# Patient Record
Sex: FEMALE | Race: WHITE | Employment: UNEMPLOYED | URBAN - METROPOLITAN AREA
[De-identification: names, ages, dates, MRNs, and addresses within clinical notes are randomized per-mention and may not be internally consistent; named-entity substitution may affect disease eponyms.]

---

## 2015-01-30 LAB — HPV ABSTRACT: NORMAL

## 2017-05-10 ENCOUNTER — TELEPHONE (OUTPATIENT)
Dept: NURSING | Facility: CLINIC | Age: 42
End: 2017-05-10

## 2017-08-11 ENCOUNTER — APPOINTMENT (OUTPATIENT)
Dept: CT IMAGING | Facility: CLINIC | Age: 42
End: 2017-08-11
Attending: EMERGENCY MEDICINE
Payer: COMMERCIAL

## 2017-08-11 ENCOUNTER — APPOINTMENT (OUTPATIENT)
Dept: ULTRASOUND IMAGING | Facility: CLINIC | Age: 42
End: 2017-08-11
Attending: EMERGENCY MEDICINE
Payer: COMMERCIAL

## 2017-08-11 ENCOUNTER — HOSPITAL ENCOUNTER (EMERGENCY)
Facility: CLINIC | Age: 42
Discharge: HOME | End: 2017-08-11
Attending: EMERGENCY MEDICINE | Admitting: EMERGENCY MEDICINE
Payer: COMMERCIAL

## 2017-08-11 VITALS
SYSTOLIC BLOOD PRESSURE: 122 MMHG | RESPIRATION RATE: 16 BRPM | TEMPERATURE: 98.1 F | OXYGEN SATURATION: 97 % | DIASTOLIC BLOOD PRESSURE: 84 MMHG | BODY MASS INDEX: 43.4 KG/M2 | WEIGHT: 293 LBS | HEIGHT: 69 IN

## 2017-08-11 DIAGNOSIS — R17 TOTAL BILIRUBIN, ELEVATED: ICD-10-CM

## 2017-08-11 DIAGNOSIS — R11.2 NON-INTRACTABLE VOMITING WITH NAUSEA, UNSPECIFIED VOMITING TYPE: ICD-10-CM

## 2017-08-11 DIAGNOSIS — R74.8 ELEVATED LIPASE: ICD-10-CM

## 2017-08-11 LAB
ALBUMIN SERPL-MCNC: 3.4 G/DL (ref 3.4–5)
ALP SERPL-CCNC: 65 U/L (ref 40–150)
ALT SERPL W P-5'-P-CCNC: 49 U/L (ref 0–50)
ANION GAP SERPL CALCULATED.3IONS-SCNC: 13 MMOL/L (ref 3–14)
AST SERPL W P-5'-P-CCNC: 86 U/L (ref 0–45)
BASOPHILS # BLD AUTO: 0 10E9/L (ref 0–0.2)
BASOPHILS NFR BLD AUTO: 0.2 %
BILIRUB SERPL-MCNC: 1.6 MG/DL (ref 0.2–1.3)
BUN SERPL-MCNC: 7 MG/DL (ref 7–30)
CALCIUM SERPL-MCNC: 9 MG/DL (ref 8.5–10.1)
CHLORIDE SERPL-SCNC: 100 MMOL/L (ref 94–109)
CO2 SERPL-SCNC: 25 MMOL/L (ref 20–32)
CREAT SERPL-MCNC: 0.62 MG/DL (ref 0.52–1.04)
DIFFERENTIAL METHOD BLD: ABNORMAL
EOSINOPHIL # BLD AUTO: 0 10E9/L (ref 0–0.7)
EOSINOPHIL NFR BLD AUTO: 0.2 %
ERYTHROCYTE [DISTWIDTH] IN BLOOD BY AUTOMATED COUNT: 16.5 % (ref 10–15)
GFR SERPL CREATININE-BSD FRML MDRD: ABNORMAL ML/MIN/1.7M2
GLUCOSE SERPL-MCNC: 120 MG/DL (ref 70–99)
HCG SERPL QL: NEGATIVE
HCT VFR BLD AUTO: 43.8 % (ref 35–47)
HGB BLD-MCNC: 14.6 G/DL (ref 11.7–15.7)
IMM GRANULOCYTES # BLD: 0 10E9/L (ref 0–0.4)
IMM GRANULOCYTES NFR BLD: 0.3 %
INTERPRETATION ECG - MUSE: NORMAL
LIPASE SERPL-CCNC: 738 U/L (ref 73–393)
LYMPHOCYTES # BLD AUTO: 1.2 10E9/L (ref 0.8–5.3)
LYMPHOCYTES NFR BLD AUTO: 18.7 %
MCH RBC QN AUTO: 29.8 PG (ref 26.5–33)
MCHC RBC AUTO-ENTMCNC: 33.3 G/DL (ref 31.5–36.5)
MCV RBC AUTO: 89 FL (ref 78–100)
MONOCYTES # BLD AUTO: 0.5 10E9/L (ref 0–1.3)
MONOCYTES NFR BLD AUTO: 7.7 %
NEUTROPHILS # BLD AUTO: 4.8 10E9/L (ref 1.6–8.3)
NEUTROPHILS NFR BLD AUTO: 72.9 %
NRBC # BLD AUTO: 0 10*3/UL
NRBC BLD AUTO-RTO: 0 /100
PLATELET # BLD AUTO: 173 10E9/L (ref 150–450)
POTASSIUM SERPL-SCNC: 3.5 MMOL/L (ref 3.4–5.3)
PROT SERPL-MCNC: 7.7 G/DL (ref 6.8–8.8)
RBC # BLD AUTO: 4.9 10E12/L (ref 3.8–5.2)
SODIUM SERPL-SCNC: 138 MMOL/L (ref 133–144)
WBC # BLD AUTO: 6.6 10E9/L (ref 4–11)

## 2017-08-11 PROCEDURE — 96361 HYDRATE IV INFUSION ADD-ON: CPT

## 2017-08-11 PROCEDURE — 25000125 ZZHC RX 250: Performed by: EMERGENCY MEDICINE

## 2017-08-11 PROCEDURE — 99285 EMERGENCY DEPT VISIT HI MDM: CPT | Mod: 25

## 2017-08-11 PROCEDURE — 93005 ELECTROCARDIOGRAM TRACING: CPT

## 2017-08-11 PROCEDURE — 76705 ECHO EXAM OF ABDOMEN: CPT

## 2017-08-11 PROCEDURE — 25000128 H RX IP 250 OP 636: Performed by: EMERGENCY MEDICINE

## 2017-08-11 PROCEDURE — 80053 COMPREHEN METABOLIC PANEL: CPT | Performed by: EMERGENCY MEDICINE

## 2017-08-11 PROCEDURE — 96374 THER/PROPH/DIAG INJ IV PUSH: CPT | Mod: 59

## 2017-08-11 PROCEDURE — 96376 TX/PRO/DX INJ SAME DRUG ADON: CPT

## 2017-08-11 PROCEDURE — 83690 ASSAY OF LIPASE: CPT | Performed by: EMERGENCY MEDICINE

## 2017-08-11 PROCEDURE — 74177 CT ABD & PELVIS W/CONTRAST: CPT

## 2017-08-11 PROCEDURE — 85025 COMPLETE CBC W/AUTO DIFF WBC: CPT | Performed by: EMERGENCY MEDICINE

## 2017-08-11 PROCEDURE — 96375 TX/PRO/DX INJ NEW DRUG ADDON: CPT

## 2017-08-11 PROCEDURE — 84703 CHORIONIC GONADOTROPIN ASSAY: CPT | Performed by: EMERGENCY MEDICINE

## 2017-08-11 RX ORDER — ONDANSETRON 2 MG/ML
4 INJECTION INTRAMUSCULAR; INTRAVENOUS
Status: COMPLETED | OUTPATIENT
Start: 2017-08-11 | End: 2017-08-11

## 2017-08-11 RX ORDER — IOPAMIDOL 755 MG/ML
135 INJECTION, SOLUTION INTRAVASCULAR ONCE
Status: COMPLETED | OUTPATIENT
Start: 2017-08-11 | End: 2017-08-11

## 2017-08-11 RX ORDER — ONDANSETRON 4 MG/1
4 TABLET, ORALLY DISINTEGRATING ORAL EVERY 8 HOURS PRN
Qty: 10 TABLET | Refills: 0 | Status: SHIPPED | OUTPATIENT
Start: 2017-08-11 | End: 2017-08-14

## 2017-08-11 RX ORDER — ONDANSETRON 2 MG/ML
4 INJECTION INTRAMUSCULAR; INTRAVENOUS ONCE
Status: COMPLETED | OUTPATIENT
Start: 2017-08-11 | End: 2017-08-11

## 2017-08-11 RX ORDER — METOCLOPRAMIDE HYDROCHLORIDE 5 MG/ML
10 INJECTION INTRAMUSCULAR; INTRAVENOUS ONCE
Status: COMPLETED | OUTPATIENT
Start: 2017-08-11 | End: 2017-08-11

## 2017-08-11 RX ORDER — ONDANSETRON 4 MG/1
4 TABLET, ORALLY DISINTEGRATING ORAL ONCE
Status: DISCONTINUED | OUTPATIENT
Start: 2017-08-11 | End: 2017-08-11 | Stop reason: HOSPADM

## 2017-08-11 RX ORDER — MORPHINE SULFATE 4 MG/ML
4 INJECTION, SOLUTION INTRAMUSCULAR; INTRAVENOUS ONCE
Status: COMPLETED | OUTPATIENT
Start: 2017-08-11 | End: 2017-08-11

## 2017-08-11 RX ORDER — MORPHINE SULFATE 4 MG/ML
4 INJECTION, SOLUTION INTRAMUSCULAR; INTRAVENOUS
Status: COMPLETED | OUTPATIENT
Start: 2017-08-11 | End: 2017-08-11

## 2017-08-11 RX ADMIN — MORPHINE SULFATE 4 MG: 4 INJECTION, SOLUTION INTRAMUSCULAR; INTRAVENOUS at 13:23

## 2017-08-11 RX ADMIN — SODIUM CHLORIDE 80 ML: 9 INJECTION, SOLUTION INTRAVENOUS at 14:38

## 2017-08-11 RX ADMIN — MORPHINE SULFATE 4 MG: 4 INJECTION, SOLUTION INTRAMUSCULAR; INTRAVENOUS at 16:14

## 2017-08-11 RX ADMIN — IOPAMIDOL 135 ML: 755 INJECTION, SOLUTION INTRAVENOUS at 14:38

## 2017-08-11 RX ADMIN — SODIUM CHLORIDE 1000 ML: 9 INJECTION, SOLUTION INTRAVENOUS at 13:21

## 2017-08-11 RX ADMIN — ONDANSETRON 4 MG: 2 SOLUTION INTRAMUSCULAR; INTRAVENOUS at 13:22

## 2017-08-11 RX ADMIN — METOCLOPRAMIDE 10 MG: 5 INJECTION, SOLUTION INTRAMUSCULAR; INTRAVENOUS at 17:41

## 2017-08-11 RX ADMIN — ONDANSETRON 4 MG: 2 SOLUTION INTRAMUSCULAR; INTRAVENOUS at 16:14

## 2017-08-11 ASSESSMENT — ENCOUNTER SYMPTOMS
ABDOMINAL PAIN: 1
NAUSEA: 1
VOMITING: 1

## 2017-08-11 NOTE — DISCHARGE INSTRUCTIONS
Follow up with primary care provider for repeat of labs including liver tests and lipase  Could consider starting an antacid medication regularly  Use zofran for nausea  Return to ED if severe pain, uncontrolled vomiting, fever  Discharge Instructions  Vomiting    You have been seen today for vomiting (throwing up). This is usually caused by a virus, but some bacteria, parasites, medicines or other medical conditions can cause similar symptoms. At this time your provider does not find that your vomiting is a sign of anything dangerous or life-threatening. However, sometimes the signs of serious illness do not show up right away. If you have new or worse symptoms, you may need to be seen again in the Emergency Department or by your primary provider. Remember that serious problems like appendicitis can start as vomiting.    Generally, every Emergency Department visit should have a follow-up clinic visit with either a primary or a specialty clinic/provider. Please follow-up as instructed by your emergency provider today.    Return to the Emergency Department if:    You keep vomiting and you are not able to keep liquids down.     You feel you are getting dehydrated, such as being very thirsty, not urinating (peeing) at least every 8-12 hours, or feeling faint or lightheaded.     You develop a new fever, or your fever continues for more than 2 days.     You have abdominal (belly pain) that seems worse than cramps, is in one spot, or is getting worse over time. Appendicitis usually causes pain in the right lower abdomen (to the right and below your belly button) so watch for pain in this location.    You have blood in your vomit or stools.     You feel very weak.    You are not starting to improve within 24 hours of your visit here.     What can I do to help myself?    The most important thing to do is to drink clear liquids. If you have been vomiting a lot, it is best to have only small, frequent sips of liquids.  Drinking too much at once may cause more vomiting. If you are vomiting often, you must replace minerals, sodium and potassium lost with your illness. Pedialyte  is the best available rehydration liquid but some find that it doesn t taste good so sports drinks are an alterative. You can also drink clear liquids such as water, weak tea, apple juice, and 7-Up . Avoid acid liquids (orange), caffeine (coffee) or alcohol. Do not drink milk until you no longer have diarrhea (loose stools).     After liquids are staying down, you may start eating mild foods. Soda crackers, toast, plain noodles, gelatin, applesauce and bananas are good first choices. Avoid foods that have acid, are spicy, fatty or have a lot of fiber (such as meats, coarse grains, vegetables). You may start eating these foods again in about 3 days when you are better.     Sometimes treatment includes prescription medicine to prevent nausea (sick to your stomach) and vomiting. If your provider prescribes these for you, take them as directed.     Do not take ibuprofen, naproxen, or other nonsteroidal anti-inflammatory (NSAID) medicines without checking with your healthcare provider.     If you were given a prescription for medicine here today, be sure to read all of the information (including the package insert) that comes with your prescription.  This will include important information about the medicine, its side effects, and any warnings that you need to know about.  The pharmacist who fills the prescription can provide more information and answer questions you may have about the medicine.  If you have questions or concerns that the pharmacist cannot address, please call or return to the Emergency Department.     Remember that you can always come back to the Emergency Department if you are not able to see your regular provider in the amount of time listed above, if you get any new symptoms, or if there is anything that worries you.

## 2017-08-11 NOTE — ED AVS SNAPSHOT
Emergency Department    [address] [facility] [address]    Phone:  [phone]    Fax:  [phone]                                       Neema Bailey   MRN: [id number]    Department:   Emergency Department   Date of Visit:  8/11/2017           After Visit Summary Signature Page     I have received my discharge instructions, and my questions have been answered. I have discussed any challenges I see with this plan with the nurse or doctor.    ..........................................................................................................................................  Patient/Patient Representative Signature      ..........................................................................................................................................  Patient Representative Print Name and Relationship to Patient    ..................................................               ................................................  Date                                            Time    ..........................................................................................................................................  Reviewed by Signature/Title    ...................................................              ..............................................  Date                                                            Time

## 2017-08-11 NOTE — ED AVS SNAPSHOT
Emergency Department    [address] [facility] [address]    Phone:  [phone]    Fax:  [phone]                                       Neema Bailey   MRN: [id number]    Department:   Emergency Department   Date of Visit:  8/11/2017           Patient Information     Date Of Birth          1975        Your diagnoses for this visit were:     Non-intractable vomiting with nausea, unspecified vomiting type     Elevated lipase     Total bilirubin, elevated        You were seen by Mile Low MD.      Follow-up Information     Schedule an appointment as soon as possible for a visit with Suresh Shabazz MD.    Contact information:    [facility]  [address]  [facility] [address] [phone]          Follow up with  Emergency Department.    Specialty:  EMERGENCY MEDICINE    Why:  If symptoms worsen    Contact information:    [address] [facility] [id number] [phone]        Discharge Instructions       Follow up with primary care provider for repeat of labs including liver tests and lipase  Could consider starting an antacid medication regularly  Use zofran for nausea  Return to ED if severe pain, uncontrolled vomiting, fever  Discharge Instructions  Vomiting    You have been seen today for vomiting (throwing up). This is usually caused by a virus, but some bacteria, parasites, medicines or other medical conditions can cause similar symptoms. At this time your provider does not find that your vomiting is a sign of anything dangerous or life-threatening. However, sometimes the signs of serious illness do not show up right away. If you have new or worse symptoms, you may need to be seen again in the Emergency Department or by your primary provider. Remember that serious problems like appendicitis can start as vomiting.    Generally, every Emergency Department visit should have a follow-up clinic visit with either a primary or a  specialty clinic/provider. Please follow-up as instructed by your emergency provider today.    Return to the Emergency Department if:    You keep vomiting and you are not able to keep liquids down.     You feel you are getting dehydrated, such as being very thirsty, not urinating (peeing) at least every 8-12 hours, or feeling faint or lightheaded.     You develop a new fever, or your fever continues for more than 2 days.     You have abdominal (belly pain) that seems worse than cramps, is in one spot, or is getting worse over time. Appendicitis usually causes pain in the right lower abdomen (to the right and below your belly button) so watch for pain in this location.    You have blood in your vomit or stools.     You feel very weak.    You are not starting to improve within 24 hours of your visit here.     What can I do to help myself?    The most important thing to do is to drink clear liquids. If you have been vomiting a lot, it is best to have only small, frequent sips of liquids. Drinking too much at once may cause more vomiting. If you are vomiting often, you must replace minerals, sodium and potassium lost with your illness. Pedialyte  is the best available rehydration liquid but some find that it doesn t taste good so sports drinks are an alterative. You can also drink clear liquids such as water, weak tea, apple juice, and 7-Up . Avoid acid liquids (orange), caffeine (coffee) or alcohol. Do not drink milk until you no longer have diarrhea (loose stools).     After liquids are staying down, you may start eating mild foods. Soda crackers, toast, plain noodles, gelatin, applesauce and bananas are good first choices. Avoid foods that have acid, are spicy, fatty or have a lot of fiber (such as meats, coarse grains, vegetables). You may start eating these foods again in about 3 days when you are better.     Sometimes treatment includes prescription medicine to prevent nausea (sick to your stomach) and vomiting.  If your provider prescribes these for you, take them as directed.     Do not take ibuprofen, naproxen, or other nonsteroidal anti-inflammatory (NSAID) medicines without checking with your healthcare provider.     If you were given a prescription for medicine here today, be sure to read all of the information (including the package insert) that comes with your prescription.  This will include important information about the medicine, its side effects, and any warnings that you need to know about.  The pharmacist who fills the prescription can provide more information and answer questions you may have about the medicine.  If you have questions or concerns that the pharmacist cannot address, please call or return to the Emergency Department.     Remember that you can always come back to the Emergency Department if you are not able to see your regular provider in the amount of time listed above, if you get any new symptoms, or if there is anything that worries you.      24 Hour Appointment Hotline       To make an appointment at any [facility], call [phone] ([phone]). If you don't have a family doctor or clinic, we will help you find one. [facility] are conveniently located to serve the needs of you and your family.             Review of your medicines      START taking        Dose / Directions Last dose taken    ondansetron 4 MG ODT tab   Commonly known as:  ZOFRAN ODT   Dose:  4 mg   Quantity:  10 tablet        Take 1 tablet (4 mg) by mouth every 8 hours as needed   Refills:  0          Our records show that you are taking the medicines listed below. If these are incorrect, please call your family doctor or clinic.        Dose / Directions Last dose taken    amitriptyline 10 MG tablet   Commonly known as:  ELAVIL   Dose:  10 mg        10 mg At Bedtime   Refills:  2        chlorhexidine 0.12 % solution   Commonly known as:  PERIDEX        daily as needed   Refills:  0        fluocinolone 0.01 %  solution   Commonly known as:  SYNALAR        Apply topically daily as needed   Refills:  3        GABAPENTIN PO   Dose:  900 mg        Take 900 mg by mouth daily   Refills:  0        IMITREX PO        Take by mouth as needed for migraine (uNKNOWN DOSE)   Refills:  0        IRON SUPPLEMENT PO   Dose:  325 mg        Take 325 mg by mouth daily   Refills:  0        morphine 15 MG IR tablet   Commonly known as:  MSIR   Dose:  15 mg        Take 15 mg by mouth 2 times daily as needed   Refills:  0        Multi-vitamin Tabs tablet   Dose:  1 tablet        Take 1 tablet by mouth daily   Refills:  0        sucralfate 1 GM/10ML suspension   Commonly known as:  CARAFATE   Dose:  1 g   Quantity:  420 mL        Take 10 mLs (1 g) by mouth 4 times daily   Refills:  1        tolterodine 4 MG 24 hr capsule   Commonly known as:  DETROL LA   Dose:  4 mg   Quantity:  30 capsule        Take 1 capsule (4 mg) by mouth daily   Refills:  1        vitamin D 66545 UNIT capsule   Commonly known as:  ERGOCALCIFEROL        once a week   Refills:  0        vitamin E 400 UNIT capsule        daily   Refills:  6                Prescriptions were sent or printed at these locations (1 Prescription)                   Other Prescriptions                Printed at Department/Unit printer (1 of 1)         ondansetron (ZOFRAN ODT) 4 MG ODT tab                Procedures and tests performed during your visit     Abdomen US, limited (RUQ only)    CBC with platelets + differential    CT Abdomen Pelvis w Contrast    Comprehensive metabolic panel    EKG 12 lead    HCG qualitative    Lipase      Orders Needing Specimen Collection     None      Pending Results     No orders found from 8/9/2017 to 8/12/2017.            Pending Culture Results     No orders found from 8/9/2017 to 8/12/2017.            Pending Results Instructions     If you had any lab results that were not finalized at the time of your Discharge, you can call the ED Lab Result RN at [phone].  You will be contacted by this team for any positive Lab results or changes in treatment. The nurses are available 7 days a week from 10A to 6:30P.  You can leave a message 24 hours per day and they will return your call.        Test Results From Your Hospital Stay        8/11/2017  1:28 PM      Component Results     Component Value Ref Range & Units Status    WBC 6.6 4.0 - 11.0 10e9/L Final    RBC Count 4.90 3.8 - 5.2 10e12/L Final    Hemoglobin 14.6 11.7 - 15.7 g/dL Final    Hematocrit 43.8 35.0 - 47.0 % Final    MCV 89 78 - 100 fl Final    MCH 29.8 26.5 - 33.0 pg Final    MCHC 33.3 31.5 - 36.5 g/dL Final    RDW 16.5 (H) 10.0 - 15.0 % Final    Platelet Count 173 150 - 450 10e9/L Final    Diff Method Automated Method  Final    % Neutrophils 72.9 % Final    % Lymphocytes 18.7 % Final    % Monocytes 7.7 % Final    % Eosinophils 0.2 % Final    % Basophils 0.2 % Final    % Immature Granulocytes 0.3 % Final    Nucleated RBCs 0 0 /100 Final    Absolute Neutrophil 4.8 1.6 - 8.3 10e9/L Final    Absolute Lymphocytes 1.2 0.8 - 5.3 10e9/L Final    Absolute Monocytes 0.5 0.0 - 1.3 10e9/L Final    Absolute Eosinophils 0.0 0.0 - 0.7 10e9/L Final    Absolute Basophils 0.0 0.0 - 0.2 10e9/L Final    Abs Immature Granulocytes 0.0 0 - 0.4 10e9/L Final    Absolute Nucleated RBC 0.0  Final         8/11/2017  1:47 PM      Component Results     Component Value Ref Range & Units Status    Sodium 138 133 - 144 mmol/L Final    Potassium 3.5 3.4 - 5.3 mmol/L Final    Chloride 100 94 - 109 mmol/L Final    Carbon Dioxide 25 20 - 32 mmol/L Final    Anion Gap 13 3 - 14 mmol/L Final    Glucose 120 (H) 70 - 99 mg/dL Final    Urea Nitrogen 7 7 - 30 mg/dL Final    Creatinine 0.62 0.52 - 1.04 mg/dL Final    GFR Estimate >90  Non  GFR Calc   >60 mL/min/1.7m2 Final    GFR Estimate If Black >90   GFR Calc   >60 mL/min/1.7m2 Final    Calcium 9.0 8.5 - 10.1 mg/dL Final    Bilirubin Total 1.6 (H) 0.2 - 1.3 mg/dL Final     Albumin 3.4 3.4 - 5.0 g/dL Final    Protein Total 7.7 6.8 - 8.8 g/dL Final    Alkaline Phosphatase 65 40 - 150 U/L Final    ALT 49 0 - 50 U/L Final    AST 86 (H) 0 - 45 U/L Final         8/11/2017  1:45 PM      Component Results     Component Value Ref Range & Units Status    Lipase 738 (H) 73 - 393 U/L Final         8/11/2017  3:33 PM      Narrative     CT ABDOMEN AND PELVIS WITH CONTRAST August 11, 2017 2:44 PM     HISTORY: Abdominal pain, vomiting.    COMPARISON: 6/17/2016.    TECHNIQUE: Volumetric helical acquisition of CT images from the lung  bases through the symphysis pubis after the administration of 135mL  Isovue-370 intravenous contrast. Radiation dose for this scan was  reduced using automated exposure control, adjustment of the mA and/or  kV according to patient size, or iterative reconstruction technique.    FINDINGS: Severe hepatic steatosis and hepatic enlargement, consider  steatohepatitis. Gallbladder unremarkable. No biliary dilatation.  Adrenal glands, kidneys, spleen, and pancreas demonstrate no worrisome  focal lesion. No pancreatic inflammation demonstrated. Spleen is  minimally generous at 13.5 cm. Nonaneurysmal aorta. No free fluid. No  free air. There are no lymph nodes that are abnormal by size criteria.   There are no dilated loops of small intestine or large bowel to  suggest ileus or obstruction. Gastric surgical changes noted. Survey  of the visualized bony structures demonstrates no destructive bony  lesions. The visualized lung bases are clear of infiltrate. Stable  benign granuloma at the left base.        Impression     IMPRESSION: Severe hepatic steatosis and hepatic enlargement, consider  steatohepatitis. Otherwise no acute process demonstrated in the  abdomen and pelvis.    ANETA GRADY MD         8/11/2017  2:01 PM      Component Results     Component Value Ref Range & Units Status    HCG Qualitative Serum Negative NEG Final         8/11/2017  4:08 PM      Narrative      ULTRASOUND ABDOMEN LIMITED   8/11/2017 3:57 PM     HISTORY: Epigastric pain, elevated lipase.    COMPARISON: CT 8/11/2017.    FINDINGS: Visualized portions of the pancreas are unremarkable. There  is diffuse increased echogenicity of the enlarged liver. The liver  measures 18.6 cm in length. No focal liver lesion or biliary  dilatation demonstrated. Common bile duct measures 3 mm. The  gallbladder appears normal. No gallstones demonstrated. The right  kidney measures 11.2 cm and appears normal.        Impression     IMPRESSION: Fatty infiltration of the liver. No gallstones.    EVANGELINA PERALTA MD                Clinical Quality Measure: Blood Pressure Screening     Your blood pressure was checked while you were in the emergency department today. The last reading we obtained was  BP: 122/84 . Please read the guidelines below about what these numbers mean and what you should do about them.  If your systolic blood pressure (the top number) is less than 120 and your diastolic blood pressure (the bottom number) is less than 80, then your blood pressure is normal. There is nothing more that you need to do about it.  If your systolic blood pressure (the top number) is 120-139 or your diastolic blood pressure (the bottom number) is 80-89, your blood pressure may be higher than it should be. You should have your blood pressure rechecked within a year by a primary care provider.  If your systolic blood pressure (the top number) is 140 or greater or your diastolic blood pressure (the bottom number) is 90 or greater, you may have high blood pressure. High blood pressure is treatable, but if left untreated over time it can put you at risk for heart attack, stroke, or kidney failure. You should have your blood pressure rechecked by a primary care provider within the next 4 weeks.  If your provider in the emergency department today gave you specific instructions to follow-up with your doctor or provider even sooner than that, you  "should follow that instruction and not wait for up to 4 weeks for your follow-up visit.        Thank you for choosing [facility]       Thank you for choosing [facility] for your care. Our goal is always to provide you with excellent care. Hearing back from our patients is one way we can continue to improve our services. Please take a few minutes to complete the written survey that you may receive in the mail after you visit with us. Thank you!        [employer] Information     [employer] lets you send messages to your doctor, view your test results, renew your prescriptions, schedule appointments and more. To sign up, go to [email or web address] . Click on \"Log in\" on the left side of the screen, which will take you to the Welcome page. Then click on \"Sign up Now\" on the right side of the page.     You will be asked to enter the access code listed below, as well as some personal information. Please follow the directions to create your username and password.     Your access code is: [email or web address]  Expires: 2017  6:20 PM     Your access code will  in 90 days. If you need help or a new code, please call your [facility] clinic or [phone].        Care EveryWhere ID     This is your Care EveryWhere ID. This could be used by other organizations to access your [facility] medical records  [id number]        Equal Access to Services     LISA LAWTON : Hadii rosales Rasmussen, waaxda luqadaha, qaybta kaalmada scott, daja hollins . So [facility] [phone].    ATENCIÓN: Si habla español, tiene a boykin disposición servicios gratuitos de asistencia lingüística. Llame al [phone].    We comply with applicable federal civil rights laws and [place] laws. We do not discriminate on the basis of race, color, national origin, age, disability sex, sexual orientation or gender identity.            After Visit Summary       This is your record. Keep this with you and show to your community " pharmacist(s) and doctor(s) at your next visit.

## 2017-08-11 NOTE — ED PROVIDER NOTES
History     Chief Complaint:  Abdominal Pain     HPI   Neema Bailey is a 42 year old female s/p gastric bypass (2002) who presents with abdominal pain, nausea, and vomiting. The patient reports 2 days ago she began feeling unwell with nausea and vomiting. The patient continued to have nausea/vomiting and developed stabbing epigastric abdominal pain yesterday which has been constant since though the pain is very minimal at this time. The patient was unable to tolerate her symptoms which prompts her visit. She has had little intake since onset of her nausea but has continued to have some urine output. The patient has been unable to keep her medications down since onset of symptoms. Normal BMs. No chest pain. No urinary symptoms.     Allergies:  The patient has no known allergies to medications.      Medications:    Sucralfate (carafate) 1 gm/10ml suspension  Vitamin e 400 unit capsule  Morphine (msir) 15 mg tablet  Fluocinolone (synalar) 0.01 % external solution  Vitamin d (ergocalciferol) 76193 unit capsule  Chlorhexidine (peridex) 0.12 % solution  Amitriptyline (elavil) 10 mg tablet  Gabapentin po  Multivitamin, therapeutic with minerals (multi-vitamin) tabs  Ferrous sulfate (iron supplement po)  Sumatriptan succinate (imitrex po)  Tolterodine (detrol la) 4 mg 24 hr capsule    Past Medical History:    Anemia   Numbness and tingling   Other chronic pain   Seborrheic dermatitis   Vitamin D deficiency       Past Surgical History:    Gastric bypass  Appendectomy  Back surgery  Colporrhaphy anterior  Tonsillectomy    Family History:    The patient has no pertinent family history.     Social History:  Single.  The patient denies smoking.  The patient denies alcohol consumption.      Review of Systems   Gastrointestinal: Positive for abdominal pain, nausea and vomiting.   All other systems reviewed and are negative.    Physical Exam   First Vitals:  BP: (!) 146/102  Heart Rate: 99  Temp: 98.1  F (36.7  " C)  Resp: 20  Height: 175.3 cm (5' 9\")  Weight: (!) 145.2 kg (320 lb)  SpO2: 100 %    Physical Exam  General: Resting comfortably on the gurney  Eyes:  The pupils are equal and round  ENT:    Moist mucous membranes  Neck:  Normal range of motion  CV:  Regular rate and rhythm    Skin warm and well perfused   Resp:  Lungs are clear    Non-labored    No rales    No wheezing   GI:  Abdomen is soft, there is no rigidity    No distension    No rebound tenderness     Minimal epigastric and mid abdominal pain on palpation  MS:  Normal muscular tone  Skin:  No rash or acute skin lesions noted  Neuro:   Awake, alert.      Speech is normal and fluent.    Face is symmetric.     Moves all extremities  Psych:  Normal affect.  Appropriate interactions.    Emergency Department Course   ECG:  Completed at 1734.  Read at 1738.   Rate 90 bpm. RI interval 150. QRS duration 76. QT/QTc 378/462. P-R-T axes 42 -33 18. normal sinus rhythm, left axis deviation, low voltage QRS, cannot rule out anterior infarct age undetermined, abnormal ECG     Imaging:  Radiographic findings were communicated with the patient who voiced understanding of the findings.    US Abdomen Limited RUQ:  Fatty infiltration of the liver. No gallstones.  Results per radiology.     CT Abdomen Pelvis w contrast:   Severe hepatic steatosis and hepatic enlargement, consider  steatohepatitis. Otherwise no acute process demonstrated in the  abdomen and pelvis  Results per radiology.     Laboratory:  CBC: WNL (WBC 6.6, HGB 14.6, )  CMP:  (H), Bili tot 1.6 (H), AST 86 (H), ow wnl (creat 0.62)  Lipase: 738     Interventions:  Zofran 8 mg IV  Morphine 8 mg IV  0.9% sodium chloride infusion 1000 mL    Emergency Department Course:  Nursing notes and vitals reviewed.  I performed an exam of the patient as documented above.     The work up above was undertaken.     The patient received the intervention(s) above.     Findings and plan explained to the Patient. Patient " discharged home with instructions regarding supportive care, medications, and reasons to return. The importance of close follow-up was reviewed.     Impression & Plan    Medical Decision Making:  Neema Bailey is a 42 year old female who presented to the emergency department with vomiting. She does have some mild epigastric tenderness though really unable to illicit much tenderness on exam and she reports pain is minimal at this time and mostly concerned about her nausea and vomiting. Vital signs normal except for mild hypertension. Laboratory values obtained and lipase is slightly elevated, total bilirubin is slightly elevated, and AST is slightly elevated. Has had elevated AST in past. CT abdomen was performed and shows severe hepatic steatosis. She is not a drinker but is overweight which is likely cause of this and she has been told this in the past. No gallstones on ultrasound and her common bile duct is normal. Patient was able to tolerate oral intake here in the emergency department w/o vomiting and feeling better though still mildly nauseous intermittently. Could be a small gall stone that they do not visualize or has already passed which caused the lab abnormalities. Lipase is slightly elevated which could be causing her nausea/vomiting though lipase minimal elevated. Potentially could be gastritis as well. I do not suspect ectopic pregnancy, ovarian torsion, ACS, PE, or mesenteric ischemia. No evidence of cholangitis, cholecystitis. Given that she is able to tolerate oral intake, well appearing, minimal pain, nl WBC, will discharge her home to follow up with PCP for recheck of the labs. Reasons to return to the emergency department were discussed with the patient.     Diagnosis:    ICD-10-CM    1. Non-intractable vomiting with nausea, unspecified vomiting type R11.2    2. Elevated lipase R74.8    3. Total bilirubin, elevated R17        Disposition:  Discharged.    Discharge Medications:  New  Prescriptions    ONDANSETRON (ZOFRAN ODT) 4 MG ODT TAB    Take 1 tablet (4 mg) by mouth every 8 hours as needed       IMelecio, am serving as a scribe at 4:57 PM on 8/11/2017 to document services personally performed by Dr. Low, based on my observations and the provider's statements to me.     EMERGENCY DEPARTMENT       Mile Low MD  08/11/17 5808

## 2018-01-01 ENCOUNTER — HOSPITAL ENCOUNTER (EMERGENCY)
Facility: CLINIC | Age: 43
Discharge: HOME | End: 2018-12-15
Attending: EMERGENCY MEDICINE | Admitting: EMERGENCY MEDICINE
Payer: COMMERCIAL

## 2018-01-01 ENCOUNTER — TELEPHONE (OUTPATIENT)
Dept: FAMILY MEDICINE | Facility: CLINIC | Age: 43
End: 2018-01-01

## 2018-01-01 ENCOUNTER — OFFICE VISIT (OUTPATIENT)
Dept: FAMILY MEDICINE | Facility: CLINIC | Age: 43
End: 2018-01-01
Payer: COMMERCIAL

## 2018-01-01 ENCOUNTER — ANESTHESIA (OUTPATIENT)
Dept: SURGERY | Facility: CLINIC | Age: 43
End: 2018-01-01
Payer: COMMERCIAL

## 2018-01-01 ENCOUNTER — APPOINTMENT (OUTPATIENT)
Dept: GENERAL RADIOLOGY | Facility: CLINIC | Age: 43
End: 2018-01-01
Attending: EMERGENCY MEDICINE
Payer: COMMERCIAL

## 2018-01-01 ENCOUNTER — RADIANT APPOINTMENT (OUTPATIENT)
Dept: GENERAL RADIOLOGY | Facility: CLINIC | Age: 43
End: 2018-01-01
Attending: PHYSICIAN ASSISTANT
Payer: COMMERCIAL

## 2018-01-01 ENCOUNTER — APPOINTMENT (OUTPATIENT)
Dept: CT IMAGING | Facility: CLINIC | Age: 43
End: 2018-01-01
Attending: EMERGENCY MEDICINE
Payer: COMMERCIAL

## 2018-01-01 ENCOUNTER — TELEPHONE (OUTPATIENT)
Dept: OBGYN | Facility: CLINIC | Age: 43
End: 2018-01-01

## 2018-01-01 ENCOUNTER — HOSPITAL ENCOUNTER (EMERGENCY)
Facility: CLINIC | Age: 43
Discharge: HOME | End: 2018-12-20
Attending: EMERGENCY MEDICINE | Admitting: EMERGENCY MEDICINE
Payer: COMMERCIAL

## 2018-01-01 ENCOUNTER — ANESTHESIA EVENT (OUTPATIENT)
Dept: SURGERY | Facility: CLINIC | Age: 43
End: 2018-01-01
Payer: COMMERCIAL

## 2018-01-01 ENCOUNTER — HOSPITAL ENCOUNTER (EMERGENCY)
Facility: CLINIC | Age: 43
Discharge: HOME | End: 2018-10-22
Attending: EMERGENCY MEDICINE | Admitting: EMERGENCY MEDICINE
Payer: COMMERCIAL

## 2018-01-01 ENCOUNTER — OFFICE VISIT (OUTPATIENT)
Dept: OBGYN | Facility: CLINIC | Age: 43
End: 2018-01-01
Payer: COMMERCIAL

## 2018-01-01 ENCOUNTER — NURSE TRIAGE (OUTPATIENT)
Dept: NURSING | Facility: CLINIC | Age: 43
End: 2018-01-01

## 2018-01-01 ENCOUNTER — HOSPITAL ENCOUNTER (OUTPATIENT)
Facility: CLINIC | Age: 43
Discharge: HOME | End: 2018-11-28
Attending: OBSTETRICS & GYNECOLOGY | Admitting: OBSTETRICS & GYNECOLOGY
Payer: COMMERCIAL

## 2018-01-01 ENCOUNTER — SURGERY (OUTPATIENT)
Age: 43
End: 2018-01-01
Payer: COMMERCIAL

## 2018-01-01 VITALS
SYSTOLIC BLOOD PRESSURE: 110 MMHG | HEIGHT: 69 IN | WEIGHT: 270 LBS | DIASTOLIC BLOOD PRESSURE: 85 MMHG | TEMPERATURE: 98.1 F | RESPIRATION RATE: 18 BRPM | OXYGEN SATURATION: 98 % | BODY MASS INDEX: 39.99 KG/M2

## 2018-01-01 VITALS
DIASTOLIC BLOOD PRESSURE: 60 MMHG | OXYGEN SATURATION: 93 % | HEART RATE: 67 BPM | HEIGHT: 68 IN | WEIGHT: 266.9 LBS | SYSTOLIC BLOOD PRESSURE: 110 MMHG | BODY MASS INDEX: 40.45 KG/M2 | TEMPERATURE: 98.6 F | RESPIRATION RATE: 16 BRPM

## 2018-01-01 VITALS
WEIGHT: 271 LBS | BODY MASS INDEX: 40.02 KG/M2 | TEMPERATURE: 97 F | HEART RATE: 68 BPM | SYSTOLIC BLOOD PRESSURE: 128 MMHG | RESPIRATION RATE: 16 BRPM | DIASTOLIC BLOOD PRESSURE: 70 MMHG

## 2018-01-01 VITALS
SYSTOLIC BLOOD PRESSURE: 119 MMHG | RESPIRATION RATE: 16 BRPM | OXYGEN SATURATION: 96 % | HEART RATE: 80 BPM | DIASTOLIC BLOOD PRESSURE: 80 MMHG | BODY MASS INDEX: 38.51 KG/M2 | HEIGHT: 69 IN | WEIGHT: 260 LBS | TEMPERATURE: 98.6 F

## 2018-01-01 VITALS
DIASTOLIC BLOOD PRESSURE: 76 MMHG | BODY MASS INDEX: 38.51 KG/M2 | TEMPERATURE: 97.6 F | HEIGHT: 69 IN | WEIGHT: 260 LBS | SYSTOLIC BLOOD PRESSURE: 118 MMHG

## 2018-01-01 VITALS
BODY MASS INDEX: 40.46 KG/M2 | RESPIRATION RATE: 18 BRPM | WEIGHT: 274 LBS | TEMPERATURE: 97 F | SYSTOLIC BLOOD PRESSURE: 130 MMHG | DIASTOLIC BLOOD PRESSURE: 80 MMHG | HEART RATE: 78 BPM

## 2018-01-01 VITALS
SYSTOLIC BLOOD PRESSURE: 112 MMHG | HEIGHT: 68 IN | DIASTOLIC BLOOD PRESSURE: 74 MMHG | WEIGHT: 201 LBS | BODY MASS INDEX: 30.46 KG/M2

## 2018-01-01 VITALS
WEIGHT: 266 LBS | SYSTOLIC BLOOD PRESSURE: 112 MMHG | HEIGHT: 68 IN | BODY MASS INDEX: 40.32 KG/M2 | DIASTOLIC BLOOD PRESSURE: 62 MMHG | HEART RATE: 76 BPM

## 2018-01-01 VITALS
RESPIRATION RATE: 20 BRPM | HEIGHT: 68 IN | DIASTOLIC BLOOD PRESSURE: 77 MMHG | OXYGEN SATURATION: 98 % | WEIGHT: 260 LBS | BODY MASS INDEX: 39.4 KG/M2 | TEMPERATURE: 98.1 F | SYSTOLIC BLOOD PRESSURE: 128 MMHG

## 2018-01-01 DIAGNOSIS — Z48.816 ENCOUNTER FOR SURGICAL AFTERCARE FOLLOWING SURGERY OF GENITOURINARY SYSTEM: Primary | ICD-10-CM

## 2018-01-01 DIAGNOSIS — S62.647A CLOSED NONDISPLACED FRACTURE OF PROXIMAL PHALANX OF LEFT LITTLE FINGER, INITIAL ENCOUNTER: Primary | ICD-10-CM

## 2018-01-01 DIAGNOSIS — K59.03 DRUG-INDUCED CONSTIPATION: ICD-10-CM

## 2018-01-01 DIAGNOSIS — R45.851 SUICIDAL IDEATION: ICD-10-CM

## 2018-01-01 DIAGNOSIS — R82.90 NONSPECIFIC FINDING ON EXAMINATION OF URINE: ICD-10-CM

## 2018-01-01 DIAGNOSIS — R30.0 DYSURIA: ICD-10-CM

## 2018-01-01 DIAGNOSIS — R82.90 ABNORMAL URINE FINDINGS: ICD-10-CM

## 2018-01-01 DIAGNOSIS — R19.7 DIARRHEA, UNSPECIFIED TYPE: ICD-10-CM

## 2018-01-01 DIAGNOSIS — B96.89 BACTERIAL VAGINOSIS: ICD-10-CM

## 2018-01-01 DIAGNOSIS — S50.819A ABRASION OF FOREARM, UNSPECIFIED LATERALITY, INITIAL ENCOUNTER: ICD-10-CM

## 2018-01-01 DIAGNOSIS — G89.18 ACUTE POST-OPERATIVE PAIN: ICD-10-CM

## 2018-01-01 DIAGNOSIS — Z23 NEED FOR PROPHYLACTIC VACCINATION AND INOCULATION AGAINST INFLUENZA: ICD-10-CM

## 2018-01-01 DIAGNOSIS — G89.18 ACUTE POST-OPERATIVE PAIN: Primary | ICD-10-CM

## 2018-01-01 DIAGNOSIS — N95.1 SYMPTOMATIC MENOPAUSAL OR FEMALE CLIMACTERIC STATES: Primary | ICD-10-CM

## 2018-01-01 DIAGNOSIS — R79.89 ELEVATED LFTS: ICD-10-CM

## 2018-01-01 DIAGNOSIS — M25.512 ACUTE PAIN OF LEFT SHOULDER DUE TO TRAUMA: ICD-10-CM

## 2018-01-01 DIAGNOSIS — N81.3 UTEROVAGINAL PROLAPSE, COMPLETE: Primary | ICD-10-CM

## 2018-01-01 DIAGNOSIS — R79.89 ELEVATED LFTS: Primary | ICD-10-CM

## 2018-01-01 DIAGNOSIS — Z11.3 SCREEN FOR STD (SEXUALLY TRANSMITTED DISEASE): ICD-10-CM

## 2018-01-01 DIAGNOSIS — F10.920 ALCOHOLIC INTOXICATION WITHOUT COMPLICATION (H): ICD-10-CM

## 2018-01-01 DIAGNOSIS — N76.0 BACTERIAL VAGINOSIS: ICD-10-CM

## 2018-01-01 DIAGNOSIS — N95.1 SYMPTOMS, SUCH AS FLUSHING, SLEEPLESSNESS, HEADACHE, LACK OF CONCENTRATION, ASSOCIATED WITH THE MENOPAUSE: Primary | ICD-10-CM

## 2018-01-01 DIAGNOSIS — S09.90XD CLOSED HEAD INJURY, SUBSEQUENT ENCOUNTER: Primary | ICD-10-CM

## 2018-01-01 DIAGNOSIS — S00.81XA FACIAL ABRASION, INITIAL ENCOUNTER: ICD-10-CM

## 2018-01-01 DIAGNOSIS — E66.01 MORBID OBESITY (H): ICD-10-CM

## 2018-01-01 DIAGNOSIS — R11.0 NAUSEA: ICD-10-CM

## 2018-01-01 DIAGNOSIS — F10.920 ALCOHOL INTOXICATION, UNCOMPLICATED (H): ICD-10-CM

## 2018-01-01 DIAGNOSIS — N81.4 UTEROVAGINAL PROLAPSE: ICD-10-CM

## 2018-01-01 DIAGNOSIS — N30.00 ACUTE CYSTITIS WITHOUT HEMATURIA: Primary | ICD-10-CM

## 2018-01-01 DIAGNOSIS — S62.92XA FRACTURE OF LEFT HAND, CLOSED, INITIAL ENCOUNTER: ICD-10-CM

## 2018-01-01 DIAGNOSIS — G89.11 ACUTE PAIN OF LEFT SHOULDER DUE TO TRAUMA: ICD-10-CM

## 2018-01-01 LAB
ALBUMIN SERPL-MCNC: 3.4 G/DL (ref 3.4–5)
ALBUMIN UR-MCNC: ABNORMAL MG/DL
ALCOHOL BREATH TEST: 0.1 (ref 0–0.01)
ALCOHOL BREATH TEST: 0.23 (ref 0–0.01)
ALCOHOL BREATH TEST: 0.26 (ref 0–0.01)
ALP SERPL-CCNC: 53 U/L (ref 40–150)
ALT SERPL W P-5'-P-CCNC: 106 U/L (ref 0–50)
ANION GAP SERPL CALCULATED.3IONS-SCNC: 10 MMOL/L (ref 3–14)
ANION GAP SERPL CALCULATED.3IONS-SCNC: 9 MMOL/L (ref 3–14)
APPEARANCE UR: CLEAR
AST SERPL W P-5'-P-CCNC: 193 U/L (ref 0–45)
B-HCG SERPL-ACNC: <1 IU/L (ref 0–5)
BACTERIA #/AREA URNS HPF: ABNORMAL /HPF
BACTERIA SPEC CULT: NORMAL
BASOPHILS # BLD AUTO: 0 10E9/L (ref 0–0.2)
BASOPHILS # BLD AUTO: 0 10E9/L (ref 0–0.2)
BASOPHILS # BLD AUTO: 0.1 10E9/L (ref 0–0.2)
BASOPHILS NFR BLD AUTO: 0.2 %
BASOPHILS NFR BLD AUTO: 0.6 %
BASOPHILS NFR BLD AUTO: 1.3 %
BILIRUB DIRECT SERPL-MCNC: 0.5 MG/DL (ref 0–0.2)
BILIRUB SERPL-MCNC: 1.2 MG/DL (ref 0.2–1.3)
BILIRUB UR QL STRIP: ABNORMAL
BUN SERPL-MCNC: 3 MG/DL (ref 7–30)
BUN SERPL-MCNC: 7 MG/DL (ref 7–30)
C TRACH DNA SPEC QL NAA+PROBE: NEGATIVE
CALCIUM SERPL-MCNC: 7.9 MG/DL (ref 8.5–10.1)
CALCIUM SERPL-MCNC: 8.1 MG/DL (ref 8.5–10.1)
CHLORIDE SERPL-SCNC: 103 MMOL/L (ref 94–109)
CHLORIDE SERPL-SCNC: 110 MMOL/L (ref 94–109)
CO2 SERPL-SCNC: 27 MMOL/L (ref 20–32)
CO2 SERPL-SCNC: 28 MMOL/L (ref 20–32)
COLOR UR AUTO: YELLOW
COPATH REPORT: NORMAL
CREAT SERPL-MCNC: 0.58 MG/DL (ref 0.52–1.04)
CREAT SERPL-MCNC: 0.6 MG/DL (ref 0.52–1.04)
DIFFERENTIAL METHOD BLD: ABNORMAL
EOSINOPHIL # BLD AUTO: 0.1 10E9/L (ref 0–0.7)
EOSINOPHIL # BLD AUTO: 0.2 10E9/L (ref 0–0.7)
EOSINOPHIL # BLD AUTO: 0.2 10E9/L (ref 0–0.7)
EOSINOPHIL NFR BLD AUTO: 1.4 %
EOSINOPHIL NFR BLD AUTO: 2.4 %
EOSINOPHIL NFR BLD AUTO: 2.5 %
ERYTHROCYTE [DISTWIDTH] IN BLOOD BY AUTOMATED COUNT: 15.5 % (ref 10–15)
ERYTHROCYTE [DISTWIDTH] IN BLOOD BY AUTOMATED COUNT: 17.5 % (ref 10–15)
ERYTHROCYTE [DISTWIDTH] IN BLOOD BY AUTOMATED COUNT: 17.8 % (ref 10–15)
ESTRADIOL SERPL-MCNC: 22 PG/ML
FSH SERPL-ACNC: 33.2 IU/L
GFR SERPL CREATININE-BSD FRML MDRD: >90 ML/MIN/1.7M2
GFR SERPL CREATININE-BSD FRML MDRD: >90 ML/MIN/{1.73_M2}
GLUCOSE SERPL-MCNC: 101 MG/DL (ref 70–99)
GLUCOSE SERPL-MCNC: 109 MG/DL (ref 70–99)
GLUCOSE UR STRIP-MCNC: NEGATIVE MG/DL
HAV IGM SERPL QL IA: NONREACTIVE
HBV CORE AB SERPL QL IA: NONREACTIVE
HBV SURFACE AG SERPL QL IA: NONREACTIVE
HCT VFR BLD AUTO: 35.9 % (ref 35–47)
HCT VFR BLD AUTO: 36.2 % (ref 35–47)
HCT VFR BLD AUTO: 42.1 % (ref 35–47)
HCV AB SERPL QL IA: NONREACTIVE
HGB BLD-MCNC: 10.6 G/DL (ref 11.7–15.7)
HGB BLD-MCNC: 11.4 G/DL (ref 11.7–15.7)
HGB BLD-MCNC: 11.4 G/DL (ref 11.7–15.7)
HGB BLD-MCNC: 13.6 G/DL (ref 11.7–15.7)
HGB BLD-MCNC: 9.3 G/DL (ref 11.7–15.7)
HGB UR QL STRIP: ABNORMAL
HIV 1+2 AB+HIV1 P24 AG SERPL QL IA: NONREACTIVE
IMM GRANULOCYTES # BLD: 0 10E9/L (ref 0–0.4)
IMM GRANULOCYTES # BLD: 0 10E9/L (ref 0–0.4)
IMM GRANULOCYTES NFR BLD: 0.4 %
IMM GRANULOCYTES NFR BLD: 0.6 %
KETONES UR STRIP-MCNC: 15 MG/DL
LEUKOCYTE ESTERASE UR QL STRIP: ABNORMAL
LYMPHOCYTES # BLD AUTO: 0.8 10E9/L (ref 0.8–5.3)
LYMPHOCYTES # BLD AUTO: 1.9 10E9/L (ref 0.8–5.3)
LYMPHOCYTES # BLD AUTO: 2.2 10E9/L (ref 0.8–5.3)
LYMPHOCYTES NFR BLD AUTO: 15.6 %
LYMPHOCYTES NFR BLD AUTO: 30.1 %
LYMPHOCYTES NFR BLD AUTO: 32.8 %
MCH RBC QN AUTO: 30.2 PG (ref 26.5–33)
MCH RBC QN AUTO: 30.4 PG (ref 26.5–33)
MCH RBC QN AUTO: 30.5 PG (ref 26.5–33)
MCHC RBC AUTO-ENTMCNC: 31.5 G/DL (ref 31.5–36.5)
MCHC RBC AUTO-ENTMCNC: 31.8 G/DL (ref 31.5–36.5)
MCHC RBC AUTO-ENTMCNC: 32.3 G/DL (ref 31.5–36.5)
MCV RBC AUTO: 94 FL (ref 78–100)
MCV RBC AUTO: 96 FL (ref 78–100)
MCV RBC AUTO: 96 FL (ref 78–100)
MONOCYTES # BLD AUTO: 0.4 10E9/L (ref 0–1.3)
MONOCYTES NFR BLD AUTO: 6.1 %
MONOCYTES NFR BLD AUTO: 7 %
MONOCYTES NFR BLD AUTO: 8.4 %
N GONORRHOEA DNA SPEC QL NAA+PROBE: NEGATIVE
NEUTROPHILS # BLD AUTO: 3.6 10E9/L (ref 1.6–8.3)
NEUTROPHILS # BLD AUTO: 3.8 10E9/L (ref 1.6–8.3)
NEUTROPHILS # BLD AUTO: 3.8 10E9/L (ref 1.6–8.3)
NEUTROPHILS NFR BLD AUTO: 56.9 %
NEUTROPHILS NFR BLD AUTO: 59.9 %
NEUTROPHILS NFR BLD AUTO: 73.8 %
NITRATE UR QL: NEGATIVE
NON-SQ EPI CELLS #/AREA URNS LPF: ABNORMAL /LPF
NRBC # BLD AUTO: 0 10*3/UL
NRBC # BLD AUTO: 0 10*3/UL
NRBC BLD AUTO-RTO: 0 /100
NRBC BLD AUTO-RTO: 0 /100
PH UR STRIP: 6 PH (ref 5–7)
PLATELET # BLD AUTO: 154 10E9/L (ref 150–450)
PLATELET # BLD AUTO: 265 10E9/L (ref 150–450)
PLATELET # BLD AUTO: 99 10E9/L (ref 150–450)
POTASSIUM SERPL-SCNC: 3.4 MMOL/L (ref 3.4–5.3)
POTASSIUM SERPL-SCNC: 3.6 MMOL/L (ref 3.4–5.3)
PROT SERPL-MCNC: 6.5 G/DL (ref 6.8–8.8)
RBC # BLD AUTO: 3.75 10E12/L (ref 3.8–5.2)
RBC # BLD AUTO: 3.77 10E12/L (ref 3.8–5.2)
RBC # BLD AUTO: 4.46 10E12/L (ref 3.8–5.2)
RBC #/AREA URNS AUTO: ABNORMAL /HPF
SODIUM SERPL-SCNC: 140 MMOL/L (ref 133–144)
SODIUM SERPL-SCNC: 147 MMOL/L (ref 133–144)
SOURCE: ABNORMAL
SP GR UR STRIP: 1.02 (ref 1–1.03)
SPECIMEN SOURCE: ABNORMAL
SPECIMEN SOURCE: NORMAL
T PALLIDUM AB SER QL: NONREACTIVE
UROBILINOGEN UR STRIP-ACNC: >=8 EU/DL (ref 0.2–1)
WBC # BLD AUTO: 4.9 10E9/L (ref 4–11)
WBC # BLD AUTO: 6.3 10E9/L (ref 4–11)
WBC # BLD AUTO: 6.7 10E9/L (ref 4–11)
WBC #/AREA URNS AUTO: ABNORMAL /HPF
WET PREP SPEC: ABNORMAL

## 2018-01-01 PROCEDURE — 36415 COLL VENOUS BLD VENIPUNCTURE: CPT | Performed by: OBSTETRICS & GYNECOLOGY

## 2018-01-01 PROCEDURE — 40000169 ZZH STATISTIC PRE-PROCEDURE ASSESSMENT I: Performed by: OBSTETRICS & GYNECOLOGY

## 2018-01-01 PROCEDURE — 96374 THER/PROPH/DIAG INJ IV PUSH: CPT

## 2018-01-01 PROCEDURE — 81001 URINALYSIS AUTO W/SCOPE: CPT | Performed by: PHYSICIAN ASSISTANT

## 2018-01-01 PROCEDURE — 26720 TREAT FINGER FRACTURE EACH: CPT | Mod: LT

## 2018-01-01 PROCEDURE — 83001 ASSAY OF GONADOTROPIN (FSH): CPT | Performed by: OBSTETRICS & GYNECOLOGY

## 2018-01-01 PROCEDURE — P9041 ALBUMIN (HUMAN),5%, 50ML: HCPCS | Performed by: NURSE ANESTHETIST, CERTIFIED REGISTERED

## 2018-01-01 PROCEDURE — 99215 OFFICE O/P EST HI 40 MIN: CPT | Mod: 25 | Performed by: PHYSICIAN ASSISTANT

## 2018-01-01 PROCEDURE — 25000128 H RX IP 250 OP 636: Performed by: EMERGENCY MEDICINE

## 2018-01-01 PROCEDURE — 96360 HYDRATION IV INFUSION INIT: CPT

## 2018-01-01 PROCEDURE — 96375 TX/PRO/DX INJ NEW DRUG ADDON: CPT

## 2018-01-01 PROCEDURE — 25000128 H RX IP 250 OP 636: Performed by: ANESTHESIOLOGY

## 2018-01-01 PROCEDURE — 88305 TISSUE EXAM BY PATHOLOGIST: CPT | Mod: 26 | Performed by: OBSTETRICS & GYNECOLOGY

## 2018-01-01 PROCEDURE — 85025 COMPLETE CBC W/AUTO DIFF WBC: CPT | Performed by: EMERGENCY MEDICINE

## 2018-01-01 PROCEDURE — 99204 OFFICE O/P NEW MOD 45 MIN: CPT | Performed by: OBSTETRICS & GYNECOLOGY

## 2018-01-01 PROCEDURE — 25000128 H RX IP 250 OP 636: Performed by: OBSTETRICS & GYNECOLOGY

## 2018-01-01 PROCEDURE — 25000128 H RX IP 250 OP 636: Performed by: NURSE ANESTHETIST, CERTIFIED REGISTERED

## 2018-01-01 PROCEDURE — 84702 CHORIONIC GONADOTROPIN TEST: CPT | Performed by: OBSTETRICS & GYNECOLOGY

## 2018-01-01 PROCEDURE — 40000936 ZZH STATISTIC OUTPATIENT (NON-OBS) NIGHT

## 2018-01-01 PROCEDURE — 25000132 ZZH RX MED GY IP 250 OP 250 PS 637: Performed by: ANESTHESIOLOGY

## 2018-01-01 PROCEDURE — 87210 SMEAR WET MOUNT SALINE/INK: CPT | Performed by: PHYSICIAN ASSISTANT

## 2018-01-01 PROCEDURE — 86780 TREPONEMA PALLIDUM: CPT | Performed by: PHYSICIAN ASSISTANT

## 2018-01-01 PROCEDURE — 40000934 ZZH STATISTIC OUTPATIENT (NON-OBS) DAY

## 2018-01-01 PROCEDURE — 25000125 ZZHC RX 250: Performed by: NURSE ANESTHETIST, CERTIFIED REGISTERED

## 2018-01-01 PROCEDURE — 87340 HEPATITIS B SURFACE AG IA: CPT | Performed by: PHYSICIAN ASSISTANT

## 2018-01-01 PROCEDURE — 85018 HEMOGLOBIN: CPT | Performed by: OBSTETRICS & GYNECOLOGY

## 2018-01-01 PROCEDURE — 86803 HEPATITIS C AB TEST: CPT | Performed by: PHYSICIAN ASSISTANT

## 2018-01-01 PROCEDURE — 99284 EMERGENCY DEPT VISIT MOD MDM: CPT | Mod: 25

## 2018-01-01 PROCEDURE — 25000566 ZZH SEVOFLURANE, EA 15 MIN: Performed by: OBSTETRICS & GYNECOLOGY

## 2018-01-01 PROCEDURE — 87491 CHLMYD TRACH DNA AMP PROBE: CPT | Performed by: PHYSICIAN ASSISTANT

## 2018-01-01 PROCEDURE — 80076 HEPATIC FUNCTION PANEL: CPT | Performed by: PHYSICIAN ASSISTANT

## 2018-01-01 PROCEDURE — 25000132 ZZH RX MED GY IP 250 OP 250 PS 637: Performed by: OBSTETRICS & GYNECOLOGY

## 2018-01-01 PROCEDURE — 36000058 ZZH SURGERY LEVEL 3 EA 15 ADDTL MIN: Performed by: OBSTETRICS & GYNECOLOGY

## 2018-01-01 PROCEDURE — 25000125 ZZHC RX 250: Performed by: OBSTETRICS & GYNECOLOGY

## 2018-01-01 PROCEDURE — 57260 CMBN ANT PST COLPRHY: CPT | Mod: 51 | Performed by: OBSTETRICS & GYNECOLOGY

## 2018-01-01 PROCEDURE — 96361 HYDRATE IV INFUSION ADD-ON: CPT

## 2018-01-01 PROCEDURE — 88305 TISSUE EXAM BY PATHOLOGIST: CPT | Performed by: OBSTETRICS & GYNECOLOGY

## 2018-01-01 PROCEDURE — 99285 EMERGENCY DEPT VISIT HI MDM: CPT | Mod: 25

## 2018-01-01 PROCEDURE — 87086 URINE CULTURE/COLONY COUNT: CPT | Performed by: PHYSICIAN ASSISTANT

## 2018-01-01 PROCEDURE — 73140 X-RAY EXAM OF FINGER(S): CPT | Mod: LT

## 2018-01-01 PROCEDURE — 40000935 ZZH STATISTIC OUTPATIENT (NON-OBS) EVE

## 2018-01-01 PROCEDURE — 99204 OFFICE O/P NEW MOD 45 MIN: CPT | Performed by: PHYSICIAN ASSISTANT

## 2018-01-01 PROCEDURE — 70486 CT MAXILLOFACIAL W/O DYE: CPT

## 2018-01-01 PROCEDURE — 87389 HIV-1 AG W/HIV-1&-2 AB AG IA: CPT | Performed by: PHYSICIAN ASSISTANT

## 2018-01-01 PROCEDURE — 36415 COLL VENOUS BLD VENIPUNCTURE: CPT | Performed by: PHYSICIAN ASSISTANT

## 2018-01-01 PROCEDURE — 99024 POSTOP FOLLOW-UP VISIT: CPT | Performed by: OBSTETRICS & GYNECOLOGY

## 2018-01-01 PROCEDURE — 36415 COLL VENOUS BLD VENIPUNCTURE: CPT | Performed by: ANESTHESIOLOGY

## 2018-01-01 PROCEDURE — 73130 X-RAY EXAM OF HAND: CPT | Mod: LT

## 2018-01-01 PROCEDURE — 70450 CT HEAD/BRAIN W/O DYE: CPT

## 2018-01-01 PROCEDURE — 58260 VAGINAL HYSTERECTOMY: CPT | Performed by: OBSTETRICS & GYNECOLOGY

## 2018-01-01 PROCEDURE — 27210794 ZZH OR GENERAL SUPPLY STERILE: Performed by: OBSTETRICS & GYNECOLOGY

## 2018-01-01 PROCEDURE — 25000132 ZZH RX MED GY IP 250 OP 250 PS 637: Performed by: EMERGENCY MEDICINE

## 2018-01-01 PROCEDURE — 90791 PSYCH DIAGNOSTIC EVALUATION: CPT

## 2018-01-01 PROCEDURE — 25000128 H RX IP 250 OP 636

## 2018-01-01 PROCEDURE — 86704 HEP B CORE ANTIBODY TOTAL: CPT | Performed by: PHYSICIAN ASSISTANT

## 2018-01-01 PROCEDURE — 85018 HEMOGLOBIN: CPT | Performed by: ANESTHESIOLOGY

## 2018-01-01 PROCEDURE — 36000056 ZZH SURGERY LEVEL 3 1ST 30 MIN: Performed by: OBSTETRICS & GYNECOLOGY

## 2018-01-01 PROCEDURE — 80048 BASIC METABOLIC PNL TOTAL CA: CPT | Performed by: EMERGENCY MEDICINE

## 2018-01-01 PROCEDURE — 99024 POSTOP FOLLOW-UP VISIT: CPT | Performed by: NURSE PRACTITIONER

## 2018-01-01 PROCEDURE — 86709 HEPATITIS A IGM ANTIBODY: CPT | Performed by: PHYSICIAN ASSISTANT

## 2018-01-01 PROCEDURE — 73030 X-RAY EXAM OF SHOULDER: CPT | Mod: LT

## 2018-01-01 PROCEDURE — 29125 APPL SHORT ARM SPLINT STATIC: CPT | Performed by: PHYSICIAN ASSISTANT

## 2018-01-01 PROCEDURE — 85025 COMPLETE CBC W/AUTO DIFF WBC: CPT | Performed by: OBSTETRICS & GYNECOLOGY

## 2018-01-01 PROCEDURE — 71000012 ZZH RECOVERY PHASE 1 LEVEL 1 FIRST HR: Performed by: OBSTETRICS & GYNECOLOGY

## 2018-01-01 PROCEDURE — 82670 ASSAY OF TOTAL ESTRADIOL: CPT | Performed by: OBSTETRICS & GYNECOLOGY

## 2018-01-01 PROCEDURE — 37000009 ZZH ANESTHESIA TECHNICAL FEE, EACH ADDTL 15 MIN: Performed by: OBSTETRICS & GYNECOLOGY

## 2018-01-01 PROCEDURE — 37000008 ZZH ANESTHESIA TECHNICAL FEE, 1ST 30 MIN: Performed by: OBSTETRICS & GYNECOLOGY

## 2018-01-01 PROCEDURE — 87591 N.GONORRHOEAE DNA AMP PROB: CPT | Performed by: PHYSICIAN ASSISTANT

## 2018-01-01 PROCEDURE — 71000013 ZZH RECOVERY PHASE 1 LEVEL 1 EA ADDTL HR: Performed by: OBSTETRICS & GYNECOLOGY

## 2018-01-01 RX ORDER — SODIUM CHLORIDE, SODIUM LACTATE, POTASSIUM CHLORIDE, CALCIUM CHLORIDE 600; 310; 30; 20 MG/100ML; MG/100ML; MG/100ML; MG/100ML
INJECTION, SOLUTION INTRAVENOUS CONTINUOUS
Status: DISCONTINUED | OUTPATIENT
Start: 2018-01-01 | End: 2018-01-01 | Stop reason: HOSPADM

## 2018-01-01 RX ORDER — HYDROXYZINE HYDROCHLORIDE 10 MG/1
10 TABLET, FILM COATED ORAL EVERY 8 HOURS PRN
Status: ON HOLD | COMMUNITY
End: 2018-01-01

## 2018-01-01 RX ORDER — FENTANYL CITRATE 50 UG/ML
25-50 INJECTION, SOLUTION INTRAMUSCULAR; INTRAVENOUS
Status: DISCONTINUED | OUTPATIENT
Start: 2018-01-01 | End: 2018-01-01

## 2018-01-01 RX ORDER — EPHEDRINE SULFATE 50 MG/ML
INJECTION, SOLUTION INTRAMUSCULAR; INTRAVENOUS; SUBCUTANEOUS PRN
Status: DISCONTINUED | OUTPATIENT
Start: 2018-01-01 | End: 2018-01-01

## 2018-01-01 RX ORDER — LOPERAMIDE HYDROCHLORIDE 2 MG/1
2 TABLET ORAL 4 TIMES DAILY PRN
Qty: 30 TABLET | Refills: 0 | Status: SHIPPED | OUTPATIENT
Start: 2018-01-01 | End: 2018-01-01

## 2018-01-01 RX ORDER — ESTRADIOL 0.5 MG/1
0.5 TABLET ORAL DAILY
Qty: 60 TABLET | Refills: 0 | Status: ON HOLD | OUTPATIENT
Start: 2018-01-01 | End: 2019-01-01

## 2018-01-01 RX ORDER — NITROFURANTOIN 25; 75 MG/1; MG/1
100 CAPSULE ORAL 2 TIMES DAILY
Qty: 14 CAPSULE | Refills: 0 | Status: SHIPPED | OUTPATIENT
Start: 2018-01-01 | End: 2018-01-01

## 2018-01-01 RX ORDER — HYDROXYZINE HYDROCHLORIDE 25 MG/1
25 TABLET, FILM COATED ORAL EVERY 6 HOURS PRN
Status: DISCONTINUED | OUTPATIENT
Start: 2018-01-01 | End: 2018-01-01 | Stop reason: HOSPADM

## 2018-01-01 RX ORDER — ONDANSETRON 2 MG/ML
4 INJECTION INTRAMUSCULAR; INTRAVENOUS ONCE
Status: COMPLETED | OUTPATIENT
Start: 2018-01-01 | End: 2018-01-01

## 2018-01-01 RX ORDER — KETOROLAC TROMETHAMINE 30 MG/ML
30 INJECTION, SOLUTION INTRAMUSCULAR; INTRAVENOUS EVERY 6 HOURS PRN
Status: DISCONTINUED | OUTPATIENT
Start: 2018-01-01 | End: 2018-01-01 | Stop reason: HOSPADM

## 2018-01-01 RX ORDER — NALOXONE HYDROCHLORIDE 0.4 MG/ML
.1-.4 INJECTION, SOLUTION INTRAMUSCULAR; INTRAVENOUS; SUBCUTANEOUS
Status: DISCONTINUED | OUTPATIENT
Start: 2018-01-01 | End: 2018-01-01 | Stop reason: HOSPADM

## 2018-01-01 RX ORDER — PHENAZOPYRIDINE HYDROCHLORIDE 200 MG/1
200 TABLET, FILM COATED ORAL ONCE
Status: COMPLETED | OUTPATIENT
Start: 2018-01-01 | End: 2018-01-01

## 2018-01-01 RX ORDER — ONDANSETRON 2 MG/ML
4 INJECTION INTRAMUSCULAR; INTRAVENOUS EVERY 6 HOURS PRN
Status: DISCONTINUED | OUTPATIENT
Start: 2018-01-01 | End: 2018-01-01 | Stop reason: HOSPADM

## 2018-01-01 RX ORDER — ONDANSETRON 4 MG/1
4-8 TABLET, ORALLY DISINTEGRATING ORAL EVERY 8 HOURS PRN
Qty: 20 TABLET | Refills: 0 | Status: SHIPPED | OUTPATIENT
Start: 2018-01-01 | End: 2018-01-01

## 2018-01-01 RX ORDER — METOPROLOL SUCCINATE 50 MG/1
50 TABLET, EXTENDED RELEASE ORAL DAILY
Status: DISCONTINUED | OUTPATIENT
Start: 2018-01-01 | End: 2018-01-01 | Stop reason: HOSPADM

## 2018-01-01 RX ORDER — METOCLOPRAMIDE 10 MG/1
10 TABLET ORAL EVERY 6 HOURS PRN
Status: DISCONTINUED | OUTPATIENT
Start: 2018-01-01 | End: 2018-01-01 | Stop reason: HOSPADM

## 2018-01-01 RX ORDER — GLYCOPYRROLATE 0.2 MG/ML
INJECTION, SOLUTION INTRAMUSCULAR; INTRAVENOUS PRN
Status: DISCONTINUED | OUTPATIENT
Start: 2018-01-01 | End: 2018-01-01

## 2018-01-01 RX ORDER — FENTANYL CITRATE 50 UG/ML
25-50 INJECTION, SOLUTION INTRAMUSCULAR; INTRAVENOUS
Status: DISCONTINUED | OUTPATIENT
Start: 2018-01-01 | End: 2018-01-01 | Stop reason: HOSPADM

## 2018-01-01 RX ORDER — SODIUM CHLORIDE, SODIUM LACTATE, POTASSIUM CHLORIDE, CALCIUM CHLORIDE 600; 310; 30; 20 MG/100ML; MG/100ML; MG/100ML; MG/100ML
INJECTION, SOLUTION INTRAVENOUS CONTINUOUS PRN
Status: DISCONTINUED | OUTPATIENT
Start: 2018-01-01 | End: 2018-01-01

## 2018-01-01 RX ORDER — ACETAMINOPHEN 325 MG/1
650 TABLET ORAL EVERY 6 HOURS PRN
Status: DISCONTINUED | OUTPATIENT
Start: 2018-01-01 | End: 2018-01-01 | Stop reason: HOSPADM

## 2018-01-01 RX ORDER — CEFAZOLIN SODIUM 1 G/3ML
1 INJECTION, POWDER, FOR SOLUTION INTRAMUSCULAR; INTRAVENOUS SEE ADMIN INSTRUCTIONS
Status: DISCONTINUED | OUTPATIENT
Start: 2018-01-01 | End: 2018-01-01 | Stop reason: HOSPADM

## 2018-01-01 RX ORDER — METOCLOPRAMIDE HYDROCHLORIDE 5 MG/ML
10 INJECTION INTRAMUSCULAR; INTRAVENOUS EVERY 6 HOURS PRN
Status: DISCONTINUED | OUTPATIENT
Start: 2018-01-01 | End: 2018-01-01 | Stop reason: HOSPADM

## 2018-01-01 RX ORDER — OXYCODONE AND ACETAMINOPHEN 5; 325 MG/1; MG/1
1-2 TABLET ORAL EVERY 4 HOURS PRN
Qty: 40 TABLET | Refills: 0 | Status: SHIPPED | OUTPATIENT
Start: 2018-01-01 | End: 2018-01-01

## 2018-01-01 RX ORDER — ONDANSETRON 2 MG/ML
INJECTION INTRAMUSCULAR; INTRAVENOUS
Status: COMPLETED
Start: 2018-01-01 | End: 2018-01-01

## 2018-01-01 RX ORDER — ONDANSETRON 4 MG/1
4 TABLET, ORALLY DISINTEGRATING ORAL EVERY 30 MIN PRN
Status: DISCONTINUED | OUTPATIENT
Start: 2018-01-01 | End: 2018-01-01 | Stop reason: HOSPADM

## 2018-01-01 RX ORDER — ALBUMIN, HUMAN INJ 5% 5 %
SOLUTION INTRAVENOUS CONTINUOUS PRN
Status: DISCONTINUED | OUTPATIENT
Start: 2018-01-01 | End: 2018-01-01

## 2018-01-01 RX ORDER — LORAZEPAM 0.5 MG/1
0.5 TABLET ORAL ONCE
Status: COMPLETED | OUTPATIENT
Start: 2018-01-01 | End: 2018-01-01

## 2018-01-01 RX ORDER — LIDOCAINE HYDROCHLORIDE 20 MG/ML
INJECTION, SOLUTION INFILTRATION; PERINEURAL PRN
Status: DISCONTINUED | OUTPATIENT
Start: 2018-01-01 | End: 2018-01-01

## 2018-01-01 RX ORDER — IBUPROFEN 600 MG/1
600 TABLET, FILM COATED ORAL EVERY 6 HOURS PRN
Qty: 30 TABLET | Refills: 0 | Status: ON HOLD | OUTPATIENT
Start: 2018-01-01 | End: 2019-01-01

## 2018-01-01 RX ORDER — IBUPROFEN 600 MG/1
TABLET, FILM COATED ORAL
Qty: 30 TABLET | Refills: 0 | OUTPATIENT
Start: 2018-01-01

## 2018-01-01 RX ORDER — FUROSEMIDE 10 MG/ML
INJECTION INTRAMUSCULAR; INTRAVENOUS PRN
Status: DISCONTINUED | OUTPATIENT
Start: 2018-01-01 | End: 2018-01-01

## 2018-01-01 RX ORDER — LORAZEPAM 2 MG/ML
1 INJECTION INTRAMUSCULAR
Status: DISCONTINUED | OUTPATIENT
Start: 2018-01-01 | End: 2018-01-01 | Stop reason: HOSPADM

## 2018-01-01 RX ORDER — DEXAMETHASONE SODIUM PHOSPHATE 4 MG/ML
INJECTION, SOLUTION INTRA-ARTICULAR; INTRALESIONAL; INTRAMUSCULAR; INTRAVENOUS; SOFT TISSUE PRN
Status: DISCONTINUED | OUTPATIENT
Start: 2018-01-01 | End: 2018-01-01

## 2018-01-01 RX ORDER — PREGABALIN 100 MG/1
100 CAPSULE ORAL 3 TIMES DAILY
Status: ON HOLD | COMMUNITY
Start: 2018-04-25 | End: 2018-01-01

## 2018-01-01 RX ORDER — BUPIVACAINE HYDROCHLORIDE AND EPINEPHRINE 5; 5 MG/ML; UG/ML
INJECTION, SOLUTION PERINEURAL PRN
Status: DISCONTINUED | OUTPATIENT
Start: 2018-01-01 | End: 2018-01-01 | Stop reason: HOSPADM

## 2018-01-01 RX ORDER — PROPOFOL 10 MG/ML
INJECTION, EMULSION INTRAVENOUS CONTINUOUS PRN
Status: DISCONTINUED | OUTPATIENT
Start: 2018-01-01 | End: 2018-01-01

## 2018-01-01 RX ORDER — IBUPROFEN 600 MG/1
600 TABLET, FILM COATED ORAL EVERY 6 HOURS PRN
Qty: 30 TABLET | Refills: 0 | Status: SHIPPED | OUTPATIENT
Start: 2018-01-01 | End: 2018-01-01

## 2018-01-01 RX ORDER — IBUPROFEN 600 MG/1
600 TABLET, FILM COATED ORAL EVERY 6 HOURS PRN
Status: DISCONTINUED | OUTPATIENT
Start: 2018-01-01 | End: 2018-01-01 | Stop reason: HOSPADM

## 2018-01-01 RX ORDER — PROPOFOL 10 MG/ML
INJECTION, EMULSION INTRAVENOUS PRN
Status: DISCONTINUED | OUTPATIENT
Start: 2018-01-01 | End: 2018-01-01

## 2018-01-01 RX ORDER — AMOXICILLIN 250 MG
1-2 CAPSULE ORAL 2 TIMES DAILY
Qty: 30 TABLET | Refills: 0 | Status: ON HOLD | OUTPATIENT
Start: 2018-01-01 | End: 2019-01-01

## 2018-01-01 RX ORDER — FLUTICASONE PROPIONATE 0.05 %
CREAM (GRAM) TOPICAL 2 TIMES DAILY PRN
Status: ON HOLD | COMMUNITY
End: 2019-01-01

## 2018-01-01 RX ORDER — PROCHLORPERAZINE MALEATE 5 MG
10 TABLET ORAL EVERY 6 HOURS PRN
Status: DISCONTINUED | OUTPATIENT
Start: 2018-01-01 | End: 2018-01-01 | Stop reason: HOSPADM

## 2018-01-01 RX ORDER — ONDANSETRON 2 MG/ML
INJECTION INTRAMUSCULAR; INTRAVENOUS PRN
Status: DISCONTINUED | OUTPATIENT
Start: 2018-01-01 | End: 2018-01-01

## 2018-01-01 RX ORDER — HYDROMORPHONE HYDROCHLORIDE 1 MG/ML
.3-.5 INJECTION, SOLUTION INTRAMUSCULAR; INTRAVENOUS; SUBCUTANEOUS EVERY 10 MIN PRN
Status: DISCONTINUED | OUTPATIENT
Start: 2018-01-01 | End: 2018-01-01 | Stop reason: HOSPADM

## 2018-01-01 RX ORDER — AMOXICILLIN 250 MG
1-2 CAPSULE ORAL 2 TIMES DAILY
Qty: 30 TABLET | Refills: 0 | Status: SHIPPED | OUTPATIENT
Start: 2018-01-01 | End: 2018-01-01

## 2018-01-01 RX ORDER — NEOSTIGMINE METHYLSULFATE 1 MG/ML
VIAL (ML) INJECTION PRN
Status: DISCONTINUED | OUTPATIENT
Start: 2018-01-01 | End: 2018-01-01

## 2018-01-01 RX ORDER — MEPERIDINE HYDROCHLORIDE 25 MG/ML
12.5 INJECTION INTRAMUSCULAR; INTRAVENOUS; SUBCUTANEOUS
Status: DISCONTINUED | OUTPATIENT
Start: 2018-01-01 | End: 2018-01-01 | Stop reason: HOSPADM

## 2018-01-01 RX ORDER — LIDOCAINE 40 MG/G
CREAM TOPICAL
Status: DISCONTINUED | OUTPATIENT
Start: 2018-01-01 | End: 2018-01-01 | Stop reason: HOSPADM

## 2018-01-01 RX ORDER — ONDANSETRON 4 MG/1
4 TABLET, ORALLY DISINTEGRATING ORAL EVERY 6 HOURS PRN
Status: DISCONTINUED | OUTPATIENT
Start: 2018-01-01 | End: 2018-01-01 | Stop reason: HOSPADM

## 2018-01-01 RX ORDER — BUPIVACAINE HYDROCHLORIDE AND EPINEPHRINE 5; 5 MG/ML; UG/ML
INJECTION, SOLUTION EPIDURAL; INTRACAUDAL; PERINEURAL
Status: DISCONTINUED
Start: 2018-01-01 | End: 2018-01-01 | Stop reason: HOSPADM

## 2018-01-01 RX ORDER — ACETAMINOPHEN 325 MG/1
650 TABLET ORAL ONCE
Status: COMPLETED | OUTPATIENT
Start: 2018-01-01 | End: 2018-01-01

## 2018-01-01 RX ORDER — FLUOCINOLONE ACETONIDE 0.1 MG/ML
SOLUTION TOPICAL 2 TIMES DAILY
Status: ON HOLD | COMMUNITY
End: 2018-01-01

## 2018-01-01 RX ORDER — HYDROMORPHONE HYDROCHLORIDE 1 MG/ML
0.2 INJECTION, SOLUTION INTRAMUSCULAR; INTRAVENOUS; SUBCUTANEOUS
Status: DISCONTINUED | OUTPATIENT
Start: 2018-01-01 | End: 2018-01-01 | Stop reason: HOSPADM

## 2018-01-01 RX ORDER — CYANOCOBALAMIN (VITAMIN B-12) 2500 MCG
2500 TABLET, SUBLINGUAL SUBLINGUAL DAILY
Status: ON HOLD | COMMUNITY
End: 2018-01-01

## 2018-01-01 RX ORDER — CEFAZOLIN SODIUM IN 0.9 % NACL 3 G/100 ML
3 INTRAVENOUS SOLUTION, PIGGYBACK (ML) INTRAVENOUS
Status: COMPLETED | OUTPATIENT
Start: 2018-01-01 | End: 2018-01-01

## 2018-01-01 RX ORDER — VASOPRESSIN 20 U/ML
INJECTION PARENTERAL
Status: DISCONTINUED
Start: 2018-01-01 | End: 2018-01-01 | Stop reason: HOSPADM

## 2018-01-01 RX ORDER — OXYCODONE AND ACETAMINOPHEN 5; 325 MG/1; MG/1
1-2 TABLET ORAL EVERY 4 HOURS PRN
Status: DISCONTINUED | OUTPATIENT
Start: 2018-01-01 | End: 2018-01-01 | Stop reason: HOSPADM

## 2018-01-01 RX ORDER — BUPIVACAINE HYDROCHLORIDE AND EPINEPHRINE 2.5; 5 MG/ML; UG/ML
INJECTION, SOLUTION EPIDURAL; INFILTRATION; INTRACAUDAL; PERINEURAL
Status: DISCONTINUED
Start: 2018-01-01 | End: 2018-01-01 | Stop reason: HOSPADM

## 2018-01-01 RX ORDER — METRONIDAZOLE 500 MG/1
500 TABLET ORAL 2 TIMES DAILY
Qty: 14 TABLET | Refills: 0 | Status: SHIPPED | OUTPATIENT
Start: 2018-01-01 | End: 2018-01-01

## 2018-01-01 RX ORDER — SODIUM CHLORIDE 9 MG/ML
1000 INJECTION, SOLUTION INTRAVENOUS CONTINUOUS
Status: DISCONTINUED | OUTPATIENT
Start: 2018-01-01 | End: 2018-01-01 | Stop reason: CLARIF

## 2018-01-01 RX ORDER — FENTANYL CITRATE 50 UG/ML
INJECTION, SOLUTION INTRAMUSCULAR; INTRAVENOUS PRN
Status: DISCONTINUED | OUTPATIENT
Start: 2018-01-01 | End: 2018-01-01

## 2018-01-01 RX ORDER — ONDANSETRON 2 MG/ML
4 INJECTION INTRAMUSCULAR; INTRAVENOUS EVERY 30 MIN PRN
Status: DISCONTINUED | OUTPATIENT
Start: 2018-01-01 | End: 2018-01-01 | Stop reason: HOSPADM

## 2018-01-01 RX ORDER — HYDROXYZINE HYDROCHLORIDE 50 MG/ML
50 INJECTION, SOLUTION INTRAMUSCULAR EVERY 6 HOURS PRN
Status: DISCONTINUED | OUTPATIENT
Start: 2018-01-01 | End: 2018-01-01 | Stop reason: HOSPADM

## 2018-01-01 RX ORDER — KETOROLAC TROMETHAMINE 30 MG/ML
30 INJECTION, SOLUTION INTRAMUSCULAR; INTRAVENOUS EVERY 6 HOURS
Status: COMPLETED | OUTPATIENT
Start: 2018-01-01 | End: 2018-01-01

## 2018-01-01 RX ORDER — ACETAMINOPHEN 325 MG/1
975 TABLET ORAL ONCE
Status: COMPLETED | OUTPATIENT
Start: 2018-01-01 | End: 2018-01-01

## 2018-01-01 RX ADMIN — ACETAMINOPHEN 975 MG: 325 TABLET, FILM COATED ORAL at 07:08

## 2018-01-01 RX ADMIN — BUPIVACAINE HYDROCHLORIDE AND EPINEPHRINE BITARTRATE 30 ML: 5; .005 INJECTION, SOLUTION PERINEURAL at 09:35

## 2018-01-01 RX ADMIN — Medication 10 MG: at 08:53

## 2018-01-01 RX ADMIN — SODIUM CHLORIDE, POTASSIUM CHLORIDE, SODIUM LACTATE AND CALCIUM CHLORIDE: 600; 310; 30; 20 INJECTION, SOLUTION INTRAVENOUS at 10:00

## 2018-01-01 RX ADMIN — PROPOFOL 50 MG: 10 INJECTION, EMULSION INTRAVENOUS at 07:45

## 2018-01-01 RX ADMIN — PROPOFOL 30 MCG/KG/MIN: 10 INJECTION, EMULSION INTRAVENOUS at 07:52

## 2018-01-01 RX ADMIN — OXYCODONE HYDROCHLORIDE AND ACETAMINOPHEN 2 TABLET: 5; 325 TABLET ORAL at 17:13

## 2018-01-01 RX ADMIN — METOPROLOL SUCCINATE 50 MG: 50 TABLET, EXTENDED RELEASE ORAL at 07:20

## 2018-01-01 RX ADMIN — OXYCODONE HYDROCHLORIDE AND ACETAMINOPHEN 2 TABLET: 5; 325 TABLET ORAL at 13:12

## 2018-01-01 RX ADMIN — Medication 0.5 MG: at 10:24

## 2018-01-01 RX ADMIN — ONDANSETRON 4 MG: 2 INJECTION INTRAMUSCULAR; INTRAVENOUS at 13:15

## 2018-01-01 RX ADMIN — KETOROLAC TROMETHAMINE 30 MG: 30 INJECTION, SOLUTION INTRAMUSCULAR at 04:56

## 2018-01-01 RX ADMIN — ONDANSETRON 4 MG: 2 INJECTION INTRAMUSCULAR; INTRAVENOUS at 07:49

## 2018-01-01 RX ADMIN — OXYCODONE HYDROCHLORIDE AND ACETAMINOPHEN 1 TABLET: 5; 325 TABLET ORAL at 00:47

## 2018-01-01 RX ADMIN — FENTANYL CITRATE 50 MCG: 50 INJECTION, SOLUTION INTRAMUSCULAR; INTRAVENOUS at 07:38

## 2018-01-01 RX ADMIN — FUROSEMIDE 10 MG: 10 INJECTION, SOLUTION INTRAVENOUS at 09:40

## 2018-01-01 RX ADMIN — Medication 3 G: at 07:47

## 2018-01-01 RX ADMIN — FENTANYL CITRATE 50 MCG: 50 INJECTION, SOLUTION INTRAMUSCULAR; INTRAVENOUS at 07:31

## 2018-01-01 RX ADMIN — SODIUM CHLORIDE, POTASSIUM CHLORIDE, SODIUM LACTATE AND CALCIUM CHLORIDE: 600; 310; 30; 20 INJECTION, SOLUTION INTRAVENOUS at 14:03

## 2018-01-01 RX ADMIN — PHENAZOPYRIDINE HYDROCHLORIDE 200 MG: 200 TABLET ORAL at 07:07

## 2018-01-01 RX ADMIN — Medication 0.2 MG: at 16:16

## 2018-01-01 RX ADMIN — FENTANYL CITRATE 50 MCG: 50 INJECTION, SOLUTION INTRAMUSCULAR; INTRAVENOUS at 10:00

## 2018-01-01 RX ADMIN — KETOROLAC TROMETHAMINE 30 MG: 30 INJECTION, SOLUTION INTRAMUSCULAR at 10:57

## 2018-01-01 RX ADMIN — Medication 0.5 MG: at 11:31

## 2018-01-01 RX ADMIN — OXYCODONE HYDROCHLORIDE AND ACETAMINOPHEN 1 TABLET: 5; 325 TABLET ORAL at 20:27

## 2018-01-01 RX ADMIN — Medication 0.2 MG: at 03:57

## 2018-01-01 RX ADMIN — KETOROLAC TROMETHAMINE 30 MG: 30 INJECTION, SOLUTION INTRAMUSCULAR at 11:27

## 2018-01-01 RX ADMIN — SODIUM CHLORIDE 1000 ML: 9 INJECTION, SOLUTION INTRAVENOUS at 04:54

## 2018-01-01 RX ADMIN — KETOROLAC TROMETHAMINE 30 MG: 30 INJECTION, SOLUTION INTRAMUSCULAR at 22:52

## 2018-01-01 RX ADMIN — FUROSEMIDE 10 MG: 10 INJECTION, SOLUTION INTRAVENOUS at 09:36

## 2018-01-01 RX ADMIN — PROPOFOL 50 MG: 10 INJECTION, EMULSION INTRAVENOUS at 09:35

## 2018-01-01 RX ADMIN — SODIUM CHLORIDE, POTASSIUM CHLORIDE, SODIUM LACTATE AND CALCIUM CHLORIDE: 600; 310; 30; 20 INJECTION, SOLUTION INTRAVENOUS at 19:12

## 2018-01-01 RX ADMIN — LORAZEPAM 1 MG: 2 INJECTION INTRAMUSCULAR; INTRAVENOUS at 07:20

## 2018-01-01 RX ADMIN — OXYCODONE HYDROCHLORIDE AND ACETAMINOPHEN 2 TABLET: 5; 325 TABLET ORAL at 08:53

## 2018-01-01 RX ADMIN — KETOROLAC TROMETHAMINE 30 MG: 30 INJECTION, SOLUTION INTRAMUSCULAR at 17:29

## 2018-01-01 RX ADMIN — HYDROXYZINE HYDROCHLORIDE 50 MG: 50 INJECTION, SOLUTION INTRAMUSCULAR at 11:34

## 2018-01-01 RX ADMIN — HYDROXYZINE HYDROCHLORIDE 25 MG: 25 TABLET ORAL at 20:24

## 2018-01-01 RX ADMIN — ONDANSETRON 4 MG: 2 INJECTION INTRAMUSCULAR; INTRAVENOUS at 07:00

## 2018-01-01 RX ADMIN — HYDROMORPHONE HYDROCHLORIDE 0.5 MG: 1 INJECTION, SOLUTION INTRAMUSCULAR; INTRAVENOUS; SUBCUTANEOUS at 08:11

## 2018-01-01 RX ADMIN — OXYCODONE HYDROCHLORIDE AND ACETAMINOPHEN 2 TABLET: 5; 325 TABLET ORAL at 21:55

## 2018-01-01 RX ADMIN — DEXAMETHASONE SODIUM PHOSPHATE 4 MG: 4 INJECTION, SOLUTION INTRA-ARTICULAR; INTRALESIONAL; INTRAMUSCULAR; INTRAVENOUS; SOFT TISSUE at 07:49

## 2018-01-01 RX ADMIN — OXYCODONE HYDROCHLORIDE AND ACETAMINOPHEN 2 TABLET: 5; 325 TABLET ORAL at 02:26

## 2018-01-01 RX ADMIN — LIDOCAINE HYDROCHLORIDE 100 MG: 20 INJECTION, SOLUTION INFILTRATION; PERINEURAL at 07:41

## 2018-01-01 RX ADMIN — NEOSTIGMINE METHYLSULFATE 4 MG: 1 INJECTION, SOLUTION INTRAVENOUS at 09:46

## 2018-01-01 RX ADMIN — ONDANSETRON 4 MG: 2 INJECTION INTRAMUSCULAR; INTRAVENOUS at 10:23

## 2018-01-01 RX ADMIN — GLYCOPYRROLATE 0.2 MG: 0.2 INJECTION, SOLUTION INTRAMUSCULAR; INTRAVENOUS at 08:21

## 2018-01-01 RX ADMIN — Medication 0.2 MG: at 14:15

## 2018-01-01 RX ADMIN — FENTANYL CITRATE 50 MCG: 50 INJECTION, SOLUTION INTRAMUSCULAR; INTRAVENOUS at 09:15

## 2018-01-01 RX ADMIN — LORAZEPAM 0.5 MG: 0.5 TABLET ORAL at 23:58

## 2018-01-01 RX ADMIN — ALBUMIN HUMAN: 0.05 INJECTION, SOLUTION INTRAVENOUS at 09:28

## 2018-01-01 RX ADMIN — SODIUM CHLORIDE, POTASSIUM CHLORIDE, SODIUM LACTATE AND CALCIUM CHLORIDE: 600; 310; 30; 20 INJECTION, SOLUTION INTRAVENOUS at 04:50

## 2018-01-01 RX ADMIN — ROCURONIUM BROMIDE 50 MG: 10 INJECTION INTRAVENOUS at 07:42

## 2018-01-01 RX ADMIN — OXYCODONE HYDROCHLORIDE AND ACETAMINOPHEN 2 TABLET: 5; 325 TABLET ORAL at 06:29

## 2018-01-01 RX ADMIN — PROPOFOL 200 MG: 10 INJECTION, EMULSION INTRAVENOUS at 07:41

## 2018-01-01 RX ADMIN — OXYCODONE HYDROCHLORIDE AND ACETAMINOPHEN 2 TABLET: 5; 325 TABLET ORAL at 10:42

## 2018-01-01 RX ADMIN — ACETAMINOPHEN 650 MG: 325 TABLET, FILM COATED ORAL at 14:05

## 2018-01-01 RX ADMIN — SODIUM CHLORIDE, POTASSIUM CHLORIDE, SODIUM LACTATE AND CALCIUM CHLORIDE: 600; 310; 30; 20 INJECTION, SOLUTION INTRAVENOUS at 11:33

## 2018-01-01 RX ADMIN — Medication 5 MG: at 08:38

## 2018-01-01 RX ADMIN — Medication 0.5 MG: at 10:55

## 2018-01-01 RX ADMIN — DEXMEDETOMIDINE HYDROCHLORIDE 12 MCG: 100 INJECTION, SOLUTION INTRAVENOUS at 08:09

## 2018-01-01 RX ADMIN — HYDROXYZINE HYDROCHLORIDE 25 MG: 25 TABLET ORAL at 02:26

## 2018-01-01 RX ADMIN — Medication 0.2 MG: at 19:31

## 2018-01-01 RX ADMIN — VASOPRESSIN 20 ML: 20 INJECTION INTRAVENOUS at 08:41

## 2018-01-01 RX ADMIN — SODIUM CHLORIDE, POTASSIUM CHLORIDE, SODIUM LACTATE AND CALCIUM CHLORIDE: 600; 310; 30; 20 INJECTION, SOLUTION INTRAVENOUS at 08:40

## 2018-01-01 RX ADMIN — ONDANSETRON 4 MG: 2 INJECTION INTRAMUSCULAR; INTRAVENOUS at 17:21

## 2018-01-01 RX ADMIN — MIDAZOLAM 2 MG: 1 INJECTION INTRAMUSCULAR; INTRAVENOUS at 07:31

## 2018-01-01 RX ADMIN — SODIUM CHLORIDE, POTASSIUM CHLORIDE, SODIUM LACTATE AND CALCIUM CHLORIDE: 600; 310; 30; 20 INJECTION, SOLUTION INTRAVENOUS at 07:31

## 2018-01-01 RX ADMIN — METOPROLOL SUCCINATE 50 MG: 50 TABLET, EXTENDED RELEASE ORAL at 08:26

## 2018-01-01 RX ADMIN — HYDROXYZINE HYDROCHLORIDE 25 MG: 25 TABLET ORAL at 08:26

## 2018-01-01 RX ADMIN — SODIUM CHLORIDE 1000 ML: 9 INJECTION, SOLUTION INTRAVENOUS at 12:47

## 2018-01-01 RX ADMIN — OXYCODONE HYDROCHLORIDE AND ACETAMINOPHEN 2 TABLET: 5; 325 TABLET ORAL at 14:41

## 2018-01-01 RX ADMIN — HYDROXYZINE HYDROCHLORIDE 25 MG: 25 TABLET ORAL at 14:09

## 2018-01-01 RX ADMIN — ACETAMINOPHEN 650 MG: 325 TABLET, FILM COATED ORAL at 04:56

## 2018-01-01 RX ADMIN — METOPROLOL SUCCINATE 50 MG: 50 TABLET, EXTENDED RELEASE ORAL at 08:53

## 2018-01-01 RX ADMIN — GLYCOPYRROLATE 0.6 MG: 0.2 INJECTION, SOLUTION INTRAMUSCULAR; INTRAVENOUS at 09:46

## 2018-01-01 SDOH — HEALTH STABILITY: MENTAL HEALTH: HOW OFTEN DO YOU HAVE A DRINK CONTAINING ALCOHOL?: NEVER

## 2018-01-01 ASSESSMENT — ENCOUNTER SYMPTOMS
RESPIRATORY NEGATIVE: 1
WOUND: 1
NAUSEA: 1
PSYCHIATRIC NEGATIVE: 1
EYES NEGATIVE: 1
DIARRHEA: 1
ARTHRALGIAS: 1
DIZZINESS: 0
NEUROLOGICAL NEGATIVE: 1
ROS SKIN COMMENTS: AS IN HPI
LIGHT-HEADEDNESS: 0
EYES NEGATIVE: 1
PSYCHIATRIC NEGATIVE: 1
GASTROINTESTINAL NEGATIVE: 1
VOMITING: 1
RESPIRATORY NEGATIVE: 1
FACIAL SWELLING: 1
CONSTITUTIONAL NEGATIVE: 1
CARDIOVASCULAR NEGATIVE: 1
BLOOD IN STOOL: 1
CARDIOVASCULAR NEGATIVE: 1
HEADACHES: 0
GASTROINTESTINAL NEGATIVE: 1
DYSPHORIC MOOD: 1
MUSCULOSKELETAL NEGATIVE: 1
CONSTITUTIONAL NEGATIVE: 1
NECK PAIN: 0
FEVER: 0
BACK PAIN: 0

## 2018-01-01 ASSESSMENT — ANXIETY QUESTIONNAIRES
6. BECOMING EASILY ANNOYED OR IRRITABLE: NOT AT ALL
IF YOU CHECKED OFF ANY PROBLEMS ON THIS QUESTIONNAIRE, HOW DIFFICULT HAVE THESE PROBLEMS MADE IT FOR YOU TO DO YOUR WORK, TAKE CARE OF THINGS AT HOME, OR GET ALONG WITH OTHER PEOPLE: SOMEWHAT DIFFICULT
5. BEING SO RESTLESS THAT IT IS HARD TO SIT STILL: NOT AT ALL
3. WORRYING TOO MUCH ABOUT DIFFERENT THINGS: SEVERAL DAYS
2. NOT BEING ABLE TO STOP OR CONTROL WORRYING: SEVERAL DAYS
GAD7 TOTAL SCORE: 5
GAD7 TOTAL SCORE: 5
7. FEELING AFRAID AS IF SOMETHING AWFUL MIGHT HAPPEN: SEVERAL DAYS
1. FEELING NERVOUS, ANXIOUS, OR ON EDGE: SEVERAL DAYS

## 2018-01-01 ASSESSMENT — MIFFLIN-ST. JEOR
SCORE: 1888.73
SCORE: 1888.73
SCORE: 1872.85

## 2018-01-01 ASSESSMENT — PATIENT HEALTH QUESTIONNAIRE - PHQ9
SUM OF ALL RESPONSES TO PHQ QUESTIONS 1-9: 2
5. POOR APPETITE OR OVEREATING: SEVERAL DAYS

## 2018-05-03 ENCOUNTER — TRANSFERRED RECORDS (OUTPATIENT)
Dept: HEALTH INFORMATION MANAGEMENT | Facility: CLINIC | Age: 43
End: 2018-05-03

## 2018-05-03 LAB
C TRACH DNA SPEC QL PROBE+SIG AMP: NEGATIVE
HPV ABSTRACT: NORMAL
N GONORRHOEA DNA SPEC QL PROBE+SIG AMP: NEGATIVE
PAP-ABSTRACT: NORMAL
SPECIMEN DESCRIP: NORMAL
SPECIMEN DESCRIPTION: NORMAL

## 2018-08-23 ENCOUNTER — HOSPITAL ENCOUNTER (EMERGENCY)
Facility: CLINIC | Age: 43
Discharge: HOME | End: 2018-08-23
Attending: NURSE PRACTITIONER | Admitting: NURSE PRACTITIONER
Payer: COMMERCIAL

## 2018-08-23 VITALS
DIASTOLIC BLOOD PRESSURE: 92 MMHG | OXYGEN SATURATION: 98 % | HEIGHT: 69 IN | WEIGHT: 260 LBS | RESPIRATION RATE: 16 BRPM | SYSTOLIC BLOOD PRESSURE: 143 MMHG | HEART RATE: 71 BPM | BODY MASS INDEX: 38.51 KG/M2 | TEMPERATURE: 98 F

## 2018-08-23 DIAGNOSIS — K04.7 DENTAL ABSCESS: ICD-10-CM

## 2018-08-23 PROCEDURE — 41800 DRAINAGE OF GUM LESION: CPT

## 2018-08-23 PROCEDURE — 99283 EMERGENCY DEPT VISIT LOW MDM: CPT

## 2018-08-23 PROCEDURE — 25000132 ZZH RX MED GY IP 250 OP 250 PS 637: Performed by: NURSE PRACTITIONER

## 2018-08-23 RX ORDER — HYDROCODONE BITARTRATE AND ACETAMINOPHEN 5; 325 MG/1; MG/1
1 TABLET ORAL ONCE
Status: COMPLETED | OUTPATIENT
Start: 2018-08-23 | End: 2018-08-23

## 2018-08-23 RX ORDER — HYDROCODONE BITARTRATE AND ACETAMINOPHEN 5; 325 MG/1; MG/1
1 TABLET ORAL EVERY 6 HOURS PRN
Qty: 5 TABLET | Refills: 0 | Status: ON HOLD | OUTPATIENT
Start: 2018-08-23 | End: 2018-09-01

## 2018-08-23 RX ADMIN — HYDROCODONE BITARTRATE AND ACETAMINOPHEN 1 TABLET: 5; 325 TABLET ORAL at 21:07

## 2018-08-23 ASSESSMENT — ENCOUNTER SYMPTOMS
FEVER: 0
SHORTNESS OF BREATH: 0
FACIAL SWELLING: 1
TROUBLE SWALLOWING: 0

## 2018-08-23 NOTE — ED AVS SNAPSHOT
Emergency Department    [address] [facility] [address]    Phone:  [phone]    Fax:  [phone]                                       Neema Bailey   MRN: [id number]    Department:   Emergency Department   Date of Visit:  8/23/2018           Patient Information     Date Of Birth          1975        Your diagnoses for this visit were:     Dental abscess        You were seen by Vickie Huffman, CNP.      Follow-up Information     Follow up with kaden banuelos In 1 day.        Follow up with [facility], MD [place].    Specialty:  Oral Surgery    Why:  As needed    Contact information:    [address] [facility] [address]  [facility] [id number] [phone]          Follow up with  Emergency Department.    Specialty:  EMERGENCY MEDICINE    Why:  As needed, If symptoms worsen    Contact information:    [address] [facility] [id number] [phone]        Discharge Instructions         Dental Abscess  An abscess is a sac of pus. A dental abscess forms when a tooth or the tissue around it becomes infected with bacteria. The bacteria can enter through a cavity or a crack in a tooth. It can also infect the gum tissue or bone around a tooth. An untreated abscess can cause the loss of the tooth. It can even spread to other parts of the body and become life-threatening.    Symptoms of a dental abscess   Signs of a dental abscess include:    Toothache, often severe    Tooth pain with hot, cold, or pressure    Pain in the gums, cheek, or jaw    Bad breath or bitter taste in the mouth    Trouble swallowing or opening the mouth    Fever    Swollen or enlarged glands in the neck  Diagnosing a dental abscess  An abscess is diagnosed by looking at your teeth and gums. You will be told if any tests are needed, such as dental X-rays.  Treating a dental abscess  Treatments for a dental abscess may include the following:    Antibiotic medicines. These  treat the underlying infection.    Pain relievers. These help you feel more comfortable. Your healthcare provider may prescribe a medicine for you. Or you may use over-the-counter pain relievers, such as acetaminophen or ibuprofen.    Warm saltwater rinses. These can soothe discomfort and help clear away pus.    Root canal surgery.  This may be done if needed to save the tooth. With a root canal, the infected part of the tooth is removed. A special substance is then used to fill the empty space in the tooth.    Draining the abscess. This may be doneif needed. Incisions are made to allow the infected material to drain from the tooth.    Removing the tooth. This is done in cases of severe infection that can t be treated another way.  You may need to be admitted to a hospital if the infection is severe, has spread, or doesn t respond to treatment.     When to call the dentist  Call your dentist right away if you have any of the following:    Fever of 100.4 F (38 C) or higher    Increased pain, redness, drainage, or swelling in the treated area    Swelling of the face or jawbone    Pain that can't be controlled with medicines   Preventing dental abscess  To prevent another abscess in the future, keep your teeth clean and healthy. Brush twice a day and floss at least once daily. See your dentist for regular tooth cleanings. And stay away from sugary foods and drinks that can lead to tooth decay.  Date Last Reviewed: 6/1/2017 2000-2017 [employer] [address] [place] [address]. All rights reserved. This information is not intended as a substitute for professional medical care. Always follow your healthcare professional's instructions.          24 Hour Appointment Hotline       To make an appointment at any [facility], call [phone] ([phone]). If you don't have a family doctor or clinic, we will help you find one. [facility] clinics are conveniently located to serve the needs  of you and your family.             Review of your medicines      START taking        Dose / Directions Last dose taken    HYDROcodone-acetaminophen 5-325 MG per tablet   Commonly known as:  NORCO   Dose:  1 tablet   Quantity:  5 tablet        Take 1 tablet by mouth every 6 hours as needed for severe pain   Refills:  0          Our records show that you are taking the medicines listed below. If these are incorrect, please call your family doctor or clinic.        Dose / Directions Last dose taken    amitriptyline 10 MG tablet   Commonly known as:  ELAVIL   Dose:  10 mg        10 mg At Bedtime   Refills:  2        chlorhexidine 0.12 % solution   Commonly known as:  PERIDEX        daily as needed   Refills:  0        fluocinolone 0.01 % solution   Commonly known as:  SYNALAR        Apply topically daily as needed   Refills:  3        GABAPENTIN PO   Dose:  900 mg        Take 900 mg by mouth daily   Refills:  0        IMITREX PO        Take by mouth as needed for migraine (uNKNOWN DOSE)   Refills:  0        IRON SUPPLEMENT PO   Dose:  325 mg        Take 325 mg by mouth daily   Refills:  0        morphine 15 MG IR tablet   Commonly known as:  MSIR   Dose:  15 mg        Take 15 mg by mouth 2 times daily as needed   Refills:  0        Multi-vitamin Tabs tablet   Dose:  1 tablet        Take 1 tablet by mouth daily   Refills:  0        sucralfate 1 GM/10ML suspension   Commonly known as:  CARAFATE   Dose:  1 g   Quantity:  420 mL        Take 10 mLs (1 g) by mouth 4 times daily   Refills:  1        tolterodine 4 MG 24 hr capsule   Commonly known as:  DETROL LA   Dose:  4 mg   Quantity:  30 capsule        Take 1 capsule (4 mg) by mouth daily   Refills:  1        vitamin D 45524 UNIT capsule   Commonly known as:  ERGOCALCIFEROL        once a week   Refills:  0        vitamin E 400 UNIT capsule        daily   Refills:  6                Information about OPIOIDS     PRESCRIPTION OPIOIDS: WHAT YOU NEED TO KNOW   We gave you an  opioid (narcotic) pain medicine. It is important to manage your pain, but opioids are not always the best choice. You should first try all the other options your care team gave you. Take this medicine for as short a time (and as few doses) as possible.    Some activities can increase your pain, such as bandage changes or therapy sessions. It may help to take your pain medicine 30 to 60 minutes before these activities. Reduce your stress by getting enough sleep, working on hobbies you enjoy and practicing relaxation or meditation. Talk to your care team about ways to manage your pain beyond prescription opioids.    These medicines have risks:    DO NOT drive when on new or higher doses of pain medicine. These medicines can affect your alertness and reaction times, and you could be arrested for driving under the influence (DUI). If you need to use opioids long-term, talk to your care team about driving.    DO NOT operate heavy machinery    DO NOT do any other dangerous activities while taking these medicines.    DO NOT drink any alcohol while taking these medicines.     If the opioid prescribed includes acetaminophen, DO NOT take with any other medicines that contain acetaminophen. Read all labels carefully. Look for the word  acetaminophen  or  Tylenol.  Ask your pharmacist if you have questions or are unsure.    You can get addicted to pain medicines, especially if you have a history of addiction (chemical, alcohol or substance dependence). Talk to your care team about ways to reduce this risk.    All opioids tend to cause constipation. Drink plenty of water and eat foods that have a lot of fiber, such as fruits, vegetables, prune juice, apple juice and high-fiber cereal. Take a laxative (Miralax, milk of magnesia, Colace, Senna) if you don t move your bowels at least every other day. Other side effects include upset stomach, sleepiness, dizziness, throwing up, tolerance (needing more of the medicine to have the  same effect), physical dependence and slowed breathing.    Store your pills in a secure place, locked if possible. We will not replace any lost or stolen medicine. If you don t finish your medicine, please throw away (dispose) as directed by your pharmacist. The [place] [employer] has more information about safe disposal: https://www.pca.[facility].[place].us/living-green/managing-unwanted-medications        Prescriptions were sent or printed at these locations (1 Prescription)                   Other Prescriptions                Printed at Department/Unit printer (1 of 1)         HYDROcodone-acetaminophen (NORCO) 5-325 MG per tablet                Orders Needing Specimen Collection     None      Pending Results     No orders found from 8/21/2018 to 8/24/2018.            Pending Culture Results     No orders found from 8/21/2018 to 8/24/2018.            Pending Results Instructions     If you had any lab results that were not finalized at the time of your Discharge, you can call the ED Lab Result RN at [phone]. You will be contacted by this team for any positive Lab results or changes in treatment. The nurses are available 7 days a week from 10A to 6:30P.  You can leave a message 24 hours per day and they will return your call.        Test Results From Your Hospital Stay               Clinical Quality Measure: Blood Pressure Screening     Your blood pressure was checked while you were in the emergency department today. The last reading we obtained was  BP: (!) 143/92 . Please read the guidelines below about what these numbers mean and what you should do about them.  If your systolic blood pressure (the top number) is less than 120 and your diastolic blood pressure (the bottom number) is less than 80, then your blood pressure is normal. There is nothing more that you need to do about it.  If your systolic blood pressure (the top number) is 120-139 or your diastolic blood pressure (the bottom number) is  "80-89, your blood pressure may be higher than it should be. You should have your blood pressure rechecked within a year by a primary care provider.  If your systolic blood pressure (the top number) is 140 or greater or your diastolic blood pressure (the bottom number) is 90 or greater, you may have high blood pressure. High blood pressure is treatable, but if left untreated over time it can put you at risk for heart attack, stroke, or kidney failure. You should have your blood pressure rechecked by a primary care provider within the next 4 weeks.  If your provider in the emergency department today gave you specific instructions to follow-up with your doctor or provider even sooner than that, you should follow that instruction and not wait for up to 4 weeks for your follow-up visit.        Thank you for choosing [facility]       Thank you for choosing [facility] for your care. Our goal is always to provide you with excellent care. Hearing back from our patients is one way we can continue to improve our services. Please take a few minutes to complete the written survey that you may receive in the mail after you visit with us. Thank you!        eigital Information     eigital lets you send messages to your doctor, view your test results, renew your prescriptions, schedule appointments and more. To sign up, go to [email or web address] . Click on \"Log in\" on the left side of the screen, which will take you to the Welcome page. Then click on \"Sign up Now\" on the right side of the page.     You will be asked to enter the access code listed below, as well as some personal information. Please follow the directions to create your username and password.     Your access code is: [email or web address]  Expires: 2018  9:02 PM     Your access code will  in 90 days. If you need help or a new code, please call your [facility] clinic or [phone].        Care EveryWhere ID     This is your Care EveryWhere ID. This could be used by " other organizations to access your [facility] medical records  [id number]        Equal Access to Services     LISA LAWTON : Gretchen Rasmussen, jing hopper, daja reyna. So [facility] [phone].    ATENCIÓN: Si habla español, tiene a boykin disposición servicios gratuitos de asistencia lingüística. Llame al [phone].    We comply with applicable federal civil rights laws and [place] laws. We do not discriminate on the basis of race, color, national origin, age, disability, sex, sexual orientation, or gender identity.            After Visit Summary       This is your record. Keep this with you and show to your community pharmacist(s) and doctor(s) at your next visit.

## 2018-08-23 NOTE — ED AVS SNAPSHOT
Emergency Department    [address] [facility] [address]    Phone:  [phone]    Fax:  [phone]                                       Neema Bailey   MRN: [id number]    Department:   Emergency Department   Date of Visit:  8/23/2018           After Visit Summary Signature Page     I have received my discharge instructions, and my questions have been answered. I have discussed any challenges I see with this plan with the nurse or doctor.    ..........................................................................................................................................  Patient/Patient Representative Signature      ..........................................................................................................................................  Patient Representative Print Name and Relationship to Patient    ..................................................               ................................................  Date                                            Time    ..........................................................................................................................................  Reviewed by Signature/Title    ...................................................              ..............................................  Date                                                            Time          22EPIC Rev 08/18

## 2018-08-24 NOTE — ED PROVIDER NOTES
History     Chief Complaint:  Wound check    HPI   Neema Bailey is a 43 year old female who presents to the emergency department today for evaluation of facial pain and warmth in the setting of having had a upper right root canal 2 days ago. The patient had her dental procedure performed by a Dr. Suzanna Ashraf at [facility] in [place]. She was placed on Augmentin and has been taking this for the past couple of days (4 doses total). However, last night the patient started feeling tender and warm with swelling in her right cheek just above the root canal site. She denies fever or dental pain. No shortness of breath or difficulty swallowing. She called her dentist, but was unable to get in today, though she may be able to get in tomorrow.    Allergies:  Gabapentin (edema)    Medications:    Furosemide  Pregabalin  Duloxetine  Triamterene-hydrochlorothiazide 37.5-25  Ferrous sufate  Vitamin D3  Calcium Carbonate  Zinc Gluconate    Past Medical History:      Alcohol dependence in remission (HC) 2010  put self in treatment at [facility]     Chronic back pain     Depression 2/13/2009     Dizziness and giddiness 2/13/2009 [facility]     Fe deficiency anemia 2/13/2009     Fibromyalgia     History of herniated intervertebral disc     Hx of degenerative disc disease     Hypertension     Joint pain     Migraines     Morbid obesity (HC)     BURT (nonalcoholic steatohepatitis) 2/13/2009     Neuropathy (HC)     Osteoarthritis     Other vitamin B12 deficiency anemia 2/13/2009     Overweight(278.02) 2/13/2009     Pain     Peripheral neuropathy (HC)     Plantar fasciitis     Restless legs     Sciatica     Stress incontinence     Unspecified hypothyroidism 2/13/2009     Past Surgical History:      (IA) SC SMALL BOWEL ENTEROTOMY REPAIR 2004  SBO     4-5 lumbar tumor removal 1997     ABDOMINOPLASTY 2005     APPENDECTOMY 1993     BREAST AUGMENTATION 2003     ESOPHAGOGASTRODUODENOSCOPY 04/18/2017     LIPOSUCTION  "2003     SD LIGATE FALLOPIAN TUBE 2003     MELY-EN-Y PROCEDURE 2001      spinal tumor removal     ependymoma Braggs 1997     TONSIL AND ADENOIDECTOMY 1985     Family History:    History reviewed. No pertinent family history.      Social History:  The patient was accompanied to the ED by nani.  Smoking Status: never  Smokeless Tobacco: never  Alcohol Use: no   Marital Status:  Single [1]     Review of Systems   Constitutional: Negative for fever.   HENT: Positive for facial swelling (pain and warmth). Negative for dental problem and trouble swallowing.    Respiratory: Negative for shortness of breath.    All other systems reviewed and are negative.    Physical Exam     Patient Vitals for the past 24 hrs:   BP Temp Temp src Pulse Heart Rate Resp SpO2 Height Weight   08/23/18 2101 - - - - - 16 - - -   08/23/18 1916 (!) 143/92 98  F (36.7  C) Oral 71 71 16 98 % 1.753 m (5' 9\") 117.9 kg (260 lb)      Physical Exam  Nursing notes reviewed. Vitals reviewed.  General: Alert. Well kept.  Eyes:  Conjunctiva non-injected, non-icteric.  Ears:TM s normal.  Neck/Throat: Moist mucous membranes. Firm swelling and fluctuance on gum line above tooth 4 and 5 on buccal aspect. Tonsils 2+, uvula midline. No trismus. No Preauricular, submandibular, submental, anterior/posterior cervical lymphadenopathy.  Normal voice.  Cardiac: Regular rhythm. Normal heart sounds with no murmur/rubs/click.   Pulmonary: Clear and equal breath sounds bilaterally. No crackles/rales. No wheezing  Musculoskeletal: Normal gross range of motion of all 4 extremities.    Neurological: Alert and oriented x4.   Skin: Warm and dry without rashes or petechiae. Normal appearance of visualized exposed skin.  Psych: Affect normal. Good eye contact.      Emergency Department Course     Procedures:    Procedure: Incision and Drainage     LOCATIONS:  Upper right buccal mucosa, next to tooth 4 and 5    ANESTHESIA:  Local field block using Marcaine 0.5% with epinephrine, " total of 2 mLs    PROCEDURE:  Area was incised with # 11 Blade ([place]) with a Single Straight incision.  Wound treatment included Deloculation and Purulent Drainage. No Packing.  Appropriate dressing was applied to cover the area.    Patient Status: Patient tolerated the procedure well. There were no complications.          Interventions:  2107 Norco 5-325 mg 1 tablet PO     Emergency Department Course:  Nursing notes and vitals reviewed.  I entered the room.  I performed an exam of the patient as documented above.     1935 I discussed the patient in shared service with Dr. Cavazos Trigger  2022 applied anesthesia around the designated incision site.   2036 I performed a incision and drainage, as documented above.     The patient received the above intervention(s).     I discussed the treatment plan with the patient. They expressed understanding of this plan and consented to discharge. They will be discharged home with instructions for care and follow up. In addition, the patient will return to the emergency department if their symptoms worsen, if new symptoms arise or if there is any concern.  All questions were answered.    Impression & Plan      Medical Decision Making:  Neema Bailey is a 43 year old female who presented for evaluation of upper facial pain and mild above the maxilla facial swelling in the setting of having a recent root canal. Symptoms are not consistent with parotitis and exam showed dental abscess. I & D preformed as above.   No signs of sepsis or systemic infection and no indication for advanced imaging, admission or IV antibiotics.  She will continue Augmentin with no indication for change in antibiotics.  Advised to use Ibuprofen or Tylenol for discomfort and she was given a very small prescription for norco for severe pain.  No indication of deeper space infection, PTA, retropharyngeal abscess, facial cellulitis, or Keven's angina. Follow up with dentist in the next 1 day  or immediately if worsening.  Return to ED if develops difficulty swallowing, fevers, worsening swelling, or for other concerns.     Diagnosis:    ICD-10-CM    1. Dental abscess K04.7      Disposition:   The patient was discharged to home.    Discharge Medications:  Discharge Medication List as of 8/23/2018  9:02 PM      START taking these medications    Details   HYDROcodone-acetaminophen (NORCO) 5-325 MG per tablet Take 1 tablet by mouth every 6 hours as needed for severe pain, Disp-5 tablet, R-0, Local Print           Scribe Disclosure:  I, Rashad Tirado, am serving as a scribe at 7:40 PM on 8/23/2018 to document services personally performed by Vickie Huffman CNP, based on my observations and the provider's statements to me.     EMERGENCY DEPARTMENT         Vickie Huffman CNP  08/23/18 1959

## 2018-08-31 ENCOUNTER — HOSPITAL ENCOUNTER (INPATIENT)
Facility: CLINIC | Age: 43
LOS: 1 days | Discharge: HOME | DRG: 897 | End: 2018-09-01
Attending: PSYCHIATRY & NEUROLOGY | Admitting: PSYCHIATRY & NEUROLOGY
Payer: COMMERCIAL

## 2018-08-31 ENCOUNTER — TELEPHONE (OUTPATIENT)
Dept: BEHAVIORAL HEALTH | Facility: CLINIC | Age: 43
End: 2018-08-31

## 2018-08-31 ENCOUNTER — HOSPITAL ENCOUNTER (EMERGENCY)
Facility: CLINIC | Age: 43
Discharge: PSYCHIATRIC OR SUBSTANCE USE FACILITY | End: 2018-08-31
Attending: EMERGENCY MEDICINE | Admitting: EMERGENCY MEDICINE
Payer: COMMERCIAL

## 2018-08-31 VITALS
DIASTOLIC BLOOD PRESSURE: 89 MMHG | TEMPERATURE: 97.5 F | HEART RATE: 80 BPM | WEIGHT: 270 LBS | BODY MASS INDEX: 39.99 KG/M2 | OXYGEN SATURATION: 94 % | HEIGHT: 69 IN | SYSTOLIC BLOOD PRESSURE: 155 MMHG | RESPIRATION RATE: 16 BRPM

## 2018-08-31 DIAGNOSIS — F32.A DEPRESSION, UNSPECIFIED DEPRESSION TYPE: ICD-10-CM

## 2018-08-31 DIAGNOSIS — F10.220 ACUTE ALCOHOLIC INTOXICATION IN ALCOHOLISM WITHOUT COMPLICATION (H): ICD-10-CM

## 2018-08-31 PROBLEM — F19.10 POLYSUBSTANCE ABUSE (H): Status: ACTIVE | Noted: 2018-08-31

## 2018-08-31 LAB
ALBUMIN SERPL-MCNC: 3.6 G/DL (ref 3.4–5)
ALP SERPL-CCNC: 78 U/L (ref 40–150)
ALT SERPL W P-5'-P-CCNC: 119 U/L (ref 0–50)
AMPHETAMINES UR QL SCN: NEGATIVE
ANION GAP SERPL CALCULATED.3IONS-SCNC: 12 MMOL/L (ref 3–14)
AST SERPL W P-5'-P-CCNC: 236 U/L (ref 0–45)
BARBITURATES UR QL: NEGATIVE
BASOPHILS # BLD AUTO: 0.2 10E9/L (ref 0–0.2)
BASOPHILS NFR BLD AUTO: 2.5 %
BENZODIAZ UR QL: NEGATIVE
BILIRUB SERPL-MCNC: 0.5 MG/DL (ref 0.2–1.3)
BUN SERPL-MCNC: 5 MG/DL (ref 7–30)
CALCIUM SERPL-MCNC: 8.7 MG/DL (ref 8.5–10.1)
CANNABINOIDS UR QL SCN: NEGATIVE
CHLORIDE SERPL-SCNC: 106 MMOL/L (ref 94–109)
CO2 SERPL-SCNC: 26 MMOL/L (ref 20–32)
COCAINE UR QL: NEGATIVE
CREAT SERPL-MCNC: 0.51 MG/DL (ref 0.52–1.04)
DIFFERENTIAL METHOD BLD: ABNORMAL
EOSINOPHIL # BLD AUTO: 0 10E9/L (ref 0–0.7)
EOSINOPHIL NFR BLD AUTO: 0.7 %
ERYTHROCYTE [DISTWIDTH] IN BLOOD BY AUTOMATED COUNT: 15.7 % (ref 10–15)
ETHANOL SERPL-MCNC: 0.3 G/DL
GFR SERPL CREATININE-BSD FRML MDRD: >90 ML/MIN/1.7M2
GLUCOSE SERPL-MCNC: 86 MG/DL (ref 70–99)
HCT VFR BLD AUTO: 42.9 % (ref 35–47)
HGB BLD-MCNC: 14.1 G/DL (ref 11.7–15.7)
IMM GRANULOCYTES # BLD: 0 10E9/L (ref 0–0.4)
IMM GRANULOCYTES NFR BLD: 0.2 %
INR PPP: 1.14 (ref 0.86–1.14)
LYMPHOCYTES # BLD AUTO: 2.2 10E9/L (ref 0.8–5.3)
LYMPHOCYTES NFR BLD AUTO: 36.5 %
MCH RBC QN AUTO: 30.1 PG (ref 26.5–33)
MCHC RBC AUTO-ENTMCNC: 32.9 G/DL (ref 31.5–36.5)
MCV RBC AUTO: 92 FL (ref 78–100)
MONOCYTES # BLD AUTO: 0.5 10E9/L (ref 0–1.3)
MONOCYTES NFR BLD AUTO: 9.1 %
NEUTROPHILS # BLD AUTO: 3 10E9/L (ref 1.6–8.3)
NEUTROPHILS NFR BLD AUTO: 51 %
NRBC # BLD AUTO: 0 10*3/UL
NRBC BLD AUTO-RTO: 0 /100
OPIATES UR QL SCN: NEGATIVE
PCP UR QL SCN: NEGATIVE
PLATELET # BLD AUTO: 273 10E9/L (ref 150–450)
POTASSIUM SERPL-SCNC: 4.2 MMOL/L (ref 3.4–5.3)
PROT SERPL-MCNC: 7.6 G/DL (ref 6.8–8.8)
RBC # BLD AUTO: 4.68 10E12/L (ref 3.8–5.2)
SODIUM SERPL-SCNC: 144 MMOL/L (ref 133–144)
WBC # BLD AUTO: 5.9 10E9/L (ref 4–11)

## 2018-08-31 PROCEDURE — 80320 DRUG SCREEN QUANTALCOHOLS: CPT | Performed by: EMERGENCY MEDICINE

## 2018-08-31 PROCEDURE — 80053 COMPREHEN METABOLIC PANEL: CPT | Performed by: EMERGENCY MEDICINE

## 2018-08-31 PROCEDURE — 96376 TX/PRO/DX INJ SAME DRUG ADON: CPT

## 2018-08-31 PROCEDURE — 99285 EMERGENCY DEPT VISIT HI MDM: CPT | Mod: 25

## 2018-08-31 PROCEDURE — 85025 COMPLETE CBC W/AUTO DIFF WBC: CPT | Performed by: EMERGENCY MEDICINE

## 2018-08-31 PROCEDURE — 90791 PSYCH DIAGNOSTIC EVALUATION: CPT

## 2018-08-31 PROCEDURE — 25000128 H RX IP 250 OP 636: Performed by: EMERGENCY MEDICINE

## 2018-08-31 PROCEDURE — 25000132 ZZH RX MED GY IP 250 OP 250 PS 637: Performed by: PSYCHIATRY & NEUROLOGY

## 2018-08-31 PROCEDURE — 12800012 ZZH R&B CD MH INTERMEDIATE ADULT

## 2018-08-31 PROCEDURE — 96374 THER/PROPH/DIAG INJ IV PUSH: CPT

## 2018-08-31 PROCEDURE — 85610 PROTHROMBIN TIME: CPT | Performed by: EMERGENCY MEDICINE

## 2018-08-31 PROCEDURE — 96375 TX/PRO/DX INJ NEW DRUG ADDON: CPT

## 2018-08-31 PROCEDURE — 80307 DRUG TEST PRSMV CHEM ANLYZR: CPT | Performed by: EMERGENCY MEDICINE

## 2018-08-31 RX ORDER — TRAZODONE HYDROCHLORIDE 50 MG/1
50 TABLET, FILM COATED ORAL
Status: DISCONTINUED | OUTPATIENT
Start: 2018-08-31 | End: 2018-09-01 | Stop reason: HOSPADM

## 2018-08-31 RX ORDER — LORAZEPAM 2 MG/ML
1 INJECTION INTRAMUSCULAR
Status: DISCONTINUED | OUTPATIENT
Start: 2018-08-31 | End: 2018-08-31 | Stop reason: HOSPADM

## 2018-08-31 RX ORDER — BISACODYL 10 MG
10 SUPPOSITORY, RECTAL RECTAL DAILY PRN
Status: DISCONTINUED | OUTPATIENT
Start: 2018-08-31 | End: 2018-09-01 | Stop reason: HOSPADM

## 2018-08-31 RX ORDER — FOLIC ACID 1 MG/1
1 TABLET ORAL DAILY
Status: DISCONTINUED | OUTPATIENT
Start: 2018-08-31 | End: 2018-09-01 | Stop reason: HOSPADM

## 2018-08-31 RX ORDER — LANOLIN ALCOHOL/MO/W.PET/CERES
100 CREAM (GRAM) TOPICAL DAILY
Status: DISCONTINUED | OUTPATIENT
Start: 2018-08-31 | End: 2018-09-01 | Stop reason: HOSPADM

## 2018-08-31 RX ORDER — MULTIPLE VITAMINS W/ MINERALS TAB 9MG-400MCG
1 TAB ORAL DAILY
Status: DISCONTINUED | OUTPATIENT
Start: 2018-08-31 | End: 2018-09-01 | Stop reason: HOSPADM

## 2018-08-31 RX ORDER — ONDANSETRON 2 MG/ML
4 INJECTION INTRAMUSCULAR; INTRAVENOUS ONCE
Status: COMPLETED | OUTPATIENT
Start: 2018-08-31 | End: 2018-08-31

## 2018-08-31 RX ORDER — HYDROXYZINE HYDROCHLORIDE 25 MG/1
25 TABLET, FILM COATED ORAL EVERY 4 HOURS PRN
Status: DISCONTINUED | OUTPATIENT
Start: 2018-08-31 | End: 2018-09-01 | Stop reason: HOSPADM

## 2018-08-31 RX ORDER — BUPRENORPHINE 2 MG/1
2 TABLET SUBLINGUAL 4 TIMES DAILY PRN
Status: DISCONTINUED | OUTPATIENT
Start: 2018-09-01 | End: 2018-09-01 | Stop reason: HOSPADM

## 2018-08-31 RX ORDER — MULTIPLE VITAMINS W/ MINERALS TAB 9MG-400MCG
1 TAB ORAL DAILY
Status: DISCONTINUED | OUTPATIENT
Start: 2018-08-31 | End: 2018-08-31

## 2018-08-31 RX ORDER — ACETAMINOPHEN 325 MG/1
650 TABLET ORAL EVERY 4 HOURS PRN
Status: DISCONTINUED | OUTPATIENT
Start: 2018-08-31 | End: 2018-09-01 | Stop reason: HOSPADM

## 2018-08-31 RX ORDER — FERROUS SULFATE 325(65) MG
325 TABLET ORAL DAILY
Status: DISCONTINUED | OUTPATIENT
Start: 2018-08-31 | End: 2018-09-01 | Stop reason: HOSPADM

## 2018-08-31 RX ORDER — ALUMINA, MAGNESIA, AND SIMETHICONE 2400; 2400; 240 MG/30ML; MG/30ML; MG/30ML
30 SUSPENSION ORAL EVERY 4 HOURS PRN
Status: DISCONTINUED | OUTPATIENT
Start: 2018-08-31 | End: 2018-09-01 | Stop reason: HOSPADM

## 2018-08-31 RX ORDER — ERGOCALCIFEROL 1.25 MG/1
50000 CAPSULE, LIQUID FILLED ORAL DAILY
Status: DISCONTINUED | OUTPATIENT
Start: 2018-08-31 | End: 2018-08-31 | Stop reason: CLARIF

## 2018-08-31 RX ORDER — DIAZEPAM 5 MG
5-20 TABLET ORAL EVERY 30 MIN PRN
Status: DISCONTINUED | OUTPATIENT
Start: 2018-08-31 | End: 2018-09-01 | Stop reason: HOSPADM

## 2018-08-31 RX ADMIN — LORAZEPAM 1 MG: 2 INJECTION INTRAMUSCULAR; INTRAVENOUS at 14:30

## 2018-08-31 RX ADMIN — ONDANSETRON 4 MG: 2 INJECTION INTRAMUSCULAR; INTRAVENOUS at 09:50

## 2018-08-31 RX ADMIN — DIAZEPAM 10 MG: 5 TABLET ORAL at 20:09

## 2018-08-31 RX ADMIN — LORAZEPAM 1 MG: 2 INJECTION INTRAMUSCULAR; INTRAVENOUS at 11:42

## 2018-08-31 RX ADMIN — DIAZEPAM 10 MG: 5 TABLET ORAL at 17:56

## 2018-08-31 RX ADMIN — LORAZEPAM 1 MG: 2 INJECTION INTRAMUSCULAR; INTRAVENOUS at 09:31

## 2018-08-31 ASSESSMENT — ACTIVITIES OF DAILY LIVING (ADL)
GROOMING: INDEPENDENT
DRESS: INDEPENDENT
ORAL_HYGIENE: INDEPENDENT

## 2018-08-31 ASSESSMENT — ENCOUNTER SYMPTOMS
WEAKNESS: 1
NUMBNESS: 1
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
## 2018-08-31 NOTE — IP AVS SNAPSHOT
[facility]    [address] [place] [id number]    Phone:  [phone]                                       After Visit Summary   8/31/2018    Neema Bailey    MRN: [id number]           After Visit Summary Signature Page     I have received my discharge instructions, and my questions have been answered. I have discussed any challenges I see with this plan with the nurse or doctor.    ..........................................................................................................................................  Patient/Patient Representative Signature      ..........................................................................................................................................  Patient Representative Print Name and Relationship to Patient    ..................................................               ................................................  Date                                            Time    ..........................................................................................................................................  Reviewed by Signature/Title    ...................................................              ..............................................  Date                                                            Time          22EPIC Rev 08/18

## 2018-08-31 NOTE — ED NOTES
Bed: ED17  Expected date:   Expected time:   Means of arrival:   Comments:  Glenda 514 Depression 43 female

## 2018-08-31 NOTE — ED NOTES
Pharmacy in to speak with pt, will return as pt. Is sleepy. Rechecked pt. sats are 95% on 1 lpm. Updated pt on plan for [facility] admit. Pt. Is cooperative, and agrees to plan.

## 2018-08-31 NOTE — IP AVS SNAPSHOT
MRN:[id number]                      After Visit Summary   8/31/2018    Neema Bailey    MRN: [id number]           Thank you!     Thank you for choosing [facility] for your care. Our goal is always to provide you with excellent care.        Patient Information     Date Of Birth          1975        Designated Caregiver       Most Recent Value    Caregiver    Will someone help with your care after discharge? no    Name of designated caregiver NA    Phone number of caregiver NA      About your hospital stay     You were admitted on:  August 31, 2018 You last received care in the: [facility]    You were discharged on:  September 1, 2018       Who to Call     For medical emergencies, please call 911.  For non-urgent questions about your medical care, please call your primary care provider or clinic, [phone]          Attending Provider     Provider Specialty    Regina Cruz MD Psychiatry       Primary Care Provider Office Phone # Fax #    Suresh Shabazz [phone]      Further instructions from your care team       Behavioral Discharge Planning and Instructions  THANK YOU FOR CHOOSING THE [address]  [phone]    Summary: You were admitted to [facility] on 8/31/18  for detoxification from Alcohol.  A medical exam was performed that included lab work. You have met with a  and opted to discharge to home and follow up with your established outpatient treatment at [facility]. Please take care and make your recovery a priority!    Main Diagnosis:  Per  Dr. Dill  Alcohol withdrawal    Major Treatments, Procedures and Findings:  You were detoxed from alcohol. You have met with a  to develop a treatment plan for discharge.  You have had labs drawn and a copy of those labs will be sent home with you. Your alcohol withdrawal is complete and you are being discharged today.  Please bring  your lab results with to your follow up doctor appointment.  Make your recovery a priority!                        Symptoms to Report:  If you experience more anxiety, confusion, sleeplessness, deep sadness or thoughts of suicide, notify your treatment team or notify your primary care physician. IF ANY OF THE SYMPTOMS YOU ARE EXPERIENCING ARE A MEDICAL EMERGENCY CALL 911 IMMEDIATELY.     Lifestyle Adjustment: Adjust your lifestyle to get enough sleep, relaxation, exercise and  good nutrition. Continue to develop healthy coping skills to decrease stress and promote a sober living environment. Do not use alcohol, illegal drugs or addictive medications other than what is currently prescribed. AA, NA, and  Sponsor are excellent resources for support.     Disposition:     Primary Provider:    [facility]    ShabazzSuresh bryant  [address]    [place], [place] [address] [phone]       Appointment:  Patient will schedule a follow up appointment clinic is closed today      [facility]: Return to Outpatient CD treatment 2 time a week    [facility]  [address]  [address]  [place] [address]  Phone: [phone]  Fax [phone]     Resources:     [facility] [phone]    Support Group:  AA/NA and Sponsor/support    Crisis Intervention: 688.372.8066 or 654-912-9964 (TTY: 652.732.8690).  Call anytime for help.  National [place] on Mental Illness (www.[place].deon.org): 458.410.4067 or 465-792-8738.  Alcoholics Anonymous (www.alcoholics-anonymous.org): Check your phone book for your local chapter.  Suicide Awareness Voices of Education (SAVE) (www.save.org): 031-981-BRMP (1152)  National Suicide Prevention Line (www.mentalhealthmn.org): 094-501-FFVL (8076)  Mental Health Consumer/Survivor Network of [place] (www.mhcsn.net): 428.479.7288 or 188-465-7811  Mental Health Association of [place] (www.mentalhealth.org): 744.848.2878 or 854-736-5828   Substance Abuse and  Mental Health Services (www.samhsa.gov)    [facility] ([facility])  [facility] connects people seeking recovery to resources that help foster and sustain long-term recovery.  Whether you are seeking resources for treatment, transportation, housing, job training, education, health care or other pathways to recovery, [facility] is a great place to start.  [phone].  Www.[facility].org    General Medication Instructions:   See your medication sheet(s) for instructions.   Take all medicines as directed.  Make no changes unless your doctor suggests them.   Go to all your doctor visits.  Be sure to have all your required lab tests. This way, your medicines can be refilled on time.  Do not use any drugs not prescribed by your provider.  AA/NA and Sponsors are excellent resources for support  Avoid alcohol.    Please Note:  If you have any questions at anytime after you are discharged please call the [facility] detox unit 3AW unit at [phone].    [facility], Behavioral Intake [phone]    Please take this discharge folder with you to all your follow up appointments, it contains your lab results, diagnosis, medication list and discharge recommendations.      THANK YOU FOR CHOOSING THE [facility]     Pending Results     Date and Time Order Name Status Description    9/1/2018 0030 Folate In process             Statement of Approval     Ordered          09/01/18 1207  I have reviewed and agree with all the recommendations and orders detailed in this document.  EFFECTIVE NOW     Approved and electronically signed by:  Myron Dill MD             Admission Information     Date & Time Provider Department Dept. Phone    8/31/2018 Regina Cruz MD [facility] [phone]      Your Vitals Were     Blood Pressure Pulse Temperature Respirations Height Weight    114/78 80 97  F (36.1  C) (Oral) 16  "1.753 m (5' 9\") 117.9 kg (260 lb)    BMI (Body Mass Index)                   38.4 kg/m2           [employer] Information     [employer] lets you send messages to your doctor, view your test results, renew your prescriptions, schedule appointments and more. To sign up, go to [email or web address] . Click on \"Log in\" on the left side of the screen, which will take you to the Welcome page. Then click on \"Sign up Now\" on the right side of the page.     You will be asked to enter the access code listed below, as well as some personal information. Please follow the directions to create your username and password.     Your access code is: [email or web address]  Expires: 2018  9:02 PM     Your access code will  in 90 days. If you need help or a new code, please call your [facility] clinic or [phone].        Care EveryWhere ID     This is your Care EveryWhere ID. This could be used by other organizations to access your [facility] medical records  [id number]        Equal Access to Services     [facility] : Hadclau Rasmussen, waestrellita hopper, qakemal chavez, daja morfin. So [facility] [phone].    ATENCIÓN: Si habla español, tiene a boykin disposición servicios gratuitos de asistencia lingüística. Michael al [phone].    We comply with applicable federal civil rights laws and [place] laws. We do not discriminate on the basis of race, color, national origin, age, disability, sex, sexual orientation, or gender identity.               Review of your medicines      CONTINUE these medicines which have NOT CHANGED        Dose / Directions    IMITREX PO   Notes to Patient:  None in hospital        Take by mouth as needed for migraine (uNKNOWN DOSE)   Refills:  0       IRON SUPPLEMENT PO   Notes to Patient:  None in hospital        Dose:  325 mg   Take 325 mg by mouth daily   Refills:  0       Multi-vitamin Tabs tablet   Notes to Patient:  None in hospital         Dose:  1 tablet "   Take 1 tablet by mouth daily   Refills:  0       sucralfate 1 GM/10ML suspension   Commonly known as:  CARAFATE   Notes to Patient:  None in hospital        Dose:  1 g   Take 10 mLs (1 g) by mouth 4 times daily   Quantity:  420 mL   Refills:  1       vitamin D 18057 UNIT capsule   Commonly known as:  ERGOCALCIFEROL   Notes to Patient:  None in hospital         once a week   Refills:  0         STOP taking     amitriptyline 10 MG tablet   Commonly known as:  ELAVIL           chlorhexidine 0.12 % solution   Commonly known as:  PERIDEX           fluocinolone 0.01 % solution   Commonly known as:  SYNALAR           GABAPENTIN PO           HYDROcodone-acetaminophen 5-325 MG per tablet   Commonly known as:  NORCO           morphine 15 MG IR tablet   Commonly known as:  MSIR           tolterodine 4 MG 24 hr capsule   Commonly known as:  DETROL LA           vitamin E 400 UNIT capsule                    Protect others around you: Learn how to safely use, store and throw away your medicines at www.disposemymeds.org.             Medication List: This is a list of all your medications and when to take them. Check marks below indicate your daily home schedule. Keep this list as a reference.      Medications           Morning Afternoon Evening Bedtime As Needed    IMITREX PO   Take by mouth as needed for migraine (uNKNOWN DOSE)   Notes to Patient:  None in hospital                                IRON SUPPLEMENT PO   Take 325 mg by mouth daily   Notes to Patient:  None in hospital                                Multi-vitamin Tabs tablet   Take 1 tablet by mouth daily   Notes to Patient:  None in hospital                                 sucralfate 1 GM/10ML suspension   Commonly known as:  CARAFATE   Take 10 mLs (1 g) by mouth 4 times daily   Notes to Patient:  None in hospital                                vitamin D 31516 UNIT capsule   Commonly known as:  ERGOCALCIFEROL   once a week   Notes to Patient:  None in hospital

## 2018-08-31 NOTE — PROGRESS NOTES
08/31/18 1603   Patient Belongings   Did you bring any home meds/supplements to the hospital?  Yes   Patient Belongings other (see comments)   Disposition of Belongings Other (see comment)   Belongings Search Yes   Clothing Search Yes   Second Staff Lg MILLER and Zara MILLER   General Info Comment See Notes     Storage Bin:  Red Purse, Makeup, Pens, , LG Phone, Book, Birthday Card, [place] Change, Jewelry, Keys, Stick-on Nails, Lighter, Jeans with rhinestones, Gray Hoodie, Body Spray, Scottish Musk bottle.    Locked Drawer:  Wallet, Phone.    Medication (024023):    Security ([id number]):  [place] License, BMO Mastercard, Direct Express Debit Card, TCF Visa Cards (2), TCF MEGAN Card, CSL Plasma Visa Card, Target Gift Card, [employer] Gift Cards (3), Walmart Gift Cards (2), [employer] Gift Card, Social Security Card, $20 Cash, BMO Triana Receipt.      A               Admission:  I am responsible for any personal items that are not sent to the safe or pharmacy.  [facility] is not responsible for loss, theft or damage of any property in my possession.    Signature:  _________________________________ Date: _______  Time: _____                                              Staff Signature:  ____________________________ Date: ________  Time: _____      2nd Staff person, if patient is unable/unwilling to sign:    Signature: ________________________________ Date: ________  Time: _____     Discharge:  [facility] has returned all of my personal belongings:    Signature: _________________________________ Date: ________  Time: _____                                          Staff Signature:  ____________________________ Date: ________  Time: _____

## 2018-08-31 NOTE — TELEPHONE ENCOUNTER
S:  Pt seen in the [facility] ED / DEC due to increased depression and requesting detox from alcohol.    B:  Info per TRAMAINE Eller  :  Pt reports a hx of alcohol abuse.  She went thru tx in  and has been mostly sober since then.  She reports she relapsed recently after her foster child  in a car accident.  She repots feeling guilty about her death and is very grief stricken.  She reports she is drinking a Box of wine , 3-5 ltrs, per day along with some hard liquor.  She blew a 0.3 when she came in and had her RADHA a couple of hours before that.  She states she gets very sick when she stops using.  She denies seizures or DT's.  She denies other chems.  She has chronic back pain but is otherwise medically cleared. Utox neg.pt was on the phone with her son, very tearful, and he called 911.  Pt has a hx of depression and anxiety.  She is not currently on meds.  Cooperative. Seeking help.  Please see chart for further info.    A:  Needing hospitalization for safe detox.    R:  Admit to 3A  CD/detox     accepts for herself     Vol      - 3A , Emmanuel informed  12:50  -[facility] ED JAMIL Sevilla informed 12:52

## 2018-09-01 VITALS
HEART RATE: 80 BPM | SYSTOLIC BLOOD PRESSURE: 114 MMHG | BODY MASS INDEX: 38.51 KG/M2 | HEIGHT: 69 IN | DIASTOLIC BLOOD PRESSURE: 78 MMHG | WEIGHT: 260 LBS | TEMPERATURE: 97 F | RESPIRATION RATE: 16 BRPM

## 2018-09-01 LAB
ALBUMIN SERPL-MCNC: 2.8 G/DL (ref 3.4–5)
ALP SERPL-CCNC: 68 U/L (ref 40–150)
ALT SERPL W P-5'-P-CCNC: 84 U/L (ref 0–50)
ANION GAP SERPL CALCULATED.3IONS-SCNC: 11 MMOL/L (ref 3–14)
AST SERPL W P-5'-P-CCNC: 132 U/L (ref 0–45)
BILIRUB SERPL-MCNC: 0.8 MG/DL (ref 0.2–1.3)
BUN SERPL-MCNC: 8 MG/DL (ref 7–30)
CALCIUM SERPL-MCNC: 8.2 MG/DL (ref 8.5–10.1)
CHLORIDE SERPL-SCNC: 104 MMOL/L (ref 94–109)
CO2 SERPL-SCNC: 27 MMOL/L (ref 20–32)
CREAT SERPL-MCNC: 0.55 MG/DL (ref 0.52–1.04)
ERYTHROCYTE [DISTWIDTH] IN BLOOD BY AUTOMATED COUNT: 15.2 % (ref 10–15)
FOLATE SERPL-MCNC: 5 NG/ML
GFR SERPL CREATININE-BSD FRML MDRD: >90 ML/MIN/1.7M2
GGT SERPL-CCNC: 182 U/L (ref 0–40)
GLUCOSE SERPL-MCNC: 81 MG/DL (ref 70–99)
HCT VFR BLD AUTO: 41 % (ref 35–47)
HGB BLD-MCNC: 12.9 G/DL (ref 11.7–15.7)
MCH RBC QN AUTO: 29.3 PG (ref 26.5–33)
MCHC RBC AUTO-ENTMCNC: 31.5 G/DL (ref 31.5–36.5)
MCV RBC AUTO: 93 FL (ref 78–100)
PLATELET # BLD AUTO: 183 10E9/L (ref 150–450)
POTASSIUM SERPL-SCNC: 3.9 MMOL/L (ref 3.4–5.3)
PROT SERPL-MCNC: 6.5 G/DL (ref 6.8–8.8)
RBC # BLD AUTO: 4.4 10E12/L (ref 3.8–5.2)
SODIUM SERPL-SCNC: 142 MMOL/L (ref 133–144)
T4 FREE SERPL-MCNC: 0.86 NG/DL (ref 0.76–1.46)
TSH SERPL DL<=0.005 MIU/L-ACNC: 8.78 MU/L (ref 0.4–4)
WBC # BLD AUTO: 4.5 10E9/L (ref 4–11)

## 2018-09-01 PROCEDURE — 82746 ASSAY OF FOLIC ACID SERUM: CPT | Performed by: PSYCHIATRY & NEUROLOGY

## 2018-09-01 PROCEDURE — 25000132 ZZH RX MED GY IP 250 OP 250 PS 637: Performed by: PSYCHIATRY & NEUROLOGY

## 2018-09-01 PROCEDURE — 36415 COLL VENOUS BLD VENIPUNCTURE: CPT | Performed by: PSYCHIATRY & NEUROLOGY

## 2018-09-01 PROCEDURE — 82977 ASSAY OF GGT: CPT | Performed by: PSYCHIATRY & NEUROLOGY

## 2018-09-01 PROCEDURE — 99232 SBSQ HOSP IP/OBS MODERATE 35: CPT | Performed by: PHYSICIAN ASSISTANT

## 2018-09-01 PROCEDURE — 80053 COMPREHEN METABOLIC PANEL: CPT | Performed by: PSYCHIATRY & NEUROLOGY

## 2018-09-01 PROCEDURE — 84439 ASSAY OF FREE THYROXINE: CPT | Performed by: PSYCHIATRY & NEUROLOGY

## 2018-09-01 PROCEDURE — HZ2ZZZZ DETOXIFICATION SERVICES FOR SUBSTANCE ABUSE TREATMENT: ICD-10-PCS | Performed by: PHYSICIAN ASSISTANT

## 2018-09-01 PROCEDURE — 84443 ASSAY THYROID STIM HORMONE: CPT | Performed by: PSYCHIATRY & NEUROLOGY

## 2018-09-01 PROCEDURE — 85027 COMPLETE CBC AUTOMATED: CPT | Performed by: PSYCHIATRY & NEUROLOGY

## 2018-09-01 PROCEDURE — 99236 HOSP IP/OBS SAME DATE HI 85: CPT | Performed by: PSYCHIATRY & NEUROLOGY

## 2018-09-01 PROCEDURE — 99207 ZZC CONSULT E&M CHANGED TO SUBSEQUENT LEVEL: CPT | Performed by: PHYSICIAN ASSISTANT

## 2018-09-01 RX ADMIN — TRAZODONE HYDROCHLORIDE 50 MG: 50 TABLET ORAL at 00:18

## 2018-09-01 RX ADMIN — ACETAMINOPHEN 650 MG: 325 TABLET, FILM COATED ORAL at 05:12

## 2018-09-01 RX ADMIN — DIAZEPAM 10 MG: 5 TABLET ORAL at 05:05

## 2018-09-01 RX ADMIN — DIAZEPAM 10 MG: 5 TABLET ORAL at 00:18

## 2018-09-01 ASSESSMENT — ACTIVITIES OF DAILY LIVING (ADL)
ORAL_HYGIENE: INDEPENDENT
GROOMING: HANDWASHING;SHOWER;INDEPENDENT
DRESS: STREET CLOTHES;INDEPENDENT

## 2018-09-01 NOTE — PROVIDER NOTIFICATION
"Pt reports that she wants to be discharged, I have no withdrawal sx and I need to get out of here and deal with stuff. I have outpatient treatment set up and just relapsed. I explained the detox process and she seemed to understand.  She requested to have a visitor and visitor came and started questioning why she can not just walk out the door, pt became increasingly more agitated. \"I'm not in care home I can walk out the door anytime I want too.\" I explained that she needs to see Dr. Dill but can sign a 72 hour intent to leave which she did. Dr. Dill will see patient this am.   "

## 2018-09-01 NOTE — PROGRESS NOTES
Pt discharged to home with all belongings. Acknowledged understanding of all discharge instructions. Picked up by significant other, walked off unit by Stacie HOLLEY.

## 2018-09-01 NOTE — CONSULTS
[facility]  Internal Medicine Consult       Date of Admission:  8/31/2018  Consult Requested by: Psychiatry  Reason for Consult: Medical evaluation      Assessment & Plan   Neema Bailey is a 43 year old female admitted on 8/31/2018 to unit 3A. She has a history of alcohol abuse, peripheral neuropathy, chronic anemia, chronic back pain, and vitamin D deficiency and is admitted for alcohol detox.    1. Alcohol use disorder:  History of alcohol abuse. Recently self-detoxed at home before entering outpatient treatment on 8/5. Patient sober x 2 weeks before relapse this past Monday. Her alcohol use has gotten more significant over the past few days, prompting her visit to the ED where her ETOH was 0.3 on arrival. She is currently demonstrating no signs of alcohol withdrawal. There is documentation of DTs and withdrawal seizures in the past, which the patient denies. Given her recent period of sobriety followed by short term alcohol use, I feel that the patient is low risk for medically significant withdrawal at this time. If the patient were admitted to an inpatient medical unit, I would not place patient on emergency hold and petition for commitment d/t chemical dependency. Awaiting psychiatry to potentially clear patient for discharge.  - Cont MSSA protocol  - Cont thiamine and folate  - Encouraged sobriety and better coping strategies; condolences given for her loss.  - Further management per psychiatry.     2. 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   - Cont Elavil and Gabapentin per home regimen    3. Transaminitis:  Suspect d/t alcohol. LFT's improving on repeat testing.     4. Subclinical hypothyroidism vs Sick euthyroid state:  TSH 8.78 (h) with T4 0.86 (wnl). Suspect sick euthyroid state in setting of alcohol intoxication.   - Repeat TFTs in 4-6 weeks as outpatient.       The patient's care was  discussed with the Bedside Nurse.    Thank you for the consult. No further recommendations or need for medical follow up.  Medicine will sign off. Please page on-call provider for any additional medical concerns.      Ciaran Foster PA-C  Internal Medicine Staff Hospitalist Service  [facility]  Pager: [phone]  After 5 PM, page gold team cross cover at [phone]. If no response, page job code [phone].  ______________________________________________________________________    History of Present Illness   43 year old female with history of alcohol abuse, peripheral neuropathy, chronic anemia, chronic pain, and vitamin D deficiency who was admitted to unit 3A for alcohol detox. The patient reports a longstanding history of alcohol abuse, for which she is currently getting outpatient treatment. The patient entered the treatment program on 8/5 after self-detoxing at home. She recently suffered a loss after a foster child in her care was killed after being hit by a car. She relapsed on alcohol on Monday, 8/27. She has been drinking wine and beer daily since then. On Wednesday she went to the store and bought a small box of wine (which is about 2 bottles) and some beer. She went through this amount of alcohol over the course of 3 days, but the majority of it was drank on Friday, 8/31. Her son noted her level of intoxication and became concerned that she was suicidal, therefore he brought her to the [facility] ED for further evaluation. Her ETOH was 0.30 on arrival. She denied SI but subsequently agreed to admission to detox.     The patient denies any active withdrawal symptoms. She is feeling well physically and has no complaints. She does not feel that she is going to have any further withdrawal symptoms given the amount of alcohol she was using. She had been sober x 2 weeks prior to her relapse. It is documented that she has a history of DTs and withdrawal seizures, however the patient strongly denies -  although admits to not being 100% confident. Today is the memorial for her foster child and she would like to discharge.     Review of Systems   The 10 point Review of Systems is negative other than noted in the HPI or here.     Past Medical History    I have reviewed this patient's medical history and updated it with pertinent information if needed.   Past Medical History:   Diagnosis Date     Anemia      Numbness and tingling     PERIPHERAL NEUROPATHY     Other chronic pain      Seborrheic dermatitis      Vitamin D deficiency         Past Surgical History   I have reviewed this patient's surgical history and updated it with pertinent information if needed.  Past Surgical History:   Procedure Laterality Date     ABDOMEN SURGERY      gastric bypass in 2002     APPENDECTOMY       BACK SURGERY      lumbar 4-5 surgery for benign tumor removal     COLPORRHAPHY ANTERIOR N/A 9/21/2015    Procedure: COLPORRHAPHY ANTERIOR;  Surgeon: Jairon Adams MD;  Location: [facility]     ENT SURGERY      tonsillectomy        Social History   Social History   Substance Use Topics     Smoking status: Never Smoker     Smokeless tobacco: Never Used     Alcohol use 0.0 oz/week     0 Standard drinks or equivalent per week         Family History   Family history reviewed with patient and is noncontributory.    Medications   I have reviewed this patient's current medications  Prescriptions Prior to Admission   Medication Sig Dispense Refill Last Dose     amitriptyline (ELAVIL) 10 MG tablet 10 mg At Bedtime   2 9/20/2015 at pm     chlorhexidine (PERIDEX) 0.12 % solution daily as needed   0 Past Week     Ferrous Sulfate (IRON SUPPLEMENT PO) Take 325 mg by mouth daily   9/20/2015     fluocinolone (SYNALAR) 0.01 % external solution Apply topically daily as needed   3 Past Month     GABAPENTIN PO Take 900 mg by mouth daily   9/20/2015 at pm     HYDROcodone-acetaminophen (NORCO) 5-325 MG per tablet Take 1 tablet by mouth every 6 hours as needed  for severe pain 5 tablet 0      morphine (MSIR) 15 MG tablet Take 15 mg by mouth 2 times daily as needed   0 9/20/2015 at 1500     multivitamin, therapeutic with minerals (MULTI-VITAMIN) TABS Take 1 tablet by mouth daily   9/20/2015     sucralfate (CARAFATE) 1 GM/10ML suspension Take 10 mLs (1 g) by mouth 4 times daily 420 mL 1      SUMAtriptan Succinate (IMITREX PO) Take by mouth as needed for migraine (uNKNOWN DOSE)    Past Month     tolterodine (DETROL LA) 4 MG 24 hr capsule Take 1 capsule (4 mg) by mouth daily 30 capsule 1 Past Month     vitamin D (ERGOCALCIFEROL) 27362 UNIT capsule once a week   0 Past Week     vitamin E 400 UNIT capsule daily   6 Past Month       Allergies   Allergies   Allergen Reactions     Gabapentin      Other reaction(s): Edema       Physical Exam   Vital Signs: Temp: 97.2  F (36.2  C) Temp src: Oral BP: 122/78 Pulse: 98   Resp: 16        Weight: 260 lbs 0 oz    GENERAL:  Awake. Alert. Oriented x 3. NAD.   HEENT:  No scleral icterus. Mucous membranes moist.   CV:  RRR. S1, S2. No murmurs appreciated.   RESPIRATORY:  Lungs CTAB with no wheezing, rales, rhonchi.   GI:  Abdomen soft, non-distended. Active bowel sounds. No tenderness.    NEUROLOGICAL:  No focal deficits. Moves all extremities.  No tremor.  EXTREMITIES:  No peripheral edema. No calf tenderness. Intact bilateral pedal pulses.   SKIN:  No jaundice, rashes, wounds or lesions.       Data   Data reviewed today: I have personally reviewed all medications, new labs and imaging results.      ROUTINE IP LABS (Last four results)    Recent Labs  Lab 09/01/18  0738 08/31/18  0915    144   POTASSIUM 3.9 4.2   CHLORIDE 104 106   CO2 27 26   ANIONGAP 11 12   GLC 81 86   BUN 8 5*   CR 0.55 0.51*   NARENDRA 8.2* 8.7   PROTTOTAL 6.5* 7.6   ALBUMIN 2.8* 3.6   BILITOTAL 0.8 0.5   ALKPHOS 68 78   * 236*   ALT 84* 119*       Recent Labs  Lab 09/01/18  0738 08/31/18  0915   WBC 4.5 5.9   RBC 4.40 4.68   HGB 12.9 14.1   HCT 41.0 42.9   MCV 93  92   MCH 29.3 30.1   MCHC 31.5 32.9   RDW 15.2* 15.7*    273       Recent Labs  Lab 08/31/18  0915   INR 1.14     Glucose Values Latest Ref Rng & Units 8/31/2018 9/1/2018   Bedside Glucose (mg/dl )  - -- --   GLUCOSE 70 - 99 mg/dL 86 81   Some recent data might be hidden        Recent Labs  Lab 09/01/18  0738   TSH 8.78*   T4 0.86          All labs personally reviewed in [facility].  See A&P for additional results.     Unresulted Labs Ordered in the Past 30 Days of this Admission     Date and Time Order Name Status Description    9/1/2018 0030 Folate In process

## 2018-09-01 NOTE — DISCHARGE INSTRUCTIONS
Behavioral Discharge Planning and Instructions  THANK YOU FOR CHOOSING THE [address]  [phone]    Summary: You were admitted to Station 3A on 8/31/18  for detoxification from Alcohol.  A medical exam was performed that included lab work. You have met with a  and opted to discharge to home and follow up with your established outpatient treatment at [facility]. Please take care and make your recovery a priority!    Main Diagnosis:  Per  Dr. Dill  Alcohol withdrawal    Major Treatments, Procedures and Findings:  You were detoxed from alcohol. You have met with a  to develop a treatment plan for discharge.  You have had labs drawn and a copy of those labs will be sent home with you. Your alcohol withdrawal is complete and you are being discharged today.  Please bring your lab results with to your follow up doctor appointment.  Make your recovery a priority!                        Symptoms to Report:  If you experience more anxiety, confusion, sleeplessness, deep sadness or thoughts of suicide, notify your treatment team or notify your primary care physician. IF ANY OF THE SYMPTOMS YOU ARE EXPERIENCING ARE A MEDICAL EMERGENCY CALL 911 IMMEDIATELY.     Lifestyle Adjustment: Adjust your lifestyle to get enough sleep, relaxation, exercise and  good nutrition. Continue to develop healthy coping skills to decrease stress and promote a sober living environment. Do not use alcohol, illegal drugs or addictive medications other than what is currently prescribed. AA, NA, and  Sponsor are excellent resources for support.     Disposition:  Home and resume outpatient treatment at [facility]    Primary Provider:    [facility] [place] [place]    Suresh Shabazz  [address]    [place], [place] [address] [phone]       Appointment:  Patient will schedule a follow up appointment clinic is closed today      [facility]: Return to Outpatient CD  treatment 2 time a week    [facility]  [address]  [address]  [place] [address]  Phone: [phone]  Fax [phone]     Resources:     [facility] [phone]    Support Group:  AA/NA and Sponsor/support    Crisis Intervention: 896.562.4214 or 885-427-7499 (TTY: 313.459.8273).  Call anytime for help.  National [place] on Mental Illness (www.mn.deon.org): 924.719.1793 or 536-843-5872.  Alcoholics Anonymous (www.alcoholics-anonymous.org): Check your phone book for your local chapter.  Suicide Awareness Voices of Education (SAVE) (www.save.org): 011-148-EMYI (0047)  National Suicide Prevention Line (www.mentalhealthmn.org): 607-896-PWXF (0040)  Mental Health Consumer/Survivor Network of [place] (www.mhcsn.net): 620.229.3439 or 362-700-9440  Mental Health Association of [place] (www.mentalhealth.org): 664.732.7279 or 879-773-1421   Substance Abuse and Mental Health Services (www.samhsa.gov)    [place] Recovery Connection ([facility])  [facility] connects people seeking recovery to resources that help foster and sustain long-term recovery.  Whether you are seeking resources for treatment, transportation, housing, job training, education, health care or other pathways to recovery, [facility] is a great place to start.  860.425.8613.  Www.[place]arecovery.org    General Medication Instructions:   See your medication sheet(s) for instructions.   Take all medicines as directed.  Make no changes unless your doctor suggests them.   Go to all your doctor visits.  Be sure to have all your required lab tests. This way, your medicines can be refilled on time.  Do not use any drugs not prescribed by your provider.  AA/NA and Sponsors are excellent resources for support  Avoid alcohol.    Please Note:  If you have any questions at anytime after you are discharged please call the [facility] detox unit 3AW unit at [phone].    [facility] , Health, Behavioral Intake  [phone]    Please take this discharge folder with you to all your follow up appointments, it contains your lab results, diagnosis, medication list and discharge recommendations.      THANK YOU FOR CHOOSING THE [facility]

## 2018-09-01 NOTE — H&P
Admitted:     08/31/2018      PSYCHIATRIC EVALUATION DISCHARGE SUMMARY       The patient was seen and discharged on the very same day which is a 9/1/2018.  Greater than 50% of 70 minutes spent with the patient was greater than 50% of 70 minutes was spent on counseling and coordinating care, clarifying diagnostic prognostic issues, presence of support in community.      CHIEF COMPLAINT AND REASON FOR ADMISSION:  The patient is a 43-year-old  female self-admitted to unit 4A detoxification from alcohol.      HISTORY OF PRESENT ILLNESS:  The patient reports a longstanding history of alcoholism.  She said that she is currently enrolled in an outpatient program at [facility] in [place], [place] and was able to stay sober for approximately 1 month.  Then her adopted daughter was hit by a semi and this happened 6 days ago and was killed.  The patient tried her best to maintain sobriety, but still relapsed and reports drinking every day for the last 4 days.  She denied using street drugs and urine drug screen came back negative for all screened substances.  Ethanol g/dL was 0.3.  The patient's comprehensive metabolic battery which h initially showed significantly elevated transaminases, ALT was 119, .  It was on 08/31/2018.  On 09/01/2018 this test was repeated, this time it showed decreased calcium 8.2, albumin 2.8, total protein 6.5, alkaline phosphatase normal, ALT dropped down to 84 and AST dropped down to 132 and the GGT was elevated 182.  TSH was elevated at 8.78, but free T4 was normal at 0.86.  She had CBC with differential that showed elevated RDW 15.7, second time 15.2.  The rest of test was unremarkable.  INR was ?      PAST PSYCHIATRIC HISTORY:  The patient reported poor sleep, but also connected with chronic back problems, low energy, feeling depressed, but said that she did not have suicidal thoughts and was able to contract for safety.  Reported frequent crying, said that she  had been drinking 1 box of wine per day.  Says that she was encouraged to get help by her 18-year-old son.      PAST PSYCHIATRIC HISTORY:  She was in one chemical dependency treatment program, inpatient 2002.  Currently in an outpatient program [facility], see above.  No previous psychiatric hospitalizations.  Said that she was treated on an outpatient basis for depression and anxiety.  Could recall being on Cymbalta, but did not like how it made her feel.  Could not recall other medications.  Reports a past history of opiate abuse.  Said that she was able to kick this addiction on her own and currently does not use them.  At the same time told me that she was recently prescribed opiates and including Norco after having a root canal, but had not been taking it recently.  She denied prior suicide attempts, admitted having suicidal thoughts as a teenager.      FAMILY AND SOCIAL HISTORY:  The patient denies any family history of mental illness.  She is a , says that her  got killed in a snowmobiling accident.  She also had a number of losses, see above.  She is currently engaged, lives with her fiance and 2 of her younger children, a daughter who is 15 and son who is 18, but still lives with them.  Has a 25-year-old daughter from her marriage.  Says that she has 3 granddaughters from her daughter and adores them.  Works full-time.  Said that her fiance is very supportive and has education, chemical addiction.        REVIEW OF SYSTEMS:   For 12-point review of systems and medical examination, please refer to Ciaran Foster PA-C, note from 09/01/2018.  I reviewed this note and agree with it.      HOME MEDICATIONS:   1.  Ferrous sulfate 325 mg daily.   2.  Multivitamins therapeutic with minerals daily.      MENTAL STATUS EXAMINATION:  The patient is an overweight  female looking sad and tired, dressed in hospital garbs, soft spoken.  Maintains partial eye contact, frequently looking down.   Reports of feeling sad; however, also said that she feels safe to be discharged because she and family and close friends have what she calls viewing that is a part of  today at 2 p.m. and she wants to attend it.  There was no visible tremors or other abnormal involuntary movements.  Affect is constricted, sad, congruent with mood.  Speech monotone, decreased production.  Thought process is linear, goal directed.  She denies suicidal or homicidal thoughts.  There is no evidence of psychosis, hypermania or drew.  She is alert, oriented x 3.  Fund of knowledge is average with proper usage of vocabulary.  Ability to focus and concentrate, immediate short and long-term memories are intact.  Insight partial.  Judgment fair.      ASSESSMENT:   1.  Major depressive disorder, recurrent, moderate severity.   2.  Generalized anxiety disorder.   3.  Alcohol dependence.        TREATMENT PLAN:  The patient wants to be discharged.  She was given Valium based on her complaints of anxiety, however, her vitals have been stable and she has not been given Valium since midnight of the .  As stated, the patient does not exhibit any tremors and her vitals are stable.  She feels that she is safe to be discharged back to the community.        I discussed with her starting on antidepressant and seeing a counselor and she was open to this idea.  Says that she will return to the patient chemical dependency treatment program at [facility] and will talk to them about finding for her a counselor within the program.         JACOB SIEGEL MD             D: 2018   T: 2018   MT: SYEDA      Name:     MELLY JONES   MRN:      [id number]        Account:      [id number]   :      1975        Admitted:     2018                   Document: [id number]       cc: Suresh Shabazz MD       [facility]

## 2018-09-01 NOTE — PROGRESS NOTES
This pt brought to 3A detox from [facility]. Pt reports relapse of about 4 days following lost of friend killed in car accident a week ago. Pt is in out pt tx at [facility]/pt has good support system. Pt requesting discharge with plan to continue tx [facility].I discussed residential tx as an option/ may be helpful-pt grieving loss of very close friend.

## 2018-09-04 NOTE — ED NOTES
"(Entry after patient's disposition 9/3/2018 2000)    Patient's chart accessed by [place] Nurse for investigation of voiced concern.    Patient (patient identity identified using valid 's license) and patient's \"significant other\" present to triage desk today requesting to speak with charge RN in regards to a recent ED stay which in turn patient was eventually transferred to another healthcare facility.  Patient's significant main concern was when he called [facility] after patient was transferred, S/O reports he had called [facility] (unable to provide staff member he spoke with) requesting an update regarding patient's care; he reported that ED staff told him that she was no longer a patient at [facility] and they were unable to provide any information in regards to her disposition.  I discussed at length HIPPA laws and regulations including that under HIPPA laws that the staff's response to him that day was the appropriate response in this particular situation, S/O did verbally indicate that he was listed as a emergency contact and this information was verified in patient's chart by RN however RN explained that this is not an appropriate form to release and/or discuss private health information with him, both were educated that the patient would need to verbally approve at each healthcare encounter that staff is able to share information with S/O prior to any information being shared; patient and patient's significant other both verbalized understanding of the process and agree that this was handled well by staffing and both were thankful for staff protecting patient's information appropriately and is thankful for staff \"being great\".  Both patient and patient's S/O denied any other questions or concerns; patient advocate information offered at this time, both declined need for further discussion in relations to concern...........................................................[place] 9/3/2018 2030  "

## 2018-10-04 PROBLEM — E66.01 MORBID OBESITY (H): Status: ACTIVE | Noted: 2018-01-01

## 2018-10-04 NOTE — MR AVS SNAPSHOT
After Visit Summary   10/4/2018    Neema Bailey    MRN: [id number]           Patient Information     Date Of Birth          1975        Visit Information        Provider Department      10/4/2018 10:10 AM Jacque Cruz PA-C [facility]        Today's Diagnoses     Acute cystitis without hematuria    -  1    Dysuria        Need for prophylactic vaccination and inoculation against influenza        Uterovaginal prolapse        Morbid obesity (H)        Screen for STD (sexually transmitted disease)        Bacterial vaginosis           Follow-ups after your visit        Additional Services     URO/GYN REFERRAL       Your provider has referred you to: FMG: [facility] [phone]   [email or web address]/[place]/  FMG: [facility] [phone] [email or web address] [place]/[facility]/[place]  UMP: [facility] [phone]  [email or web address]    Please be aware that coverage of these services is subject to the terms and limitations of your health insurance plan.  Call member services at your health plan with any benefit or coverage questions.      Please bring the following with you to your appointment:    (1) Any X-Rays, CTs or MRIs which have been performed.  Contact the facility where they were done to arrange for  prior to your scheduled appointment.    (2) List of current medications   (3) This referral request   (4) Any documents/labs given to you for this referral                  Who to contact     If you have questions or need follow up information about today's clinic visit or your schedule please contact [facility] directly at  "[phone].  Normal or non-critical lab and imaging results will be communicated to you by MyChart, letter or phone within 4 business days after the clinic has received the results. If you do not hear from us within 7 days, please contact the clinic through [employer] or phone. If you have a critical or abnormal lab result, we will notify you by phone as soon as possible.  Submit refill requests through [employer] or call your pharmacy and they will forward the refill request to us. Please allow 3 business days for your refill to be completed.          Additional Information About Your Visit        [employer] Information     [employer] lets you send messages to your doctor, view your test results, renew your prescriptions, schedule appointments and more. To sign up, go to [email or web address] . Click on \"Log in\" on the left side of the screen, which will take you to the Welcome page. Then click on \"Sign up Now\" on the right side of the page.     You will be asked to enter the access code listed below, as well as some personal information. Please follow the directions to create your username and password.     Your access code is: [email or web address]  Expires: 2019 10:06 AM     Your access code will  in 90 days. If you need help or a new code, please call your [place] clinic or [phone].        Care EveryWhere ID     This is your Care EveryWhere ID. This could be used by other organizations to access your [place] medical records  [id number]        Your Vitals Were     Pulse Temperature Respirations BMI (Body Mass Index)          68 97  F (36.1  C) (Tympanic) 16 40.02 kg/m2         Blood Pressure from Last 3 Encounters:   10/04/18 128/70   18 155/89   18 (!) 143/92    Weight from Last 3 Encounters:   10/04/18 271 lb (122.9 kg)   18 270 lb (122.5 kg)   18 260 lb (117.9 kg)              We Performed the Following     Chlamydia trachomatis PCR     Hepatitis C antibody     HIV Antigen Antibody " Combo     Neisseria gonorrhoeae PCR     Treponema Abs w Reflex to RPR and Titer     UA with Microscopic reflex to Culture     URO/GYN REFERRAL     Wet prep          Today's Medication Changes          These changes are accurate as of 10/4/18 12:04 PM.  If you have any questions, ask your nurse or doctor.               Start taking these medicines.        Dose/Directions    metroNIDAZOLE 500 MG tablet   Commonly known as:  FLAGYL   Used for:  Bacterial vaginosis   Started by:  Jacque Cruz PA-C        Dose:  500 mg   Take 1 tablet (500 mg) by mouth 2 times daily   Quantity:  14 tablet   Refills:  0       nitroFURantoin (macrocrystal-monohydrate) 100 MG capsule   Commonly known as:  MACROBID   Used for:  Acute cystitis without hematuria   Started by:  Jacque Cruz PA-C        Dose:  100 mg   Take 1 capsule (100 mg) by mouth 2 times daily   Quantity:  14 capsule   Refills:  0            Where to get your medicines      These medications were sent to [facility] [address] [place] [address] [address] [facility] [address] [facility] [address]     Phone:  [phone]     metroNIDAZOLE 500 MG tablet    nitroFURantoin (macrocrystal-monohydrate) 100 MG capsule                Primary Care Provider Office Phone # Fax #    Suresh Shabazz [phone]       [facility] [address] [place] [address]        Equal Access to Services     [facility] AH: Hadii aad ku hadasho Soomaali, waaxda luqadaha, qaybta kaalmada adeegyada, waxay idiin hayaan mateo hollins . So [facility] [phone].    ATENCIÓN: Si habla español, tiene a boykin disposición servicios gratuitos de asistencia lingüística. Chatoame al [phone].    We comply with applicable federal civil rights laws and [place] laws. We do not discriminate on the basis of race, color, national origin, age, disability, sex, sexual orientation, or gender identity.            Thank you!      Thank you for choosing [facility]  for your care. Our goal is always to provide you with excellent care. Hearing back from our patients is one way we can continue to improve our services. Please take a few minutes to complete the written survey that you may receive in the mail after your visit with us. Thank you!             Your Updated Medication List - Protect others around you: Learn how to safely use, store and throw away your medicines at www.disposemymeds.org.          This list is accurate as of 10/4/18 12:04 PM.  Always use your most recent med list.                   Brand Name Dispense Instructions for use Diagnosis    IMITREX PO      Take by mouth as needed for migraine (uNKNOWN DOSE)        IRON SUPPLEMENT PO      Take 325 mg by mouth daily        metroNIDAZOLE 500 MG tablet    FLAGYL    14 tablet    Take 1 tablet (500 mg) by mouth 2 times daily    Bacterial vaginosis       Multi-vitamin Tabs tablet      Take 1 tablet by mouth daily        nitroFURantoin (macrocrystal-monohydrate) 100 MG capsule    MACROBID    14 capsule    Take 1 capsule (100 mg) by mouth 2 times daily    Acute cystitis without hematuria       pregabalin 100 MG capsule    LYRICA     Take 100 mg by mouth 3 times daily        sucralfate 1 GM/10ML suspension    CARAFATE    420 mL    Take 10 mLs (1 g) by mouth 4 times daily        vitamin D 07960 UNIT capsule    ERGOCALCIFEROL     once a week

## 2018-10-04 NOTE — Clinical Note
LIPID SCREEN Q5 YR FEMALE (SYSTEM ASSIGNED) due on 07/10/2018 PAP SCREENING Q3 YR (SYSTEM ASSIGNED) due on 08/03/2018 INFLUENZA VACCINE(1) due on 09/01/2018 Health Maintenance reviewed at today's visit patient asked to schedule/complete:   None, Health Maintenance up to date. Cervical Cancer:  Patient reports [facility]

## 2018-10-04 NOTE — LETTER
October 8, 2018      Neema Melodie Gilletteamirah  [address]  [facility] [place] [address]        Dear ,    We are writing to inform you of your test results.      Your liver function tests were abnormal.  This can be due to medications, alcohol or an infection.  I will contact you with the additional lab results for hepatitis when they are complete.   - Your wet prep was positive bacterial vaginitis - we have treated this already with metronidazole.  -Urine - We discussed the UA results over the phone - continue the antibiotic as prescribed and contact me if symptoms worsen.   - Your STD test panel was negative for infection.      Resulted Orders   UA with Microscopic reflex to Culture   Result Value Ref Range    Color Urine Yellow     Appearance Urine Clear     Glucose Urine Negative NEG^Negative mg/dL    Bilirubin Urine Moderate (A) NEG^Negative      Comment:      This is an unconfirmed screening test result. A positive result may be false.    Ketones Urine 15 (A) NEG^Negative mg/dL    Specific Gravity Urine 1.025 1.003 - 1.035    pH Urine 6.0 5.0 - 7.0 pH    Protein Albumin Urine Trace (A) NEG^Negative mg/dL    Urobilinogen Urine >=8.0 0.2 - 1.0 EU/dL    Nitrite Urine Negative NEG^Negative    Blood Urine Large (A) NEG^Negative    Leukocyte Esterase Urine Small (A) NEG^Negative    Source Midstream Urine     WBC Urine 5-10 (A) OTO5^0 - 5 /HPF    RBC Urine 5-10 (A) OTO2^O - 2 /HPF    Squamous Epithelial /LPF Urine Many (A) FEW^Few /LPF    Bacteria Urine Few (A) NEG^Negative /HPF   Wet prep   Result Value Ref Range    Specimen Description Vagina     Wet Prep No Trichomonas seen     Wet Prep Clue cells seen (A)     Wet Prep No yeast seen    Treponema Abs w Reflex to RPR and Titer   Result Value Ref Range    Treponema Antibodies Nonreactive NR^Nonreactive   Neisseria gonorrhoeae PCR   Result Value Ref Range    Specimen Descrip Cervix     N Gonorrhea PCR Negative NEG^Negative      Comment:       Negative for N. gonorrhoeae rRNA by transcription mediated amplification.  A negative result by transcription mediated amplification does not preclude   the presence of N. gonorrhoeae infection because results are dependent on   proper and adequate collection, absence of inhibitors, and sufficient rRNA to   be detected.     Chlamydia trachomatis PCR   Result Value Ref Range    Specimen Description Cervix     Chlamydia Trachomatis PCR Negative NEG^Negative      Comment:      Negative for C. trachomatis rRNA by transcription mediated amplification.  A negative result by transcription mediated amplification does not preclude   the presence of C. trachomatis infection because results are dependent on   proper and adequate collection, absence of inhibitors, and sufficient rRNA to   be detected.     Hepatitis C antibody   Result Value Ref Range    Hepatitis C Antibody Nonreactive NR^Nonreactive   HIV Antigen Antibody Combo   Result Value Ref Range    HIV Antigen Antibody Combo Nonreactive NR^Nonreactive          Comment:      HIV-1 p24 Ag & HIV-1/HIV-2 Ab Not Detected   Hepatic panel (Albumin, ALT, AST, Bili, Alk Phos, TP)   Result Value Ref Range    Bilirubin Direct 0.5 (H) 0.0 - 0.2 mg/dL    Bilirubin Total 1.2 0.2 - 1.3 mg/dL    Albumin 3.4 3.4 - 5.0 g/dL    Protein Total 6.5 (L) 6.8 - 8.8 g/dL    Alkaline Phosphatase 53 40 - 150 U/L     (H) 0 - 50 U/L     (H) 0 - 45 U/L   Urine Culture Aerobic Bacterial   Result Value Ref Range    Specimen Description Midstream Urine     Culture Micro       >100,000 colonies/mL  mixed urogenital venkat  Susceptibility testing not routinely done         If you have any questions or concerns, please call the clinic at the number listed above.       Sincerely,        Jacque Cruz PA-C

## 2018-10-04 NOTE — PROGRESS NOTES
SUBJECTIVE:   Neema Bailey is a 43 year old female who presents to clinic today for the following health issues:      Vaginal Symptoms      Duration: one week    Description  itching and pelvicdiscomfort    Intensity:  moderate    Accompanying signs and symptoms (fever/dysuria/abdominal or back pain): dysuria    History  Sexually active: yes, single partner, contraception - none  Possibility of pregnancy: No  Recent antibiotic use: no     Precipitating or alleviating factors: None    Therapies tried and outcome: Cranberry juice   Outcome: not change    Same partner for 9 months - not sure about STI exposure  Started with a bump inside the vaginal opening  Now uncomfortable  Urine has been very dark    Problem list and histories reviewed & adjusted, as indicated.  Additional history: as documented    Patient Active Problem List   Diagnosis     Mixed incontinence urge and stress (male)(female)     Cystocele, midline     Rectocele     Aftercare following surgery     Polysubstance abuse (H)     Morbid obesity (H)     Past Surgical History:   Procedure Laterality Date     ABDOMEN SURGERY      gastric bypass in 2002     APPENDECTOMY       BACK SURGERY      lumbar 4-5 surgery for benign tumor removal     COLPORRHAPHY ANTERIOR N/A 9/21/2015    Procedure: COLPORRHAPHY ANTERIOR;  Surgeon: Jairon Adams MD;  Location: [facility]     ENT SURGERY      tonsillectomy       Social History   Substance Use Topics     Smoking status: Never Smoker     Smokeless tobacco: Never Used     Alcohol use 0.0 oz/week     0 Standard drinks or equivalent per week     History reviewed. No pertinent family history.      Current Outpatient Prescriptions   Medication Sig Dispense Refill     Ferrous Sulfate (IRON SUPPLEMENT PO) Take 325 mg by mouth daily       metroNIDAZOLE (FLAGYL) 500 MG tablet Take 1 tablet (500 mg) by mouth 2 times daily 14 tablet 0     multivitamin, therapeutic with minerals (MULTI-VITAMIN) TABS Take 1 tablet by  mouth daily       nitroFURantoin, macrocrystal-monohydrate, (MACROBID) 100 MG capsule Take 1 capsule (100 mg) by mouth 2 times daily 14 capsule 0     pregabalin (LYRICA) 100 MG capsule Take 100 mg by mouth 3 times daily       SUMAtriptan Succinate (IMITREX PO) Take by mouth as needed for migraine (uNKNOWN DOSE)        vitamin D (ERGOCALCIFEROL) 84031 UNIT capsule once a week   0     sucralfate (CARAFATE) 1 GM/10ML suspension Take 10 mLs (1 g) by mouth 4 times daily (Patient not taking: Reported on 10/4/2018) 420 mL 1     Allergies   Allergen Reactions     Gabapentin      Other reaction(s): Edema       Reviewed and updated as needed this visit by clinical staff  Tobacco  Allergies  Meds  Med Hx  Surg Hx  Fam Hx  Soc Hx      Reviewed and updated as needed this visit by Provider         Review of Systems   Constitutional: Negative.    HENT: Negative.    Eyes: Negative.    Respiratory: Negative.    Cardiovascular: Negative.    Gastrointestinal: Negative.    Genitourinary:        As in HPI   Musculoskeletal: Negative.    Skin: Negative.    Neurological: Negative.    Psychiatric/Behavioral: Negative.        OBJECTIVE:     /70 (Cuff Size: Adult Large)  Pulse 68  Temp 97  F (36.1  C) (Tympanic)  Resp 16  Wt 271 lb (122.9 kg)  BMI 40.02 kg/m2  Body mass index is 40.02 kg/(m^2).    Physical Exam   Constitutional: She is oriented to person, place, and time. She appears well-developed and well-nourished. No distress.   HENT:   Head: Normocephalic and atraumatic.   Nose: Nose normal.   Eyes: Conjunctivae and EOM are normal.   Neck: Normal range of motion.   Pulmonary/Chest: Effort normal.   Abdominal: Soft.   Genitourinary: Vagina normal. There is lesion (pink, pearly, mildly tender single lesion left anterior labia) on the left labia. No erythema in the vagina. No vaginal discharge found.   Neurological: She is alert and oriented to person, place, and time.   Skin: Skin is warm and dry.   Psychiatric: She has a  normal mood and affect. Her behavior is normal.       Results for orders placed or performed in visit on 10/04/18 (from the past 24 hour(s))   Wet prep   Result Value Ref Range    Specimen Description Vagina     Wet Prep No Trichomonas seen     Wet Prep Clue cells seen (A)     Wet Prep No yeast seen    UA with Microscopic reflex to Culture   Result Value Ref Range    Color Urine Yellow     Appearance Urine Clear     Glucose Urine Negative NEG^Negative mg/dL    Bilirubin Urine Moderate (A) NEG^Negative    Ketones Urine 15 (A) NEG^Negative mg/dL    Specific Gravity Urine 1.025 1.003 - 1.035    pH Urine 6.0 5.0 - 7.0 pH    Protein Albumin Urine Trace (A) NEG^Negative mg/dL    Urobilinogen Urine >=8.0 0.2 - 1.0 EU/dL    Nitrite Urine Negative NEG^Negative    Blood Urine Large (A) NEG^Negative    Leukocyte Esterase Urine Small (A) NEG^Negative    Source Midstream Urine     WBC Urine 5-10 (A) OTO5^0 - 5 /HPF    RBC Urine 5-10 (A) OTO2^O - 2 /HPF    Squamous Epithelial /LPF Urine Many (A) FEW^Few /LPF    Bacteria Urine Few (A) NEG^Negative /HPF       ASSESSMENT/PLAN:       ICD-10-CM    1. Acute cystitis without hematuria N30.00 nitroFURantoin, macrocrystal-monohydrate, (MACROBID) 100 MG capsule   2. Dysuria R30.0 UA with Microscopic reflex to Culture     Wet prep   3. Need for prophylactic vaccination and inoculation against influenza Z23    4. Uterovaginal prolapse N81.4 URO/GYN REFERRAL   5. Morbid obesity (H) E66.01    6. Screen for STD (sexually transmitted disease) Z11.3 Treponema Abs w Reflex to RPR and Titer     Neisseria gonorrhoeae PCR     Chlamydia trachomatis PCR     Hepatitis C antibody     HIV Antigen Antibody Combo   7. Bacterial vaginosis N76.0 metroNIDAZOLE (FLAGYL) 500 MG tablet    B96.89    8. Abnormal urine findings R82.90 Hepatic panel (Albumin, ALT, AST, Bili, Alk Phos, TP)      MDM:  Patient has new partner (9 months ago) - screening for STI was completed today  Need to rule out syphilis with single  labial lesion  STI testing ordered as above  UA - positive for UTI - incidental finding of bilirubin/urobilinogen in the urine - hepatic panel was added to the labs, will call patient with recommendations when results are complete.   Wet prep - consistent with bacterial vaginosis    There are no Patient Instructions on file for this visit.    Eber Cruz PA-C  [facility]

## 2018-10-05 NOTE — TELEPHONE ENCOUNTER
Labs discussed.    Patient has history of alcohol abuse with recent relapse - which may be the cause of LFT elevation.    Eber Cruz PA-C

## 2018-10-05 NOTE — PROGRESS NOTES
Elevated LFT likely from history of alcohol use with recent relapse.  However, will order tests to rule out hepatitis A and hepatitis B infection.     Eber Cruz PA-C

## 2018-10-18 NOTE — TELEPHONE ENCOUNTER
Type of surgery: VAG HYST A&P VAG COLPORRHAPHY PERINEOPLASTY CYSTO  Location of surgery: [facility] OR  Date and time of surgery: 11/26/2018 8:55am ARRIVAL 6:55am  Surgeon: Talia  Pre-Op Appt Date: PRIMARY [facility] at [facility]  Post-Op Appt Date: TBD   Packet sent out: HANDED 10/18/2018  Pre-cert/Authorization completed:  TBD  Date: 10/18/2018 Juan David w/Ailyn Gillis  Surgery Scheduler        Order Questions      Question Answer Comment     Procedure name(s) - multi select Vaginal hysterectomy with anterior and posterior vaginal colporrhaphy perineoplasty, cystoscopy      Reason for procedure Uterovaginal prolapse      Is this a multi surgeon case? No      Laterality N/A      Request for additional equipment Other (see comments) None     Anesthesia General      Initiate Pre-op orders for above procedure: Yes, as ordered in Epic Additional orders noted there also     Location of Case: [facility] OR      Urgency of Surgery: Routine      Surgeon Procedure Time (incision to closure) in minutes (per procedure as applicable) 90      Note:  Surgical Case Time Needed (in minutes)     Patient Class (for admit prior to surgery, specify number of days in comments): Same day (hospital outpatient) 1 night observation     Why can t this outpatient surgery be done at the [facility] or  ASC? na      H&P To Be Completed By: PCP      Vendor Needed? No

## 2018-10-18 NOTE — PROGRESS NOTES
SUBJECTIVE:                                                   Neema Bailey is a 43 year old female who presents to clinic today for the following health issue(s):  Patient presents with:  Consult: has previoulsy been evaluated for bladder prolapse, had repair appox 15 years ago.        HPI:  Patient is a 43-year-old female  3 para 3 who is seen complaining of uterovaginal prolapse.  Patient has extensive medical history.  In early  she had what sounds like an anterior repair prior to the birth of her last child.  She then had a trans-obturator sling procedure in .  Subsequently she has started with the last several years to notice increasing prolapse.  The prolapse will come down to the vagina but does not bulge beyond the vaginal opening.  She has had an abdominoplasty procedure.  She is now engaged to be  in comparison for her to deal with the prolapse.  She also has some urinary urgency.    Patient's last menstrual period was 10/04/2018..   Patient is sexually active,   Using tubal ligation for contraception.    reports that she has never smoked. She has never used smokeless tobacco.  STD testing offered?  Declined  Health maintenance updated:  yes    Today's PHQ-2 Score:   PHQ-2 (  [employer]) 2015   Q1: Little interest or pleasure in doing things 0   Q2: Feeling down, depressed or hopeless 0   PHQ-2 Score 0     Today's PHQ-9 Score:   PHQ-9 SCORE 10/18/2018   Total Score 2     Today's KAYODE-7 Score:   KAYODE-7 SCORE 10/18/2018   Total Score 5       Problem list and histories reviewed & adjusted, as indicated.  Additional history: as documented.    Patient Active Problem List   Diagnosis     Mixed incontinence urge and stress (male)(female)     Cystocele, midline     Rectocele     Aftercare following surgery     Polysubstance abuse (H)     Morbid obesity (H)     Past Surgical History:   Procedure Laterality Date     ABDOMEN SURGERY      gastric bypass in       "APPENDECTOMY       BACK SURGERY      lumbar 4-5 surgery for benign tumor removal     COLPORRHAPHY ANTERIOR N/A 9/21/2015    Procedure: COLPORRHAPHY ANTERIOR;  Surgeon: Jairon Adams MD;  Location: [facility]     ENT SURGERY      tonsillectomy     TUBAL LIGATION        Social History   Substance Use Topics     Smoking status: Never Smoker     Smokeless tobacco: Never Used     Alcohol use 0.0 oz/week     0 Standard drinks or equivalent per week           Current Outpatient Prescriptions   Medication Sig     Ferrous Sulfate (IRON SUPPLEMENT PO) Take 325 mg by mouth daily     multivitamin, therapeutic with minerals (MULTI-VITAMIN) TABS Take 1 tablet by mouth daily     pregabalin (LYRICA) 100 MG capsule Take 100 mg by mouth 3 times daily     sucralfate (CARAFATE) 1 GM/10ML suspension Take 10 mLs (1 g) by mouth 4 times daily     SUMAtriptan Succinate (IMITREX PO) Take by mouth as needed for migraine (uNKNOWN DOSE)      vitamin D (ERGOCALCIFEROL) 10990 UNIT capsule once a week      No current facility-administered medications for this visit.      Allergies   Allergen Reactions     Gabapentin      Other reaction(s): Edema       ROS:  12 point review of systems negative other than symptoms noted below.  Constitutional: Fatigue  Genitourinary: Hot Flashes, Irregular Menses and Urgency  Musculoskeletal: Joint Pain  Endocrine: Loss of Hair  Psychiatric: Anxiety and Depression    OBJECTIVE:     /74  Ht 5' 8\" (1.727 m)  Wt 201 lb (91.2 kg)  LMP 10/04/2018  BMI 30.56 kg/m2  Body mass index is 30.56 kg/(m^2).    Exam:  Constitutional:  Appearance: Well nourished, well developed alert, in no acute distress  Chest:  Respiratory Effort:  Breathing unlabored  Gastrointestinal:  Abdominal Examination:  Abdomen nontender to palpation, tone normal without rigidity or guarding, no masses present, umbilicus without lesions; Liver/Spleen:  No hepatomegaly present, liver nontender to palpation; Hernias:  No hernias " present  Lymphatic: Lymph Nodes:  No other lymphadenopathy present  Skin:General Inspection:  No rashes present, no lesions present, no areas of discoloration; .  Neurologic/Psychiatric:  Mental Status:  Oriented X3   Pelvic Exam:  External Genitalia:     Gaping introitus  Vagina:     Grade 2 cystocele, grade 1-2 rectocele  Bladder:     Nontender to palpation  Urethra:   Urethral Body:  Urethra palpation normal, urethra structural support normal   Urethral Meatus:  No erythema or lesions present  Cervix:     Appearance healthy, no lesions present, nontender to palpation, grade 1-2 prolapse  Uterus:     Uterus: firm, normal sized and nontender, grade 1-2 prolapse  Adnexa:     No adnexal tenderness present, no adnexal masses present  Perineum:     Perineum within normal limits, no evidence of trauma, no rashes or skin lesions present  Anus:     Anus within normal limits, no hemorrhoids present  Inguinal Lymph Nodes:     No lymphadenopathy present  Pubic Hair:     Normal pubic hair distribution for age         In-Clinic Test Results:  No results found for this or any previous visit (from the past 24 hour(s)).    ASSESSMENT/PLAN:                                                        ICD-10-CM    1. Uterovaginal prolapse, complete N81.3 Diamond-Operative Worksheet GYN           Plan: I explained the anatomical changes that the patient is noticing.  She does have uterovaginal prolapse especially the cystocele.  She also has a gaping vaginal introitus which I believe is causing her a lot of discomfort and embarrassment as well.  My recommendation was for a vaginal hysterectomy with uterosacral ligament suspension of the vagina anterior and posterior vaginal colporrhaphy perineoplasty and cystoscopy.  This could be done to [facility] 1 day surgery with an overnight stay in observation.  I explained the operation in detail and also reviewed potential risks and complications the potential complications include reactions  to anesthesia, infection, bleeding and injury to other organs such as bladder, ureters and bowel.  Patient understands that she should feel better every day after surgery and if there is any hint of a problem that she would need to come back in and be evaluated.  She would like to proceed with scheduling the surgery.  She will see her primary care physician for her preop exam.    Rony Melgar MD  [facility]

## 2018-10-18 NOTE — MR AVS SNAPSHOT
"              After Visit Summary   10/18/2018    Neema Bailey    MRN: [id number]           Patient Information     Date Of Birth          1975        Visit Information        Provider Department      10/18/2018 11:00 AM oRny Melgar MD [facility]        Today's Diagnoses     Uterovaginal prolapse, complete    -  1       Follow-ups after your visit        Who to contact     If you have questions or need follow up information about today's clinic visit or your schedule please contact [facility] directly at [phone].  Normal or non-critical lab and imaging results will be communicated to you by [email or web address], letter or phone within 4 business days after the clinic has received the results. If you do not hear from us within 7 days, please contact the clinic through [email or web address] or phone. If you have a critical or abnormal lab result, we will notify you by phone as soon as possible.  Submit refill requests through [employer] or call your pharmacy and they will forward the refill request to us. Please allow 3 business days for your refill to be completed.          Additional Information About Your Visit        MyChart Information     [employer] lets you send messages to your doctor, view your test results, renew your prescriptions, schedule appointments and more. To sign up, go to [email or web address] . Click on \"Log in\" on the left side of the screen, which will take you to the Welcome page. Then click on \"Sign up Now\" on the right side of the page.     You will be asked to enter the access code listed below, as well as some personal information. Please follow the directions to create your username and password.     Your access code is: [email or web address]  Expires: 2019 10:06 AM     Your access code will  in 90 days. If you need help or a new code, please call your [facility] clinic or [phone].        Care EveryWhere ID     This is your Care EveryWhere ID. " "This could be used by other organizations to access your [place] medical records  [id number]        Your Vitals Were     Height Last Period BMI (Body Mass Index)             5' 8\" (1.727 m) 10/04/2018 30.56 kg/m2          Blood Pressure from Last 3 Encounters:   10/18/18 112/74   10/04/18 128/70   08/31/18 155/89    Weight from Last 3 Encounters:   10/18/18 201 lb (91.2 kg)   10/04/18 271 lb (122.9 kg)   08/31/18 270 lb (122.5 kg)              We Performed the Following     Diamond-Operative Worksheet GYN        Primary Care Provider Office Phone # Fax #    Suresh Shabazz [phone]       [facility] [address] [facility] [address]        Equal Access to Services     LISA LAWTON : Hadii rosales gomez hadasho Sodonna, waaxda luqadaha, qaybta kaalmada adeegyada, daja hollins . So [facility] [phone].    ATENCIÓN: Si habla español, tiene a boykin disposición servicios gratuitos de asistencia lingüística. Michael al [phone].    We comply with applicable federal civil rights laws and [place] laws. We do not discriminate on the basis of race, color, national origin, age, disability, sex, sexual orientation, or gender identity.            Thank you!     Thank you for choosing [facility]  for your care. Our goal is always to provide you with excellent care. Hearing back from our patients is one way we can continue to improve our services. Please take a few minutes to complete the written survey that you may receive in the mail after your visit with us. Thank you!             Your Updated Medication List - Protect others around you: Learn how to safely use, store and throw away your medicines at www.disposemymeds.org.          This list is accurate as of 10/18/18 11:56 AM.  Always use your most recent med list.                   Brand Name Dispense Instructions for use Diagnosis    IMITREX PO      Take by mouth as needed for migraine (uNKNOWN DOSE)     "    IRON SUPPLEMENT PO      Take 325 mg by mouth daily        Multi-vitamin Tabs tablet      Take 1 tablet by mouth daily        pregabalin 100 MG capsule    LYRICA     Take 100 mg by mouth 3 times daily        sucralfate 1 GM/10ML suspension    CARAFATE    420 mL    Take 10 mLs (1 g) by mouth 4 times daily        vitamin D 43852 UNIT capsule    ERGOCALCIFEROL     once a week

## 2018-10-22 NOTE — ED TRIAGE NOTES
Pt admits to drinking alcohol today. Unknown amount. Blew 0.244 for PD. Children called 911 because patient was being aggressive and yelling. Patient c/o L 5th digit finger pain. Blood is noted on face. Patient does not remember how she injured her hand.

## 2018-10-22 NOTE — DISCHARGE INSTRUCTIONS
Tylenol or Motrin for pain.   Follow up with hand surgery regarding fracture.  Keep wounds clean and dry.  Return with severe pain, evidence of wound infection, or new concerns.
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
## 2018-10-22 NOTE — ED NOTES
"Discharge reviewed with patient and follow-up reviewed. Pt verbalized understanding. Patient stated \"Ibuprofen, that's all you've got?\". Explained to patient that Dr. Shannon wants her to use Ibuprofen and Tylenol for the pain. Follow-up with PCP if pain worsens. Patient stated \"ok\". Patient left with significant other.   "

## 2018-10-22 NOTE — ED AVS SNAPSHOT
Emergency Department    [address] [facility] [address]    Phone:  [phone]    Fax:  [phone]                                       Neema Bailey   MRN: [id number]    Department:   Emergency Department   Date of Visit:  10/22/2018           After Visit Summary Signature Page     I have received my discharge instructions, and my questions have been answered. I have discussed any challenges I see with this plan with the nurse or doctor.    ..........................................................................................................................................  Patient/Patient Representative Signature      ..........................................................................................................................................  Patient Representative Print Name and Relationship to Patient    ..................................................               ................................................  Date                                   Time    ..........................................................................................................................................  Reviewed by Signature/Title    ...................................................              ..............................................  Date                                               Time          22EPIC Rev 08/18
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
## 2018-11-02 NOTE — MR AVS SNAPSHOT
After Visit Summary   11/2/2018    Neema Bailey    MRN: [id number]           Patient Information     Date Of Birth          1975        Visit Information        Provider Department      11/2/2018 10:10 AM Jacque Cruz PA-C [facility]        Today's Diagnoses     Closed head injury, subsequent encounter    -  1    Fracture of left hand, closed, initial encounter        Acute pain of left shoulder due to trauma           Follow-ups after your visit        Additional Services     ORTHOPEDICS ADULT REFERRAL       Your provider has referred you to: [facility] [phone]   [email or web address]    Hand specialist: Dr. Mcpherson, Dr. Alcantar, Dr. Lauren    Please be aware that coverage of these services is subject to the terms and limitations of your health insurance plan.  Call member services at your health plan with any benefit or coverage questions.      Please bring the following to your appointment:    >>   Any x-rays, CTs or MRIs which have been performed.  Contact the facility where they were done to arrange for  prior to your scheduled appointment.    >>   List of current medications   >>   This referral request   >>   Any documents/labs given to you for this referral                  Your next 10 appointments already scheduled     Nov 02, 2018  3:00 PM CDT   CT HEAD W/O CONTRAST with SHCT1   [facility] ([facility])    [address] [place] [id number] [phone]           How do I prepare for my exam? (Food and drink instructions) No Food and Drink Restrictions.  How do I prepare for my exam? (Other instructions) You do not need to do anything special to prepare for this exam. For a sinus scan: Use your nose spray (nasal decongestant spray) as directed.  What should I wear: Please wear loose clothing, such as a sweat suit or jogging clothes. Avoid snaps,  zippers and other metal. We may ask you to undress and put on a hospital gown.  How long does the exam take: Most scans take less than 20 minutes.  What should I bring: Please bring any scans or X-rays taken at other hospitals, if similar tests were done. Also bring a list of your medicines, including vitamins, minerals and over-the-counter drugs. It is safest to leave personal items at home.  Do I need a : No  is needed.  What do I need to tell my doctor? Be sure to tell your doctor: * If you have any allergies. * If there s any chance you are pregnant. * If you are breastfeeding.  What should I do after the exam: No restrictions, You may resume normal activities.  What is this test: A CT (computed tomography) scan is a series of pictures that allows us to look inside your body. The scanner creates images of the body in cross sections, much like slices of bread. This helps us see any problems more clearly.  Who should I call with questions: If you have any questions, please call the Imaging Department where you will have your exam. Directions, parking instructions, and other information is available on our website, [email or web address].            Nov 26, 2018   Procedure with Rony Melgar MD   [facility] (--)    [address]  [facility] [id number] [phone]              Future tests that were ordered for you today     Open Future Orders        Priority Expected Expires Ordered    CT Head w/o Contrast Routine  11/2/2019 11/2/2018            Who to contact     If you have questions or need follow up information about today's clinic visit or your schedule please contact [facility] directly at [phone].  Normal or non-critical lab and imaging results will be communicated to you by MyChart, letter or phone within 4 business days after the clinic has received the results. If you do not hear from us within 7 days, please  "contact the clinic through We[employer] or phone. If you have a critical or abnormal lab result, we will notify you by phone as soon as possible.  Submit refill requests through Webtab or call your pharmacy and they will forward the refill request to us. Please allow 3 business days for your refill to be completed.          Additional Information About Your Visit        [employer] Information     Webtab lets you send messages to your doctor, view your test results, renew your prescriptions, schedule appointments and more. To sign up, go to [email or web address] . Click on \"Log in\" on the left side of the screen, which will take you to the Welcome page. Then click on \"Sign up Now\" on the right side of the page.     You will be asked to enter the access code listed below, as well as some personal information. Please follow the directions to create your username and password.     Your access code is: [email or web address]  Expires: 2019 10:06 AM     Your access code will  in 90 days. If you need help or a new code, please call your [facility] clinic or [phone].        Care EveryWhere ID     This is your Care EveryWhere ID. This could be used by other organizations to access your [facility] medical records  [id number]        Your Vitals Were     Pulse Temperature Respirations Last Period BMI (Body Mass Index)       78 97  F (36.1  C) (Tympanic) 18 10/04/2018 40.46 kg/m2        Blood Pressure from Last 3 Encounters:   18 130/80   10/22/18 110/85   10/18/18 112/74    Weight from Last 3 Encounters:   18 274 lb (124.3 kg)   10/22/18 270 lb (122.5 kg)   10/18/18 201 lb (91.2 kg)              We Performed the Following     ORTHOPEDICS ADULT REFERRAL     XR Hand Left G/E 3 Views     XR Shoulder Left G/E 3 Views        Primary Care Provider Office Phone # Fax #    Suresh Shabazz [phone]       [facility] [address] [place] [id number]        Equal Access to Services     LISA LAWTON" AH: Gretchen nicole Grady, waaxda luqadaha, qaybta kaleonor chavez, waxrose renny fabiolatuan cookkirilljavier morfin. So [facility] [phone].    ATENCIÓN: Si habla español, tiene a boykin disposición servicios gratuitos de asistencia lingüística. Llame al [phone].    We comply with applicable federal civil rights laws and [place] laws. We do not discriminate on the basis of race, color, national origin, age, disability, sex, sexual orientation, or gender identity.            Thank you!     Thank you for choosing [facility]  for your care. Our goal is always to provide you with excellent care. Hearing back from our patients is one way we can continue to improve our services. Please take a few minutes to complete the written survey that you may receive in the mail after your visit with us. Thank you!             Your Updated Medication List - Protect others around you: Learn how to safely use, store and throw away your medicines at www.disposemymeds.org.          This list is accurate as of 11/2/18 11:27 AM.  Always use your most recent med list.                   Brand Name Dispense Instructions for use Diagnosis    IMITREX PO      Take by mouth as needed for migraine (uNKNOWN DOSE)        IRON SUPPLEMENT PO      Take 325 mg by mouth daily        Multi-vitamin Tabs tablet      Take 1 tablet by mouth daily        pregabalin 100 MG capsule    LYRICA     Take 100 mg by mouth 3 times daily        sucralfate 1 GM/10ML suspension    CARAFATE    420 mL    Take 10 mLs (1 g) by mouth 4 times daily        vitamin D2 89167 UNIT capsule    ERGOCALCIFEROL     once a week
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
## 2018-11-02 NOTE — LETTER
[facility]  [address]  [place] [address]  Phone: [phone]  Fax: [phone]    November 2, 2018        Neema Bailey  [address]  [place] [address]          To whom it may concern:    RE: Neema Bailey    Patient was seen and treated today at our clinic and missed work.    Work restrictions when she returns: No lifting with the left hand, must wear splint at work.  Ok to lift with the right hand.     Please contact me for questions or concerns.      Sincerely,        Jacque Cruz PA-C

## 2018-11-20 NOTE — TELEPHONE ENCOUNTER
Spoke w/Vivian at [facility] to move case up due to equipment issues.  START TIME 7:30 ARRIVAL 5:30am    LVM for pt with time change. Advised I am in the office today until 4 and then am out the remainder of the week. Requested CB today for confirmation and if could not call back today to call our main line and ask for Daphnie or Margaret Gillis  Surgery Scheduler

## 2018-11-26 PROBLEM — N81.4 UTERINE PROLAPSE: Status: ACTIVE | Noted: 2018-01-01

## 2018-11-26 NOTE — OR NURSING
Pt cont. To c/o pain and headache. Pt's BP decreased. Dr. Connors notified and in to see pt. Vistaril IM and hemoglobin STAT ordered. IV fluids increased.

## 2018-11-26 NOTE — PROGRESS NOTES
Admission medication history interview status for the 11/26/2018  admission is complete. See EPIC admission navigator for prior to admission medications     Medication history source reliability:Good    Medication history interview source(s):Patient    Medication history resources (including written lists, pill bottles, clinic record):None    Primary pharmacy[email or web address]    Additional medication history information not noted on PTA med list :None    Time spent in this activity: 45 minutes    Prior to Admission medications    Medication Sig Last Dose Taking? Auth Provider   fluticasone (CUTIVATE) 0.05 % cream Apply topically 2 times daily as needed  11/25/2018 at prn Yes Reported, Patient   METOPROLOL SUCCINATE ER PO Take 50 mg by mouth daily 11/26/2018 at 0530 Yes Reported, Patient   multivitamin, therapeutic with minerals (MULTI-VITAMIN) TABS Take 1 tablet by mouth daily 11/25/2018 at am Yes Reported, Patient   SUMATRIPTAN SUCCINATE PO Take 50 mg by mouth every 8 hours as needed for migraine more than a week at prn Yes Reported, Patient

## 2018-11-26 NOTE — ANESTHESIA CARE TRANSFER NOTE
Patient: Neema Bailey    Procedure(s):  VAGINAL HYSTERECTOMY, ANTERIOR AND POSTERIOR REPAIR  PERINEOPLASTY  CYSTOSCOPY    Diagnosis: UTERINE VAGINAL PROLAPSE  Diagnosis Additional Information: No value filed.    Anesthesia Type:   General, ETT     Note:  Airway :Face Mask  Patient transferred to:PACU  Handoff Report: Identifed the Patient, Identified the Reponsible Provider, Reviewed the pertinent medical history, Discussed the surgical course, Reviewed Intra-OP anesthesia mangement and issues during anesthesia, Set expectations for post-procedure period and Allowed opportunity for questions and acknowledgement of understanding      Vitals: (Last set prior to Anesthesia Care Transfer)    CRNA VITALS  11/26/2018 0926 - 11/26/2018 1002      11/26/2018             Pulse: 104    SpO2: 98 %    Resp Rate (observed): 21    Resp Rate (set): 10                Electronically Signed By: KANDACE Gorman CRNA  November 26, 2018  10:02 AM

## 2018-11-26 NOTE — IP AVS SNAPSHOT
"                  MRN:[id number]                      After Visit Summary   11/26/2018    Neema Bailey    MRN: [id number]           Thank you!     Thank you for choosing [facility] for your care. Our goal is always to provide you with excellent care. Hearing back from our patients is one way we can continue to improve our services. Please take a few minutes to complete the written survey that you may receive in the mail after you visit with us. Thank you!        Patient Information     Date Of Birth          1975        Designated Caregiver       Most Recent Value    Caregiver    Will someone help with your care after discharge? yes    Name of designated caregiver CANDY     Phone number of caregiver [phone]    Caregiver address SAME AS PATIENT       About your hospital stay     You were admitted on:  November 26, 2018 You last received care in the:  [facility]    You were discharged on:  November 28, 2018       Who to Call     For medical emergencies, please call 911.  For non-urgent questions about your medical care, please call your primary care provider or clinic, [phone]  For questions related to your surgery, please call your surgery clinic        Attending Provider     Provider Specialty    Rony Melgar MD OB/Gyn       Primary Care Provider Office Phone # Fax #    Suresh Shabazz [phone]      After Care Instructions     Discharge Instructions       Patient to follow up with appointment in 2-3 weeks                  Pending Results     No orders found from 11/24/2018 to 11/27/2018.            Admission Information     Date & Time Provider Department Dept. Phone    11/26/2018 Rony Melgar MD [facility] [phone]      Your Vitals Were     Blood Pressure Pulse Temperature Respirations Height Weight    110/60 (BP Location: Right arm) 67 98.6  F (37  C) (Oral) 16 1.727 m (5' 8\") 121.1 kg (266 lb 14.4 oz)    Pulse " "Oximetry BMI (Body Mass Index)                93% 40.58 kg/m2          [email or web address] Information     [employer] lets you send messages to your doctor, view your test results, renew your prescriptions, schedule appointments and more. To sign up, go to [email or web address] . Click on \"Log in\" on the left side of the screen, which will take you to the Welcome page. Then click on \"Sign up Now\" on the right side of the page.     You will be asked to enter the access code listed below, as well as some personal information. Please follow the directions to create your username and password.     Your access code is: [email or web address]  Expires: 2019  9:06 AM     Your access code will  in 90 days. If you need help or a new code, please call your [facility] clinic or [phone].        Care EveryWhere ID     This is your Care EveryWhere ID. This could be used by other organizations to access your [facility] medical records  [id number]        Equal Access to Services     LISA LAWTON : Hadclau Rasmussen, waaxda lualirio, qaybta kaalzeina chavez, daja hollins . So [facility] [phone].    ATENCIÓN: Si habla español, tiene a boykin disposición servicios gratuitos de asistencia lingüística. Llame al [phone].    We comply with applicable federal civil rights laws and [place] laws. We do not discriminate on the basis of race, color, national origin, age, disability, sex, sexual orientation, or gender identity.               Review of your medicines      START taking        Dose / Directions    ibuprofen 600 MG tablet   Commonly known as:  ADVIL/MOTRIN   Used for:  Acute post-operative pain        Dose:  600 mg   Take 1 tablet (600 mg) by mouth every 6 hours as needed for pain (mild)   Quantity:  30 tablet   Refills:  0       ondansetron 4 MG ODT tab   Commonly known as:  ZOFRAN-ODT   Used for:  Nausea        Dose:  4-8 mg   Take 1-2 tablets (4-8 mg) by mouth every 8 hours as needed for nausea " Dissolve ON the tongue.   Quantity:  20 tablet   Refills:  0       oxyCODONE-acetaminophen 5-325 MG tablet   Commonly known as:  PERCOCET   Used for:  Acute post-operative pain        Dose:  1-2 tablet   Take 1-2 tablets by mouth every 4 hours as needed for pain (moderate to severe)   Quantity:  40 tablet   Refills:  0       senna-docusate 8.6-50 MG tablet   Commonly known as:  SENOKOT-S/PERICOLACE   Used for:  Drug-induced constipation        Dose:  1-2 tablet   Take 1-2 tablets by mouth 2 times daily Take while on oral narcotics to prevent or treat constipation.   Quantity:  30 tablet   Refills:  0         CONTINUE these medicines which have NOT CHANGED        Dose / Directions    fluticasone 0.05 % external cream   Commonly known as:  CUTIVATE        Apply topically 2 times daily as needed   Refills:  0       METOPROLOL SUCCINATE ER PO        Dose:  50 mg   Take 50 mg by mouth daily   Refills:  0       Multi-vitamin tablet        Dose:  1 tablet   Take 1 tablet by mouth daily   Refills:  0       SUMATRIPTAN SUCCINATE PO        Dose:  50 mg   Take 50 mg by mouth every 8 hours as needed for migraine   Refills:  0            Where to get your medicines      These medications were sent to [facility] [place], [place] - [address]  [address], [place] [address]     Phone:  [phone]     ibuprofen 600 MG tablet    ondansetron 4 MG ODT tab    senna-docusate 8.6-50 MG tablet         Some of these will need a paper prescription and others can be bought over the counter. Ask your nurse if you have questions.     Bring a paper prescription for each of these medications     oxyCODONE-acetaminophen 5-325 MG tablet                Protect others around you: Learn how to safely use, store and throw away your medicines at www.disposemymeds.org.        Information about OPIOIDS     PRESCRIPTION OPIOIDS: WHAT YOU NEED TO KNOW   We gave you an opioid (narcotic) pain medicine. It is important to  manage your pain, but opioids are not always the best choice. You should first try all the other options your care team gave you. Take this medicine for as short a time (and as few doses) as possible.    Some activities can increase your pain, such as bandage changes or therapy sessions. It may help to take your pain medicine 30 to 60 minutes before these activities. Reduce your stress by getting enough sleep, working on hobbies you enjoy and practicing relaxation or meditation. Talk to your care team about ways to manage your pain beyond prescription opioids.    These medicines have risks:    DO NOT drive when on new or higher doses of pain medicine. These medicines can affect your alertness and reaction times, and you could be arrested for driving under the influence (DUI). If you need to use opioids long-term, talk to your care team about driving.    DO NOT operate heavy machinery    DO NOT do any other dangerous activities while taking these medicines.    DO NOT drink any alcohol while taking these medicines.     If the opioid prescribed includes acetaminophen, DO NOT take with any other medicines that contain acetaminophen. Read all labels carefully. Look for the word  acetaminophen  or  Tylenol.  Ask your pharmacist if you have questions or are unsure.    You can get addicted to pain medicines, especially if you have a history of addiction (chemical, alcohol or substance dependence). Talk to your care team about ways to reduce this risk.    All opioids tend to cause constipation. Drink plenty of water and eat foods that have a lot of fiber, such as fruits, vegetables, prune juice, apple juice and high-fiber cereal. Take a laxative (Miralax, milk of magnesia, Colace, Senna) if you don t move your bowels at least every other day. Other side effects include upset stomach, sleepiness, dizziness, throwing up, tolerance (needing more of the medicine to have the same effect), physical dependence and slowed  breathing.    Store your pills in a secure place, locked if possible. We will not replace any lost or stolen medicine. If you don t finish your medicine, please throw away (dispose) as directed by your pharmacist. The [place] Pollution Control Agency has more information about safe disposal: https://www.pca.[facility].[place].us/living-green/managing-unwanted-medications             Medication List: This is a list of all your medications and when to take them. Check marks below indicate your daily home schedule. Keep this list as a reference.      Medications           Morning Afternoon Evening Bedtime As Needed    fluticasone 0.05 % external cream   Commonly known as:  CUTIVATE   Apply topically 2 times daily as needed                                   ibuprofen 600 MG tablet   Commonly known as:  ADVIL/MOTRIN   Take 1 tablet (600 mg) by mouth every 6 hours as needed for pain (mild)                                   METOPROLOL SUCCINATE ER PO   Take 50 mg by mouth daily   Last time this was given:  50 mg on 11/28/2018  8:26 AM                                   Multi-vitamin tablet   Take 1 tablet by mouth daily                                   ondansetron 4 MG ODT tab   Commonly known as:  ZOFRAN-ODT   Take 1-2 tablets (4-8 mg) by mouth every 8 hours as needed for nausea Dissolve ON the tongue.                                   oxyCODONE-acetaminophen 5-325 MG tablet   Commonly known as:  PERCOCET   Take 1-2 tablets by mouth every 4 hours as needed for pain (moderate to severe)   Last time this was given:  2 tablets on 11/28/2018 10:42 AM                                   senna-docusate 8.6-50 MG tablet   Commonly known as:  SENOKOT-S/PERICOLACE   Take 1-2 tablets by mouth 2 times daily Take while on oral narcotics to prevent or treat constipation.                                      SUMATRIPTAN SUCCINATE PO   Take 50 mg by mouth every 8 hours as needed for migraine                                             More  Information        Discharge Instructions for Vaginal Hysterectomy   Vaginal hysterectomy is surgery to remove the uterus and often the cervix. It takes 4 to 6 weeks to recover from the procedure. Here s what you need to know about caring for yourself during this time. Follow these and any other instructions you are given.  Two types of vaginal hysterectomy  Vaginal hysterectomy is done through an incision inside the vagina. In some cases, 2 to 3 small incisions are also made in the skin. Small tools are then put through the small incisions to assist the procedure. This is called laparoscopically assisted vaginal hysterectomy or LAVH. If a hysterectomy is done vaginally, the cervix is always removed as well.     Home care    Plan to rest at home for 3 to 5 days after the surgery.    Take all prescribed medicine exactly as directed.    Continue the coughing and deep breathing exercises you learned in the hospital.    If you had LAVH, you will have several small incisions on your belly. Keep the incisions clean and dry. Change bandages as instructed.    After LAVH, you may have pain in your shoulder. This is normal and due to gas used to expand your belly during the surgery. The pain may last up to 7 days.    If you have stitches inside your vagina, they will absorb over time and do not need to be taken out.    Use sanitary pads to absorb vaginal bleeding or discharge. Light bleeding is likely at first. You may have a brownish discharge for up to 6 weeks.    Don't use tampons or douches. They can cause infection.    Avoid constipation, which causes straining to pass stool. Eat fruits, vegetables, and whole-grain foods. Drink at least 8 glasses of fluid each day. If needed, ask your health care provider whether you should use a stool softener.  Activity  Full recovery may take 2 to 4 weeks. This varies from woman to woman. Increase your activities a little bit each day. While you are recovering, be sure to:    Not  drive while you are taking opioid or other narcotic pain medicines    Walk as often as you feel able. Walking prevents blood clots from forming. It also helps speed healing.    Climb stairs slowly. If you get tired, pause every few steps.    Not do sports or strenuous activity until your healthcare provider says it s OK    Not lift anything heavier than 10 pounds for 4 to 6 weeks    Ask others to help with chores and errands    Bathe or shower according to your healthcare provider s instructions    Not have sex until your healthcare provider says it s OK    Ask your healthcare provider when you can return to work  Follow-up care  You will visit the healthcare provider again to be sure you are healing well. Keep all follow-up appointments. Be sure to tell your healthcare provider if you have hot flashes, mood swings, or irritability. Medicine may help ease these symptoms.  Life after hysterectomy  This procedure removes your uterus. So you will no longer have menstrual periods. You won t be able to become pregnant. Also, you may not need Pap tests if your cervix was removed. Your healthcare provider can discuss these and other changes with you.  When to call your healthcare provider  Call your healthcare provider right away if you have any of the following after your surgery:    Fever of 100.4 F (38 C) or higher    Vaginal bleeding that is bright red or soaks more than 1 pad in 60 minutes    Smelly or green-colored discharge from the vagina    Shortness of breath or chest pain    Nausea or vomiting that continues for more than 1 day or that make it impossible to eat or drink    Inability to move the bowels for 3 days    Loose or watery stools 2 or more times a day OR bloody stools    Trouble urinating or burning during urination    Severe pain or bloating in your belly (abdomen)    Pain or swelling in your legs    For LAVH, redness, swelling, drainage, or increasing pain at an incision site    You feel unusually  depressed or sad after the surgery   Date Last Reviewed: 5/1/2017 2000-2018 [employer]. [address] [place] [address], [place] [address]. All rights reserved. This information is not intended as a substitute for professional medical care. Always follow your healthcare professional's instructions.

## 2018-11-26 NOTE — OR NURSING
Pt transported per cart with all belongings per [facility] to Obs. Care unit bed #18.

## 2018-11-26 NOTE — ANESTHESIA POSTPROCEDURE EVALUATION
Patient: Neema Bailey    Procedure(s):  VAGINAL HYSTERECTOMY, ANTERIOR AND POSTERIOR REPAIR  PERINEOPLASTY  CYSTOSCOPY    Diagnosis:UTERINE VAGINAL PROLAPSE  Diagnosis Additional Information: No value filed.    Anesthesia Type:  General, ETT    Note:  Anesthesia Post Evaluation    Patient location during evaluation: PACU  Patient participation: Able to fully participate in evaluation  Level of consciousness: awake and alert  Pain management: adequate  Airway patency: patent  Cardiovascular status: acceptable and hemodynamically stable  Respiratory status: acceptable and unassisted  Hydration status: acceptable  PONV: none             Last vitals:  Vitals:    11/26/18 0957 11/26/18 1015 11/26/18 1024   BP: 124/74 103/57    Pulse:      Resp: 16 28    Temp: 37  C (98.6  F)     SpO2: 98% 92% 94%         Electronically Signed By: Rei Mendoza DO  November 26, 2018  10:34 AM

## 2018-11-26 NOTE — OP NOTE
Procedure Date: 2018      PREOPERATIVE STATUS:  The patient is a 43-year-old female,  3, para 3 who has uterovaginal prolapse.  In early , she had an anterior colporrhaphy and subsequently gave birth.  She had a transobturator sling in .  For the past several years, she has noticed a vaginal bulge.  The bulge comes out of the vaginal introitus, but does not extend beyond the vagina.  She is engaged to be  and would like to have the prolapse fixed.      PREOPERATIVE DIAGNOSES:  Grade 2 cystocele, grade 1-2 uterine prolapse, grade 2 rectocele and a gaping vaginal introitus.      PROCEDURES:  Vaginal hysterectomy with Reich culdoplasty, anterior and posterior vaginal colporrhaphies, perineoplasty and cystoscopy.      SURGEON:  Rony Melgar MD      ANESTHESIA:  General.      OPERATIVE FINDINGS:  On examination, the patient's degree of prolapse was as noted.  A vaginal hysterectomy was performed and the uterosacral ligaments were anchored for apical support.  A Reich culdoplasty was also performed.  An anterior vaginal repair was performed with imbricating sutures of 2-0 Vicryl.  Excess vaginal tissue was trimmed.  Posteriorly, a large incision was made on the perineum significantly narrowing the vaginal introitus.  A posterior vaginal repair was performed with reattachment of the rectovaginal fascia to the levators into the perineal body.  At the end the case, cystoscopy confirmed normal bladder with bilateral ureteral jets.  A vaginal pack was placed.      DESCRIPTION OF PROCEDURE:  Under general endotracheal anesthesia, the patient was placed in lithotomy position, prepped and draped in the usual fashion.  A weighted speculum was placed in the vagina and the anterior and posterior lips of the cervix were grasped with a tenaculum.  The cervix was injected with dilute vasopressin solution and a circumcision was made around the cervix.  Anterior and posterior cul-de-sacs were dissected  and entered.  The uterosacral ligaments were clamped, cut and ligated bilaterally and incorporated into the lateral angles of the vagina.  The remainder of the cardinal ligament, broad ligament, fallopian tube, suspensory ligament of the ovary and round ligament were taken down using the bipolar impact cutter.  Once the uterus was removed, there was no bleeding from upper pelvic area.  There was bleeding from along the cut edge of the vagina and dissection into the posterior cul-de-sac.  The patient did seem to ooze throughout the case.        Once the uterus had been removed and then a Reich culdoplasty performed, the anterior vaginal mucosa was injected with dilute vasopressin solution.  A midline incision was made.  The paravaginal fascia was dissected off the vagina.  The paravaginal fascia was then approximated with interrupted 2-0 imbricating sutures.  Excess vaginal mucosa was then trimmed and the vagina was closed with interrupted 2-0 Vicryl sutures in a vertical fashion.  The Reich stitch was tied.      Posteriorly, a triangular incision was made on the perineum and the posterior vagina was dissected in the midline.  The rectovaginal septum was dissected off of the vagina and using 2-0 Vicryl, the rectovaginal septum was attached to the levator muscles laterally and the perineal body distally.  The vagina was then closed with running 3-0 Vicryl suture.      A cystoscopy was performed after the patient received Lasix.  Bilateral ureteral jets were noted.  The bladder itself appeared normal.      A vaginal pack was placed in the vagina, and the patient was taken to recovery in good condition.        ESTIMATED BLOOD LOSS:  600 mL.      POSTOPERATIVE DIAGNOSES:  Grade 2 cystocele, grade 1-2 uterine prolapse, grade 2 rectocele and a gaping vaginal introitus.  Pathology pending on the uterus.         KATHY GALLARDO MD             D: 11/26/2018   T: 11/26/2018   MT: REGI      Name:     MELLY JONES    MRN:      [id number]        Account:        [id number]   :      1975           Procedure Date: 2018      Document: [id number]       cc: Suresh Shabazz MD

## 2018-11-26 NOTE — PLAN OF CARE
Problem: Patient Care Overview  Goal: Plan of Care/Patient Progress Review  Outcome: No Change  PRIMARY DIAGNOSIS: Vaginal hysterectomy  OUTPATIENT/OBSERVATION GOALS TO BE MET BEFORE DISCHARGE:  1. Stable vital signs Yes  2. Tolerating diet:No, no appetite  3. Pain controlled with oral pain medications:  No IV dilaudid given  4. Positive bowel sounds:  Yes  5. Voiding without difficulty:  No, joseph in  6. Able to ambulate:  No, due to dangle. Agrees to ambulate this elke.  7. Provider specific discharge goals met:  No    Discharge Planner Nurse   Safe discharge environment identified: Yes  Barriers to discharge: Yes       Entered by: Rei Hanley 11/26/2018 2:41 PM     Please review provider order for any additional goals.   Nurse to notify provider when observation goals have been met and patient is ready for discharge.    A&O x4. Up with one. VSS. Lungs clear. Tolerating clears. IVF going. Joseph in. Urine orange due to pyridium. Pt vaginal incision CDI. Packing moist. Plan is to discharge tomorrow. Start on PO oxy this elke. Ice PRN on abdomen. Tylenol for headache. Capno WNL.

## 2018-11-26 NOTE — IP AVS SNAPSHOT
[facility]    [address] [place] [id number]    Phone:  [phone]                                       After Visit Summary   11/26/2018    Neema Bailey    MRN: [id number]           After Visit Summary Signature Page     I have received my discharge instructions, and my questions have been answered. I have discussed any challenges I see with this plan with the nurse or doctor.    ..........................................................................................................................................  Patient/Patient Representative Signature      ..........................................................................................................................................  Patient Representative Print Name and Relationship to Patient    ..................................................               ................................................  Date                                   Time    ..........................................................................................................................................  Reviewed by Signature/Title    ...................................................              ..............................................  Date                                               Time          22EPIC Rev 08/18

## 2018-11-26 NOTE — ANESTHESIA PREPROCEDURE EVALUATION
Anesthesia Evaluation     .             ROS/MED HX    ENT/Pulmonary:       Neurologic: Comment: Restless legs, numbness and tingling      Cardiovascular:     (+) hypertension----. : . . . :. .       METS/Exercise Tolerance:     Hematologic:         Musculoskeletal:         GI/Hepatic:         Renal/Genitourinary:         Endo:     (+) Obesity, .      Psychiatric:         Infectious Disease:         Malignancy:         Other:                                    Anesthesia Plan      History & Physical Review  History and physical reviewed and following examination; no interval change.    ASA Status:  3 .    NPO Status:  > 8 hours    Plan for General and ETT with Intravenous and Propofol induction. Maintenance will be Balanced.    PONV prophylaxis:  Ondansetron (or other 5HT-3) and Dexamethasone or Solumedrol  Additional equipment: Videolaryngoscope      Postoperative Care  Postoperative pain management:  IV analgesics and Multi-modal analgesia.      Consents  Anesthetic plan, risks, benefits and alternatives discussed with:  Patient..                          .

## 2018-11-26 NOTE — BRIEF OP NOTE
[facility] Brief Operative Note    Pre-operative diagnosis: UTERINE VAGINAL PROLAPSE   Post-operative diagnosis SAME   Procedure: Procedure(s):  VAGINAL HYSTERECTOMY, ANTERIOR AND POSTERIOR REPAIR  PERINEOPLASTY  CYSTOSCOPY   Surgeon(s): Surgeon(s) and Role:     * Rony Melgar MD - Primary   Estimated blood loss: 600 mL    Specimens:   ID Type Source Tests Collected by Time Destination   A : uterus and cervix Tissue Uterus and Cervix SURGICAL PATHOLOGY EXAM Rony Melgar MD 11/26/2018  8:34 AM       Findings:  The patient had a grade 2 cystocele with a grade 1-2 uterine prolapse and grade 2 rectocele and gaping introitus.  The vaginal hysterectomy was performed and a small normal appearing uterus was removed.  A Reich culdoplasty was done to support the upper apex of the vagina.  Anterior and posterior colporrhaphy's were performed.  The patient had a previous trans-obturator sling.  And large perineoplasty was performed to tighten up the vaginal introitus.  Patient was oozy throughout the surgery.  Vaginal pack was placed at the end.

## 2018-11-27 NOTE — PLAN OF CARE
Problem: Patient Care Overview  Goal: Plan of Care/Patient Progress Review  Outcome: No Change  A&O although drowsy at times. Pain managed with percocet, dilaudid and Toradol. Pt repo'd often, although kept declining to get out of bed stating she felt tired and wanted to rest. Alexander patent, adequate output. Ashley diet. Scant vaginal drainage. Refused capno at arrival to floor.

## 2018-11-27 NOTE — TELEPHONE ENCOUNTER
[facility] observation unit is calling to speak with on call GYN Surgeon.  FNA directed RN to [facility] on call surgeons.

## 2018-11-27 NOTE — PLAN OF CARE
Problem: Patient Care Overview  Goal: Plan of Care/Patient Progress Review  Outcome: No Change  PRIMARY DIAGNOSIS: vaginal hysterectomy  OUTPATIENT/OBSERVATION GOALS TO BE MET BEFORE DISCHARGE:  1. Stable vital signs Yes  2. Tolerating diet:Yes  3. Pain controlled with oral pain medications:  No  4. Positive bowel sounds:  No  5. Voiding without difficulty:  Yes  6. Able to ambulate:  Yes  7. Provider specific discharge goals met:  No    Discharge Planner Nurse   Safe discharge environment identified: Yes  Barriers to discharge: Yes       Entered by: Mala Johnson 11/26/2018 9:15 PM     Please review provider order for any additional goals.   Nurse to notify provider when observation goals have been met and patient is ready for discharge.

## 2018-11-27 NOTE — PLAN OF CARE
Problem: Patient Care Overview  Goal: Plan of Care/Patient Progress Review  Outcome: Improving  RN:  Patient A/O x4. Pain managed with oral pain medications at this time.  Having difficulty voiding.  PVR documenting.  Hemoglobin stable.  IV saline locked.  Tolerating diet.  Encouraging patient to increase oral beverage intake.  Discharge pending patient's ability to empty bladder with good PVR results.  Patient has been up ambulating in the room and hallways.

## 2018-11-27 NOTE — PROGRESS NOTES
Gyn 1  Pt doing well, pack and cath out. No voiding yet. Trial voiding now. Home later today if possible. RTC 2 weeks

## 2018-11-27 NOTE — PLAN OF CARE
Problem: Patient Care Overview  Goal: Plan of Care/Patient Progress Review  PRIMARY DIAGNOSIS: Vaginal hysterectomy  OUTPATIENT/OBSERVATION GOALS TO BE MET BEFORE DISCHARGE:  1. Stable vital signs Yes  2. Tolerating diet:Yes  3. Pain controlled with oral pain medications:  No  4. Positive bowel sounds:  No  5. Voiding without difficulty:  Yes  6. Able to ambulate:  Yes  7. Provider specific discharge goals met:  Yes    Discharge Planner Nurse   Safe discharge environment identified: Yes  Barriers to discharge: Yes       Entered by: Mala Johnson 11/26/2018 9:17 PM     Please review provider order for any additional goals.   Nurse to notify provider when observation goals have been met and patient is ready for discharge.

## 2018-11-27 NOTE — PROGRESS NOTES
A&O, VSS on RA, regular diet, CMS intact, scant vaginal bleeding, vaginal packing and joseph removed, due to void, pain controlled with percocet and scheduled toradol, will continue to monitor

## 2018-11-28 NOTE — PLAN OF CARE
Discharge instructions and education reviewed, medication schedule and follow up appts discussed. Educated on joseph. Will return to clinic on Friday to have joseph removed. Pt had no questions. All belongings sent with pt. Discharge medications were filled and sent with pt. Pt educated on medications.

## 2018-11-28 NOTE — PROGRESS NOTES
MD Notification    Notified Person: MD    Notified Person Name: Emil Reid    Notification Date/Time: 11/27/2018@ 2330hrs    Notification Interaction: Phone    Purpose of Notification: Low Urine output<200ml.     Orders Received: Encourage fluids, MD will see the pt in the AM.     Comments:

## 2018-11-28 NOTE — PROGRESS NOTES
MD Notification    Notified Person: MD    Notified Person Name:  Emil Reid    Notification Date/Time: 11/27/2018 @1750hrs    Notification Interaction: Phone    Purpose of Notification: Pt is not able to void since AM.    Orders Received: Insert Alexander catheter. Pt can discharge today with Alexander catheter and come back on Friday Am to be removed or stay tonight, the MD will see the pt in the AM.     Comments:

## 2018-11-28 NOTE — PROGRESS NOTES
Gyn 2  Pt unable to void yesterday. Recath last night. Home this afternoon, rtc Friday am for cath removal.

## 2018-11-28 NOTE — PLAN OF CARE
A&Ox4. VSS. Pain managed w/atarax and percocet. Alexander patent and draining. Ambulating ind. Tolerating regular diet. Passing flatus. Plan to discharge home today.

## 2018-11-28 NOTE — PLAN OF CARE
Problem: Patient Care Overview  Goal: Plan of Care/Patient Progress Review  Outcome: No Change  Alert and oriented.  VSS except temp 99.5.  Tolerating reg diet.  Percocet and atarax given for pain.  Fall risk.  Alexander intact.  Is to 1250.  Ice applied to abdomen.  Passing gas.   Encourage pt to take in more fluids.

## 2018-11-28 NOTE — PLAN OF CARE
Problem: Patient Care Overview  Goal: Plan of Care/Patient Progress Review  Outcome: No Change  A/OX4. VSS. PVR @ 1715hrs is 36, refer to MD notification note. +BS,+flatus, poor appetite. Zofran given for nausea w/ relief. Pain managed w/ percocet. Alexander patent, low UOP, refer to MD notification note. Pt will stay tonight.

## 2018-11-30 NOTE — PROGRESS NOTES
SUBJECTIVE:                                                   Neema Bailey is a 43 year old female who presents to clinic today for the following health issue(s):  Patient presents with:  catheter removal        HPI:  Pt here today for post op catheter removal following a vag hyst A& P repair on 18.    No LMP recorded. Patient is not currently having periods (Reason: Irregular Periods)..   Patient is not sexually active, .  Using not sexually active for contraception.    reports that she has never smoked. She has never used smokeless tobacco.    STD testing offered?  Declined    Health maintenance updated:  yes    Today's PHQ-2 Score:   PHQ-2 (  [employer]) 2015   Q1: Little interest or pleasure in doing things 0   Q2: Feeling down, depressed or hopeless 0   PHQ-2 Score 0     Today's PHQ-9 Score:   PHQ-9 SCORE 10/18/2018   PHQ-9 Total Score 2     Today's KAYODE-7 Score:   KAYODE-7 SCORE 10/18/2018   Total Score 5       Problem list and histories reviewed & adjusted, as indicated.  Additional history: as documented.    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)     BREAST SURGERY      Breast Augmentation     COLPORRHAPHY ANTERIOR N/A 2015    Procedure: COLPORRHAPHY ANTERIOR;  Surgeon: Jairon Adams MD;  Location: [facility]     COSMETIC SURGERY      Abdominoplasty     CYSTOCELE REPAIR       CYSTOSCOPY N/A 2018    Procedure: CYSTOSCOPY;  Surgeon: Rony Melgar MD;  Location: [facility]     ENT SURGERY      tonsillectomy & adenoidectomy     GI SURGERY      Small Bowel Enterotomy  "Repair for SBO, EGD     HYSTERECTOMY VAGINAL, COLPORRHAPHY ANTERIOR, POSTERIOR, COMBINED N/A 11/26/2018    Procedure: VAGINAL HYSTERECTOMY, ANTERIOR AND POSTERIOR REPAIR;  Surgeon: Rony Melgar MD;  Location: [facility]     PERINEORRHAPHY N/A 11/26/2018    Procedure: PERINEOPLASTY;  Surgeon: Rony Melgar MD;  Location: [facility]     TUBAL LIGATION        Social History   Substance Use Topics     Smoking status: Never Smoker     Smokeless tobacco: Never Used     Alcohol use 0.0 oz/week     0 Standard drinks or equivalent per week      Comment: very rare           Current Outpatient Prescriptions   Medication Sig     fluticasone (CUTIVATE) 0.05 % cream Apply topically 2 times daily as needed      ibuprofen (ADVIL/MOTRIN) 600 MG tablet Take 1 tablet (600 mg) by mouth every 6 hours as needed for pain (mild)     METOPROLOL SUCCINATE ER PO Take 50 mg by mouth daily     multivitamin, therapeutic with minerals (MULTI-VITAMIN) TABS Take 1 tablet by mouth daily     ondansetron (ZOFRAN-ODT) 4 MG ODT tab Take 1-2 tablets (4-8 mg) by mouth every 8 hours as needed for nausea Dissolve ON the tongue.     oxyCODONE-acetaminophen (PERCOCET) 5-325 MG tablet Take 1-2 tablets by mouth every 4 hours as needed for pain (moderate to severe)     senna-docusate (SENOKOT-S;PERICOLACE) 8.6-50 MG per tablet Take 1-2 tablets by mouth 2 times daily Take while on oral narcotics to prevent or treat constipation.     SUMATRIPTAN SUCCINATE PO Take 50 mg by mouth every 8 hours as needed for migraine     No current facility-administered medications for this visit.      Allergies   Allergen Reactions     Gabapentin      Other reaction(s): Edema           OBJECTIVE:     /62  Pulse 76  Ht 5' 8\" (1.727 m)  Wt 266 lb (120.7 kg)  BMI 40.45 kg/m2  Body mass index is 40.45 kg/(m^2).    Exam:  Constitutional:  Appearance: Well nourished, well developed alert, in no acute distress  Neurologic/Psychiatric:  Mental Status:  Oriented X3  "     Catheter removed from collection bag. 240cc sterile H2O instilled into bladder via catheter. Catheter balloon deflated completely and joseph removed. Pt involuntarily voided most of the water on the exam room floor. Pt voided <10cc in bathroom. PVR: <5ml    In-Clinic Test Results:  No results found for this or any previous visit (from the past 24 hour(s)).    ASSESSMENT/PLAN:                                                        ICD-10-CM    1. Encounter for surgical aftercare following surgery of genitourinary system Z48.816        There are no Patient Instructions on file for this visit.    Catheter removed successfully. Pt to call with issues/fever/UTI symptoms. Will attempt to void every 2 hours. Post op with ZAYDA in 2 weeks.    KANDACE Thompson CNP  [facility] FOR [facility]

## 2018-11-30 NOTE — MR AVS SNAPSHOT
"              After Visit Summary   11/30/2018    Neema Bailey    MRN: [id number]           Patient Information     Date Of Birth          1975        Visit Information        Provider Department      11/30/2018 1:30 PM Bela Can APRN CNP [facility]        Today's Diagnoses     Encounter for surgical aftercare following surgery of genitourinary system    -  1       Follow-ups after your visit        Your next 10 appointments already scheduled     Dec 10, 2018  1:15 PM CST   SHORT with Rony Melgar MD   [facility] ([facility]    [address] [id number] [phone]              Who to contact     If you have questions or need follow up information about today's clinic visit or your schedule please contact [facility] directly at [phone].  Normal or non-critical lab and imaging results will be communicated to you by MyChart, letter or phone within 4 business days after the clinic has received the results. If you do not hear from us within 7 days, please contact the clinic through [employer] or phone. If you have a critical or abnormal lab result, we will notify you by phone as soon as possible.  Submit refill requests through [employer] or call your pharmacy and they will forward the refill request to us. Please allow 3 business days for your refill to be completed.          Additional Information About Your Visit        [employer] Information     [employer] lets you send messages to your doctor, view your test results, renew your prescriptions, schedule appointments and more. To sign up, go to [email or web address] . Click on \"Log in\" on the left side of the screen, which will take you to the Welcome page. Then click on \"Sign up Now\" on the right side of the page.     You will be asked to enter the access code listed below, as well as some personal information. Please " "follow the directions to create your username and password.     Your access code is: [email or web address]  Expires: 2019  9:06 AM     Your access code will  in 90 days. If you need help or a new code, please call your [facility] clinic or [phone].        Care EveryWhere ID     This is your Care EveryWhere ID. This could be used by other organizations to access your [facility] medical records  [id number]        Your Vitals Were     Pulse Height BMI (Body Mass Index)             76 5' 8\" (1.727 m) 40.45 kg/m2          Blood Pressure from Last 3 Encounters:   18 112/62   18 110/60   18 130/80    Weight from Last 3 Encounters:   18 266 lb (120.7 kg)   18 266 lb 14.4 oz (121.1 kg)   18 274 lb (124.3 kg)              Today, you had the following     No orders found for display       Primary Care Provider Office Phone # Fax #    Suresh Shabazz [phone]       [facility] [address] [facility] [address]        Equal Access to Services     LISA LAWTON AH: Hadii rosales choio Sodonna, waaxda luqadaha, qaybta kaalmada adeshermanyada, daja morfin. So [facility] [phone].    ATENCIÓN: Si habla español, tiene a boykin disposición servicios gratuitos de asistencia lingüística. Llame al [phone].    We comply with applicable federal civil rights laws and [place] laws. We do not discriminate on the basis of race, color, national origin, age, disability, sex, sexual orientation, or gender identity.            Thank you!     Thank you for choosing [facility] FOR [facility]  for your care. Our goal is always to provide you with excellent care. Hearing back from our patients is one way we can continue to improve our services. Please take a few minutes to complete the written survey that you may receive in the mail after your visit with us. Thank you!             Your Updated Medication List - Protect others around you: Learn how to " safely use, store and throw away your medicines at www.disposemymeds.org.          This list is accurate as of 11/30/18  1:46 PM.  Always use your most recent med list.                   Brand Name Dispense Instructions for use Diagnosis    fluticasone 0.05 % external cream    CUTIVATE     Apply topically 2 times daily as needed        ibuprofen 600 MG tablet    ADVIL/MOTRIN    30 tablet    Take 1 tablet (600 mg) by mouth every 6 hours as needed for pain (mild)    Acute post-operative pain       METOPROLOL SUCCINATE ER PO      Take 50 mg by mouth daily        Multi-vitamin tablet      Take 1 tablet by mouth daily        ondansetron 4 MG ODT tab    ZOFRAN-ODT    20 tablet    Take 1-2 tablets (4-8 mg) by mouth every 8 hours as needed for nausea Dissolve ON the tongue.    Nausea       oxyCODONE-acetaminophen 5-325 MG tablet    PERCOCET    40 tablet    Take 1-2 tablets by mouth every 4 hours as needed for pain (moderate to severe)    Acute post-operative pain       senna-docusate 8.6-50 MG tablet    SENOKOT-S/PERICOLACE    30 tablet    Take 1-2 tablets by mouth 2 times daily Take while on oral narcotics to prevent or treat constipation.    Drug-induced constipation       SUMATRIPTAN SUCCINATE PO      Take 50 mg by mouth every 8 hours as needed for migraine

## 2018-12-03 NOTE — TELEPHONE ENCOUNTER
I refilled all 3 prescriptions.  The Percocet prescription is at the .  The others are at the [facility] in [place] on [address].

## 2018-12-03 NOTE — TELEPHONE ENCOUNTER
S/P PROCEDURES:  Vaginal hysterectomy with eRich culdoplasty, anterior and posterior vaginal colporrhaphies, perineoplasty and cystoscopy.    Pt feeling bloated and experiencing cramping.  Is passing flatus, having small BM's. Had a good BM this morning.   Denies n/v. Taking in good PO fluids  Having a tough time this weekend. Incision sites intact and bruised.   Inquiring on a refill on the below medications:    oxyCODONE-acetaminophen (PERCOCET) 5-325 MG tablet 40 tablet 0 11/26/2018     ibuprofen (ADVIL/MOTRIN) 600 MG tablet 30 tablet 0 11/26/2018  --   Sig - Route: Take 1 tablet (600 mg) by mouth every 6 hours as needed for pain (mild) - Oral     senna-docusate (SENOKOT-S;PERICOLACE) 8.6-50 MG per tablet 30 tablet 0 11/26/2018      Instructed pt she can take Ibuprofen OTC, would just take 3 tablets for the 600 mg dose. Can use OTC docusate supps to ensure a good BM to move things forward. Sounds like she is experiencing intestinal cramping. Percocet can be very constipating. Ensure good PO fluids and walking as tolerated.    Routing pt request for refills. Pt aware will need to  hard copy of Rx for Percocet if ZAYDA refills.    Will call pt back with response and if needs to  Rx.

## 2018-12-03 NOTE — TELEPHONE ENCOUNTER
Called and informed pt of Rx filled. Pt's  came in already and picked up the Percocet Rx. She will  others from her pharmacy. No further questions.

## 2018-12-10 NOTE — TELEPHONE ENCOUNTER
"Requested Prescriptions   Pending Prescriptions Disp Refills     ibuprofen (ADVIL/MOTRIN) 600 MG tablet [Pharmacy Med Name: IBUPROFEN 600MG TABLETS] 30 tablet 0     Sig: TAKE 1 TABLET BY MOUTH EVERY 6 HOURS AS NEEDED FOR PAIN    NSAID Medications Failed - 12/10/2018 10:00 AM       Failed - Normal ALT on file in past 12 months    Recent Labs   Lab Test 10/04/18  1125   *            Failed - Normal AST on file in past 12 months    Recent Labs   Lab Test 10/04/18  1125   *            Passed - Blood pressure under 140/90 in past 12 months    BP Readings from Last 3 Encounters:   11/30/18 112/62   11/28/18 110/60   11/02/18 130/80                Passed - Recent (12 mo) or future (30 days) visit within the authorizing provider's specialty    Patient had office visit in the last 12 months or has a visit in the next 30 days with authorizing provider or within the authorizing provider's specialty.  See \"Patient Info\" tab in inbasket, or \"Choose Columns\" in Meds & Orders section of the refill encounter.             Passed - Patient is age 6-64 years       Passed - Normal CBC on file in past 12 months    Recent Labs   Lab Test 11/27/18  0624  10/22/18  0457   WBC  --   --  6.7   RBC  --   --  4.46   HGB 9.3*   < > 13.6   HCT  --   --  42.1   PLT  --   --  265    < > = values in this interval not displayed.                Passed - No active pregnancy on record       Passed - Normal serum creatinine on file in past 12 months    Recent Labs   Lab Test 10/22/18  0457   CR 0.60            Passed - No positive pregnancy test in past 12 months        Next 5 appointments (look out 90 days)    Dec 10, 2018  1:15 PM CST  SHORT with Rony Melgar MD  [facility] ([facility]) [address] [place] [id number] [phone]        Pt due for labs for future refills. Pt has appt today with her provider.   Harriet Fenton RN on 12/10/2018 at 10:15 AM      "

## 2018-12-14 NOTE — ED AVS SNAPSHOT
Emergency Department  [address] [facility] [address]  Phone:  [phone]  Fax:  [phone]                                    Neema Bailey   MRN: [id number]    Department:   Emergency Department   Date of Visit:  12/14/2018           After Visit Summary Signature Page    I have received my discharge instructions, and my questions have been answered. I have discussed any challenges I see with this plan with the nurse or doctor.    ..........................................................................................................................................  Patient/Patient Representative Signature      ..........................................................................................................................................  Patient Representative Print Name and Relationship to Patient    ..................................................               ................................................  Date                                   Time    ..........................................................................................................................................  Reviewed by Signature/Title    ...................................................              ..............................................  Date                                               Time          22EPIC Rev 08/18
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
## 2018-12-15 NOTE — DISCHARGE INSTRUCTIONS
Please return to the ER or call 911 at any time if you have any concerns regarding your safety or any other concerns.  Keep the therapy appointment on Tuesday as scheduled.  Talk to your doctor about staying sober if you are unable to stop drinking.

## 2018-12-15 NOTE — ED NOTES
PT requested a new pair of scrubs after urinating in the pair she was wearing.  PT was given a new pair of scrubs along with wipes to clean herself in the bathroom.

## 2018-12-15 NOTE — ED PROVIDER NOTES
Patient now sober.  Small amount of tremulousness and was given a dose of Ativan.  Otherwise she denies suicidal ideation at this time.  Austyn saw the patient this morning from DEC and he also talked with mom.  Please see his note for details.  The patient is not a danger to herself or others at this time, she does not want detox, she is willing to get into a therapy appointment which Austyn set up for Tuesday.     Ese Morales MD  12/15/18 3857
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
## 2018-12-15 NOTE — ED NOTES
Pt given all belongings back. She is contacting a ride home now. Will notify RN when ride is on their way.
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
## 2018-12-15 NOTE — ED NOTES
Bed: ED16  Expected date: 12/14/18  Expected time: 10:11 PM  Means of arrival:   Comments:  Glenda [phone] [id number]  Crisis eval/Hold/Cooperative

## 2018-12-18 NOTE — PROGRESS NOTES
"Please abstract the following data from this visit with this patient into the appropriate field in Epic:    Pap smear & HPV done on this date: 18 (approximately), by this group: [facility], results were negative, HPV-. Report on Care Everywhere       SUBJECTIVE:                                                   Nemea Bailey is a 43 year old female who presents to clinic today for the following health issue(s):  Patient presents with:  Surgical Followup: f/u to vag hyst.- having problems such as chills, body sweats, redness in vaginal area, odor, weakness, fatigue      Additional information: ***    HPI:  ***    Patient's last menstrual period was 10/04/2018..   Patient {is/is not:856162::\"is\"} sexually active, .  Using {[facility] CONTRACEPTION:630411} for contraception.    reports that  has never smoked. she has never used smokeless tobacco.  {Tobacco Cessation -- Delete if patient is a non-smoker:117797}  STD testing offered?  {[facility] GC/CHLAMYDIA:077353}    Health maintenance updated:  {yes no:898219}    Today's PHQ-2 Score:   PHQ-2 (  [employer]) 2015   Q1: Little interest or pleasure in doing things 0   Q2: Feeling down, depressed or hopeless 0   PHQ-2 Score 0     Today's PHQ-9 Score:   PHQ-9 SCORE 10/18/2018   PHQ-9 Total Score 2     Today's KAYODE-7 Score:   KAYODE-7 SCORE 10/18/2018   Total Score 5       Problem list and histories reviewed & adjusted, as indicated.  Additional history: as documented.    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)     BREAST SURGERY      Breast Augmentation     " "COLPORRHAPHY ANTERIOR N/A 9/21/2015    Procedure: COLPORRHAPHY ANTERIOR;  Surgeon: Jairon Adams MD;  Location: [facility]     COSMETIC SURGERY      Abdominoplasty     CYSTOCELE REPAIR       CYSTOSCOPY N/A 11/26/2018    Procedure: CYSTOSCOPY;  Surgeon: Rony Melgar MD;  Location: [facility]     ENT SURGERY      tonsillectomy & adenoidectomy     GI SURGERY      Small Bowel Enterotomy Repair for SBO, EGD     HYSTERECTOMY VAGINAL, COLPORRHAPHY ANTERIOR, POSTERIOR, COMBINED N/A 11/26/2018    Procedure: VAGINAL HYSTERECTOMY, ANTERIOR AND POSTERIOR REPAIR;  Surgeon: Rony Melgar MD;  Location: [facility]     PERINEORRHAPHY N/A 11/26/2018    Procedure: PERINEOPLASTY;  Surgeon: Rony Melgar MD;  Location: [facility]     TUBAL LIGATION        Social History     Tobacco Use     Smoking status: Never Smoker     Smokeless tobacco: Never Used   Substance Use Topics     Alcohol use: Yes     Alcohol/week: 0.0 oz     Comment: daily for the last week, hx of ETOH abuse           Current Outpatient Medications   Medication Sig     ibuprofen (ADVIL/MOTRIN) 600 MG tablet Take 1 tablet (600 mg) by mouth every 6 hours as needed for pain (mild)     METOPROLOL SUCCINATE ER PO Take 50 mg by mouth daily     multivitamin, therapeutic with minerals (MULTI-VITAMIN) TABS Take 1 tablet by mouth daily     senna-docusate (SENOKOT-S/PERICOLACE) 8.6-50 MG tablet Take 1-2 tablets by mouth 2 times daily Take while on oral narcotics to prevent or treat constipation.     fluticasone (CUTIVATE) 0.05 % cream Apply topically 2 times daily as needed      SUMATRIPTAN SUCCINATE PO Take 50 mg by mouth every 8 hours as needed for migraine     No current facility-administered medications for this visit.      Allergies   Allergen Reactions     Gabapentin      Other reaction(s): Edema       ROS:  {PLC ROSGYN:087003::\"12 point review of systems negative other than symptoms noted below.\"}    OBJECTIVE:     /76   Temp 97.6  F (36.4  C)   Ht " "1.753 m (5' 9\")   Wt 117.9 kg (260 lb)   LMP 10/04/2018   BMI 38.40 kg/m    Body mass index is 38.4 kg/m .    Exam:  {[facility] EXAM CHOICES:929978}     In-Clinic Test Results:  No results found for this or any previous visit (from the past 24 hour(s)).    ASSESSMENT/PLAN:                                                      No diagnosis found.    There are no Patient Instructions on file for this visit.    ***    Rony Melgar MD  [facility]  "

## 2018-12-18 NOTE — PROGRESS NOTES
SUBJECTIVE:                                                   Neema Bailey is a 43 year old female who presents to clinic today for the following health issue(s):  Patient presents with:  Surgical Followup: f/u to vag hyst.- having problems such as chills, body sweats, redness in vaginal area, odor, weakness, fatigue      HPI:  Patient is feeling generally weak.  She has been having chills and body sweats complains of vaginal odor.  She has not had a fever.  She has not had intercourse yet.    Patient's last menstrual period was 10/04/2018..   Patient is not sexually active, .  Using not sexually active for contraception.    reports that  has never smoked. she has never used smokeless tobacco.    STD testing offered?  Declined    Health maintenance updated:  yes    Today's PHQ-9 Score:   PHQ-9 SCORE 10/18/2018   PHQ-9 Total Score 2     Today's KAYODE-7 Score:   KAYODE-7 SCORE 10/18/2018   Total Score 5       Problem list and histories reviewed & adjusted, as indicated.  Additional history: as documented.    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)     BREAST SURGERY      Breast Augmentation     COLPORRHAPHY ANTERIOR N/A 2015    Procedure: COLPORRHAPHY ANTERIOR;  Surgeon: Jairon Adams MD;  Location: [facility]     COSMETIC SURGERY      Abdominoplasty     CYSTOCELE REPAIR       CYSTOSCOPY N/A 2018    Procedure: CYSTOSCOPY;  Surgeon: Rony Melgar MD;  Location: [facility]     ENT SURGERY      tonsillectomy & adenoidectomy     GI SURGERY      Small Bowel Enterotomy Repair for SBO, EGD  "    HYSTERECTOMY VAGINAL, COLPORRHAPHY ANTERIOR, POSTERIOR, COMBINED N/A 11/26/2018    Procedure: VAGINAL HYSTERECTOMY, ANTERIOR AND POSTERIOR REPAIR;  Surgeon: Rony Melgar MD;  Location: [facility]     PERINEORRHAPHY N/A 11/26/2018    Procedure: PERINEOPLASTY;  Surgeon: Rony Melgar MD;  Location: [facility]     TUBAL LIGATION        Social History     Tobacco Use     Smoking status: Never Smoker     Smokeless tobacco: Never Used   Substance Use Topics     Alcohol use: Yes     Alcohol/week: 0.0 oz     Comment: daily for the last week, hx of ETOH abuse           Current Outpatient Medications   Medication Sig     ibuprofen (ADVIL/MOTRIN) 600 MG tablet Take 1 tablet (600 mg) by mouth every 6 hours as needed for pain (mild)     METOPROLOL SUCCINATE ER PO Take 50 mg by mouth daily     multivitamin, therapeutic with minerals (MULTI-VITAMIN) TABS Take 1 tablet by mouth daily     senna-docusate (SENOKOT-S/PERICOLACE) 8.6-50 MG tablet Take 1-2 tablets by mouth 2 times daily Take while on oral narcotics to prevent or treat constipation.     fluticasone (CUTIVATE) 0.05 % cream Apply topically 2 times daily as needed      SUMATRIPTAN SUCCINATE PO Take 50 mg by mouth every 8 hours as needed for migraine     No current facility-administered medications for this visit.      Allergies   Allergen Reactions     Gabapentin      Other reaction(s): Edema       ROS:  12 point review of systems negative other than symptoms noted below.  Cardiovascular: Lower Extremity Swelling  Respiratory: Shortness of Breath  Gastrointestinal: Constipation  Genitourinary: Heavy Bleeding with Period, Pelvic Pain, Significant PMS, Spotting and Vaginal Dryness  Skin: Rash  Neurologic: Tremors  Musculoskeletal: Joint Pain  Endocrine: Cold Intolerance and Loss of Hair  Psychiatric: Anxiety and Depression    OBJECTIVE:     /76   Temp 97.6  F (36.4  C)   Ht 1.753 m (5' 9\")   Wt 117.9 kg (260 lb)   LMP 10/04/2018   BMI 38.40 kg/m    Body " mass index is 38.4 kg/m .    Exam:  Constitutional:  Appearance: Well nourished, well developed alert, in no acute distress  Chest:  Respiratory Effort:  Breathing unlabored  Gastrointestinal:  Abdominal Examination:  Abdomen nontender to palpation, tone normal without rigidity or guarding, no masses present, umbilicus without lesions; Liver/Spleen:  No hepatomegaly present, liver nontender to palpation; Hernias:  No hernias present  Lymphatic: Lymph Nodes:  No other lymphadenopathy present  Skin:General Inspection:  No rashes present, no lesions present, no areas of discoloration;   Neurologic/Psychiatric:  Mental Status:  Oriented X3   Pelvic Exam:  External Genitalia:     Female.  Vagina:     Sutures intact, no unusual discharge  Bladder:     Nontender to palpation  Urethra:   Urethral Body:  Urethra palpation normal, urethra structural support normal   Urethral Meatus:  No erythema or lesions present  Cervix:     Surgically absent  Uterus:     Surgically absent  Adnexa:     No adnexal tenderness present, no adnexal masses present  Perineum:     Perineum within normal limits, no evidence of trauma, no rashes or skin lesions present  Anus:     Anus within normal limits, no hemorrhoids present  Inguinal Lymph Nodes:     No lymphadenopathy present  Pubic Hair:     Normal pubic hair distribution for age         In-Clinic Test Results:  No results found for this or any previous visit (from the past 24 hour(s)).    ASSESSMENT/PLAN:                                                        ICD-10-CM    1. Symptoms, such as flushing, sleeplessness, headache, lack of concentration, associated with the menopause N95.1 Estradiol     Follicle stimulating hormone     CBC with platelets differential           Plan: Patient will stop the lab and have blood work done today.  She will be notified as to her findings.  Patient was advised to not have intercourse for the next month and return to clinic in 1 month for  follow-up.    Rony Melgar MD  [facility][1]

## 2018-12-20 NOTE — ED AVS SNAPSHOT
Emergency Department  [address] [facility] [address]  Phone:  [phone]  Fax:  [phone]                                    Neema Bailey   MRN: [id number]    Department:   Emergency Department   Date of Visit:  12/20/2018           After Visit Summary Signature Page    I have received my discharge instructions, and my questions have been answered. I have discussed any challenges I see with this plan with the nurse or doctor.    ..........................................................................................................................................  Patient/Patient Representative Signature      ..........................................................................................................................................  Patient Representative Print Name and Relationship to Patient    ..................................................               ................................................  Date                                   Time    ..........................................................................................................................................  Reviewed by Signature/Title    ...................................................              ..............................................  Date                                               Time          22EPIC Rev 08/18

## 2018-12-20 NOTE — ED PROVIDER NOTES
History     Chief Complaint:  Diarrhea     HPI   Neema Bailey is a 43 year old female with a history of iron-deficiency anemia, hypertension,  who presents to the emergency department today with diarrhea. The patient was here in the emergency department six days ago with chief complaints of alcohol intoxication and suicidal ideation that did not pain out. Furthermore, she saw her OB doctor three days ago for a post op check after her hysterectomy with no pathological indications. There was not mention of diarrhea at this appointment. The patient is suffering with diarrhea for the last three days. Yesterday, the patient felt like she was going every 15 minutes and found her stool to be black. She is also very nauseas with associated vomiting although states that she has not been eating anything over the last coupe days. Nobody else at home is sick and she denies any recent travel. The patient denies feeling feverish, lightheadedness, or dizziness.     Allergies:  Gabapentin    Medications:    Metoprolol succinate   Senna-docusate  Sumatriptan succinate     Past Medical History:    Anxiety   Bilateral leg edema   Dizziness and giddiness   Hypertension   Insomnia   Iron deficiency anemia   Migraine   Nonalcoholic serohepatitis   Numbness and tingling  Obese   Other chronic pain  Peripheral neuropathy   Pruritis   Restless legs   Sciatica   Seborrheic dermatitis   Major depressive disorder  Sinus tachycardia   Substance abuse in remission  Uterovaginal prolapse   Vitamin D deficiency   Rectocele  Cystocele   Substance abuse (multiple)     Past Surgical History:    Abdomen surgery   Appendectomy  Back surgery   Breast surgery   Colporrhaphy anterior   Abdominoplasty  Cystocele repair   Cystoscopy   Tonsillectomy and adenoidectomy  GI surgery  Hysterectomy vaginal, colporrhaphy anterior, posterior, combined.   Perineorrhaphy   Tubal ligation     Family History:    History reviewed. No pertinent family history.  "    Social History:  The patient was alone in the emergency department.  Smoking Status: Never smoker   Smokeless Tobacco: Never used   Alcohol Use: No   Drug use: No   Marital Status:  Single     Review of Systems   Constitutional: Negative for fever.   Gastrointestinal: Positive for blood in stool, diarrhea, nausea and vomiting.   Neurological: Negative for dizziness and light-headedness.   All other systems reviewed and are negative.    Physical Exam   First Vitals:  Patient Vitals for the past 24 hrs:   BP Temp Temp src Heart Rate Resp SpO2 Height Weight   12/20/18 1319 128/77 -- -- 61 20 98 % -- --   12/20/18 1123 141/83 98.1  F (36.7  C) Oral 71 16 98 % 1.727 m (5' 8\") 117.9 kg (260 lb)       Physical Exam    Nursing note and vitals reviewed.  Constitutional:   Awake alert  HENT:   Pharynx normal, tms normal, atraumatic  Mouth/Throat:  Oropharynx is clear and moist.   Eyes:    Conjunctivae normal and EOM are normal. Pupils are equal, round, and reactive to light.   Neck:    Normal range of motion. Neck supple. No tracheal deviation present.   Cardiovascular: Normal rate, regular rhythm, normal heart sounds and intact distal pulses.    Pulmonary/Chest:  Effort normal and breath sounds normal. No respiratory distress. No wheezes. No rales. No tenderness.   Abdominal:   Soft. The patient exhibits no mass. There is no tenderness. There is no rebound and no guarding.   Musculoskeletal:  Normal range of motion. No edema and no tenderness.   Lymphadenopathy:  No cervical adenopathy.   Neurological:  Alert and oriented to person, place, and time. No cranial nerve deficit. Normal muscle tone.           GCS 15   Skin:    Skin is warm and dry.   Psychiatric:   Normal mood and affect.   Emergency Department Course   Laboratory:  Laboratory findings were communicated with the patient who voiced understanding of the findings.    CBC: WBC 4.9, HGB 3.75 (L), PLT 99 (L)     BMP: Glucose 109 (H), calcium 8.1 " (L)    Interventions:  1247: NS 1L IV Bolus  1315: Zofran 4 mg IV     Emergency Department Course:  Nursing notes and vitals reviewed.  1209: I performed an exam of the patient as documented above.     Findings and plan explained to the Patient. Patient discharged home with instructions regarding supportive care, medications, and reasons to return. The importance of close follow-up was reviewed.    I personally reviewed the laboratory results with the Patient and answered all related questions prior to discharge.    Impression & Plan    Medical Decision Makin-year-old female presents with a 2-3-day history of dark diarrhea stools.  She saw her OB/GYN 2 days ago for hysterectomy follow-up and no mention was made in that note of any diarrhea.  She has a history of esophageal ulcers.  No chest or abdominal pain.  Benign abdominal exam.  Laboratory studies are stable she is anemic but at her baseline if not better than normal.  Electrolytes were unremarkable.  She is feeling better after some IV fluids.  She was unable to give a stool sample.  No recent antibiotics or history of C. difficile.  Discharged home clear liquid diet and Imodium for diarrhea.  Probable viral gastroenteritis.  Recheck with her doctor if not getting better in the next 2 days or return to the emergency department if she worsens or changes in any way.    Diagnosis:    ICD-10-CM    1. Diarrhea, unspecified type R19.7        Disposition:  discharged to home    Discharge Medications:     Medication List      Started    loperamide 2 MG tablet  Commonly known as:  IMODIUM A-D  2 mg, Oral, 4 TIMES DAILY PRN          River Carr  2018    EMERGENCY DEPARTMENT  Scribe Disclosure:  River CHO, am serving as a scribe at 11:52 AM on 2018 to document services personally performed by Bony Garces MD based on my observations and the provider's statements to me.        Bony Garces MD  [phone]

## 2018-12-27 NOTE — RESULT ENCOUNTER NOTE
Called pt with results. She is having a lot of menopausal symptons.  We discussed doing estrogen, risks and benefits, patient would like to try.  Will send prescription.Raegan Guillaume RNC

## 2019-01-01 ENCOUNTER — APPOINTMENT (OUTPATIENT)
Dept: GENERAL RADIOLOGY | Facility: CLINIC | Age: 44
End: 2019-01-01
Attending: PSYCHIATRY & NEUROLOGY
Payer: COMMERCIAL

## 2019-01-01 ENCOUNTER — APPOINTMENT (OUTPATIENT)
Dept: OCCUPATIONAL THERAPY | Facility: CLINIC | Age: 44
End: 2019-01-01
Attending: STUDENT IN AN ORGANIZED HEALTH CARE EDUCATION/TRAINING PROGRAM
Payer: COMMERCIAL

## 2019-01-01 ENCOUNTER — APPOINTMENT (OUTPATIENT)
Dept: GENERAL RADIOLOGY | Facility: CLINIC | Age: 44
End: 2019-01-01
Attending: STUDENT IN AN ORGANIZED HEALTH CARE EDUCATION/TRAINING PROGRAM
Payer: COMMERCIAL

## 2019-01-01 ENCOUNTER — ANESTHESIA (OUTPATIENT)
Dept: INTENSIVE CARE | Facility: CLINIC | Age: 44
End: 2019-01-01
Payer: COMMERCIAL

## 2019-01-01 ENCOUNTER — APPOINTMENT (OUTPATIENT)
Dept: CT IMAGING | Facility: CLINIC | Age: 44
End: 2019-01-01
Attending: EMERGENCY MEDICINE
Payer: COMMERCIAL

## 2019-01-01 ENCOUNTER — APPOINTMENT (OUTPATIENT)
Dept: CARDIOLOGY | Facility: CLINIC | Age: 44
End: 2019-01-01
Attending: STUDENT IN AN ORGANIZED HEALTH CARE EDUCATION/TRAINING PROGRAM
Payer: COMMERCIAL

## 2019-01-01 ENCOUNTER — APPOINTMENT (OUTPATIENT)
Dept: LAB | Facility: CLINIC | Age: 44
End: 2019-01-01
Attending: INTERNAL MEDICINE
Payer: COMMERCIAL

## 2019-01-01 ENCOUNTER — DOCUMENTATION ONLY (OUTPATIENT)
Dept: OTHER | Facility: CLINIC | Age: 44
End: 2019-01-01

## 2019-01-01 ENCOUNTER — APPOINTMENT (OUTPATIENT)
Dept: GENERAL RADIOLOGY | Facility: CLINIC | Age: 44
End: 2019-01-01
Attending: INTERNAL MEDICINE
Payer: COMMERCIAL

## 2019-01-01 ENCOUNTER — APPOINTMENT (OUTPATIENT)
Dept: PHYSICAL THERAPY | Facility: CLINIC | Age: 44
End: 2019-01-01
Attending: STUDENT IN AN ORGANIZED HEALTH CARE EDUCATION/TRAINING PROGRAM
Payer: COMMERCIAL

## 2019-01-01 ENCOUNTER — HOSPITAL ENCOUNTER (EMERGENCY)
Facility: CLINIC | Age: 44
End: 2019-01-01
Payer: COMMERCIAL

## 2019-01-01 ENCOUNTER — APPOINTMENT (OUTPATIENT)
Dept: PHYSICAL THERAPY | Facility: CLINIC | Age: 44
End: 2019-01-01
Attending: PSYCHIATRY & NEUROLOGY
Payer: COMMERCIAL

## 2019-01-01 ENCOUNTER — RECORDS - HEALTHEAST (OUTPATIENT)
Dept: LAB | Facility: CLINIC | Age: 44
End: 2019-01-01

## 2019-01-01 ENCOUNTER — APPOINTMENT (OUTPATIENT)
Dept: CT IMAGING | Facility: CLINIC | Age: 44
End: 2019-01-01
Attending: INTERNAL MEDICINE
Payer: COMMERCIAL

## 2019-01-01 ENCOUNTER — HOSPITAL ENCOUNTER (EMERGENCY)
Facility: CLINIC | Age: 44
Discharge: HOME | End: 2019-07-17
Attending: EMERGENCY MEDICINE

## 2019-01-01 ENCOUNTER — APPOINTMENT (OUTPATIENT)
Dept: ULTRASOUND IMAGING | Facility: CLINIC | Age: 44
End: 2019-01-01
Attending: STUDENT IN AN ORGANIZED HEALTH CARE EDUCATION/TRAINING PROGRAM
Payer: COMMERCIAL

## 2019-01-01 ENCOUNTER — APPOINTMENT (OUTPATIENT)
Dept: PHYSICAL THERAPY | Facility: CLINIC | Age: 44
End: 2019-01-01
Payer: COMMERCIAL

## 2019-01-01 ENCOUNTER — APPOINTMENT (OUTPATIENT)
Dept: INTERVENTIONAL RADIOLOGY/VASCULAR | Facility: CLINIC | Age: 44
End: 2019-01-01
Attending: PHYSICIAN ASSISTANT
Payer: COMMERCIAL

## 2019-01-01 ENCOUNTER — ANESTHESIA EVENT (OUTPATIENT)
Dept: INTENSIVE CARE | Facility: CLINIC | Age: 44
End: 2019-01-01
Payer: COMMERCIAL

## 2019-01-01 ENCOUNTER — ALLIED HEALTH/NURSE VISIT (OUTPATIENT)
Dept: NEUROLOGY | Facility: CLINIC | Age: 44
End: 2019-01-01
Attending: PSYCHIATRY & NEUROLOGY
Payer: COMMERCIAL

## 2019-01-01 ENCOUNTER — APPOINTMENT (OUTPATIENT)
Dept: ULTRASOUND IMAGING | Facility: CLINIC | Age: 44
End: 2019-01-01
Attending: PSYCHIATRY & NEUROLOGY
Payer: COMMERCIAL

## 2019-01-01 ENCOUNTER — HOME CARE/HOSPICE - HEALTHEAST (OUTPATIENT)
Dept: HOME HEALTH SERVICES | Facility: HOME HEALTH | Age: 44
End: 2019-01-01

## 2019-01-01 ENCOUNTER — HOSPITAL ENCOUNTER (INPATIENT)
Facility: SKILLED NURSING FACILITY | Age: 44
LOS: 23 days | Discharge: HOME HEALTH | End: 2019-08-13
Attending: INTERNAL MEDICINE | Admitting: INTERNAL MEDICINE
Payer: COMMERCIAL

## 2019-01-01 ENCOUNTER — AMBULATORY - HEALTHEAST (OUTPATIENT)
Dept: OTHER | Facility: CLINIC | Age: 44
End: 2019-01-01

## 2019-01-01 ENCOUNTER — APPOINTMENT (OUTPATIENT)
Dept: ULTRASOUND IMAGING | Facility: CLINIC | Age: 44
End: 2019-01-01
Attending: EMERGENCY MEDICINE
Payer: COMMERCIAL

## 2019-01-01 ENCOUNTER — APPOINTMENT (OUTPATIENT)
Dept: OCCUPATIONAL THERAPY | Facility: CLINIC | Age: 44
End: 2019-01-01
Payer: COMMERCIAL

## 2019-01-01 ENCOUNTER — HOSPITAL ENCOUNTER (INPATIENT)
Facility: CLINIC | Age: 44
LOS: 17 days | End: 2019-09-06
Attending: EMERGENCY MEDICINE | Admitting: SURGERY
Payer: COMMERCIAL

## 2019-01-01 ENCOUNTER — HOSPITAL ENCOUNTER (INPATIENT)
Facility: CLINIC | Age: 44
LOS: 4 days | Discharge: NURSING, REHAB OR LONG-TERM CARE | End: 2019-07-21
Attending: EMERGENCY MEDICINE | Admitting: INTERNAL MEDICINE
Payer: COMMERCIAL

## 2019-01-01 ENCOUNTER — APPOINTMENT (OUTPATIENT)
Dept: NUCLEAR MEDICINE | Facility: CLINIC | Age: 44
End: 2019-01-01
Attending: INTERNAL MEDICINE
Payer: COMMERCIAL

## 2019-01-01 ENCOUNTER — TELEPHONE (OUTPATIENT)
Dept: FAMILY MEDICINE | Facility: CLINIC | Age: 44
End: 2019-01-01

## 2019-01-01 ENCOUNTER — TRANSFERRED RECORDS (OUTPATIENT)
Dept: HEALTH INFORMATION MANAGEMENT | Facility: CLINIC | Age: 44
End: 2019-01-01

## 2019-01-01 ENCOUNTER — APPOINTMENT (OUTPATIENT)
Dept: GENERAL RADIOLOGY | Facility: CLINIC | Age: 44
End: 2019-01-01
Attending: EMERGENCY MEDICINE
Payer: COMMERCIAL

## 2019-01-01 VITALS
RESPIRATION RATE: 20 BRPM | TEMPERATURE: 97.3 F | DIASTOLIC BLOOD PRESSURE: 65 MMHG | HEART RATE: 97 BPM | SYSTOLIC BLOOD PRESSURE: 118 MMHG | OXYGEN SATURATION: 94 % | BODY MASS INDEX: 38.82 KG/M2 | WEIGHT: 255.3 LBS

## 2019-01-01 VITALS
SYSTOLIC BLOOD PRESSURE: 114 MMHG | RESPIRATION RATE: 18 BRPM | BODY MASS INDEX: 36.26 KG/M2 | HEART RATE: 96 BPM | DIASTOLIC BLOOD PRESSURE: 59 MMHG | OXYGEN SATURATION: 99 % | TEMPERATURE: 96.8 F | WEIGHT: 238.5 LBS

## 2019-01-01 VITALS
HEART RATE: 105 BPM | OXYGEN SATURATION: 96 % | SYSTOLIC BLOOD PRESSURE: 100 MMHG | RESPIRATION RATE: 33 BRPM | DIASTOLIC BLOOD PRESSURE: 42 MMHG | WEIGHT: 267.86 LBS | BODY MASS INDEX: 40.6 KG/M2 | TEMPERATURE: 97.9 F | HEIGHT: 68 IN

## 2019-01-01 VITALS
TEMPERATURE: 98 F | RESPIRATION RATE: 16 BRPM | WEIGHT: 259 LBS | DIASTOLIC BLOOD PRESSURE: 76 MMHG | BODY MASS INDEX: 39.25 KG/M2 | SYSTOLIC BLOOD PRESSURE: 134 MMHG | OXYGEN SATURATION: 98 % | HEART RATE: 76 BPM | HEIGHT: 68 IN

## 2019-01-01 DIAGNOSIS — K76.82 HEPATIC ENCEPHALOPATHY (H): ICD-10-CM

## 2019-01-01 DIAGNOSIS — J90 PLEURAL EFFUSION: ICD-10-CM

## 2019-01-01 DIAGNOSIS — N39.0 URINARY TRACT INFECTION WITHOUT HEMATURIA, SITE UNSPECIFIED: ICD-10-CM

## 2019-01-01 DIAGNOSIS — D64.9 ANEMIA, UNSPECIFIED TYPE: ICD-10-CM

## 2019-01-01 DIAGNOSIS — A41.9 SEPSIS, DUE TO UNSPECIFIED ORGANISM: ICD-10-CM

## 2019-01-01 DIAGNOSIS — K72.10 END STAGE LIVER DISEASE (H): ICD-10-CM

## 2019-01-01 DIAGNOSIS — F32.9 MAJOR DEPRESSIVE DISORDER WITH CURRENT ACTIVE EPISODE, UNSPECIFIED DEPRESSION EPISODE SEVERITY, UNSPECIFIED WHETHER RECURRENT: Primary | ICD-10-CM

## 2019-01-01 DIAGNOSIS — K76.89 LIVER DYSFUNCTION: ICD-10-CM

## 2019-01-01 DIAGNOSIS — R41.82 ALTERED MENTAL STATUS, UNSPECIFIED ALTERED MENTAL STATUS TYPE: Primary | ICD-10-CM

## 2019-01-01 DIAGNOSIS — E87.6 HYPOKALEMIA: Primary | ICD-10-CM

## 2019-01-01 DIAGNOSIS — E87.6 HYPOKALEMIA: ICD-10-CM

## 2019-01-01 DIAGNOSIS — F32.9 MAJOR DEPRESSIVE DISORDER WITH CURRENT ACTIVE EPISODE, UNSPECIFIED DEPRESSION EPISODE SEVERITY, UNSPECIFIED WHETHER RECURRENT: ICD-10-CM

## 2019-01-01 DIAGNOSIS — Z48.89 AFTERCARE FOLLOWING SURGERY: ICD-10-CM

## 2019-01-01 LAB
1,3 BETA GLUCAN SER-MCNC: 33 PG/ML
ABO + RH BLD: NORMAL
ACID FAST STN SPEC QL: NORMAL
AEROBIC BLOOD CULTURE BOTTLE: NO GROWTH
ALBUMIN FLD-MCNC: 0.1 G/DL
ALBUMIN FLD-MCNC: 0.4 G/DL
ALBUMIN SERPL-MCNC: 2 G/DL (ref 3.4–5)
ALBUMIN SERPL-MCNC: 2 G/DL (ref 3.5–5)
ALBUMIN SERPL-MCNC: 2 G/DL (ref 3.5–5)
ALBUMIN SERPL-MCNC: 2.1 G/DL (ref 3.4–5)
ALBUMIN SERPL-MCNC: 2.2 G/DL (ref 3.4–5)
ALBUMIN SERPL-MCNC: 2.2 G/DL (ref 3.4–5)
ALBUMIN SERPL-MCNC: 2.3 G/DL (ref 3.4–5)
ALBUMIN SERPL-MCNC: 2.4 G/DL (ref 3.4–5)
ALBUMIN SERPL-MCNC: 2.5 G/DL (ref 3.4–5)
ALBUMIN SERPL-MCNC: 2.6 G/DL (ref 3.4–5)
ALBUMIN SERPL-MCNC: 2.7 G/DL (ref 3.4–5)
ALBUMIN SERPL-MCNC: 2.8 G/DL (ref 3.4–5)
ALBUMIN SERPL-MCNC: 2.8 G/DL (ref 3.4–5)
ALBUMIN SERPL-MCNC: 2.9 G/DL (ref 3.4–5)
ALBUMIN SERPL-MCNC: 3 G/DL (ref 3.4–5)
ALBUMIN SERPL-MCNC: 3 G/DL (ref 3.4–5)
ALBUMIN SERPL-MCNC: 3.1 G/DL (ref 3.4–5)
ALBUMIN SERPL-MCNC: 3.1 G/DL (ref 3.4–5)
ALBUMIN SERPL-MCNC: 3.3 G/DL (ref 3.4–5)
ALBUMIN SERPL-MCNC: 3.4 G/DL (ref 3.4–5)
ALBUMIN SERPL-MCNC: 3.4 G/DL (ref 3.4–5)
ALBUMIN SERPL-MCNC: 3.5 G/DL (ref 3.4–5)
ALBUMIN SERPL-MCNC: 3.7 G/DL (ref 3.4–5)
ALBUMIN UR-MCNC: 10 MG/DL
ALBUMIN UR-MCNC: 30 MG/DL
ALBUMIN UR-MCNC: NEGATIVE MG/DL
ALDOLASE SERPL-CCNC: 55.2 U/L (ref 1.5–8.1)
ALP SERPL-CCNC: 101 U/L (ref 40–150)
ALP SERPL-CCNC: 101 U/L (ref 40–150)
ALP SERPL-CCNC: 104 U/L (ref 40–150)
ALP SERPL-CCNC: 105 U/L (ref 40–150)
ALP SERPL-CCNC: 108 U/L (ref 40–150)
ALP SERPL-CCNC: 110 U/L (ref 40–150)
ALP SERPL-CCNC: 110 U/L (ref 40–150)
ALP SERPL-CCNC: 111 U/L (ref 40–150)
ALP SERPL-CCNC: 112 U/L (ref 40–150)
ALP SERPL-CCNC: 113 U/L (ref 40–150)
ALP SERPL-CCNC: 114 U/L (ref 40–150)
ALP SERPL-CCNC: 116 U/L (ref 40–150)
ALP SERPL-CCNC: 125 U/L (ref 40–150)
ALP SERPL-CCNC: 141 U/L (ref 40–150)
ALP SERPL-CCNC: 142 U/L (ref 40–150)
ALP SERPL-CCNC: 150 U/L (ref 40–150)
ALP SERPL-CCNC: 168 U/L (ref 40–150)
ALP SERPL-CCNC: 171 U/L (ref 40–150)
ALP SERPL-CCNC: 172 U/L (ref 45–120)
ALP SERPL-CCNC: 179 U/L (ref 40–150)
ALP SERPL-CCNC: 186 U/L (ref 40–150)
ALP SERPL-CCNC: 188 U/L (ref 40–150)
ALP SERPL-CCNC: 188 U/L (ref 40–150)
ALP SERPL-CCNC: 192 U/L (ref 40–150)
ALP SERPL-CCNC: 207 U/L (ref 40–150)
ALP SERPL-CCNC: 214 U/L (ref 40–150)
ALP SERPL-CCNC: 222 U/L (ref 40–150)
ALP SERPL-CCNC: 227 U/L (ref 40–150)
ALP SERPL-CCNC: 228 U/L (ref 40–150)
ALP SERPL-CCNC: 228 U/L (ref 40–150)
ALP SERPL-CCNC: 229 U/L (ref 40–150)
ALP SERPL-CCNC: 230 U/L (ref 40–150)
ALP SERPL-CCNC: 234 U/L (ref 40–150)
ALP SERPL-CCNC: 237 U/L (ref 40–150)
ALP SERPL-CCNC: 238 U/L (ref 40–150)
ALP SERPL-CCNC: 244 U/L (ref 40–150)
ALP SERPL-CCNC: 244 U/L (ref 40–150)
ALP SERPL-CCNC: 248 U/L (ref 40–150)
ALP SERPL-CCNC: 256 U/L (ref 40–150)
ALP SERPL-CCNC: 262 U/L (ref 40–150)
ALP SERPL-CCNC: 91 U/L (ref 40–150)
ALP SERPL-CCNC: 96 U/L (ref 40–150)
ALP SERPL-CCNC: 97 U/L (ref 40–150)
ALP SERPL-CCNC: 97 U/L (ref 40–150)
ALP SERPL-CCNC: 99 U/L (ref 40–150)
ALP SERPL-CCNC: 99 U/L (ref 40–150)
ALT SERPL W P-5'-P-CCNC: 110 U/L (ref 0–50)
ALT SERPL W P-5'-P-CCNC: 124 U/L (ref 0–50)
ALT SERPL W P-5'-P-CCNC: 15 U/L (ref 0–50)
ALT SERPL W P-5'-P-CCNC: 155 U/L (ref 0–50)
ALT SERPL W P-5'-P-CCNC: 16 U/L (ref 0–50)
ALT SERPL W P-5'-P-CCNC: 18 U/L (ref 0–50)
ALT SERPL W P-5'-P-CCNC: 18 U/L (ref 0–50)
ALT SERPL W P-5'-P-CCNC: 184 U/L (ref 0–50)
ALT SERPL W P-5'-P-CCNC: 19 U/L (ref 0–50)
ALT SERPL W P-5'-P-CCNC: 19 U/L (ref 0–50)
ALT SERPL W P-5'-P-CCNC: 194 U/L (ref 0–50)
ALT SERPL W P-5'-P-CCNC: 20 U/L (ref 0–50)
ALT SERPL W P-5'-P-CCNC: 20 U/L (ref 0–50)
ALT SERPL W P-5'-P-CCNC: 21 U/L (ref 0–50)
ALT SERPL W P-5'-P-CCNC: 214 U/L (ref 0–50)
ALT SERPL W P-5'-P-CCNC: 215 U/L (ref 0–50)
ALT SERPL W P-5'-P-CCNC: 218 U/L (ref 0–50)
ALT SERPL W P-5'-P-CCNC: 23 U/L (ref 0–50)
ALT SERPL W P-5'-P-CCNC: 24 U/L (ref 0–50)
ALT SERPL W P-5'-P-CCNC: 246 U/L (ref 0–50)
ALT SERPL W P-5'-P-CCNC: 252 U/L (ref 0–50)
ALT SERPL W P-5'-P-CCNC: 253 U/L (ref 0–50)
ALT SERPL W P-5'-P-CCNC: 26 U/L (ref 0–50)
ALT SERPL W P-5'-P-CCNC: 27 U/L (ref 0–50)
ALT SERPL W P-5'-P-CCNC: 278 U/L (ref 0–50)
ALT SERPL W P-5'-P-CCNC: 28 U/L (ref 0–50)
ALT SERPL W P-5'-P-CCNC: 33 U/L (ref 0–50)
ALT SERPL W P-5'-P-CCNC: 35 U/L (ref 0–50)
ALT SERPL W P-5'-P-CCNC: 37 U/L (ref 0–50)
ALT SERPL W P-5'-P-CCNC: 37 U/L (ref 0–50)
ALT SERPL W P-5'-P-CCNC: 41 U/L (ref 0–50)
ALT SERPL W P-5'-P-CCNC: 48 U/L (ref 0–50)
ALT SERPL W P-5'-P-CCNC: 51 U/L (ref 0–50)
ALT SERPL W P-5'-P-CCNC: 54 U/L (ref 0–50)
ALT SERPL W P-5'-P-CCNC: 54 U/L (ref 0–50)
ALT SERPL W P-5'-P-CCNC: 58 U/L (ref 0–50)
ALT SERPL W P-5'-P-CCNC: 61 U/L (ref 0–50)
ALT SERPL W P-5'-P-CCNC: 64 U/L (ref 0–50)
ALT SERPL W P-5'-P-CCNC: 65 U/L (ref 0–50)
ALT SERPL W P-5'-P-CCNC: 65 U/L (ref 0–50)
ALT SERPL W P-5'-P-CCNC: 66 U/L (ref 0–50)
ALT SERPL W P-5'-P-CCNC: 68 U/L (ref 0–50)
ALT SERPL W P-5'-P-CCNC: 68 U/L (ref 0–50)
ALT SERPL W P-5'-P-CCNC: 70 U/L (ref 0–50)
ALT SERPL W P-5'-P-CCNC: 72 U/L (ref 0–50)
ALT SERPL W P-5'-P-CCNC: 77 U/L (ref 0–50)
ALT SERPL W P-5'-P-CCNC: 84 U/L (ref 0–45)
ALT SERPL W P-5'-P-CCNC: 94 U/L (ref 0–50)
ALT SERPL W P-5'-P-CCNC: 98 U/L (ref 0–50)
AMMONIA PLAS-SCNC: 109 UMOL/L (ref 10–50)
AMMONIA PLAS-SCNC: 123 UMOL/L (ref 10–50)
AMMONIA PLAS-SCNC: 147 UMOL/L (ref 10–50)
AMMONIA PLAS-SCNC: 164 UMOL/L (ref 10–50)
AMMONIA PLAS-SCNC: 170 UMOL/L (ref 10–50)
AMMONIA PLAS-SCNC: 205 UMOL/L (ref 10–50)
AMMONIA PLAS-SCNC: 213 UMOL/L (ref 10–50)
AMMONIA PLAS-SCNC: 224 UMOL/L (ref 10–50)
AMMONIA PLAS-SCNC: 226 UMOL/L (ref 10–50)
AMMONIA PLAS-SCNC: 57 UMOL/L (ref 10–50)
AMMONIA PLAS-SCNC: 68 UMOL/L (ref 10–50)
AMMONIA PLAS-SCNC: 79 UMOL/L (ref 10–50)
AMMONIA PLAS-SCNC: 91 UMOL/L (ref 10–50)
AMORPH CRY #/AREA URNS HPF: ABNORMAL /HPF
AMYLASE FLD-CCNC: 5 U/L
AMYLASE FLD-CCNC: 8 U/L
ANAEROBIC BLOOD CULTURE BOTTLE: NO GROWTH
ANION GAP SERPL CALCULATED.3IONS-SCNC: 10 MMOL/L (ref 3–14)
ANION GAP SERPL CALCULATED.3IONS-SCNC: 11 MMOL/L (ref 3–14)
ANION GAP SERPL CALCULATED.3IONS-SCNC: 11 MMOL/L (ref 5–18)
ANION GAP SERPL CALCULATED.3IONS-SCNC: 12 MMOL/L (ref 3–14)
ANION GAP SERPL CALCULATED.3IONS-SCNC: 13 MMOL/L (ref 3–14)
ANION GAP SERPL CALCULATED.3IONS-SCNC: 14 MMOL/L (ref 3–14)
ANION GAP SERPL CALCULATED.3IONS-SCNC: 6 MMOL/L (ref 3–14)
ANION GAP SERPL CALCULATED.3IONS-SCNC: 7 MMOL/L (ref 3–14)
ANION GAP SERPL CALCULATED.3IONS-SCNC: 8 MMOL/L (ref 3–14)
ANION GAP SERPL CALCULATED.3IONS-SCNC: 9 MMOL/L (ref 3–14)
ANISOCYTOSIS BLD QL SMEAR: ABNORMAL
APAP SERPL-MCNC: <5 UG/ML (ref 10–30)
APAP SERPL-MCNC: NORMAL MG/L (ref 10–20)
APPEARANCE FLD: CLEAR
APPEARANCE FLD: NORMAL
APPEARANCE UR: ABNORMAL
APPEARANCE UR: CLEAR
APTT PPP: 50 SEC (ref 22–37)
APTT PPP: 51 SEC (ref 22–37)
APTT PPP: 53 SEC (ref 22–37)
APTT PPP: 53 SEC (ref 22–37)
APTT PPP: 54 SEC (ref 22–37)
APTT PPP: 55 SEC (ref 22–37)
APTT PPP: 55 SEC (ref 22–37)
APTT PPP: 57 SEC (ref 22–37)
APTT PPP: 60 SEC (ref 22–37)
APTT PPP: 61 SEC (ref 22–37)
APTT PPP: 62 SEC (ref 22–37)
APTT PPP: 64 SEC (ref 22–37)
APTT PPP: 65 SEC (ref 22–37)
APTT PPP: 67 SEC (ref 22–37)
APTT PPP: 71 SEC (ref 22–37)
APTT PPP: 79 SEC (ref 22–37)
AST SERPL W P-5'-P-CCNC: 102 U/L (ref 0–45)
AST SERPL W P-5'-P-CCNC: 116 U/L (ref 0–45)
AST SERPL W P-5'-P-CCNC: 132 U/L (ref 0–45)
AST SERPL W P-5'-P-CCNC: 134 U/L (ref 0–45)
AST SERPL W P-5'-P-CCNC: 170 U/L (ref 0–45)
AST SERPL W P-5'-P-CCNC: 205 U/L (ref 0–40)
AST SERPL W P-5'-P-CCNC: 260 U/L (ref 0–45)
AST SERPL W P-5'-P-CCNC: 261 U/L (ref 0–45)
AST SERPL W P-5'-P-CCNC: 262 U/L (ref 0–45)
AST SERPL W P-5'-P-CCNC: 271 U/L (ref 0–45)
AST SERPL W P-5'-P-CCNC: 274 U/L (ref 0–45)
AST SERPL W P-5'-P-CCNC: 277 U/L (ref 0–45)
AST SERPL W P-5'-P-CCNC: 280 U/L (ref 0–45)
AST SERPL W P-5'-P-CCNC: 284 U/L (ref 0–45)
AST SERPL W P-5'-P-CCNC: 287 U/L (ref 0–45)
AST SERPL W P-5'-P-CCNC: 292 U/L (ref 0–45)
AST SERPL W P-5'-P-CCNC: 294 U/L (ref 0–45)
AST SERPL W P-5'-P-CCNC: 296 U/L (ref 0–45)
AST SERPL W P-5'-P-CCNC: 305 U/L (ref 0–45)
AST SERPL W P-5'-P-CCNC: 313 U/L (ref 0–45)
AST SERPL W P-5'-P-CCNC: 316 U/L (ref 0–45)
AST SERPL W P-5'-P-CCNC: 316 U/L (ref 0–45)
AST SERPL W P-5'-P-CCNC: 318 U/L (ref 0–45)
AST SERPL W P-5'-P-CCNC: 324 U/L (ref 0–45)
AST SERPL W P-5'-P-CCNC: 339 U/L (ref 0–45)
AST SERPL W P-5'-P-CCNC: 421 U/L (ref 0–45)
AST SERPL W P-5'-P-CCNC: 484 U/L (ref 0–45)
AST SERPL W P-5'-P-CCNC: 492 U/L (ref 0–45)
AST SERPL W P-5'-P-CCNC: 51 U/L (ref 0–45)
AST SERPL W P-5'-P-CCNC: 52 U/L (ref 0–45)
AST SERPL W P-5'-P-CCNC: 52 U/L (ref 0–45)
AST SERPL W P-5'-P-CCNC: 527 U/L (ref 0–45)
AST SERPL W P-5'-P-CCNC: 529 U/L (ref 0–45)
AST SERPL W P-5'-P-CCNC: 53 U/L (ref 0–45)
AST SERPL W P-5'-P-CCNC: 54 U/L (ref 0–45)
AST SERPL W P-5'-P-CCNC: 548 U/L (ref 0–45)
AST SERPL W P-5'-P-CCNC: 56 U/L (ref 0–45)
AST SERPL W P-5'-P-CCNC: 57 U/L (ref 0–45)
AST SERPL W P-5'-P-CCNC: 58 U/L (ref 0–45)
AST SERPL W P-5'-P-CCNC: 585 U/L (ref 0–45)
AST SERPL W P-5'-P-CCNC: 603 U/L (ref 0–45)
AST SERPL W P-5'-P-CCNC: 607 U/L (ref 0–45)
AST SERPL W P-5'-P-CCNC: 62 U/L (ref 0–45)
AST SERPL W P-5'-P-CCNC: 63 U/L (ref 0–45)
AST SERPL W P-5'-P-CCNC: 65 U/L (ref 0–45)
AST SERPL W P-5'-P-CCNC: 84 U/L (ref 0–45)
AST SERPL W P-5'-P-CCNC: 88 U/L (ref 0–45)
AST SERPL W P-5'-P-CCNC: 98 U/L (ref 0–45)
AST SERPL W P-5'-P-CCNC: ABNORMAL U/L (ref 0–45)
B-D GLUCAN INTERPRETATION (1,3): NEGATIVE
BACTERIA #/AREA URNS HPF: ABNORMAL /HPF
BACTERIA SPEC CULT: ABNORMAL
BACTERIA SPEC CULT: NO GROWTH
BACTERIA SPEC CULT: NORMAL
BASE DEFICIT BLDA-SCNC: 0.8 MMOL/L
BASE DEFICIT BLDA-SCNC: 0.8 MMOL/L
BASE DEFICIT BLDA-SCNC: 1.8 MMOL/L
BASE DEFICIT BLDA-SCNC: 2.2 MMOL/L
BASE DEFICIT BLDA-SCNC: 2.3 MMOL/L
BASE DEFICIT BLDA-SCNC: 2.6 MMOL/L
BASE DEFICIT BLDA-SCNC: 2.7 MMOL/L
BASE DEFICIT BLDA-SCNC: 2.8 MMOL/L
BASE DEFICIT BLDA-SCNC: 2.9 MMOL/L
BASE DEFICIT BLDA-SCNC: 2.9 MMOL/L
BASE DEFICIT BLDA-SCNC: 3.3 MMOL/L
BASE DEFICIT BLDA-SCNC: 3.7 MMOL/L
BASE DEFICIT BLDA-SCNC: 3.9 MMOL/L
BASE DEFICIT BLDA-SCNC: 4 MMOL/L
BASE DEFICIT BLDA-SCNC: 4.3 MMOL/L
BASE DEFICIT BLDA-SCNC: 4.4 MMOL/L
BASE DEFICIT BLDA-SCNC: 4.4 MMOL/L
BASE DEFICIT BLDA-SCNC: 4.5 MMOL/L
BASE DEFICIT BLDA-SCNC: 4.9 MMOL/L
BASE DEFICIT BLDA-SCNC: 5.2 MMOL/L
BASE DEFICIT BLDA-SCNC: 5.6 MMOL/L
BASE DEFICIT BLDA-SCNC: 5.9 MMOL/L
BASE DEFICIT BLDA-SCNC: 5.9 MMOL/L
BASE DEFICIT BLDA-SCNC: 6.3 MMOL/L
BASE DEFICIT BLDA-SCNC: 6.8 MMOL/L
BASE DEFICIT BLDA-SCNC: 7.1 MMOL/L
BASE DEFICIT BLDA-SCNC: 7.1 MMOL/L
BASE DEFICIT BLDA-SCNC: 7.7 MMOL/L
BASE DEFICIT BLDA-SCNC: 7.9 MMOL/L
BASE DEFICIT BLDA-SCNC: 8 MMOL/L
BASE DEFICIT BLDA-SCNC: 8.2 MMOL/L
BASE DEFICIT BLDA-SCNC: 8.4 MMOL/L
BASE DEFICIT BLDA-SCNC: 9.3 MMOL/L
BASE DEFICIT BLDV-SCNC: 1.2 MMOL/L
BASE DEFICIT BLDV-SCNC: 3 MMOL/L
BASE DEFICIT BLDV-SCNC: 3.4 MMOL/L
BASE DEFICIT BLDV-SCNC: 3.8 MMOL/L
BASE DEFICIT BLDV-SCNC: 4.7 MMOL/L
BASE DEFICIT BLDV-SCNC: 4.8 MMOL/L
BASOPHILS # BLD AUTO: 0 10E9/L (ref 0–0.2)
BASOPHILS # BLD AUTO: 0.1 10E9/L (ref 0–0.2)
BASOPHILS # BLD AUTO: 0.5 10E9/L (ref 0–0.2)
BASOPHILS NFR BLD AUTO: 0 %
BASOPHILS NFR BLD AUTO: 0.1 %
BASOPHILS NFR BLD AUTO: 0.2 %
BASOPHILS NFR BLD AUTO: 0.3 %
BASOPHILS NFR BLD AUTO: 0.4 %
BASOPHILS NFR BLD AUTO: 0.5 %
BASOPHILS NFR BLD AUTO: 0.6 %
BASOPHILS NFR BLD AUTO: 0.9 %
BILIRUB DIRECT SERPL-MCNC: 10.8 MG/DL
BILIRUB DIRECT SERPL-MCNC: 5.2 MG/DL (ref 0–0.2)
BILIRUB DIRECT SERPL-MCNC: 7.4 MG/DL (ref 0–0.2)
BILIRUB DIRECT SERPL-MCNC: 7.5 MG/DL (ref 0–0.2)
BILIRUB DIRECT SERPL-MCNC: 7.6 MG/DL (ref 0–0.2)
BILIRUB DIRECT SERPL-MCNC: 8.5 MG/DL (ref 0–0.2)
BILIRUB FLD-MCNC: NORMAL MG/DL
BILIRUB SERPL-MCNC: 10 MG/DL (ref 0.2–1.3)
BILIRUB SERPL-MCNC: 10.2 MG/DL (ref 0.2–1.3)
BILIRUB SERPL-MCNC: 10.2 MG/DL (ref 0.2–1.3)
BILIRUB SERPL-MCNC: 10.4 MG/DL (ref 0.2–1.3)
BILIRUB SERPL-MCNC: 10.5 MG/DL (ref 0.2–1.3)
BILIRUB SERPL-MCNC: 10.7 MG/DL (ref 0.2–1.3)
BILIRUB SERPL-MCNC: 10.7 MG/DL (ref 0.2–1.3)
BILIRUB SERPL-MCNC: 10.8 MG/DL (ref 0.2–1.3)
BILIRUB SERPL-MCNC: 10.8 MG/DL (ref 0.2–1.3)
BILIRUB SERPL-MCNC: 11 MG/DL (ref 0.2–1.3)
BILIRUB SERPL-MCNC: 11.1 MG/DL (ref 0.2–1.3)
BILIRUB SERPL-MCNC: 11.2 MG/DL (ref 0.2–1.3)
BILIRUB SERPL-MCNC: 11.3 MG/DL (ref 0.2–1.3)
BILIRUB SERPL-MCNC: 11.4 MG/DL (ref 0.2–1.3)
BILIRUB SERPL-MCNC: 11.5 MG/DL (ref 0.2–1.3)
BILIRUB SERPL-MCNC: 11.6 MG/DL (ref 0.2–1.3)
BILIRUB SERPL-MCNC: 11.6 MG/DL (ref 0.2–1.3)
BILIRUB SERPL-MCNC: 11.7 MG/DL (ref 0.2–1.3)
BILIRUB SERPL-MCNC: 11.9 MG/DL (ref 0.2–1.3)
BILIRUB SERPL-MCNC: 12 MG/DL (ref 0.2–1.3)
BILIRUB SERPL-MCNC: 12 MG/DL (ref 0.2–1.3)
BILIRUB SERPL-MCNC: 12.1 MG/DL (ref 0.2–1.3)
BILIRUB SERPL-MCNC: 12.2 MG/DL (ref 0.2–1.3)
BILIRUB SERPL-MCNC: 12.3 MG/DL (ref 0.2–1.3)
BILIRUB SERPL-MCNC: 12.4 MG/DL (ref 0.2–1.3)
BILIRUB SERPL-MCNC: 12.4 MG/DL (ref 0.2–1.3)
BILIRUB SERPL-MCNC: 13 MG/DL (ref 0.2–1.3)
BILIRUB SERPL-MCNC: 13.1 MG/DL (ref 0.2–1.3)
BILIRUB SERPL-MCNC: 13.4 MG/DL (ref 0.2–1.3)
BILIRUB SERPL-MCNC: 13.7 MG/DL (ref 0.2–1.3)
BILIRUB SERPL-MCNC: 13.7 MG/DL (ref 0.2–1.3)
BILIRUB SERPL-MCNC: 13.9 MG/DL (ref 0.2–1.3)
BILIRUB SERPL-MCNC: 14.2 MG/DL (ref 0.2–1.3)
BILIRUB SERPL-MCNC: 14.2 MG/DL (ref 0.2–1.3)
BILIRUB SERPL-MCNC: 20.8 MG/DL (ref 0–1)
BILIRUB SERPL-MCNC: 9.2 MG/DL (ref 0.2–1.3)
BILIRUB SERPL-MCNC: 9.4 MG/DL (ref 0.2–1.3)
BILIRUB SERPL-MCNC: 9.4 MG/DL (ref 0.2–1.3)
BILIRUB SERPL-MCNC: 9.5 MG/DL (ref 0.2–1.3)
BILIRUB SERPL-MCNC: 9.5 MG/DL (ref 0.2–1.3)
BILIRUB SERPL-MCNC: 9.8 MG/DL (ref 0.2–1.3)
BILIRUB SERPL-MCNC: 9.8 MG/DL (ref 0.2–1.3)
BILIRUB SERPL-MCNC: 9.9 MG/DL (ref 0.2–1.3)
BILIRUB UR QL STRIP: ABNORMAL
BILIRUB UR QL STRIP: NEGATIVE
BLD GP AB SCN SERPL QL: NORMAL
BLD PROD TYP BPU: NORMAL
BLD UNIT ID BPU: 0
BLOOD BANK CMNT PATIENT-IMP: NORMAL
BLOOD PRODUCT CODE: NORMAL
BPU ID: NORMAL
BUN SERPL-MCNC: 13 MG/DL (ref 7–30)
BUN SERPL-MCNC: 13 MG/DL (ref 7–30)
BUN SERPL-MCNC: 14 MG/DL (ref 7–30)
BUN SERPL-MCNC: 15 MG/DL (ref 7–30)
BUN SERPL-MCNC: 16 MG/DL (ref 7–30)
BUN SERPL-MCNC: 17 MG/DL (ref 7–30)
BUN SERPL-MCNC: 18 MG/DL (ref 7–30)
BUN SERPL-MCNC: 18 MG/DL (ref 7–30)
BUN SERPL-MCNC: 21 MG/DL (ref 7–30)
BUN SERPL-MCNC: 22 MG/DL (ref 7–30)
BUN SERPL-MCNC: 22 MG/DL (ref 8–22)
BUN SERPL-MCNC: 23 MG/DL (ref 7–30)
BUN SERPL-MCNC: 24 MG/DL (ref 7–30)
BUN SERPL-MCNC: 25 MG/DL (ref 7–30)
BUN SERPL-MCNC: 28 MG/DL (ref 7–30)
BUN SERPL-MCNC: 28 MG/DL (ref 7–30)
BUN SERPL-MCNC: 3 MG/DL (ref 7–30)
BUN SERPL-MCNC: 34 MG/DL (ref 7–30)
BUN SERPL-MCNC: 34 MG/DL (ref 7–30)
BUN SERPL-MCNC: 4 MG/DL (ref 7–30)
BUN SERPL-MCNC: 5 MG/DL (ref 7–30)
BUN SERPL-MCNC: 6 MG/DL (ref 7–30)
BUN SERPL-MCNC: 7 MG/DL (ref 7–30)
BUN SERPL-MCNC: 8 MG/DL (ref 7–30)
BUN SERPL-MCNC: 9 MG/DL (ref 7–30)
BURR CELLS BLD QL SMEAR: ABNORMAL
BURR CELLS BLD QL SMEAR: SLIGHT
C DIFF TOX B STL QL: NEGATIVE
C DIFF TOX B STL QL: NEGATIVE
CA-I BLD-MCNC: 4.2 MG/DL (ref 4.4–5.2)
CA-I BLD-MCNC: 4.3 MG/DL (ref 4.4–5.2)
CA-I BLD-MCNC: 4.4 MG/DL (ref 4.4–5.2)
CA-I BLD-MCNC: 4.5 MG/DL (ref 4.4–5.2)
CA-I BLD-MCNC: 4.5 MG/DL (ref 4.4–5.2)
CA-I BLD-MCNC: 4.6 MG/DL (ref 4.4–5.2)
CA-I BLD-MCNC: 4.8 MG/DL (ref 4.4–5.2)
CALCIUM SERPL-MCNC: 7.1 MG/DL (ref 8.5–10.1)
CALCIUM SERPL-MCNC: 7.2 MG/DL (ref 8.5–10.1)
CALCIUM SERPL-MCNC: 7.3 MG/DL (ref 8.5–10.1)
CALCIUM SERPL-MCNC: 7.4 MG/DL (ref 8.5–10.1)
CALCIUM SERPL-MCNC: 7.4 MG/DL (ref 8.5–10.1)
CALCIUM SERPL-MCNC: 7.5 MG/DL (ref 8.5–10.1)
CALCIUM SERPL-MCNC: 7.6 MG/DL (ref 8.5–10.1)
CALCIUM SERPL-MCNC: 7.8 MG/DL (ref 8.5–10.1)
CALCIUM SERPL-MCNC: 7.9 MG/DL (ref 8.5–10.1)
CALCIUM SERPL-MCNC: 8 MG/DL (ref 8.5–10.1)
CALCIUM SERPL-MCNC: 8.1 MG/DL (ref 8.5–10.1)
CALCIUM SERPL-MCNC: 8.1 MG/DL (ref 8.5–10.1)
CALCIUM SERPL-MCNC: 8.2 MG/DL (ref 8.5–10.1)
CALCIUM SERPL-MCNC: 8.3 MG/DL (ref 8.5–10.1)
CALCIUM SERPL-MCNC: 8.4 MG/DL (ref 8.5–10.1)
CALCIUM SERPL-MCNC: 8.6 MG/DL (ref 8.5–10.1)
CALCIUM SERPL-MCNC: 8.7 MG/DL (ref 8.5–10.1)
CALCIUM SERPL-MCNC: 8.8 MG/DL (ref 8.5–10.1)
CALCIUM SERPL-MCNC: 8.8 MG/DL (ref 8.5–10.5)
CALCIUM SERPL-MCNC: 9.2 MG/DL (ref 8.5–10.1)
CHLORIDE BLD-SCNC: 99 MMOL/L (ref 98–107)
CHLORIDE SERPL-SCNC: 103 MMOL/L (ref 94–109)
CHLORIDE SERPL-SCNC: 106 MMOL/L (ref 94–109)
CHLORIDE SERPL-SCNC: 107 MMOL/L (ref 94–109)
CHLORIDE SERPL-SCNC: 108 MMOL/L (ref 94–109)
CHLORIDE SERPL-SCNC: 109 MMOL/L (ref 94–109)
CHLORIDE SERPL-SCNC: 110 MMOL/L (ref 94–109)
CHLORIDE SERPL-SCNC: 111 MMOL/L (ref 94–109)
CHLORIDE SERPL-SCNC: 112 MMOL/L (ref 94–109)
CHLORIDE SERPL-SCNC: 113 MMOL/L (ref 94–109)
CHLORIDE SERPL-SCNC: 115 MMOL/L (ref 94–109)
CHLORIDE SERPL-SCNC: 115 MMOL/L (ref 94–109)
CHLORIDE SERPL-SCNC: 116 MMOL/L (ref 94–109)
CHLORIDE SERPL-SCNC: 117 MMOL/L (ref 94–109)
CHLORIDE SERPL-SCNC: 118 MMOL/L (ref 94–109)
CK SERPL-CCNC: 1316 U/L (ref 30–225)
CK SERPL-CCNC: 1334 U/L (ref 30–225)
CK SERPL-CCNC: 1553 U/L (ref 30–225)
CK SERPL-CCNC: 1996 U/L (ref 30–225)
CK SERPL-CCNC: 288 U/L (ref 30–225)
CK SERPL-CCNC: 352 U/L (ref 30–225)
CK SERPL-CCNC: 378 U/L (ref 30–225)
CK SERPL-CCNC: 542 U/L (ref 30–225)
CK SERPL-CCNC: 692 U/L (ref 30–225)
CK SERPL-CCNC: 892 U/L (ref 30–225)
CK SERPL-CCNC: 927 U/L (ref 30–225)
CMV DNA SPEC NAA+PROBE-ACNC: NORMAL [IU]/ML
CMV DNA SPEC NAA+PROBE-LOG#: NORMAL {LOG_IU}/ML
CO2 BLDCOV-SCNC: 18 MMOL/L (ref 21–28)
CO2 BLDCOV-SCNC: 20 MMOL/L (ref 21–28)
CO2 BLDCOV-SCNC: 21 MMOL/L (ref 21–28)
CO2 SERPL-SCNC: 18 MMOL/L (ref 20–32)
CO2 SERPL-SCNC: 19 MMOL/L (ref 20–32)
CO2 SERPL-SCNC: 20 MMOL/L (ref 20–32)
CO2 SERPL-SCNC: 21 MMOL/L (ref 20–32)
CO2 SERPL-SCNC: 22 MMOL/L (ref 20–32)
CO2 SERPL-SCNC: 23 MMOL/L (ref 20–32)
CO2 SERPL-SCNC: 24 MMOL/L (ref 20–32)
CO2 SERPL-SCNC: 25 MMOL/L (ref 20–32)
CO2 SERPL-SCNC: 25 MMOL/L (ref 20–32)
CO2 SERPL-SCNC: 26 MMOL/L (ref 22–31)
COLOR FLD: NORMAL
COLOR FLD: NORMAL
COLOR FLD: YELLOW
COLOR UR AUTO: ABNORMAL
COLOR UR AUTO: YELLOW
COLOR UR AUTO: YELLOW
COPATH REPORT: NORMAL
COPATH REPORT: NORMAL
CREAT SERPL-MCNC: 0.56 MG/DL (ref 0.52–1.04)
CREAT SERPL-MCNC: 0.59 MG/DL (ref 0.52–1.04)
CREAT SERPL-MCNC: 0.64 MG/DL (ref 0.52–1.04)
CREAT SERPL-MCNC: 0.64 MG/DL (ref 0.52–1.04)
CREAT SERPL-MCNC: 0.65 MG/DL (ref 0.52–1.04)
CREAT SERPL-MCNC: 0.66 MG/DL (ref 0.52–1.04)
CREAT SERPL-MCNC: 0.67 MG/DL (ref 0.52–1.04)
CREAT SERPL-MCNC: 0.7 MG/DL (ref 0.52–1.04)
CREAT SERPL-MCNC: 0.71 MG/DL (ref 0.52–1.04)
CREAT SERPL-MCNC: 0.72 MG/DL (ref 0.52–1.04)
CREAT SERPL-MCNC: 0.73 MG/DL (ref 0.52–1.04)
CREAT SERPL-MCNC: 0.73 MG/DL (ref 0.52–1.04)
CREAT SERPL-MCNC: 0.74 MG/DL (ref 0.52–1.04)
CREAT SERPL-MCNC: 0.76 MG/DL (ref 0.52–1.04)
CREAT SERPL-MCNC: 0.77 MG/DL (ref 0.52–1.04)
CREAT SERPL-MCNC: 0.77 MG/DL (ref 0.52–1.04)
CREAT SERPL-MCNC: 0.78 MG/DL (ref 0.52–1.04)
CREAT SERPL-MCNC: 0.79 MG/DL (ref 0.52–1.04)
CREAT SERPL-MCNC: 0.79 MG/DL (ref 0.52–1.04)
CREAT SERPL-MCNC: 0.84 MG/DL (ref 0.52–1.04)
CREAT SERPL-MCNC: 0.84 MG/DL (ref 0.52–1.04)
CREAT SERPL-MCNC: 0.85 MG/DL (ref 0.52–1.04)
CREAT SERPL-MCNC: 0.85 MG/DL (ref 0.52–1.04)
CREAT SERPL-MCNC: 0.86 MG/DL (ref 0.52–1.04)
CREAT SERPL-MCNC: 0.87 MG/DL (ref 0.52–1.04)
CREAT SERPL-MCNC: 0.87 MG/DL (ref 0.52–1.04)
CREAT SERPL-MCNC: 0.91 MG/DL (ref 0.52–1.04)
CREAT SERPL-MCNC: 0.91 MG/DL (ref 0.52–1.04)
CREAT SERPL-MCNC: 0.93 MG/DL (ref 0.52–1.04)
CREAT SERPL-MCNC: 0.96 MG/DL (ref 0.52–1.04)
CREAT SERPL-MCNC: 0.99 MG/DL (ref 0.55–1.02)
CREAT SERPL-MCNC: 1.01 MG/DL (ref 0.52–1.04)
CREAT SERPL-MCNC: 1.01 MG/DL (ref 0.52–1.04)
CREAT SERPL-MCNC: 1.03 MG/DL (ref 0.52–1.04)
CREAT SERPL-MCNC: 1.06 MG/DL (ref 0.52–1.04)
CREAT SERPL-MCNC: 1.07 MG/DL (ref 0.6–1.1)
CREAT SERPL-MCNC: 1.1 MG/DL (ref 0.52–1.04)
CREAT SERPL-MCNC: 1.2 MG/DL (ref 0.52–1.04)
CREAT SERPL-MCNC: 1.23 MG/DL (ref 0.52–1.04)
CREAT SERPL-MCNC: 1.39 MG/DL (ref 0.52–1.04)
CREAT SERPL-MCNC: 1.43 MG/DL (ref 0.52–1.04)
CREAT SERPL-MCNC: 1.59 MG/DL (ref 0.52–1.04)
CREAT UR-MCNC: 146 MG/DL
CRP SERPL-MCNC: 16 MG/L (ref 0–8)
CRYPTOC AG SPEC QL: NORMAL
DIFFERENTIAL METHOD BLD: ABNORMAL
EOSINOPHIL # BLD AUTO: 0 10E9/L (ref 0–0.7)
EOSINOPHIL # BLD AUTO: 0.1 10E9/L (ref 0–0.7)
EOSINOPHIL # BLD AUTO: 0.2 10E9/L (ref 0–0.7)
EOSINOPHIL # BLD AUTO: 0.3 10E9/L (ref 0–0.7)
EOSINOPHIL # BLD AUTO: 0.7 10E9/L (ref 0–0.7)
EOSINOPHIL # BLD AUTO: 0.9 10E9/L (ref 0–0.7)
EOSINOPHIL # BLD AUTO: 1 10E9/L (ref 0–0.7)
EOSINOPHIL # BLD AUTO: 1.1 10E9/L (ref 0–0.7)
EOSINOPHIL # BLD AUTO: 1.3 10E9/L (ref 0–0.7)
EOSINOPHIL NFR BLD AUTO: 0 %
EOSINOPHIL NFR BLD AUTO: 0.1 %
EOSINOPHIL NFR BLD AUTO: 0.1 %
EOSINOPHIL NFR BLD AUTO: 0.2 %
EOSINOPHIL NFR BLD AUTO: 0.3 %
EOSINOPHIL NFR BLD AUTO: 0.7 %
EOSINOPHIL NFR BLD AUTO: 1 %
EOSINOPHIL NFR BLD AUTO: 1.1 %
EOSINOPHIL NFR BLD AUTO: 1.1 %
EOSINOPHIL NFR BLD AUTO: 1.2 %
EOSINOPHIL NFR BLD AUTO: 1.2 %
EOSINOPHIL NFR BLD AUTO: 1.6 %
EOSINOPHIL NFR BLD AUTO: 1.7 %
EOSINOPHIL NFR BLD AUTO: 2 %
EOSINOPHIL NFR BLD AUTO: 2 %
EOSINOPHIL NFR BLD AUTO: 2.3 %
EOSINOPHIL NFR BLD AUTO: 3 %
EOSINOPHIL NFR BLD AUTO: 3.1 %
EOSINOPHIL NFR BLD AUTO: 3.4 %
EOSINOPHIL NFR BLD AUTO: 3.6 %
EOSINOPHIL NFR BLD AUTO: 3.6 %
EOSINOPHIL NFR BLD AUTO: 3.9 %
EOSINOPHIL NFR BLD AUTO: 3.9 %
EOSINOPHIL NFR BLD AUTO: 4.1 %
EOSINOPHIL NFR BLD AUTO: 4.2 %
EOSINOPHIL NFR BLD AUTO: 5 %
EOSINOPHIL NFR BLD AUTO: 5 %
EOSINOPHIL NFR BLD AUTO: 5.5 %
EOSINOPHIL NFR BLD AUTO: 5.8 %
ERYTHROCYTE [DISTWIDTH] IN BLOOD BY AUTOMATED COUNT: 16.3 % (ref 10–15)
ERYTHROCYTE [DISTWIDTH] IN BLOOD BY AUTOMATED COUNT: 16.7 % (ref 10–15)
ERYTHROCYTE [DISTWIDTH] IN BLOOD BY AUTOMATED COUNT: 16.9 % (ref 10–15)
ERYTHROCYTE [DISTWIDTH] IN BLOOD BY AUTOMATED COUNT: 17 % (ref 10–15)
ERYTHROCYTE [DISTWIDTH] IN BLOOD BY AUTOMATED COUNT: 17.1 % (ref 10–15)
ERYTHROCYTE [DISTWIDTH] IN BLOOD BY AUTOMATED COUNT: 17.3 % (ref 10–15)
ERYTHROCYTE [DISTWIDTH] IN BLOOD BY AUTOMATED COUNT: 17.4 % (ref 10–15)
ERYTHROCYTE [DISTWIDTH] IN BLOOD BY AUTOMATED COUNT: 17.5 % (ref 10–15)
ERYTHROCYTE [DISTWIDTH] IN BLOOD BY AUTOMATED COUNT: 17.6 % (ref 10–15)
ERYTHROCYTE [DISTWIDTH] IN BLOOD BY AUTOMATED COUNT: 17.6 % (ref 10–15)
ERYTHROCYTE [DISTWIDTH] IN BLOOD BY AUTOMATED COUNT: 17.7 % (ref 10–15)
ERYTHROCYTE [DISTWIDTH] IN BLOOD BY AUTOMATED COUNT: 17.9 % (ref 10–15)
ERYTHROCYTE [DISTWIDTH] IN BLOOD BY AUTOMATED COUNT: 18 % (ref 10–15)
ERYTHROCYTE [DISTWIDTH] IN BLOOD BY AUTOMATED COUNT: 18.1 % (ref 10–15)
ERYTHROCYTE [DISTWIDTH] IN BLOOD BY AUTOMATED COUNT: 18.1 % (ref 10–15)
ERYTHROCYTE [DISTWIDTH] IN BLOOD BY AUTOMATED COUNT: 18.3 % (ref 10–15)
ERYTHROCYTE [DISTWIDTH] IN BLOOD BY AUTOMATED COUNT: 18.3 % (ref 10–15)
ERYTHROCYTE [DISTWIDTH] IN BLOOD BY AUTOMATED COUNT: 18.4 % (ref 10–15)
ERYTHROCYTE [DISTWIDTH] IN BLOOD BY AUTOMATED COUNT: 18.5 % (ref 10–15)
ERYTHROCYTE [DISTWIDTH] IN BLOOD BY AUTOMATED COUNT: 19 % (ref 10–15)
ERYTHROCYTE [DISTWIDTH] IN BLOOD BY AUTOMATED COUNT: 19.5 % (ref 10–15)
ERYTHROCYTE [DISTWIDTH] IN BLOOD BY AUTOMATED COUNT: 19.8 % (ref 10–15)
ERYTHROCYTE [DISTWIDTH] IN BLOOD BY AUTOMATED COUNT: 19.8 % (ref 10–15)
ERYTHROCYTE [DISTWIDTH] IN BLOOD BY AUTOMATED COUNT: 20 % (ref 10–15)
ERYTHROCYTE [DISTWIDTH] IN BLOOD BY AUTOMATED COUNT: 20.1 % (ref 10–15)
ERYTHROCYTE [DISTWIDTH] IN BLOOD BY AUTOMATED COUNT: 20.3 % (ref 10–15)
ERYTHROCYTE [DISTWIDTH] IN BLOOD BY AUTOMATED COUNT: 20.3 % (ref 10–15)
ERYTHROCYTE [DISTWIDTH] IN BLOOD BY AUTOMATED COUNT: 20.6 % (ref 10–15)
ERYTHROCYTE [DISTWIDTH] IN BLOOD BY AUTOMATED COUNT: 20.9 % (ref 10–15)
ERYTHROCYTE [DISTWIDTH] IN BLOOD BY AUTOMATED COUNT: 21 % (ref 10–15)
ERYTHROCYTE [DISTWIDTH] IN BLOOD BY AUTOMATED COUNT: 21.1 % (ref 10–15)
ERYTHROCYTE [DISTWIDTH] IN BLOOD BY AUTOMATED COUNT: 21.2 % (ref 10–15)
ERYTHROCYTE [DISTWIDTH] IN BLOOD BY AUTOMATED COUNT: 21.3 % (ref 10–15)
ERYTHROCYTE [DISTWIDTH] IN BLOOD BY AUTOMATED COUNT: 21.9 % (ref 10–15)
ERYTHROCYTE [DISTWIDTH] IN BLOOD BY AUTOMATED COUNT: 22.1 % (ref 10–15)
ERYTHROCYTE [DISTWIDTH] IN BLOOD BY AUTOMATED COUNT: 22.2 % (ref 10–15)
ERYTHROCYTE [DISTWIDTH] IN BLOOD BY AUTOMATED COUNT: 22.3 % (ref 10–15)
ERYTHROCYTE [DISTWIDTH] IN BLOOD BY AUTOMATED COUNT: 22.3 % (ref 10–15)
ETHANOL SERPL-MCNC: <0.01 G/DL
FERRITIN SERPL-MCNC: 166 NG/ML (ref 8–252)
FERRITIN SERPL-MCNC: 372 NG/ML (ref 8–252)
FERRITIN SERPL-MCNC: 384 NG/ML (ref 8–252)
FIBRINOGEN PPP-MCNC: 105 MG/DL (ref 200–420)
FIBRINOGEN PPP-MCNC: 109 MG/DL (ref 200–420)
FIBRINOGEN PPP-MCNC: 111 MG/DL (ref 200–420)
FIBRINOGEN PPP-MCNC: 114 MG/DL (ref 200–420)
FIBRINOGEN PPP-MCNC: 116 MG/DL (ref 200–420)
FIBRINOGEN PPP-MCNC: 118 MG/DL (ref 200–420)
FIBRINOGEN PPP-MCNC: 125 MG/DL (ref 200–420)
FIBRINOGEN PPP-MCNC: 127 MG/DL (ref 200–420)
FIBRINOGEN PPP-MCNC: 141 MG/DL (ref 200–420)
FIBRINOGEN PPP-MCNC: 142 MG/DL (ref 200–420)
FIBRINOGEN PPP-MCNC: 143 MG/DL (ref 200–420)
FIBRINOGEN PPP-MCNC: 151 MG/DL (ref 200–420)
FIBRINOGEN PPP-MCNC: 154 MG/DL (ref 200–420)
FIBRINOGEN PPP-MCNC: 162 MG/DL (ref 200–420)
FIBRINOGEN PPP-MCNC: 173 MG/DL (ref 200–420)
FIBRINOGEN PPP-MCNC: 185 MG/DL (ref 200–420)
FIBRINOGEN PPP-MCNC: 191 MG/DL (ref 200–420)
FIBRINOGEN PPP-MCNC: 89 MG/DL (ref 200–420)
FIBRINOGEN PPP-MCNC: 95 MG/DL (ref 200–420)
FIBRINOGEN PPP-MCNC: 99 MG/DL (ref 200–420)
FLUAV H1 2009 PAND RNA SPEC QL NAA+PROBE: NEGATIVE
FLUAV H1 RNA SPEC QL NAA+PROBE: NEGATIVE
FLUAV H3 RNA SPEC QL NAA+PROBE: NEGATIVE
FLUAV RNA SPEC QL NAA+PROBE: NEGATIVE
FLUBV RNA SPEC QL NAA+PROBE: NEGATIVE
FOLATE SERPL-MCNC: 12.1 NG/ML
FRACT EXCRET NA UR+SERPL-RTO: <0.1 %
GALACTOMANNAN AG SERPL QL IA: NEGATIVE
GALACTOMANNAN AG SERPL-ACNC: 0.05
GFR SERPL CREATININE-BSD FRML MDRD: 39 ML/MIN/{1.73_M2}
GFR SERPL CREATININE-BSD FRML MDRD: 44 ML/MIN/{1.73_M2}
GFR SERPL CREATININE-BSD FRML MDRD: 45 ML/MIN/{1.73_M2}
GFR SERPL CREATININE-BSD FRML MDRD: 53 ML/MIN/{1.73_M2}
GFR SERPL CREATININE-BSD FRML MDRD: 54 ML/MIN/{1.73_M2}
GFR SERPL CREATININE-BSD FRML MDRD: 55 ML/MIN/1.73M2
GFR SERPL CREATININE-BSD FRML MDRD: 60 ML/MIN/{1.73_M2}
GFR SERPL CREATININE-BSD FRML MDRD: 63 ML/MIN/{1.73_M2}
GFR SERPL CREATININE-BSD FRML MDRD: 65 ML/MIN/{1.73_M2}
GFR SERPL CREATININE-BSD FRML MDRD: 67 ML/MIN/{1.73_M2}
GFR SERPL CREATININE-BSD FRML MDRD: 67 ML/MIN/{1.73_M2}
GFR SERPL CREATININE-BSD FRML MDRD: 71 ML/MIN/{1.73_M2}
GFR SERPL CREATININE-BSD FRML MDRD: 74 ML/MIN/{1.73_M2}
GFR SERPL CREATININE-BSD FRML MDRD: 76 ML/MIN/{1.73_M2}
GFR SERPL CREATININE-BSD FRML MDRD: 76 ML/MIN/{1.73_M2}
GFR SERPL CREATININE-BSD FRML MDRD: 80 ML/MIN/{1.73_M2}
GFR SERPL CREATININE-BSD FRML MDRD: 80 ML/MIN/{1.73_M2}
GFR SERPL CREATININE-BSD FRML MDRD: 81 ML/MIN/{1.73_M2}
GFR SERPL CREATININE-BSD FRML MDRD: 82 ML/MIN/{1.73_M2}
GFR SERPL CREATININE-BSD FRML MDRD: 82 ML/MIN/{1.73_M2}
GFR SERPL CREATININE-BSD FRML MDRD: 84 ML/MIN/{1.73_M2}
GFR SERPL CREATININE-BSD FRML MDRD: 84 ML/MIN/{1.73_M2}
GFR SERPL CREATININE-BSD FRML MDRD: 89 ML/MIN/{1.73_M2}
GFR SERPL CREATININE-BSD FRML MDRD: >60 ML/MIN/1.73M2
GFR SERPL CREATININE-BSD FRML MDRD: >90 ML/MIN/{1.73_M2}
GLUCOSE BLD-MCNC: 87 MG/DL (ref 70–125)
GLUCOSE BLDC GLUCOMTR-MCNC: 100 MG/DL (ref 70–99)
GLUCOSE BLDC GLUCOMTR-MCNC: 103 MG/DL (ref 70–99)
GLUCOSE BLDC GLUCOMTR-MCNC: 110 MG/DL (ref 70–99)
GLUCOSE BLDC GLUCOMTR-MCNC: 117 MG/DL (ref 70–99)
GLUCOSE BLDC GLUCOMTR-MCNC: 119 MG/DL (ref 70–99)
GLUCOSE BLDC GLUCOMTR-MCNC: 120 MG/DL (ref 70–99)
GLUCOSE BLDC GLUCOMTR-MCNC: 125 MG/DL (ref 70–99)
GLUCOSE BLDC GLUCOMTR-MCNC: 128 MG/DL (ref 70–99)
GLUCOSE BLDC GLUCOMTR-MCNC: 131 MG/DL (ref 70–99)
GLUCOSE BLDC GLUCOMTR-MCNC: 134 MG/DL (ref 70–99)
GLUCOSE BLDC GLUCOMTR-MCNC: 139 MG/DL (ref 70–99)
GLUCOSE BLDC GLUCOMTR-MCNC: 141 MG/DL (ref 70–99)
GLUCOSE BLDC GLUCOMTR-MCNC: 142 MG/DL (ref 70–99)
GLUCOSE BLDC GLUCOMTR-MCNC: 143 MG/DL (ref 70–99)
GLUCOSE BLDC GLUCOMTR-MCNC: 146 MG/DL (ref 70–99)
GLUCOSE BLDC GLUCOMTR-MCNC: 150 MG/DL (ref 70–99)
GLUCOSE BLDC GLUCOMTR-MCNC: 150 MG/DL (ref 70–99)
GLUCOSE BLDC GLUCOMTR-MCNC: 151 MG/DL (ref 70–99)
GLUCOSE BLDC GLUCOMTR-MCNC: 153 MG/DL (ref 70–99)
GLUCOSE BLDC GLUCOMTR-MCNC: 155 MG/DL (ref 70–99)
GLUCOSE BLDC GLUCOMTR-MCNC: 157 MG/DL (ref 70–99)
GLUCOSE BLDC GLUCOMTR-MCNC: 158 MG/DL (ref 70–99)
GLUCOSE BLDC GLUCOMTR-MCNC: 162 MG/DL (ref 70–99)
GLUCOSE BLDC GLUCOMTR-MCNC: 167 MG/DL (ref 70–99)
GLUCOSE BLDC GLUCOMTR-MCNC: 167 MG/DL (ref 70–99)
GLUCOSE BLDC GLUCOMTR-MCNC: 168 MG/DL (ref 70–99)
GLUCOSE BLDC GLUCOMTR-MCNC: 172 MG/DL (ref 70–99)
GLUCOSE BLDC GLUCOMTR-MCNC: 68 MG/DL (ref 70–99)
GLUCOSE BLDC GLUCOMTR-MCNC: 74 MG/DL (ref 70–99)
GLUCOSE BLDC GLUCOMTR-MCNC: 81 MG/DL (ref 70–99)
GLUCOSE BLDC GLUCOMTR-MCNC: 82 MG/DL (ref 70–99)
GLUCOSE BLDC GLUCOMTR-MCNC: 88 MG/DL (ref 70–99)
GLUCOSE BLDC GLUCOMTR-MCNC: 93 MG/DL (ref 70–99)
GLUCOSE BLDC GLUCOMTR-MCNC: 93 MG/DL (ref 70–99)
GLUCOSE FLD-MCNC: 108 MG/DL
GLUCOSE FLD-MCNC: NORMAL MG/DL
GLUCOSE SERPL-MCNC: 101 MG/DL (ref 70–99)
GLUCOSE SERPL-MCNC: 102 MG/DL (ref 70–99)
GLUCOSE SERPL-MCNC: 104 MG/DL (ref 70–99)
GLUCOSE SERPL-MCNC: 104 MG/DL (ref 70–99)
GLUCOSE SERPL-MCNC: 108 MG/DL (ref 70–99)
GLUCOSE SERPL-MCNC: 108 MG/DL (ref 70–99)
GLUCOSE SERPL-MCNC: 111 MG/DL (ref 70–99)
GLUCOSE SERPL-MCNC: 112 MG/DL (ref 70–99)
GLUCOSE SERPL-MCNC: 112 MG/DL (ref 70–99)
GLUCOSE SERPL-MCNC: 115 MG/DL (ref 70–99)
GLUCOSE SERPL-MCNC: 116 MG/DL (ref 70–99)
GLUCOSE SERPL-MCNC: 117 MG/DL (ref 70–99)
GLUCOSE SERPL-MCNC: 121 MG/DL (ref 70–99)
GLUCOSE SERPL-MCNC: 121 MG/DL (ref 70–99)
GLUCOSE SERPL-MCNC: 122 MG/DL (ref 70–99)
GLUCOSE SERPL-MCNC: 122 MG/DL (ref 70–99)
GLUCOSE SERPL-MCNC: 125 MG/DL (ref 70–99)
GLUCOSE SERPL-MCNC: 125 MG/DL (ref 70–99)
GLUCOSE SERPL-MCNC: 128 MG/DL (ref 70–99)
GLUCOSE SERPL-MCNC: 131 MG/DL (ref 70–99)
GLUCOSE SERPL-MCNC: 140 MG/DL (ref 70–99)
GLUCOSE SERPL-MCNC: 146 MG/DL (ref 70–99)
GLUCOSE SERPL-MCNC: 147 MG/DL (ref 70–99)
GLUCOSE SERPL-MCNC: 149 MG/DL (ref 70–99)
GLUCOSE SERPL-MCNC: 150 MG/DL (ref 70–99)
GLUCOSE SERPL-MCNC: 150 MG/DL (ref 70–99)
GLUCOSE SERPL-MCNC: 151 MG/DL (ref 70–99)
GLUCOSE SERPL-MCNC: 152 MG/DL (ref 70–99)
GLUCOSE SERPL-MCNC: 152 MG/DL (ref 70–99)
GLUCOSE SERPL-MCNC: 153 MG/DL (ref 70–99)
GLUCOSE SERPL-MCNC: 155 MG/DL (ref 70–99)
GLUCOSE SERPL-MCNC: 156 MG/DL (ref 70–99)
GLUCOSE SERPL-MCNC: 161 MG/DL (ref 70–99)
GLUCOSE SERPL-MCNC: 164 MG/DL (ref 70–99)
GLUCOSE SERPL-MCNC: 166 MG/DL (ref 70–99)
GLUCOSE SERPL-MCNC: 166 MG/DL (ref 70–99)
GLUCOSE SERPL-MCNC: 169 MG/DL (ref 70–99)
GLUCOSE SERPL-MCNC: 172 MG/DL (ref 70–99)
GLUCOSE SERPL-MCNC: 174 MG/DL (ref 70–99)
GLUCOSE SERPL-MCNC: 181 MG/DL (ref 70–99)
GLUCOSE SERPL-MCNC: 185 MG/DL (ref 70–99)
GLUCOSE SERPL-MCNC: 190 MG/DL (ref 70–99)
GLUCOSE SERPL-MCNC: 194 MG/DL (ref 70–100)
GLUCOSE SERPL-MCNC: 76 MG/DL (ref 70–99)
GLUCOSE SERPL-MCNC: 80 MG/DL (ref 70–99)
GLUCOSE SERPL-MCNC: 80 MG/DL (ref 70–99)
GLUCOSE SERPL-MCNC: 86 MG/DL (ref 70–99)
GLUCOSE SERPL-MCNC: 87 MG/DL (ref 70–99)
GLUCOSE SERPL-MCNC: 88 MG/DL (ref 70–99)
GLUCOSE SERPL-MCNC: 89 MG/DL (ref 70–99)
GLUCOSE SERPL-MCNC: 91 MG/DL (ref 70–99)
GLUCOSE SERPL-MCNC: 96 MG/DL (ref 70–99)
GLUCOSE SERPL-MCNC: 99 MG/DL (ref 70–99)
GLUCOSE UR STRIP-MCNC: NEGATIVE MG/DL
GRAM STN SPEC: NORMAL
HADV DNA SPEC QL NAA+PROBE: NEGATIVE
HADV DNA SPEC QL NAA+PROBE: NEGATIVE
HAPTOGLOB SERPL-MCNC: <6 MG/DL (ref 15–200)
HAPTOGLOB SERPL-MCNC: <6 MG/DL (ref 15–200)
HAV IGM SERPL QL IA: NONREACTIVE
HCO3 BLD-SCNC: 17 MMOL/L (ref 21–28)
HCO3 BLD-SCNC: 17 MMOL/L (ref 21–28)
HCO3 BLD-SCNC: 18 MMOL/L (ref 21–28)
HCO3 BLD-SCNC: 18 MMOL/L (ref 21–28)
HCO3 BLD-SCNC: 19 MMOL/L (ref 21–28)
HCO3 BLD-SCNC: 20 MMOL/L (ref 21–28)
HCO3 BLD-SCNC: 21 MMOL/L (ref 21–28)
HCO3 BLD-SCNC: 22 MMOL/L (ref 21–28)
HCO3 BLD-SCNC: 23 MMOL/L (ref 21–28)
HCO3 BLD-SCNC: 24 MMOL/L (ref 21–28)
HCO3 BLD-SCNC: 25 MMOL/L (ref 21–28)
HCO3 BLDV-SCNC: 20 MMOL/L (ref 21–28)
HCO3 BLDV-SCNC: 20 MMOL/L (ref 21–28)
HCO3 BLDV-SCNC: 21 MMOL/L (ref 21–28)
HCO3 BLDV-SCNC: 21 MMOL/L (ref 21–28)
HCO3 BLDV-SCNC: 22 MMOL/L (ref 21–28)
HCO3 BLDV-SCNC: 24 MMOL/L (ref 21–28)
HCT VFR BLD AUTO: 21 % (ref 35–47)
HCT VFR BLD AUTO: 21.6 % (ref 35–47)
HCT VFR BLD AUTO: 21.8 % (ref 35–47)
HCT VFR BLD AUTO: 22.1 % (ref 35–47)
HCT VFR BLD AUTO: 22.4 % (ref 35–47)
HCT VFR BLD AUTO: 22.5 % (ref 35–47)
HCT VFR BLD AUTO: 22.5 % (ref 35–47)
HCT VFR BLD AUTO: 22.7 % (ref 35–47)
HCT VFR BLD AUTO: 23 % (ref 35–47)
HCT VFR BLD AUTO: 23.2 % (ref 35–47)
HCT VFR BLD AUTO: 23.3 % (ref 35–47)
HCT VFR BLD AUTO: 23.7 % (ref 35–47)
HCT VFR BLD AUTO: 23.7 % (ref 35–47)
HCT VFR BLD AUTO: 23.8 % (ref 35–47)
HCT VFR BLD AUTO: 23.8 % (ref 35–47)
HCT VFR BLD AUTO: 24 % (ref 35–47)
HCT VFR BLD AUTO: 24.1 % (ref 35–47)
HCT VFR BLD AUTO: 24.3 % (ref 35–47)
HCT VFR BLD AUTO: 24.4 % (ref 35–47)
HCT VFR BLD AUTO: 24.4 % (ref 35–47)
HCT VFR BLD AUTO: 24.5 % (ref 35–47)
HCT VFR BLD AUTO: 24.5 % (ref 35–47)
HCT VFR BLD AUTO: 24.6 % (ref 35–47)
HCT VFR BLD AUTO: 24.6 % (ref 35–47)
HCT VFR BLD AUTO: 24.7 % (ref 35–47)
HCT VFR BLD AUTO: 24.8 % (ref 35–47)
HCT VFR BLD AUTO: 25 % (ref 35–47)
HCT VFR BLD AUTO: 25.1 % (ref 35–47)
HCT VFR BLD AUTO: 25.3 % (ref 35–47)
HCT VFR BLD AUTO: 25.3 % (ref 35–47)
HCT VFR BLD AUTO: 25.4 % (ref 35–47)
HCT VFR BLD AUTO: 25.7 % (ref 35–47)
HCT VFR BLD AUTO: 25.7 % (ref 35–47)
HCT VFR BLD AUTO: 25.8 % (ref 35–47)
HCT VFR BLD AUTO: 25.8 % (ref 35–47)
HCT VFR BLD AUTO: 26 % (ref 35–47)
HCT VFR BLD AUTO: 26.4 % (ref 35–47)
HCT VFR BLD AUTO: 26.5 % (ref 35–47)
HCT VFR BLD AUTO: 26.6 % (ref 35–47)
HCT VFR BLD AUTO: 26.8 % (ref 35–47)
HCT VFR BLD AUTO: 27.3 % (ref 35–47)
HCT VFR BLD AUTO: 27.3 % (ref 35–47)
HCT VFR BLD AUTO: 27.7 % (ref 35–47)
HCT VFR BLD AUTO: 27.9 % (ref 35–47)
HCT VFR BLD AUTO: 30.9 % (ref 35–47)
HGB BLD-MCNC: 6.6 G/DL (ref 11.7–15.7)
HGB BLD-MCNC: 6.7 G/DL (ref 11.7–15.7)
HGB BLD-MCNC: 6.8 G/DL (ref 11.7–15.7)
HGB BLD-MCNC: 6.9 G/DL (ref 11.7–15.7)
HGB BLD-MCNC: 6.9 G/DL (ref 11.7–15.7)
HGB BLD-MCNC: 7 G/DL (ref 11.7–15.7)
HGB BLD-MCNC: 7.1 G/DL (ref 11.7–15.7)
HGB BLD-MCNC: 7.2 G/DL (ref 11.7–15.7)
HGB BLD-MCNC: 7.3 G/DL (ref 11.7–15.7)
HGB BLD-MCNC: 7.3 G/DL (ref 11.7–15.7)
HGB BLD-MCNC: 7.4 G/DL (ref 11.7–15.7)
HGB BLD-MCNC: 7.5 G/DL (ref 11.7–15.7)
HGB BLD-MCNC: 7.6 G/DL (ref 11.7–15.7)
HGB BLD-MCNC: 7.7 G/DL (ref 11.7–15.7)
HGB BLD-MCNC: 7.8 G/DL (ref 11.7–15.7)
HGB BLD-MCNC: 7.8 G/DL (ref 11.7–15.7)
HGB BLD-MCNC: 7.9 G/DL (ref 11.7–15.7)
HGB BLD-MCNC: 8 G/DL (ref 11.7–15.7)
HGB BLD-MCNC: 8 G/DL (ref 11.7–15.7)
HGB BLD-MCNC: 8.1 G/DL (ref 11.7–15.7)
HGB BLD-MCNC: 8.2 G/DL (ref 11.7–15.7)
HGB BLD-MCNC: 8.2 G/DL (ref 11.7–15.7)
HGB BLD-MCNC: 8.3 G/DL (ref 11.7–15.7)
HGB BLD-MCNC: 8.5 G/DL (ref 11.7–15.7)
HGB BLD-MCNC: 8.5 G/DL (ref 11.7–15.7)
HGB BLD-MCNC: 8.8 G/DL (ref 11.7–15.7)
HGB BLD-MCNC: 9.6 G/DL (ref 11.7–15.7)
HGB FREE PLAS-MCNC: 40 MG/DL
HGB UR QL STRIP: ABNORMAL
HGB UR QL STRIP: NEGATIVE
HMPV RNA SPEC QL NAA+PROBE: NEGATIVE
HPIV1 RNA SPEC QL NAA+PROBE: NEGATIVE
HPIV2 RNA SPEC QL NAA+PROBE: NEGATIVE
HPIV3 RNA SPEC QL NAA+PROBE: NEGATIVE
HYALINE CASTS #/AREA URNS LPF: 12 /LPF (ref 0–2)
HYALINE CASTS #/AREA URNS LPF: 2 /LPF (ref 0–2)
HYALINE CASTS #/AREA URNS LPF: 39 /LPF (ref 0–2)
HYALINE CASTS #/AREA URNS LPF: 4 /LPF (ref 0–2)
HYPOCHROMIA BLD QL: PRESENT
IMM GRANULOCYTES # BLD: 0 10E9/L (ref 0–0.4)
IMM GRANULOCYTES # BLD: 0.1 10E9/L (ref 0–0.4)
IMM GRANULOCYTES # BLD: 0.2 10E9/L (ref 0–0.4)
IMM GRANULOCYTES # BLD: 0.2 10E9/L (ref 0–0.4)
IMM GRANULOCYTES # BLD: 0.5 10E9/L (ref 0–0.4)
IMM GRANULOCYTES # BLD: 0.5 10E9/L (ref 0–0.4)
IMM GRANULOCYTES # BLD: 0.6 10E9/L (ref 0–0.4)
IMM GRANULOCYTES # BLD: 0.6 10E9/L (ref 0–0.4)
IMM GRANULOCYTES # BLD: 0.8 10E9/L (ref 0–0.4)
IMM GRANULOCYTES # BLD: 0.9 10E9/L (ref 0–0.4)
IMM GRANULOCYTES # BLD: 1 10E9/L (ref 0–0.4)
IMM GRANULOCYTES # BLD: 1.1 10E9/L (ref 0–0.4)
IMM GRANULOCYTES # BLD: 1.1 10E9/L (ref 0–0.4)
IMM GRANULOCYTES # BLD: 1.2 10E9/L (ref 0–0.4)
IMM GRANULOCYTES # BLD: 1.5 10E9/L (ref 0–0.4)
IMM GRANULOCYTES # BLD: 2 10E9/L (ref 0–0.4)
IMM GRANULOCYTES # BLD: 2 10E9/L (ref 0–0.4)
IMM GRANULOCYTES NFR BLD: 0.2 %
IMM GRANULOCYTES NFR BLD: 0.3 %
IMM GRANULOCYTES NFR BLD: 0.4 %
IMM GRANULOCYTES NFR BLD: 0.6 %
IMM GRANULOCYTES NFR BLD: 0.7 %
IMM GRANULOCYTES NFR BLD: 0.7 %
IMM GRANULOCYTES NFR BLD: 0.9 %
IMM GRANULOCYTES NFR BLD: 1.1 %
IMM GRANULOCYTES NFR BLD: 1.8 %
IMM GRANULOCYTES NFR BLD: 2.3 %
IMM GRANULOCYTES NFR BLD: 2.3 %
IMM GRANULOCYTES NFR BLD: 2.7 %
IMM GRANULOCYTES NFR BLD: 3 %
IMM GRANULOCYTES NFR BLD: 3.1 %
IMM GRANULOCYTES NFR BLD: 3.3 %
IMM GRANULOCYTES NFR BLD: 3.4 %
IMM GRANULOCYTES NFR BLD: 3.4 %
IMM GRANULOCYTES NFR BLD: 3.5 %
IMM GRANULOCYTES NFR BLD: 3.8 %
IMM GRANULOCYTES NFR BLD: 3.9 %
IMM GRANULOCYTES NFR BLD: 3.9 %
IMM GRANULOCYTES NFR BLD: 4 %
IMM GRANULOCYTES NFR BLD: 4.1 %
IMM GRANULOCYTES NFR BLD: 4.2 %
IMM GRANULOCYTES NFR BLD: 5 %
INR PPP: 1.98 (ref 0.86–1.14)
INR PPP: 1.98 (ref 0.9–1.1)
INR PPP: 2.6 (ref 0.86–1.14)
INR PPP: 2.79 (ref 0.86–1.14)
INR PPP: 2.82 (ref 0.86–1.14)
INR PPP: 2.92 (ref 0.86–1.14)
INR PPP: 2.94 (ref 0.86–1.14)
INR PPP: 2.94 (ref 0.86–1.14)
INR PPP: 2.95 (ref 0.86–1.14)
INR PPP: 3.01 (ref 0.86–1.14)
INR PPP: 3.09 (ref 0.86–1.14)
INR PPP: 3.1 (ref 0.86–1.14)
INR PPP: 3.22 (ref 0.86–1.14)
INR PPP: 3.26 (ref 0.86–1.14)
INR PPP: 3.26 (ref 0.86–1.14)
INR PPP: 3.27 (ref 0.86–1.14)
INR PPP: 3.32 (ref 0.86–1.14)
INR PPP: 3.32 (ref 0.86–1.14)
INR PPP: 3.33 (ref 0.86–1.14)
INR PPP: 3.35 (ref 0.86–1.14)
INR PPP: 3.38 (ref 0.86–1.14)
INR PPP: 3.4 (ref 0.86–1.14)
INR PPP: 3.49 (ref 0.86–1.14)
INR PPP: 3.57 (ref 0.86–1.14)
INR PPP: 3.64 (ref 0.86–1.14)
INR PPP: 3.64 (ref 0.86–1.14)
INR PPP: 3.72 (ref 0.86–1.14)
INR PPP: 3.85 (ref 0.86–1.14)
INR PPP: 3.87 (ref 0.86–1.14)
INR PPP: 3.87 (ref 0.86–1.14)
INR PPP: 4 (ref 0.86–1.14)
INR PPP: 4.53 (ref 0.86–1.14)
INTERPRETATION ECG - MUSE: NORMAL
IRON SATN MFR SERPL: 29 % (ref 15–46)
IRON SATN MFR SERPL: 86 % (ref 15–46)
IRON SERPL-MCNC: 37 UG/DL (ref 35–180)
IRON SERPL-MCNC: 91 UG/DL (ref 35–180)
KETONES UR STRIP-MCNC: 10 MG/DL
KETONES UR STRIP-MCNC: 5 MG/DL
KETONES UR STRIP-MCNC: 5 MG/DL
KETONES UR STRIP-MCNC: NEGATIVE MG/DL
KOH PREP SPEC: NORMAL
L PNEUMO1 AG UR QL IA: NORMAL
LACTATE BLD-SCNC: 1.7 MMOL/L (ref 0.7–2)
LACTATE BLD-SCNC: 1.9 MMOL/L (ref 0.7–2)
LACTATE BLD-SCNC: 1.9 MMOL/L (ref 0.7–2)
LACTATE BLD-SCNC: 2.1 MMOL/L (ref 0.7–2)
LACTATE BLD-SCNC: 2.2 MMOL/L (ref 0.7–2)
LACTATE BLD-SCNC: 2.3 MMOL/L (ref 0.7–2)
LACTATE BLD-SCNC: 2.4 MMOL/L (ref 0.7–2)
LACTATE BLD-SCNC: 2.4 MMOL/L (ref 0.7–2)
LACTATE BLD-SCNC: 2.5 MMOL/L (ref 0.7–2)
LACTATE BLD-SCNC: 2.6 MMOL/L (ref 0.7–2)
LACTATE BLD-SCNC: 2.6 MMOL/L (ref 0.7–2.1)
LACTATE BLD-SCNC: 2.7 MMOL/L (ref 0.7–2)
LACTATE BLD-SCNC: 2.9 MMOL/L (ref 0.7–2)
LACTATE BLD-SCNC: 2.9 MMOL/L (ref 0.7–2)
LACTATE BLD-SCNC: 3 MMOL/L (ref 0.7–2)
LACTATE BLD-SCNC: 3 MMOL/L (ref 0.7–2.1)
LACTATE BLD-SCNC: 3.2 MMOL/L (ref 0.7–2)
LACTATE BLD-SCNC: 3.3 MMOL/L (ref 0.7–2)
LACTATE BLD-SCNC: 3.5 MMOL/L (ref 0.7–2)
LACTATE BLD-SCNC: 3.9 MMOL/L (ref 0.7–2)
LACTATE BLD-SCNC: 3.9 MMOL/L (ref 0.7–2.1)
LACTATE BLD-SCNC: 4.1 MMOL/L (ref 0.7–2)
LACTATE BLD-SCNC: 4.1 MMOL/L (ref 0.7–2)
LACTATE BLD-SCNC: 4.2 MMOL/L (ref 0.7–2)
LACTATE BLD-SCNC: 4.3 MMOL/L (ref 0.7–2)
LACTATE BLD-SCNC: 4.9 MMOL/L (ref 0.7–2)
LACTATE BLD-SCNC: 5.1 MMOL/L (ref 0.7–2)
LACTATE BLD-SCNC: 5.3 MMOL/L (ref 0.7–2)
LACTATE BLD-SCNC: 7.7 MMOL/L (ref 0.7–2)
LDH FLD L TO P-CCNC: 36 U/L
LDH FLD L TO P-CCNC: 50 U/L
LDH FLD L TO P-CCNC: 56 U/L
LDH FLD L TO P-CCNC: NORMAL U/L
LDH SERPL L TO P-CCNC: 232 U/L (ref 81–234)
LDH SERPL L TO P-CCNC: 281 U/L (ref 81–234)
LDH SERPL L TO P-CCNC: 293 U/L (ref 81–234)
LDH SERPL L TO P-CCNC: 331 U/L (ref 81–234)
LDH SERPL L TO P-CCNC: 552 U/L (ref 81–234)
LDH SERPL L TO P-CCNC: 559 U/L (ref 81–234)
LDH SERPL L TO P-CCNC: 657 U/L (ref 81–234)
LDH SERPL L TO P-CCNC: 682 U/L (ref 81–234)
LEUKOCYTE ESTERASE UR QL STRIP: ABNORMAL
LEUKOCYTE ESTERASE UR QL STRIP: NEGATIVE
LIPASE SERPL-CCNC: 107 U/L (ref 73–393)
LYMPHOCYTES # BLD AUTO: 0.9 10E9/L (ref 0.8–5.3)
LYMPHOCYTES # BLD AUTO: 1.1 10E9/L (ref 0.8–5.3)
LYMPHOCYTES # BLD AUTO: 1.1 10E9/L (ref 0.8–5.3)
LYMPHOCYTES # BLD AUTO: 1.4 10E9/L (ref 0.8–5.3)
LYMPHOCYTES # BLD AUTO: 1.5 10E9/L (ref 0.8–5.3)
LYMPHOCYTES # BLD AUTO: 1.5 10E9/L (ref 0.8–5.3)
LYMPHOCYTES # BLD AUTO: 1.6 10E9/L (ref 0.8–5.3)
LYMPHOCYTES # BLD AUTO: 1.6 10E9/L (ref 0.8–5.3)
LYMPHOCYTES # BLD AUTO: 1.7 10E9/L (ref 0.8–5.3)
LYMPHOCYTES # BLD AUTO: 1.8 10E9/L (ref 0.8–5.3)
LYMPHOCYTES # BLD AUTO: 1.9 10E9/L (ref 0.8–5.3)
LYMPHOCYTES # BLD AUTO: 10.9 10E9/L (ref 0.8–5.3)
LYMPHOCYTES # BLD AUTO: 2 10E9/L (ref 0.8–5.3)
LYMPHOCYTES # BLD AUTO: 2.1 10E9/L (ref 0.8–5.3)
LYMPHOCYTES # BLD AUTO: 2.2 10E9/L (ref 0.8–5.3)
LYMPHOCYTES # BLD AUTO: 2.2 10E9/L (ref 0.8–5.3)
LYMPHOCYTES # BLD AUTO: 2.3 10E9/L (ref 0.8–5.3)
LYMPHOCYTES # BLD AUTO: 2.4 10E9/L (ref 0.8–5.3)
LYMPHOCYTES # BLD AUTO: 2.5 10E9/L (ref 0.8–5.3)
LYMPHOCYTES # BLD AUTO: 2.6 10E9/L (ref 0.8–5.3)
LYMPHOCYTES # BLD AUTO: 2.7 10E9/L (ref 0.8–5.3)
LYMPHOCYTES # BLD AUTO: 2.8 10E9/L (ref 0.8–5.3)
LYMPHOCYTES # BLD AUTO: 3.4 10E9/L (ref 0.8–5.3)
LYMPHOCYTES # BLD AUTO: 3.5 10E9/L (ref 0.8–5.3)
LYMPHOCYTES # BLD AUTO: 3.8 10E9/L (ref 0.8–5.3)
LYMPHOCYTES NFR BLD AUTO: 10.4 %
LYMPHOCYTES NFR BLD AUTO: 10.4 %
LYMPHOCYTES NFR BLD AUTO: 10.7 %
LYMPHOCYTES NFR BLD AUTO: 11.3 %
LYMPHOCYTES NFR BLD AUTO: 12.3 %
LYMPHOCYTES NFR BLD AUTO: 13.1 %
LYMPHOCYTES NFR BLD AUTO: 13.5 %
LYMPHOCYTES NFR BLD AUTO: 13.6 %
LYMPHOCYTES NFR BLD AUTO: 15.2 %
LYMPHOCYTES NFR BLD AUTO: 17.8 %
LYMPHOCYTES NFR BLD AUTO: 18 %
LYMPHOCYTES NFR BLD AUTO: 19.4 %
LYMPHOCYTES NFR BLD AUTO: 19.6 %
LYMPHOCYTES NFR BLD AUTO: 2.5 %
LYMPHOCYTES NFR BLD AUTO: 23.2 %
LYMPHOCYTES NFR BLD AUTO: 26.6 %
LYMPHOCYTES NFR BLD AUTO: 27.9 %
LYMPHOCYTES NFR BLD AUTO: 29.8 %
LYMPHOCYTES NFR BLD AUTO: 29.9 %
LYMPHOCYTES NFR BLD AUTO: 3.4 %
LYMPHOCYTES NFR BLD AUTO: 3.7 %
LYMPHOCYTES NFR BLD AUTO: 3.9 %
LYMPHOCYTES NFR BLD AUTO: 31.3 %
LYMPHOCYTES NFR BLD AUTO: 31.5 %
LYMPHOCYTES NFR BLD AUTO: 4.1 %
LYMPHOCYTES NFR BLD AUTO: 4.3 %
LYMPHOCYTES NFR BLD AUTO: 4.9 %
LYMPHOCYTES NFR BLD AUTO: 5.9 %
LYMPHOCYTES NFR BLD AUTO: 6.1 %
LYMPHOCYTES NFR BLD AUTO: 6.8 %
LYMPHOCYTES NFR BLD AUTO: 9.1 %
LYMPHOCYTES NFR BLD AUTO: 9.1 %
LYMPHOCYTES NFR BLD AUTO: 9.3 %
LYMPHOCYTES NFR BLD AUTO: 9.5 %
LYMPHOCYTES NFR BLD AUTO: 9.6 %
LYMPHOCYTES NFR FLD MANUAL: 1 %
LYMPHOCYTES NFR FLD MANUAL: 13 %
LYMPHOCYTES NFR FLD MANUAL: 36 %
LYMPHOCYTES NFR FLD MANUAL: 43 %
Lab: ABNORMAL
Lab: NORMAL
MACROCYTES BLD QL SMEAR: PRESENT
MAGNESIUM SERPL-MCNC: 1.7 MG/DL (ref 1.6–2.3)
MAGNESIUM SERPL-MCNC: 2.2 MG/DL (ref 1.6–2.3)
MAGNESIUM SERPL-MCNC: 2.3 MG/DL (ref 1.6–2.3)
MAGNESIUM SERPL-MCNC: 2.3 MG/DL (ref 1.6–2.3)
MAGNESIUM SERPL-MCNC: 2.4 MG/DL (ref 1.6–2.3)
MAGNESIUM SERPL-MCNC: 2.4 MG/DL (ref 1.6–2.3)
MAGNESIUM SERPL-MCNC: 2.5 MG/DL (ref 1.6–2.3)
MAGNESIUM SERPL-MCNC: 2.6 MG/DL (ref 1.6–2.3)
MAGNESIUM SERPL-MCNC: 2.7 MG/DL (ref 1.6–2.3)
MCH RBC QN AUTO: 29.8 PG (ref 26.5–33)
MCH RBC QN AUTO: 30 PG (ref 26.5–33)
MCH RBC QN AUTO: 30 PG (ref 26.5–33)
MCH RBC QN AUTO: 30.1 PG (ref 26.5–33)
MCH RBC QN AUTO: 30.1 PG (ref 26.5–33)
MCH RBC QN AUTO: 30.2 PG (ref 26.5–33)
MCH RBC QN AUTO: 30.4 PG (ref 26.5–33)
MCH RBC QN AUTO: 30.5 PG (ref 26.5–33)
MCH RBC QN AUTO: 30.6 PG (ref 26.5–33)
MCH RBC QN AUTO: 30.7 PG (ref 26.5–33)
MCH RBC QN AUTO: 30.7 PG (ref 26.5–33)
MCH RBC QN AUTO: 30.8 PG (ref 26.5–33)
MCH RBC QN AUTO: 30.9 PG (ref 26.5–33)
MCH RBC QN AUTO: 30.9 PG (ref 26.5–33)
MCH RBC QN AUTO: 31 PG (ref 26.5–33)
MCH RBC QN AUTO: 31.1 PG (ref 26.5–33)
MCH RBC QN AUTO: 31.2 PG (ref 26.5–33)
MCH RBC QN AUTO: 31.3 PG (ref 26.5–33)
MCH RBC QN AUTO: 31.4 PG (ref 26.5–33)
MCH RBC QN AUTO: 31.5 PG (ref 26.5–33)
MCH RBC QN AUTO: 31.6 PG (ref 26.5–33)
MCH RBC QN AUTO: 31.7 PG (ref 26.5–33)
MCH RBC QN AUTO: 31.8 PG (ref 26.5–33)
MCH RBC QN AUTO: 31.9 PG (ref 26.5–33)
MCH RBC QN AUTO: 31.9 PG (ref 26.5–33)
MCH RBC QN AUTO: 32 PG (ref 26.5–33)
MCH RBC QN AUTO: 32.1 PG (ref 26.5–33)
MCH RBC QN AUTO: 32.1 PG (ref 26.5–33)
MCH RBC QN AUTO: 32.2 PG (ref 26.5–33)
MCH RBC QN AUTO: 32.3 PG (ref 26.5–33)
MCH RBC QN AUTO: 32.4 PG (ref 26.5–33)
MCH RBC QN AUTO: 32.7 PG (ref 26.5–33)
MCHC RBC AUTO-ENTMCNC: 29.3 G/DL (ref 31.5–36.5)
MCHC RBC AUTO-ENTMCNC: 29.6 G/DL (ref 31.5–36.5)
MCHC RBC AUTO-ENTMCNC: 29.7 G/DL (ref 31.5–36.5)
MCHC RBC AUTO-ENTMCNC: 29.8 G/DL (ref 31.5–36.5)
MCHC RBC AUTO-ENTMCNC: 29.8 G/DL (ref 31.5–36.5)
MCHC RBC AUTO-ENTMCNC: 30 G/DL (ref 31.5–36.5)
MCHC RBC AUTO-ENTMCNC: 30 G/DL (ref 31.5–36.5)
MCHC RBC AUTO-ENTMCNC: 30.3 G/DL (ref 31.5–36.5)
MCHC RBC AUTO-ENTMCNC: 30.4 G/DL (ref 31.5–36.5)
MCHC RBC AUTO-ENTMCNC: 30.4 G/DL (ref 31.5–36.5)
MCHC RBC AUTO-ENTMCNC: 30.5 G/DL (ref 31.5–36.5)
MCHC RBC AUTO-ENTMCNC: 30.7 G/DL (ref 31.5–36.5)
MCHC RBC AUTO-ENTMCNC: 30.8 G/DL (ref 31.5–36.5)
MCHC RBC AUTO-ENTMCNC: 31 G/DL (ref 31.5–36.5)
MCHC RBC AUTO-ENTMCNC: 31.1 G/DL (ref 31.5–36.5)
MCHC RBC AUTO-ENTMCNC: 31.2 G/DL (ref 31.5–36.5)
MCHC RBC AUTO-ENTMCNC: 31.2 G/DL (ref 31.5–36.5)
MCHC RBC AUTO-ENTMCNC: 31.3 G/DL (ref 31.5–36.5)
MCHC RBC AUTO-ENTMCNC: 31.4 G/DL (ref 31.5–36.5)
MCHC RBC AUTO-ENTMCNC: 31.4 G/DL (ref 31.5–36.5)
MCHC RBC AUTO-ENTMCNC: 31.5 G/DL (ref 31.5–36.5)
MCHC RBC AUTO-ENTMCNC: 31.6 G/DL (ref 31.5–36.5)
MCHC RBC AUTO-ENTMCNC: 31.7 G/DL (ref 31.5–36.5)
MCHC RBC AUTO-ENTMCNC: 31.9 G/DL (ref 31.5–36.5)
MCHC RBC AUTO-ENTMCNC: 32 G/DL (ref 31.5–36.5)
MCHC RBC AUTO-ENTMCNC: 32.1 G/DL (ref 31.5–36.5)
MCHC RBC AUTO-ENTMCNC: 32.1 G/DL (ref 31.5–36.5)
MCHC RBC AUTO-ENTMCNC: 32.2 G/DL (ref 31.5–36.5)
MCHC RBC AUTO-ENTMCNC: 32.4 G/DL (ref 31.5–36.5)
MCHC RBC AUTO-ENTMCNC: 32.4 G/DL (ref 31.5–36.5)
MCHC RBC AUTO-ENTMCNC: 32.5 G/DL (ref 31.5–36.5)
MCHC RBC AUTO-ENTMCNC: 33 G/DL (ref 31.5–36.5)
MCHC RBC AUTO-ENTMCNC: 33.1 G/DL (ref 31.5–36.5)
MCV RBC AUTO: 100 FL (ref 78–100)
MCV RBC AUTO: 101 FL (ref 78–100)
MCV RBC AUTO: 102 FL (ref 78–100)
MCV RBC AUTO: 103 FL (ref 78–100)
MCV RBC AUTO: 104 FL (ref 78–100)
MCV RBC AUTO: 104 FL (ref 78–100)
MCV RBC AUTO: 105 FL (ref 78–100)
MCV RBC AUTO: 105 FL (ref 78–100)
MCV RBC AUTO: 106 FL (ref 78–100)
MCV RBC AUTO: 95 FL (ref 78–100)
MCV RBC AUTO: 96 FL (ref 78–100)
MCV RBC AUTO: 98 FL (ref 78–100)
MCV RBC AUTO: 99 FL (ref 78–100)
METAMYELOCYTES # BLD: 0.5 10E9/L
METAMYELOCYTES # BLD: 1 10E9/L
METAMYELOCYTES NFR BLD MANUAL: 0.8 %
METAMYELOCYTES NFR BLD MANUAL: 1.7 %
MICROBIOLOGIST REVIEW: NORMAL
MONOCYTES # BLD AUTO: 0.4 10E9/L (ref 0–1.3)
MONOCYTES # BLD AUTO: 0.6 10E9/L (ref 0–1.3)
MONOCYTES # BLD AUTO: 0.7 10E9/L (ref 0–1.3)
MONOCYTES # BLD AUTO: 0.9 10E9/L (ref 0–1.3)
MONOCYTES # BLD AUTO: 0.9 10E9/L (ref 0–1.3)
MONOCYTES # BLD AUTO: 1 10E9/L (ref 0–1.3)
MONOCYTES # BLD AUTO: 1 10E9/L (ref 0–1.3)
MONOCYTES # BLD AUTO: 1.1 10E9/L (ref 0–1.3)
MONOCYTES # BLD AUTO: 1.2 10E9/L (ref 0–1.3)
MONOCYTES # BLD AUTO: 1.3 10E9/L (ref 0–1.3)
MONOCYTES # BLD AUTO: 1.4 10E9/L (ref 0–1.3)
MONOCYTES # BLD AUTO: 1.5 10E9/L (ref 0–1.3)
MONOCYTES # BLD AUTO: 1.7 10E9/L (ref 0–1.3)
MONOCYTES # BLD AUTO: 1.8 10E9/L (ref 0–1.3)
MONOCYTES # BLD AUTO: 2 10E9/L (ref 0–1.3)
MONOCYTES # BLD AUTO: 2.9 10E9/L (ref 0–1.3)
MONOCYTES # BLD AUTO: 3.7 10E9/L (ref 0–1.3)
MONOCYTES # BLD AUTO: 3.8 10E9/L (ref 0–1.3)
MONOCYTES # BLD AUTO: 4 10E9/L (ref 0–1.3)
MONOCYTES # BLD AUTO: 4 10E9/L (ref 0–1.3)
MONOCYTES # BLD AUTO: 4.4 10E9/L (ref 0–1.3)
MONOCYTES # BLD AUTO: 5.3 10E9/L (ref 0–1.3)
MONOCYTES # BLD AUTO: 5.5 10E9/L (ref 0–1.3)
MONOCYTES # BLD AUTO: 5.7 10E9/L (ref 0–1.3)
MONOCYTES # BLD AUTO: 5.9 10E9/L (ref 0–1.3)
MONOCYTES NFR BLD AUTO: 10.2 %
MONOCYTES NFR BLD AUTO: 10.2 %
MONOCYTES NFR BLD AUTO: 10.4 %
MONOCYTES NFR BLD AUTO: 10.5 %
MONOCYTES NFR BLD AUTO: 3.4 %
MONOCYTES NFR BLD AUTO: 3.9 %
MONOCYTES NFR BLD AUTO: 4.5 %
MONOCYTES NFR BLD AUTO: 4.7 %
MONOCYTES NFR BLD AUTO: 5 %
MONOCYTES NFR BLD AUTO: 5.1 %
MONOCYTES NFR BLD AUTO: 5.2 %
MONOCYTES NFR BLD AUTO: 5.2 %
MONOCYTES NFR BLD AUTO: 5.4 %
MONOCYTES NFR BLD AUTO: 5.5 %
MONOCYTES NFR BLD AUTO: 5.6 %
MONOCYTES NFR BLD AUTO: 5.8 %
MONOCYTES NFR BLD AUTO: 5.8 %
MONOCYTES NFR BLD AUTO: 6 %
MONOCYTES NFR BLD AUTO: 6.1 %
MONOCYTES NFR BLD AUTO: 6.6 %
MONOCYTES NFR BLD AUTO: 6.6 %
MONOCYTES NFR BLD AUTO: 6.7 %
MONOCYTES NFR BLD AUTO: 6.8 %
MONOCYTES NFR BLD AUTO: 7 %
MONOCYTES NFR BLD AUTO: 7 %
MONOCYTES NFR BLD AUTO: 7.7 %
MONOCYTES NFR BLD AUTO: 8.1 %
MONOCYTES NFR BLD AUTO: 8.4 %
MONOCYTES NFR BLD AUTO: 8.5 %
MONOCYTES NFR BLD AUTO: 8.5 %
MONOCYTES NFR BLD AUTO: 8.6 %
MONOCYTES NFR BLD AUTO: 8.7 %
MONOCYTES NFR BLD AUTO: 8.8 %
MONOCYTES NFR BLD AUTO: 9.1 %
MONOCYTES NFR BLD AUTO: 9.3 %
MONOCYTES NFR BLD AUTO: 9.6 %
MONOCYTES NFR BLD AUTO: 9.8 %
MONOCYTES NFR BLD AUTO: 9.9 %
MRSA DNA SPEC QL NAA+PROBE: NEGATIVE
MUCOUS THREADS #/AREA URNS LPF: PRESENT /LPF
MYELOCYTES # BLD: 0.5 10E9/L
MYELOCYTES # BLD: 1 10E9/L
MYELOCYTES NFR BLD MANUAL: 0.8 %
MYELOCYTES NFR BLD MANUAL: 1.7 %
MYOGLOBIN SERPL-MCNC: ABNORMAL UG/L
NEUTROPHILS # BLD AUTO: 11.8 10E9/L (ref 1.6–8.3)
NEUTROPHILS # BLD AUTO: 12.7 10E9/L (ref 1.6–8.3)
NEUTROPHILS # BLD AUTO: 13.6 10E9/L (ref 1.6–8.3)
NEUTROPHILS # BLD AUTO: 14.6 10E9/L (ref 1.6–8.3)
NEUTROPHILS # BLD AUTO: 15.7 10E9/L (ref 1.6–8.3)
NEUTROPHILS # BLD AUTO: 16.5 10E9/L (ref 1.6–8.3)
NEUTROPHILS # BLD AUTO: 17.2 10E9/L (ref 1.6–8.3)
NEUTROPHILS # BLD AUTO: 17.7 10E9/L (ref 1.6–8.3)
NEUTROPHILS # BLD AUTO: 19.4 10E9/L (ref 1.6–8.3)
NEUTROPHILS # BLD AUTO: 19.7 10E9/L (ref 1.6–8.3)
NEUTROPHILS # BLD AUTO: 20.3 10E9/L (ref 1.6–8.3)
NEUTROPHILS # BLD AUTO: 20.9 10E9/L (ref 1.6–8.3)
NEUTROPHILS # BLD AUTO: 22.4 10E9/L (ref 1.6–8.3)
NEUTROPHILS # BLD AUTO: 22.7 10E9/L (ref 1.6–8.3)
NEUTROPHILS # BLD AUTO: 22.7 10E9/L (ref 1.6–8.3)
NEUTROPHILS # BLD AUTO: 25 10E9/L (ref 1.6–8.3)
NEUTROPHILS # BLD AUTO: 3.3 10E9/L (ref 1.6–8.3)
NEUTROPHILS # BLD AUTO: 3.4 10E9/L (ref 1.6–8.3)
NEUTROPHILS # BLD AUTO: 3.9 10E9/L (ref 1.6–8.3)
NEUTROPHILS # BLD AUTO: 33.4 10E9/L (ref 1.6–8.3)
NEUTROPHILS # BLD AUTO: 37.7 10E9/L (ref 1.6–8.3)
NEUTROPHILS # BLD AUTO: 39.6 10E9/L (ref 1.6–8.3)
NEUTROPHILS # BLD AUTO: 4.2 10E9/L (ref 1.6–8.3)
NEUTROPHILS # BLD AUTO: 4.5 10E9/L (ref 1.6–8.3)
NEUTROPHILS # BLD AUTO: 43.1 10E9/L (ref 1.6–8.3)
NEUTROPHILS # BLD AUTO: 43.7 10E9/L (ref 1.6–8.3)
NEUTROPHILS # BLD AUTO: 47.2 10E9/L (ref 1.6–8.3)
NEUTROPHILS # BLD AUTO: 47.6 10E9/L (ref 1.6–8.3)
NEUTROPHILS # BLD AUTO: 47.9 10E9/L (ref 1.6–8.3)
NEUTROPHILS # BLD AUTO: 49.5 10E9/L (ref 1.6–8.3)
NEUTROPHILS # BLD AUTO: 5.2 10E9/L (ref 1.6–8.3)
NEUTROPHILS # BLD AUTO: 5.7 10E9/L (ref 1.6–8.3)
NEUTROPHILS # BLD AUTO: 5.7 10E9/L (ref 1.6–8.3)
NEUTROPHILS # BLD AUTO: 50.5 10E9/L (ref 1.6–8.3)
NEUTROPHILS # BLD AUTO: 53.4 10E9/L (ref 1.6–8.3)
NEUTROPHILS # BLD AUTO: 6.4 10E9/L (ref 1.6–8.3)
NEUTROPHILS # BLD AUTO: 7.6 10E9/L (ref 1.6–8.3)
NEUTROPHILS # BLD AUTO: 8.5 10E9/L (ref 1.6–8.3)
NEUTROPHILS # BLD AUTO: 8.7 10E9/L (ref 1.6–8.3)
NEUTROPHILS # BLD AUTO: 9.8 10E9/L (ref 1.6–8.3)
NEUTROPHILS NFR BLD AUTO: 54.9 %
NEUTROPHILS NFR BLD AUTO: 55 %
NEUTROPHILS NFR BLD AUTO: 56.2 %
NEUTROPHILS NFR BLD AUTO: 57.9 %
NEUTROPHILS NFR BLD AUTO: 58.7 %
NEUTROPHILS NFR BLD AUTO: 60.8 %
NEUTROPHILS NFR BLD AUTO: 62.8 %
NEUTROPHILS NFR BLD AUTO: 68.8 %
NEUTROPHILS NFR BLD AUTO: 70.4 %
NEUTROPHILS NFR BLD AUTO: 71.2 %
NEUTROPHILS NFR BLD AUTO: 71.3 %
NEUTROPHILS NFR BLD AUTO: 71.3 %
NEUTROPHILS NFR BLD AUTO: 72.2 %
NEUTROPHILS NFR BLD AUTO: 72.8 %
NEUTROPHILS NFR BLD AUTO: 72.8 %
NEUTROPHILS NFR BLD AUTO: 73.8 %
NEUTROPHILS NFR BLD AUTO: 73.8 %
NEUTROPHILS NFR BLD AUTO: 75.6 %
NEUTROPHILS NFR BLD AUTO: 75.8 %
NEUTROPHILS NFR BLD AUTO: 76.2 %
NEUTROPHILS NFR BLD AUTO: 77 %
NEUTROPHILS NFR BLD AUTO: 78.8 %
NEUTROPHILS NFR BLD AUTO: 79.1 %
NEUTROPHILS NFR BLD AUTO: 79.3 %
NEUTROPHILS NFR BLD AUTO: 80 %
NEUTROPHILS NFR BLD AUTO: 80.8 %
NEUTROPHILS NFR BLD AUTO: 82.2 %
NEUTROPHILS NFR BLD AUTO: 82.6 %
NEUTROPHILS NFR BLD AUTO: 83.4 %
NEUTROPHILS NFR BLD AUTO: 84.1 %
NEUTROPHILS NFR BLD AUTO: 84.7 %
NEUTROPHILS NFR BLD AUTO: 85 %
NEUTROPHILS NFR BLD AUTO: 85.7 %
NEUTROPHILS NFR BLD AUTO: 86 %
NEUTROPHILS NFR BLD AUTO: 86.4 %
NEUTROPHILS NFR BLD AUTO: 86.4 %
NEUTROPHILS NFR BLD AUTO: 87.6 %
NEUTROPHILS NFR BLD AUTO: 87.9 %
NEUTROPHILS NFR BLD AUTO: 88 %
NEUTROPHILS NFR BLD AUTO: 88.4 %
NEUTS BAND NFR FLD MANUAL: 23 %
NEUTS BAND NFR FLD MANUAL: 23 %
NEUTS BAND NFR FLD MANUAL: 5 %
NEUTS BAND NFR FLD MANUAL: 88 %
NITRATE UR QL: NEGATIVE
NITRATE UR QL: POSITIVE
NRBC # BLD AUTO: 0 10*3/UL
NRBC # BLD AUTO: 0.1 10*3/UL
NRBC # BLD AUTO: 0.2 10*3/UL
NRBC # BLD AUTO: 0.5 10*3/UL
NRBC # BLD AUTO: 0.5 10*3/UL
NRBC # BLD AUTO: 1 10*3/UL
NRBC # BLD AUTO: 2.4 10*3/UL
NRBC # BLD AUTO: 3.6 10*3/UL
NRBC # BLD AUTO: 4.3 10*3/UL
NRBC BLD AUTO-RTO: 0 /100
NRBC BLD AUTO-RTO: 1 /100
NRBC BLD AUTO-RTO: 1 /100
NRBC BLD AUTO-RTO: 2 /100
NRBC BLD AUTO-RTO: 4 /100
NRBC BLD AUTO-RTO: 6 /100
NRBC BLD AUTO-RTO: 7 /100
NT-PROBNP SERPL-MCNC: 461 PG/ML (ref 0–450)
NUM BPU REQUESTED: 1
NUM BPU REQUESTED: 10
NUM BPU REQUESTED: 10
NUM BPU REQUESTED: 2
NUM BPU REQUESTED: 5
O2/TOTAL GAS SETTING VFR VENT: 100 %
O2/TOTAL GAS SETTING VFR VENT: 21 %
O2/TOTAL GAS SETTING VFR VENT: 40 %
O2/TOTAL GAS SETTING VFR VENT: 45 %
O2/TOTAL GAS SETTING VFR VENT: 50 %
O2/TOTAL GAS SETTING VFR VENT: 60 %
O2/TOTAL GAS SETTING VFR VENT: 65 %
O2/TOTAL GAS SETTING VFR VENT: 65 %
O2/TOTAL GAS SETTING VFR VENT: 70 %
O2/TOTAL GAS SETTING VFR VENT: 80 %
O2/TOTAL GAS SETTING VFR VENT: 80 %
O2/TOTAL GAS SETTING VFR VENT: 90 %
O2/TOTAL GAS SETTING VFR VENT: 95 %
O2/TOTAL GAS SETTING VFR VENT: ABNORMAL %
OTHER CELLS FLD MANUAL: 11 %
OTHER CELLS FLD MANUAL: 41 %
OTHER CELLS FLD MANUAL: 52 %
OTHER CELLS FLD MANUAL: 64 %
OVALOCYTES BLD QL SMEAR: SLIGHT
OVALOCYTES BLD QL SMEAR: SLIGHT
OXYHGB MFR BLD: 87 % (ref 92–100)
OXYHGB MFR BLD: 92 % (ref 92–100)
OXYHGB MFR BLD: 97 % (ref 92–100)
OXYHGB MFR BLD: 97 % (ref 92–100)
OXYHGB MFR BLD: 98 % (ref 92–100)
OXYHGB MFR BLD: 99 % (ref 92–100)
OXYHGB MFR BLDV: 56 %
OXYHGB MFR BLDV: 78 %
PCO2 BLD: 28 MM HG (ref 35–45)
PCO2 BLD: 29 MM HG (ref 35–45)
PCO2 BLD: 30 MM HG (ref 35–45)
PCO2 BLD: 31 MM HG (ref 35–45)
PCO2 BLD: 32 MM HG (ref 35–45)
PCO2 BLD: 34 MM HG (ref 35–45)
PCO2 BLD: 35 MM HG (ref 35–45)
PCO2 BLD: 36 MM HG (ref 35–45)
PCO2 BLD: 36 MM HG (ref 35–45)
PCO2 BLD: 37 MM HG (ref 35–45)
PCO2 BLD: 37 MM HG (ref 35–45)
PCO2 BLD: 38 MM HG (ref 35–45)
PCO2 BLD: 39 MM HG (ref 35–45)
PCO2 BLD: 40 MM HG (ref 35–45)
PCO2 BLD: 44 MM HG (ref 35–45)
PCO2 BLD: 45 MM HG (ref 35–45)
PCO2 BLD: 46 MM HG (ref 35–45)
PCO2 BLD: 47 MM HG (ref 35–45)
PCO2 BLD: 48 MM HG (ref 35–45)
PCO2 BLD: 48 MM HG (ref 35–45)
PCO2 BLD: 49 MM HG (ref 35–45)
PCO2 BLD: 49 MM HG (ref 35–45)
PCO2 BLD: 50 MM HG (ref 35–45)
PCO2 BLD: 51 MM HG (ref 35–45)
PCO2 BLD: 51 MM HG (ref 35–45)
PCO2 BLD: 52 MM HG (ref 35–45)
PCO2 BLD: 53 MM HG (ref 35–45)
PCO2 BLDV: 25 MM HG (ref 40–50)
PCO2 BLDV: 25 MM HG (ref 40–50)
PCO2 BLDV: 26 MM HG (ref 40–50)
PCO2 BLDV: 30 MM HG (ref 40–50)
PCO2 BLDV: 32 MM HG (ref 40–50)
PCO2 BLDV: 32 MM HG (ref 40–50)
PCO2 BLDV: 33 MM HG (ref 40–50)
PCO2 BLDV: 42 MM HG (ref 40–50)
PCO2 BLDV: 52 MM HG (ref 40–50)
PH BLD: 7.23 PH (ref 7.35–7.45)
PH BLD: 7.23 PH (ref 7.35–7.45)
PH BLD: 7.26 PH (ref 7.35–7.45)
PH BLD: 7.27 PH (ref 7.35–7.45)
PH BLD: 7.28 PH (ref 7.35–7.45)
PH BLD: 7.29 PH (ref 7.35–7.45)
PH BLD: 7.3 PH (ref 7.35–7.45)
PH BLD: 7.31 PH (ref 7.35–7.45)
PH BLD: 7.32 PH (ref 7.35–7.45)
PH BLD: 7.35 PH (ref 7.35–7.45)
PH BLD: 7.36 PH (ref 7.35–7.45)
PH BLD: 7.37 PH (ref 7.35–7.45)
PH BLD: 7.4 PH (ref 7.35–7.45)
PH BLD: 7.41 PH (ref 7.35–7.45)
PH BLD: 7.43 PH (ref 7.35–7.45)
PH BLD: 7.44 PH (ref 7.35–7.45)
PH BLDV: 7.27 PH (ref 7.32–7.43)
PH BLDV: 7.31 PH (ref 7.32–7.43)
PH BLDV: 7.39 PH (ref 7.32–7.43)
PH BLDV: 7.41 PH (ref 7.32–7.43)
PH BLDV: 7.43 PH (ref 7.32–7.43)
PH BLDV: 7.46 PH (ref 7.32–7.43)
PH BLDV: 7.47 PH (ref 7.32–7.43)
PH BLDV: 7.5 PH (ref 7.32–7.43)
PH BLDV: 7.52 PH (ref 7.32–7.43)
PH UR STRIP: 5.5 PH (ref 5–7)
PH UR STRIP: 6 PH (ref 5–7)
PH UR STRIP: 6.5 PH (ref 5–7)
PH UR STRIP: 6.5 [PH] (ref 4.5–8)
PH UR STRIP: 7 PH (ref 5–7)
PHOSPHATE SERPL-MCNC: 2.9 MG/DL (ref 2.5–4.5)
PHOSPHATE SERPL-MCNC: 3 MG/DL (ref 2.5–4.5)
PHOSPHATE SERPL-MCNC: 3.2 MG/DL (ref 2.5–4.5)
PHOSPHATE SERPL-MCNC: 3.3 MG/DL (ref 2.5–4.5)
PHOSPHATE SERPL-MCNC: 3.3 MG/DL (ref 2.5–4.5)
PHOSPHATE SERPL-MCNC: 3.4 MG/DL (ref 2.5–4.5)
PHOSPHATE SERPL-MCNC: 3.5 MG/DL (ref 2.5–4.5)
PHOSPHATE SERPL-MCNC: 3.5 MG/DL (ref 2.5–4.5)
PHOSPHATE SERPL-MCNC: 3.7 MG/DL (ref 2.5–4.5)
PHOSPHATE SERPL-MCNC: 3.9 MG/DL (ref 2.5–4.5)
PHOSPHATE SERPL-MCNC: 4 MG/DL (ref 2.5–4.5)
PHOSPHATE SERPL-MCNC: 4 MG/DL (ref 2.5–4.5)
PHOSPHATE SERPL-MCNC: 4.2 MG/DL (ref 2.5–4.5)
PHOSPHATE SERPL-MCNC: 4.3 MG/DL (ref 2.5–4.5)
PHOSPHATE SERPL-MCNC: 4.7 MG/DL (ref 2.5–4.5)
PHOSPHATE SERPL-MCNC: 5 MG/DL (ref 2.5–4.5)
PHOSPHATE SERPL-MCNC: 5.3 MG/DL (ref 2.5–4.5)
PHOSPHATE SERPL-MCNC: 5.4 MG/DL (ref 2.5–4.5)
PHOSPHATE SERPL-MCNC: 5.5 MG/DL (ref 2.5–4.5)
PHOSPHATE SERPL-MCNC: 5.9 MG/DL (ref 2.5–4.5)
PLATELET # BLD AUTO: 100 10E9/L (ref 150–450)
PLATELET # BLD AUTO: 100 10E9/L (ref 150–450)
PLATELET # BLD AUTO: 102 10E9/L (ref 150–450)
PLATELET # BLD AUTO: 105 10E9/L (ref 150–450)
PLATELET # BLD AUTO: 112 10E9/L (ref 150–450)
PLATELET # BLD AUTO: 116 10E9/L (ref 150–450)
PLATELET # BLD AUTO: 121 10E9/L (ref 150–450)
PLATELET # BLD AUTO: 121 10E9/L (ref 150–450)
PLATELET # BLD AUTO: 122 10E9/L (ref 150–450)
PLATELET # BLD AUTO: 123 10E9/L (ref 150–450)
PLATELET # BLD AUTO: 124 10E9/L (ref 150–450)
PLATELET # BLD AUTO: 125 10E9/L (ref 150–450)
PLATELET # BLD AUTO: 129 10E9/L (ref 150–450)
PLATELET # BLD AUTO: 130 10E9/L (ref 150–450)
PLATELET # BLD AUTO: 136 10E9/L (ref 150–450)
PLATELET # BLD AUTO: 150 10E9/L (ref 150–450)
PLATELET # BLD AUTO: 17 10E9/L (ref 150–450)
PLATELET # BLD AUTO: 184 10E9/L (ref 150–450)
PLATELET # BLD AUTO: 20 10E9/L (ref 150–450)
PLATELET # BLD AUTO: 25 10E9/L (ref 150–450)
PLATELET # BLD AUTO: 25 10E9/L (ref 150–450)
PLATELET # BLD AUTO: 26 10E9/L (ref 150–450)
PLATELET # BLD AUTO: 31 10E9/L (ref 150–450)
PLATELET # BLD AUTO: 31 10E9/L (ref 150–450)
PLATELET # BLD AUTO: 34 10E9/L (ref 150–450)
PLATELET # BLD AUTO: 35 10E9/L (ref 150–450)
PLATELET # BLD AUTO: 42 10E9/L (ref 150–450)
PLATELET # BLD AUTO: 45 10E9/L (ref 150–450)
PLATELET # BLD AUTO: 48 10E9/L (ref 150–450)
PLATELET # BLD AUTO: 62 10E9/L (ref 150–450)
PLATELET # BLD AUTO: 63 10E9/L (ref 150–450)
PLATELET # BLD AUTO: 65 10E9/L (ref 150–450)
PLATELET # BLD AUTO: 66 10E9/L (ref 150–450)
PLATELET # BLD AUTO: 70 10E9/L (ref 150–450)
PLATELET # BLD AUTO: 70 10E9/L (ref 150–450)
PLATELET # BLD AUTO: 77 10E9/L (ref 150–450)
PLATELET # BLD AUTO: 79 10E9/L (ref 150–450)
PLATELET # BLD AUTO: 80 10E9/L (ref 150–450)
PLATELET # BLD AUTO: 82 10E9/L (ref 150–450)
PLATELET # BLD AUTO: 83 10E9/L (ref 150–450)
PLATELET # BLD AUTO: 85 10E9/L (ref 150–450)
PLATELET # BLD AUTO: 87 10E9/L (ref 150–450)
PLATELET # BLD AUTO: 91 10E9/L (ref 150–450)
PLATELET # BLD AUTO: 94 10E9/L (ref 150–450)
PLATELET # BLD AUTO: 97 10E9/L (ref 150–450)
PLATELET # BLD AUTO: 99 10E9/L (ref 150–450)
PLATELET # BLD EST: ABNORMAL 10*3/UL
PO2 BLD: 105 MM HG (ref 80–105)
PO2 BLD: 107 MM HG (ref 80–105)
PO2 BLD: 119 MM HG (ref 80–105)
PO2 BLD: 132 MM HG (ref 80–105)
PO2 BLD: 200 MM HG (ref 80–105)
PO2 BLD: 218 MM HG (ref 80–105)
PO2 BLD: 387 MM HG (ref 80–105)
PO2 BLD: 61 MM HG (ref 80–105)
PO2 BLD: 62 MM HG (ref 80–105)
PO2 BLD: 64 MM HG (ref 80–105)
PO2 BLD: 64 MM HG (ref 80–105)
PO2 BLD: 66 MM HG (ref 80–105)
PO2 BLD: 67 MM HG (ref 80–105)
PO2 BLD: 67 MM HG (ref 80–105)
PO2 BLD: 71 MM HG (ref 80–105)
PO2 BLD: 72 MM HG (ref 80–105)
PO2 BLD: 73 MM HG (ref 80–105)
PO2 BLD: 74 MM HG (ref 80–105)
PO2 BLD: 75 MM HG (ref 80–105)
PO2 BLD: 77 MM HG (ref 80–105)
PO2 BLD: 77 MM HG (ref 80–105)
PO2 BLD: 79 MM HG (ref 80–105)
PO2 BLD: 85 MM HG (ref 80–105)
PO2 BLD: 90 MM HG (ref 80–105)
PO2 BLD: 91 MM HG (ref 80–105)
PO2 BLD: 91 MM HG (ref 80–105)
PO2 BLD: 93 MM HG (ref 80–105)
PO2 BLD: 94 MM HG (ref 80–105)
PO2 BLD: 94 MM HG (ref 80–105)
PO2 BLD: 96 MM HG (ref 80–105)
PO2 BLD: 97 MM HG (ref 80–105)
PO2 BLDV: 29 MM HG (ref 25–47)
PO2 BLDV: 38 MM HG (ref 25–47)
PO2 BLDV: 43 MM HG (ref 25–47)
PO2 BLDV: 43 MM HG (ref 25–47)
PO2 BLDV: 48 MM HG (ref 25–47)
PO2 BLDV: 50 MM HG (ref 25–47)
PO2 BLDV: 51 MM HG (ref 25–47)
PO2 BLDV: 52 MM HG (ref 25–47)
PO2 BLDV: 61 MM HG (ref 25–47)
POIKILOCYTOSIS BLD QL SMEAR: ABNORMAL
POIKILOCYTOSIS BLD QL SMEAR: SLIGHT
POIKILOCYTOSIS BLD QL SMEAR: SLIGHT
POLYCHROMASIA BLD QL SMEAR: ABNORMAL
POLYCHROMASIA BLD QL SMEAR: ABNORMAL
POLYCHROMASIA BLD QL SMEAR: SLIGHT
POTASSIUM BLD-SCNC: 2.6 MMOL/L (ref 3.4–5.3)
POTASSIUM BLD-SCNC: 3.3 MMOL/L (ref 3.5–5)
POTASSIUM BLD-SCNC: 3.3 MMOL/L (ref 3.5–5)
POTASSIUM BLD-SCNC: 4.9 MMOL/L (ref 3.4–5.3)
POTASSIUM SERPL-SCNC: 2.6 MMOL/L (ref 3.4–5.3)
POTASSIUM SERPL-SCNC: 2.8 MMOL/L (ref 3.5–5.1)
POTASSIUM SERPL-SCNC: 3.1 MMOL/L (ref 3.4–5.3)
POTASSIUM SERPL-SCNC: 3.3 MMOL/L (ref 3.4–5.3)
POTASSIUM SERPL-SCNC: 3.4 MMOL/L (ref 3.4–5.3)
POTASSIUM SERPL-SCNC: 3.5 MMOL/L (ref 3.4–5.3)
POTASSIUM SERPL-SCNC: 3.5 MMOL/L (ref 3.4–5.3)
POTASSIUM SERPL-SCNC: 3.6 MMOL/L (ref 3.4–5.3)
POTASSIUM SERPL-SCNC: 3.6 MMOL/L (ref 3.4–5.3)
POTASSIUM SERPL-SCNC: 3.7 MMOL/L (ref 3.4–5.3)
POTASSIUM SERPL-SCNC: 3.8 MMOL/L (ref 3.4–5.3)
POTASSIUM SERPL-SCNC: 3.9 MMOL/L (ref 3.4–5.3)
POTASSIUM SERPL-SCNC: 4 MMOL/L (ref 3.4–5.3)
POTASSIUM SERPL-SCNC: 4.1 MMOL/L (ref 3.4–5.3)
POTASSIUM SERPL-SCNC: 4.2 MMOL/L (ref 3.4–5.3)
POTASSIUM SERPL-SCNC: 4.4 MMOL/L (ref 3.4–5.3)
POTASSIUM SERPL-SCNC: 4.4 MMOL/L (ref 3.4–5.3)
POTASSIUM SERPL-SCNC: 4.5 MMOL/L (ref 3.4–5.3)
POTASSIUM SERPL-SCNC: 4.6 MMOL/L (ref 3.4–5.3)
POTASSIUM SERPL-SCNC: 4.6 MMOL/L (ref 3.4–5.3)
POTASSIUM SERPL-SCNC: 4.7 MMOL/L (ref 3.4–5.3)
POTASSIUM SERPL-SCNC: 4.8 MMOL/L (ref 3.4–5.3)
POTASSIUM SERPL-SCNC: 4.8 MMOL/L (ref 3.4–5.3)
POTASSIUM SERPL-SCNC: 4.9 MMOL/L (ref 3.4–5.3)
POTASSIUM SERPL-SCNC: 4.9 MMOL/L (ref 3.4–5.3)
POTASSIUM SERPL-SCNC: 5.1 MMOL/L (ref 3.4–5.3)
PROCALCITONIN SERPL-MCNC: 0.05 NG/ML
PROCALCITONIN SERPL-MCNC: 0.12 NG/ML
PROCALCITONIN SERPL-MCNC: 0.49 NG/ML
PROCALCITONIN SERPL-MCNC: 0.61 NG/ML
PROCALCITONIN SERPL-MCNC: 0.64 NG/ML
PROCALCITONIN SERPL-MCNC: 0.76 NG/ML
PROCALCITONIN SERPL-MCNC: 0.83 NG/ML
PROT FLD-MCNC: 0.7 G/DL
PROT FLD-MCNC: 1 G/DL
PROT FLD-MCNC: 1.1 G/DL
PROT FLD-MCNC: NORMAL G/DL
PROT SERPL-MCNC: 4.4 G/DL (ref 6.8–8.8)
PROT SERPL-MCNC: 4.4 G/DL (ref 6.8–8.8)
PROT SERPL-MCNC: 4.8 G/DL (ref 6.8–8.8)
PROT SERPL-MCNC: 4.9 G/DL (ref 6.8–8.8)
PROT SERPL-MCNC: 4.9 G/DL (ref 6.8–8.8)
PROT SERPL-MCNC: 5 G/DL (ref 6.8–8.8)
PROT SERPL-MCNC: 5.1 G/DL (ref 6.8–8.8)
PROT SERPL-MCNC: 5.2 G/DL (ref 6.8–8.8)
PROT SERPL-MCNC: 5.3 G/DL (ref 6.8–8.8)
PROT SERPL-MCNC: 5.4 G/DL (ref 6.8–8.8)
PROT SERPL-MCNC: 5.5 G/DL (ref 6.8–8.8)
PROT SERPL-MCNC: 5.6 G/DL (ref 6.8–8.8)
PROT SERPL-MCNC: 5.7 G/DL (ref 6.8–8.8)
PROT SERPL-MCNC: 5.8 G/DL (ref 6.8–8.8)
PROT SERPL-MCNC: 5.9 G/DL (ref 6–8)
PROT SERPL-MCNC: 6.2 G/DL (ref 6.8–8.8)
PROT SERPL-MCNC: 6.8 G/DL (ref 6.8–8.8)
RBC # BLD AUTO: 2.04 10E12/L (ref 3.8–5.2)
RBC # BLD AUTO: 2.08 10E12/L (ref 3.8–5.2)
RBC # BLD AUTO: 2.14 10E12/L (ref 3.8–5.2)
RBC # BLD AUTO: 2.17 10E12/L (ref 3.8–5.2)
RBC # BLD AUTO: 2.19 10E12/L (ref 3.8–5.2)
RBC # BLD AUTO: 2.19 10E12/L (ref 3.8–5.2)
RBC # BLD AUTO: 2.24 10E12/L (ref 3.8–5.2)
RBC # BLD AUTO: 2.27 10E12/L (ref 3.8–5.2)
RBC # BLD AUTO: 2.29 10E12/L (ref 3.8–5.2)
RBC # BLD AUTO: 2.33 10E12/L (ref 3.8–5.2)
RBC # BLD AUTO: 2.33 10E12/L (ref 3.8–5.2)
RBC # BLD AUTO: 2.35 10E12/L (ref 3.8–5.2)
RBC # BLD AUTO: 2.37 10E12/L (ref 3.8–5.2)
RBC # BLD AUTO: 2.37 10E12/L (ref 3.8–5.2)
RBC # BLD AUTO: 2.38 10E12/L (ref 3.8–5.2)
RBC # BLD AUTO: 2.38 10E12/L (ref 3.8–5.2)
RBC # BLD AUTO: 2.39 10E12/L (ref 3.8–5.2)
RBC # BLD AUTO: 2.39 10E12/L (ref 3.8–5.2)
RBC # BLD AUTO: 2.41 10E12/L (ref 3.8–5.2)
RBC # BLD AUTO: 2.43 10E12/L (ref 3.8–5.2)
RBC # BLD AUTO: 2.47 10E12/L (ref 3.8–5.2)
RBC # BLD AUTO: 2.49 10E12/L (ref 3.8–5.2)
RBC # BLD AUTO: 2.49 10E12/L (ref 3.8–5.2)
RBC # BLD AUTO: 2.51 10E12/L (ref 3.8–5.2)
RBC # BLD AUTO: 2.54 10E12/L (ref 3.8–5.2)
RBC # BLD AUTO: 2.55 10E12/L (ref 3.8–5.2)
RBC # BLD AUTO: 2.56 10E12/L (ref 3.8–5.2)
RBC # BLD AUTO: 2.56 10E12/L (ref 3.8–5.2)
RBC # BLD AUTO: 2.57 10E12/L (ref 3.8–5.2)
RBC # BLD AUTO: 2.59 10E12/L (ref 3.8–5.2)
RBC # BLD AUTO: 2.59 10E12/L (ref 3.8–5.2)
RBC # BLD AUTO: 2.6 10E12/L (ref 3.8–5.2)
RBC # BLD AUTO: 2.63 10E12/L (ref 3.8–5.2)
RBC # BLD AUTO: 2.63 10E12/L (ref 3.8–5.2)
RBC # BLD AUTO: 2.65 10E12/L (ref 3.8–5.2)
RBC # BLD AUTO: 2.65 10E12/L (ref 3.8–5.2)
RBC # BLD AUTO: 2.66 10E12/L (ref 3.8–5.2)
RBC # BLD AUTO: 2.68 10E12/L (ref 3.8–5.2)
RBC # BLD AUTO: 2.69 10E12/L (ref 3.8–5.2)
RBC # BLD AUTO: 2.73 10E12/L (ref 3.8–5.2)
RBC # BLD AUTO: 2.74 10E12/L (ref 3.8–5.2)
RBC # BLD AUTO: 2.84 10E12/L (ref 3.8–5.2)
RBC # BLD AUTO: 3.04 10E12/L (ref 3.8–5.2)
RBC #/AREA URNS AUTO: 0 /HPF (ref 0–2)
RBC #/AREA URNS AUTO: 1 /HPF (ref 0–2)
RBC #/AREA URNS AUTO: 5 /HPF (ref 0–2)
RBC #/AREA URNS AUTO: <1 /HPF (ref 0–2)
RBC INCLUSIONS BLD: SLIGHT
RETICS # AUTO: 103 10E9/L (ref 25–95)
RETICS # AUTO: 72.8 10E9/L (ref 25–95)
RETICS # AUTO: 77.6 10E9/L (ref 25–95)
RETICS # AUTO: 96.8 10E9/L (ref 25–95)
RETICS/RBC NFR AUTO: 3.2 % (ref 0.5–2)
RETICS/RBC NFR AUTO: 3.4 % (ref 0.5–2)
RETICS/RBC NFR AUTO: 4.3 % (ref 0.5–2)
RETICS/RBC NFR AUTO: 4.4 % (ref 0.5–2)
RHINOVIRUS RNA SPEC QL NAA+PROBE: NEGATIVE
RSV RNA SPEC QL NAA+PROBE: NEGATIVE
RSV RNA SPEC QL NAA+PROBE: NEGATIVE
S PNEUM AG SPEC QL: NORMAL
SAO2 % BLDV FROM PO2: 62 %
SAO2 % BLDV FROM PO2: 84 %
SAO2 % BLDV FROM PO2: 94 %
SODIUM BLD-SCNC: 147 MMOL/L (ref 133–144)
SODIUM SERPL-SCNC: 136 MMOL/L (ref 136–145)
SODIUM SERPL-SCNC: 137 MMOL/L (ref 133–144)
SODIUM SERPL-SCNC: 137 MMOL/L (ref 133–144)
SODIUM SERPL-SCNC: 138 MMOL/L (ref 133–144)
SODIUM SERPL-SCNC: 138 MMOL/L (ref 133–144)
SODIUM SERPL-SCNC: 139 MMOL/L (ref 133–144)
SODIUM SERPL-SCNC: 140 MMOL/L (ref 133–144)
SODIUM SERPL-SCNC: 141 MMOL/L (ref 133–144)
SODIUM SERPL-SCNC: 142 MMOL/L (ref 133–144)
SODIUM SERPL-SCNC: 144 MMOL/L (ref 133–144)
SODIUM SERPL-SCNC: 145 MMOL/L (ref 133–144)
SODIUM SERPL-SCNC: 146 MMOL/L (ref 133–144)
SODIUM SERPL-SCNC: 146 MMOL/L (ref 133–144)
SODIUM SERPL-SCNC: 148 MMOL/L (ref 133–144)
SODIUM UR-SCNC: 5 MMOL/L
SOURCE: ABNORMAL
SP GR UR STRIP: 1.01 (ref 1–1.03)
SP GR UR STRIP: 1.02 (ref 1–1.03)
SP GR UR STRIP: 1.03 (ref 1–1.03)
SPECIMEN EXP DATE BLD: NORMAL
SPECIMEN SOURCE FLD: NORMAL
SPECIMEN SOURCE: ABNORMAL
SPECIMEN SOURCE: NORMAL
SQUAMOUS #/AREA URNS AUTO: 1 /HPF (ref 0–1)
SQUAMOUS #/AREA URNS AUTO: 2 /HPF (ref 0–1)
SQUAMOUS #/AREA URNS AUTO: 2 /HPF (ref 0–1)
SQUAMOUS #/AREA URNS AUTO: 5 /HPF (ref 0–1)
SQUAMOUS #/AREA URNS AUTO: <1 /HPF (ref 0–1)
SQUAMOUS #/AREA URNS AUTO: <1 /HPF (ref 0–1)
T4 FREE SERPL-MCNC: 0.92 NG/DL (ref 0.76–1.46)
TARGETS BLD QL SMEAR: SLIGHT
TARGETS BLD QL SMEAR: SLIGHT
TIBC SERPL-MCNC: 106 UG/DL (ref 240–430)
TIBC SERPL-MCNC: 127 UG/DL (ref 240–430)
TRANS CELLS #/AREA URNS HPF: 1 /HPF (ref 0–1)
TRANS CELLS #/AREA URNS HPF: <1 /HPF (ref 0–1)
TRANS CELLS #/AREA URNS HPF: <1 /HPF (ref 0–1)
TRANSFERRIN SERPL-MCNC: 80 MG/DL (ref 210–360)
TRANSFUSION STATUS PATIENT QL: NORMAL
TRIGL FLD-MCNC: NORMAL MG/DL
TROPONIN I SERPL-MCNC: 0.18 UG/L (ref 0–0.04)
TROPONIN I SERPL-MCNC: 0.2 UG/L (ref 0–0.04)
TROPONIN I SERPL-MCNC: 0.21 UG/L (ref 0–0.04)
TROPONIN I SERPL-MCNC: <0.015 UG/L (ref 0–0.04)
TSH SERPL DL<=0.005 MIU/L-ACNC: 6.46 MU/L (ref 0.4–4)
UROBILINOGEN UR STRIP-ACNC: ABNORMAL
UROBILINOGEN UR STRIP-MCNC: 0.2 MG/DL (ref 0–2)
UROBILINOGEN UR STRIP-MCNC: 4 MG/DL (ref 0–2)
UROBILINOGEN UR STRIP-MCNC: NORMAL MG/DL (ref 0–2)
UUN UR-MCNC: 143 MG/DL
UUN/CREAT 24H UR: 1 G/G CR
VANCOMYCIN SERPL-MCNC: 15.6 MG/L
VANCOMYCIN SERPL-MCNC: 21.4 MG/L
VIT B12 SERPL-MCNC: 3835 PG/ML (ref 193–986)
VIT B12 SERPL-MCNC: 3862 PG/ML (ref 193–986)
WBC # BLD AUTO: 10.5 10E9/L (ref 4–11)
WBC # BLD AUTO: 10.9 10E9/L (ref 4–11)
WBC # BLD AUTO: 11.2 10E9/L (ref 4–11)
WBC # BLD AUTO: 12.2 10E9/L (ref 4–11)
WBC # BLD AUTO: 13.5 10E9/L (ref 4–11)
WBC # BLD AUTO: 13.8 10E9/L (ref 4–11)
WBC # BLD AUTO: 16 10E9/L (ref 4–11)
WBC # BLD AUTO: 16.7 10E9/L (ref 4–11)
WBC # BLD AUTO: 17 10E9/L (ref 4–11)
WBC # BLD AUTO: 19 10E9/L (ref 4–11)
WBC # BLD AUTO: 19.8 10E9/L (ref 4–11)
WBC # BLD AUTO: 20.9 10E9/L (ref 4–11)
WBC # BLD AUTO: 21.4 10E9/L (ref 4–11)
WBC # BLD AUTO: 22.3 10E9/L (ref 4–11)
WBC # BLD AUTO: 22.7 10E9/L (ref 4–11)
WBC # BLD AUTO: 23.3 10E9/L (ref 4–11)
WBC # BLD AUTO: 24.6 10E9/L (ref 4–11)
WBC # BLD AUTO: 25.4 10E9/L (ref 4–11)
WBC # BLD AUTO: 25.6 10E9/L (ref 4–11)
WBC # BLD AUTO: 27.1 10E9/L (ref 4–11)
WBC # BLD AUTO: 28.1 10E9/L (ref 4–11)
WBC # BLD AUTO: 28.5 10E9/L (ref 4–11)
WBC # BLD AUTO: 28.7 10E9/L (ref 4–11)
WBC # BLD AUTO: 29 10E9/L (ref 4–11)
WBC # BLD AUTO: 37.8 10E9/L (ref 4–11)
WBC # BLD AUTO: 43.6 10E9/L (ref 4–11)
WBC # BLD AUTO: 46.5 10E9/L (ref 4–11)
WBC # BLD AUTO: 5.8 10E9/L (ref 4–11)
WBC # BLD AUTO: 51.9 10E9/L (ref 4–11)
WBC # BLD AUTO: 56 10E9/L (ref 4–11)
WBC # BLD AUTO: 56.6 10E9/L (ref 4–11)
WBC # BLD AUTO: 56.6 10E9/L (ref 4–11)
WBC # BLD AUTO: 57.5 10E9/L (ref 4–11)
WBC # BLD AUTO: 57.7 10E9/L (ref 4–11)
WBC # BLD AUTO: 57.9 10E9/L (ref 4–11)
WBC # BLD AUTO: 6.2 10E9/L (ref 4–11)
WBC # BLD AUTO: 6.2 10E9/L (ref 4–11)
WBC # BLD AUTO: 6.8 10E9/L (ref 4–11)
WBC # BLD AUTO: 60.8 10E9/L (ref 4–11)
WBC # BLD AUTO: 61.2 10E9/L (ref 4–11)
WBC # BLD AUTO: 7.6 10E9/L (ref 4–11)
WBC # BLD AUTO: 7.7 10E9/L (ref 4–11)
WBC # BLD AUTO: 8 10E9/L (ref 4–11)
WBC # BLD AUTO: 8.3 10E9/L (ref 4–11)
WBC # BLD AUTO: 8.5 10E9/L (ref 4–11)
WBC # BLD AUTO: 9 10E9/L (ref 4–11)
WBC # BLD AUTO: 9 10E9/L (ref 4–11)
WBC # FLD AUTO: 195 /UL
WBC # FLD AUTO: 238 /UL
WBC # FLD AUTO: 353 /UL
WBC # FLD AUTO: 630 /UL
WBC # FLD AUTO: NORMAL /UL
WBC #/AREA URNS AUTO: 11 /HPF (ref 0–5)
WBC #/AREA URNS AUTO: 3 /HPF (ref 0–5)
WBC #/AREA URNS AUTO: 4 /HPF (ref 0–5)
WBC #/AREA URNS AUTO: 4 /HPF (ref 0–5)
WBC #/AREA URNS AUTO: 68 /HPF (ref 0–5)
WBC #/AREA URNS AUTO: 9 /HPF (ref 0–5)

## 2019-01-01 PROCEDURE — 25800030 ZZH RX IP 258 OP 636: Performed by: STUDENT IN AN ORGANIZED HEALTH CARE EDUCATION/TRAINING PROGRAM

## 2019-01-01 PROCEDURE — 85610 PROTHROMBIN TIME: CPT | Performed by: STUDENT IN AN ORGANIZED HEALTH CARE EDUCATION/TRAINING PROGRAM

## 2019-01-01 PROCEDURE — 25000132 ZZH RX MED GY IP 250 OP 250 PS 637: Performed by: STUDENT IN AN ORGANIZED HEALTH CARE EDUCATION/TRAINING PROGRAM

## 2019-01-01 PROCEDURE — 87205 SMEAR GRAM STAIN: CPT

## 2019-01-01 PROCEDURE — 82330 ASSAY OF CALCIUM: CPT | Performed by: STUDENT IN AN ORGANIZED HEALTH CARE EDUCATION/TRAINING PROGRAM

## 2019-01-01 PROCEDURE — 83735 ASSAY OF MAGNESIUM: CPT | Performed by: STUDENT IN AN ORGANIZED HEALTH CARE EDUCATION/TRAINING PROGRAM

## 2019-01-01 PROCEDURE — 84100 ASSAY OF PHOSPHORUS: CPT | Performed by: INTERNAL MEDICINE

## 2019-01-01 PROCEDURE — 40000275 ZZH STATISTIC RCP TIME EA 10 MIN

## 2019-01-01 PROCEDURE — 25000125 ZZHC RX 250: Performed by: INTERNAL MEDICINE

## 2019-01-01 PROCEDURE — 25000132 ZZH RX MED GY IP 250 OP 250 PS 637: Performed by: INTERNAL MEDICINE

## 2019-01-01 PROCEDURE — 36415 COLL VENOUS BLD VENIPUNCTURE: CPT | Performed by: STUDENT IN AN ORGANIZED HEALTH CARE EDUCATION/TRAINING PROGRAM

## 2019-01-01 PROCEDURE — 87106 FUNGI IDENTIFICATION YEAST: CPT

## 2019-01-01 PROCEDURE — 25000125 ZZHC RX 250: Performed by: PHYSICIAN ASSISTANT

## 2019-01-01 PROCEDURE — 44500 INTRO GASTROINTESTINAL TUBE: CPT

## 2019-01-01 PROCEDURE — 84484 ASSAY OF TROPONIN QUANT: CPT | Performed by: INTERNAL MEDICINE

## 2019-01-01 PROCEDURE — 25000128 H RX IP 250 OP 636: Performed by: INTERNAL MEDICINE

## 2019-01-01 PROCEDURE — 99291 CRITICAL CARE FIRST HOUR: CPT | Mod: GC | Performed by: INTERNAL MEDICINE

## 2019-01-01 PROCEDURE — 80053 COMPREHEN METABOLIC PANEL: CPT | Performed by: STUDENT IN AN ORGANIZED HEALTH CARE EDUCATION/TRAINING PROGRAM

## 2019-01-01 PROCEDURE — 85025 COMPLETE CBC W/AUTO DIFF WBC: CPT | Performed by: EMERGENCY MEDICINE

## 2019-01-01 PROCEDURE — 76705 ECHO EXAM OF ABDOMEN: CPT

## 2019-01-01 PROCEDURE — 87640 STAPH A DNA AMP PROBE: CPT | Performed by: STUDENT IN AN ORGANIZED HEALTH CARE EDUCATION/TRAINING PROGRAM

## 2019-01-01 PROCEDURE — 25000128 H RX IP 250 OP 636: Performed by: NURSE ANESTHETIST, CERTIFIED REGISTERED

## 2019-01-01 PROCEDURE — 82140 ASSAY OF AMMONIA: CPT | Performed by: STUDENT IN AN ORGANIZED HEALTH CARE EDUCATION/TRAINING PROGRAM

## 2019-01-01 PROCEDURE — 86140 C-REACTIVE PROTEIN: CPT | Performed by: EMERGENCY MEDICINE

## 2019-01-01 PROCEDURE — 83010 ASSAY OF HAPTOGLOBIN QUANT: CPT | Performed by: STUDENT IN AN ORGANIZED HEALTH CARE EDUCATION/TRAINING PROGRAM

## 2019-01-01 PROCEDURE — 85384 FIBRINOGEN ACTIVITY: CPT | Performed by: INTERNAL MEDICINE

## 2019-01-01 PROCEDURE — 36415 COLL VENOUS BLD VENIPUNCTURE: CPT | Performed by: HOSPITALIST

## 2019-01-01 PROCEDURE — 82728 ASSAY OF FERRITIN: CPT | Performed by: STUDENT IN AN ORGANIZED HEALTH CARE EDUCATION/TRAINING PROGRAM

## 2019-01-01 PROCEDURE — 40000196 ZZH STATISTIC RAPCV CVP MONITORING

## 2019-01-01 PROCEDURE — 86901 BLOOD TYPING SEROLOGIC RH(D): CPT | Performed by: INTERNAL MEDICINE

## 2019-01-01 PROCEDURE — 25000128 H RX IP 250 OP 636: Performed by: STUDENT IN AN ORGANIZED HEALTH CARE EDUCATION/TRAINING PROGRAM

## 2019-01-01 PROCEDURE — 85610 PROTHROMBIN TIME: CPT | Performed by: INTERNAL MEDICINE

## 2019-01-01 PROCEDURE — 83615 LACTATE (LD) (LDH) ENZYME: CPT | Performed by: STUDENT IN AN ORGANIZED HEALTH CARE EDUCATION/TRAINING PROGRAM

## 2019-01-01 PROCEDURE — 80048 BASIC METABOLIC PNL TOTAL CA: CPT | Performed by: EMERGENCY MEDICINE

## 2019-01-01 PROCEDURE — 97535 SELF CARE MNGMENT TRAINING: CPT | Mod: GO

## 2019-01-01 PROCEDURE — 87641 MR-STAPH DNA AMP PROBE: CPT | Performed by: STUDENT IN AN ORGANIZED HEALTH CARE EDUCATION/TRAINING PROGRAM

## 2019-01-01 PROCEDURE — 71045 X-RAY EXAM CHEST 1 VIEW: CPT

## 2019-01-01 PROCEDURE — 71046 X-RAY EXAM CHEST 2 VIEWS: CPT

## 2019-01-01 PROCEDURE — 94645 CONT INHLJ TX EACH ADDL HOUR: CPT

## 2019-01-01 PROCEDURE — 25000128 H RX IP 250 OP 636: Performed by: HOSPITALIST

## 2019-01-01 PROCEDURE — 36415 COLL VENOUS BLD VENIPUNCTURE: CPT | Performed by: EMERGENCY MEDICINE

## 2019-01-01 PROCEDURE — 86850 RBC ANTIBODY SCREEN: CPT | Performed by: STUDENT IN AN ORGANIZED HEALTH CARE EDUCATION/TRAINING PROGRAM

## 2019-01-01 PROCEDURE — 25000131 ZZH RX MED GY IP 250 OP 636 PS 637: Performed by: INTERNAL MEDICINE

## 2019-01-01 PROCEDURE — 97116 GAIT TRAINING THERAPY: CPT | Mod: GP | Performed by: PHYSICAL THERAPIST

## 2019-01-01 PROCEDURE — 25000125 ZZHC RX 250: Performed by: RADIOLOGY

## 2019-01-01 PROCEDURE — 97116 GAIT TRAINING THERAPY: CPT | Mod: GP

## 2019-01-01 PROCEDURE — 80048 BASIC METABOLIC PNL TOTAL CA: CPT | Performed by: STUDENT IN AN ORGANIZED HEALTH CARE EDUCATION/TRAINING PROGRAM

## 2019-01-01 PROCEDURE — 83735 ASSAY OF MAGNESIUM: CPT | Performed by: INTERNAL MEDICINE

## 2019-01-01 PROCEDURE — 99233 SBSQ HOSP IP/OBS HIGH 50: CPT | Mod: GC | Performed by: INTERNAL MEDICINE

## 2019-01-01 PROCEDURE — 87102 FUNGUS ISOLATION CULTURE: CPT | Performed by: INTERNAL MEDICINE

## 2019-01-01 PROCEDURE — 97161 PT EVAL LOW COMPLEX 20 MIN: CPT | Mod: GP

## 2019-01-01 PROCEDURE — 96361 HYDRATE IV INFUSION ADD-ON: CPT

## 2019-01-01 PROCEDURE — 36415 COLL VENOUS BLD VENIPUNCTURE: CPT | Performed by: INTERNAL MEDICINE

## 2019-01-01 PROCEDURE — 25000128 H RX IP 250 OP 636: Performed by: PEDIATRICS

## 2019-01-01 PROCEDURE — 27211393 ZZH DRESSING, STATSEAL DISC

## 2019-01-01 PROCEDURE — P9047 ALBUMIN (HUMAN), 25%, 50ML: HCPCS | Performed by: STUDENT IN AN ORGANIZED HEALTH CARE EDUCATION/TRAINING PROGRAM

## 2019-01-01 PROCEDURE — 83880 ASSAY OF NATRIURETIC PEPTIDE: CPT | Performed by: EMERGENCY MEDICINE

## 2019-01-01 PROCEDURE — 40000264 ECHOCARDIOGRAM COMPLETE

## 2019-01-01 PROCEDURE — 97110 THERAPEUTIC EXERCISES: CPT | Mod: GP

## 2019-01-01 PROCEDURE — 80329 ANALGESICS NON-OPIOID 1 OR 2: CPT | Performed by: STUDENT IN AN ORGANIZED HEALTH CARE EDUCATION/TRAINING PROGRAM

## 2019-01-01 PROCEDURE — 83615 LACTATE (LD) (LDH) ENZYME: CPT | Performed by: INTERNAL MEDICINE

## 2019-01-01 PROCEDURE — 85025 COMPLETE CBC W/AUTO DIFF WBC: CPT | Performed by: INTERNAL MEDICINE

## 2019-01-01 PROCEDURE — 84155 ASSAY OF PROTEIN SERUM: CPT

## 2019-01-01 PROCEDURE — 27210429 ZZH NUTRITION PRODUCT INTERMEDIATE LITER

## 2019-01-01 PROCEDURE — 94003 VENT MGMT INPAT SUBQ DAY: CPT

## 2019-01-01 PROCEDURE — 84100 ASSAY OF PHOSPHORUS: CPT | Performed by: STUDENT IN AN ORGANIZED HEALTH CARE EDUCATION/TRAINING PROGRAM

## 2019-01-01 PROCEDURE — 85384 FIBRINOGEN ACTIVITY: CPT | Performed by: STUDENT IN AN ORGANIZED HEALTH CARE EDUCATION/TRAINING PROGRAM

## 2019-01-01 PROCEDURE — 20000004 ZZH R&B ICU UMMC

## 2019-01-01 PROCEDURE — 25000125 ZZHC RX 250: Performed by: STUDENT IN AN ORGANIZED HEALTH CARE EDUCATION/TRAINING PROGRAM

## 2019-01-01 PROCEDURE — 80048 BASIC METABOLIC PNL TOTAL CA: CPT | Performed by: SURGERY

## 2019-01-01 PROCEDURE — 85027 COMPLETE CBC AUTOMATED: CPT | Performed by: STUDENT IN AN ORGANIZED HEALTH CARE EDUCATION/TRAINING PROGRAM

## 2019-01-01 PROCEDURE — 82550 ASSAY OF CK (CPK): CPT | Performed by: STUDENT IN AN ORGANIZED HEALTH CARE EDUCATION/TRAINING PROGRAM

## 2019-01-01 PROCEDURE — 87205 SMEAR GRAM STAIN: CPT | Performed by: INTERNAL MEDICINE

## 2019-01-01 PROCEDURE — 86850 RBC ANTIBODY SCREEN: CPT | Performed by: INTERNAL MEDICINE

## 2019-01-01 PROCEDURE — 84460 ALANINE AMINO (ALT) (SGPT): CPT

## 2019-01-01 PROCEDURE — 12000022 ZZH R&B SNF

## 2019-01-01 PROCEDURE — 82248 BILIRUBIN DIRECT: CPT | Performed by: STUDENT IN AN ORGANIZED HEALTH CARE EDUCATION/TRAINING PROGRAM

## 2019-01-01 PROCEDURE — 82042 OTHER SOURCE ALBUMIN QUAN EA: CPT | Performed by: STUDENT IN AN ORGANIZED HEALTH CARE EDUCATION/TRAINING PROGRAM

## 2019-01-01 PROCEDURE — 80053 COMPREHEN METABOLIC PANEL: CPT | Performed by: INTERNAL MEDICINE

## 2019-01-01 PROCEDURE — 86850 RBC ANTIBODY SCREEN: CPT | Performed by: EMERGENCY MEDICINE

## 2019-01-01 PROCEDURE — 83690 ASSAY OF LIPASE: CPT | Performed by: EMERGENCY MEDICINE

## 2019-01-01 PROCEDURE — 00000146 ZZHCL STATISTIC GLUCOSE BY METER IP

## 2019-01-01 PROCEDURE — 87070 CULTURE OTHR SPECIMN AEROBIC: CPT | Performed by: STUDENT IN AN ORGANIZED HEALTH CARE EDUCATION/TRAINING PROGRAM

## 2019-01-01 PROCEDURE — 83605 ASSAY OF LACTIC ACID: CPT | Performed by: HOSPITALIST

## 2019-01-01 PROCEDURE — 99291 CRITICAL CARE FIRST HOUR: CPT | Mod: 25 | Performed by: EMERGENCY MEDICINE

## 2019-01-01 PROCEDURE — P9016 RBC LEUKOCYTES REDUCED: HCPCS | Performed by: EMERGENCY MEDICINE

## 2019-01-01 PROCEDURE — 40000014 ZZH STATISTIC ARTERIAL MONITORING DAILY

## 2019-01-01 PROCEDURE — 82550 ASSAY OF CK (CPK): CPT | Performed by: INTERNAL MEDICINE

## 2019-01-01 PROCEDURE — 36569 INSJ PICC 5 YR+ W/O IMAGING: CPT

## 2019-01-01 PROCEDURE — 89051 BODY FLUID CELL COUNT: CPT | Performed by: STUDENT IN AN ORGANIZED HEALTH CARE EDUCATION/TRAINING PROGRAM

## 2019-01-01 PROCEDURE — 25000125 ZZHC RX 250

## 2019-01-01 PROCEDURE — 87800 DETECT AGNT MULT DNA DIREC: CPT | Performed by: EMERGENCY MEDICINE

## 2019-01-01 PROCEDURE — 83605 ASSAY OF LACTIC ACID: CPT | Performed by: STUDENT IN AN ORGANIZED HEALTH CARE EDUCATION/TRAINING PROGRAM

## 2019-01-01 PROCEDURE — 84145 PROCALCITONIN (PCT): CPT | Performed by: EMERGENCY MEDICINE

## 2019-01-01 PROCEDURE — 87181 SC STD AGAR DILUTION PER AGT: CPT

## 2019-01-01 PROCEDURE — 90947 DIALYSIS REPEATED EVAL: CPT

## 2019-01-01 PROCEDURE — 81001 URINALYSIS AUTO W/SCOPE: CPT | Performed by: STUDENT IN AN ORGANIZED HEALTH CARE EDUCATION/TRAINING PROGRAM

## 2019-01-01 PROCEDURE — 99305 1ST NF CARE MODERATE MDM 35: CPT | Performed by: INTERNAL MEDICINE

## 2019-01-01 PROCEDURE — 25000128 H RX IP 250 OP 636: Performed by: EMERGENCY MEDICINE

## 2019-01-01 PROCEDURE — 97535 SELF CARE MNGMENT TRAINING: CPT | Mod: GO | Performed by: OCCUPATIONAL THERAPIST

## 2019-01-01 PROCEDURE — 82565 ASSAY OF CREATININE: CPT | Performed by: INTERNAL MEDICINE

## 2019-01-01 PROCEDURE — 84540 ASSAY OF URINE/UREA-N: CPT | Performed by: STUDENT IN AN ORGANIZED HEALTH CARE EDUCATION/TRAINING PROGRAM

## 2019-01-01 PROCEDURE — 80076 HEPATIC FUNCTION PANEL: CPT | Performed by: STUDENT IN AN ORGANIZED HEALTH CARE EDUCATION/TRAINING PROGRAM

## 2019-01-01 PROCEDURE — 82805 BLOOD GASES W/O2 SATURATION: CPT | Performed by: STUDENT IN AN ORGANIZED HEALTH CARE EDUCATION/TRAINING PROGRAM

## 2019-01-01 PROCEDURE — 95951 ZZHC EEG VIDEO EACH 24 HR: CPT | Mod: ZF

## 2019-01-01 PROCEDURE — 82947 ASSAY GLUCOSE BLOOD QUANT: CPT

## 2019-01-01 PROCEDURE — 93321 DOPPLER ECHO F-UP/LMTD STD: CPT | Mod: 26 | Performed by: INTERNAL MEDICINE

## 2019-01-01 PROCEDURE — C9113 INJ PANTOPRAZOLE SODIUM, VIA: HCPCS | Performed by: STUDENT IN AN ORGANIZED HEALTH CARE EDUCATION/TRAINING PROGRAM

## 2019-01-01 PROCEDURE — 97530 THERAPEUTIC ACTIVITIES: CPT | Mod: GO | Performed by: OCCUPATIONAL THERAPIST

## 2019-01-01 PROCEDURE — 84443 ASSAY THYROID STIM HORMONE: CPT | Performed by: EMERGENCY MEDICINE

## 2019-01-01 PROCEDURE — 84520 ASSAY OF UREA NITROGEN: CPT

## 2019-01-01 PROCEDURE — 12000001 ZZH R&B MED SURG/OB UMMC

## 2019-01-01 PROCEDURE — 85730 THROMBOPLASTIN TIME PARTIAL: CPT | Performed by: STUDENT IN AN ORGANIZED HEALTH CARE EDUCATION/TRAINING PROGRAM

## 2019-01-01 PROCEDURE — 93308 TTE F-UP OR LMTD: CPT | Mod: 26 | Performed by: INTERNAL MEDICINE

## 2019-01-01 PROCEDURE — 82803 BLOOD GASES ANY COMBINATION: CPT | Performed by: STUDENT IN AN ORGANIZED HEALTH CARE EDUCATION/TRAINING PROGRAM

## 2019-01-01 PROCEDURE — 84145 PROCALCITONIN (PCT): CPT | Performed by: STUDENT IN AN ORGANIZED HEALTH CARE EDUCATION/TRAINING PROGRAM

## 2019-01-01 PROCEDURE — 85730 THROMBOPLASTIN TIME PARTIAL: CPT | Performed by: INTERNAL MEDICINE

## 2019-01-01 PROCEDURE — 99232 SBSQ HOSP IP/OBS MODERATE 35: CPT | Performed by: CLINICAL NURSE SPECIALIST

## 2019-01-01 PROCEDURE — 94644 CONT INHLJ TX 1ST HOUR: CPT

## 2019-01-01 PROCEDURE — 82435 ASSAY OF BLOOD CHLORIDE: CPT

## 2019-01-01 PROCEDURE — 93005 ELECTROCARDIOGRAM TRACING: CPT | Performed by: EMERGENCY MEDICINE

## 2019-01-01 PROCEDURE — 84157 ASSAY OF PROTEIN OTHER: CPT | Performed by: STUDENT IN AN ORGANIZED HEALTH CARE EDUCATION/TRAINING PROGRAM

## 2019-01-01 PROCEDURE — 94640 AIRWAY INHALATION TREATMENT: CPT

## 2019-01-01 PROCEDURE — 97530 THERAPEUTIC ACTIVITIES: CPT | Mod: GP

## 2019-01-01 PROCEDURE — 87086 URINE CULTURE/COLONY COUNT: CPT | Performed by: INTERNAL MEDICINE

## 2019-01-01 PROCEDURE — 84132 ASSAY OF SERUM POTASSIUM: CPT | Performed by: EMERGENCY MEDICINE

## 2019-01-01 PROCEDURE — 25000131 ZZH RX MED GY IP 250 OP 636 PS 637: Performed by: STUDENT IN AN ORGANIZED HEALTH CARE EDUCATION/TRAINING PROGRAM

## 2019-01-01 PROCEDURE — G0463 HOSPITAL OUTPT CLINIC VISIT: HCPCS

## 2019-01-01 PROCEDURE — 85025 COMPLETE CBC W/AUTO DIFF WBC: CPT | Performed by: STUDENT IN AN ORGANIZED HEALTH CARE EDUCATION/TRAINING PROGRAM

## 2019-01-01 PROCEDURE — 36592 COLLECT BLOOD FROM PICC: CPT | Performed by: STUDENT IN AN ORGANIZED HEALTH CARE EDUCATION/TRAINING PROGRAM

## 2019-01-01 PROCEDURE — 80053 COMPREHEN METABOLIC PANEL: CPT | Performed by: CLINICAL NURSE SPECIALIST

## 2019-01-01 PROCEDURE — 82040 ASSAY OF SERUM ALBUMIN: CPT

## 2019-01-01 PROCEDURE — 25000132 ZZH RX MED GY IP 250 OP 250 PS 637: Performed by: EMERGENCY MEDICINE

## 2019-01-01 PROCEDURE — 94640 AIRWAY INHALATION TREATMENT: CPT | Mod: 76

## 2019-01-01 PROCEDURE — 99291 CRITICAL CARE FIRST HOUR: CPT | Mod: 24 | Performed by: INTERNAL MEDICINE

## 2019-01-01 PROCEDURE — 87040 BLOOD CULTURE FOR BACTERIA: CPT | Performed by: INTERNAL MEDICINE

## 2019-01-01 PROCEDURE — 82803 BLOOD GASES ANY COMBINATION: CPT

## 2019-01-01 PROCEDURE — 97110 THERAPEUTIC EXERCISES: CPT | Mod: GP | Performed by: PHYSICAL THERAPIST

## 2019-01-01 PROCEDURE — A9537 TC99M MEBROFENIN: HCPCS | Performed by: INTERNAL MEDICINE

## 2019-01-01 PROCEDURE — 84295 ASSAY OF SERUM SODIUM: CPT | Performed by: STUDENT IN AN ORGANIZED HEALTH CARE EDUCATION/TRAINING PROGRAM

## 2019-01-01 PROCEDURE — 97161 PT EVAL LOW COMPLEX 20 MIN: CPT | Mod: GP | Performed by: PHYSICAL THERAPIST

## 2019-01-01 PROCEDURE — 85045 AUTOMATED RETICULOCYTE COUNT: CPT | Performed by: INTERNAL MEDICINE

## 2019-01-01 PROCEDURE — 83605 ASSAY OF LACTIC ACID: CPT

## 2019-01-01 PROCEDURE — 0W9G3ZX DRAINAGE OF PERITONEAL CAVITY, PERCUTANEOUS APPROACH, DIAGNOSTIC: ICD-10-PCS | Performed by: INTERNAL MEDICINE

## 2019-01-01 PROCEDURE — 80053 COMPREHEN METABOLIC PANEL: CPT | Performed by: EMERGENCY MEDICINE

## 2019-01-01 PROCEDURE — 82810 BLOOD GASES O2 SAT ONLY: CPT | Performed by: STUDENT IN AN ORGANIZED HEALTH CARE EDUCATION/TRAINING PROGRAM

## 2019-01-01 PROCEDURE — 78226 HEPATOBILIARY SYSTEM IMAGING: CPT

## 2019-01-01 PROCEDURE — 99231 SBSQ HOSP IP/OBS SF/LOW 25: CPT | Performed by: CLINICAL NURSE SPECIALIST

## 2019-01-01 PROCEDURE — 84132 ASSAY OF SERUM POTASSIUM: CPT

## 2019-01-01 PROCEDURE — 25800030 ZZH RX IP 258 OP 636: Performed by: INTERNAL MEDICINE

## 2019-01-01 PROCEDURE — 83605 ASSAY OF LACTIC ACID: CPT | Performed by: INTERNAL MEDICINE

## 2019-01-01 PROCEDURE — 95951 ZZHC EEG VIDEO < 12 HR: CPT | Mod: 52,ZF

## 2019-01-01 PROCEDURE — 82565 ASSAY OF CREATININE: CPT

## 2019-01-01 PROCEDURE — 83605 ASSAY OF LACTIC ACID: CPT | Performed by: EMERGENCY MEDICINE

## 2019-01-01 PROCEDURE — 82085 ASSAY OF ALDOLASE: CPT | Performed by: STUDENT IN AN ORGANIZED HEALTH CARE EDUCATION/TRAINING PROGRAM

## 2019-01-01 PROCEDURE — 86900 BLOOD TYPING SEROLOGIC ABO: CPT | Performed by: INTERNAL MEDICINE

## 2019-01-01 PROCEDURE — 40000264 ECHOCARDIOGRAM LIMITED

## 2019-01-01 PROCEDURE — 87210 SMEAR WET MOUNT SALINE/INK: CPT | Performed by: INTERNAL MEDICINE

## 2019-01-01 PROCEDURE — 97162 PT EVAL MOD COMPLEX 30 MIN: CPT | Mod: GP

## 2019-01-01 PROCEDURE — 25000132 ZZH RX MED GY IP 250 OP 250 PS 637

## 2019-01-01 PROCEDURE — 87205 SMEAR GRAM STAIN: CPT | Performed by: STUDENT IN AN ORGANIZED HEALTH CARE EDUCATION/TRAINING PROGRAM

## 2019-01-01 PROCEDURE — 97530 THERAPEUTIC ACTIVITIES: CPT | Mod: GP | Performed by: PHYSICAL THERAPIST

## 2019-01-01 PROCEDURE — 99233 SBSQ HOSP IP/OBS HIGH 50: CPT | Performed by: INTERNAL MEDICINE

## 2019-01-01 PROCEDURE — 87899 AGENT NOS ASSAY W/OPTIC: CPT | Performed by: INTERNAL MEDICINE

## 2019-01-01 PROCEDURE — 12000004 ZZH R&B IMCU UMMC

## 2019-01-01 PROCEDURE — 82803 BLOOD GASES ANY COMBINATION: CPT | Performed by: INTERNAL MEDICINE

## 2019-01-01 PROCEDURE — 85730 THROMBOPLASTIN TIME PARTIAL: CPT | Performed by: EMERGENCY MEDICINE

## 2019-01-01 PROCEDURE — P9059 PLASMA, FRZ BETWEEN 8-24HOUR: HCPCS | Performed by: STUDENT IN AN ORGANIZED HEALTH CARE EDUCATION/TRAINING PROGRAM

## 2019-01-01 PROCEDURE — 87040 BLOOD CULTURE FOR BACTERIA: CPT | Performed by: EMERGENCY MEDICINE

## 2019-01-01 PROCEDURE — 40000344 ZZHCL STATISTIC THAWING COMPONENT: Performed by: STUDENT IN AN ORGANIZED HEALTH CARE EDUCATION/TRAINING PROGRAM

## 2019-01-01 PROCEDURE — P9012 CRYOPRECIPITATE EACH UNIT: HCPCS | Performed by: STUDENT IN AN ORGANIZED HEALTH CARE EDUCATION/TRAINING PROGRAM

## 2019-01-01 PROCEDURE — 82140 ASSAY OF AMMONIA: CPT | Performed by: INTERNAL MEDICINE

## 2019-01-01 PROCEDURE — 25000132 ZZH RX MED GY IP 250 OP 250 PS 637: Performed by: HOSPITALIST

## 2019-01-01 PROCEDURE — 96365 THER/PROPH/DIAG IV INF INIT: CPT | Performed by: EMERGENCY MEDICINE

## 2019-01-01 PROCEDURE — 82746 ASSAY OF FOLIC ACID SERUM: CPT | Performed by: STUDENT IN AN ORGANIZED HEALTH CARE EDUCATION/TRAINING PROGRAM

## 2019-01-01 PROCEDURE — 49083 ABD PARACENTESIS W/IMAGING: CPT | Performed by: INTERNAL MEDICINE

## 2019-01-01 PROCEDURE — 93005 ELECTROCARDIOGRAM TRACING: CPT

## 2019-01-01 PROCEDURE — 3E043XZ INTRODUCTION OF VASOPRESSOR INTO CENTRAL VEIN, PERCUTANEOUS APPROACH: ICD-10-PCS | Performed by: INTERNAL MEDICINE

## 2019-01-01 PROCEDURE — 82607 VITAMIN B-12: CPT | Performed by: STUDENT IN AN ORGANIZED HEALTH CARE EDUCATION/TRAINING PROGRAM

## 2019-01-01 PROCEDURE — 80320 DRUG SCREEN QUANTALCOHOLS: CPT | Performed by: STUDENT IN AN ORGANIZED HEALTH CARE EDUCATION/TRAINING PROGRAM

## 2019-01-01 PROCEDURE — 36600 WITHDRAWAL OF ARTERIAL BLOOD: CPT

## 2019-01-01 PROCEDURE — 99358 PROLONG SERVICE W/O CONTACT: CPT | Performed by: CLINICAL NURSE SPECIALIST

## 2019-01-01 PROCEDURE — P9016 RBC LEUKOCYTES REDUCED: HCPCS | Performed by: STUDENT IN AN ORGANIZED HEALTH CARE EDUCATION/TRAINING PROGRAM

## 2019-01-01 PROCEDURE — 87181 SC STD AGAR DILUTION PER AGT: CPT | Performed by: STUDENT IN AN ORGANIZED HEALTH CARE EDUCATION/TRAINING PROGRAM

## 2019-01-01 PROCEDURE — 27211391 ZZ H KIT, 6 FR TL BIOFLO OPEN ENDED PICC

## 2019-01-01 PROCEDURE — 81001 URINALYSIS AUTO W/SCOPE: CPT | Performed by: INTERNAL MEDICINE

## 2019-01-01 PROCEDURE — 5A1D90Z PERFORMANCE OF URINARY FILTRATION, CONTINUOUS, GREATER THAN 18 HOURS PER DAY: ICD-10-PCS | Performed by: INTERNAL MEDICINE

## 2019-01-01 PROCEDURE — 93010 ELECTROCARDIOGRAM REPORT: CPT | Mod: Z6 | Performed by: EMERGENCY MEDICINE

## 2019-01-01 PROCEDURE — 80329 ANALGESICS NON-OPIOID 1 OR 2: CPT | Performed by: SURGERY

## 2019-01-01 PROCEDURE — 83540 ASSAY OF IRON: CPT | Performed by: STUDENT IN AN ORGANIZED HEALTH CARE EDUCATION/TRAINING PROGRAM

## 2019-01-01 PROCEDURE — 74177 CT ABD & PELVIS W/CONTRAST: CPT

## 2019-01-01 PROCEDURE — 96366 THER/PROPH/DIAG IV INF ADDON: CPT | Performed by: EMERGENCY MEDICINE

## 2019-01-01 PROCEDURE — 76705 ECHO EXAM OF ABDOMEN: CPT | Mod: 26 | Performed by: PEDIATRICS

## 2019-01-01 PROCEDURE — 25800030 ZZH RX IP 258 OP 636

## 2019-01-01 PROCEDURE — 80076 HEPATIC FUNCTION PANEL: CPT | Performed by: EMERGENCY MEDICINE

## 2019-01-01 PROCEDURE — 84132 ASSAY OF SERUM POTASSIUM: CPT | Performed by: PEDIATRICS

## 2019-01-01 PROCEDURE — 87075 CULTR BACTERIA EXCEPT BLOOD: CPT | Performed by: STUDENT IN AN ORGANIZED HEALTH CARE EDUCATION/TRAINING PROGRAM

## 2019-01-01 PROCEDURE — P9047 ALBUMIN (HUMAN), 25%, 50ML: HCPCS | Performed by: INTERNAL MEDICINE

## 2019-01-01 PROCEDURE — 99207 ZZC CDG-MDM COMPONENT: MEETS MODERATE - UP CODED: CPT | Performed by: INTERNAL MEDICINE

## 2019-01-01 PROCEDURE — 89051 BODY FLUID CELL COUNT: CPT | Performed by: INTERNAL MEDICINE

## 2019-01-01 PROCEDURE — 87633 RESP VIRUS 12-25 TARGETS: CPT | Performed by: INTERNAL MEDICINE

## 2019-01-01 PROCEDURE — 99223 1ST HOSP IP/OBS HIGH 75: CPT | Mod: AI | Performed by: INTERNAL MEDICINE

## 2019-01-01 PROCEDURE — 27211417 ZZ H KIT, VPS RHYTHM STYLET

## 2019-01-01 PROCEDURE — 99207 ZZC CDG-CODE CATEGORY CHANGED: CPT | Performed by: HOSPITALIST

## 2019-01-01 PROCEDURE — 86900 BLOOD TYPING SEROLOGIC ABO: CPT | Performed by: STUDENT IN AN ORGANIZED HEALTH CARE EDUCATION/TRAINING PROGRAM

## 2019-01-01 PROCEDURE — 99316 NF DSCHRG MGMT 30 MIN+: CPT | Performed by: HOSPITALIST

## 2019-01-01 PROCEDURE — 87186 SC STD MICRODIL/AGAR DIL: CPT | Performed by: STUDENT IN AN ORGANIZED HEALTH CARE EDUCATION/TRAINING PROGRAM

## 2019-01-01 PROCEDURE — 86923 COMPATIBILITY TEST ELECTRIC: CPT | Performed by: INTERNAL MEDICINE

## 2019-01-01 PROCEDURE — 82310 ASSAY OF CALCIUM: CPT

## 2019-01-01 PROCEDURE — 0W993ZZ DRAINAGE OF RIGHT PLEURAL CAVITY, PERCUTANEOUS APPROACH: ICD-10-PCS | Performed by: INTERNAL MEDICINE

## 2019-01-01 PROCEDURE — 84132 ASSAY OF SERUM POTASSIUM: CPT | Performed by: STUDENT IN AN ORGANIZED HEALTH CARE EDUCATION/TRAINING PROGRAM

## 2019-01-01 PROCEDURE — 94660 CPAP INITIATION&MGMT: CPT

## 2019-01-01 PROCEDURE — 97530 THERAPEUTIC ACTIVITIES: CPT | Mod: GO

## 2019-01-01 PROCEDURE — 86901 BLOOD TYPING SEROLOGIC RH(D): CPT | Performed by: STUDENT IN AN ORGANIZED HEALTH CARE EDUCATION/TRAINING PROGRAM

## 2019-01-01 PROCEDURE — 93325 DOPPLER ECHO COLOR FLOW MAPG: CPT | Mod: 26 | Performed by: INTERNAL MEDICINE

## 2019-01-01 PROCEDURE — C1751 CATH, INF, PER/CENT/MIDLINE: HCPCS

## 2019-01-01 PROCEDURE — 84300 ASSAY OF URINE SODIUM: CPT | Performed by: STUDENT IN AN ORGANIZED HEALTH CARE EDUCATION/TRAINING PROGRAM

## 2019-01-01 PROCEDURE — 93306 TTE W/DOPPLER COMPLETE: CPT | Mod: 26 | Performed by: INTERNAL MEDICINE

## 2019-01-01 PROCEDURE — 96367 TX/PROPH/DG ADDL SEQ IV INF: CPT | Performed by: EMERGENCY MEDICINE

## 2019-01-01 PROCEDURE — 5A1955Z RESPIRATORY VENTILATION, GREATER THAN 96 CONSECUTIVE HOURS: ICD-10-PCS | Performed by: INTERNAL MEDICINE

## 2019-01-01 PROCEDURE — 85049 AUTOMATED PLATELET COUNT: CPT | Performed by: STUDENT IN AN ORGANIZED HEALTH CARE EDUCATION/TRAINING PROGRAM

## 2019-01-01 PROCEDURE — 40000983 ZZH STATISTIC HFNC ADULT NON-CPAP

## 2019-01-01 PROCEDURE — 93010 ELECTROCARDIOGRAM REPORT: CPT | Performed by: INTERNAL MEDICINE

## 2019-01-01 PROCEDURE — 87015 SPECIMEN INFECT AGNT CONCNTJ: CPT | Performed by: INTERNAL MEDICINE

## 2019-01-01 PROCEDURE — P9016 RBC LEUKOCYTES REDUCED: HCPCS | Performed by: INTERNAL MEDICINE

## 2019-01-01 PROCEDURE — 25000125 ZZHC RX 250: Performed by: NURSE ANESTHETIST, CERTIFIED REGISTERED

## 2019-01-01 PROCEDURE — 96360 HYDRATION IV INFUSION INIT: CPT

## 2019-01-01 PROCEDURE — 85610 PROTHROMBIN TIME: CPT | Performed by: SURGERY

## 2019-01-01 PROCEDURE — 84155 ASSAY OF PROTEIN SERUM: CPT | Performed by: STUDENT IN AN ORGANIZED HEALTH CARE EDUCATION/TRAINING PROGRAM

## 2019-01-01 PROCEDURE — 40000986 XR CHEST PORT 1 VW

## 2019-01-01 PROCEDURE — 82374 ASSAY BLOOD CARBON DIOXIDE: CPT

## 2019-01-01 PROCEDURE — 87070 CULTURE OTHR SPECIMN AEROBIC: CPT | Performed by: INTERNAL MEDICINE

## 2019-01-01 PROCEDURE — 27211414 ZZ H ADHESIVE SKIN CLOSURE, DERMABOND

## 2019-01-01 PROCEDURE — 87206 SMEAR FLUORESCENT/ACID STAI: CPT | Performed by: INTERNAL MEDICINE

## 2019-01-01 PROCEDURE — 40000141 ZZH STATISTIC PERIPHERAL IV START W/O US GUIDANCE

## 2019-01-01 PROCEDURE — 84466 ASSAY OF TRANSFERRIN: CPT | Performed by: STUDENT IN AN ORGANIZED HEALTH CARE EDUCATION/TRAINING PROGRAM

## 2019-01-01 PROCEDURE — 25800030 ZZH RX IP 258 OP 636: Performed by: EMERGENCY MEDICINE

## 2019-01-01 PROCEDURE — 40000281 ZZH STATISTIC TRANSPORT TIME EA 15 MIN

## 2019-01-01 PROCEDURE — 99239 HOSP IP/OBS DSCHRG MGMT >30: CPT | Performed by: INTERNAL MEDICINE

## 2019-01-01 PROCEDURE — 85018 HEMOGLOBIN: CPT | Performed by: STUDENT IN AN ORGANIZED HEALTH CARE EDUCATION/TRAINING PROGRAM

## 2019-01-01 PROCEDURE — 40000556 ZZH STATISTIC PERIPHERAL IV START W US GUIDANCE

## 2019-01-01 PROCEDURE — 49083 ABD PARACENTESIS W/IMAGING: CPT

## 2019-01-01 PROCEDURE — 83051 HEMOGLOBIN PLASMA: CPT | Performed by: INTERNAL MEDICINE

## 2019-01-01 PROCEDURE — 27210136 ZZH KIT CATH ARTERIAL EXT SUPPLY

## 2019-01-01 PROCEDURE — 71260 CT THORAX DX C+: CPT

## 2019-01-01 PROCEDURE — 85610 PROTHROMBIN TIME: CPT | Performed by: EMERGENCY MEDICINE

## 2019-01-01 PROCEDURE — 36592 COLLECT BLOOD FROM PICC: CPT | Performed by: INTERNAL MEDICINE

## 2019-01-01 PROCEDURE — 97165 OT EVAL LOW COMPLEX 30 MIN: CPT | Mod: GO | Performed by: OCCUPATIONAL THERAPIST

## 2019-01-01 PROCEDURE — 84132 ASSAY OF SERUM POTASSIUM: CPT | Performed by: INTERNAL MEDICINE

## 2019-01-01 PROCEDURE — 99285 EMERGENCY DEPT VISIT HI MDM: CPT | Mod: 25 | Performed by: EMERGENCY MEDICINE

## 2019-01-01 PROCEDURE — P9037 PLATE PHERES LEUKOREDU IRRAD: HCPCS

## 2019-01-01 PROCEDURE — 31624 DX BRONCHOSCOPE/LAVAGE: CPT | Mod: GC | Performed by: INTERNAL MEDICINE

## 2019-01-01 PROCEDURE — 36245 INS CATH ABD/L-EXT ART 1ST: CPT | Mod: RT | Performed by: INTERNAL MEDICINE

## 2019-01-01 PROCEDURE — 80202 ASSAY OF VANCOMYCIN: CPT | Performed by: INTERNAL MEDICINE

## 2019-01-01 PROCEDURE — 25800025 ZZH RX 258: Performed by: STUDENT IN AN ORGANIZED HEALTH CARE EDUCATION/TRAINING PROGRAM

## 2019-01-01 PROCEDURE — 87086 URINE CULTURE/COLONY COUNT: CPT | Performed by: HOSPITALIST

## 2019-01-01 PROCEDURE — 87077 CULTURE AEROBIC IDENTIFY: CPT | Performed by: EMERGENCY MEDICINE

## 2019-01-01 PROCEDURE — 99207 ZZC NO CHARGE LOS: CPT | Performed by: CLINICAL NURSE SPECIALIST

## 2019-01-01 PROCEDURE — 85045 AUTOMATED RETICULOCYTE COUNT: CPT | Performed by: STUDENT IN AN ORGANIZED HEALTH CARE EDUCATION/TRAINING PROGRAM

## 2019-01-01 PROCEDURE — 0B9D8ZX DRAINAGE OF RIGHT MIDDLE LUNG LOBE, VIA NATURAL OR ARTIFICIAL OPENING ENDOSCOPIC, DIAGNOSTIC: ICD-10-PCS | Performed by: INTERNAL MEDICINE

## 2019-01-01 PROCEDURE — 36416 COLLJ CAPILLARY BLOOD SPEC: CPT | Performed by: STUDENT IN AN ORGANIZED HEALTH CARE EDUCATION/TRAINING PROGRAM

## 2019-01-01 PROCEDURE — 96375 TX/PRO/DX INJ NEW DRUG ADDON: CPT | Performed by: EMERGENCY MEDICINE

## 2019-01-01 PROCEDURE — 80048 BASIC METABOLIC PNL TOTAL CA: CPT | Performed by: INTERNAL MEDICINE

## 2019-01-01 PROCEDURE — 99233 SBSQ HOSP IP/OBS HIGH 50: CPT | Performed by: CLINICAL NURSE SPECIALIST

## 2019-01-01 PROCEDURE — 87077 CULTURE AEROBIC IDENTIFY: CPT | Performed by: STUDENT IN AN ORGANIZED HEALTH CARE EDUCATION/TRAINING PROGRAM

## 2019-01-01 PROCEDURE — 84484 ASSAY OF TROPONIN QUANT: CPT | Performed by: STUDENT IN AN ORGANIZED HEALTH CARE EDUCATION/TRAINING PROGRAM

## 2019-01-01 PROCEDURE — 87081 CULTURE SCREEN ONLY: CPT | Performed by: INTERNAL MEDICINE

## 2019-01-01 PROCEDURE — 97166 OT EVAL MOD COMPLEX 45 MIN: CPT | Mod: GO | Performed by: OCCUPATIONAL THERAPIST

## 2019-01-01 PROCEDURE — 86709 HEPATITIS A IGM ANTIBODY: CPT | Performed by: STUDENT IN AN ORGANIZED HEALTH CARE EDUCATION/TRAINING PROGRAM

## 2019-01-01 PROCEDURE — 87040 BLOOD CULTURE FOR BACTERIA: CPT | Performed by: STUDENT IN AN ORGANIZED HEALTH CARE EDUCATION/TRAINING PROGRAM

## 2019-01-01 PROCEDURE — 83550 IRON BINDING TEST: CPT | Performed by: STUDENT IN AN ORGANIZED HEALTH CARE EDUCATION/TRAINING PROGRAM

## 2019-01-01 PROCEDURE — 40000007 ZZH STATISTIC ADULT CD FACE TO FACE-NO CHRG

## 2019-01-01 PROCEDURE — 84145 PROCALCITONIN (PCT): CPT | Performed by: INTERNAL MEDICINE

## 2019-01-01 PROCEDURE — 82945 GLUCOSE OTHER FLUID: CPT | Performed by: STUDENT IN AN ORGANIZED HEALTH CARE EDUCATION/TRAINING PROGRAM

## 2019-01-01 PROCEDURE — 99223 1ST HOSP IP/OBS HIGH 75: CPT | Performed by: FAMILY MEDICINE

## 2019-01-01 PROCEDURE — 32555 ASPIRATE PLEURA W/ IMAGING: CPT

## 2019-01-01 PROCEDURE — 32555 ASPIRATE PLEURA W/ IMAGING: CPT | Performed by: INTERNAL MEDICINE

## 2019-01-01 PROCEDURE — 99223 1ST HOSP IP/OBS HIGH 75: CPT | Performed by: CLINICAL NURSE SPECIALIST

## 2019-01-01 PROCEDURE — 0W9G3ZZ DRAINAGE OF PERITONEAL CAVITY, PERCUTANEOUS APPROACH: ICD-10-PCS | Performed by: RADIOLOGY

## 2019-01-01 PROCEDURE — 87186 SC STD MICRODIL/AGAR DIL: CPT | Performed by: EMERGENCY MEDICINE

## 2019-01-01 PROCEDURE — 94002 VENT MGMT INPAT INIT DAY: CPT

## 2019-01-01 PROCEDURE — 25000128 H RX IP 250 OP 636

## 2019-01-01 PROCEDURE — 93975 VASCULAR STUDY: CPT | Mod: TC

## 2019-01-01 PROCEDURE — 87493 C DIFF AMPLIFIED PROBE: CPT | Performed by: STUDENT IN AN ORGANIZED HEALTH CARE EDUCATION/TRAINING PROGRAM

## 2019-01-01 PROCEDURE — 0W993ZZ DRAINAGE OF RIGHT PLEURAL CAVITY, PERCUTANEOUS APPROACH: ICD-10-PCS | Performed by: RADIOLOGY

## 2019-01-01 PROCEDURE — 87899 AGENT NOS ASSAY W/OPTIC: CPT | Performed by: STUDENT IN AN ORGANIZED HEALTH CARE EDUCATION/TRAINING PROGRAM

## 2019-01-01 PROCEDURE — 84295 ASSAY OF SERUM SODIUM: CPT

## 2019-01-01 PROCEDURE — 76705 ECHO EXAM OF ABDOMEN: CPT | Performed by: INTERNAL MEDICINE

## 2019-01-01 PROCEDURE — 97164 PT RE-EVAL EST PLAN CARE: CPT | Mod: GP | Performed by: REHABILITATION PRACTITIONER

## 2019-01-01 PROCEDURE — 86900 BLOOD TYPING SEROLOGIC ABO: CPT | Performed by: EMERGENCY MEDICINE

## 2019-01-01 PROCEDURE — 87015 SPECIMEN INFECT AGNT CONCNTJ: CPT | Performed by: STUDENT IN AN ORGANIZED HEALTH CARE EDUCATION/TRAINING PROGRAM

## 2019-01-01 PROCEDURE — 0B9B8ZZ DRAINAGE OF LEFT LOWER LOBE BRONCHUS, VIA NATURAL OR ARTIFICIAL OPENING ENDOSCOPIC: ICD-10-PCS | Performed by: INTERNAL MEDICINE

## 2019-01-01 PROCEDURE — 99222 1ST HOSP IP/OBS MODERATE 55: CPT | Mod: GC | Performed by: INTERNAL MEDICINE

## 2019-01-01 PROCEDURE — P9041 ALBUMIN (HUMAN),5%, 50ML: HCPCS | Performed by: STUDENT IN AN ORGANIZED HEALTH CARE EDUCATION/TRAINING PROGRAM

## 2019-01-01 PROCEDURE — 82150 ASSAY OF AMYLASE: CPT | Performed by: STUDENT IN AN ORGANIZED HEALTH CARE EDUCATION/TRAINING PROGRAM

## 2019-01-01 PROCEDURE — 87086 URINE CULTURE/COLONY COUNT: CPT | Performed by: STUDENT IN AN ORGANIZED HEALTH CARE EDUCATION/TRAINING PROGRAM

## 2019-01-01 PROCEDURE — 99291 CRITICAL CARE FIRST HOUR: CPT | Mod: Z6 | Performed by: EMERGENCY MEDICINE

## 2019-01-01 PROCEDURE — 82247 BILIRUBIN TOTAL: CPT | Performed by: STUDENT IN AN ORGANIZED HEALTH CARE EDUCATION/TRAINING PROGRAM

## 2019-01-01 PROCEDURE — 82140 ASSAY OF AMMONIA: CPT | Performed by: EMERGENCY MEDICINE

## 2019-01-01 PROCEDURE — 84157 ASSAY OF PROTEIN OTHER: CPT | Performed by: INTERNAL MEDICINE

## 2019-01-01 PROCEDURE — 36556 INSERT NON-TUNNEL CV CATH: CPT | Mod: LT | Performed by: INTERNAL MEDICINE

## 2019-01-01 PROCEDURE — P9059 PLASMA, FRZ BETWEEN 8-24HOUR: HCPCS | Performed by: INTERNAL MEDICINE

## 2019-01-01 PROCEDURE — 0W9G3ZZ DRAINAGE OF PERITONEAL CAVITY, PERCUTANEOUS APPROACH: ICD-10-PCS | Performed by: PEDIATRICS

## 2019-01-01 PROCEDURE — 87305 ASPERGILLUS AG IA: CPT | Performed by: INTERNAL MEDICINE

## 2019-01-01 PROCEDURE — 87116 MYCOBACTERIA CULTURE: CPT | Performed by: INTERNAL MEDICINE

## 2019-01-01 PROCEDURE — 84295 ASSAY OF SERUM SODIUM: CPT | Performed by: INTERNAL MEDICINE

## 2019-01-01 PROCEDURE — 40000344 ZZHCL STATISTIC THAWING COMPONENT: Performed by: INTERNAL MEDICINE

## 2019-01-01 PROCEDURE — 86923 COMPATIBILITY TEST ELECTRIC: CPT | Performed by: STUDENT IN AN ORGANIZED HEALTH CARE EDUCATION/TRAINING PROGRAM

## 2019-01-01 PROCEDURE — 80048 BASIC METABOLIC PNL TOTAL CA: CPT | Performed by: HOSPITALIST

## 2019-01-01 PROCEDURE — 84075 ASSAY ALKALINE PHOSPHATASE: CPT

## 2019-01-01 PROCEDURE — 85018 HEMOGLOBIN: CPT | Performed by: INTERNAL MEDICINE

## 2019-01-01 PROCEDURE — 40000611 ZZHCL STATISTIC MORPHOLOGY W/INTERP HEMEPATH TC 85060: Performed by: STUDENT IN AN ORGANIZED HEALTH CARE EDUCATION/TRAINING PROGRAM

## 2019-01-01 PROCEDURE — 40000671 ZZH STATISTIC ANESTHESIA CASE

## 2019-01-01 PROCEDURE — 84439 ASSAY OF FREE THYROXINE: CPT | Performed by: STUDENT IN AN ORGANIZED HEALTH CARE EDUCATION/TRAINING PROGRAM

## 2019-01-01 PROCEDURE — 87106 FUNGI IDENTIFICATION YEAST: CPT | Performed by: STUDENT IN AN ORGANIZED HEALTH CARE EDUCATION/TRAINING PROGRAM

## 2019-01-01 PROCEDURE — 84484 ASSAY OF TROPONIN QUANT: CPT | Performed by: EMERGENCY MEDICINE

## 2019-01-01 PROCEDURE — 87493 C DIFF AMPLIFIED PROBE: CPT | Performed by: INTERNAL MEDICINE

## 2019-01-01 PROCEDURE — 99309 SBSQ NF CARE MODERATE MDM 30: CPT | Performed by: INTERNAL MEDICINE

## 2019-01-01 PROCEDURE — 82247 BILIRUBIN TOTAL: CPT

## 2019-01-01 PROCEDURE — 86923 COMPATIBILITY TEST ELECTRIC: CPT | Performed by: EMERGENCY MEDICINE

## 2019-01-01 PROCEDURE — 25800030 ZZH RX IP 258 OP 636: Performed by: NURSE ANESTHETIST, CERTIFIED REGISTERED

## 2019-01-01 PROCEDURE — P9041 ALBUMIN (HUMAN),5%, 50ML: HCPCS | Performed by: EMERGENCY MEDICINE

## 2019-01-01 PROCEDURE — 99285 EMERGENCY DEPT VISIT HI MDM: CPT | Mod: 25

## 2019-01-01 PROCEDURE — 25500064 ZZH RX 255 OP 636: Performed by: SURGERY

## 2019-01-01 PROCEDURE — 27210300 ZZH CANNULA HIGH FLOW, ADULT

## 2019-01-01 PROCEDURE — 27210432 ZZH NUTRITION PRODUCT RENAL BASIC LITER

## 2019-01-01 PROCEDURE — 99309 SBSQ NF CARE MODERATE MDM 30: CPT | Performed by: HOSPITALIST

## 2019-01-01 PROCEDURE — 99308 SBSQ NF CARE LOW MDM 20: CPT | Performed by: INTERNAL MEDICINE

## 2019-01-01 PROCEDURE — 86901 BLOOD TYPING SEROLOGIC RH(D): CPT | Performed by: EMERGENCY MEDICINE

## 2019-01-01 PROCEDURE — 34300033 ZZH RX 343: Performed by: INTERNAL MEDICINE

## 2019-01-01 PROCEDURE — 85610 PROTHROMBIN TIME: CPT | Performed by: HOSPITALIST

## 2019-01-01 PROCEDURE — 81001 URINALYSIS AUTO W/SCOPE: CPT | Performed by: EMERGENCY MEDICINE

## 2019-01-01 PROCEDURE — 76882 US LMTD JT/FCL EVL NVASC XTR: CPT

## 2019-01-01 PROCEDURE — 87070 CULTURE OTHR SPECIMN AEROBIC: CPT

## 2019-01-01 PROCEDURE — 31624 DX BRONCHOSCOPE/LAVAGE: CPT

## 2019-01-01 PROCEDURE — 97110 THERAPEUTIC EXERCISES: CPT | Mod: GP | Performed by: REHABILITATION PRACTITIONER

## 2019-01-01 PROCEDURE — 84484 ASSAY OF TROPONIN QUANT: CPT | Performed by: CLINICAL NURSE SPECIALIST

## 2019-01-01 PROCEDURE — 99207 ZZC CDG-MDM COMPONENT: MEETS LOW - DOWN CODED: CPT | Performed by: INTERNAL MEDICINE

## 2019-01-01 PROCEDURE — 87449 NOS EACH ORGANISM AG IA: CPT | Performed by: INTERNAL MEDICINE

## 2019-01-01 RX ORDER — LACTULOSE 10 G/15ML
20 SOLUTION ORAL 3 TIMES DAILY
Status: ON HOLD | COMMUNITY
End: 2019-01-01

## 2019-01-01 RX ORDER — PROPOFOL 10 MG/ML
INJECTION, EMULSION INTRAVENOUS PRN
Status: DISCONTINUED | OUTPATIENT
Start: 2019-01-01 | End: 2019-01-01

## 2019-01-01 RX ORDER — ALUMINA, MAGNESIA, AND SIMETHICONE 2400; 2400; 240 MG/30ML; MG/30ML; MG/30ML
20 SUSPENSION ORAL EVERY 4 HOURS PRN
Qty: 769 ML | Refills: 1 | Status: SHIPPED | OUTPATIENT
Start: 2019-01-01 | End: 2019-01-01

## 2019-01-01 RX ORDER — FENTANYL CITRATE 50 UG/ML
25-50 INJECTION, SOLUTION INTRAMUSCULAR; INTRAVENOUS
Status: DISCONTINUED | OUTPATIENT
Start: 2019-01-01 | End: 2019-01-01

## 2019-01-01 RX ORDER — HEPARIN SODIUM,PORCINE 10 UNIT/ML
5-10 VIAL (ML) INTRAVENOUS EVERY 24 HOURS
Status: DISCONTINUED | OUTPATIENT
Start: 2019-01-01 | End: 2019-01-01 | Stop reason: HOSPADM

## 2019-01-01 RX ORDER — ALUMINA, MAGNESIA, AND SIMETHICONE 2400; 2400; 240 MG/30ML; MG/30ML; MG/30ML
20 SUSPENSION ORAL EVERY 4 HOURS PRN
Qty: 355 ML | Refills: 1 | Status: SHIPPED | OUTPATIENT
Start: 2019-01-01

## 2019-01-01 RX ORDER — AMINO AC/PROTEIN HYDR/WHEY PRO 10G-100/30
1 LIQUID (ML) ORAL 3 TIMES DAILY
Status: DISCONTINUED | OUTPATIENT
Start: 2019-01-01 | End: 2019-01-01

## 2019-01-01 RX ORDER — MIDAZOLAM (PF) 1 MG/ML IN 0.9 % SODIUM CHLORIDE INTRAVENOUS SOLUTION
1-10 CONTINUOUS
Status: DISCONTINUED | OUTPATIENT
Start: 2019-01-01 | End: 2019-01-01

## 2019-01-01 RX ORDER — LANOLIN ALCOHOL/MO/W.PET/CERES
3 CREAM (GRAM) TOPICAL AT BEDTIME
Status: ON HOLD | COMMUNITY
End: 2019-01-01

## 2019-01-01 RX ORDER — HEPARIN SODIUM 5000 [USP'U]/.5ML
5000 INJECTION, SOLUTION INTRAVENOUS; SUBCUTANEOUS EVERY 12 HOURS
Status: DISCONTINUED | OUTPATIENT
Start: 2019-01-01 | End: 2019-01-01

## 2019-01-01 RX ORDER — LACTULOSE 10 G/15ML
200 SOLUTION ORAL EVERY 4 HOURS
Status: DISCONTINUED | OUTPATIENT
Start: 2019-01-01 | End: 2019-01-01

## 2019-01-01 RX ORDER — DEXTROSE MONOHYDRATE 100 MG/ML
INJECTION, SOLUTION INTRAVENOUS CONTINUOUS
Status: DISCONTINUED | OUTPATIENT
Start: 2019-01-01 | End: 2019-01-01

## 2019-01-01 RX ORDER — LACTULOSE 10 G/15ML
20 SOLUTION ORAL EVERY 6 HOURS
Status: DISCONTINUED | OUTPATIENT
Start: 2019-01-01 | End: 2019-01-01

## 2019-01-01 RX ORDER — POTASSIUM CHLORIDE 1500 MG/1
20 TABLET, EXTENDED RELEASE ORAL DAILY
Status: ON HOLD | DISCHARGE
Start: 2019-01-01 | End: 2019-01-01

## 2019-01-01 RX ORDER — POLYETHYLENE GLYCOL 3350 17 G/17G
17 POWDER, FOR SOLUTION ORAL DAILY
Status: DISCONTINUED | OUTPATIENT
Start: 2019-01-01 | End: 2019-01-01

## 2019-01-01 RX ORDER — POTASSIUM CHLORIDE 29.8 MG/ML
20 INJECTION INTRAVENOUS
Status: DISCONTINUED | OUTPATIENT
Start: 2019-01-01 | End: 2019-01-01 | Stop reason: HOSPADM

## 2019-01-01 RX ORDER — ALBUTEROL SULFATE 0.83 MG/ML
SOLUTION RESPIRATORY (INHALATION)
Status: COMPLETED
Start: 2019-01-01 | End: 2019-01-01

## 2019-01-01 RX ORDER — CEFTRIAXONE 2 G/1
2 INJECTION, POWDER, FOR SOLUTION INTRAMUSCULAR; INTRAVENOUS EVERY 24 HOURS
Status: COMPLETED | OUTPATIENT
Start: 2019-01-01 | End: 2019-01-01

## 2019-01-01 RX ORDER — POTASSIUM CHLORIDE 7.45 MG/ML
10 INJECTION INTRAVENOUS
Status: DISCONTINUED | OUTPATIENT
Start: 2019-01-01 | End: 2019-01-01 | Stop reason: HOSPADM

## 2019-01-01 RX ORDER — ERTAPENEM 1 G/1
1 INJECTION, POWDER, LYOPHILIZED, FOR SOLUTION INTRAMUSCULAR; INTRAVENOUS EVERY 24 HOURS
Status: DISCONTINUED | OUTPATIENT
Start: 2019-01-01 | End: 2019-01-01

## 2019-01-01 RX ORDER — MULTIVITAMIN,THERAPEUTIC
1 TABLET ORAL DAILY
Status: DISCONTINUED | OUTPATIENT
Start: 2019-01-01 | End: 2019-01-01 | Stop reason: HOSPADM

## 2019-01-01 RX ORDER — ALBUMIN, HUMAN INJ 5% 5 %
50 SOLUTION INTRAVENOUS ONCE
Status: DISCONTINUED | OUTPATIENT
Start: 2019-01-01 | End: 2019-01-01

## 2019-01-01 RX ORDER — MEROPENEM 1 G/1
1 INJECTION, POWDER, FOR SOLUTION INTRAVENOUS EVERY 8 HOURS
Status: DISCONTINUED | OUTPATIENT
Start: 2019-01-01 | End: 2019-01-01

## 2019-01-01 RX ORDER — NOREPINEPHRINE BITARTRATE 0.06 MG/ML
.03-1 INJECTION, SOLUTION INTRAVENOUS CONTINUOUS
Status: DISCONTINUED | OUTPATIENT
Start: 2019-01-01 | End: 2019-01-01 | Stop reason: HOSPADM

## 2019-01-01 RX ORDER — ONDANSETRON 4 MG/1
4 TABLET, ORALLY DISINTEGRATING ORAL EVERY 6 HOURS PRN
Status: DISCONTINUED | OUTPATIENT
Start: 2019-01-01 | End: 2019-01-01

## 2019-01-01 RX ORDER — LACTULOSE 10 G/15ML
SOLUTION ORAL
Qty: 1892 ML | Refills: 3 | Status: SHIPPED | OUTPATIENT
Start: 2019-01-01 | End: 2019-01-01

## 2019-01-01 RX ORDER — MULTIVITAMIN,THERAPEUTIC
1 TABLET ORAL DAILY
Qty: 30 TABLET | Refills: 0 | Status: SHIPPED | OUTPATIENT
Start: 2019-01-01

## 2019-01-01 RX ORDER — SERTRALINE HYDROCHLORIDE 25 MG/1
75 TABLET, FILM COATED ORAL DAILY
Status: DISCONTINUED | OUTPATIENT
Start: 2019-01-01 | End: 2019-01-01 | Stop reason: HOSPADM

## 2019-01-01 RX ORDER — LACTULOSE 10 G/15ML
20 SOLUTION ORAL
Status: DISCONTINUED | OUTPATIENT
Start: 2019-01-01 | End: 2019-01-01 | Stop reason: HOSPADM

## 2019-01-01 RX ORDER — MULTIVITAMIN,THERAPEUTIC
1 TABLET ORAL DAILY
Status: ON HOLD | COMMUNITY
End: 2019-01-01

## 2019-01-01 RX ORDER — SPIRONOLACTONE 25 MG
12.5 TABLET ORAL DAILY
Status: DISCONTINUED | OUTPATIENT
Start: 2019-01-01 | End: 2019-01-01

## 2019-01-01 RX ORDER — LACTULOSE 10 G/15ML
20 SOLUTION ORAL 3 TIMES DAILY
Status: DISCONTINUED | OUTPATIENT
Start: 2019-01-01 | End: 2019-01-01

## 2019-01-01 RX ORDER — PHENYLEPHRINE HCL IN 0.9% NACL 50MG/250ML
0.5-6 PLASTIC BAG, INJECTION (ML) INTRAVENOUS CONTINUOUS
Status: DISCONTINUED | OUTPATIENT
Start: 2019-01-01 | End: 2019-01-01 | Stop reason: HOSPADM

## 2019-01-01 RX ORDER — FUROSEMIDE 20 MG
20 TABLET ORAL DAILY
Status: DISCONTINUED | OUTPATIENT
Start: 2019-01-01 | End: 2019-01-01

## 2019-01-01 RX ORDER — NALOXONE HYDROCHLORIDE 0.4 MG/ML
.1-.4 INJECTION, SOLUTION INTRAMUSCULAR; INTRAVENOUS; SUBCUTANEOUS
Status: DISCONTINUED | OUTPATIENT
Start: 2019-01-01 | End: 2019-01-01 | Stop reason: HOSPADM

## 2019-01-01 RX ORDER — LIDOCAINE HYDROCHLORIDE 10 MG/ML
INJECTION, SOLUTION EPIDURAL; INFILTRATION; INTRACAUDAL; PERINEURAL
Status: DISCONTINUED
Start: 2019-01-01 | End: 2019-01-01 | Stop reason: HOSPADM

## 2019-01-01 RX ORDER — MULTIVIT-MIN/IRON/FOLIC ACID/K 18-600-40
1 CAPSULE ORAL DAILY
Qty: 30 TABLET | Refills: 0 | Status: SHIPPED | OUTPATIENT
Start: 2019-01-01

## 2019-01-01 RX ORDER — POLYETHYLENE GLYCOL 3350 17 G/17G
17 POWDER, FOR SOLUTION ORAL DAILY
Status: DISCONTINUED | OUTPATIENT
Start: 2019-01-01 | End: 2019-01-01 | Stop reason: HOSPADM

## 2019-01-01 RX ORDER — POTASSIUM CHLORIDE 750 MG/1
20-40 TABLET, EXTENDED RELEASE ORAL
Status: DISCONTINUED | OUTPATIENT
Start: 2019-01-01 | End: 2019-01-01 | Stop reason: HOSPADM

## 2019-01-01 RX ORDER — PANTOPRAZOLE SODIUM 40 MG/1
40 TABLET, DELAYED RELEASE ORAL DAILY
Status: DISCONTINUED | OUTPATIENT
Start: 2019-01-01 | End: 2019-01-01

## 2019-01-01 RX ORDER — ONDANSETRON 4 MG/1
4 TABLET, ORALLY DISINTEGRATING ORAL EVERY 6 HOURS PRN
Qty: 30 TABLET | Refills: 0 | Status: SHIPPED | OUTPATIENT
Start: 2019-01-01

## 2019-01-01 RX ORDER — ONDANSETRON 4 MG/1
4 TABLET, ORALLY DISINTEGRATING ORAL EVERY 6 HOURS PRN
Status: ON HOLD | DISCHARGE
Start: 2019-01-01 | End: 2019-01-01

## 2019-01-01 RX ORDER — HYDROXYZINE HYDROCHLORIDE 25 MG/1
25-50 TABLET, FILM COATED ORAL EVERY 6 HOURS PRN
Status: DISCONTINUED | OUTPATIENT
Start: 2019-01-01 | End: 2019-01-01 | Stop reason: HOSPADM

## 2019-01-01 RX ORDER — SODIUM CHLORIDE AND POTASSIUM CHLORIDE 150; 900 MG/100ML; MG/100ML
INJECTION, SOLUTION INTRAVENOUS CONTINUOUS
Status: DISCONTINUED | OUTPATIENT
Start: 2019-01-01 | End: 2019-01-01

## 2019-01-01 RX ORDER — MAGNESIUM SULFATE HEPTAHYDRATE 40 MG/ML
4 INJECTION, SOLUTION INTRAVENOUS EVERY 4 HOURS PRN
Status: DISCONTINUED | OUTPATIENT
Start: 2019-01-01 | End: 2019-01-01

## 2019-01-01 RX ORDER — ACETYLCYSTEINE 200 MG/ML
2 SOLUTION ORAL; RESPIRATORY (INHALATION) EVERY 4 HOURS
Status: DISCONTINUED | OUTPATIENT
Start: 2019-01-01 | End: 2019-01-01

## 2019-01-01 RX ORDER — FENTANYL CITRATE 50 UG/ML
25 INJECTION, SOLUTION INTRAMUSCULAR; INTRAVENOUS ONCE
Status: COMPLETED | OUTPATIENT
Start: 2019-01-01 | End: 2019-01-01

## 2019-01-01 RX ORDER — NALOXONE HYDROCHLORIDE 0.4 MG/ML
.1-.4 INJECTION, SOLUTION INTRAMUSCULAR; INTRAVENOUS; SUBCUTANEOUS
Status: DISCONTINUED | OUTPATIENT
Start: 2019-01-01 | End: 2019-01-01

## 2019-01-01 RX ORDER — LIDOCAINE HYDROCHLORIDE 20 MG/ML
JELLY TOPICAL
Status: DISCONTINUED | OUTPATIENT
Start: 2019-01-01 | End: 2019-01-01 | Stop reason: HOSPADM

## 2019-01-01 RX ORDER — LACTULOSE 10 G/15ML
10 SOLUTION ORAL ONCE
Status: COMPLETED | OUTPATIENT
Start: 2019-01-01 | End: 2019-01-01

## 2019-01-01 RX ORDER — PANTOPRAZOLE SODIUM 40 MG/1
40 TABLET, DELAYED RELEASE ORAL DAILY
Status: DISCONTINUED | OUTPATIENT
Start: 2019-01-01 | End: 2019-01-01 | Stop reason: HOSPADM

## 2019-01-01 RX ORDER — FUROSEMIDE 20 MG
20 TABLET ORAL DAILY
Status: ON HOLD | COMMUNITY
End: 2019-01-01

## 2019-01-01 RX ORDER — POTASSIUM CHLORIDE 29.8 MG/ML
20 INJECTION INTRAVENOUS EVERY 6 HOURS PRN
Status: DISCONTINUED | OUTPATIENT
Start: 2019-01-01 | End: 2019-01-01 | Stop reason: HOSPADM

## 2019-01-01 RX ORDER — SERTRALINE HYDROCHLORIDE 25 MG/1
75 TABLET, FILM COATED ORAL DAILY
Status: ON HOLD | DISCHARGE
Start: 2019-01-01 | End: 2019-01-01

## 2019-01-01 RX ORDER — ASCORBIC ACID 250 MG
500 TABLET,CHEWABLE ORAL DAILY
Status: DISCONTINUED | OUTPATIENT
Start: 2019-01-01 | End: 2019-01-01

## 2019-01-01 RX ORDER — LACTULOSE 10 G/15ML
100 SOLUTION ORAL
Status: DISCONTINUED | OUTPATIENT
Start: 2019-01-01 | End: 2019-01-01

## 2019-01-01 RX ORDER — MIDODRINE HYDROCHLORIDE 10 MG/1
10 TABLET ORAL
Qty: 90 TABLET | Refills: 0 | Status: SHIPPED | OUTPATIENT
Start: 2019-01-01

## 2019-01-01 RX ORDER — HYDROXYZINE HYDROCHLORIDE 25 MG/1
25 TABLET, FILM COATED ORAL EVERY 6 HOURS PRN
Status: ON HOLD | COMMUNITY
End: 2019-01-01

## 2019-01-01 RX ORDER — ONDANSETRON 2 MG/ML
4 INJECTION INTRAMUSCULAR; INTRAVENOUS EVERY 6 HOURS PRN
Status: DISCONTINUED | OUTPATIENT
Start: 2019-01-01 | End: 2019-01-01 | Stop reason: HOSPADM

## 2019-01-01 RX ORDER — PROCHLORPERAZINE MALEATE 5 MG
10 TABLET ORAL EVERY 6 HOURS PRN
Status: DISCONTINUED | OUTPATIENT
Start: 2019-01-01 | End: 2019-01-01 | Stop reason: HOSPADM

## 2019-01-01 RX ORDER — POTASSIUM CHLORIDE 7.45 MG/ML
10 INJECTION INTRAVENOUS
Status: DISCONTINUED | OUTPATIENT
Start: 2019-01-01 | End: 2019-01-01

## 2019-01-01 RX ORDER — NICOTINE POLACRILEX 4 MG
15-30 LOZENGE BUCCAL
Status: CANCELLED | OUTPATIENT
Start: 2019-01-01

## 2019-01-01 RX ORDER — ONDANSETRON 4 MG/1
4 TABLET, ORALLY DISINTEGRATING ORAL EVERY 6 HOURS PRN
Status: DISCONTINUED | OUTPATIENT
Start: 2019-01-01 | End: 2019-01-01 | Stop reason: HOSPADM

## 2019-01-01 RX ORDER — PANTOPRAZOLE SODIUM 40 MG/1
40 TABLET, DELAYED RELEASE ORAL
Status: DISCONTINUED | OUTPATIENT
Start: 2019-01-01 | End: 2019-01-01 | Stop reason: HOSPADM

## 2019-01-01 RX ORDER — FUROSEMIDE 20 MG
20 TABLET ORAL ONCE
Status: COMPLETED | OUTPATIENT
Start: 2019-01-01 | End: 2019-01-01

## 2019-01-01 RX ORDER — HYDROXYZINE HYDROCHLORIDE 25 MG/1
25 TABLET, FILM COATED ORAL EVERY 6 HOURS PRN
Status: DISCONTINUED | OUTPATIENT
Start: 2019-01-01 | End: 2019-01-01 | Stop reason: HOSPADM

## 2019-01-01 RX ORDER — ACETYLCYSTEINE 200 MG/ML
2 SOLUTION ORAL; RESPIRATORY (INHALATION) EVERY 4 HOURS PRN
Status: DISCONTINUED | OUTPATIENT
Start: 2019-01-01 | End: 2019-01-01 | Stop reason: HOSPADM

## 2019-01-01 RX ORDER — HYDROXYZINE HYDROCHLORIDE 25 MG/1
25 TABLET, FILM COATED ORAL EVERY 6 HOURS PRN
Status: DISCONTINUED | OUTPATIENT
Start: 2019-01-01 | End: 2019-01-01

## 2019-01-01 RX ORDER — PHYTONADIONE 1 MG/.5ML
10 INJECTION, EMULSION INTRAMUSCULAR; INTRAVENOUS; SUBCUTANEOUS DAILY
Status: DISCONTINUED | OUTPATIENT
Start: 2019-01-01 | End: 2019-01-01

## 2019-01-01 RX ORDER — HYDROMORPHONE HCL/0.9% NACL/PF 0.2MG/0.2
0.2 SYRINGE (ML) INTRAVENOUS EVERY 4 HOURS PRN
Status: DISCONTINUED | OUTPATIENT
Start: 2019-01-01 | End: 2019-01-01

## 2019-01-01 RX ORDER — ERTAPENEM 1 G/1
1 INJECTION, POWDER, LYOPHILIZED, FOR SOLUTION INTRAMUSCULAR; INTRAVENOUS ONCE
Status: COMPLETED | OUTPATIENT
Start: 2019-01-01 | End: 2019-01-01

## 2019-01-01 RX ORDER — MIDODRINE HYDROCHLORIDE 5 MG/1
10 TABLET ORAL
Status: DISCONTINUED | OUTPATIENT
Start: 2019-01-01 | End: 2019-01-01

## 2019-01-01 RX ORDER — POTASSIUM CHLORIDE 750 MG/1
20 TABLET, EXTENDED RELEASE ORAL DAILY
Status: DISCONTINUED | OUTPATIENT
Start: 2019-01-01 | End: 2019-01-01

## 2019-01-01 RX ORDER — B COMPLEX C NO.10/FOLIC ACID 900MCG/5ML
5 LIQUID (ML) ORAL DAILY
Status: DISCONTINUED | OUTPATIENT
Start: 2019-01-01 | End: 2019-01-01 | Stop reason: HOSPADM

## 2019-01-01 RX ORDER — LIDOCAINE 4 G/G
1-2 PATCH TOPICAL
Status: DISCONTINUED | OUTPATIENT
Start: 2019-01-01 | End: 2019-01-01 | Stop reason: HOSPADM

## 2019-01-01 RX ORDER — HYDROMORPHONE HYDROCHLORIDE 2 MG/1
2 TABLET ORAL
Status: DISCONTINUED | OUTPATIENT
Start: 2019-01-01 | End: 2019-01-01

## 2019-01-01 RX ORDER — SPIRONOLACTONE 50 MG/1
50 TABLET, FILM COATED ORAL DAILY
Status: DISCONTINUED | OUTPATIENT
Start: 2019-01-01 | End: 2019-01-01

## 2019-01-01 RX ORDER — LIDOCAINE 4 G/G
1 PATCH TOPICAL
Status: DISCONTINUED | OUTPATIENT
Start: 2019-01-01 | End: 2019-01-01

## 2019-01-01 RX ORDER — MEROPENEM 1 G/1
1 INJECTION, POWDER, FOR SOLUTION INTRAVENOUS EVERY 12 HOURS
Status: DISCONTINUED | OUTPATIENT
Start: 2019-01-01 | End: 2019-01-01

## 2019-01-01 RX ORDER — UBIDECARENONE 75 MG
100 CAPSULE ORAL DAILY
Status: DISCONTINUED | OUTPATIENT
Start: 2019-01-01 | End: 2019-01-01 | Stop reason: HOSPADM

## 2019-01-01 RX ORDER — LANOLIN ALCOHOL/MO/W.PET/CERES
3 CREAM (GRAM) TOPICAL AT BEDTIME
Status: DISCONTINUED | OUTPATIENT
Start: 2019-01-01 | End: 2019-01-01

## 2019-01-01 RX ORDER — PROCHLORPERAZINE 25 MG
25 SUPPOSITORY, RECTAL RECTAL EVERY 12 HOURS PRN
Status: DISCONTINUED | OUTPATIENT
Start: 2019-01-01 | End: 2019-01-01

## 2019-01-01 RX ORDER — LIDOCAINE 4 G/G
PATCH TOPICAL
Status: ON HOLD | COMMUNITY
End: 2019-01-01

## 2019-01-01 RX ORDER — SPIRONOLACTONE 25 MG/1
12.5 TABLET ORAL DAILY
Status: ON HOLD | COMMUNITY
End: 2019-01-01

## 2019-01-01 RX ORDER — FUROSEMIDE 20 MG
20 TABLET ORAL DAILY
Qty: 30 TABLET | Refills: 0 | Status: SHIPPED | OUTPATIENT
Start: 2019-01-01

## 2019-01-01 RX ORDER — DEXTROSE MONOHYDRATE 25 G/50ML
25-50 INJECTION, SOLUTION INTRAVENOUS
Status: CANCELLED | OUTPATIENT
Start: 2019-01-01

## 2019-01-01 RX ORDER — LIDOCAINE HYDROCHLORIDE 20 MG/ML
JELLY TOPICAL ONCE
Status: COMPLETED | OUTPATIENT
Start: 2019-01-01 | End: 2019-01-01

## 2019-01-01 RX ORDER — LACTULOSE 10 G/15ML
10 SOLUTION ORAL 3 TIMES DAILY
Status: DISCONTINUED | OUTPATIENT
Start: 2019-01-01 | End: 2019-01-01 | Stop reason: HOSPADM

## 2019-01-01 RX ORDER — HYDROMORPHONE HYDROCHLORIDE 1 MG/ML
.3-.5 INJECTION, SOLUTION INTRAMUSCULAR; INTRAVENOUS; SUBCUTANEOUS EVERY 10 MIN PRN
Status: DISCONTINUED | OUTPATIENT
Start: 2019-01-01 | End: 2019-01-01 | Stop reason: HOSPADM

## 2019-01-01 RX ORDER — ALBUMIN (HUMAN) 12.5 G/50ML
12.5 SOLUTION INTRAVENOUS ONCE
Status: COMPLETED | OUTPATIENT
Start: 2019-01-01 | End: 2019-01-01

## 2019-01-01 RX ORDER — POTASSIUM CHLORIDE 750 MG/1
40 TABLET, EXTENDED RELEASE ORAL ONCE
Status: COMPLETED | OUTPATIENT
Start: 2019-01-01 | End: 2019-01-01

## 2019-01-01 RX ORDER — CEFTRIAXONE 2 G/1
2 INJECTION, POWDER, FOR SOLUTION INTRAMUSCULAR; INTRAVENOUS EVERY 24 HOURS
Status: DISCONTINUED | OUTPATIENT
Start: 2019-01-01 | End: 2019-01-01

## 2019-01-01 RX ORDER — KIT FOR THE PREPARATION OF TECHNETIUM TC 99M MEBROFENIN 45 MG/10ML
4.8-7.2 INJECTION, POWDER, LYOPHILIZED, FOR SOLUTION INTRAVENOUS ONCE
Status: COMPLETED | OUTPATIENT
Start: 2019-01-01 | End: 2019-01-01

## 2019-01-01 RX ORDER — AMINO AC/PROTEIN HYDR/WHEY PRO 10G-100/30
2 LIQUID (ML) ORAL 2 TIMES DAILY
Status: DISCONTINUED | OUTPATIENT
Start: 2019-01-01 | End: 2019-01-01 | Stop reason: HOSPADM

## 2019-01-01 RX ORDER — MIDAZOLAM (PF) 1 MG/ML IN 0.9 % SODIUM CHLORIDE INTRAVENOUS SOLUTION
1-8 CONTINUOUS
Status: DISCONTINUED | OUTPATIENT
Start: 2019-01-01 | End: 2019-01-01

## 2019-01-01 RX ORDER — HYDROMORPHONE HYDROCHLORIDE 1 MG/ML
.3-.5 INJECTION, SOLUTION INTRAMUSCULAR; INTRAVENOUS; SUBCUTANEOUS EVERY 30 MIN PRN
Status: DISCONTINUED | OUTPATIENT
Start: 2019-01-01 | End: 2019-01-01 | Stop reason: HOSPADM

## 2019-01-01 RX ORDER — NICOTINE POLACRILEX 4 MG
15-30 LOZENGE BUCCAL
Status: DISCONTINUED | OUTPATIENT
Start: 2019-01-01 | End: 2019-01-01 | Stop reason: HOSPADM

## 2019-01-01 RX ORDER — ALUMINA, MAGNESIA, AND SIMETHICONE 2400; 2400; 240 MG/30ML; MG/30ML; MG/30ML
20 SUSPENSION ORAL EVERY 4 HOURS PRN
Status: DISCONTINUED | OUTPATIENT
Start: 2019-01-01 | End: 2019-01-01 | Stop reason: HOSPADM

## 2019-01-01 RX ORDER — ALBUMIN (HUMAN) 12.5 G/50ML
12.5 SOLUTION INTRAVENOUS ONCE
Status: DISCONTINUED | OUTPATIENT
Start: 2019-01-01 | End: 2019-01-01

## 2019-01-01 RX ORDER — PANTOPRAZOLE SODIUM 40 MG/1
40 TABLET, DELAYED RELEASE ORAL DAILY
Qty: 30 TABLET | Refills: 0 | Status: SHIPPED | OUTPATIENT
Start: 2019-01-01

## 2019-01-01 RX ORDER — POTASSIUM CHLORIDE 1.5 G/1.58G
20-40 POWDER, FOR SOLUTION ORAL
Status: DISCONTINUED | OUTPATIENT
Start: 2019-01-01 | End: 2019-01-01

## 2019-01-01 RX ORDER — POTASSIUM CHLORIDE 750 MG/1
20-40 TABLET, EXTENDED RELEASE ORAL
Status: DISCONTINUED | OUTPATIENT
Start: 2019-01-01 | End: 2019-01-01

## 2019-01-01 RX ORDER — ERTAPENEM 1 G/1
1 INJECTION, POWDER, LYOPHILIZED, FOR SOLUTION INTRAMUSCULAR; INTRAVENOUS EVERY 24 HOURS
Status: DISCONTINUED | OUTPATIENT
Start: 2019-01-01 | End: 2019-01-01 | Stop reason: HOSPADM

## 2019-01-01 RX ORDER — LACTULOSE 10 G/15ML
100 SOLUTION ORAL
Status: DISCONTINUED | OUTPATIENT
Start: 2019-01-01 | End: 2019-01-01 | Stop reason: HOSPADM

## 2019-01-01 RX ORDER — SIMETHICONE 80 MG
80 TABLET,CHEWABLE ORAL EVERY 6 HOURS PRN
Qty: 30 TABLET | Refills: 3 | Status: SHIPPED | OUTPATIENT
Start: 2019-01-01 | End: 2019-01-01

## 2019-01-01 RX ORDER — HYDROMORPHONE HCL/0.9% NACL/PF 0.2MG/0.2
.1-.2 SYRINGE (ML) INTRAVENOUS
Status: DISCONTINUED | OUTPATIENT
Start: 2019-01-01 | End: 2019-01-01

## 2019-01-01 RX ORDER — CEFDINIR 300 MG/1
300 CAPSULE ORAL EVERY 12 HOURS SCHEDULED
Status: DISCONTINUED | OUTPATIENT
Start: 2019-01-01 | End: 2019-01-01

## 2019-01-01 RX ORDER — ALBUTEROL SULFATE 0.83 MG/ML
SOLUTION RESPIRATORY (INHALATION)
Status: DISPENSED
Start: 2019-01-01 | End: 2019-01-01

## 2019-01-01 RX ORDER — LIDOCAINE HYDROCHLORIDE 20 MG/ML
10 SOLUTION OROPHARYNGEAL ONCE
Status: COMPLETED | OUTPATIENT
Start: 2019-01-01 | End: 2019-01-01

## 2019-01-01 RX ORDER — SODIUM CHLORIDE 9 MG/ML
INJECTION, SOLUTION INTRAVENOUS
Status: COMPLETED
Start: 2019-01-01 | End: 2019-01-01

## 2019-01-01 RX ORDER — ALBUMIN (HUMAN) 12.5 G/50ML
100 SOLUTION INTRAVENOUS DAILY
Status: DISCONTINUED | OUTPATIENT
Start: 2019-01-01 | End: 2019-01-01

## 2019-01-01 RX ORDER — LINEZOLID 2 MG/ML
600 INJECTION, SOLUTION INTRAVENOUS EVERY 12 HOURS
Status: DISCONTINUED | OUTPATIENT
Start: 2019-01-01 | End: 2019-01-01 | Stop reason: HOSPADM

## 2019-01-01 RX ORDER — SIMETHICONE 80 MG
80 TABLET,CHEWABLE ORAL EVERY 6 HOURS PRN
Status: DISCONTINUED | OUTPATIENT
Start: 2019-01-01 | End: 2019-01-01 | Stop reason: HOSPADM

## 2019-01-01 RX ORDER — IOPAMIDOL 755 MG/ML
135 INJECTION, SOLUTION INTRAVASCULAR ONCE
Status: COMPLETED | OUTPATIENT
Start: 2019-01-01 | End: 2019-01-01

## 2019-01-01 RX ORDER — CEFDINIR 300 MG/1
300 CAPSULE ORAL EVERY 12 HOURS
Qty: 14 CAPSULE | Refills: 0 | Status: SHIPPED | OUTPATIENT
Start: 2019-01-01 | End: 2019-01-01

## 2019-01-01 RX ORDER — NOREPINEPHRINE BITARTRATE 0.06 MG/ML
0.03-0.4 INJECTION, SOLUTION INTRAVENOUS CONTINUOUS
Status: DISCONTINUED | OUTPATIENT
Start: 2019-01-01 | End: 2019-01-01

## 2019-01-01 RX ORDER — ALBUMIN, HUMAN INJ 5% 5 %
12.5 SOLUTION INTRAVENOUS ONCE
Status: COMPLETED | OUTPATIENT
Start: 2019-01-01 | End: 2019-01-01

## 2019-01-01 RX ORDER — LIDOCAINE 40 MG/G
CREAM TOPICAL
Status: DISCONTINUED | OUTPATIENT
Start: 2019-01-01 | End: 2019-01-01 | Stop reason: HOSPADM

## 2019-01-01 RX ORDER — ALBUMIN, HUMAN INJ 5% 5 %
25 SOLUTION INTRAVENOUS ONCE
Status: COMPLETED | OUTPATIENT
Start: 2019-01-01 | End: 2019-01-01

## 2019-01-01 RX ORDER — LACTULOSE 10 G/15ML
10 SOLUTION ORAL 3 TIMES DAILY
Status: DISCONTINUED | OUTPATIENT
Start: 2019-01-01 | End: 2019-01-01

## 2019-01-01 RX ORDER — LIDOCAINE HYDROCHLORIDE 20 MG/ML
5 SOLUTION OROPHARYNGEAL
Status: DISCONTINUED | OUTPATIENT
Start: 2019-01-01 | End: 2019-01-01 | Stop reason: HOSPADM

## 2019-01-01 RX ORDER — MULTIVIT-MIN/IRON/FOLIC ACID/K 18-600-40
1 CAPSULE ORAL DAILY
Status: ON HOLD | COMMUNITY
End: 2019-01-01

## 2019-01-01 RX ORDER — SPIRONOLACTONE 50 MG/1
50 TABLET, FILM COATED ORAL DAILY
Status: DISCONTINUED | OUTPATIENT
Start: 2019-01-01 | End: 2019-01-01 | Stop reason: HOSPADM

## 2019-01-01 RX ORDER — LACTULOSE 10 G/15ML
20 SOLUTION ORAL
Status: DISCONTINUED | OUTPATIENT
Start: 2019-01-01 | End: 2019-01-01

## 2019-01-01 RX ORDER — ACETAMINOPHEN 325 MG/1
650 TABLET ORAL ONCE
Status: COMPLETED | OUTPATIENT
Start: 2019-01-01 | End: 2019-01-01

## 2019-01-01 RX ORDER — FENTANYL CITRATE 50 UG/ML
25 INJECTION, SOLUTION INTRAMUSCULAR; INTRAVENOUS
Status: DISCONTINUED | OUTPATIENT
Start: 2019-01-01 | End: 2019-01-01 | Stop reason: HOSPADM

## 2019-01-01 RX ORDER — LACTULOSE 10 G/15ML
20 SOLUTION ORAL 4 TIMES DAILY
Status: DISCONTINUED | OUTPATIENT
Start: 2019-01-01 | End: 2019-01-01

## 2019-01-01 RX ORDER — SPIRONOLACTONE 25 MG
12.5 TABLET ORAL DAILY
Status: DISCONTINUED | OUTPATIENT
Start: 2019-01-01 | End: 2019-01-01 | Stop reason: HOSPADM

## 2019-01-01 RX ORDER — LACTULOSE 10 G/15ML
SOLUTION ORAL
Qty: 1892 ML | Refills: 3 | Status: SHIPPED | OUTPATIENT
Start: 2019-01-01

## 2019-01-01 RX ORDER — SERTRALINE HYDROCHLORIDE 25 MG/1
75 TABLET, FILM COATED ORAL DAILY
Qty: 90 TABLET | Refills: 0 | Status: SHIPPED | OUTPATIENT
Start: 2019-01-01

## 2019-01-01 RX ORDER — PROCHLORPERAZINE 25 MG
25 SUPPOSITORY, RECTAL RECTAL EVERY 12 HOURS PRN
Status: DISCONTINUED | OUTPATIENT
Start: 2019-01-01 | End: 2019-01-01 | Stop reason: HOSPADM

## 2019-01-01 RX ORDER — ONDANSETRON 2 MG/ML
4 INJECTION INTRAMUSCULAR; INTRAVENOUS ONCE
Status: COMPLETED | OUTPATIENT
Start: 2019-01-01 | End: 2019-01-01

## 2019-01-01 RX ORDER — ALBUMIN (HUMAN) 12.5 G/50ML
50 SOLUTION INTRAVENOUS ONCE
Status: DISCONTINUED | OUTPATIENT
Start: 2019-01-01 | End: 2019-01-01

## 2019-01-01 RX ORDER — MIDAZOLAM (PF) 1 MG/ML IN 0.9 % SODIUM CHLORIDE INTRAVENOUS SOLUTION
0-5 CONTINUOUS
Status: DISCONTINUED | OUTPATIENT
Start: 2019-01-01 | End: 2019-01-01

## 2019-01-01 RX ORDER — POTASSIUM CL/LIDO/0.9 % NACL 10MEQ/0.1L
10 INTRAVENOUS SOLUTION, PIGGYBACK (ML) INTRAVENOUS
Status: DISCONTINUED | OUTPATIENT
Start: 2019-01-01 | End: 2019-01-01

## 2019-01-01 RX ORDER — CEFTRIAXONE 1 G/1
1 INJECTION, POWDER, FOR SOLUTION INTRAMUSCULAR; INTRAVENOUS EVERY 24 HOURS
Status: DISCONTINUED | OUTPATIENT
Start: 2019-01-01 | End: 2019-01-01

## 2019-01-01 RX ORDER — MAGNESIUM HYDROXIDE 1200 MG/15ML
LIQUID ORAL
Status: DISCONTINUED
Start: 2019-01-01 | End: 2019-01-01 | Stop reason: HOSPADM

## 2019-01-01 RX ORDER — SODIUM CHLORIDE 9 MG/ML
INJECTION, SOLUTION INTRAVENOUS CONTINUOUS
Status: DISCONTINUED | OUTPATIENT
Start: 2019-01-01 | End: 2019-01-01 | Stop reason: HOSPADM

## 2019-01-01 RX ORDER — MEROPENEM 1 G/1
1 INJECTION, POWDER, FOR SOLUTION INTRAVENOUS EVERY 24 HOURS
Status: DISCONTINUED | OUTPATIENT
Start: 2019-01-01 | End: 2019-01-01

## 2019-01-01 RX ORDER — LACTULOSE 10 G/10G
10 SOLUTION ORAL 3 TIMES DAILY
Status: DISCONTINUED | OUTPATIENT
Start: 2019-01-01 | End: 2019-01-01

## 2019-01-01 RX ORDER — FUROSEMIDE 20 MG
20 TABLET ORAL DAILY
Status: DISCONTINUED | OUTPATIENT
Start: 2019-01-01 | End: 2019-01-01 | Stop reason: HOSPADM

## 2019-01-01 RX ORDER — ALBUMIN (HUMAN) 12.5 G/50ML
25 SOLUTION INTRAVENOUS ONCE
Status: COMPLETED | OUTPATIENT
Start: 2019-01-01 | End: 2019-01-01

## 2019-01-01 RX ORDER — SPIRONOLACTONE 25 MG/1
50 TABLET ORAL DAILY
Qty: 60 TABLET | Refills: 0 | Status: SHIPPED | OUTPATIENT
Start: 2019-01-01

## 2019-01-01 RX ORDER — FUROSEMIDE 10 MG/ML
120 INJECTION INTRAMUSCULAR; INTRAVENOUS ONCE
Status: COMPLETED | OUTPATIENT
Start: 2019-01-01 | End: 2019-01-01

## 2019-01-01 RX ORDER — ALBUMIN (HUMAN) 12.5 G/50ML
50 SOLUTION INTRAVENOUS ONCE
Status: COMPLETED | OUTPATIENT
Start: 2019-01-01 | End: 2019-01-01

## 2019-01-01 RX ORDER — SIMETHICONE 80 MG
80 TABLET,CHEWABLE ORAL EVERY 6 HOURS PRN
Qty: 30 TABLET | Refills: 3 | Status: SHIPPED | OUTPATIENT
Start: 2019-01-01

## 2019-01-01 RX ORDER — HEPARIN SODIUM,PORCINE 10 UNIT/ML
5-10 VIAL (ML) INTRAVENOUS
Status: DISCONTINUED | OUTPATIENT
Start: 2019-01-01 | End: 2019-01-01 | Stop reason: HOSPADM

## 2019-01-01 RX ORDER — POTASSIUM CL/LIDO/0.9 % NACL 10MEQ/0.1L
10 INTRAVENOUS SOLUTION, PIGGYBACK (ML) INTRAVENOUS
Status: DISCONTINUED | OUTPATIENT
Start: 2019-01-01 | End: 2019-01-01 | Stop reason: HOSPADM

## 2019-01-01 RX ORDER — POTASSIUM CHLORIDE 1.5 G/1.58G
20-40 POWDER, FOR SOLUTION ORAL
Status: DISCONTINUED | OUTPATIENT
Start: 2019-01-01 | End: 2019-01-01 | Stop reason: HOSPADM

## 2019-01-01 RX ORDER — LANOLIN ALCOHOL/MO/W.PET/CERES
3 CREAM (GRAM) TOPICAL AT BEDTIME
Status: DISCONTINUED | OUTPATIENT
Start: 2019-01-01 | End: 2019-01-01 | Stop reason: HOSPADM

## 2019-01-01 RX ORDER — ALBUMIN, HUMAN INJ 5% 5 %
25 SOLUTION INTRAVENOUS ONCE
Status: DISCONTINUED | OUTPATIENT
Start: 2019-01-01 | End: 2019-01-01

## 2019-01-01 RX ORDER — POLYETHYLENE GLYCOL 3350 17 G/17G
17 POWDER, FOR SOLUTION ORAL DAILY
Status: ON HOLD | COMMUNITY
End: 2019-01-01

## 2019-01-01 RX ORDER — LIDOCAINE HYDROCHLORIDE 20 MG/ML
JELLY TOPICAL
Status: DISPENSED
Start: 2019-01-01 | End: 2019-01-01

## 2019-01-01 RX ORDER — PROPRANOLOL HYDROCHLORIDE 10 MG/1
10 TABLET ORAL 3 TIMES DAILY
Status: DISCONTINUED | OUTPATIENT
Start: 2019-01-01 | End: 2019-01-01

## 2019-01-01 RX ORDER — ACETAMINOPHEN 325 MG/1
325 TABLET ORAL EVERY 4 HOURS PRN
Status: DISCONTINUED | OUTPATIENT
Start: 2019-01-01 | End: 2019-01-01 | Stop reason: HOSPADM

## 2019-01-01 RX ORDER — HEPARIN SODIUM,PORCINE 10 UNIT/ML
2-5 VIAL (ML) INTRAVENOUS
Status: DISCONTINUED | OUTPATIENT
Start: 2019-01-01 | End: 2019-01-01 | Stop reason: HOSPADM

## 2019-01-01 RX ORDER — ONDANSETRON 2 MG/ML
4 INJECTION INTRAMUSCULAR; INTRAVENOUS EVERY 6 HOURS PRN
Status: DISCONTINUED | OUTPATIENT
Start: 2019-01-01 | End: 2019-01-01

## 2019-01-01 RX ORDER — MIDODRINE HYDROCHLORIDE 10 MG/1
12.5 TABLET ORAL
Status: ON HOLD | COMMUNITY
End: 2019-01-01

## 2019-01-01 RX ORDER — HYDROXYZINE HYDROCHLORIDE 25 MG/1
25 TABLET, FILM COATED ORAL EVERY 6 HOURS PRN
Qty: 30 TABLET | Refills: 0 | Status: SHIPPED | OUTPATIENT
Start: 2019-01-01

## 2019-01-01 RX ORDER — ALUMINA, MAGNESIA, AND SIMETHICONE 2400; 2400; 240 MG/30ML; MG/30ML; MG/30ML
30 SUSPENSION ORAL EVERY 4 HOURS PRN
Status: DISCONTINUED | OUTPATIENT
Start: 2019-01-01 | End: 2019-01-01 | Stop reason: HOSPADM

## 2019-01-01 RX ORDER — PROCHLORPERAZINE MALEATE 5 MG
10 TABLET ORAL EVERY 6 HOURS PRN
Status: DISCONTINUED | OUTPATIENT
Start: 2019-01-01 | End: 2019-01-01

## 2019-01-01 RX ORDER — POTASSIUM CHLORIDE 29.8 MG/ML
20 INJECTION INTRAVENOUS
Status: DISCONTINUED | OUTPATIENT
Start: 2019-01-01 | End: 2019-01-01

## 2019-01-01 RX ORDER — DEXTROSE MONOHYDRATE 25 G/50ML
25-50 INJECTION, SOLUTION INTRAVENOUS
Status: DISCONTINUED | OUTPATIENT
Start: 2019-01-01 | End: 2019-01-01 | Stop reason: HOSPADM

## 2019-01-01 RX ORDER — ATROPINE SULFATE 10 MG/ML
1-2 SOLUTION/ DROPS OPHTHALMIC
Status: DISCONTINUED | OUTPATIENT
Start: 2019-01-01 | End: 2019-01-01 | Stop reason: HOSPADM

## 2019-01-01 RX ORDER — LACTULOSE 10 G/15ML
20 SOLUTION ORAL 2 TIMES DAILY
Status: DISCONTINUED | OUTPATIENT
Start: 2019-01-01 | End: 2019-01-01 | Stop reason: HOSPADM

## 2019-01-01 RX ORDER — LACTULOSE 10 G/15ML
10 SOLUTION ORAL 3 TIMES DAILY PRN
Status: DISCONTINUED | OUTPATIENT
Start: 2019-01-01 | End: 2019-01-01

## 2019-01-01 RX ORDER — PANTOPRAZOLE SODIUM 40 MG/1
40 TABLET, DELAYED RELEASE ORAL
Status: DISCONTINUED | OUTPATIENT
Start: 2019-01-01 | End: 2019-01-01

## 2019-01-01 RX ORDER — GLYCOPYRROLATE 0.2 MG/ML
.1-.2 INJECTION, SOLUTION INTRAMUSCULAR; INTRAVENOUS EVERY 4 HOURS PRN
Status: DISCONTINUED | OUTPATIENT
Start: 2019-01-01 | End: 2019-01-01 | Stop reason: HOSPADM

## 2019-01-01 RX ORDER — PANTOPRAZOLE SODIUM 40 MG/1
1 FOR SUSPENSION ORAL DAILY
Status: ON HOLD | COMMUNITY
End: 2019-01-01

## 2019-01-01 RX ORDER — MULTIVITAMIN,THERAPEUTIC
1 TABLET ORAL DAILY
Status: DISCONTINUED | OUTPATIENT
Start: 2019-01-01 | End: 2019-01-01

## 2019-01-01 RX ORDER — ALUMINA, MAGNESIA, AND SIMETHICONE 2400; 2400; 240 MG/30ML; MG/30ML; MG/30ML
30 SUSPENSION ORAL EVERY 4 HOURS PRN
Qty: 769 ML | Refills: 1 | Status: SHIPPED | OUTPATIENT
Start: 2019-01-01 | End: 2019-01-01

## 2019-01-01 RX ORDER — LANOLIN ALCOHOL/MO/W.PET/CERES
100 CREAM (GRAM) TOPICAL DAILY
Status: DISCONTINUED | OUTPATIENT
Start: 2019-01-01 | End: 2019-01-01 | Stop reason: HOSPADM

## 2019-01-01 RX ADMIN — WHITE PETROLATUM: .15; .85 OINTMENT OPHTHALMIC at 14:18

## 2019-01-01 RX ADMIN — Medication 500 MCG: at 08:56

## 2019-01-01 RX ADMIN — MEROPENEM 1 G: 1 INJECTION, POWDER, FOR SOLUTION INTRAVENOUS at 00:13

## 2019-01-01 RX ADMIN — SODIUM CHLORIDE 1000 ML: 9 INJECTION, SOLUTION INTRAVENOUS at 23:56

## 2019-01-01 RX ADMIN — CALCIUM CHLORIDE, MAGNESIUM CHLORIDE, SODIUM CHLORIDE, SODIUM BICARBONATE, POTASSIUM CHLORIDE AND SODIUM PHOSPHATE DIBASIC DIHYDRATE 10 ML/KG/HR: 3.68; 3.05; 6.34; 3.09; .314; .187 INJECTION INTRAVENOUS at 14:48

## 2019-01-01 RX ADMIN — Medication 500 MG: at 07:56

## 2019-01-01 RX ADMIN — CARBIDOPA AND LEVODOPA 12.5 MG: 50; 200 TABLET, EXTENDED RELEASE ORAL at 10:51

## 2019-01-01 RX ADMIN — Medication 2 PACKET: at 08:43

## 2019-01-01 RX ADMIN — CARBIDOPA AND LEVODOPA 12.5 MG: 50; 200 TABLET, EXTENDED RELEASE ORAL at 18:34

## 2019-01-01 RX ADMIN — ERTAPENEM SODIUM 1 G: 1 INJECTION, POWDER, LYOPHILIZED, FOR SOLUTION INTRAMUSCULAR; INTRAVENOUS at 10:11

## 2019-01-01 RX ADMIN — CALCIUM CHLORIDE, MAGNESIUM CHLORIDE, SODIUM CHLORIDE, SODIUM BICARBONATE, POTASSIUM CHLORIDE AND SODIUM PHOSPHATE DIBASIC DIHYDRATE 16 ML/KG/HR: 3.68; 3.05; 6.34; 3.09; .314; .187 INJECTION INTRAVENOUS at 15:24

## 2019-01-01 RX ADMIN — LACTULOSE 10 G: 20 SOLUTION ORAL at 13:05

## 2019-01-01 RX ADMIN — MICONAZOLE NITRATE: 20 POWDER TOPICAL at 20:58

## 2019-01-01 RX ADMIN — SERTRALINE HYDROCHLORIDE 75 MG: 50 TABLET ORAL at 08:33

## 2019-01-01 RX ADMIN — INSULIN ASPART 1 UNITS: 100 INJECTION, SOLUTION INTRAVENOUS; SUBCUTANEOUS at 12:05

## 2019-01-01 RX ADMIN — INSULIN ASPART 1 UNITS: 100 INJECTION, SOLUTION INTRAVENOUS; SUBCUTANEOUS at 08:52

## 2019-01-01 RX ADMIN — POTASSIUM CHLORIDE 20 MEQ: 29.8 INJECTION, SOLUTION INTRAVENOUS at 06:24

## 2019-01-01 RX ADMIN — RIFAXIMIN 550 MG: 550 TABLET ORAL at 21:14

## 2019-01-01 RX ADMIN — CALCIUM CHLORIDE, MAGNESIUM CHLORIDE, SODIUM CHLORIDE, SODIUM BICARBONATE, POTASSIUM CHLORIDE AND SODIUM PHOSPHATE DIBASIC DIHYDRATE 16 ML/KG/HR: 3.68; 3.05; 6.34; 3.09; .314; .187 INJECTION INTRAVENOUS at 08:28

## 2019-01-01 RX ADMIN — CALCIUM CHLORIDE, MAGNESIUM CHLORIDE, SODIUM CHLORIDE, SODIUM BICARBONATE, POTASSIUM CHLORIDE AND SODIUM PHOSPHATE DIBASIC DIHYDRATE 12.5 ML/KG/HR: 3.68; 3.05; 6.34; 3.09; .314; .187 INJECTION INTRAVENOUS at 17:45

## 2019-01-01 RX ADMIN — LACTULOSE 20 G: 10 SOLUTION ORAL; RECTAL at 07:48

## 2019-01-01 RX ADMIN — RIFAXIMIN 550 MG: 550 TABLET ORAL at 09:05

## 2019-01-01 RX ADMIN — WHITE PETROLATUM: .15; .85 OINTMENT OPHTHALMIC at 13:58

## 2019-01-01 RX ADMIN — CARBIDOPA AND LEVODOPA 12.5 MG: 50; 200 TABLET, EXTENDED RELEASE ORAL at 13:30

## 2019-01-01 RX ADMIN — ONDANSETRON 4 MG: 4 TABLET, ORALLY DISINTEGRATING ORAL at 16:29

## 2019-01-01 RX ADMIN — POTASSIUM CHLORIDE 20 MEQ: 29.8 INJECTION, SOLUTION INTRAVENOUS at 05:45

## 2019-01-01 RX ADMIN — SODIUM CHLORIDE 500 ML: 9 INJECTION, SOLUTION INTRAVENOUS at 20:58

## 2019-01-01 RX ADMIN — ALBUTEROL SULFATE 2.5 MG: 2.5 SOLUTION RESPIRATORY (INHALATION) at 00:51

## 2019-01-01 RX ADMIN — Medication 12.5 MG: at 10:49

## 2019-01-01 RX ADMIN — RIFAXIMIN 550 MG: 550 TABLET ORAL at 09:27

## 2019-01-01 RX ADMIN — CALCIUM CHLORIDE, MAGNESIUM CHLORIDE, SODIUM CHLORIDE, SODIUM BICARBONATE, POTASSIUM CHLORIDE AND SODIUM PHOSPHATE DIBASIC DIHYDRATE 12.5 ML/KG/HR: 3.68; 3.05; 6.34; 3.09; .314; .187 INJECTION INTRAVENOUS at 20:52

## 2019-01-01 RX ADMIN — ALBUTEROL SULFATE 2.5 MG: 2.5 SOLUTION RESPIRATORY (INHALATION) at 08:08

## 2019-01-01 RX ADMIN — LACTULOSE 20 G: 20 SOLUTION ORAL at 08:19

## 2019-01-01 RX ADMIN — HUMAN ALBUMIN MICROSPHERES AND PERFLUTREN 5 ML: 10; .22 INJECTION, SOLUTION INTRAVENOUS at 14:00

## 2019-01-01 RX ADMIN — RIFAXIMIN 550 MG: 550 TABLET ORAL at 21:32

## 2019-01-01 RX ADMIN — Medication 1 PACKET: at 20:47

## 2019-01-01 RX ADMIN — CALCIUM CHLORIDE, MAGNESIUM CHLORIDE, SODIUM CHLORIDE, SODIUM BICARBONATE, POTASSIUM CHLORIDE AND SODIUM PHOSPHATE DIBASIC DIHYDRATE 10 ML/KG/HR: 3.68; 3.05; 6.34; 3.09; .314; .187 INJECTION INTRAVENOUS at 09:37

## 2019-01-01 RX ADMIN — CARBIDOPA AND LEVODOPA 12.5 MG: 50; 200 TABLET, EXTENDED RELEASE ORAL at 14:14

## 2019-01-01 RX ADMIN — PHYTONADIONE 10 MG: 10 INJECTION, EMULSION INTRAMUSCULAR; INTRAVENOUS; SUBCUTANEOUS at 09:18

## 2019-01-01 RX ADMIN — CALCIUM CHLORIDE, MAGNESIUM CHLORIDE, SODIUM CHLORIDE, SODIUM BICARBONATE, POTASSIUM CHLORIDE AND SODIUM PHOSPHATE DIBASIC DIHYDRATE 16 ML/KG/HR: 3.68; 3.05; 6.34; 3.09; .314; .187 INJECTION INTRAVENOUS at 23:09

## 2019-01-01 RX ADMIN — MELATONIN 1000 UNITS: at 08:09

## 2019-01-01 RX ADMIN — MICONAZOLE NITRATE: 20 POWDER TOPICAL at 08:55

## 2019-01-01 RX ADMIN — FUROSEMIDE 20 MG: 20 TABLET ORAL at 09:18

## 2019-01-01 RX ADMIN — HYDROCORTISONE SODIUM SUCCINATE 50 MG: 100 INJECTION, POWDER, FOR SOLUTION INTRAMUSCULAR; INTRAVENOUS at 12:32

## 2019-01-01 RX ADMIN — POTASSIUM CHLORIDE 20 MEQ: 10 TABLET, EXTENDED RELEASE ORAL at 08:59

## 2019-01-01 RX ADMIN — MEROPENEM 1 G: 1 INJECTION, POWDER, FOR SOLUTION INTRAVENOUS at 01:52

## 2019-01-01 RX ADMIN — LACTULOSE 10 G: 20 SOLUTION ORAL at 19:36

## 2019-01-01 RX ADMIN — Medication 12.5 MG: at 08:08

## 2019-01-01 RX ADMIN — RIFAXIMIN 550 MG: 550 TABLET ORAL at 20:51

## 2019-01-01 RX ADMIN — SPIRONOLACTONE 50 MG: 50 TABLET, FILM COATED ORAL at 08:59

## 2019-01-01 RX ADMIN — ONDANSETRON 4 MG: 2 INJECTION INTRAMUSCULAR; INTRAVENOUS at 21:41

## 2019-01-01 RX ADMIN — MELATONIN 1000 UNITS: at 08:06

## 2019-01-01 RX ADMIN — CALCIUM CHLORIDE, MAGNESIUM CHLORIDE, SODIUM CHLORIDE, SODIUM BICARBONATE, POTASSIUM CHLORIDE AND SODIUM PHOSPHATE DIBASIC DIHYDRATE 16 ML/KG/HR: 3.68; 3.05; 6.34; 3.09; .314; .187 INJECTION INTRAVENOUS at 21:12

## 2019-01-01 RX ADMIN — Medication 500 MCG: at 08:20

## 2019-01-01 RX ADMIN — PANTOPRAZOLE SODIUM 40 MG: 40 TABLET, DELAYED RELEASE ORAL at 08:59

## 2019-01-01 RX ADMIN — PANTOPRAZOLE SODIUM 40 MG: 40 INJECTION, POWDER, LYOPHILIZED, FOR SOLUTION INTRAVENOUS at 08:19

## 2019-01-01 RX ADMIN — CALCIUM CHLORIDE, MAGNESIUM CHLORIDE, SODIUM CHLORIDE, SODIUM BICARBONATE, POTASSIUM CHLORIDE AND SODIUM PHOSPHATE DIBASIC DIHYDRATE 15 ML/KG/HR: 3.68; 3.05; 6.34; 3.09; .314; .187 INJECTION INTRAVENOUS at 16:19

## 2019-01-01 RX ADMIN — Medication 0.35 MCG/KG/MIN: at 07:58

## 2019-01-01 RX ADMIN — RIFAXIMIN 550 MG: 550 TABLET ORAL at 20:56

## 2019-01-01 RX ADMIN — CALCIUM CHLORIDE, MAGNESIUM CHLORIDE, SODIUM CHLORIDE, SODIUM BICARBONATE, POTASSIUM CHLORIDE AND SODIUM PHOSPHATE DIBASIC DIHYDRATE 16 ML/KG/HR: 3.68; 3.05; 6.34; 3.09; .314; .187 INJECTION INTRAVENOUS at 04:23

## 2019-01-01 RX ADMIN — RIFAXIMIN 550 MG: 550 TABLET ORAL at 20:40

## 2019-01-01 RX ADMIN — Medication 0.22 MCG/KG/MIN: at 10:30

## 2019-01-01 RX ADMIN — LACTULOSE 10 G: 20 SOLUTION ORAL at 21:03

## 2019-01-01 RX ADMIN — MELATONIN 1000 UNITS: at 10:49

## 2019-01-01 RX ADMIN — ACETYLCYSTEINE 2 ML: 200 SOLUTION ORAL; RESPIRATORY (INHALATION) at 11:18

## 2019-01-01 RX ADMIN — ALBUTEROL SULFATE 2.5 MG: 2.5 SOLUTION RESPIRATORY (INHALATION) at 16:19

## 2019-01-01 RX ADMIN — CALCIUM CHLORIDE, MAGNESIUM CHLORIDE, SODIUM CHLORIDE, SODIUM BICARBONATE, POTASSIUM CHLORIDE AND SODIUM PHOSPHATE DIBASIC DIHYDRATE 12.5 ML/KG/HR: 3.68; 3.05; 6.34; 3.09; .314; .187 INJECTION INTRAVENOUS at 00:27

## 2019-01-01 RX ADMIN — Medication 5 ML: at 11:34

## 2019-01-01 RX ADMIN — CALCIUM CHLORIDE, MAGNESIUM CHLORIDE, SODIUM CHLORIDE, SODIUM BICARBONATE, POTASSIUM CHLORIDE AND SODIUM PHOSPHATE DIBASIC DIHYDRATE 10 ML/KG/HR: 3.68; 3.05; 6.34; 3.09; .314; .187 INJECTION INTRAVENOUS at 10:52

## 2019-01-01 RX ADMIN — WHITE PETROLATUM: .15; .85 OINTMENT OPHTHALMIC at 01:33

## 2019-01-01 RX ADMIN — THERA TABS 1 TABLET: TAB at 10:09

## 2019-01-01 RX ADMIN — MIDODRINE HYDROCHLORIDE 12.5 MG: 5 TABLET ORAL at 13:05

## 2019-01-01 RX ADMIN — CARBIDOPA AND LEVODOPA 12.5 MG: 50; 200 TABLET, EXTENDED RELEASE ORAL at 08:58

## 2019-01-01 RX ADMIN — HYDROXYZINE HYDROCHLORIDE 25 MG: 25 TABLET ORAL at 23:47

## 2019-01-01 RX ADMIN — Medication 500 MG: at 07:48

## 2019-01-01 RX ADMIN — ACETYLCYSTEINE 2 ML: 200 SOLUTION ORAL; RESPIRATORY (INHALATION) at 20:30

## 2019-01-01 RX ADMIN — CALCIUM CHLORIDE 1 G: 100 INJECTION, SOLUTION INTRAVENOUS at 09:24

## 2019-01-01 RX ADMIN — LACTULOSE 200 G: 10 SOLUTION ORAL; RECTAL at 22:39

## 2019-01-01 RX ADMIN — Medication 100 MG: at 08:42

## 2019-01-01 RX ADMIN — MIDODRINE HYDROCHLORIDE 10 MG: 5 TABLET ORAL at 17:56

## 2019-01-01 RX ADMIN — CALCIUM CHLORIDE, MAGNESIUM CHLORIDE, SODIUM CHLORIDE, SODIUM BICARBONATE, POTASSIUM CHLORIDE AND SODIUM PHOSPHATE DIBASIC DIHYDRATE 16 ML/KG/HR: 3.68; 3.05; 6.34; 3.09; .314; .187 INJECTION INTRAVENOUS at 02:07

## 2019-01-01 RX ADMIN — THERA TABS 1 TABLET: TAB at 10:57

## 2019-01-01 RX ADMIN — MELATONIN TAB 3 MG 3 MG: 3 TAB at 20:56

## 2019-01-01 RX ADMIN — SERTRALINE HYDROCHLORIDE 75 MG: 50 TABLET ORAL at 09:21

## 2019-01-01 RX ADMIN — SERTRALINE HYDROCHLORIDE 75 MG: 50 TABLET ORAL at 10:01

## 2019-01-01 RX ADMIN — MELATONIN TAB 3 MG 3 MG: 3 TAB at 21:21

## 2019-01-01 RX ADMIN — SPIRONOLACTONE 50 MG: 25 TABLET ORAL at 08:34

## 2019-01-01 RX ADMIN — CARBIDOPA AND LEVODOPA 12.5 MG: 50; 200 TABLET, EXTENDED RELEASE ORAL at 15:02

## 2019-01-01 RX ADMIN — MELATONIN TAB 3 MG 3 MG: 3 TAB at 22:15

## 2019-01-01 RX ADMIN — LACTULOSE 10 G: 10 SOLUTION ORAL at 09:58

## 2019-01-01 RX ADMIN — MELATONIN 1000 UNITS: at 09:17

## 2019-01-01 RX ADMIN — HYDROCORTISONE SODIUM SUCCINATE 50 MG: 100 INJECTION, POWDER, FOR SOLUTION INTRAMUSCULAR; INTRAVENOUS at 06:49

## 2019-01-01 RX ADMIN — SODIUM CHLORIDE: 9 INJECTION, SOLUTION INTRAVENOUS at 11:08

## 2019-01-01 RX ADMIN — EPOPROSTENOL 20 NG/KG/MIN: 1.5 INJECTION, POWDER, LYOPHILIZED, FOR SOLUTION INTRAVENOUS at 10:33

## 2019-01-01 RX ADMIN — CARBIDOPA AND LEVODOPA 12.5 MG: 50; 200 TABLET, EXTENDED RELEASE ORAL at 10:55

## 2019-01-01 RX ADMIN — SIMETHICONE CHEW TAB 80 MG 80 MG: 80 TABLET ORAL at 23:07

## 2019-01-01 RX ADMIN — PANTOPRAZOLE SODIUM 40 MG: 40 TABLET, DELAYED RELEASE ORAL at 10:39

## 2019-01-01 RX ADMIN — MIDODRINE HYDROCHLORIDE 10 MG: 5 TABLET ORAL at 08:47

## 2019-01-01 RX ADMIN — EPOPROSTENOL 20 NG/KG/MIN: 1.5 INJECTION, POWDER, LYOPHILIZED, FOR SOLUTION INTRAVENOUS at 22:21

## 2019-01-01 RX ADMIN — CEFTRIAXONE SODIUM 2 G: 2 INJECTION, POWDER, FOR SOLUTION INTRAMUSCULAR; INTRAVENOUS at 21:09

## 2019-01-01 RX ADMIN — SERTRALINE HYDROCHLORIDE 75 MG: 50 TABLET ORAL at 09:55

## 2019-01-01 RX ADMIN — MIDODRINE HYDROCHLORIDE 12.5 MG: 5 TABLET ORAL at 17:19

## 2019-01-01 RX ADMIN — RIFAXIMIN 550 MG: 550 TABLET ORAL at 21:10

## 2019-01-01 RX ADMIN — INSULIN ASPART 1 UNITS: 100 INJECTION, SOLUTION INTRAVENOUS; SUBCUTANEOUS at 20:41

## 2019-01-01 RX ADMIN — LACTULOSE 20 G: 20 SOLUTION ORAL at 20:47

## 2019-01-01 RX ADMIN — SERTRALINE HYDROCHLORIDE 75 MG: 50 TABLET ORAL at 07:49

## 2019-01-01 RX ADMIN — LACTULOSE 10 G: 10 SOLUTION ORAL at 09:00

## 2019-01-01 RX ADMIN — Medication 0.3 MCG/KG/MIN: at 09:31

## 2019-01-01 RX ADMIN — MELATONIN 1000 UNITS: at 10:11

## 2019-01-01 RX ADMIN — CARBIDOPA AND LEVODOPA 12.5 MG: 50; 200 TABLET, EXTENDED RELEASE ORAL at 13:03

## 2019-01-01 RX ADMIN — PHENYLEPHRINE HYDROCHLORIDE 100 MCG: 10 INJECTION INTRAVENOUS at 15:44

## 2019-01-01 RX ADMIN — Medication 100 MG: at 08:09

## 2019-01-01 RX ADMIN — Medication 0.19 MCG/KG/MIN: at 23:47

## 2019-01-01 RX ADMIN — MIDODRINE HYDROCHLORIDE 10 MG: 5 TABLET ORAL at 08:18

## 2019-01-01 RX ADMIN — MIDODRINE HYDROCHLORIDE 12.5 MG: 5 TABLET ORAL at 09:04

## 2019-01-01 RX ADMIN — RIFAXIMIN 550 MG: 550 TABLET ORAL at 08:02

## 2019-01-01 RX ADMIN — PANTOPRAZOLE SODIUM 40 MG: 40 TABLET, DELAYED RELEASE ORAL at 08:09

## 2019-01-01 RX ADMIN — RIFAXIMIN 550 MG: 550 TABLET ORAL at 20:21

## 2019-01-01 RX ADMIN — POTASSIUM CHLORIDE 20 MEQ: 10 TABLET, EXTENDED RELEASE ORAL at 08:09

## 2019-01-01 RX ADMIN — LACTULOSE 10 G: 10 SOLUTION ORAL at 21:48

## 2019-01-01 RX ADMIN — MELATONIN TAB 3 MG 3 MG: 3 TAB at 23:07

## 2019-01-01 RX ADMIN — ACETYLCYSTEINE 2 ML: 200 SOLUTION ORAL; RESPIRATORY (INHALATION) at 16:20

## 2019-01-01 RX ADMIN — MEROPENEM 1 G: 1 INJECTION, POWDER, FOR SOLUTION INTRAVENOUS at 17:05

## 2019-01-01 RX ADMIN — HYDROXYZINE HYDROCHLORIDE 25 MG: 25 TABLET ORAL at 03:35

## 2019-01-01 RX ADMIN — MICONAZOLE NITRATE: 20 POWDER TOPICAL at 20:43

## 2019-01-01 RX ADMIN — MEROPENEM 1 G: 1 INJECTION, POWDER, FOR SOLUTION INTRAVENOUS at 23:48

## 2019-01-01 RX ADMIN — CARBIDOPA AND LEVODOPA 12.5 MG: 50; 200 TABLET, EXTENDED RELEASE ORAL at 13:19

## 2019-01-01 RX ADMIN — PANTOPRAZOLE SODIUM 40 MG: 40 INJECTION, POWDER, LYOPHILIZED, FOR SOLUTION INTRAVENOUS at 08:20

## 2019-01-01 RX ADMIN — VITAMIN B12 0.1 MG ORAL TABLET 100 MCG: 0.1 TABLET ORAL at 18:14

## 2019-01-01 RX ADMIN — LACTULOSE 20 G: 10 SOLUTION ORAL; RECTAL at 01:29

## 2019-01-01 RX ADMIN — MELATONIN TAB 3 MG 3 MG: 3 TAB at 21:03

## 2019-01-01 RX ADMIN — SERTRALINE HYDROCHLORIDE 75 MG: 50 TABLET ORAL at 10:41

## 2019-01-01 RX ADMIN — Medication 12.5 MG: at 12:19

## 2019-01-01 RX ADMIN — RIFAXIMIN 550 MG: 550 TABLET ORAL at 07:49

## 2019-01-01 RX ADMIN — Medication 12.5 MG: at 09:04

## 2019-01-01 RX ADMIN — CARBIDOPA AND LEVODOPA 12.5 MG: 50; 200 TABLET, EXTENDED RELEASE ORAL at 12:19

## 2019-01-01 RX ADMIN — POTASSIUM CHLORIDE 20 MEQ: 29.8 INJECTION, SOLUTION INTRAVENOUS at 05:12

## 2019-01-01 RX ADMIN — FUROSEMIDE 20 MG: 20 TABLET ORAL at 09:29

## 2019-01-01 RX ADMIN — RIFAXIMIN 550 MG: 550 TABLET ORAL at 08:59

## 2019-01-01 RX ADMIN — Medication 500 MG: at 08:02

## 2019-01-01 RX ADMIN — RIFAXIMIN 550 MG: 550 TABLET ORAL at 23:07

## 2019-01-01 RX ADMIN — CALCIUM CHLORIDE, MAGNESIUM CHLORIDE, SODIUM CHLORIDE, SODIUM BICARBONATE, POTASSIUM CHLORIDE AND SODIUM PHOSPHATE DIBASIC DIHYDRATE 10 ML/KG/HR: 3.68; 3.05; 6.34; 3.09; .314; .187 INJECTION INTRAVENOUS at 22:31

## 2019-01-01 RX ADMIN — HYDROCORTISONE SODIUM SUCCINATE 50 MG: 100 INJECTION, POWDER, FOR SOLUTION INTRAMUSCULAR; INTRAVENOUS at 05:39

## 2019-01-01 RX ADMIN — THERA TABS 1 TABLET: TAB at 10:34

## 2019-01-01 RX ADMIN — MELATONIN TAB 3 MG 3 MG: 3 TAB at 21:20

## 2019-01-01 RX ADMIN — PANTOPRAZOLE SODIUM 40 MG: 40 TABLET, DELAYED RELEASE ORAL at 09:42

## 2019-01-01 RX ADMIN — MELATONIN 1000 UNITS: at 09:59

## 2019-01-01 RX ADMIN — INSULIN ASPART 1 UNITS: 100 INJECTION, SOLUTION INTRAVENOUS; SUBCUTANEOUS at 04:42

## 2019-01-01 RX ADMIN — PANTOPRAZOLE SODIUM 40 MG: 40 TABLET, DELAYED RELEASE ORAL at 09:17

## 2019-01-01 RX ADMIN — INSULIN ASPART 1 UNITS: 100 INJECTION, SOLUTION INTRAVENOUS; SUBCUTANEOUS at 01:23

## 2019-01-01 RX ADMIN — FUROSEMIDE 20 MG: 20 TABLET ORAL at 20:48

## 2019-01-01 RX ADMIN — SPIRONOLACTONE 50 MG: 25 TABLET ORAL at 08:12

## 2019-01-01 RX ADMIN — SERTRALINE HYDROCHLORIDE 75 MG: 50 TABLET ORAL at 10:05

## 2019-01-01 RX ADMIN — Medication 0.28 MCG/KG/MIN: at 04:10

## 2019-01-01 RX ADMIN — MULTIVITAMIN 15 ML: LIQUID ORAL at 07:49

## 2019-01-01 RX ADMIN — WHITE PETROLATUM: .15; .85 OINTMENT OPHTHALMIC at 13:51

## 2019-01-01 RX ADMIN — HYDROCORTISONE SODIUM SUCCINATE 50 MG: 100 INJECTION, POWDER, FOR SOLUTION INTRAMUSCULAR; INTRAVENOUS at 18:33

## 2019-01-01 RX ADMIN — LACTULOSE 10 G: 10 SOLUTION ORAL at 08:53

## 2019-01-01 RX ADMIN — Medication 12.5 MG: at 10:42

## 2019-01-01 RX ADMIN — MIDODRINE HYDROCHLORIDE 12.5 MG: 5 TABLET ORAL at 09:20

## 2019-01-01 RX ADMIN — AZITHROMYCIN MONOHYDRATE 250 MG: 500 INJECTION, POWDER, LYOPHILIZED, FOR SOLUTION INTRAVENOUS at 09:10

## 2019-01-01 RX ADMIN — RIFAXIMIN 550 MG: 550 TABLET ORAL at 21:33

## 2019-01-01 RX ADMIN — LACTULOSE 10 G: 10 SOLUTION ORAL at 14:37

## 2019-01-01 RX ADMIN — CALCIUM CHLORIDE, MAGNESIUM CHLORIDE, SODIUM CHLORIDE, SODIUM BICARBONATE, POTASSIUM CHLORIDE AND SODIUM PHOSPHATE DIBASIC DIHYDRATE 15 ML/KG/HR: 3.68; 3.05; 6.34; 3.09; .314; .187 INJECTION INTRAVENOUS at 02:16

## 2019-01-01 RX ADMIN — RIFAXIMIN 550 MG: 550 TABLET ORAL at 19:36

## 2019-01-01 RX ADMIN — SERTRALINE HYDROCHLORIDE 75 MG: 50 TABLET ORAL at 10:11

## 2019-01-01 RX ADMIN — CARBIDOPA AND LEVODOPA 12.5 MG: 50; 200 TABLET, EXTENDED RELEASE ORAL at 13:44

## 2019-01-01 RX ADMIN — DEXTROSE MONOHYDRATE 3 MCG/KG/MIN: 50 INJECTION, SOLUTION INTRAVENOUS at 15:04

## 2019-01-01 RX ADMIN — CALCIUM CHLORIDE, MAGNESIUM CHLORIDE, SODIUM CHLORIDE, SODIUM BICARBONATE, POTASSIUM CHLORIDE AND SODIUM PHOSPHATE DIBASIC DIHYDRATE 16 ML/KG/HR: 3.68; 3.05; 6.34; 3.09; .314; .187 INJECTION INTRAVENOUS at 06:00

## 2019-01-01 RX ADMIN — Medication 1 PACKET: at 13:52

## 2019-01-01 RX ADMIN — Medication 5 UNITS: at 09:15

## 2019-01-01 RX ADMIN — Medication 1 PACKET: at 07:58

## 2019-01-01 RX ADMIN — RIFAXIMIN 550 MG: 550 TABLET ORAL at 20:05

## 2019-01-01 RX ADMIN — Medication 100 MG: at 08:19

## 2019-01-01 RX ADMIN — PANTOPRAZOLE SODIUM 40 MG: 40 TABLET, DELAYED RELEASE ORAL at 09:21

## 2019-01-01 RX ADMIN — CALCIUM CHLORIDE 1 G: 100 INJECTION, SOLUTION INTRAVENOUS at 06:23

## 2019-01-01 RX ADMIN — ONDANSETRON 4 MG: 4 TABLET, ORALLY DISINTEGRATING ORAL at 08:48

## 2019-01-01 RX ADMIN — CARBIDOPA AND LEVODOPA 12.5 MG: 50; 200 TABLET, EXTENDED RELEASE ORAL at 17:29

## 2019-01-01 RX ADMIN — FUROSEMIDE 20 MG: 20 TABLET ORAL at 09:25

## 2019-01-01 RX ADMIN — RIFAXIMIN 550 MG: 550 TABLET ORAL at 21:20

## 2019-01-01 RX ADMIN — ONDANSETRON 4 MG: 4 TABLET, ORALLY DISINTEGRATING ORAL at 10:17

## 2019-01-01 RX ADMIN — RIFAXIMIN 550 MG: 550 TABLET ORAL at 09:16

## 2019-01-01 RX ADMIN — CALCIUM CHLORIDE, MAGNESIUM CHLORIDE, SODIUM CHLORIDE, SODIUM BICARBONATE, POTASSIUM CHLORIDE AND SODIUM PHOSPHATE DIBASIC DIHYDRATE 16 ML/KG/HR: 3.68; 3.05; 6.34; 3.09; .314; .187 INJECTION INTRAVENOUS at 22:23

## 2019-01-01 RX ADMIN — CALCIUM CHLORIDE, MAGNESIUM CHLORIDE, SODIUM CHLORIDE, SODIUM BICARBONATE, POTASSIUM CHLORIDE AND SODIUM PHOSPHATE DIBASIC DIHYDRATE 15 ML/KG/HR: 3.68; 3.05; 6.34; 3.09; .314; .187 INJECTION INTRAVENOUS at 23:34

## 2019-01-01 RX ADMIN — ONDANSETRON 4 MG: 4 TABLET, ORALLY DISINTEGRATING ORAL at 09:23

## 2019-01-01 RX ADMIN — LACTULOSE 10 G: 20 SOLUTION ORAL at 20:37

## 2019-01-01 RX ADMIN — MICAFUNGIN SODIUM 100 MG: 10 INJECTION, POWDER, LYOPHILIZED, FOR SOLUTION INTRAVENOUS at 13:39

## 2019-01-01 RX ADMIN — MIDODRINE HYDROCHLORIDE 12.5 MG: 5 TABLET ORAL at 17:12

## 2019-01-01 RX ADMIN — CALCIUM CHLORIDE, MAGNESIUM CHLORIDE, SODIUM CHLORIDE, SODIUM BICARBONATE, POTASSIUM CHLORIDE AND SODIUM PHOSPHATE DIBASIC DIHYDRATE 1.66 ML/KG/HR: 3.68; 3.05; 6.34; 3.09; .314; .187 INJECTION INTRAVENOUS at 06:44

## 2019-01-01 RX ADMIN — RIFAXIMIN 550 MG: 550 TABLET ORAL at 21:47

## 2019-01-01 RX ADMIN — HYDROCORTISONE SODIUM SUCCINATE 50 MG: 100 INJECTION, POWDER, FOR SOLUTION INTRAMUSCULAR; INTRAVENOUS at 23:48

## 2019-01-01 RX ADMIN — LACTULOSE 10 G: 20 SOLUTION ORAL at 07:55

## 2019-01-01 RX ADMIN — RIFAXIMIN 550 MG: 550 TABLET ORAL at 21:28

## 2019-01-01 RX ADMIN — RIFAXIMIN 550 MG: 550 TABLET ORAL at 09:38

## 2019-01-01 RX ADMIN — Medication 0.3 MCG/KG/MIN: at 23:31

## 2019-01-01 RX ADMIN — Medication 500 MCG: at 07:58

## 2019-01-01 RX ADMIN — CARBIDOPA AND LEVODOPA 12.5 MG: 50; 200 TABLET, EXTENDED RELEASE ORAL at 18:55

## 2019-01-01 RX ADMIN — THERA TABS 1 TABLET: TAB at 09:28

## 2019-01-01 RX ADMIN — PANTOPRAZOLE SODIUM 40 MG: 40 TABLET, DELAYED RELEASE ORAL at 10:08

## 2019-01-01 RX ADMIN — CALCIUM CHLORIDE, MAGNESIUM CHLORIDE, SODIUM CHLORIDE, SODIUM BICARBONATE, POTASSIUM CHLORIDE AND SODIUM PHOSPHATE DIBASIC DIHYDRATE 16 ML/KG/HR: 3.68; 3.05; 6.34; 3.09; .314; .187 INJECTION INTRAVENOUS at 23:42

## 2019-01-01 RX ADMIN — POTASSIUM CHLORIDE 20 MEQ: 10 TABLET, EXTENDED RELEASE ORAL at 10:50

## 2019-01-01 RX ADMIN — SPIRONOLACTONE 50 MG: 50 TABLET, FILM COATED ORAL at 09:17

## 2019-01-01 RX ADMIN — RIFAXIMIN 550 MG: 550 TABLET ORAL at 08:20

## 2019-01-01 RX ADMIN — LACTULOSE 10 G: 10 SOLUTION ORAL at 10:58

## 2019-01-01 RX ADMIN — PANTOPRAZOLE SODIUM 40 MG: 40 TABLET, DELAYED RELEASE ORAL at 10:04

## 2019-01-01 RX ADMIN — Medication 1 PACKET: at 20:50

## 2019-01-01 RX ADMIN — CALCIUM CHLORIDE, MAGNESIUM CHLORIDE, SODIUM CHLORIDE, SODIUM BICARBONATE, POTASSIUM CHLORIDE AND SODIUM PHOSPHATE DIBASIC DIHYDRATE 10 ML/KG/HR: 3.68; 3.05; 6.34; 3.09; .314; .187 INJECTION INTRAVENOUS at 06:44

## 2019-01-01 RX ADMIN — MICAFUNGIN SODIUM 100 MG: 10 INJECTION, POWDER, LYOPHILIZED, FOR SOLUTION INTRAVENOUS at 13:44

## 2019-01-01 RX ADMIN — LACTULOSE 10 G: 10 SOLUTION ORAL at 15:03

## 2019-01-01 RX ADMIN — ACETYLCYSTEINE 2 ML: 200 SOLUTION ORAL; RESPIRATORY (INHALATION) at 04:50

## 2019-01-01 RX ADMIN — HYDROCORTISONE SODIUM SUCCINATE 50 MG: 100 INJECTION, POWDER, FOR SOLUTION INTRAMUSCULAR; INTRAVENOUS at 06:00

## 2019-01-01 RX ADMIN — PROCHLORPERAZINE EDISYLATE 10 MG: 5 INJECTION INTRAMUSCULAR; INTRAVENOUS at 16:59

## 2019-01-01 RX ADMIN — VITAMIN B12 0.1 MG ORAL TABLET 100 MCG: 0.1 TABLET ORAL at 14:08

## 2019-01-01 RX ADMIN — MIDODRINE HYDROCHLORIDE 12.5 MG: 5 TABLET ORAL at 14:39

## 2019-01-01 RX ADMIN — POTASSIUM CHLORIDE 20 MEQ: 10 TABLET, EXTENDED RELEASE ORAL at 09:37

## 2019-01-01 RX ADMIN — LACTULOSE 20 G: 20 SOLUTION ORAL at 19:57

## 2019-01-01 RX ADMIN — HYDROCORTISONE SODIUM SUCCINATE 50 MG: 100 INJECTION, POWDER, FOR SOLUTION INTRAMUSCULAR; INTRAVENOUS at 17:05

## 2019-01-01 RX ADMIN — CALCIUM CHLORIDE, MAGNESIUM CHLORIDE, SODIUM CHLORIDE, SODIUM BICARBONATE, POTASSIUM CHLORIDE AND SODIUM PHOSPHATE DIBASIC DIHYDRATE 12.5 ML/KG/HR: 3.68; 3.05; 6.34; 3.09; .314; .187 INJECTION INTRAVENOUS at 10:29

## 2019-01-01 RX ADMIN — RIFAXIMIN 550 MG: 550 TABLET ORAL at 07:48

## 2019-01-01 RX ADMIN — MELATONIN TAB 3 MG 3 MG: 3 TAB at 21:54

## 2019-01-01 RX ADMIN — SPIRONOLACTONE 50 MG: 50 TABLET, FILM COATED ORAL at 10:11

## 2019-01-01 RX ADMIN — Medication 1 PACKET: at 08:16

## 2019-01-01 RX ADMIN — CARBIDOPA AND LEVODOPA 12.5 MG: 50; 200 TABLET, EXTENDED RELEASE ORAL at 12:53

## 2019-01-01 RX ADMIN — Medication 1 PACKET: at 08:04

## 2019-01-01 RX ADMIN — INSULIN ASPART 1 UNITS: 100 INJECTION, SOLUTION INTRAVENOUS; SUBCUTANEOUS at 20:05

## 2019-01-01 RX ADMIN — FENTANYL CITRATE 250 MCG/HR: 50 INJECTION INTRAVENOUS at 03:11

## 2019-01-01 RX ADMIN — MEROPENEM 1 G: 1 INJECTION, POWDER, FOR SOLUTION INTRAVENOUS at 07:48

## 2019-01-01 RX ADMIN — PANTOPRAZOLE SODIUM 40 MG: 40 TABLET, DELAYED RELEASE ORAL at 08:33

## 2019-01-01 RX ADMIN — MELATONIN 1000 UNITS: at 08:53

## 2019-01-01 RX ADMIN — THERA TABS 1 TABLET: TAB at 08:34

## 2019-01-01 RX ADMIN — Medication 0.3 MCG/KG/MIN: at 19:20

## 2019-01-01 RX ADMIN — MELATONIN 1000 UNITS: at 10:51

## 2019-01-01 RX ADMIN — FUROSEMIDE 20 MG: 20 TABLET ORAL at 08:09

## 2019-01-01 RX ADMIN — LACTULOSE 10 G: 10 SOLUTION ORAL at 17:14

## 2019-01-01 RX ADMIN — Medication 0.03 MCG/KG/MIN: at 09:48

## 2019-01-01 RX ADMIN — LIDOCAINE HYDROCHLORIDE 15 ML: 10 INJECTION, SOLUTION EPIDURAL; INFILTRATION; INTRACAUDAL; PERINEURAL at 16:26

## 2019-01-01 RX ADMIN — LACTULOSE 10 G: 10 SOLUTION ORAL at 13:55

## 2019-01-01 RX ADMIN — WHITE PETROLATUM: .15; .85 OINTMENT OPHTHALMIC at 13:39

## 2019-01-01 RX ADMIN — Medication 500 MCG: at 07:50

## 2019-01-01 RX ADMIN — CALCIUM CHLORIDE, MAGNESIUM CHLORIDE, SODIUM CHLORIDE, SODIUM BICARBONATE, POTASSIUM CHLORIDE AND SODIUM PHOSPHATE DIBASIC DIHYDRATE 1.66 ML/KG/HR: 3.68; 3.05; 6.34; 3.09; .314; .187 INJECTION INTRAVENOUS at 02:07

## 2019-01-01 RX ADMIN — VITAMIN B12 0.1 MG ORAL TABLET 100 MCG: 0.1 TABLET ORAL at 08:21

## 2019-01-01 RX ADMIN — Medication 1 PACKET: at 13:40

## 2019-01-01 RX ADMIN — ONDANSETRON 4 MG: 4 TABLET, ORALLY DISINTEGRATING ORAL at 10:04

## 2019-01-01 RX ADMIN — RIFAXIMIN 550 MG: 550 TABLET ORAL at 21:03

## 2019-01-01 RX ADMIN — LACTULOSE 20 G: 20 SOLUTION ORAL at 12:24

## 2019-01-01 RX ADMIN — LACTULOSE 20 G: 10 SOLUTION ORAL; RECTAL at 20:31

## 2019-01-01 RX ADMIN — CALCIUM CHLORIDE, MAGNESIUM CHLORIDE, SODIUM CHLORIDE, SODIUM BICARBONATE, POTASSIUM CHLORIDE AND SODIUM PHOSPHATE DIBASIC DIHYDRATE 16 ML/KG/HR: 3.68; 3.05; 6.34; 3.09; .314; .187 INJECTION INTRAVENOUS at 08:00

## 2019-01-01 RX ADMIN — HYDROCORTISONE SODIUM SUCCINATE 50 MG: 100 INJECTION, POWDER, FOR SOLUTION INTRAMUSCULAR; INTRAVENOUS at 00:16

## 2019-01-01 RX ADMIN — LACTULOSE 10 G: 10 POWDER, FOR SOLUTION ORAL at 09:45

## 2019-01-01 RX ADMIN — CALCIUM CHLORIDE, MAGNESIUM CHLORIDE, SODIUM CHLORIDE, SODIUM BICARBONATE, POTASSIUM CHLORIDE AND SODIUM PHOSPHATE DIBASIC DIHYDRATE 1.66 ML/KG/HR: 3.68; 3.05; 6.34; 3.09; .314; .187 INJECTION INTRAVENOUS at 00:31

## 2019-01-01 RX ADMIN — POTASSIUM CHLORIDE 20 MEQ: 10 TABLET, EXTENDED RELEASE ORAL at 15:55

## 2019-01-01 RX ADMIN — MICAFUNGIN SODIUM 100 MG: 10 INJECTION, POWDER, LYOPHILIZED, FOR SOLUTION INTRAVENOUS at 09:04

## 2019-01-01 RX ADMIN — MELATONIN TAB 3 MG 3 MG: 3 TAB at 22:16

## 2019-01-01 RX ADMIN — ONDANSETRON 4 MG: 2 INJECTION INTRAMUSCULAR; INTRAVENOUS at 20:57

## 2019-01-01 RX ADMIN — LACTULOSE 10 G: 10 SOLUTION ORAL at 10:34

## 2019-01-01 RX ADMIN — CALCIUM CHLORIDE, MAGNESIUM CHLORIDE, SODIUM CHLORIDE, SODIUM BICARBONATE, POTASSIUM CHLORIDE AND SODIUM PHOSPHATE DIBASIC DIHYDRATE 10 ML/KG/HR: 3.68; 3.05; 6.34; 3.09; .314; .187 INJECTION INTRAVENOUS at 06:08

## 2019-01-01 RX ADMIN — CALCIUM CHLORIDE, MAGNESIUM CHLORIDE, SODIUM CHLORIDE, SODIUM BICARBONATE, POTASSIUM CHLORIDE AND SODIUM PHOSPHATE DIBASIC DIHYDRATE 16 ML/KG/HR: 3.68; 3.05; 6.34; 3.09; .314; .187 INJECTION INTRAVENOUS at 16:48

## 2019-01-01 RX ADMIN — ONDANSETRON HYDROCHLORIDE 4 MG: 2 INJECTION, SOLUTION INTRAMUSCULAR; INTRAVENOUS at 20:14

## 2019-01-01 RX ADMIN — MULTIVITAMIN 15 ML: LIQUID ORAL at 07:55

## 2019-01-01 RX ADMIN — SODIUM CHLORIDE, POTASSIUM CHLORIDE, SODIUM LACTATE AND CALCIUM CHLORIDE 500 ML: 600; 310; 30; 20 INJECTION, SOLUTION INTRAVENOUS at 11:31

## 2019-01-01 RX ADMIN — EPOPROSTENOL 20 NG/KG/MIN: 1.5 INJECTION, POWDER, LYOPHILIZED, FOR SOLUTION INTRAVENOUS at 00:05

## 2019-01-01 RX ADMIN — CALCIUM CHLORIDE, MAGNESIUM CHLORIDE, SODIUM CHLORIDE, SODIUM BICARBONATE, POTASSIUM CHLORIDE AND SODIUM PHOSPHATE DIBASIC DIHYDRATE 10 ML/KG/HR: 3.68; 3.05; 6.34; 3.09; .314; .187 INJECTION INTRAVENOUS at 19:42

## 2019-01-01 RX ADMIN — LACTULOSE 10 G: 20 SOLUTION ORAL at 20:19

## 2019-01-01 RX ADMIN — MIDAZOLAM 10 MG/HR: 5 INJECTION INTRAMUSCULAR; INTRAVENOUS at 01:12

## 2019-01-01 RX ADMIN — RIFAXIMIN 550 MG: 550 TABLET ORAL at 09:00

## 2019-01-01 RX ADMIN — LACTULOSE 10 G: 10 SOLUTION ORAL at 13:16

## 2019-01-01 RX ADMIN — RIFAXIMIN 550 MG: 550 TABLET ORAL at 20:12

## 2019-01-01 RX ADMIN — CALCIUM CHLORIDE, MAGNESIUM CHLORIDE, SODIUM CHLORIDE, SODIUM BICARBONATE, POTASSIUM CHLORIDE AND SODIUM PHOSPHATE DIBASIC DIHYDRATE 16 ML/KG/HR: 3.68; 3.05; 6.34; 3.09; .314; .187 INJECTION INTRAVENOUS at 00:31

## 2019-01-01 RX ADMIN — MEROPENEM 1 G: 1 INJECTION, POWDER, FOR SOLUTION INTRAVENOUS at 23:50

## 2019-01-01 RX ADMIN — CALCIUM CHLORIDE 1 G: 100 INJECTION, SOLUTION INTRAVENOUS at 19:42

## 2019-01-01 RX ADMIN — CALCIUM CHLORIDE, MAGNESIUM CHLORIDE, SODIUM CHLORIDE, SODIUM BICARBONATE, POTASSIUM CHLORIDE AND SODIUM PHOSPHATE DIBASIC DIHYDRATE 1.66 ML/KG/HR: 3.68; 3.05; 6.34; 3.09; .314; .187 INJECTION INTRAVENOUS at 19:45

## 2019-01-01 RX ADMIN — MIDAZOLAM 2 MG: 1 INJECTION INTRAMUSCULAR; INTRAVENOUS at 05:00

## 2019-01-01 RX ADMIN — CALCIUM CHLORIDE, MAGNESIUM CHLORIDE, SODIUM CHLORIDE, SODIUM BICARBONATE, POTASSIUM CHLORIDE AND SODIUM PHOSPHATE DIBASIC DIHYDRATE 10 ML/KG/HR: 3.68; 3.05; 6.34; 3.09; .314; .187 INJECTION INTRAVENOUS at 16:35

## 2019-01-01 RX ADMIN — LACTULOSE 10 G: 10 SOLUTION ORAL at 20:55

## 2019-01-01 RX ADMIN — Medication 0.3 MCG/KG/MIN: at 02:31

## 2019-01-01 RX ADMIN — MELATONIN 1000 UNITS: at 10:55

## 2019-01-01 RX ADMIN — ALUMINUM HYDROXIDE, MAGNESIUM HYDROXIDE, AND DIMETHICONE 30 ML: 400; 400; 40 SUSPENSION ORAL at 23:56

## 2019-01-01 RX ADMIN — CARBIDOPA AND LEVODOPA 12.5 MG: 50; 200 TABLET, EXTENDED RELEASE ORAL at 17:53

## 2019-01-01 RX ADMIN — INSULIN ASPART 1 UNITS: 100 INJECTION, SOLUTION INTRAVENOUS; SUBCUTANEOUS at 03:13

## 2019-01-01 RX ADMIN — WHITE PETROLATUM: .15; .85 OINTMENT OPHTHALMIC at 02:14

## 2019-01-01 RX ADMIN — PANTOPRAZOLE SODIUM 40 MG: 40 TABLET, DELAYED RELEASE ORAL at 09:56

## 2019-01-01 RX ADMIN — ACETYLCYSTEINE 2 ML: 200 SOLUTION ORAL; RESPIRATORY (INHALATION) at 19:19

## 2019-01-01 RX ADMIN — PANTOPRAZOLE SODIUM 40 MG: 40 INJECTION, POWDER, LYOPHILIZED, FOR SOLUTION INTRAVENOUS at 07:49

## 2019-01-01 RX ADMIN — CALCIUM CHLORIDE, MAGNESIUM CHLORIDE, SODIUM CHLORIDE, SODIUM BICARBONATE, POTASSIUM CHLORIDE AND SODIUM PHOSPHATE DIBASIC DIHYDRATE 10 ML/KG/HR: 3.68; 3.05; 6.34; 3.09; .314; .187 INJECTION INTRAVENOUS at 00:31

## 2019-01-01 RX ADMIN — ACETAMINOPHEN 325 MG: 325 TABLET, FILM COATED ORAL at 08:12

## 2019-01-01 RX ADMIN — LACTULOSE 20 G: 10 SOLUTION ORAL; RECTAL at 13:20

## 2019-01-01 RX ADMIN — PANTOPRAZOLE SODIUM 40 MG: 40 TABLET, DELAYED RELEASE ORAL at 09:04

## 2019-01-01 RX ADMIN — SERTRALINE HYDROCHLORIDE 75 MG: 50 TABLET ORAL at 08:08

## 2019-01-01 RX ADMIN — HYDROXYZINE HYDROCHLORIDE 25 MG: 25 TABLET ORAL at 20:50

## 2019-01-01 RX ADMIN — CALCIUM CHLORIDE, MAGNESIUM CHLORIDE, SODIUM CHLORIDE, SODIUM BICARBONATE, POTASSIUM CHLORIDE AND SODIUM PHOSPHATE DIBASIC DIHYDRATE 16 ML/KG/HR: 3.68; 3.05; 6.34; 3.09; .314; .187 INJECTION INTRAVENOUS at 01:39

## 2019-01-01 RX ADMIN — CARBIDOPA AND LEVODOPA 12.5 MG: 50; 200 TABLET, EXTENDED RELEASE ORAL at 13:54

## 2019-01-01 RX ADMIN — LACTULOSE 10 G: 20 SOLUTION ORAL at 20:15

## 2019-01-01 RX ADMIN — CARBIDOPA AND LEVODOPA 12.5 MG: 50; 200 TABLET, EXTENDED RELEASE ORAL at 18:01

## 2019-01-01 RX ADMIN — Medication 0.31 MCG/KG/MIN: at 06:46

## 2019-01-01 RX ADMIN — LACTULOSE 10 G: 10 SOLUTION ORAL at 13:57

## 2019-01-01 RX ADMIN — SERTRALINE HYDROCHLORIDE 75 MG: 50 TABLET ORAL at 09:29

## 2019-01-01 RX ADMIN — CARBIDOPA AND LEVODOPA 12.5 MG: 50; 200 TABLET, EXTENDED RELEASE ORAL at 08:59

## 2019-01-01 RX ADMIN — LACTULOSE 10 G: 20 SOLUTION ORAL at 20:20

## 2019-01-01 RX ADMIN — VITAMIN B12 0.1 MG ORAL TABLET 100 MCG: 0.1 TABLET ORAL at 08:47

## 2019-01-01 RX ADMIN — Medication 500 MG: at 08:55

## 2019-01-01 RX ADMIN — MIDODRINE HYDROCHLORIDE 10 MG: 5 TABLET ORAL at 11:23

## 2019-01-01 RX ADMIN — CARBIDOPA AND LEVODOPA 12.5 MG: 50; 200 TABLET, EXTENDED RELEASE ORAL at 09:28

## 2019-01-01 RX ADMIN — HYDROXYZINE HYDROCHLORIDE 25 MG: 25 TABLET ORAL at 10:28

## 2019-01-01 RX ADMIN — FENTANYL CITRATE 50 MCG/HR: 50 INJECTION INTRAVENOUS at 15:45

## 2019-01-01 RX ADMIN — MELATONIN 1000 UNITS: at 09:38

## 2019-01-01 RX ADMIN — PANTOPRAZOLE SODIUM 40 MG: 40 TABLET, DELAYED RELEASE ORAL at 09:00

## 2019-01-01 RX ADMIN — HYDROMORPHONE HYDROCHLORIDE 2 MG: 2 TABLET ORAL at 04:32

## 2019-01-01 RX ADMIN — MULTIVITAMIN 15 ML: LIQUID ORAL at 08:10

## 2019-01-01 RX ADMIN — LACTULOSE 20 G: 10 SOLUTION ORAL; RECTAL at 16:41

## 2019-01-01 RX ADMIN — Medication 500 MCG: at 08:04

## 2019-01-01 RX ADMIN — CALCIUM CHLORIDE, MAGNESIUM CHLORIDE, SODIUM CHLORIDE, SODIUM BICARBONATE, POTASSIUM CHLORIDE AND SODIUM PHOSPHATE DIBASIC DIHYDRATE 1.66 ML/KG/HR: 3.68; 3.05; 6.34; 3.09; .314; .187 INJECTION INTRAVENOUS at 22:26

## 2019-01-01 RX ADMIN — MIDODRINE HYDROCHLORIDE 10 MG: 5 TABLET ORAL at 04:13

## 2019-01-01 RX ADMIN — LACTULOSE 10 G: 20 SOLUTION ORAL at 14:39

## 2019-01-01 RX ADMIN — LACTULOSE 10 G: 10 SOLUTION ORAL at 09:41

## 2019-01-01 RX ADMIN — RIFAXIMIN 550 MG: 550 TABLET ORAL at 08:07

## 2019-01-01 RX ADMIN — WHITE PETROLATUM: .15; .85 OINTMENT OPHTHALMIC at 20:40

## 2019-01-01 RX ADMIN — MELATONIN TAB 3 MG 3 MG: 3 TAB at 21:33

## 2019-01-01 RX ADMIN — VANCOMYCIN HYDROCHLORIDE 1750 MG: 10 INJECTION, POWDER, LYOPHILIZED, FOR SOLUTION INTRAVENOUS at 13:24

## 2019-01-01 RX ADMIN — RIFAXIMIN 550 MG: 550 TABLET ORAL at 08:12

## 2019-01-01 RX ADMIN — EPOPROSTENOL 20 NG/KG/MIN: 1.5 INJECTION, POWDER, LYOPHILIZED, FOR SOLUTION INTRAVENOUS at 19:34

## 2019-01-01 RX ADMIN — Medication 1 PACKET: at 18:22

## 2019-01-01 RX ADMIN — INSULIN ASPART 1 UNITS: 100 INJECTION, SOLUTION INTRAVENOUS; SUBCUTANEOUS at 00:31

## 2019-01-01 RX ADMIN — Medication 1 PACKET: at 07:49

## 2019-01-01 RX ADMIN — POTASSIUM CHLORIDE 40 MEQ: 10 TABLET, EXTENDED RELEASE ORAL at 11:53

## 2019-01-01 RX ADMIN — HYDROCORTISONE SODIUM SUCCINATE 50 MG: 100 INJECTION, POWDER, FOR SOLUTION INTRAMUSCULAR; INTRAVENOUS at 05:22

## 2019-01-01 RX ADMIN — CEFDINIR 300 MG: 300 CAPSULE ORAL at 13:30

## 2019-01-01 RX ADMIN — FUROSEMIDE 20 MG: 20 TABLET ORAL at 10:03

## 2019-01-01 RX ADMIN — CALCIUM CHLORIDE, MAGNESIUM CHLORIDE, SODIUM CHLORIDE, SODIUM BICARBONATE, POTASSIUM CHLORIDE AND SODIUM PHOSPHATE DIBASIC DIHYDRATE 12.5 ML/KG/HR: 3.68; 3.05; 6.34; 3.09; .314; .187 INJECTION INTRAVENOUS at 21:22

## 2019-01-01 RX ADMIN — MEROPENEM 1 G: 1 INJECTION, POWDER, FOR SOLUTION INTRAVENOUS at 01:37

## 2019-01-01 RX ADMIN — HYDROCORTISONE SODIUM SUCCINATE 50 MG: 100 INJECTION, POWDER, FOR SOLUTION INTRAMUSCULAR; INTRAVENOUS at 00:03

## 2019-01-01 RX ADMIN — CALCIUM CHLORIDE, MAGNESIUM CHLORIDE, SODIUM CHLORIDE, SODIUM BICARBONATE, POTASSIUM CHLORIDE AND SODIUM PHOSPHATE DIBASIC DIHYDRATE 15 ML/KG/HR: 3.68; 3.05; 6.34; 3.09; .314; .187 INJECTION INTRAVENOUS at 03:52

## 2019-01-01 RX ADMIN — DEXTROSE MONOHYDRATE 3 MCG/KG/MIN: 50 INJECTION, SOLUTION INTRAVENOUS at 00:11

## 2019-01-01 RX ADMIN — LACTULOSE 10 G: 10 SOLUTION ORAL at 13:43

## 2019-01-01 RX ADMIN — CEFTRIAXONE SODIUM 2 G: 2 INJECTION, POWDER, FOR SOLUTION INTRAMUSCULAR; INTRAVENOUS at 22:26

## 2019-01-01 RX ADMIN — RIFAXIMIN 550 MG: 550 TABLET ORAL at 21:13

## 2019-01-01 RX ADMIN — LACTULOSE 10 G: 20 SOLUTION ORAL at 14:27

## 2019-01-01 RX ADMIN — LACTULOSE 20 G: 20 SOLUTION ORAL at 14:08

## 2019-01-01 RX ADMIN — ERTAPENEM SODIUM 1 G: 1 INJECTION, POWDER, LYOPHILIZED, FOR SOLUTION INTRAMUSCULAR; INTRAVENOUS at 20:27

## 2019-01-01 RX ADMIN — FUROSEMIDE 20 MG: 20 TABLET ORAL at 09:42

## 2019-01-01 RX ADMIN — ALBUTEROL SULFATE 2.5 MG: 2.5 SOLUTION RESPIRATORY (INHALATION) at 11:18

## 2019-01-01 RX ADMIN — HYDROXYZINE HYDROCHLORIDE 50 MG: 25 TABLET ORAL at 16:29

## 2019-01-01 RX ADMIN — ONDANSETRON 4 MG: 2 INJECTION INTRAMUSCULAR; INTRAVENOUS at 11:37

## 2019-01-01 RX ADMIN — PANTOPRAZOLE SODIUM 40 MG: 40 TABLET, DELAYED RELEASE ORAL at 09:38

## 2019-01-01 RX ADMIN — HYDROMORPHONE HYDROCHLORIDE 2 MG: 2 TABLET ORAL at 21:02

## 2019-01-01 RX ADMIN — CALCIUM CHLORIDE, MAGNESIUM CHLORIDE, SODIUM CHLORIDE, SODIUM BICARBONATE, POTASSIUM CHLORIDE AND SODIUM PHOSPHATE DIBASIC DIHYDRATE 16 ML/KG/HR: 3.68; 3.05; 6.34; 3.09; .314; .187 INJECTION INTRAVENOUS at 19:16

## 2019-01-01 RX ADMIN — CALCIUM CHLORIDE, MAGNESIUM CHLORIDE, SODIUM CHLORIDE, SODIUM BICARBONATE, POTASSIUM CHLORIDE AND SODIUM PHOSPHATE DIBASIC DIHYDRATE 10 ML/KG/HR: 3.68; 3.05; 6.34; 3.09; .314; .187 INJECTION INTRAVENOUS at 02:07

## 2019-01-01 RX ADMIN — LACTULOSE 10 G: 20 SOLUTION ORAL at 15:05

## 2019-01-01 RX ADMIN — LACTULOSE 20 G: 10 SOLUTION ORAL; RECTAL at 04:34

## 2019-01-01 RX ADMIN — MIDODRINE HYDROCHLORIDE 10 MG: 5 TABLET ORAL at 12:00

## 2019-01-01 RX ADMIN — MIDODRINE HYDROCHLORIDE 10 MG: 5 TABLET ORAL at 09:31

## 2019-01-01 RX ADMIN — RIFAXIMIN 550 MG: 550 TABLET ORAL at 19:47

## 2019-01-01 RX ADMIN — Medication 1 PACKET: at 19:40

## 2019-01-01 RX ADMIN — INSULIN ASPART 1 UNITS: 100 INJECTION, SOLUTION INTRAVENOUS; SUBCUTANEOUS at 09:17

## 2019-01-01 RX ADMIN — RIFAXIMIN 550 MG: 550 TABLET ORAL at 07:56

## 2019-01-01 RX ADMIN — MEROPENEM 1 G: 1 INJECTION, POWDER, FOR SOLUTION INTRAVENOUS at 08:10

## 2019-01-01 RX ADMIN — ALBUMIN HUMAN 12.5 G: 0.05 INJECTION, SOLUTION INTRAVENOUS at 04:50

## 2019-01-01 RX ADMIN — CEFDINIR 300 MG: 300 CAPSULE ORAL at 10:50

## 2019-01-01 RX ADMIN — CEFDINIR 300 MG: 300 CAPSULE ORAL at 20:54

## 2019-01-01 RX ADMIN — Medication 100 MG: at 07:56

## 2019-01-01 RX ADMIN — SERTRALINE HYDROCHLORIDE 75 MG: 50 TABLET ORAL at 10:35

## 2019-01-01 RX ADMIN — LACTULOSE 10 G: 10 SOLUTION ORAL at 09:28

## 2019-01-01 RX ADMIN — MELATONIN 1000 UNITS: at 08:33

## 2019-01-01 RX ADMIN — MIDODRINE HYDROCHLORIDE 10 MG: 5 TABLET ORAL at 12:36

## 2019-01-01 RX ADMIN — THERA TABS 1 TABLET: TAB at 09:17

## 2019-01-01 RX ADMIN — DEXTROSE MONOHYDRATE 3 MCG/KG/MIN: 50 INJECTION, SOLUTION INTRAVENOUS at 05:14

## 2019-01-01 RX ADMIN — WHITE PETROLATUM: .15; .85 OINTMENT OPHTHALMIC at 15:40

## 2019-01-01 RX ADMIN — Medication 37.5 MG: at 13:48

## 2019-01-01 RX ADMIN — FUROSEMIDE 20 MG: 20 TABLET ORAL at 10:52

## 2019-01-01 RX ADMIN — SERTRALINE HYDROCHLORIDE 75 MG: 50 TABLET ORAL at 09:38

## 2019-01-01 RX ADMIN — SERTRALINE HYDROCHLORIDE 75 MG: 50 TABLET ORAL at 09:59

## 2019-01-01 RX ADMIN — LACTULOSE 20 G: 10 SOLUTION ORAL at 09:30

## 2019-01-01 RX ADMIN — CALCIUM CHLORIDE, MAGNESIUM CHLORIDE, SODIUM CHLORIDE, SODIUM BICARBONATE, POTASSIUM CHLORIDE AND SODIUM PHOSPHATE DIBASIC DIHYDRATE 10 ML/KG/HR: 3.68; 3.05; 6.34; 3.09; .314; .187 INJECTION INTRAVENOUS at 01:14

## 2019-01-01 RX ADMIN — Medication 12.5 MG: at 09:47

## 2019-01-01 RX ADMIN — RIFAXIMIN 550 MG: 550 TABLET ORAL at 07:54

## 2019-01-01 RX ADMIN — SERTRALINE HYDROCHLORIDE 75 MG: 50 TABLET ORAL at 08:31

## 2019-01-01 RX ADMIN — CARBIDOPA AND LEVODOPA 12.5 MG: 50; 200 TABLET, EXTENDED RELEASE ORAL at 09:38

## 2019-01-01 RX ADMIN — MELATONIN TAB 3 MG 3 MG: 3 TAB at 21:28

## 2019-01-01 RX ADMIN — MELATONIN 1000 UNITS: at 09:25

## 2019-01-01 RX ADMIN — EPOPROSTENOL 20 NG/KG/MIN: 1.5 INJECTION, POWDER, LYOPHILIZED, FOR SOLUTION INTRAVENOUS at 22:32

## 2019-01-01 RX ADMIN — CEFDINIR 300 MG: 300 CAPSULE ORAL at 09:56

## 2019-01-01 RX ADMIN — POTASSIUM CHLORIDE 20 MEQ: 10 TABLET, EXTENDED RELEASE ORAL at 10:56

## 2019-01-01 RX ADMIN — RIFAXIMIN 550 MG: 550 TABLET ORAL at 19:40

## 2019-01-01 RX ADMIN — SERTRALINE HYDROCHLORIDE 75 MG: 50 TABLET ORAL at 10:50

## 2019-01-01 RX ADMIN — Medication 1 PACKET: at 19:34

## 2019-01-01 RX ADMIN — WHITE PETROLATUM: .15; .85 OINTMENT OPHTHALMIC at 01:24

## 2019-01-01 RX ADMIN — MIDODRINE HYDROCHLORIDE 12.5 MG: 5 TABLET ORAL at 13:19

## 2019-01-01 RX ADMIN — CARBIDOPA AND LEVODOPA 12.5 MG: 50; 200 TABLET, EXTENDED RELEASE ORAL at 08:31

## 2019-01-01 RX ADMIN — Medication 0.24 MCG/KG/MIN: at 05:02

## 2019-01-01 RX ADMIN — Medication 0.6 MCG/KG/MIN: at 12:04

## 2019-01-01 RX ADMIN — MELATONIN 1000 UNITS: at 10:35

## 2019-01-01 RX ADMIN — CALCIUM CHLORIDE, MAGNESIUM CHLORIDE, SODIUM CHLORIDE, SODIUM BICARBONATE, POTASSIUM CHLORIDE AND SODIUM PHOSPHATE DIBASIC DIHYDRATE 16 ML/KG/HR: 3.68; 3.05; 6.34; 3.09; .314; .187 INJECTION INTRAVENOUS at 13:57

## 2019-01-01 RX ADMIN — CALCIUM CHLORIDE, MAGNESIUM CHLORIDE, SODIUM CHLORIDE, SODIUM BICARBONATE, POTASSIUM CHLORIDE AND SODIUM PHOSPHATE DIBASIC DIHYDRATE 15 ML/KG/HR: 3.68; 3.05; 6.34; 3.09; .314; .187 INJECTION INTRAVENOUS at 06:33

## 2019-01-01 RX ADMIN — MELATONIN 1000 UNITS: at 10:41

## 2019-01-01 RX ADMIN — WHITE PETROLATUM: .15; .85 OINTMENT OPHTHALMIC at 01:05

## 2019-01-01 RX ADMIN — CEFDINIR 300 MG: 300 CAPSULE ORAL at 09:30

## 2019-01-01 RX ADMIN — THERA TABS 1 TABLET: TAB at 09:04

## 2019-01-01 RX ADMIN — MELATONIN TAB 3 MG 3 MG: 3 TAB at 21:49

## 2019-01-01 RX ADMIN — FUROSEMIDE 20 MG: 20 TABLET ORAL at 09:00

## 2019-01-01 RX ADMIN — CALCIUM CHLORIDE, MAGNESIUM CHLORIDE, SODIUM CHLORIDE, SODIUM BICARBONATE, POTASSIUM CHLORIDE AND SODIUM PHOSPHATE DIBASIC DIHYDRATE 16 ML/KG/HR: 3.68; 3.05; 6.34; 3.09; .314; .187 INJECTION INTRAVENOUS at 09:17

## 2019-01-01 RX ADMIN — Medication 2 G: at 09:07

## 2019-01-01 RX ADMIN — Medication 40 MG: at 08:53

## 2019-01-01 RX ADMIN — CARBIDOPA AND LEVODOPA 12.5 MG: 50; 200 TABLET, EXTENDED RELEASE ORAL at 17:01

## 2019-01-01 RX ADMIN — Medication 0.38 MCG/KG/MIN: at 13:17

## 2019-01-01 RX ADMIN — CALCIUM CHLORIDE, MAGNESIUM CHLORIDE, SODIUM CHLORIDE, SODIUM BICARBONATE, POTASSIUM CHLORIDE AND SODIUM PHOSPHATE DIBASIC DIHYDRATE 15 ML/KG/HR: 3.68; 3.05; 6.34; 3.09; .314; .187 INJECTION INTRAVENOUS at 18:55

## 2019-01-01 RX ADMIN — CALCIUM CHLORIDE, MAGNESIUM CHLORIDE, SODIUM CHLORIDE, SODIUM BICARBONATE, POTASSIUM CHLORIDE AND SODIUM PHOSPHATE DIBASIC DIHYDRATE 1.66 ML/KG/HR: 3.68; 3.05; 6.34; 3.09; .314; .187 INJECTION INTRAVENOUS at 06:05

## 2019-01-01 RX ADMIN — LACTULOSE 10 G: 20 SOLUTION ORAL at 10:19

## 2019-01-01 RX ADMIN — LACTULOSE 20 G: 10 SOLUTION ORAL; RECTAL at 02:30

## 2019-01-01 RX ADMIN — EPOPROSTENOL 20 NG/KG/MIN: 1.5 INJECTION, POWDER, LYOPHILIZED, FOR SOLUTION INTRAVENOUS at 13:09

## 2019-01-01 RX ADMIN — RIFAXIMIN 550 MG: 550 TABLET ORAL at 09:45

## 2019-01-01 RX ADMIN — LACTULOSE 20 G: 10 SOLUTION ORAL; RECTAL at 21:09

## 2019-01-01 RX ADMIN — CARBIDOPA AND LEVODOPA 12.5 MG: 50; 200 TABLET, EXTENDED RELEASE ORAL at 10:34

## 2019-01-01 RX ADMIN — CALCIUM CHLORIDE, MAGNESIUM CHLORIDE, SODIUM CHLORIDE, SODIUM BICARBONATE, POTASSIUM CHLORIDE AND SODIUM PHOSPHATE DIBASIC DIHYDRATE 10 ML/KG/HR: 3.68; 3.05; 6.34; 3.09; .314; .187 INJECTION INTRAVENOUS at 20:30

## 2019-01-01 RX ADMIN — CARBIDOPA AND LEVODOPA 12.5 MG: 50; 200 TABLET, EXTENDED RELEASE ORAL at 17:32

## 2019-01-01 RX ADMIN — EPOPROSTENOL 20 NG/KG/MIN: 1.5 INJECTION, POWDER, LYOPHILIZED, FOR SOLUTION INTRAVENOUS at 04:34

## 2019-01-01 RX ADMIN — MIDODRINE HYDROCHLORIDE 12.5 MG: 5 TABLET ORAL at 20:21

## 2019-01-01 RX ADMIN — CALCIUM CHLORIDE, MAGNESIUM CHLORIDE, SODIUM CHLORIDE, SODIUM BICARBONATE, POTASSIUM CHLORIDE AND SODIUM PHOSPHATE DIBASIC DIHYDRATE 15 ML/KG/HR: 3.68; 3.05; 6.34; 3.09; .314; .187 INJECTION INTRAVENOUS at 07:03

## 2019-01-01 RX ADMIN — CALCIUM CHLORIDE, MAGNESIUM CHLORIDE, SODIUM CHLORIDE, SODIUM BICARBONATE, POTASSIUM CHLORIDE AND SODIUM PHOSPHATE DIBASIC DIHYDRATE 15 ML/KG/HR: 3.68; 3.05; 6.34; 3.09; .314; .187 INJECTION INTRAVENOUS at 17:45

## 2019-01-01 RX ADMIN — MICONAZOLE NITRATE: 20 POWDER TOPICAL at 09:18

## 2019-01-01 RX ADMIN — POTASSIUM CHLORIDE 20 MEQ: 750 TABLET, EXTENDED RELEASE ORAL at 08:47

## 2019-01-01 RX ADMIN — LINEZOLID 600 MG: 600 INJECTION, SOLUTION INTRAVENOUS at 10:11

## 2019-01-01 RX ADMIN — MIDODRINE HYDROCHLORIDE 10 MG: 5 TABLET ORAL at 08:33

## 2019-01-01 RX ADMIN — ONDANSETRON 4 MG: 4 TABLET, ORALLY DISINTEGRATING ORAL at 09:06

## 2019-01-01 RX ADMIN — HYDROCORTISONE SODIUM SUCCINATE 50 MG: 100 INJECTION, POWDER, FOR SOLUTION INTRAMUSCULAR; INTRAVENOUS at 13:58

## 2019-01-01 RX ADMIN — MULTIVITAMIN 15 ML: LIQUID ORAL at 07:48

## 2019-01-01 RX ADMIN — Medication 40 MG: at 08:17

## 2019-01-01 RX ADMIN — CARBIDOPA AND LEVODOPA 12.5 MG: 50; 200 TABLET, EXTENDED RELEASE ORAL at 08:09

## 2019-01-01 RX ADMIN — MICONAZOLE NITRATE: 20 POWDER TOPICAL at 19:40

## 2019-01-01 RX ADMIN — WHITE PETROLATUM: .15; .85 OINTMENT OPHTHALMIC at 14:21

## 2019-01-01 RX ADMIN — SERTRALINE HYDROCHLORIDE 75 MG: 50 TABLET ORAL at 08:48

## 2019-01-01 RX ADMIN — SERTRALINE HYDROCHLORIDE 75 MG: 50 TABLET ORAL at 07:54

## 2019-01-01 RX ADMIN — MELATONIN TAB 3 MG 3 MG: 3 TAB at 21:47

## 2019-01-01 RX ADMIN — CALCIUM CHLORIDE, MAGNESIUM CHLORIDE, SODIUM CHLORIDE, SODIUM BICARBONATE, POTASSIUM CHLORIDE AND SODIUM PHOSPHATE DIBASIC DIHYDRATE 16 ML/KG/HR: 3.68; 3.05; 6.34; 3.09; .314; .187 INJECTION INTRAVENOUS at 05:43

## 2019-01-01 RX ADMIN — SERTRALINE HYDROCHLORIDE 75 MG: 50 TABLET ORAL at 10:54

## 2019-01-01 RX ADMIN — WHITE PETROLATUM: .15; .85 OINTMENT OPHTHALMIC at 02:29

## 2019-01-01 RX ADMIN — CALCIUM CHLORIDE, MAGNESIUM CHLORIDE, SODIUM CHLORIDE, SODIUM BICARBONATE, POTASSIUM CHLORIDE AND SODIUM PHOSPHATE DIBASIC DIHYDRATE 10 ML/KG/HR: 3.68; 3.05; 6.34; 3.09; .314; .187 INJECTION INTRAVENOUS at 13:18

## 2019-01-01 RX ADMIN — MIDODRINE HYDROCHLORIDE 10 MG: 5 TABLET ORAL at 11:33

## 2019-01-01 RX ADMIN — Medication 5 ML: at 08:56

## 2019-01-01 RX ADMIN — MIDODRINE HYDROCHLORIDE 10 MG: 5 TABLET ORAL at 18:19

## 2019-01-01 RX ADMIN — CALCIUM CHLORIDE, MAGNESIUM CHLORIDE, SODIUM CHLORIDE, SODIUM BICARBONATE, POTASSIUM CHLORIDE AND SODIUM PHOSPHATE DIBASIC DIHYDRATE 16 ML/KG/HR: 3.68; 3.05; 6.34; 3.09; .314; .187 INJECTION INTRAVENOUS at 00:21

## 2019-01-01 RX ADMIN — CALCIUM CHLORIDE, MAGNESIUM CHLORIDE, SODIUM CHLORIDE, SODIUM BICARBONATE, POTASSIUM CHLORIDE AND SODIUM PHOSPHATE DIBASIC DIHYDRATE 12.5 ML/KG/HR: 3.68; 3.05; 6.34; 3.09; .314; .187 INJECTION INTRAVENOUS at 19:45

## 2019-01-01 RX ADMIN — LACTULOSE 10 G: 10 SOLUTION ORAL at 20:05

## 2019-01-01 RX ADMIN — CALCIUM CHLORIDE, MAGNESIUM CHLORIDE, SODIUM CHLORIDE, SODIUM BICARBONATE, POTASSIUM CHLORIDE AND SODIUM PHOSPHATE DIBASIC DIHYDRATE 12.5 ML/KG/HR: 3.68; 3.05; 6.34; 3.09; .314; .187 INJECTION INTRAVENOUS at 13:37

## 2019-01-01 RX ADMIN — CALCIUM CHLORIDE, MAGNESIUM CHLORIDE, SODIUM CHLORIDE, SODIUM BICARBONATE, POTASSIUM CHLORIDE AND SODIUM PHOSPHATE DIBASIC DIHYDRATE 16 ML/KG/HR: 3.68; 3.05; 6.34; 3.09; .314; .187 INJECTION INTRAVENOUS at 10:14

## 2019-01-01 RX ADMIN — PANTOPRAZOLE SODIUM 40 MG: 40 TABLET, DELAYED RELEASE ORAL at 10:51

## 2019-01-01 RX ADMIN — LACTULOSE 20 G: 10 SOLUTION ORAL; RECTAL at 14:15

## 2019-01-01 RX ADMIN — WHITE PETROLATUM: .15; .85 OINTMENT OPHTHALMIC at 09:18

## 2019-01-01 RX ADMIN — INSULIN ASPART 1 UNITS: 100 INJECTION, SOLUTION INTRAVENOUS; SUBCUTANEOUS at 19:40

## 2019-01-01 RX ADMIN — MIDAZOLAM 1 MG: 1 INJECTION INTRAMUSCULAR; INTRAVENOUS at 17:36

## 2019-01-01 RX ADMIN — THERA TABS 1 TABLET: TAB at 10:50

## 2019-01-01 RX ADMIN — Medication 1 PACKET: at 09:02

## 2019-01-01 RX ADMIN — PANTOPRAZOLE SODIUM 40 MG: 40 TABLET, DELAYED RELEASE ORAL at 07:55

## 2019-01-01 RX ADMIN — HYDROCORTISONE SODIUM SUCCINATE 50 MG: 100 INJECTION, POWDER, FOR SOLUTION INTRAMUSCULAR; INTRAVENOUS at 01:09

## 2019-01-01 RX ADMIN — CALCIUM CHLORIDE, MAGNESIUM CHLORIDE, SODIUM CHLORIDE, SODIUM BICARBONATE, POTASSIUM CHLORIDE AND SODIUM PHOSPHATE DIBASIC DIHYDRATE 16 ML/KG/HR: 3.68; 3.05; 6.34; 3.09; .314; .187 INJECTION INTRAVENOUS at 14:46

## 2019-01-01 RX ADMIN — HYDROCORTISONE SODIUM SUCCINATE 50 MG: 100 INJECTION, POWDER, FOR SOLUTION INTRAMUSCULAR; INTRAVENOUS at 17:11

## 2019-01-01 RX ADMIN — MICONAZOLE NITRATE: 20 POWDER TOPICAL at 14:52

## 2019-01-01 RX ADMIN — ACETAMINOPHEN 325 MG: 325 TABLET, FILM COATED ORAL at 16:57

## 2019-01-01 RX ADMIN — SERTRALINE HYDROCHLORIDE 75 MG: 50 TABLET ORAL at 08:12

## 2019-01-01 RX ADMIN — ROCURONIUM BROMIDE 100 MG: 10 INJECTION INTRAVENOUS at 15:44

## 2019-01-01 RX ADMIN — CALCIUM CHLORIDE, MAGNESIUM CHLORIDE, SODIUM CHLORIDE, SODIUM BICARBONATE, POTASSIUM CHLORIDE AND SODIUM PHOSPHATE DIBASIC DIHYDRATE 12.5 ML/KG/HR: 3.68; 3.05; 6.34; 3.09; .314; .187 INJECTION INTRAVENOUS at 09:35

## 2019-01-01 RX ADMIN — ACETAMINOPHEN 325 MG: 325 TABLET, FILM COATED ORAL at 20:19

## 2019-01-01 RX ADMIN — LACTULOSE 10 G: 10 SOLUTION ORAL at 21:09

## 2019-01-01 RX ADMIN — ALUMINUM HYDROXIDE, MAGNESIUM HYDROXIDE, AND DIMETHICONE 30 ML: 400; 400; 40 SUSPENSION ORAL at 10:18

## 2019-01-01 RX ADMIN — LACTULOSE 10 G: 10 SOLUTION ORAL at 08:08

## 2019-01-01 RX ADMIN — MEROPENEM 1 G: 1 INJECTION, POWDER, FOR SOLUTION INTRAVENOUS at 18:19

## 2019-01-01 RX ADMIN — THERA TABS 1 TABLET: TAB at 09:58

## 2019-01-01 RX ADMIN — EPOPROSTENOL 20 NG/KG/MIN: 1.5 INJECTION, POWDER, LYOPHILIZED, FOR SOLUTION INTRAVENOUS at 02:15

## 2019-01-01 RX ADMIN — POTASSIUM CHLORIDE 20 MEQ: 10 TABLET, EXTENDED RELEASE ORAL at 10:48

## 2019-01-01 RX ADMIN — SPIRONOLACTONE 50 MG: 50 TABLET, FILM COATED ORAL at 10:35

## 2019-01-01 RX ADMIN — RIFAXIMIN 550 MG: 550 TABLET ORAL at 09:55

## 2019-01-01 RX ADMIN — LACTULOSE 20 G: 10 SOLUTION ORAL; RECTAL at 13:24

## 2019-01-01 RX ADMIN — ONDANSETRON HYDROCHLORIDE 4 MG: 2 INJECTION, SOLUTION INTRAMUSCULAR; INTRAVENOUS at 05:59

## 2019-01-01 RX ADMIN — CALCIUM CHLORIDE, MAGNESIUM CHLORIDE, SODIUM CHLORIDE, SODIUM BICARBONATE, POTASSIUM CHLORIDE AND SODIUM PHOSPHATE DIBASIC DIHYDRATE 16 ML/KG/HR: 3.68; 3.05; 6.34; 3.09; .314; .187 INJECTION INTRAVENOUS at 17:54

## 2019-01-01 RX ADMIN — MELATONIN 1000 UNITS: at 10:06

## 2019-01-01 RX ADMIN — AZITHROMYCIN MONOHYDRATE 500 MG: 500 INJECTION, POWDER, LYOPHILIZED, FOR SOLUTION INTRAVENOUS at 09:49

## 2019-01-01 RX ADMIN — CALCIUM CHLORIDE, MAGNESIUM CHLORIDE, SODIUM CHLORIDE, SODIUM BICARBONATE, POTASSIUM CHLORIDE AND SODIUM PHOSPHATE DIBASIC DIHYDRATE 10 ML/KG/HR: 3.68; 3.05; 6.34; 3.09; .314; .187 INJECTION INTRAVENOUS at 15:18

## 2019-01-01 RX ADMIN — Medication 0.3 MCG/KG/MIN: at 14:00

## 2019-01-01 RX ADMIN — HYDROXYZINE HYDROCHLORIDE 25 MG: 25 TABLET ORAL at 23:07

## 2019-01-01 RX ADMIN — Medication 1 PACKET: at 15:40

## 2019-01-01 RX ADMIN — VITAMIN B12 0.1 MG ORAL TABLET 100 MCG: 0.1 TABLET ORAL at 08:55

## 2019-01-01 RX ADMIN — CEFDINIR 300 MG: 300 CAPSULE ORAL at 21:47

## 2019-01-01 RX ADMIN — LACTULOSE 10 G: 10 SOLUTION ORAL at 13:59

## 2019-01-01 RX ADMIN — HYDROXYZINE HYDROCHLORIDE 25 MG: 25 TABLET ORAL at 21:33

## 2019-01-01 RX ADMIN — HYDROCORTISONE SODIUM SUCCINATE 50 MG: 100 INJECTION, POWDER, FOR SOLUTION INTRAMUSCULAR; INTRAVENOUS at 14:03

## 2019-01-01 RX ADMIN — MIDODRINE HYDROCHLORIDE 12.5 MG: 2.5 TABLET ORAL at 19:16

## 2019-01-01 RX ADMIN — CALCIUM CHLORIDE, MAGNESIUM CHLORIDE, SODIUM CHLORIDE, SODIUM BICARBONATE, POTASSIUM CHLORIDE AND SODIUM PHOSPHATE DIBASIC DIHYDRATE 16 ML/KG/HR: 3.68; 3.05; 6.34; 3.09; .314; .187 INJECTION INTRAVENOUS at 21:49

## 2019-01-01 RX ADMIN — LACTULOSE 10 G: 10 SOLUTION ORAL at 18:38

## 2019-01-01 RX ADMIN — CALCIUM CHLORIDE, MAGNESIUM CHLORIDE, SODIUM CHLORIDE, SODIUM BICARBONATE, POTASSIUM CHLORIDE AND SODIUM PHOSPHATE DIBASIC DIHYDRATE 16 ML/KG/HR: 3.68; 3.05; 6.34; 3.09; .314; .187 INJECTION INTRAVENOUS at 17:35

## 2019-01-01 RX ADMIN — CALCIUM CHLORIDE, MAGNESIUM CHLORIDE, SODIUM CHLORIDE, SODIUM BICARBONATE, POTASSIUM CHLORIDE AND SODIUM PHOSPHATE DIBASIC DIHYDRATE 12.5 ML/KG/HR: 3.68; 3.05; 6.34; 3.09; .314; .187 INJECTION INTRAVENOUS at 00:02

## 2019-01-01 RX ADMIN — ACETAMINOPHEN 325 MG: 325 TABLET, FILM COATED ORAL at 10:12

## 2019-01-01 RX ADMIN — CALCIUM CHLORIDE, MAGNESIUM CHLORIDE, SODIUM CHLORIDE, SODIUM BICARBONATE, POTASSIUM CHLORIDE AND SODIUM PHOSPHATE DIBASIC DIHYDRATE 16 ML/KG/HR: 3.68; 3.05; 6.34; 3.09; .314; .187 INJECTION INTRAVENOUS at 07:32

## 2019-01-01 RX ADMIN — PANTOPRAZOLE SODIUM 40 MG: 40 INJECTION, POWDER, LYOPHILIZED, FOR SOLUTION INTRAVENOUS at 08:31

## 2019-01-01 RX ADMIN — ACETYLCYSTEINE 2 ML: 200 SOLUTION ORAL; RESPIRATORY (INHALATION) at 04:40

## 2019-01-01 RX ADMIN — LACTULOSE 10 G: 20 SOLUTION ORAL at 09:21

## 2019-01-01 RX ADMIN — CALCIUM CHLORIDE, MAGNESIUM CHLORIDE, SODIUM CHLORIDE, SODIUM BICARBONATE, POTASSIUM CHLORIDE AND SODIUM PHOSPHATE DIBASIC DIHYDRATE 16 ML/KG/HR: 3.68; 3.05; 6.34; 3.09; .314; .187 INJECTION INTRAVENOUS at 19:52

## 2019-01-01 RX ADMIN — LACTULOSE 10 G: 20 SOLUTION ORAL at 14:30

## 2019-01-01 RX ADMIN — HYDROXYZINE HYDROCHLORIDE 25 MG: 25 TABLET ORAL at 21:45

## 2019-01-01 RX ADMIN — POTASSIUM CHLORIDE 20 MEQ: 10 TABLET, EXTENDED RELEASE ORAL at 09:58

## 2019-01-01 RX ADMIN — Medication 12.5 MG: at 10:03

## 2019-01-01 RX ADMIN — CALCIUM CHLORIDE 1 G: 100 INJECTION, SOLUTION INTRAVENOUS at 05:43

## 2019-01-01 RX ADMIN — LACTULOSE 20 G: 10 SOLUTION ORAL; RECTAL at 02:03

## 2019-01-01 RX ADMIN — FENTANYL CITRATE 250 MCG/HR: 50 INJECTION INTRAVENOUS at 06:45

## 2019-01-01 RX ADMIN — RIFAXIMIN 550 MG: 550 TABLET ORAL at 20:38

## 2019-01-01 RX ADMIN — SODIUM CHLORIDE 1000 ML: 9 INJECTION, SOLUTION INTRAVENOUS at 23:37

## 2019-01-01 RX ADMIN — FUROSEMIDE 20 MG: 20 TABLET ORAL at 09:05

## 2019-01-01 RX ADMIN — CALCIUM CHLORIDE, MAGNESIUM CHLORIDE, SODIUM CHLORIDE, SODIUM BICARBONATE, POTASSIUM CHLORIDE AND SODIUM PHOSPHATE DIBASIC DIHYDRATE 16 ML/KG/HR: 3.68; 3.05; 6.34; 3.09; .314; .187 INJECTION INTRAVENOUS at 00:28

## 2019-01-01 RX ADMIN — HYDROXYZINE HYDROCHLORIDE 50 MG: 25 TABLET ORAL at 20:38

## 2019-01-01 RX ADMIN — FUROSEMIDE 20 MG: 20 TABLET ORAL at 10:35

## 2019-01-01 RX ADMIN — POTASSIUM CHLORIDE 20 MEQ: 10 TABLET, EXTENDED RELEASE ORAL at 17:12

## 2019-01-01 RX ADMIN — CARBIDOPA AND LEVODOPA 12.5 MG: 50; 200 TABLET, EXTENDED RELEASE ORAL at 18:27

## 2019-01-01 RX ADMIN — ONDANSETRON 4 MG: 4 TABLET, ORALLY DISINTEGRATING ORAL at 10:48

## 2019-01-01 RX ADMIN — CARBIDOPA AND LEVODOPA 12.5 MG: 50; 200 TABLET, EXTENDED RELEASE ORAL at 09:58

## 2019-01-01 RX ADMIN — LACTULOSE 20 G: 10 SOLUTION ORAL; RECTAL at 17:11

## 2019-01-01 RX ADMIN — FUROSEMIDE 20 MG: 20 TABLET ORAL at 10:09

## 2019-01-01 RX ADMIN — PANTOPRAZOLE SODIUM 40 MG: 40 TABLET, DELAYED RELEASE ORAL at 09:28

## 2019-01-01 RX ADMIN — HYDROXYZINE HYDROCHLORIDE 50 MG: 25 TABLET ORAL at 22:16

## 2019-01-01 RX ADMIN — MIDODRINE HYDROCHLORIDE 10 MG: 5 TABLET ORAL at 13:13

## 2019-01-01 RX ADMIN — CALCIUM CHLORIDE, MAGNESIUM CHLORIDE, SODIUM CHLORIDE, SODIUM BICARBONATE, POTASSIUM CHLORIDE AND SODIUM PHOSPHATE DIBASIC DIHYDRATE 16 ML/KG/HR: 3.68; 3.05; 6.34; 3.09; .314; .187 INJECTION INTRAVENOUS at 09:06

## 2019-01-01 RX ADMIN — Medication 0.26 MCG/KG/MIN: at 20:36

## 2019-01-01 RX ADMIN — PANTOPRAZOLE SODIUM 40 MG: 40 TABLET, DELAYED RELEASE ORAL at 09:58

## 2019-01-01 RX ADMIN — RIFAXIMIN 550 MG: 550 TABLET ORAL at 09:29

## 2019-01-01 RX ADMIN — CISATRACURIUM BESYLATE 20 MG: 2 INJECTION INTRAVENOUS at 21:20

## 2019-01-01 RX ADMIN — RIFAXIMIN 550 MG: 550 TABLET ORAL at 10:04

## 2019-01-01 RX ADMIN — ERTAPENEM SODIUM 1 G: 1 INJECTION, POWDER, LYOPHILIZED, FOR SOLUTION INTRAMUSCULAR; INTRAVENOUS at 21:39

## 2019-01-01 RX ADMIN — THERA TABS 1 TABLET: TAB at 09:42

## 2019-01-01 RX ADMIN — LACTULOSE 10 G: 10 SOLUTION ORAL at 17:01

## 2019-01-01 RX ADMIN — LACTULOSE 200 G: 10 SOLUTION ORAL; RECTAL at 04:39

## 2019-01-01 RX ADMIN — LACTULOSE 20 G: 10 SOLUTION ORAL; RECTAL at 01:39

## 2019-01-01 RX ADMIN — RIFAXIMIN 550 MG: 550 TABLET ORAL at 08:18

## 2019-01-01 RX ADMIN — CALCIUM CHLORIDE, MAGNESIUM CHLORIDE, SODIUM CHLORIDE, SODIUM BICARBONATE, POTASSIUM CHLORIDE AND SODIUM PHOSPHATE DIBASIC DIHYDRATE 16 ML/KG/HR: 3.68; 3.05; 6.34; 3.09; .314; .187 INJECTION INTRAVENOUS at 13:59

## 2019-01-01 RX ADMIN — ALBUMIN HUMAN 50 G: 0.25 SOLUTION INTRAVENOUS at 17:35

## 2019-01-01 RX ADMIN — RIFAXIMIN 550 MG: 550 TABLET ORAL at 10:55

## 2019-01-01 RX ADMIN — Medication 1 PACKET: at 21:11

## 2019-01-01 RX ADMIN — INSULIN ASPART 1 UNITS: 100 INJECTION, SOLUTION INTRAVENOUS; SUBCUTANEOUS at 16:35

## 2019-01-01 RX ADMIN — MIDODRINE HYDROCHLORIDE 12.5 MG: 5 TABLET ORAL at 09:54

## 2019-01-01 RX ADMIN — Medication 500 MCG: at 08:44

## 2019-01-01 RX ADMIN — VANCOMYCIN HYDROCHLORIDE 2500 MG: 10 INJECTION, POWDER, LYOPHILIZED, FOR SOLUTION INTRAVENOUS at 10:30

## 2019-01-01 RX ADMIN — ACETYLCYSTEINE 2 ML: 200 SOLUTION ORAL; RESPIRATORY (INHALATION) at 15:39

## 2019-01-01 RX ADMIN — ALBUMIN HUMAN 25 G: 0.05 INJECTION, SOLUTION INTRAVENOUS at 05:26

## 2019-01-01 RX ADMIN — MEROPENEM 1 G: 1 INJECTION, POWDER, FOR SOLUTION INTRAVENOUS at 20:38

## 2019-01-01 RX ADMIN — FENTANYL CITRATE 25 MCG/HR: 50 INJECTION INTRAVENOUS at 08:40

## 2019-01-01 RX ADMIN — RIFAXIMIN 550 MG: 550 TABLET ORAL at 22:10

## 2019-01-01 RX ADMIN — ONDANSETRON 4 MG: 4 TABLET, ORALLY DISINTEGRATING ORAL at 23:56

## 2019-01-01 RX ADMIN — RIFAXIMIN 550 MG: 550 TABLET ORAL at 21:55

## 2019-01-01 RX ADMIN — ONDANSETRON 4 MG: 4 TABLET, ORALLY DISINTEGRATING ORAL at 07:57

## 2019-01-01 RX ADMIN — Medication 12.5 MG: at 09:27

## 2019-01-01 RX ADMIN — CALCIUM GLUCONATE 1 G: 98 INJECTION, SOLUTION INTRAVENOUS at 11:06

## 2019-01-01 RX ADMIN — SERTRALINE HYDROCHLORIDE 75 MG: 50 TABLET ORAL at 07:56

## 2019-01-01 RX ADMIN — CALCIUM CHLORIDE, MAGNESIUM CHLORIDE, SODIUM CHLORIDE, SODIUM BICARBONATE, POTASSIUM CHLORIDE AND SODIUM PHOSPHATE DIBASIC DIHYDRATE 15 ML/KG/HR: 3.68; 3.05; 6.34; 3.09; .314; .187 INJECTION INTRAVENOUS at 04:58

## 2019-01-01 RX ADMIN — RIFAXIMIN 550 MG: 550 TABLET ORAL at 21:02

## 2019-01-01 RX ADMIN — THERA TABS 1 TABLET: TAB at 10:41

## 2019-01-01 RX ADMIN — RIFAXIMIN 550 MG: 550 TABLET ORAL at 20:47

## 2019-01-01 RX ADMIN — ACETYLCYSTEINE 2 ML: 200 SOLUTION ORAL; RESPIRATORY (INHALATION) at 00:52

## 2019-01-01 RX ADMIN — LACTULOSE 20 G: 10 SOLUTION ORAL; RECTAL at 11:34

## 2019-01-01 RX ADMIN — HYDROCORTISONE SODIUM SUCCINATE 50 MG: 100 INJECTION, POWDER, FOR SOLUTION INTRAMUSCULAR; INTRAVENOUS at 13:14

## 2019-01-01 RX ADMIN — ALBUMIN HUMAN 25 G: 0.05 INJECTION, SOLUTION INTRAVENOUS at 02:57

## 2019-01-01 RX ADMIN — MEROPENEM 1 G: 1 INJECTION, POWDER, FOR SOLUTION INTRAVENOUS at 00:01

## 2019-01-01 RX ADMIN — CARBIDOPA AND LEVODOPA 12.5 MG: 50; 200 TABLET, EXTENDED RELEASE ORAL at 09:16

## 2019-01-01 RX ADMIN — WHITE PETROLATUM: .15; .85 OINTMENT OPHTHALMIC at 19:26

## 2019-01-01 RX ADMIN — PANTOPRAZOLE SODIUM 40 MG: 40 TABLET, DELAYED RELEASE ORAL at 08:31

## 2019-01-01 RX ADMIN — SERTRALINE HYDROCHLORIDE 75 MG: 50 TABLET ORAL at 08:20

## 2019-01-01 RX ADMIN — INSULIN ASPART 1 UNITS: 100 INJECTION, SOLUTION INTRAVENOUS; SUBCUTANEOUS at 23:49

## 2019-01-01 RX ADMIN — MIDAZOLAM 4 MG: 1 INJECTION INTRAMUSCULAR; INTRAVENOUS at 17:55

## 2019-01-01 RX ADMIN — ALUMINUM HYDROXIDE, MAGNESIUM HYDROXIDE, AND DIMETHICONE 30 ML: 400; 400; 40 SUSPENSION ORAL at 15:21

## 2019-01-01 RX ADMIN — Medication 1 PACKET: at 19:58

## 2019-01-01 RX ADMIN — SIMETHICONE CHEW TAB 80 MG 80 MG: 80 TABLET ORAL at 06:57

## 2019-01-01 RX ADMIN — LACTULOSE 20 G: 10 SOLUTION ORAL; RECTAL at 13:52

## 2019-01-01 RX ADMIN — Medication 40 MG: at 07:50

## 2019-01-01 RX ADMIN — Medication 0.28 MCG/KG/MIN: at 20:40

## 2019-01-01 RX ADMIN — CALCIUM CHLORIDE, MAGNESIUM CHLORIDE, SODIUM CHLORIDE, SODIUM BICARBONATE, POTASSIUM CHLORIDE AND SODIUM PHOSPHATE DIBASIC DIHYDRATE 10 ML/KG/HR: 3.68; 3.05; 6.34; 3.09; .314; .187 INJECTION INTRAVENOUS at 05:17

## 2019-01-01 RX ADMIN — RIFAXIMIN 550 MG: 550 TABLET ORAL at 08:47

## 2019-01-01 RX ADMIN — CALCIUM CHLORIDE, MAGNESIUM CHLORIDE, SODIUM CHLORIDE, SODIUM BICARBONATE, POTASSIUM CHLORIDE AND SODIUM PHOSPHATE DIBASIC DIHYDRATE 10 ML/KG/HR: 3.68; 3.05; 6.34; 3.09; .314; .187 INJECTION INTRAVENOUS at 23:06

## 2019-01-01 RX ADMIN — INSULIN ASPART 1 UNITS: 100 INJECTION, SOLUTION INTRAVENOUS; SUBCUTANEOUS at 12:33

## 2019-01-01 RX ADMIN — FUROSEMIDE 20 MG: 20 TABLET ORAL at 09:16

## 2019-01-01 RX ADMIN — CARBIDOPA AND LEVODOPA 12.5 MG: 50; 200 TABLET, EXTENDED RELEASE ORAL at 08:33

## 2019-01-01 RX ADMIN — LACTULOSE 200 G: 10 SOLUTION ORAL; RECTAL at 10:37

## 2019-01-01 RX ADMIN — MIDODRINE HYDROCHLORIDE 12.5 MG: 2.5 TABLET ORAL at 09:05

## 2019-01-01 RX ADMIN — CALCIUM CHLORIDE, MAGNESIUM CHLORIDE, SODIUM CHLORIDE, SODIUM BICARBONATE, POTASSIUM CHLORIDE AND SODIUM PHOSPHATE DIBASIC DIHYDRATE 15 ML/KG/HR: 3.68; 3.05; 6.34; 3.09; .314; .187 INJECTION INTRAVENOUS at 00:48

## 2019-01-01 RX ADMIN — LACTULOSE 10 G: 20 SOLUTION ORAL at 22:48

## 2019-01-01 RX ADMIN — MICONAZOLE NITRATE: 20 POWDER TOPICAL at 20:41

## 2019-01-01 RX ADMIN — LACTULOSE 10 G: 10 SOLUTION ORAL at 09:57

## 2019-01-01 RX ADMIN — Medication 5 ML: at 04:13

## 2019-01-01 RX ADMIN — INSULIN ASPART 1 UNITS: 100 INJECTION, SOLUTION INTRAVENOUS; SUBCUTANEOUS at 04:15

## 2019-01-01 RX ADMIN — EPOPROSTENOL 20 NG/KG/MIN: 1.5 INJECTION, POWDER, LYOPHILIZED, FOR SOLUTION INTRAVENOUS at 06:29

## 2019-01-01 RX ADMIN — Medication 1 PACKET: at 08:21

## 2019-01-01 RX ADMIN — CARBIDOPA AND LEVODOPA 12.5 MG: 50; 200 TABLET, EXTENDED RELEASE ORAL at 12:06

## 2019-01-01 RX ADMIN — CARBIDOPA AND LEVODOPA 12.5 MG: 50; 200 TABLET, EXTENDED RELEASE ORAL at 17:36

## 2019-01-01 RX ADMIN — LACTULOSE 20 G: 20 SOLUTION ORAL at 15:54

## 2019-01-01 RX ADMIN — ALBUMIN HUMAN 12.5 G: 0.25 SOLUTION INTRAVENOUS at 13:20

## 2019-01-01 RX ADMIN — CALCIUM CHLORIDE, MAGNESIUM CHLORIDE, SODIUM CHLORIDE, SODIUM BICARBONATE, POTASSIUM CHLORIDE AND SODIUM PHOSPHATE DIBASIC DIHYDRATE 10 ML/KG/HR: 3.68; 3.05; 6.34; 3.09; .314; .187 INJECTION INTRAVENOUS at 14:40

## 2019-01-01 RX ADMIN — POTASSIUM CHLORIDE 20 MEQ: 10 TABLET, EXTENDED RELEASE ORAL at 19:17

## 2019-01-01 RX ADMIN — CALCIUM CHLORIDE, MAGNESIUM CHLORIDE, SODIUM CHLORIDE, SODIUM BICARBONATE, POTASSIUM CHLORIDE AND SODIUM PHOSPHATE DIBASIC DIHYDRATE 10 ML/KG/HR: 3.68; 3.05; 6.34; 3.09; .314; .187 INJECTION INTRAVENOUS at 13:54

## 2019-01-01 RX ADMIN — MULTIVITAMIN 15 ML: LIQUID ORAL at 08:09

## 2019-01-01 RX ADMIN — CALCIUM CHLORIDE, MAGNESIUM CHLORIDE, SODIUM CHLORIDE, SODIUM BICARBONATE, POTASSIUM CHLORIDE AND SODIUM PHOSPHATE DIBASIC DIHYDRATE 1.66 ML/KG/HR: 3.68; 3.05; 6.34; 3.09; .314; .187 INJECTION INTRAVENOUS at 18:15

## 2019-01-01 RX ADMIN — MEROPENEM 1 G: 1 INJECTION, POWDER, FOR SOLUTION INTRAVENOUS at 07:56

## 2019-01-01 RX ADMIN — ACETAMINOPHEN 650 MG: 325 TABLET, FILM COATED ORAL at 21:20

## 2019-01-01 RX ADMIN — CALCIUM CHLORIDE, MAGNESIUM CHLORIDE, SODIUM CHLORIDE, SODIUM BICARBONATE, POTASSIUM CHLORIDE AND SODIUM PHOSPHATE DIBASIC DIHYDRATE 16 ML/KG/HR: 3.68; 3.05; 6.34; 3.09; .314; .187 INJECTION INTRAVENOUS at 22:45

## 2019-01-01 RX ADMIN — CARBIDOPA AND LEVODOPA 12.5 MG: 50; 200 TABLET, EXTENDED RELEASE ORAL at 17:41

## 2019-01-01 RX ADMIN — SODIUM CHLORIDE 500 ML: 9 INJECTION, SOLUTION INTRAVENOUS at 03:15

## 2019-01-01 RX ADMIN — MEROPENEM 1 G: 1 INJECTION, POWDER, FOR SOLUTION INTRAVENOUS at 04:39

## 2019-01-01 RX ADMIN — RIFAXIMIN 550 MG: 550 TABLET ORAL at 08:43

## 2019-01-01 RX ADMIN — SODIUM CHLORIDE 500 ML: 9 INJECTION, SOLUTION INTRAVENOUS at 21:13

## 2019-01-01 RX ADMIN — LACTULOSE 10 G: 20 SOLUTION ORAL at 08:47

## 2019-01-01 RX ADMIN — ERTAPENEM SODIUM 1 G: 1 INJECTION, POWDER, LYOPHILIZED, FOR SOLUTION INTRAMUSCULAR; INTRAVENOUS at 11:55

## 2019-01-01 RX ADMIN — THERA TABS 1 TABLET: TAB at 08:54

## 2019-01-01 RX ADMIN — MELATONIN TAB 3 MG 3 MG: 3 TAB at 20:49

## 2019-01-01 RX ADMIN — LACTULOSE 20 G: 10 SOLUTION ORAL; RECTAL at 15:18

## 2019-01-01 RX ADMIN — HYDROXYZINE HYDROCHLORIDE 25 MG: 25 TABLET ORAL at 09:24

## 2019-01-01 RX ADMIN — CALCIUM CHLORIDE, MAGNESIUM CHLORIDE, SODIUM CHLORIDE, SODIUM BICARBONATE, POTASSIUM CHLORIDE AND SODIUM PHOSPHATE DIBASIC DIHYDRATE 12.5 ML/KG/HR: 3.68; 3.05; 6.34; 3.09; .314; .187 INJECTION INTRAVENOUS at 03:32

## 2019-01-01 RX ADMIN — FUROSEMIDE 20 MG: 20 TABLET ORAL at 12:19

## 2019-01-01 RX ADMIN — RIFAXIMIN 550 MG: 550 TABLET ORAL at 09:58

## 2019-01-01 RX ADMIN — INSULIN ASPART 1 UNITS: 100 INJECTION, SOLUTION INTRAVENOUS; SUBCUTANEOUS at 20:06

## 2019-01-01 RX ADMIN — LIDOCAINE HYDROCHLORIDE 15 ML: 20 SOLUTION ORAL; TOPICAL at 15:59

## 2019-01-01 RX ADMIN — PANTOPRAZOLE SODIUM 40 MG: 40 TABLET, DELAYED RELEASE ORAL at 10:57

## 2019-01-01 RX ADMIN — WHITE PETROLATUM: .15; .85 OINTMENT OPHTHALMIC at 08:21

## 2019-01-01 RX ADMIN — CALCIUM CHLORIDE, MAGNESIUM CHLORIDE, SODIUM CHLORIDE, SODIUM BICARBONATE, POTASSIUM CHLORIDE AND SODIUM PHOSPHATE DIBASIC DIHYDRATE 15 ML/KG/HR: 3.68; 3.05; 6.34; 3.09; .314; .187 INJECTION INTRAVENOUS at 10:41

## 2019-01-01 RX ADMIN — CARBIDOPA AND LEVODOPA 12.5 MG: 50; 200 TABLET, EXTENDED RELEASE ORAL at 17:45

## 2019-01-01 RX ADMIN — LIDOCAINE HYDROCHLORIDE 10 ML: 20 SOLUTION ORAL; TOPICAL at 10:13

## 2019-01-01 RX ADMIN — CALCIUM CHLORIDE, MAGNESIUM CHLORIDE, SODIUM CHLORIDE, SODIUM BICARBONATE, POTASSIUM CHLORIDE AND SODIUM PHOSPHATE DIBASIC DIHYDRATE 16 ML/KG/HR: 3.68; 3.05; 6.34; 3.09; .314; .187 INJECTION INTRAVENOUS at 06:35

## 2019-01-01 RX ADMIN — ERTAPENEM SODIUM 1 G: 1 INJECTION, POWDER, LYOPHILIZED, FOR SOLUTION INTRAMUSCULAR; INTRAVENOUS at 20:48

## 2019-01-01 RX ADMIN — ERTAPENEM SODIUM 1 G: 1 INJECTION, POWDER, LYOPHILIZED, FOR SOLUTION INTRAMUSCULAR; INTRAVENOUS at 20:10

## 2019-01-01 RX ADMIN — CALCIUM CHLORIDE, MAGNESIUM CHLORIDE, SODIUM CHLORIDE, SODIUM BICARBONATE, POTASSIUM CHLORIDE AND SODIUM PHOSPHATE DIBASIC DIHYDRATE 15 ML/KG/HR: 3.68; 3.05; 6.34; 3.09; .314; .187 INJECTION INTRAVENOUS at 10:29

## 2019-01-01 RX ADMIN — ONDANSETRON HYDROCHLORIDE 4 MG: 2 INJECTION, SOLUTION INTRAMUSCULAR; INTRAVENOUS at 13:28

## 2019-01-01 RX ADMIN — LACTULOSE 10 G: 10 SOLUTION ORAL at 08:34

## 2019-01-01 RX ADMIN — Medication 0.2 MG: at 23:53

## 2019-01-01 RX ADMIN — ALBUTEROL SULFATE: 2.5 SOLUTION RESPIRATORY (INHALATION) at 04:40

## 2019-01-01 RX ADMIN — MELATONIN 1000 UNITS: at 10:53

## 2019-01-01 RX ADMIN — RIFAXIMIN 550 MG: 550 TABLET ORAL at 12:05

## 2019-01-01 RX ADMIN — Medication 1 PACKET: at 14:59

## 2019-01-01 RX ADMIN — PANTOPRAZOLE SODIUM 40 MG: 40 TABLET, DELAYED RELEASE ORAL at 09:36

## 2019-01-01 RX ADMIN — MICAFUNGIN SODIUM 100 MG: 10 INJECTION, POWDER, LYOPHILIZED, FOR SOLUTION INTRAVENOUS at 14:50

## 2019-01-01 RX ADMIN — ONDANSETRON 4 MG: 4 TABLET, ORALLY DISINTEGRATING ORAL at 10:54

## 2019-01-01 RX ADMIN — LACTULOSE 20 G: 10 SOLUTION ORAL; RECTAL at 02:56

## 2019-01-01 RX ADMIN — CEFTRIAXONE SODIUM 2 G: 2 INJECTION, POWDER, FOR SOLUTION INTRAMUSCULAR; INTRAVENOUS at 22:08

## 2019-01-01 RX ADMIN — ACETAMINOPHEN 325 MG: 325 TABLET, FILM COATED ORAL at 04:17

## 2019-01-01 RX ADMIN — Medication 0.16 MCG/KG/MIN: at 20:17

## 2019-01-01 RX ADMIN — CALCIUM CHLORIDE, MAGNESIUM CHLORIDE, SODIUM CHLORIDE, SODIUM BICARBONATE, POTASSIUM CHLORIDE AND SODIUM PHOSPHATE DIBASIC DIHYDRATE 12.5 ML/KG/HR: 3.68; 3.05; 6.34; 3.09; .314; .187 INJECTION INTRAVENOUS at 10:25

## 2019-01-01 RX ADMIN — CALCIUM CHLORIDE 1 G: 100 INJECTION, SOLUTION INTRAVENOUS at 18:46

## 2019-01-01 RX ADMIN — CALCIUM CHLORIDE, MAGNESIUM CHLORIDE, SODIUM CHLORIDE, SODIUM BICARBONATE, POTASSIUM CHLORIDE AND SODIUM PHOSPHATE DIBASIC DIHYDRATE 16 ML/KG/HR: 3.68; 3.05; 6.34; 3.09; .314; .187 INJECTION INTRAVENOUS at 06:06

## 2019-01-01 RX ADMIN — LACTULOSE 10 G: 20 SOLUTION ORAL at 10:05

## 2019-01-01 RX ADMIN — Medication 40 MG: at 07:58

## 2019-01-01 RX ADMIN — LACTULOSE 20 G: 10 SOLUTION ORAL; RECTAL at 14:59

## 2019-01-01 RX ADMIN — MELATONIN 1000 UNITS: at 09:29

## 2019-01-01 RX ADMIN — ONDANSETRON 4 MG: 2 INJECTION INTRAMUSCULAR; INTRAVENOUS at 16:33

## 2019-01-01 RX ADMIN — MIDODRINE HYDROCHLORIDE 12.5 MG: 5 TABLET ORAL at 10:04

## 2019-01-01 RX ADMIN — PANTOPRAZOLE SODIUM 40 MG: 40 TABLET, DELAYED RELEASE ORAL at 09:46

## 2019-01-01 RX ADMIN — HYDROCORTISONE SODIUM SUCCINATE 50 MG: 100 INJECTION, POWDER, FOR SOLUTION INTRAMUSCULAR; INTRAVENOUS at 11:31

## 2019-01-01 RX ADMIN — ALBUTEROL SULFATE 2.5 MG: 2.5 SOLUTION RESPIRATORY (INHALATION) at 08:14

## 2019-01-01 RX ADMIN — LACTULOSE 20 G: 10 SOLUTION ORAL at 21:28

## 2019-01-01 RX ADMIN — ACETAMINOPHEN 325 MG: 325 TABLET, FILM COATED ORAL at 00:41

## 2019-01-01 RX ADMIN — FENTANYL CITRATE 50 MCG: 50 INJECTION, SOLUTION INTRAMUSCULAR; INTRAVENOUS at 16:53

## 2019-01-01 RX ADMIN — MIDAZOLAM 2 MG/HR: 5 INJECTION INTRAMUSCULAR; INTRAVENOUS at 15:22

## 2019-01-01 RX ADMIN — LACTULOSE 10 G: 10 SOLUTION ORAL at 09:39

## 2019-01-01 RX ADMIN — CARBIDOPA AND LEVODOPA 12.5 MG: 50; 200 TABLET, EXTENDED RELEASE ORAL at 13:48

## 2019-01-01 RX ADMIN — MELATONIN TAB 3 MG 3 MG: 3 TAB at 21:13

## 2019-01-01 RX ADMIN — HYDROXYZINE HYDROCHLORIDE 25 MG: 25 TABLET ORAL at 21:14

## 2019-01-01 RX ADMIN — DORNASE ALFA 2.5 MG: 1 SOLUTION RESPIRATORY (INHALATION) at 20:16

## 2019-01-01 RX ADMIN — ALBUMIN HUMAN 25 G: 0.05 INJECTION, SOLUTION INTRAVENOUS at 22:50

## 2019-01-01 RX ADMIN — THERA TABS 1 TABLET: TAB at 09:00

## 2019-01-01 RX ADMIN — LACTULOSE 10 G: 20 SOLUTION ORAL at 09:39

## 2019-01-01 RX ADMIN — ALBUTEROL SULFATE: 2.5 SOLUTION RESPIRATORY (INHALATION) at 23:09

## 2019-01-01 RX ADMIN — PANTOPRAZOLE SODIUM 40 MG: 40 TABLET, DELAYED RELEASE ORAL at 08:47

## 2019-01-01 RX ADMIN — Medication 5 MG: at 13:27

## 2019-01-01 RX ADMIN — INSULIN ASPART 1 UNITS: 100 INJECTION, SOLUTION INTRAVENOUS; SUBCUTANEOUS at 00:24

## 2019-01-01 RX ADMIN — CARBIDOPA AND LEVODOPA 12.5 MG: 50; 200 TABLET, EXTENDED RELEASE ORAL at 13:00

## 2019-01-01 RX ADMIN — MIDODRINE HYDROCHLORIDE 10 MG: 5 TABLET ORAL at 18:41

## 2019-01-01 RX ADMIN — RIFAXIMIN 550 MG: 550 TABLET ORAL at 10:40

## 2019-01-01 RX ADMIN — HYDROCORTISONE SODIUM SUCCINATE 50 MG: 100 INJECTION, POWDER, FOR SOLUTION INTRAMUSCULAR; INTRAVENOUS at 17:54

## 2019-01-01 RX ADMIN — PANTOPRAZOLE SODIUM 40 MG: 40 TABLET, DELAYED RELEASE ORAL at 10:49

## 2019-01-01 RX ADMIN — ERTAPENEM SODIUM 1 G: 1 INJECTION, POWDER, LYOPHILIZED, FOR SOLUTION INTRAMUSCULAR; INTRAVENOUS at 13:52

## 2019-01-01 RX ADMIN — ACETYLCYSTEINE 2 ML: 200 SOLUTION ORAL; RESPIRATORY (INHALATION) at 11:51

## 2019-01-01 RX ADMIN — WHITE PETROLATUM: .15; .85 OINTMENT OPHTHALMIC at 19:41

## 2019-01-01 RX ADMIN — CALCIUM CHLORIDE, MAGNESIUM CHLORIDE, SODIUM CHLORIDE, SODIUM BICARBONATE, POTASSIUM CHLORIDE AND SODIUM PHOSPHATE DIBASIC DIHYDRATE 1.66 ML/KG/HR: 3.68; 3.05; 6.34; 3.09; .314; .187 INJECTION INTRAVENOUS at 10:30

## 2019-01-01 RX ADMIN — CARBIDOPA AND LEVODOPA 12.5 MG: 50; 200 TABLET, EXTENDED RELEASE ORAL at 08:06

## 2019-01-01 RX ADMIN — Medication 500 MCG: at 08:17

## 2019-01-01 RX ADMIN — CALCIUM CHLORIDE, MAGNESIUM CHLORIDE, SODIUM CHLORIDE, SODIUM BICARBONATE, POTASSIUM CHLORIDE AND SODIUM PHOSPHATE DIBASIC DIHYDRATE 10 ML/KG/HR: 3.68; 3.05; 6.34; 3.09; .314; .187 INJECTION INTRAVENOUS at 09:00

## 2019-01-01 RX ADMIN — LACTULOSE 20 G: 10 SOLUTION ORAL; RECTAL at 19:40

## 2019-01-01 RX ADMIN — Medication 1 PACKET: at 07:51

## 2019-01-01 RX ADMIN — MICAFUNGIN SODIUM 100 MG: 10 INJECTION, POWDER, LYOPHILIZED, FOR SOLUTION INTRAVENOUS at 10:44

## 2019-01-01 RX ADMIN — RIFAXIMIN 550 MG: 550 TABLET ORAL at 21:49

## 2019-01-01 RX ADMIN — LACTULOSE 200 G: 10 SOLUTION ORAL; RECTAL at 00:19

## 2019-01-01 RX ADMIN — Medication 1 PACKET: at 19:47

## 2019-01-01 RX ADMIN — PANTOPRAZOLE SODIUM 40 MG: 40 TABLET, DELAYED RELEASE ORAL at 10:54

## 2019-01-01 RX ADMIN — SERTRALINE HYDROCHLORIDE 75 MG: 50 TABLET ORAL at 09:04

## 2019-01-01 RX ADMIN — POTASSIUM CHLORIDE 20 MEQ: 10 TABLET, EXTENDED RELEASE ORAL at 09:59

## 2019-01-01 RX ADMIN — VASOPRESSIN 4 UNITS/HR: 20 INJECTION INTRAVENOUS at 02:16

## 2019-01-01 RX ADMIN — SODIUM CHLORIDE 500 ML: 9 INJECTION, SOLUTION INTRAVENOUS at 21:39

## 2019-01-01 RX ADMIN — Medication 1 PACKET: at 19:46

## 2019-01-01 RX ADMIN — PHENYLEPHRINE HYDROCHLORIDE 0.5 MCG/KG/MIN: 10 INJECTION INTRAVENOUS at 21:09

## 2019-01-01 RX ADMIN — FUROSEMIDE 20 MG: 20 TABLET ORAL at 09:54

## 2019-01-01 RX ADMIN — MELATONIN TAB 3 MG 3 MG: 3 TAB at 21:45

## 2019-01-01 RX ADMIN — Medication 500 MG: at 08:20

## 2019-01-01 RX ADMIN — EPOPROSTENOL 20 NG/KG/MIN: 1.5 INJECTION, POWDER, LYOPHILIZED, FOR SOLUTION INTRAVENOUS at 04:57

## 2019-01-01 RX ADMIN — Medication 0.6 MCG/KG/MIN: at 15:21

## 2019-01-01 RX ADMIN — Medication 100 MG: at 07:49

## 2019-01-01 RX ADMIN — LACTULOSE 200 G: 10 SOLUTION ORAL; RECTAL at 20:38

## 2019-01-01 RX ADMIN — CALCIUM CHLORIDE 1 G: 100 INJECTION, SOLUTION INTRAVENOUS at 05:21

## 2019-01-01 RX ADMIN — LACTULOSE 20 G: 10 SOLUTION ORAL; RECTAL at 13:47

## 2019-01-01 RX ADMIN — LACTULOSE 20 G: 10 SOLUTION ORAL; RECTAL at 08:20

## 2019-01-01 RX ADMIN — CARBIDOPA AND LEVODOPA 12.5 MG: 50; 200 TABLET, EXTENDED RELEASE ORAL at 13:57

## 2019-01-01 RX ADMIN — LACTULOSE 10 G: 10 SOLUTION ORAL at 13:35

## 2019-01-01 RX ADMIN — LACTULOSE 10 G: 20 SOLUTION ORAL at 13:13

## 2019-01-01 RX ADMIN — ALBUMIN HUMAN 100 G: 0.25 SOLUTION INTRAVENOUS at 14:47

## 2019-01-01 RX ADMIN — SPIRONOLACTONE 50 MG: 50 TABLET, FILM COATED ORAL at 09:41

## 2019-01-01 RX ADMIN — LACTULOSE 200 G: 10 SOLUTION ORAL; RECTAL at 03:28

## 2019-01-01 RX ADMIN — CEFDINIR 300 MG: 300 CAPSULE ORAL at 21:55

## 2019-01-01 RX ADMIN — CARBIDOPA AND LEVODOPA 12.5 MG: 50; 200 TABLET, EXTENDED RELEASE ORAL at 18:54

## 2019-01-01 RX ADMIN — WHITE PETROLATUM: .15; .85 OINTMENT OPHTHALMIC at 11:04

## 2019-01-01 RX ADMIN — RIFAXIMIN 550 MG: 550 TABLET ORAL at 21:27

## 2019-01-01 RX ADMIN — CALCIUM CHLORIDE, MAGNESIUM CHLORIDE, SODIUM CHLORIDE, SODIUM BICARBONATE, POTASSIUM CHLORIDE AND SODIUM PHOSPHATE DIBASIC DIHYDRATE 10 ML/KG/HR: 3.68; 3.05; 6.34; 3.09; .314; .187 INJECTION INTRAVENOUS at 04:23

## 2019-01-01 RX ADMIN — MEROPENEM 1 G: 1 INJECTION, POWDER, FOR SOLUTION INTRAVENOUS at 16:11

## 2019-01-01 RX ADMIN — MEROPENEM 1 G: 1 INJECTION, POWDER, FOR SOLUTION INTRAVENOUS at 13:25

## 2019-01-01 RX ADMIN — RIFAXIMIN 550 MG: 550 TABLET ORAL at 10:51

## 2019-01-01 RX ADMIN — MEROPENEM 1 G: 1 INJECTION, POWDER, FOR SOLUTION INTRAVENOUS at 15:52

## 2019-01-01 RX ADMIN — RIFAXIMIN 550 MG: 550 TABLET ORAL at 21:21

## 2019-01-01 RX ADMIN — CALCIUM CHLORIDE, MAGNESIUM CHLORIDE, SODIUM CHLORIDE, SODIUM BICARBONATE, POTASSIUM CHLORIDE AND SODIUM PHOSPHATE DIBASIC DIHYDRATE 16 ML/KG/HR: 3.68; 3.05; 6.34; 3.09; .314; .187 INJECTION INTRAVENOUS at 16:42

## 2019-01-01 RX ADMIN — LACTULOSE 10 G: 10 SOLUTION ORAL at 14:59

## 2019-01-01 RX ADMIN — Medication 0.31 MCG/KG/MIN: at 12:18

## 2019-01-01 RX ADMIN — RIFAXIMIN 550 MG: 550 TABLET ORAL at 09:35

## 2019-01-01 RX ADMIN — CARBIDOPA AND LEVODOPA 12.5 MG: 50; 200 TABLET, EXTENDED RELEASE ORAL at 17:15

## 2019-01-01 RX ADMIN — POTASSIUM CHLORIDE 20 MEQ: 10 TABLET, EXTENDED RELEASE ORAL at 10:51

## 2019-01-01 RX ADMIN — CARBIDOPA AND LEVODOPA 12.5 MG: 50; 200 TABLET, EXTENDED RELEASE ORAL at 09:41

## 2019-01-01 RX ADMIN — FUROSEMIDE 20 MG: 20 TABLET ORAL at 10:05

## 2019-01-01 RX ADMIN — PHYTONADIONE 10 MG: 10 INJECTION, EMULSION INTRAMUSCULAR; INTRAVENOUS; SUBCUTANEOUS at 10:34

## 2019-01-01 RX ADMIN — MIDAZOLAM 10 MG/HR: 5 INJECTION INTRAMUSCULAR; INTRAVENOUS at 21:56

## 2019-01-01 RX ADMIN — Medication 1 PACKET: at 11:09

## 2019-01-01 RX ADMIN — MICAFUNGIN SODIUM 100 MG: 10 INJECTION, POWDER, LYOPHILIZED, FOR SOLUTION INTRAVENOUS at 14:59

## 2019-01-01 RX ADMIN — Medication 500 MCG: at 08:21

## 2019-01-01 RX ADMIN — Medication 0.28 MCG/KG/MIN: at 21:27

## 2019-01-01 RX ADMIN — CALCIUM CHLORIDE, MAGNESIUM CHLORIDE, SODIUM CHLORIDE, SODIUM BICARBONATE, POTASSIUM CHLORIDE AND SODIUM PHOSPHATE DIBASIC DIHYDRATE 16 ML/KG/HR: 3.68; 3.05; 6.34; 3.09; .314; .187 INJECTION INTRAVENOUS at 17:24

## 2019-01-01 RX ADMIN — CALCIUM CHLORIDE, MAGNESIUM CHLORIDE, SODIUM CHLORIDE, SODIUM BICARBONATE, POTASSIUM CHLORIDE AND SODIUM PHOSPHATE DIBASIC DIHYDRATE 10 ML/KG/HR: 3.68; 3.05; 6.34; 3.09; .314; .187 INJECTION INTRAVENOUS at 19:43

## 2019-01-01 RX ADMIN — Medication 100 MG: at 08:12

## 2019-01-01 RX ADMIN — Medication 100 MG: at 08:20

## 2019-01-01 RX ADMIN — PANTOPRAZOLE SODIUM 40 MG: 40 TABLET, DELAYED RELEASE ORAL at 08:12

## 2019-01-01 RX ADMIN — DEXTROSE MONOHYDRATE: 100 INJECTION, SOLUTION INTRAVENOUS at 08:32

## 2019-01-01 RX ADMIN — LACTULOSE 10 G: 20 SOLUTION ORAL at 13:29

## 2019-01-01 RX ADMIN — LACTULOSE 10 G: 20 SOLUTION ORAL at 20:50

## 2019-01-01 RX ADMIN — CALCIUM CHLORIDE, MAGNESIUM CHLORIDE, SODIUM CHLORIDE, SODIUM BICARBONATE, POTASSIUM CHLORIDE AND SODIUM PHOSPHATE DIBASIC DIHYDRATE 15 ML/KG/HR: 3.68; 3.05; 6.34; 3.09; .314; .187 INJECTION INTRAVENOUS at 21:23

## 2019-01-01 RX ADMIN — MEROPENEM 1 G: 1 INJECTION, POWDER, FOR SOLUTION INTRAVENOUS at 08:20

## 2019-01-01 RX ADMIN — FUROSEMIDE 20 MG: 20 TABLET ORAL at 10:49

## 2019-01-01 RX ADMIN — WHITE PETROLATUM: .15; .85 OINTMENT OPHTHALMIC at 08:44

## 2019-01-01 RX ADMIN — THERA TABS 1 TABLET: TAB at 10:48

## 2019-01-01 RX ADMIN — CALCIUM CHLORIDE, MAGNESIUM CHLORIDE, SODIUM CHLORIDE, SODIUM BICARBONATE, POTASSIUM CHLORIDE AND SODIUM PHOSPHATE DIBASIC DIHYDRATE 16 ML/KG/HR: 3.68; 3.05; 6.34; 3.09; .314; .187 INJECTION INTRAVENOUS at 19:46

## 2019-01-01 RX ADMIN — CALCIUM CHLORIDE, MAGNESIUM CHLORIDE, SODIUM CHLORIDE, SODIUM BICARBONATE, POTASSIUM CHLORIDE AND SODIUM PHOSPHATE DIBASIC DIHYDRATE 16 ML/KG/HR: 3.68; 3.05; 6.34; 3.09; .314; .187 INJECTION INTRAVENOUS at 12:20

## 2019-01-01 RX ADMIN — WHITE PETROLATUM: .15; .85 OINTMENT OPHTHALMIC at 20:41

## 2019-01-01 RX ADMIN — EPOPROSTENOL 20 NG/KG/MIN: 1.5 INJECTION, POWDER, LYOPHILIZED, FOR SOLUTION INTRAVENOUS at 16:04

## 2019-01-01 RX ADMIN — WHITE PETROLATUM: .15; .85 OINTMENT OPHTHALMIC at 07:58

## 2019-01-01 RX ADMIN — WHITE PETROLATUM: .15; .85 OINTMENT OPHTHALMIC at 14:07

## 2019-01-01 RX ADMIN — POTASSIUM CHLORIDE 40 MEQ: 10 TABLET, EXTENDED RELEASE ORAL at 15:53

## 2019-01-01 RX ADMIN — HYDROCORTISONE SODIUM SUCCINATE 50 MG: 100 INJECTION, POWDER, FOR SOLUTION INTRAMUSCULAR; INTRAVENOUS at 23:05

## 2019-01-01 RX ADMIN — Medication 500 MCG: at 18:14

## 2019-01-01 RX ADMIN — FENTANYL CITRATE 25 MCG: 50 INJECTION INTRAMUSCULAR; INTRAVENOUS at 01:15

## 2019-01-01 RX ADMIN — ERTAPENEM SODIUM 1 G: 1 INJECTION, POWDER, LYOPHILIZED, FOR SOLUTION INTRAMUSCULAR; INTRAVENOUS at 20:31

## 2019-01-01 RX ADMIN — WHITE PETROLATUM: .15; .85 OINTMENT OPHTHALMIC at 21:08

## 2019-01-01 RX ADMIN — Medication 0.19 MCG/KG/MIN: at 03:10

## 2019-01-01 RX ADMIN — CALCIUM CHLORIDE, MAGNESIUM CHLORIDE, SODIUM CHLORIDE, SODIUM BICARBONATE, POTASSIUM CHLORIDE AND SODIUM PHOSPHATE DIBASIC DIHYDRATE 1.66 ML/KG/HR: 3.68; 3.05; 6.34; 3.09; .314; .187 INJECTION INTRAVENOUS at 21:22

## 2019-01-01 RX ADMIN — MELATONIN TAB 3 MG 3 MG: 3 TAB at 21:27

## 2019-01-01 RX ADMIN — CALCIUM CHLORIDE, MAGNESIUM CHLORIDE, SODIUM CHLORIDE, SODIUM BICARBONATE, POTASSIUM CHLORIDE AND SODIUM PHOSPHATE DIBASIC DIHYDRATE 12.5 ML/KG/HR: 3.68; 3.05; 6.34; 3.09; .314; .187 INJECTION INTRAVENOUS at 03:53

## 2019-01-01 RX ADMIN — THERA TABS 1 TABLET: TAB at 09:29

## 2019-01-01 RX ADMIN — LACTULOSE 10 G: 10 SOLUTION ORAL at 21:03

## 2019-01-01 RX ADMIN — Medication 0.03 MCG/KG/MIN: at 06:48

## 2019-01-01 RX ADMIN — LACTULOSE 10 G: 20 SOLUTION ORAL at 09:20

## 2019-01-01 RX ADMIN — ALUMINUM HYDROXIDE, MAGNESIUM HYDROXIDE, AND DIMETHICONE 30 ML: 400; 400; 40 SUSPENSION ORAL at 12:48

## 2019-01-01 RX ADMIN — INSULIN ASPART 1 UNITS: 100 INJECTION, SOLUTION INTRAVENOUS; SUBCUTANEOUS at 09:15

## 2019-01-01 RX ADMIN — Medication 5 ML: at 08:43

## 2019-01-01 RX ADMIN — Medication 100 MG: at 15:54

## 2019-01-01 RX ADMIN — ACETAMINOPHEN 325 MG: 325 TABLET, FILM COATED ORAL at 12:42

## 2019-01-01 RX ADMIN — LACTULOSE 20 G: 20 SOLUTION ORAL at 09:05

## 2019-01-01 RX ADMIN — SIMETHICONE CHEW TAB 80 MG 80 MG: 80 TABLET ORAL at 21:32

## 2019-01-01 RX ADMIN — WHITE PETROLATUM: .15; .85 OINTMENT OPHTHALMIC at 19:40

## 2019-01-01 RX ADMIN — FUROSEMIDE 20 MG: 20 TABLET ORAL at 10:42

## 2019-01-01 RX ADMIN — MIDAZOLAM 1 MG/HR: 5 INJECTION INTRAMUSCULAR; INTRAVENOUS at 15:46

## 2019-01-01 RX ADMIN — LACTULOSE 20 G: 10 SOLUTION ORAL at 19:26

## 2019-01-01 RX ADMIN — VITAMIN B12 0.1 MG ORAL TABLET 100 MCG: 0.1 TABLET ORAL at 08:43

## 2019-01-01 RX ADMIN — HYDROCORTISONE SODIUM SUCCINATE 50 MG: 100 INJECTION, POWDER, FOR SOLUTION INTRAMUSCULAR; INTRAVENOUS at 05:31

## 2019-01-01 RX ADMIN — CALCIUM CHLORIDE, MAGNESIUM CHLORIDE, SODIUM CHLORIDE, SODIUM BICARBONATE, POTASSIUM CHLORIDE AND SODIUM PHOSPHATE DIBASIC DIHYDRATE 15 ML/KG/HR: 3.68; 3.05; 6.34; 3.09; .314; .187 INJECTION INTRAVENOUS at 01:23

## 2019-01-01 RX ADMIN — Medication 5 UNITS: at 10:17

## 2019-01-01 RX ADMIN — CARBIDOPA AND LEVODOPA 12.5 MG: 50; 200 TABLET, EXTENDED RELEASE ORAL at 12:26

## 2019-01-01 RX ADMIN — CALCIUM CHLORIDE, MAGNESIUM CHLORIDE, SODIUM CHLORIDE, SODIUM BICARBONATE, POTASSIUM CHLORIDE AND SODIUM PHOSPHATE DIBASIC DIHYDRATE 16 ML/KG/HR: 3.68; 3.05; 6.34; 3.09; .314; .187 INJECTION INTRAVENOUS at 09:37

## 2019-01-01 RX ADMIN — ONDANSETRON HYDROCHLORIDE 4 MG: 2 INJECTION, SOLUTION INTRAMUSCULAR; INTRAVENOUS at 00:43

## 2019-01-01 RX ADMIN — POTASSIUM CHLORIDE 20 MEQ: 10 TABLET, EXTENDED RELEASE ORAL at 09:30

## 2019-01-01 RX ADMIN — CARBIDOPA AND LEVODOPA 12.5 MG: 50; 200 TABLET, EXTENDED RELEASE ORAL at 14:52

## 2019-01-01 RX ADMIN — RIFAXIMIN 550 MG: 550 TABLET ORAL at 10:11

## 2019-01-01 RX ADMIN — MICAFUNGIN SODIUM 100 MG: 10 INJECTION, POWDER, LYOPHILIZED, FOR SOLUTION INTRAVENOUS at 14:46

## 2019-01-01 RX ADMIN — Medication 5 ML: at 04:14

## 2019-01-01 RX ADMIN — MIDAZOLAM 10 MG/HR: 5 INJECTION INTRAMUSCULAR; INTRAVENOUS at 11:45

## 2019-01-01 RX ADMIN — PHENYLEPHRINE HYDROCHLORIDE 1 MCG/KG/MIN: 10 INJECTION INTRAVENOUS at 13:24

## 2019-01-01 RX ADMIN — EPOPROSTENOL 20 NG/KG/MIN: 1.5 INJECTION, POWDER, LYOPHILIZED, FOR SOLUTION INTRAVENOUS at 12:24

## 2019-01-01 RX ADMIN — LACTULOSE 20 G: 20 SOLUTION ORAL at 05:28

## 2019-01-01 RX ADMIN — Medication 0.02 MCG/KG/MIN: at 13:19

## 2019-01-01 RX ADMIN — LACTULOSE 20 G: 10 SOLUTION ORAL; RECTAL at 01:24

## 2019-01-01 RX ADMIN — INSULIN ASPART 1 UNITS: 100 INJECTION, SOLUTION INTRAVENOUS; SUBCUTANEOUS at 04:18

## 2019-01-01 RX ADMIN — CALCIUM CHLORIDE 1 G: 100 INJECTION, SOLUTION INTRAVENOUS at 18:18

## 2019-01-01 RX ADMIN — RIFAXIMIN 550 MG: 550 TABLET ORAL at 20:15

## 2019-01-01 RX ADMIN — INSULIN ASPART 1 UNITS: 100 INJECTION, SOLUTION INTRAVENOUS; SUBCUTANEOUS at 16:12

## 2019-01-01 RX ADMIN — Medication 0.14 MCG/KG/MIN: at 22:14

## 2019-01-01 RX ADMIN — CALCIUM CHLORIDE, MAGNESIUM CHLORIDE, SODIUM CHLORIDE, SODIUM BICARBONATE, POTASSIUM CHLORIDE AND SODIUM PHOSPHATE DIBASIC DIHYDRATE 10 ML/KG/HR: 3.68; 3.05; 6.34; 3.09; .314; .187 INJECTION INTRAVENOUS at 11:16

## 2019-01-01 RX ADMIN — SERTRALINE HYDROCHLORIDE 75 MG: 50 TABLET ORAL at 09:39

## 2019-01-01 RX ADMIN — MEROPENEM 1 G: 1 INJECTION, POWDER, FOR SOLUTION INTRAVENOUS at 08:09

## 2019-01-01 RX ADMIN — PANTOPRAZOLE SODIUM 40 MG: 40 TABLET, DELAYED RELEASE ORAL at 10:35

## 2019-01-01 RX ADMIN — CEFTRIAXONE SODIUM 1 G: 1 INJECTION, POWDER, FOR SOLUTION INTRAMUSCULAR; INTRAVENOUS at 20:58

## 2019-01-01 RX ADMIN — MIDAZOLAM 2 MG: 1 INJECTION INTRAMUSCULAR; INTRAVENOUS at 15:54

## 2019-01-01 RX ADMIN — LACTULOSE 200 G: 10 SOLUTION ORAL; RECTAL at 16:24

## 2019-01-01 RX ADMIN — Medication 1 PACKET: at 08:22

## 2019-01-01 RX ADMIN — PHYTONADIONE 10 MG: 10 INJECTION, EMULSION INTRAMUSCULAR; INTRAVENOUS; SUBCUTANEOUS at 08:14

## 2019-01-01 RX ADMIN — CALCIUM CHLORIDE, MAGNESIUM CHLORIDE, SODIUM CHLORIDE, SODIUM BICARBONATE, POTASSIUM CHLORIDE AND SODIUM PHOSPHATE DIBASIC DIHYDRATE 16 ML/KG/HR: 3.68; 3.05; 6.34; 3.09; .314; .187 INJECTION INTRAVENOUS at 04:53

## 2019-01-01 RX ADMIN — HYDROCORTISONE SODIUM SUCCINATE 50 MG: 100 INJECTION, POWDER, FOR SOLUTION INTRAMUSCULAR; INTRAVENOUS at 21:04

## 2019-01-01 RX ADMIN — LACTULOSE 10 G: 10 SOLUTION ORAL at 10:12

## 2019-01-01 RX ADMIN — LACTULOSE 20 G: 10 SOLUTION ORAL at 21:25

## 2019-01-01 RX ADMIN — SODIUM CHLORIDE, POTASSIUM CHLORIDE, SODIUM LACTATE AND CALCIUM CHLORIDE 1000 ML: 600; 310; 30; 20 INJECTION, SOLUTION INTRAVENOUS at 17:19

## 2019-01-01 RX ADMIN — MIDAZOLAM 4 MG: 1 INJECTION INTRAMUSCULAR; INTRAVENOUS at 20:13

## 2019-01-01 RX ADMIN — POTASSIUM CHLORIDE 20 MEQ: 29.8 INJECTION, SOLUTION INTRAVENOUS at 09:18

## 2019-01-01 RX ADMIN — MEROPENEM 1 G: 1 INJECTION, POWDER, FOR SOLUTION INTRAVENOUS at 16:44

## 2019-01-01 RX ADMIN — LIDOCAINE HYDROCHLORIDE: 20 JELLY TOPICAL at 20:54

## 2019-01-01 RX ADMIN — MELATONIN TAB 3 MG 3 MG: 3 TAB at 21:10

## 2019-01-01 RX ADMIN — CALCIUM CHLORIDE, MAGNESIUM CHLORIDE, SODIUM CHLORIDE, SODIUM BICARBONATE, POTASSIUM CHLORIDE AND SODIUM PHOSPHATE DIBASIC DIHYDRATE 1.66 ML/KG/HR: 3.68; 3.05; 6.34; 3.09; .314; .187 INJECTION INTRAVENOUS at 04:23

## 2019-01-01 RX ADMIN — Medication 40 MG: at 14:09

## 2019-01-01 RX ADMIN — ALBUTEROL SULFATE 2.5 MG: 2.5 SOLUTION RESPIRATORY (INHALATION) at 07:40

## 2019-01-01 RX ADMIN — MELATONIN 1000 UNITS: at 09:41

## 2019-01-01 RX ADMIN — CALCIUM CHLORIDE, MAGNESIUM CHLORIDE, SODIUM CHLORIDE, SODIUM BICARBONATE, POTASSIUM CHLORIDE AND SODIUM PHOSPHATE DIBASIC DIHYDRATE 15 ML/KG/HR: 3.68; 3.05; 6.34; 3.09; .314; .187 INJECTION INTRAVENOUS at 19:45

## 2019-01-01 RX ADMIN — Medication 5 ML: at 07:42

## 2019-01-01 RX ADMIN — CALCIUM CHLORIDE, MAGNESIUM CHLORIDE, SODIUM CHLORIDE, SODIUM BICARBONATE, POTASSIUM CHLORIDE AND SODIUM PHOSPHATE DIBASIC DIHYDRATE 16 ML/KG/HR: 3.68; 3.05; 6.34; 3.09; .314; .187 INJECTION INTRAVENOUS at 04:17

## 2019-01-01 RX ADMIN — Medication 0.22 MCG/KG/MIN: at 23:48

## 2019-01-01 RX ADMIN — FENTANYL CITRATE 250 MCG/HR: 50 INJECTION INTRAVENOUS at 23:50

## 2019-01-01 RX ADMIN — LACTULOSE 20 G: 10 SOLUTION ORAL; RECTAL at 01:09

## 2019-01-01 RX ADMIN — CALCIUM CHLORIDE, MAGNESIUM CHLORIDE, SODIUM CHLORIDE, SODIUM BICARBONATE, POTASSIUM CHLORIDE AND SODIUM PHOSPHATE DIBASIC DIHYDRATE 16 ML/KG/HR: 3.68; 3.05; 6.34; 3.09; .314; .187 INJECTION INTRAVENOUS at 11:36

## 2019-01-01 RX ADMIN — SPIRONOLACTONE 50 MG: 25 TABLET ORAL at 09:21

## 2019-01-01 RX ADMIN — RIFAXIMIN 550 MG: 550 TABLET ORAL at 08:33

## 2019-01-01 RX ADMIN — POTASSIUM CHLORIDE AND SODIUM CHLORIDE: 900; 150 INJECTION, SOLUTION INTRAVENOUS at 02:20

## 2019-01-01 RX ADMIN — SPIRONOLACTONE 50 MG: 50 TABLET, FILM COATED ORAL at 09:19

## 2019-01-01 RX ADMIN — LACTULOSE 20 G: 10 SOLUTION ORAL; RECTAL at 08:11

## 2019-01-01 RX ADMIN — Medication 100 MG: at 08:55

## 2019-01-01 RX ADMIN — LIDOCAINE 1 PATCH: 560 PATCH PERCUTANEOUS; TOPICAL; TRANSDERMAL at 21:21

## 2019-01-01 RX ADMIN — LACTULOSE 10 G: 10 SOLUTION ORAL at 10:40

## 2019-01-01 RX ADMIN — MELATONIN 1000 UNITS: at 08:59

## 2019-01-01 RX ADMIN — LACTULOSE 10 G: 20 SOLUTION ORAL at 08:11

## 2019-01-01 RX ADMIN — FUROSEMIDE 20 MG: 20 TABLET ORAL at 09:59

## 2019-01-01 RX ADMIN — MELATONIN TAB 3 MG 3 MG: 3 TAB at 00:26

## 2019-01-01 RX ADMIN — CALCIUM CHLORIDE, MAGNESIUM CHLORIDE, SODIUM CHLORIDE, SODIUM BICARBONATE, POTASSIUM CHLORIDE AND SODIUM PHOSPHATE DIBASIC DIHYDRATE 16 ML/KG/HR: 3.68; 3.05; 6.34; 3.09; .314; .187 INJECTION INTRAVENOUS at 10:09

## 2019-01-01 RX ADMIN — MEROPENEM 1 G: 1 INJECTION, POWDER, FOR SOLUTION INTRAVENOUS at 00:16

## 2019-01-01 RX ADMIN — HYDROCORTISONE SODIUM SUCCINATE 50 MG: 100 INJECTION, POWDER, FOR SOLUTION INTRAMUSCULAR; INTRAVENOUS at 21:48

## 2019-01-01 RX ADMIN — LACTULOSE 10 G: 20 SOLUTION ORAL at 20:12

## 2019-01-01 RX ADMIN — ACETYLCYSTEINE 2 ML: 200 SOLUTION ORAL; RESPIRATORY (INHALATION) at 08:08

## 2019-01-01 RX ADMIN — CALCIUM CHLORIDE, MAGNESIUM CHLORIDE, SODIUM CHLORIDE, SODIUM BICARBONATE, POTASSIUM CHLORIDE AND SODIUM PHOSPHATE DIBASIC DIHYDRATE 10 ML/KG/HR: 3.68; 3.05; 6.34; 3.09; .314; .187 INJECTION INTRAVENOUS at 05:03

## 2019-01-01 RX ADMIN — SODIUM CHLORIDE 500 ML: 9 INJECTION, SOLUTION INTRAVENOUS at 13:55

## 2019-01-01 RX ADMIN — MICONAZOLE NITRATE: 20 POWDER TOPICAL at 19:41

## 2019-01-01 RX ADMIN — RIFAXIMIN 550 MG: 550 TABLET ORAL at 20:31

## 2019-01-01 RX ADMIN — CALCIUM CHLORIDE, MAGNESIUM CHLORIDE, SODIUM CHLORIDE, SODIUM BICARBONATE, POTASSIUM CHLORIDE AND SODIUM PHOSPHATE DIBASIC DIHYDRATE 16 ML/KG/HR: 3.68; 3.05; 6.34; 3.09; .314; .187 INJECTION INTRAVENOUS at 14:10

## 2019-01-01 RX ADMIN — RIFAXIMIN 550 MG: 550 TABLET ORAL at 08:32

## 2019-01-01 RX ADMIN — MELATONIN TAB 3 MG 3 MG: 3 TAB at 22:10

## 2019-01-01 RX ADMIN — RIFAXIMIN 550 MG: 550 TABLET ORAL at 09:21

## 2019-01-01 RX ADMIN — CARBIDOPA AND LEVODOPA 12.5 MG: 50; 200 TABLET, EXTENDED RELEASE ORAL at 13:34

## 2019-01-01 RX ADMIN — SERTRALINE HYDROCHLORIDE 75 MG: 50 TABLET ORAL at 08:59

## 2019-01-01 RX ADMIN — CARBIDOPA AND LEVODOPA 12.5 MG: 50; 200 TABLET, EXTENDED RELEASE ORAL at 13:58

## 2019-01-01 RX ADMIN — POTASSIUM CHLORIDE 20 MEQ: 10 TABLET, EXTENDED RELEASE ORAL at 09:26

## 2019-01-01 RX ADMIN — THERA TABS 1 TABLET: TAB at 09:38

## 2019-01-01 RX ADMIN — MELATONIN 5000 UNITS: at 09:45

## 2019-01-01 RX ADMIN — Medication 0.3 MCG/KG/MIN: at 06:41

## 2019-01-01 RX ADMIN — Medication 500 MG: at 15:54

## 2019-01-01 RX ADMIN — SERTRALINE HYDROCHLORIDE 75 MG: 50 TABLET ORAL at 10:57

## 2019-01-01 RX ADMIN — THERA TABS 1 TABLET: TAB at 08:07

## 2019-01-01 RX ADMIN — CALCIUM CHLORIDE, MAGNESIUM CHLORIDE, SODIUM CHLORIDE, SODIUM BICARBONATE, POTASSIUM CHLORIDE AND SODIUM PHOSPHATE DIBASIC DIHYDRATE 1.66 ML/KG/HR: 3.68; 3.05; 6.34; 3.09; .314; .187 INJECTION INTRAVENOUS at 09:36

## 2019-01-01 RX ADMIN — MIDODRINE HYDROCHLORIDE 10 MG: 5 TABLET ORAL at 18:03

## 2019-01-01 RX ADMIN — MULTIVITAMIN 15 ML: LIQUID ORAL at 08:18

## 2019-01-01 RX ADMIN — PANTOPRAZOLE SODIUM 40 MG: 40 TABLET, DELAYED RELEASE ORAL at 08:07

## 2019-01-01 RX ADMIN — ACETYLCYSTEINE 2 ML: 200 SOLUTION ORAL; RESPIRATORY (INHALATION) at 23:09

## 2019-01-01 RX ADMIN — SIMETHICONE CHEW TAB 80 MG 80 MG: 80 TABLET ORAL at 12:48

## 2019-01-01 RX ADMIN — LACTULOSE 10 G: 10 SOLUTION ORAL at 10:50

## 2019-01-01 RX ADMIN — Medication 0.28 MCG/KG/MIN: at 18:08

## 2019-01-01 RX ADMIN — CALCIUM CHLORIDE, MAGNESIUM CHLORIDE, SODIUM CHLORIDE, SODIUM BICARBONATE, POTASSIUM CHLORIDE AND SODIUM PHOSPHATE DIBASIC DIHYDRATE 10 ML/KG/HR: 3.68; 3.05; 6.34; 3.09; .314; .187 INJECTION INTRAVENOUS at 23:17

## 2019-01-01 RX ADMIN — SERTRALINE HYDROCHLORIDE 75 MG: 50 TABLET ORAL at 09:17

## 2019-01-01 RX ADMIN — LACTULOSE 10 G: 10 SOLUTION ORAL at 13:00

## 2019-01-01 RX ADMIN — LACTULOSE 10 G: 20 SOLUTION ORAL at 08:34

## 2019-01-01 RX ADMIN — Medication 5 ML: at 12:36

## 2019-01-01 RX ADMIN — ONDANSETRON 4 MG: 4 TABLET, ORALLY DISINTEGRATING ORAL at 21:45

## 2019-01-01 RX ADMIN — CALCIUM CHLORIDE, MAGNESIUM CHLORIDE, SODIUM CHLORIDE, SODIUM BICARBONATE, POTASSIUM CHLORIDE AND SODIUM PHOSPHATE DIBASIC DIHYDRATE 10 ML/KG/HR: 3.68; 3.05; 6.34; 3.09; .314; .187 INJECTION INTRAVENOUS at 06:31

## 2019-01-01 RX ADMIN — SERTRALINE HYDROCHLORIDE 75 MG: 50 TABLET ORAL at 08:06

## 2019-01-01 RX ADMIN — VITAMIN B12 0.1 MG ORAL TABLET 100 MCG: 0.1 TABLET ORAL at 07:58

## 2019-01-01 RX ADMIN — PROPOFOL 50 MG: 10 INJECTION, EMULSION INTRAVENOUS at 15:44

## 2019-01-01 RX ADMIN — MICAFUNGIN SODIUM 100 MG: 10 INJECTION, POWDER, LYOPHILIZED, FOR SOLUTION INTRAVENOUS at 16:47

## 2019-01-01 RX ADMIN — SERTRALINE HYDROCHLORIDE 75 MG: 50 TABLET ORAL at 09:57

## 2019-01-01 RX ADMIN — LIDOCAINE HYDROCHLORIDE 5 ML: 10 INJECTION, SOLUTION EPIDURAL; INFILTRATION; INTRACAUDAL; PERINEURAL at 10:52

## 2019-01-01 RX ADMIN — LACTULOSE 10 G: 10 SOLUTION ORAL at 20:51

## 2019-01-01 RX ADMIN — LACTULOSE 10 G: 10 SOLUTION ORAL at 21:33

## 2019-01-01 RX ADMIN — WHITE PETROLATUM: .15; .85 OINTMENT OPHTHALMIC at 20:31

## 2019-01-01 RX ADMIN — LACTULOSE 10 G: 10 SOLUTION ORAL at 08:06

## 2019-01-01 RX ADMIN — CALCIUM CHLORIDE, MAGNESIUM CHLORIDE, SODIUM CHLORIDE, SODIUM BICARBONATE, POTASSIUM CHLORIDE AND SODIUM PHOSPHATE DIBASIC DIHYDRATE 10 ML/KG/HR: 3.68; 3.05; 6.34; 3.09; .314; .187 INJECTION INTRAVENOUS at 19:15

## 2019-01-01 RX ADMIN — Medication 500 MCG: at 08:52

## 2019-01-01 RX ADMIN — SODIUM CHLORIDE 1000 ML: 9 INJECTION, SOLUTION INTRAVENOUS at 21:36

## 2019-01-01 RX ADMIN — CALCIUM CHLORIDE, MAGNESIUM CHLORIDE, SODIUM CHLORIDE, SODIUM BICARBONATE, POTASSIUM CHLORIDE AND SODIUM PHOSPHATE DIBASIC DIHYDRATE 16 ML/KG/HR: 3.68; 3.05; 6.34; 3.09; .314; .187 INJECTION INTRAVENOUS at 20:25

## 2019-01-01 RX ADMIN — CALCIUM CHLORIDE, MAGNESIUM CHLORIDE, SODIUM CHLORIDE, SODIUM BICARBONATE, POTASSIUM CHLORIDE AND SODIUM PHOSPHATE DIBASIC DIHYDRATE 16 ML/KG/HR: 3.68; 3.05; 6.34; 3.09; .314; .187 INJECTION INTRAVENOUS at 19:02

## 2019-01-01 RX ADMIN — SERTRALINE HYDROCHLORIDE 75 MG: 50 TABLET ORAL at 09:05

## 2019-01-01 RX ADMIN — LACTULOSE 20 G: 10 SOLUTION ORAL; RECTAL at 19:45

## 2019-01-01 RX ADMIN — DORNASE ALFA 2.5 MG: 1 SOLUTION RESPIRATORY (INHALATION) at 20:42

## 2019-01-01 RX ADMIN — ACETYLCYSTEINE 2 ML: 200 SOLUTION ORAL; RESPIRATORY (INHALATION) at 07:40

## 2019-01-01 RX ADMIN — SIMETHICONE CHEW TAB 80 MG 80 MG: 80 TABLET ORAL at 02:30

## 2019-01-01 RX ADMIN — LACTULOSE 20 G: 10 SOLUTION ORAL; RECTAL at 01:05

## 2019-01-01 RX ADMIN — SERTRALINE HYDROCHLORIDE 75 MG: 50 TABLET ORAL at 07:48

## 2019-01-01 RX ADMIN — LACTULOSE 20 G: 10 SOLUTION ORAL; RECTAL at 06:29

## 2019-01-01 RX ADMIN — ALBUMIN HUMAN 12.5 G: 0.05 INJECTION, SOLUTION INTRAVENOUS at 12:43

## 2019-01-01 RX ADMIN — MELATONIN 5000 UNITS: at 09:04

## 2019-01-01 RX ADMIN — CEFTRIAXONE SODIUM 2 G: 2 INJECTION, POWDER, FOR SOLUTION INTRAMUSCULAR; INTRAVENOUS at 21:59

## 2019-01-01 RX ADMIN — RIFAXIMIN 550 MG: 550 TABLET ORAL at 10:00

## 2019-01-01 RX ADMIN — LACTULOSE 10 G: 10 SOLUTION ORAL at 13:41

## 2019-01-01 RX ADMIN — Medication 1 PACKET: at 13:39

## 2019-01-01 RX ADMIN — MIDODRINE HYDROCHLORIDE 10 MG: 5 TABLET ORAL at 08:12

## 2019-01-01 RX ADMIN — CALCIUM CHLORIDE, MAGNESIUM CHLORIDE, SODIUM CHLORIDE, SODIUM BICARBONATE, POTASSIUM CHLORIDE AND SODIUM PHOSPHATE DIBASIC DIHYDRATE 10 ML/KG/HR: 3.68; 3.05; 6.34; 3.09; .314; .187 INJECTION INTRAVENOUS at 17:37

## 2019-01-01 RX ADMIN — MIDODRINE HYDROCHLORIDE 10 MG: 5 TABLET ORAL at 07:54

## 2019-01-01 RX ADMIN — MULTIVITAMIN 15 ML: LIQUID ORAL at 08:20

## 2019-01-01 RX ADMIN — CARBIDOPA AND LEVODOPA 12.5 MG: 50; 200 TABLET, EXTENDED RELEASE ORAL at 17:14

## 2019-01-01 RX ADMIN — CALCIUM CHLORIDE, MAGNESIUM CHLORIDE, SODIUM CHLORIDE, SODIUM BICARBONATE, POTASSIUM CHLORIDE AND SODIUM PHOSPHATE DIBASIC DIHYDRATE 16 ML/KG/HR: 3.68; 3.05; 6.34; 3.09; .314; .187 INJECTION INTRAVENOUS at 18:16

## 2019-01-01 RX ADMIN — Medication 0.22 MCG/KG/MIN: at 13:50

## 2019-01-01 RX ADMIN — VASOPRESSIN 2.4 UNITS/HR: 20 INJECTION INTRAVENOUS at 10:15

## 2019-01-01 RX ADMIN — SERTRALINE HYDROCHLORIDE 75 MG: 50 TABLET ORAL at 08:02

## 2019-01-01 RX ADMIN — LACTULOSE 10 G: 10 SOLUTION ORAL at 09:01

## 2019-01-01 RX ADMIN — Medication 1 PACKET: at 16:27

## 2019-01-01 RX ADMIN — Medication 5 ML: at 03:17

## 2019-01-01 RX ADMIN — RIFAXIMIN 550 MG: 550 TABLET ORAL at 21:09

## 2019-01-01 RX ADMIN — MEROPENEM 1 G: 1 INJECTION, POWDER, FOR SOLUTION INTRAVENOUS at 09:19

## 2019-01-01 RX ADMIN — THERA TABS 1 TABLET: TAB at 08:31

## 2019-01-01 RX ADMIN — RIFAXIMIN 550 MG: 550 TABLET ORAL at 10:34

## 2019-01-01 RX ADMIN — Medication 500 MG: at 08:19

## 2019-01-01 RX ADMIN — SERTRALINE HYDROCHLORIDE 75 MG: 50 TABLET ORAL at 12:06

## 2019-01-01 RX ADMIN — THERA TABS 1 TABLET: TAB at 08:09

## 2019-01-01 RX ADMIN — CALCIUM CHLORIDE, MAGNESIUM CHLORIDE, SODIUM CHLORIDE, SODIUM BICARBONATE, POTASSIUM CHLORIDE AND SODIUM PHOSPHATE DIBASIC DIHYDRATE 16 ML/KG/HR: 3.68; 3.05; 6.34; 3.09; .314; .187 INJECTION INTRAVENOUS at 15:31

## 2019-01-01 RX ADMIN — LACTULOSE 10 G: 10 SOLUTION ORAL at 15:28

## 2019-01-01 RX ADMIN — Medication 0.2 MG: at 13:20

## 2019-01-01 RX ADMIN — LACTULOSE 20 G: 10 SOLUTION ORAL; RECTAL at 14:08

## 2019-01-01 RX ADMIN — MIDAZOLAM 10 MG/HR: 5 INJECTION INTRAMUSCULAR; INTRAVENOUS at 08:16

## 2019-01-01 RX ADMIN — MIDODRINE HYDROCHLORIDE 12.5 MG: 5 TABLET ORAL at 13:27

## 2019-01-01 RX ADMIN — THERA TABS 1 TABLET: TAB at 10:02

## 2019-01-01 RX ADMIN — CARBIDOPA AND LEVODOPA 12.5 MG: 50; 200 TABLET, EXTENDED RELEASE ORAL at 12:05

## 2019-01-01 RX ADMIN — Medication 1 PACKET: at 14:19

## 2019-01-01 RX ADMIN — ACETAMINOPHEN 325 MG: 325 TABLET, FILM COATED ORAL at 20:12

## 2019-01-01 RX ADMIN — CALCIUM CHLORIDE, MAGNESIUM CHLORIDE, SODIUM CHLORIDE, SODIUM BICARBONATE, POTASSIUM CHLORIDE AND SODIUM PHOSPHATE DIBASIC DIHYDRATE 15 ML/KG/HR: 3.68; 3.05; 6.34; 3.09; .314; .187 INJECTION INTRAVENOUS at 13:30

## 2019-01-01 RX ADMIN — POTASSIUM CHLORIDE 20 MEQ: 10 TABLET, EXTENDED RELEASE ORAL at 10:39

## 2019-01-01 RX ADMIN — RIFAXIMIN 550 MG: 550 TABLET ORAL at 19:57

## 2019-01-01 RX ADMIN — HYDROCORTISONE SODIUM SUCCINATE 50 MG: 100 INJECTION, POWDER, FOR SOLUTION INTRAMUSCULAR; INTRAVENOUS at 11:48

## 2019-01-01 RX ADMIN — HYDROMORPHONE HYDROCHLORIDE 2 MG: 2 TABLET ORAL at 00:34

## 2019-01-01 RX ADMIN — VASOPRESSIN 2.4 UNITS/HR: 20 INJECTION INTRAVENOUS at 12:18

## 2019-01-01 RX ADMIN — CARBIDOPA AND LEVODOPA 12.5 MG: 50; 200 TABLET, EXTENDED RELEASE ORAL at 18:50

## 2019-01-01 RX ADMIN — VASOPRESSIN 2.4 UNITS/HR: 20 INJECTION INTRAVENOUS at 20:09

## 2019-01-01 RX ADMIN — RIFAXIMIN 550 MG: 550 TABLET ORAL at 09:42

## 2019-01-01 RX ADMIN — CALCIUM CHLORIDE, MAGNESIUM CHLORIDE, SODIUM CHLORIDE, SODIUM BICARBONATE, POTASSIUM CHLORIDE AND SODIUM PHOSPHATE DIBASIC DIHYDRATE 16 ML/KG/HR: 3.68; 3.05; 6.34; 3.09; .314; .187 INJECTION INTRAVENOUS at 11:40

## 2019-01-01 RX ADMIN — Medication 0.2 MG: at 11:22

## 2019-01-01 RX ADMIN — VITAMIN B12 0.1 MG ORAL TABLET 100 MCG: 0.1 TABLET ORAL at 13:40

## 2019-01-01 RX ADMIN — LACTULOSE 10 G: 10 SOLUTION ORAL at 08:32

## 2019-01-01 RX ADMIN — CALCIUM CHLORIDE, MAGNESIUM CHLORIDE, SODIUM CHLORIDE, SODIUM BICARBONATE, POTASSIUM CHLORIDE AND SODIUM PHOSPHATE DIBASIC DIHYDRATE 12.5 ML/KG/HR: 3.68; 3.05; 6.34; 3.09; .314; .187 INJECTION INTRAVENOUS at 03:44

## 2019-01-01 RX ADMIN — IOPAMIDOL 135 ML: 755 INJECTION, SOLUTION INTRAVENOUS at 13:35

## 2019-01-01 RX ADMIN — ONDANSETRON 4 MG: 4 TABLET, ORALLY DISINTEGRATING ORAL at 09:05

## 2019-01-01 RX ADMIN — RIFAXIMIN 550 MG: 550 TABLET ORAL at 20:50

## 2019-01-01 RX ADMIN — ACETYLCYSTEINE 2 ML: 200 SOLUTION ORAL; RESPIRATORY (INHALATION) at 08:14

## 2019-01-01 RX ADMIN — RIFAXIMIN 550 MG: 550 TABLET ORAL at 20:19

## 2019-01-01 RX ADMIN — MULTIVITAMIN 15 ML: LIQUID ORAL at 15:54

## 2019-01-01 RX ADMIN — MIDODRINE HYDROCHLORIDE 10 MG: 5 TABLET ORAL at 12:39

## 2019-01-01 RX ADMIN — HYDROCORTISONE SODIUM SUCCINATE 50 MG: 100 INJECTION, POWDER, FOR SOLUTION INTRAMUSCULAR; INTRAVENOUS at 05:42

## 2019-01-01 RX ADMIN — ACETAMINOPHEN 325 MG: 325 TABLET, FILM COATED ORAL at 12:36

## 2019-01-01 RX ADMIN — Medication 0.5 MCG/KG/MIN: at 17:03

## 2019-01-01 RX ADMIN — EPOPROSTENOL 20 NG/KG/MIN: 1.5 INJECTION, POWDER, LYOPHILIZED, FOR SOLUTION INTRAVENOUS at 17:05

## 2019-01-01 RX ADMIN — LACTULOSE 20 G: 10 SOLUTION ORAL; RECTAL at 18:50

## 2019-01-01 RX ADMIN — MIDODRINE HYDROCHLORIDE 12.5 MG: 5 TABLET ORAL at 18:54

## 2019-01-01 RX ADMIN — MELATONIN 1000 UNITS: at 08:31

## 2019-01-01 RX ADMIN — FENTANYL CITRATE 150 MCG/HR: 50 INJECTION INTRAVENOUS at 18:29

## 2019-01-01 RX ADMIN — MELATONIN TAB 3 MG 3 MG: 3 TAB at 21:32

## 2019-01-01 RX ADMIN — ONDANSETRON 4 MG: 4 TABLET, ORALLY DISINTEGRATING ORAL at 13:24

## 2019-01-01 RX ADMIN — SIMETHICONE CHEW TAB 80 MG 80 MG: 80 TABLET ORAL at 10:28

## 2019-01-01 RX ADMIN — FENTANYL CITRATE 250 MCG/HR: 50 INJECTION INTRAVENOUS at 13:25

## 2019-01-01 RX ADMIN — MIDODRINE HYDROCHLORIDE 10 MG: 5 TABLET ORAL at 18:01

## 2019-01-01 RX ADMIN — CALCIUM CHLORIDE, MAGNESIUM CHLORIDE, SODIUM CHLORIDE, SODIUM BICARBONATE, POTASSIUM CHLORIDE AND SODIUM PHOSPHATE DIBASIC DIHYDRATE 15 ML/KG/HR: 3.68; 3.05; 6.34; 3.09; .314; .187 INJECTION INTRAVENOUS at 22:26

## 2019-01-01 RX ADMIN — SERTRALINE HYDROCHLORIDE 75 MG: 50 TABLET ORAL at 09:41

## 2019-01-01 RX ADMIN — LACTULOSE 20 G: 10 SOLUTION ORAL; RECTAL at 08:55

## 2019-01-01 RX ADMIN — LACTULOSE 20 G: 10 SOLUTION ORAL; RECTAL at 17:45

## 2019-01-01 RX ADMIN — RIFAXIMIN 550 MG: 550 TABLET ORAL at 19:55

## 2019-01-01 RX ADMIN — CARBIDOPA AND LEVODOPA 12.5 MG: 50; 200 TABLET, EXTENDED RELEASE ORAL at 14:23

## 2019-01-01 RX ADMIN — ONDANSETRON 4 MG: 4 TABLET, ORALLY DISINTEGRATING ORAL at 16:23

## 2019-01-01 RX ADMIN — Medication 1 PACKET: at 20:31

## 2019-01-01 RX ADMIN — Medication 0.6 MCG/KG/MIN: at 09:01

## 2019-01-01 RX ADMIN — MEROPENEM 1 G: 1 INJECTION, POWDER, FOR SOLUTION INTRAVENOUS at 16:27

## 2019-01-01 RX ADMIN — CARBIDOPA AND LEVODOPA 12.5 MG: 50; 200 TABLET, EXTENDED RELEASE ORAL at 10:40

## 2019-01-01 RX ADMIN — PROPRANOLOL HYDROCHLORIDE 10 MG: 10 TABLET ORAL at 13:20

## 2019-01-01 RX ADMIN — CALCIUM CHLORIDE, MAGNESIUM CHLORIDE, SODIUM CHLORIDE, SODIUM BICARBONATE, POTASSIUM CHLORIDE AND SODIUM PHOSPHATE DIBASIC DIHYDRATE 16 ML/KG/HR: 3.68; 3.05; 6.34; 3.09; .314; .187 INJECTION INTRAVENOUS at 03:31

## 2019-01-01 RX ADMIN — CALCIUM CHLORIDE, MAGNESIUM CHLORIDE, SODIUM CHLORIDE, SODIUM BICARBONATE, POTASSIUM CHLORIDE AND SODIUM PHOSPHATE DIBASIC DIHYDRATE 16 ML/KG/HR: 3.68; 3.05; 6.34; 3.09; .314; .187 INJECTION INTRAVENOUS at 04:12

## 2019-01-01 RX ADMIN — CALCIUM CHLORIDE, MAGNESIUM CHLORIDE, SODIUM CHLORIDE, SODIUM BICARBONATE, POTASSIUM CHLORIDE AND SODIUM PHOSPHATE DIBASIC DIHYDRATE 10 ML/KG/HR: 3.68; 3.05; 6.34; 3.09; .314; .187 INJECTION INTRAVENOUS at 03:30

## 2019-01-01 RX ADMIN — POLYETHYLENE GLYCOL 3350 17 G: 17 POWDER, FOR SOLUTION ORAL at 09:22

## 2019-01-01 RX ADMIN — CALCIUM CHLORIDE, MAGNESIUM CHLORIDE, SODIUM CHLORIDE, SODIUM BICARBONATE, POTASSIUM CHLORIDE AND SODIUM PHOSPHATE DIBASIC DIHYDRATE 12.5 ML/KG/HR: 3.68; 3.05; 6.34; 3.09; .314; .187 INJECTION INTRAVENOUS at 06:47

## 2019-01-01 RX ADMIN — VITAMIN D, TAB 1000IU (100/BT) 1000 UNITS: 25 TAB at 09:04

## 2019-01-01 RX ADMIN — LACTULOSE 20 G: 10 SOLUTION ORAL at 14:23

## 2019-01-01 RX ADMIN — SPIRONOLACTONE 50 MG: 25 TABLET ORAL at 07:54

## 2019-01-01 RX ADMIN — ERTAPENEM SODIUM 1 G: 1 INJECTION, POWDER, LYOPHILIZED, FOR SOLUTION INTRAMUSCULAR; INTRAVENOUS at 21:09

## 2019-01-01 RX ADMIN — CARBIDOPA AND LEVODOPA 12.5 MG: 50; 200 TABLET, EXTENDED RELEASE ORAL at 10:08

## 2019-01-01 RX ADMIN — CARBIDOPA AND LEVODOPA 12.5 MG: 50; 200 TABLET, EXTENDED RELEASE ORAL at 10:02

## 2019-01-01 RX ADMIN — FUROSEMIDE 20 MG: 20 TABLET ORAL at 08:07

## 2019-01-01 RX ADMIN — Medication 12.5 MG: at 10:00

## 2019-01-01 RX ADMIN — Medication 0.2 MCG/KG/MIN: at 10:27

## 2019-01-01 RX ADMIN — Medication 0.24 MCG/KG/MIN: at 17:35

## 2019-01-01 RX ADMIN — MICONAZOLE NITRATE: 20 POWDER TOPICAL at 07:49

## 2019-01-01 RX ADMIN — WHITE PETROLATUM: .15; .85 OINTMENT OPHTHALMIC at 13:14

## 2019-01-01 RX ADMIN — HYDROXYZINE HYDROCHLORIDE 25 MG: 25 TABLET ORAL at 03:34

## 2019-01-01 RX ADMIN — PANTOPRAZOLE SODIUM 40 MG: 40 TABLET, DELAYED RELEASE ORAL at 08:54

## 2019-01-01 RX ADMIN — Medication 5 ML: at 12:33

## 2019-01-01 RX ADMIN — CEFTRIAXONE SODIUM 2 G: 2 INJECTION, POWDER, FOR SOLUTION INTRAMUSCULAR; INTRAVENOUS at 21:18

## 2019-01-01 RX ADMIN — PANTOPRAZOLE SODIUM 40 MG: 40 TABLET, DELAYED RELEASE ORAL at 09:59

## 2019-01-01 RX ADMIN — DORNASE ALFA 2.5 MG: 1 SOLUTION RESPIRATORY (INHALATION) at 08:43

## 2019-01-01 RX ADMIN — MIDODRINE HYDROCHLORIDE 10 MG: 5 TABLET ORAL at 17:18

## 2019-01-01 RX ADMIN — SPIRONOLACTONE 50 MG: 50 TABLET, FILM COATED ORAL at 08:32

## 2019-01-01 RX ADMIN — Medication 5 ML: at 12:00

## 2019-01-01 RX ADMIN — Medication 12.5 MG: at 09:57

## 2019-01-01 RX ADMIN — CALCIUM CHLORIDE, MAGNESIUM CHLORIDE, SODIUM CHLORIDE, SODIUM BICARBONATE, POTASSIUM CHLORIDE AND SODIUM PHOSPHATE DIBASIC DIHYDRATE 10 ML/KG/HR: 3.68; 3.05; 6.34; 3.09; .314; .187 INJECTION INTRAVENOUS at 18:14

## 2019-01-01 RX ADMIN — HYDROMORPHONE HYDROCHLORIDE 2 MG: 2 TABLET ORAL at 16:53

## 2019-01-01 RX ADMIN — RIFAXIMIN 550 MG: 550 TABLET ORAL at 19:34

## 2019-01-01 RX ADMIN — CALCIUM CHLORIDE, MAGNESIUM CHLORIDE, SODIUM CHLORIDE, SODIUM BICARBONATE, POTASSIUM CHLORIDE AND SODIUM PHOSPHATE DIBASIC DIHYDRATE 16 ML/KG/HR: 3.68; 3.05; 6.34; 3.09; .314; .187 INJECTION INTRAVENOUS at 03:30

## 2019-01-01 RX ADMIN — CALCIUM CHLORIDE, MAGNESIUM CHLORIDE, SODIUM CHLORIDE, SODIUM BICARBONATE, POTASSIUM CHLORIDE AND SODIUM PHOSPHATE DIBASIC DIHYDRATE 15 ML/KG/HR: 3.68; 3.05; 6.34; 3.09; .314; .187 INJECTION INTRAVENOUS at 09:35

## 2019-01-01 RX ADMIN — LACTULOSE 200 G: 10 SOLUTION ORAL; RECTAL at 08:32

## 2019-01-01 RX ADMIN — RIFAXIMIN 550 MG: 550 TABLET ORAL at 10:48

## 2019-01-01 RX ADMIN — CALCIUM CHLORIDE, MAGNESIUM CHLORIDE, SODIUM CHLORIDE, SODIUM BICARBONATE, POTASSIUM CHLORIDE AND SODIUM PHOSPHATE DIBASIC DIHYDRATE 15 ML/KG/HR: 3.68; 3.05; 6.34; 3.09; .314; .187 INJECTION INTRAVENOUS at 13:52

## 2019-01-01 RX ADMIN — VANCOMYCIN HYDROCHLORIDE 2000 MG: 10 INJECTION, POWDER, LYOPHILIZED, FOR SOLUTION INTRAVENOUS at 10:38

## 2019-01-01 RX ADMIN — CALCIUM CHLORIDE 1 G: 100 INJECTION, SOLUTION INTRAVENOUS at 20:37

## 2019-01-01 RX ADMIN — WHITE PETROLATUM: .15; .85 OINTMENT OPHTHALMIC at 02:55

## 2019-01-01 RX ADMIN — CALCIUM CHLORIDE, MAGNESIUM CHLORIDE, SODIUM CHLORIDE, SODIUM BICARBONATE, POTASSIUM CHLORIDE AND SODIUM PHOSPHATE DIBASIC DIHYDRATE 10 ML/KG/HR: 3.68; 3.05; 6.34; 3.09; .314; .187 INJECTION INTRAVENOUS at 22:45

## 2019-01-01 RX ADMIN — MICONAZOLE NITRATE: 20 POWDER TOPICAL at 08:44

## 2019-01-01 RX ADMIN — HYDROCORTISONE SODIUM SUCCINATE 50 MG: 100 INJECTION, POWDER, FOR SOLUTION INTRAMUSCULAR; INTRAVENOUS at 12:03

## 2019-01-01 RX ADMIN — CALCIUM CHLORIDE, MAGNESIUM CHLORIDE, SODIUM CHLORIDE, SODIUM BICARBONATE, POTASSIUM CHLORIDE AND SODIUM PHOSPHATE DIBASIC DIHYDRATE 15 ML/KG/HR: 3.68; 3.05; 6.34; 3.09; .314; .187 INJECTION INTRAVENOUS at 07:55

## 2019-01-01 RX ADMIN — MEROPENEM 1 G: 1 INJECTION, POWDER, FOR SOLUTION INTRAVENOUS at 11:36

## 2019-01-01 RX ADMIN — SIMETHICONE CHEW TAB 80 MG 80 MG: 80 TABLET ORAL at 21:52

## 2019-01-01 RX ADMIN — PANTOPRAZOLE SODIUM 40 MG: 40 TABLET, DELAYED RELEASE ORAL at 09:05

## 2019-01-01 RX ADMIN — MELATONIN TAB 3 MG 3 MG: 3 TAB at 20:51

## 2019-01-01 RX ADMIN — POTASSIUM CHLORIDE 40 MEQ: 10 TABLET, EXTENDED RELEASE ORAL at 15:03

## 2019-01-01 RX ADMIN — HYDROXYZINE HYDROCHLORIDE 25 MG: 25 TABLET ORAL at 23:49

## 2019-01-01 RX ADMIN — RIFAXIMIN 550 MG: 550 TABLET ORAL at 21:45

## 2019-01-01 RX ADMIN — CALCIUM CHLORIDE, MAGNESIUM CHLORIDE, SODIUM CHLORIDE, SODIUM BICARBONATE, POTASSIUM CHLORIDE AND SODIUM PHOSPHATE DIBASIC DIHYDRATE 16 ML/KG/HR: 3.68; 3.05; 6.34; 3.09; .314; .187 INJECTION INTRAVENOUS at 16:15

## 2019-01-01 RX ADMIN — Medication 12.5 MG: at 09:59

## 2019-01-01 RX ADMIN — Medication 5 ML: at 08:53

## 2019-01-01 RX ADMIN — ONDANSETRON 4 MG: 4 TABLET, ORALLY DISINTEGRATING ORAL at 09:25

## 2019-01-01 RX ADMIN — LACTULOSE 20 G: 10 SOLUTION ORAL; RECTAL at 16:27

## 2019-01-01 RX ADMIN — WHITE PETROLATUM: .15; .85 OINTMENT OPHTHALMIC at 19:51

## 2019-01-01 RX ADMIN — SERTRALINE HYDROCHLORIDE 75 MG: 50 TABLET ORAL at 09:25

## 2019-01-01 RX ADMIN — CALCIUM CHLORIDE, MAGNESIUM CHLORIDE, SODIUM CHLORIDE, SODIUM BICARBONATE, POTASSIUM CHLORIDE AND SODIUM PHOSPHATE DIBASIC DIHYDRATE 16 ML/KG/HR: 3.68; 3.05; 6.34; 3.09; .314; .187 INJECTION INTRAVENOUS at 21:22

## 2019-01-01 RX ADMIN — LACTULOSE 10 G: 20 SOLUTION ORAL at 13:27

## 2019-01-01 RX ADMIN — SERTRALINE HYDROCHLORIDE 75 MG: 50 TABLET ORAL at 10:49

## 2019-01-01 RX ADMIN — MICONAZOLE NITRATE: 20 POWDER TOPICAL at 07:58

## 2019-01-01 RX ADMIN — LACTULOSE 20 G: 10 SOLUTION ORAL at 10:48

## 2019-01-01 RX ADMIN — LACTULOSE 20 G: 20 SOLUTION ORAL at 12:05

## 2019-01-01 RX ADMIN — SODIUM CHLORIDE 500 ML: 9 INJECTION, SOLUTION INTRAVENOUS at 21:01

## 2019-01-01 RX ADMIN — LACTULOSE 10 G: 10 SOLUTION ORAL at 14:30

## 2019-01-01 RX ADMIN — Medication 12.5 MG: at 08:10

## 2019-01-01 RX ADMIN — HYDROXYZINE HYDROCHLORIDE 25 MG: 25 TABLET ORAL at 01:49

## 2019-01-01 RX ADMIN — PANTOPRAZOLE SODIUM 40 MG: 40 TABLET, DELAYED RELEASE ORAL at 09:29

## 2019-01-01 RX ADMIN — CALCIUM CHLORIDE, MAGNESIUM CHLORIDE, SODIUM CHLORIDE, SODIUM BICARBONATE, POTASSIUM CHLORIDE AND SODIUM PHOSPHATE DIBASIC DIHYDRATE 12.5 ML/KG/HR: 3.68; 3.05; 6.34; 3.09; .314; .187 INJECTION INTRAVENOUS at 07:03

## 2019-01-01 RX ADMIN — MELATONIN TAB 3 MG 3 MG: 3 TAB at 21:55

## 2019-01-01 RX ADMIN — Medication 40 MG: at 08:43

## 2019-01-01 RX ADMIN — MELATONIN TAB 3 MG 3 MG: 3 TAB at 21:14

## 2019-01-01 RX ADMIN — LACTULOSE 10 G: 20 SOLUTION ORAL at 09:05

## 2019-01-01 RX ADMIN — ACETAMINOPHEN 325 MG: 325 TABLET, FILM COATED ORAL at 05:24

## 2019-01-01 RX ADMIN — CALCIUM CHLORIDE, MAGNESIUM CHLORIDE, SODIUM CHLORIDE, SODIUM BICARBONATE, POTASSIUM CHLORIDE AND SODIUM PHOSPHATE DIBASIC DIHYDRATE 1.66 ML/KG/HR: 3.68; 3.05; 6.34; 3.09; .314; .187 INJECTION INTRAVENOUS at 03:42

## 2019-01-01 RX ADMIN — Medication 0.2 MCG/KG/MIN: at 09:02

## 2019-01-01 RX ADMIN — RIFAXIMIN 550 MG: 550 TABLET ORAL at 19:45

## 2019-01-01 RX ADMIN — MICAFUNGIN SODIUM 100 MG: 10 INJECTION, POWDER, LYOPHILIZED, FOR SOLUTION INTRAVENOUS at 14:29

## 2019-01-01 RX ADMIN — CARBIDOPA AND LEVODOPA 12.5 MG: 50; 200 TABLET, EXTENDED RELEASE ORAL at 14:37

## 2019-01-01 RX ADMIN — Medication 12.5 MG: at 09:32

## 2019-01-01 RX ADMIN — LACTULOSE 20 G: 10 SOLUTION ORAL at 10:56

## 2019-01-01 RX ADMIN — CARBIDOPA AND LEVODOPA 12.5 MG: 50; 200 TABLET, EXTENDED RELEASE ORAL at 18:03

## 2019-01-01 RX ADMIN — POTASSIUM CHLORIDE 20 MEQ: 10 TABLET, EXTENDED RELEASE ORAL at 08:07

## 2019-01-01 RX ADMIN — RIFAXIMIN 550 MG: 550 TABLET ORAL at 08:08

## 2019-01-01 RX ADMIN — LACTULOSE 20 G: 10 SOLUTION ORAL; RECTAL at 06:23

## 2019-01-01 RX ADMIN — ALBUTEROL SULFATE 2.5 MG: 2.5 SOLUTION RESPIRATORY (INHALATION) at 19:19

## 2019-01-01 RX ADMIN — CARBIDOPA AND LEVODOPA 12.5 MG: 50; 200 TABLET, EXTENDED RELEASE ORAL at 13:16

## 2019-01-01 RX ADMIN — WHITE PETROLATUM: .15; .85 OINTMENT OPHTHALMIC at 01:42

## 2019-01-01 RX ADMIN — MELATONIN 5000 UNITS: at 09:21

## 2019-01-01 RX ADMIN — CALCIUM CHLORIDE, MAGNESIUM CHLORIDE, SODIUM CHLORIDE, SODIUM BICARBONATE, POTASSIUM CHLORIDE AND SODIUM PHOSPHATE DIBASIC DIHYDRATE 12.5 ML/KG/HR: 3.68; 3.05; 6.34; 3.09; .314; .187 INJECTION INTRAVENOUS at 13:52

## 2019-01-01 RX ADMIN — CEFTRIAXONE SODIUM 2 G: 2 INJECTION, POWDER, FOR SOLUTION INTRAMUSCULAR; INTRAVENOUS at 21:13

## 2019-01-01 RX ADMIN — VANCOMYCIN HYDROCHLORIDE 1750 MG: 10 INJECTION, POWDER, LYOPHILIZED, FOR SOLUTION INTRAVENOUS at 23:53

## 2019-01-01 RX ADMIN — CALCIUM CHLORIDE, MAGNESIUM CHLORIDE, SODIUM CHLORIDE, SODIUM BICARBONATE, POTASSIUM CHLORIDE AND SODIUM PHOSPHATE DIBASIC DIHYDRATE 16 ML/KG/HR: 3.68; 3.05; 6.34; 3.09; .314; .187 INJECTION INTRAVENOUS at 12:44

## 2019-01-01 RX ADMIN — MELATONIN 5000 UNITS: at 10:05

## 2019-01-01 RX ADMIN — ONDANSETRON HYDROCHLORIDE 4 MG: 2 INJECTION, SOLUTION INTRAMUSCULAR; INTRAVENOUS at 09:55

## 2019-01-01 RX ADMIN — FUROSEMIDE 120 MG: 10 INJECTION, SOLUTION INTRAVENOUS at 13:25

## 2019-01-01 RX ADMIN — POTASSIUM CHLORIDE 20 MEQ: 10 TABLET, EXTENDED RELEASE ORAL at 09:04

## 2019-01-01 RX ADMIN — CARBIDOPA AND LEVODOPA 12.5 MG: 50; 200 TABLET, EXTENDED RELEASE ORAL at 10:50

## 2019-01-01 RX ADMIN — THERA TABS 1 TABLET: TAB at 10:53

## 2019-01-01 RX ADMIN — WHITE PETROLATUM: .15; .85 OINTMENT OPHTHALMIC at 08:56

## 2019-01-01 RX ADMIN — CALCIUM CHLORIDE, MAGNESIUM CHLORIDE, SODIUM CHLORIDE, SODIUM BICARBONATE, POTASSIUM CHLORIDE AND SODIUM PHOSPHATE DIBASIC DIHYDRATE 10 ML/KG/HR: 3.68; 3.05; 6.34; 3.09; .314; .187 INJECTION INTRAVENOUS at 11:55

## 2019-01-01 RX ADMIN — MELATONIN 5000 UNITS: at 09:56

## 2019-01-01 RX ADMIN — MEROPENEM 1 G: 1 INJECTION, POWDER, FOR SOLUTION INTRAVENOUS at 01:08

## 2019-01-01 RX ADMIN — MIDODRINE HYDROCHLORIDE 12.5 MG: 5 TABLET ORAL at 09:46

## 2019-01-01 RX ADMIN — DEXTROSE MONOHYDRATE 3 MCG/KG/MIN: 50 INJECTION, SOLUTION INTRAVENOUS at 21:20

## 2019-01-01 RX ADMIN — CALCIUM CHLORIDE, MAGNESIUM CHLORIDE, SODIUM CHLORIDE, SODIUM BICARBONATE, POTASSIUM CHLORIDE AND SODIUM PHOSPHATE DIBASIC DIHYDRATE 16 ML/KG/HR: 3.68; 3.05; 6.34; 3.09; .314; .187 INJECTION INTRAVENOUS at 11:20

## 2019-01-01 RX ADMIN — EPOPROSTENOL 20 NG/KG/MIN: 1.5 INJECTION, POWDER, LYOPHILIZED, FOR SOLUTION INTRAVENOUS at 09:16

## 2019-01-01 RX ADMIN — RIFAXIMIN 550 MG: 550 TABLET ORAL at 10:53

## 2019-01-01 RX ADMIN — VITAMIN B12 0.1 MG ORAL TABLET 100 MCG: 0.1 TABLET ORAL at 08:02

## 2019-01-01 RX ADMIN — SERTRALINE HYDROCHLORIDE 75 MG: 50 TABLET ORAL at 08:54

## 2019-01-01 RX ADMIN — Medication 1 PACKET: at 14:07

## 2019-01-01 RX ADMIN — MICONAZOLE NITRATE: 20 POWDER TOPICAL at 11:09

## 2019-01-01 RX ADMIN — RIFAXIMIN 550 MG: 550 TABLET ORAL at 08:54

## 2019-01-01 RX ADMIN — CALCIUM CHLORIDE, MAGNESIUM CHLORIDE, SODIUM CHLORIDE, SODIUM BICARBONATE, POTASSIUM CHLORIDE AND SODIUM PHOSPHATE DIBASIC DIHYDRATE 10 ML/KG/HR: 3.68; 3.05; 6.34; 3.09; .314; .187 INJECTION INTRAVENOUS at 08:32

## 2019-01-01 RX ADMIN — MICONAZOLE NITRATE: 20 POWDER TOPICAL at 21:11

## 2019-01-01 RX ADMIN — FENTANYL CITRATE 100 MCG/HR: 50 INJECTION INTRAVENOUS at 13:33

## 2019-01-01 RX ADMIN — Medication 500 MG: at 08:12

## 2019-01-01 RX ADMIN — VITAMIN B12 0.1 MG ORAL TABLET 100 MCG: 0.1 TABLET ORAL at 07:50

## 2019-01-01 RX ADMIN — PHYTONADIONE 10 MG: 10 INJECTION, EMULSION INTRAMUSCULAR; INTRAVENOUS; SUBCUTANEOUS at 17:45

## 2019-01-01 RX ADMIN — MELATONIN TAB 3 MG 3 MG: 3 TAB at 20:48

## 2019-01-01 RX ADMIN — CALCIUM CHLORIDE, MAGNESIUM CHLORIDE, SODIUM CHLORIDE, SODIUM BICARBONATE, POTASSIUM CHLORIDE AND SODIUM PHOSPHATE DIBASIC DIHYDRATE 12.5 ML/KG/HR: 3.68; 3.05; 6.34; 3.09; .314; .187 INJECTION INTRAVENOUS at 17:17

## 2019-01-01 RX ADMIN — MIDODRINE HYDROCHLORIDE 10 MG: 5 TABLET ORAL at 09:21

## 2019-01-01 RX ADMIN — CALCIUM CHLORIDE, MAGNESIUM CHLORIDE, SODIUM CHLORIDE, SODIUM BICARBONATE, POTASSIUM CHLORIDE AND SODIUM PHOSPHATE DIBASIC DIHYDRATE 10 ML/KG/HR: 3.68; 3.05; 6.34; 3.09; .314; .187 INJECTION INTRAVENOUS at 04:12

## 2019-01-01 RX ADMIN — CARBIDOPA AND LEVODOPA 12.5 MG: 50; 200 TABLET, EXTENDED RELEASE ORAL at 12:40

## 2019-01-01 RX ADMIN — MELATONIN TAB 3 MG 3 MG: 3 TAB at 21:09

## 2019-01-01 RX ADMIN — HYDROXYZINE HYDROCHLORIDE 25 MG: 25 TABLET ORAL at 06:01

## 2019-01-01 RX ADMIN — MEROPENEM 1 G: 1 INJECTION, POWDER, FOR SOLUTION INTRAVENOUS at 13:13

## 2019-01-01 RX ADMIN — FUROSEMIDE 20 MG: 20 TABLET ORAL at 08:32

## 2019-01-01 RX ADMIN — HYDROXYZINE HYDROCHLORIDE 50 MG: 25 TABLET ORAL at 11:54

## 2019-01-01 RX ADMIN — CARBIDOPA AND LEVODOPA 12.5 MG: 50; 200 TABLET, EXTENDED RELEASE ORAL at 17:55

## 2019-01-01 RX ADMIN — LACTULOSE 20 G: 20 SOLUTION ORAL at 07:48

## 2019-01-01 RX ADMIN — CARBIDOPA AND LEVODOPA 12.5 MG: 50; 200 TABLET, EXTENDED RELEASE ORAL at 17:25

## 2019-01-01 RX ADMIN — CALCIUM CHLORIDE, MAGNESIUM CHLORIDE, SODIUM CHLORIDE, SODIUM BICARBONATE, POTASSIUM CHLORIDE AND SODIUM PHOSPHATE DIBASIC DIHYDRATE 1.66 ML/KG/HR: 3.68; 3.05; 6.34; 3.09; .314; .187 INJECTION INTRAVENOUS at 21:25

## 2019-01-01 RX ADMIN — Medication: at 17:19

## 2019-01-01 RX ADMIN — Medication 0.34 MCG/KG/MIN: at 00:56

## 2019-01-01 RX ADMIN — CARBIDOPA AND LEVODOPA 12.5 MG: 50; 200 TABLET, EXTENDED RELEASE ORAL at 08:53

## 2019-01-01 RX ADMIN — Medication 12.5 MG: at 10:51

## 2019-01-01 RX ADMIN — HYDROCORTISONE SODIUM SUCCINATE 50 MG: 100 INJECTION, POWDER, FOR SOLUTION INTRAMUSCULAR; INTRAVENOUS at 17:55

## 2019-01-01 RX ADMIN — VANCOMYCIN HYDROCHLORIDE 2500 MG: 10 INJECTION, POWDER, LYOPHILIZED, FOR SOLUTION INTRAVENOUS at 00:05

## 2019-01-01 RX ADMIN — ALBUMIN HUMAN 25 G: 0.25 SOLUTION INTRAVENOUS at 20:08

## 2019-01-01 RX ADMIN — LACTULOSE 10 G: 10 SOLUTION ORAL at 22:10

## 2019-01-01 RX ADMIN — LACTULOSE 10 G: 20 SOLUTION ORAL at 14:32

## 2019-01-01 RX ADMIN — POTASSIUM CHLORIDE 20 MEQ: 10 TABLET, EXTENDED RELEASE ORAL at 08:34

## 2019-01-01 RX ADMIN — HYDROXYZINE HYDROCHLORIDE 25 MG: 25 TABLET ORAL at 21:59

## 2019-01-01 RX ADMIN — SIMETHICONE CHEW TAB 80 MG 80 MG: 80 TABLET ORAL at 07:57

## 2019-01-01 RX ADMIN — MEROPENEM 1 G: 1 INJECTION, POWDER, FOR SOLUTION INTRAVENOUS at 07:49

## 2019-01-01 RX ADMIN — Medication 0.2 MG: at 04:15

## 2019-01-01 RX ADMIN — WHITE PETROLATUM: .15; .85 OINTMENT OPHTHALMIC at 01:39

## 2019-01-01 RX ADMIN — LACTULOSE 20 G: 10 SOLUTION ORAL; RECTAL at 01:25

## 2019-01-01 RX ADMIN — MEBROFENIN 6.6 MILLICURIE: 45 INJECTION, POWDER, LYOPHILIZED, FOR SOLUTION INTRAVENOUS at 13:50

## 2019-01-01 RX ADMIN — MIDAZOLAM 8 MG/HR: 5 INJECTION INTRAMUSCULAR; INTRAVENOUS at 18:59

## 2019-01-01 RX ADMIN — Medication 100 MG: at 08:47

## 2019-01-01 RX ADMIN — CALCIUM CHLORIDE, MAGNESIUM CHLORIDE, SODIUM CHLORIDE, SODIUM BICARBONATE, POTASSIUM CHLORIDE AND SODIUM PHOSPHATE DIBASIC DIHYDRATE 10 ML/KG/HR: 3.68; 3.05; 6.34; 3.09; .314; .187 INJECTION INTRAVENOUS at 22:53

## 2019-01-01 RX ADMIN — RIFAXIMIN 550 MG: 550 TABLET ORAL at 08:55

## 2019-01-01 RX ADMIN — MIDAZOLAM 5 MG/HR: 5 INJECTION INTRAMUSCULAR; INTRAVENOUS at 16:41

## 2019-01-01 RX ADMIN — LACTULOSE 10 G: 20 SOLUTION ORAL at 09:55

## 2019-01-01 RX ADMIN — PROCHLORPERAZINE EDISYLATE 10 MG: 5 INJECTION INTRAMUSCULAR; INTRAVENOUS at 23:21

## 2019-01-01 RX ADMIN — LACTULOSE 20 G: 10 SOLUTION ORAL; RECTAL at 23:37

## 2019-01-01 RX ADMIN — THERA TABS 1 TABLET: TAB at 08:59

## 2019-01-01 RX ADMIN — IOPAMIDOL 135 ML: 755 INJECTION, SOLUTION INTRAVENOUS at 23:05

## 2019-01-01 RX ADMIN — SIMETHICONE CHEW TAB 80 MG 80 MG: 80 TABLET ORAL at 15:21

## 2019-01-01 RX ADMIN — LACTULOSE 20 G: 10 SOLUTION ORAL; RECTAL at 07:55

## 2019-01-01 RX ADMIN — Medication 12.5 MG: at 10:04

## 2019-01-01 RX ADMIN — ACETAMINOPHEN 325 MG: 325 TABLET, FILM COATED ORAL at 21:11

## 2019-01-01 RX ADMIN — CALCIUM CHLORIDE, MAGNESIUM CHLORIDE, SODIUM CHLORIDE, SODIUM BICARBONATE, POTASSIUM CHLORIDE AND SODIUM PHOSPHATE DIBASIC DIHYDRATE 12.5 ML/KG/HR: 3.68; 3.05; 6.34; 3.09; .314; .187 INJECTION INTRAVENOUS at 00:48

## 2019-01-01 RX ADMIN — Medication 40 MG: at 08:58

## 2019-01-01 ASSESSMENT — PAIN DESCRIPTION - DESCRIPTORS
DESCRIPTORS: DISCOMFORT
DESCRIPTORS: DISCOMFORT;STABBING
DESCRIPTORS: ACHING;SHARP
DESCRIPTORS: ACHING;SORE
DESCRIPTORS: ACHING
DESCRIPTORS: DISCOMFORT;INTERMITTENT
DESCRIPTORS: ACHING;NAGGING
DESCRIPTORS: SORE;SHARP
DESCRIPTORS: ACHING
DESCRIPTORS: ACHING
DESCRIPTORS: ACHING;SHARP
DESCRIPTORS: ACHING
DESCRIPTORS: DISCOMFORT;NAGGING;PRESSURE
DESCRIPTORS: ACHING
DESCRIPTORS: ACHING
DESCRIPTORS: DISCOMFORT;STABBING
DESCRIPTORS: ACHING
DESCRIPTORS: ACHING
DESCRIPTORS: DISCOMFORT;NAGGING;PRESSURE
DESCRIPTORS: SHARP;ACHING

## 2019-01-01 ASSESSMENT — ACTIVITIES OF DAILY LIVING (ADL)
SWALLOWING: 0-->SWALLOWS FOODS/LIQUIDS WITHOUT DIFFICULTY
ADLS_ACUITY_SCORE: 19
FALL_HISTORY_WITHIN_LAST_SIX_MONTHS: YES
ADLS_ACUITY_SCORE: 21
ADLS_ACUITY_SCORE: 20
ADLS_ACUITY_SCORE: 20
ADLS_ACUITY_SCORE: 21
ADLS_ACUITY_SCORE: 16
ADLS_ACUITY_SCORE: 21
ADLS_ACUITY_SCORE: 19
ADLS_ACUITY_SCORE: 21
ADLS_ACUITY_SCORE: 20
ADLS_ACUITY_SCORE: 16
ADLS_ACUITY_SCORE: 21
ADLS_ACUITY_SCORE: 17
ADLS_ACUITY_SCORE: 19
ADLS_ACUITY_SCORE: 16
ADLS_ACUITY_SCORE: 20
ADLS_ACUITY_SCORE: 23
RETIRED_EATING: 0-->INDEPENDENT
ADLS_ACUITY_SCORE: 19
ADLS_ACUITY_SCORE: 21
ADLS_ACUITY_SCORE: 17
ADLS_ACUITY_SCORE: 16
ADLS_ACUITY_SCORE: 20
ADLS_ACUITY_SCORE: 21
ADLS_ACUITY_SCORE: 21
ADLS_ACUITY_SCORE: 17
ADLS_ACUITY_SCORE: 19
ADLS_ACUITY_SCORE: 16
ADLS_ACUITY_SCORE: 20
ADLS_ACUITY_SCORE: 21
ADLS_ACUITY_SCORE: 23
ADLS_ACUITY_SCORE: 21
ADLS_ACUITY_SCORE: 20
ADLS_ACUITY_SCORE: 21
ADLS_ACUITY_SCORE: 17
ADLS_ACUITY_SCORE: 21
ADLS_ACUITY_SCORE: 17
ADLS_ACUITY_SCORE: 21
ADLS_ACUITY_SCORE: 16
ADLS_ACUITY_SCORE: 21
ADLS_ACUITY_SCORE: 21
ADLS_ACUITY_SCORE: 16
AMBULATION: 1-->ASSISTIVE EQUIPMENT
NUMBER_OF_TIMES_PATIENT_HAS_FALLEN_WITHIN_LAST_SIX_MONTHS: 2
ADLS_ACUITY_SCORE: 23
ADLS_ACUITY_SCORE: 20
ADLS_ACUITY_SCORE: 21
ADLS_ACUITY_SCORE: 19
ADLS_ACUITY_SCORE: 19
ADLS_ACUITY_SCORE: 20
ADLS_ACUITY_SCORE: 21
ADLS_ACUITY_SCORE: 23
ADLS_ACUITY_SCORE: 20
ADLS_ACUITY_SCORE: 21
ADLS_ACUITY_SCORE: 16
RETIRED_COMMUNICATION: 0-->UNDERSTANDS/COMMUNICATES WITHOUT DIFFICULTY
ADLS_ACUITY_SCORE: 21
ADLS_ACUITY_SCORE: 20
BATHING: 1-->ASSISTIVE EQUIPMENT
ADLS_ACUITY_SCORE: 21
ADLS_ACUITY_SCORE: 17
ADLS_ACUITY_SCORE: 17
ADLS_ACUITY_SCORE: 16
ADLS_ACUITY_SCORE: 21
ADLS_ACUITY_SCORE: 19
ADLS_ACUITY_SCORE: 19
ADLS_ACUITY_SCORE: 21
ADLS_ACUITY_SCORE: 20
ADLS_ACUITY_SCORE: 21
ADLS_ACUITY_SCORE: 16
ADLS_ACUITY_SCORE: 17
ADLS_ACUITY_SCORE: 21
ADLS_ACUITY_SCORE: 16
ADLS_ACUITY_SCORE: 19
ADLS_ACUITY_SCORE: 17
ADLS_ACUITY_SCORE: 16
ADLS_ACUITY_SCORE: 21
ADLS_ACUITY_SCORE: 19
TRANSFERRING: 1-->ASSISTIVE EQUIPMENT
ADLS_ACUITY_SCORE: 21
ADLS_ACUITY_SCORE: 20
ADLS_ACUITY_SCORE: 19
ADLS_ACUITY_SCORE: 18
ADLS_ACUITY_SCORE: 21
COGNITION: 0 - NO COGNITION ISSUES REPORTED
ADLS_ACUITY_SCORE: 19
ADLS_ACUITY_SCORE: 17
ADLS_ACUITY_SCORE: 21
ADLS_ACUITY_SCORE: 21
ADLS_ACUITY_SCORE: 19
ADLS_ACUITY_SCORE: 21
ADLS_ACUITY_SCORE: 21
ADLS_ACUITY_SCORE: 18
ADLS_ACUITY_SCORE: 21
ADLS_ACUITY_SCORE: 21
ADLS_ACUITY_SCORE: 16
ADLS_ACUITY_SCORE: 20
ADLS_ACUITY_SCORE: 19
ADLS_ACUITY_SCORE: 19
ADLS_ACUITY_SCORE: 21
ADLS_ACUITY_SCORE: 21
ADLS_ACUITY_SCORE: 20
ADLS_ACUITY_SCORE: 16
ADLS_ACUITY_SCORE: 17
ADLS_ACUITY_SCORE: 19
ADLS_ACUITY_SCORE: 21
ADLS_ACUITY_SCORE: 23
ADLS_ACUITY_SCORE: 17
ADLS_ACUITY_SCORE: 21
ADLS_ACUITY_SCORE: 16
ADLS_ACUITY_SCORE: 17
ADLS_ACUITY_SCORE: 21
ADLS_ACUITY_SCORE: 21
ADLS_ACUITY_SCORE: 20
ADLS_ACUITY_SCORE: 21
WHICH_OF_THE_ABOVE_FUNCTIONAL_RISKS_HAD_A_RECENT_ONSET_OR_CHANGE?: AMBULATION;TRANSFERRING
ADLS_ACUITY_SCORE: 16
ADLS_ACUITY_SCORE: 17
ADLS_ACUITY_SCORE: 16
ADLS_ACUITY_SCORE: 17
TOILETING: 1-->ASSISTIVE EQUIPMENT
ADLS_ACUITY_SCORE: 21
ADLS_ACUITY_SCORE: 20
ADLS_ACUITY_SCORE: 21
ADLS_ACUITY_SCORE: 21
ADLS_ACUITY_SCORE: 17
ADLS_ACUITY_SCORE: 19
DRESS: 1-->ASSISTIVE EQUIPMENT
ADLS_ACUITY_SCORE: 21
ADLS_ACUITY_SCORE: 16
ADLS_ACUITY_SCORE: 17
ADLS_ACUITY_SCORE: 21
ADLS_ACUITY_SCORE: 23
ADLS_ACUITY_SCORE: 21
ADLS_ACUITY_SCORE: 16
ADLS_ACUITY_SCORE: 17
ADLS_ACUITY_SCORE: 18

## 2019-01-01 ASSESSMENT — MIFFLIN-ST. JEOR
SCORE: 1893.5
SCORE: 1949.5
SCORE: 1895.98
SCORE: 1916.5
SCORE: 1924.5
SCORE: 1921.5
SCORE: 1846.02
SCORE: 1903.5
SCORE: 1937.5
SCORE: 1917.5
SCORE: 1874.5
SCORE: 1887.5
SCORE: 1924.5
SCORE: 1922.5
SCORE: 1863.32
SCORE: 1888.5
SCORE: 1885.5
SCORE: 1898.24
SCORE: 1918.5

## 2019-01-01 ASSESSMENT — ENCOUNTER SYMPTOMS
CHILLS: 1
DYSURIA: 0
VOMITING: 1
FEVER: 0
DIARRHEA: 1
VOMITING: 0
JOINT SWELLING: 1
WEAKNESS: 1
NAUSEA: 1
ABDOMINAL PAIN: 1
BLOOD IN STOOL: 0
NAUSEA: 1
BACK PAIN: 1
SHORTNESS OF BREATH: 0
ABDOMINAL PAIN: 1
HEMATURIA: 0
FATIGUE: 1
SHORTNESS OF BREATH: 0
FEVER: 0
FREQUENCY: 0
SHORTNESS OF BREATH: 1
TREMORS: 1
FEVER: 0

## 2019-01-25 NOTE — PROGRESS NOTES
Order(s) created erroneously. Erroneous order ID: [id number]   Order canceled by: JESSICA ROSENTHAL   Order cancel date/time: 01/25/2019 10:29 AM

## 2019-07-17 PROBLEM — E87.6 HYPOKALEMIA: Status: ACTIVE | Noted: 2019-01-01

## 2019-07-17 NOTE — PROGRESS NOTES
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   Cover superior wound with mepilex border dressing    Incontinence protocol using criticaid paste, including to inferior wound Every 2 hours     Orders Written  Recommended provider order:   WOC Nurse follow-up plan:weekly  Nursing to notify the Provider(s) and re-consult the WOC Nurse if wound(s) deteriorates or new skin concern.    Patient History  According to provider note(s):  44 year old female with past medical history of morbid obesity status post Denny-en-Y gastric bypass with return of excess weight, long-standing history of severe alcohol use disorder, history of prescription narcotic dependence, peripheral neuropathy, iron deficiency anemia, and major depression who presents from a TCU with concerns of decompensated liver disease, diarrhea and nausea    Objective Data  Containment of urine/stool: Diaper    Active Diet Order  Orders Placed This Encounter      Combination Diet Low Saturated Fat Na <2400mg Diet, No Caffeine Diet      NPO per Anesthesia Guidelines for Procedure/Surgery Except for: Meds, Ice Chips      Output:   No intake/output data recorded.    Risk Assessment:   Sensory Perception: 4-->no impairment  Moisture: 4-->rarely moist  Activity: 3-->walks occasionally  Mobility: 3-->slightly limited  Nutrition: 3-->adequate  Friction and Shear: 2-->potential problem  Sebas Score: 19                          Labs:   Recent Labs   Lab 19  0549 19  0209   ALBUMIN 2.6*  --    HGB 6.8*  --    INR  --  3.27*   WBC 7.6  --        Physical Exam  Skin inspection: focused buttocks    Wound Location:   cleft          Date of last  photo 7/17/19  Wound History:  Wounds present on admission.  Receiving lactulose with several daily episodes of incontinent diarrhea.  States that when she was at TCU she would have to wait up to over an hour to have brief changed after incontinent episodes.  Now trying to use commode instead of brief.   Has a Physical therapy consult ordered.  Is able to turn and reposition independently in bed.     Measurements (length x width x depth, in cm)   Superior wound:  4.7  cm x 0.5 cm  x  0.1 cm intact periwound   Inferior wound:  2.3 cm x 0.5 cm x 0.1 cm periwound peeling  Wound Base: 100 % dermis  Palpation of the wound bed: normal   Periwound Color: normal and consistent with surrounding tissue  Temperature: normal   Drainage:, none  Odor: none  Pain: minimal     Interventions  Current support surface: Standard  Atmos Air mattress  Visual inspection of wound(s) completed  Wound Care: done per plan of care  Supplies: placed at the bedside  Education provided: importance of repositioning and plan of care  Discussed plan of care with Patient

## 2019-07-17 NOTE — ED TRIAGE NOTES
Pt arrives from a nursing home, and arrives with a hemoglobin of 6.5, labs that were taken at nursing home.  Pt reports feeling lethargic, was in hospital, and [facility].  Pt states feeling stronger but overall condition is lethargic and feeling weak.  Per EMS previous provider reports patient is more jaundiced than 7/16.

## 2019-07-17 NOTE — CONSULTS
[facility]  Palliative Care Consultation Note    Patient: Neema Bailey  Date of Admission:  7/17/2019    Requesting Clinician / Team: Clifford Sepulveda  Reason for consult: GOC    Recommendations:    Will ask Pall Care SW to see for support, per patient request.  She worked with the palliative care team at [facility] and found this helpful.  She does have initial questions about being able to see palliative care  for support on an ongoing basis in clinic.  I have asked our  to assess this possibility.    We discussed that liver transplant team will likely want to know the last time she used alcohol.  She tells me this was just before the first of 2 recent hospitalizations at [facility].  Will review records for accuracy of date.    She tells me Atarax helps with her itching, she has access to this    Sertraline can be helpful with pruritis as well.  She is on 75 mg daily; titrate as appropriate    Cholestyramine can help with biliary pruritis as well; it's logistically difficult to give BID-QID in the midst of a complex med regimen    She reports she had an ACD done while at [facility], will try to get that into record here if she's going to be seeking care here.    These recommendations have been discussed with Clifford Sepulveda.      Thank you for the opportunity to participate in the care of this patient and family. Our team: will continue to follow.     During regular M-F work hours -- if you are not sure who specifically to contact -- please contact us by sending a text page to our team consult pager at [phone].    After regular work hours and on weekends/holidays, you can call our answering service at [phone]. Also, who's on call for us is available in [facility] Smart Web.     Mickey Montenegro MD  Palliative Medicine Consult Team  Pager: [phone]   TT: 73 minutes, with > 50% spent in C/C/E patient/family/care teams re: GOC, POC, Sx management.  [id number]        Assessments:  Neema Bailey is a 44 year old female with PMH significant for ESLD  (likely combination of chronic liver disease, alcohol injury, and possible medication injury), admitted on 7/17/2019 from her TCU following a few days of generalized fatigue, one day of nausea, and vomiting, with abnormal labs drawn there (low hemoglobin and potassium - 6.5 and 2.6 , respectively).   She hopes to be evaluated for liver transplant here.  Today, the patient was seen for:  Pruritis  GOC    Prognosis, Goals, & Planning:      Functional Status just prior to hospitalization: 3 (Capable of only limited self-care; needs help with ADLs; in bed/chair >50% of waking hours)      Prognosis, Goals, and/or Advance Care Planning were addressed today: Yes        Summary/Comments: Reviewed that her dtr and Mom are her decision-maker/HCA's on recently recorded ACD at [facility]. Seeks full restorative cares      Patient's decision making preferences: shared with support from loved ones          Patient has decision-making capacity today for complex decisions: Yes            I have concerns about the patient/family's health literacy today: No           Patient has a completed Health Care Directive: Reportedly yes, but not available to us currently.      Code status: full code    Coping, Meaning, & Spirituality:   Mood, coping, and/or meaning in the context of serious illness were addressed today: Yes  Summary/Comments:     Social:     Had lived in her own home prior to recent hospitalizations. 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 Now being transfused, K+ replacement    Key Palliative Symptom Data:  # Pain severity the last 12 hours: moderate  # Dyspnea severity the last 12 hours: none  # Nausea severity the last 12 hours: low  # Anxiety severity the last 12 hours: low    Patient is on opioids: bowels not assessed  today.    ROS:  Comprehensive ROS is reviewed and is negative except as here & per HPI:      Past Medical History:  Past Medical History:   Diagnosis Date     Anxiety      Bilateral leg edema      Dizziness and giddiness      Hypertension      Insomnia      Iron deficiency anemia     due to chronic blood loss, vitamin B12 deficiency, Macrocytic     Migraines      BURT (nonalcoholic steatohepatitis)      Numbness and tingling     PERIPHERAL NEUROPATHY     Obese      Other chronic pain     Chronic Bilateral Low Back Pain with Bilateral Sciatica, Bilateral Knee Pain     Peripheral neuropathy      Pruritus      Restless legs      Sciatica      Seborrheic dermatitis      Severe episode of recurrent major depressive disorder, without psychotic features (H)      Sinus tachycardia      Substance abuse in remission (H)     h/o alcoholism had treatment at [facility]     Uterovaginal prolapse      Vitamin D deficiency         Past Surgical History:  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)     BREAST SURGERY      Breast Augmentation     COLPORRHAPHY ANTERIOR N/A 9/21/2015    Procedure: COLPORRHAPHY ANTERIOR;  Surgeon: Jairon Adams MD;  Location: [facility]     COSMETIC SURGERY      Abdominoplasty     CYSTOCELE REPAIR       CYSTOSCOPY N/A 11/26/2018    Procedure: CYSTOSCOPY;  Surgeon: Rony Melgar MD;  Location: [facility]     ENT SURGERY      tonsillectomy & adenoidectomy     GI SURGERY      Small Bowel Enterotomy Repair for SBO, EGD     HYSTERECTOMY VAGINAL, COLPORRHAPHY ANTERIOR, POSTERIOR, COMBINED N/A 11/26/2018    Procedure: VAGINAL HYSTERECTOMY, ANTERIOR AND POSTERIOR REPAIR;  Surgeon: Rony Melgar MD;  Location: [facility]     PERINEORRHAPHY N/A 11/26/2018    Procedure: PERINEOPLASTY;  Surgeon: Rony Melgar MD;  Location: [facility]      TUBAL LIGATION           Family History:  History reviewed. No pertinent family history. Mother is living     Allergies:  Allergies   Allergen Reactions     Gabapentin      Other reaction(s): Edema        Medications:  I have reviewed this patient's medication profile and medications from this hospitalization.   Sertraline 75 mg daily    Physical Exam:  Vital Signs: Temp: 97.8  F (36.6  C) Temp src: Oral BP: 99/49(recheck) Pulse: 85 Heart Rate: 81 Resp: 16 SpO2: 100 % O2 Device: None (Room air)    Weight: 250 lbs 0 oz:  GEN: Awake, alert, interactive  EYES: Sclera + icteric  EARS: Eumorphic bilaterally  NOSE: No significant nasal drainage  MOUTH: Oral mucosa moist, no evidence of thrush  LUNGS: No increased WOB or accessory muscle use  CV: Regular radial pulse 88  ABD: Full, diffuse generalized tenderness  EXTR: 2+LE edema, areas of excoriation  NEUROPSYCH: Engaged, coherent thought process     Data reviewed:  ROUTINE ICU LABS (Last four results)  CMP  Recent Labs   Lab 07/17/19  2035 07/17/19  0549   NA  --  141   POTASSIUM 3.7 3.1*   CHLORIDE  --  110*   CO2  --  18*   ANIONGAP  --  13   GLC  --  96   BUN  --  4*   CR  --  0.86   GFRESTIMATED  --  81   GFRESTBLACK  --  >90   NARENDRA  --  8.4*   PROTTOTAL  --  5.5*   ALBUMIN  --  2.6*   BILITOTAL  --  12.3*   ALKPHOS  --  110   AST  --  63*   ALT  --  23     CBC  Recent Labs   Lab 07/17/19  1542 07/17/19  0549   WBC 7.7 7.6   RBC 2.35* 2.08*   HGB 7.5* 6.8*   HCT 24.4* 21.8*   * 105*   MCH 31.9 32.7   MCHC 30.7* 31.2*   RDW 18.5* 18.3*   * 129*     INR  Recent Labs   Lab 07/17/19  0209   INR 3.27*     Arterial Blood GasNo lab results found in last 7 days.

## 2019-07-17 NOTE — H&P
[facility], [place]    History and Physical - [employer] Night Service        Date of Admission:  7/17/2019    Assessment & Plan   Neema Bailey is a 44 year old female with PMH significant for ESLD of unclear cause (likely combination of chronic liver disease, alcohol injury, and possible medication injury), admitted on 7/17/2019 following a few days of generalized fatigue, one day of nausea, and vomiting, transferred to [facility] from her rehab facility because of abnormal labs drawn there (low hemoglobin and potassium - 6.5 and 2.6 , respectively).    # Acute on chronic anemia  Patient is not aware of any bleeding - denies any recent bleeding/trauma, no blood in emesis, stool, or urine. Does not note any new/worse bruising. Numerous abdominal varices noted on CT C/A/P from 6/25/19 (in Care Everywhere). Most recent hgb was 6.9 from 7/5, when patient was discharged from [facility].  - Hgb on admission 6.8  - Will transfuse 1 unit PRBC and reassess    # Hypokalemia  Possible etiologies likely d/t losses from both GI (lactulose, recent diarrhea, recent vomiting) and urine (diuretic use).  - Patient received NS w/KCl 20mEz/L infusion in ED  - Will follow-up AM CMP and assess need for additional repletion   - K repletion protocol    # ESLD, multifactorial  # HE  Most recent MELD score of 32 obtained prior discharge from [facility] on 7/5 (was admitted there between 6/6/19 and 7/5/19 for sepsis, DEE DEE on CKD, encephalopathy). MELD here is 29. Numerous abdominal varices noted on CT C/A/P from 6/25/19. Liver biopsy on 5/10/18 c/w possible alcohol injury. S/p prednisolone trial during 6/6 [facility] admission x2 (no response). Admission CMP with low albumin 2.6, total protein 5.5, elevated total bili 12.3, ALT wnl, AST elevated to 63.  Likelihood of recovery unclear  - Consult Hepatology, appreciate recs  - AM CMP  - Continue Lactulose 10g TID  - Continue Rifaximin 550mg  BID  - Hydroxyzine 25mg PRN for itching  - Will hold PTA lasix and spironolactone for now  - Will consult Palliative Care as patient would like to discuss her prognosis and goals of care  - PT/OT consult  - Wound Care consult for bed sore  MELD-Na score: 29 at 7/17/2019  5:49 AM  MELD score: 29 at 7/17/2019  5:49 AM  Calculated from:  Serum Creatinine: 0.86 mg/dL (Rounded to 1 mg/dL) at 7/17/2019  5:49 AM  Serum Sodium: 141 mmol/L (Rounded to 137 mmol/L) at 7/17/2019  5:49 AM  Total Bilirubin: 12.3 mg/dL at 7/17/2019  5:49 AM  INR(ratio): 3.27 at 7/17/2019  2:09 AM  Age: 44 years    # Mild thrombocytopenia  Likely related to ESLD. Most recently platelets were [facility] discharge in 80s, currently 122.  - Follow-up AM CBC    # alcohol use disorder:  Clearly had etoh disorder in the past, drinking 1-2 bottles of wine per day. Unclear on when last drink was.  Possibly self-medicating for depression, PTSD, and prior opioid use disorder.  Currently, no cravings, etc.  However, would highly recommend close outpatient follow up with PCP, and consider referral for chemical dependency.  In addition, could consider meds for alcohol cravings (such as baclofen or gabapentin).  Unclear if she would benefit from naltrexone for etoh use disorder, as she previously likely had opioid use disorder as well, see below    # Chronic pain: several years ago Rxed high dose opioids.  Has been off opioids for several years.  Can reassess over time.      Diet: Combination Diet Low Saturated Fat Na <2400mg Diet, No Caffeine Diet    Fluids: prn  DVT Prophylaxis: Heparin SQ  Alexander Catheter: in place, indication:    Code Status: Full Code      Disposition Plan   Expected discharge: 2 - 3 days, recommended to transitional care unit once hemoglobin stable.  Entered: Matt Avitia MD 07/17/2019, 5:41 AM     Patient to be formally staffed by the day team.    Matt Avitia MD  Medicine-Pediatrics, PGY1  [facility] Night  "Service  [facility], [place]  Please see sticky note for cross cover information  ______________________________________________________________________    Chief Complaint   Abnormal labs    History is obtained from the patient    History of Present Illness   Neema Bailey is a 44 year old female with PMH significant for ESLD 2/2 alcoholic cirrhosis presenting from rehab facility due to abnormal labs (low hgb and potassium). Patient notes that she has been feeling a little \"off\" the past few days, somewhat more fatigued than normal. She has had difficulty sleeping, and has been tired during the day. She denies having had any recent fevers, but does endorse chills over the past few days. She has been very frustrated by itching, which is constant and has made it hard to sleep. She acknowledges generalized abdominal pain that is always present, and is worse in the RUQ. She grades it 3-4/10. The patient did have multiple episodes of emesis this past evening. It was non-bloody. She has been nauseous since her dinner yesterday evening. She says she has been having diarrhea, and is taking lactulose. No blood in her stool. At present, she denies cough, chest pain or shortness of breath. She does not have a headache, feel dizzy, lightheaded, or confused. Her abdominal pain now is at baseline, around 3/10. She denies any dysuria or hematuria. She notes that although her legs are swollen, they are much improved over the course of the past month. She does not note any new rashes or bruising. To her knowledge, she has not had any bleeding. The patient says that her labs were checked at her TCU facility, and were noted to be abnormal. For this reason, and due to her feeling fatigued, a decision was made to come to the ED. She specifically is coming to [facility] as she has a history of ESLD previously treated at [facility]; she had planned on establishing care at [facility] to discuss her " liver disease, prognosis, and treatment options. Of note, she recently was admitted at [facility] with hepatic encephalopathy, sepsis, UTI, and DEE DEE between 6/6-7/5, discharged to a TCU where she has been staying since.    Review of Systems    The 10 point Review of Systems is negative other than noted in the HPI.    Past Medical History    Reviewed:  Past Medical History:   Diagnosis Date     Anxiety      Bilateral leg edema      Dizziness and giddiness      Hypertension      Insomnia      Iron deficiency anemia     due to chronic blood loss, vitamin B12 deficiency, Macrocytic     Migraines      BURT (nonalcoholic steatohepatitis)      Numbness and tingling     PERIPHERAL NEUROPATHY     Obese      Other chronic pain     Chronic Bilateral Low Back Pain with Bilateral Sciatica, Bilateral Knee Pain     Peripheral neuropathy      Pruritus      Restless legs      Sciatica      Seborrheic dermatitis      Severe episode of recurrent major depressive disorder, without psychotic features (H)      Sinus tachycardia      Substance abuse in remission (H)     h/o alcoholism had treatment at [facility]     Uterovaginal prolapse      Vitamin D deficiency      Past Surgical History   Reviewed:  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)     BREAST SURGERY      Breast Augmentation     COLPORRHAPHY ANTERIOR N/A 9/21/2015    Procedure: COLPORRHAPHY ANTERIOR;  Surgeon: Jairon Adams MD;  Location: [facility]     COSMETIC SURGERY      Abdominoplasty     CYSTOCELE REPAIR       CYSTOSCOPY N/A 11/26/2018    Procedure: CYSTOSCOPY;  Surgeon: Rony Melgar MD;  Location: [facility]     ENT SURGERY      tonsillectomy & adenoidectomy     GI SURGERY      Small Bowel Enterotomy Repair for SBO, EGD     HYSTERECTOMY VAGINAL, COLPORRHAPHY ANTERIOR, POSTERIOR, COMBINED  N/A 11/26/2018    Procedure: VAGINAL HYSTERECTOMY, ANTERIOR AND POSTERIOR REPAIR;  Surgeon: Rony Melgar MD;  Location: [facility]     PERINEORRHAPHY N/A 11/26/2018    Procedure: PERINEOPLASTY;  Surgeon: Rony Melgar MD;  Location: [facility]     TUBAL LIGATION         Social History   Patient is currently staying in [place] at a rehab facility. She had previously been living in [place], [place]. She has 3 children. She has a history of alcohol abuse, but is currently sober. She does not use tobacco products or other recreational drugs.    Family History    Reviewed:  History reviewed. No pertinent family history.    Prior to Admission Medications   Prior to Admission Medications   Prescriptions Last Dose Informant Patient Reported? Taking?   METOPROLOL SUCCINATE ER PO  Self Yes No   Sig: Take 50 mg by mouth daily   SUMATRIPTAN SUCCINATE PO  Self Yes No   Sig: Take 50 mg by mouth every 8 hours as needed for migraine   estradiol (ESTRACE) 0.5 MG tablet   No No   Sig: Take 1 tablet (0.5 mg) by mouth daily   fluticasone (CUTIVATE) 0.05 % cream  Self Yes No   Sig: Apply topically 2 times daily as needed    ibuprofen (ADVIL/MOTRIN) 600 MG tablet   No No   Sig: Take 1 tablet (600 mg) by mouth every 6 hours as needed for pain (mild)   multivitamin, therapeutic with minerals (MULTI-VITAMIN) TABS  Self Yes No   Sig: Take 1 tablet by mouth daily   senna-docusate (SENOKOT-S/PERICOLACE) 8.6-50 MG tablet   No No   Sig: Take 1-2 tablets by mouth 2 times daily Take while on oral narcotics to prevent or treat constipation.      Facility-Administered Medications: None     Allergies   Allergies   Allergen Reactions     Gabapentin      Other reaction(s): Edema       Physical Exam   Vital Signs: Temp: 97.4  F (36.3  C) Temp src: Oral BP: 103/42 Pulse: 103 Heart Rate: 98 Resp: 14 SpO2: 98 % O2 Device: None (Room air)    Weight: 259 lbs 0 oz     General: NAD, pleasant, cooperative with exam  HEENT: AT/NT, EOMI, MMM,  +scleral icterus  Cardiovascular: RRR, normal S1/S2, no S3/S4, no murmurs  Pulm: CTAB, normal effort, no wheezes or crackles  Abdomen: soft, moderately distended, moderately tender to palpation in RUQ, no rebound tenderness or guarding, palpable liver edge, +BS  Extremities: trace pitting edema in lower extremities bilaterally to mid shin  Skin: warm, well perfused, no rashes or bruising. +jaundice  Neuro: A&Ox3, CNII-XII grossly intact, moves all extremities, no focal deficits, sensation intact    Data   Data reviewed today: I reviewed all medications, new labs and imaging results over the last 24 hours.    Internal Medicine Staff Addendum  Date of Service: 7/17/2019  I have seen and examined Ms. Bailey, reviewed the data and discussed the plan of care with the patient and the care team on P&FC Rounds.  I agree with the above documentation     I discussed pt's care with bedside RN, case management/social work today.  I personally reviewed history, labs, medications and past 24 hr notes.  Assessment/Plan/Diagnoses: plan/dx as above, which contains my edits and reflects our joint medical decision-making.     Herbert Velazquez MD  Internal Medicine/Pediatrics Hospitalist & Staff Physician   of Internal Medicine and Pediatrics  [facility]  Pager: [phone]

## 2019-07-17 NOTE — CONSULTS
GASTROENTEROLOGY CONSULTATION      Date of Admission: 7/17/2019       Date of Consult: 7/17/2019          Chief Complaint:   We were asked by Herbert Velazquez MD to evaluate this patient with hx of alcoholic liver cirrhosis          ASSESSMENT AND RECOMMENDATIONS:   Assessment:    Neema Bailey is a 44 year old female with a past medical history of alcoholic liver cirrhosis c/w hepatic encepalopathy, [place] hypertension admitted to [facility] on 7/ /2019 with hypokalemia most likely 2/2 a combination of diarrhea due to lactulose, emesis and diuretic therapy. Patient has anemia with Hgb of 6.5 with a baseline of 10 around Jan, Feb/2019 with hx of iron deficiency anemia, gastric bypass surgery and alcohol misuse hx. No signs of active upper or lower GI bleeding at this point of care. Patient has a baseline alcoholic liver cirrhosis most likely complicated with drug induced liver injury due to Macrobid when she was diagnosed with UTI late April- First of May/2019. Patient has been sober for almost 4 months and she stated that she was sober for at least 30-40 days before her first admission to [facility] on 4/30/2019, Given this fact; Alcoholic hepatitis is less likely as a diagnosis. Patient had a liver biopsy suggesting cirrhosis with evidence of DILI.       - Acute on top of chronic liver cirrhosis most likely due to Drug-Induced Liver Injury  Patient with pmh of alcoholic liver cirrhosis with psh of gastric bypass and a recent use of a hepatotoxic medication, in this case Macrobid, all together lead to Drug-Induced liver injury.     - Anemia   Patient with alcoholic liver cirrhosis with hx of iron deficiency anemia and gastri bypass surgery without any signs of active GI bleeding, making chronic anemia is more likely.     - Hypokalemia    Most likely due to K loss due to Diarrhea, Vomiting and diuretic therapy.     Recommendations    - If patient is not tolerating lactulose, hold it and give  Kristalose 30 ml PO TID , titer for a 2-3 soft BMs/day  - PO potassium replacement.  - Anemia workup: CBC, Retic count, Haptoglobin, Iron studies, Folate, Vit B12, Peripheral smear, Fecal occult blood  - Transfuse if Hgb<7 from a GI prospective, avoid over transfusion to avoid worsening portal hypertension  - U/S abdomen with doppler flow and consider diagnostic paracentesis if possible  - Blood and urine culture to rule out infection  - No need for steroids for his liver cirrohsis  - Trend MELD-Na labs daily   - Trend Liver enzymes daily           Patient care plan discussed with Dr. leventhal, GI staff physician. Thank you for involving us in this patient's care. Please do not hesitate to contact the GI service with any questions or concerns.     Jose Nash M.D.   IM PGY1  Department of Gastroenterology   Hepatology  -------------------------------------------------------------------------------------------------------------------   History is obtained from the patient and the medical record.          History of Present Illness:   Neema Bailey is a 44 year old female with a pmh of  Alcoholic liver cirrhosis complicated with hepatic encephalopathy, migraine, HTN, iron deficiency anemia and BURT. Patient was admitted to [facility] on 7/17/2019 with concerns of hypokalemia and anemia in the sitting of her comordities.     Patient had multiple admission to OSH over the last 3 months. On 4/30/2019, She was admitted for UTI and found to have an elevated liver enzymes. U/S abdomen showed hepatic steatosis. Patient stated that at this time she was sober for 30-40 days. she was discharged home on 10 days course of bactrim and Macrobid for UTI.     On 5/5/2019, she was admitted again for worsening abdominal pain. Liver enzymes had an acute elevation on top of the patient's baseline. CT scan positive for fatty liver infiltrates and a negative work-up for viral hepatitis, CMV, EBV, HSV, iron studies, AMA, DAWOOD, ASMA.    Liver biopsy on 5/10/2019 showed evidence of alcoholic liver cirrhosis and drug induced liver injury. Patient was placed on steorids and liver enzymes trended down.     Then her liver enzymes started to trend up again around 5/20/2019 and she was admitted to OSH again for hypovolumic shock of unknown etiology. Patient was put on 7 days course of  steroid and patient's liver enzymes improved.    Patient was just fine until she got admitted again for sepsis 2/2 UTI and wound infection with DEE DEE and HE between 6/6/2019 and 7/5/2019. Patient was discharged on lactulose and rifixamin and lasix and spironolactone. Patient then went to Rehab facility and stated that she progressively got more fatigued and weak and had 6-8 episodes of diarrhea/day for the last 1-2 weeks with 2 episodes of emesis yesterday. No fever, no chills or night sweats. Patient is c/o chronic RUQ pain with no melena or hematochezia. To note: patient stated that she used to have melena with the last time was about 2 months ago.               Past Medical History:   Reviewed and edited as appropriate  Past Medical History:   Diagnosis Date     Anxiety      Bilateral leg edema      Dizziness and giddiness      Hypertension      Insomnia      Iron deficiency anemia     due to chronic blood loss, vitamin B12 deficiency, Macrocytic     Migraines      BURT (nonalcoholic steatohepatitis)      Numbness and tingling     PERIPHERAL NEUROPATHY     Obese      Other chronic pain     Chronic Bilateral Low Back Pain with Bilateral Sciatica, Bilateral Knee Pain     Peripheral neuropathy      Pruritus      Restless legs      Sciatica      Seborrheic dermatitis      Severe episode of recurrent major depressive disorder, without psychotic features (H)      Sinus tachycardia      Substance abuse in remission (H)     h/o alcoholism had treatment at [facility]     Uterovaginal prolapse      Vitamin D deficiency             Past Surgical History:   Reviewed and edited as  appropriate   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)     BREAST SURGERY      Breast Augmentation     COLPORRHAPHY ANTERIOR N/A 9/21/2015    Procedure: COLPORRHAPHY ANTERIOR;  Surgeon: Jairon Adams MD;  Location: [facility]     COSMETIC SURGERY      Abdominoplasty     CYSTOCELE REPAIR       CYSTOSCOPY N/A 11/26/2018    Procedure: CYSTOSCOPY;  Surgeon: Rony Melgar MD;  Location: [facility]     ENT SURGERY      tonsillectomy & adenoidectomy     GI SURGERY      Small Bowel Enterotomy Repair for SBO, EGD     HYSTERECTOMY VAGINAL, COLPORRHAPHY ANTERIOR, POSTERIOR, COMBINED N/A 11/26/2018    Procedure: VAGINAL HYSTERECTOMY, ANTERIOR AND POSTERIOR REPAIR;  Surgeon: Rony Melgar MD;  Location: [facility]     PERINEORRHAPHY N/A 11/26/2018    Procedure: PERINEOPLASTY;  Surgeon: Rony Melgar MD;  Location: [facility]     TUBAL LIGATION              Previous Endoscopy:    OSH 5/9/2019. Results in care everywhere          Social History:   Reviewed and edited as appropriate  Social History     Socioeconomic History     Marital status: Single     Spouse name: Not on file     Number of children: Not on file     Years of education: Not on file     Highest education level: Not on file   Occupational History     Not on file   Social Needs     Financial resource strain: Not on file     Food insecurity:     Worry: Not on file     Inability: Not on file     Transportation needs:     Medical: Not on file     Non-medical: Not on file   Tobacco Use     Smoking status: Never Smoker     Smokeless tobacco: Never Used   Substance and Sexual Activity     Alcohol use: No     Alcohol/week: 0.0 oz     Frequency: Never     Comment:  hx of ETOH abuse     Drug use: No     Sexual activity: Not Currently     Partners: Male     Birth control/protection: Female  Surgical     Comment: Tubal Ligation 2003   Lifestyle     Physical activity:     Days per week: Not on file     Minutes per session: Not on file     Stress: Not on file   Relationships     Social connections:     Talks on phone: Not on file     Gets together: Not on file     Attends Anabaptism service: Not on file     Active member of club or organization: Not on file     Attends meetings of clubs or organizations: Not on file     Relationship status: Not on file     Intimate partner violence:     Fear of current or ex partner: Not on file     Emotionally abused: Not on file     Physically abused: Not on file     Forced sexual activity: Not on file   Other Topics Concern     Parent/sibling w/ CABG, MI or angioplasty before 65F 55M? Not Asked   Social History Narrative     Not on file            Family History:   Reviewed and edited as appropriate  History reviewed. No pertinent family history.        Allergies:   Reviewed and edited as appropriate     Allergies   Allergen Reactions     Gabapentin      Other reaction(s): Edema            Medications:     Current Facility-Administered Medications   Medication     0.9% sodium chloride BOLUS     hydrOXYzine (ATARAX) tablet 25-50 mg     lactulose (CHRONULAC) solution 10 g     lidocaine (LMX4) cream     lidocaine 1 % 0.1-1 mL     melatonin tablet 1 mg     metoprolol tartrate (LOPRESSOR) half-tab 12.5 mg     midodrine (PROAMATINE) tablet 12.5 mg     naloxone (NARCAN) injection 0.1-0.4 mg     ondansetron (ZOFRAN) injection 4 mg     pantoprazole (PROTONIX) EC tablet 40 mg     phytonadione (MEPHYTON/VITAMIN K) 1 MG/ML oral solution 5 mg     polyethylene glycol (MIRALAX/GLYCOLAX) Packet 17 g     potassium chloride (KLOR-CON) Packet 20-40 mEq     potassium chloride 10 mEq in 100 mL intermittent infusion with 10 mg lidocaine     potassium chloride 10 mEq in 100 mL sterile water intermittent infusion (premix)     potassium chloride 20 mEq in 50 mL intermittent infusion      potassium chloride ER (K-DUR/KLOR-CON M) CR tablet 20-40 mEq     rifaximin (XIFAXAN) tablet 550 mg     sodium chloride (PF) 0.9% PF flush 3 mL     sodium chloride (PF) 0.9% PF flush 3 mL     vitamin D3 (CHOLECALCIFEROL) 1000 units (25 mcg) tablet 5,000 Units             Review of Systems:     A complete review of systems was performed and is negative except as noted in the HPI           Physical Exam:   /51   Pulse 85   Temp 98.3  F (36.8  C) (Oral)   Resp 18   Wt 113.4 kg (250 lb)   LMP 10/04/2018   SpO2 98%   BMI 38.01 kg/m    Wt:   Wt Readings from Last 2 Encounters:   07/17/19 113.4 kg (250 lb)   12/20/18 117.9 kg (260 lb)      Constitutional: cooperative, pleasant, not dyspneic/diaphoretic, no acute distress  Eyes: Sclera icteric/injected  Ears/nose/mouth/throat: Normal oropharynx without ulcers or exudate, mucus membranes moist, hearing intact  CV: trace LL pitting edema  Respiratory: Unlabored breathing  Lymph: No axillary, submandibular, supraclavicular or inguinal lymphadenopathy  Abd: soft Nondistended, +bs, no hepatosplenomegaly, Epigastric tenderness, RUQ and RLQ tenderness, no peritoneal signs  Skin: warm, perfused, jaundice  Neuro: AAO x 3, No asterixis, + Clonus  Psych: Normal affect           Data:   Labs and imaging below were independently reviewed and interpreted    BMP  Recent Labs   Lab 07/17/19  0549      POTASSIUM 3.1*   CHLORIDE 110*   NARENDRA 8.4*   CO2 18*   BUN 4*   CR 0.86   GLC 96     CBC  Recent Labs   Lab 07/17/19  0549   WBC 7.6   RBC 2.08*   HGB 6.8*   HCT 21.8*   *   MCH 32.7   MCHC 31.2*   RDW 18.3*   *     INR  Recent Labs   Lab 07/17/19  0209   INR 3.27*     LFTs  Recent Labs   Lab 07/17/19  0549   ALKPHOS 110   AST 63*   ALT 23   BILITOTAL 12.3*   PROTTOTAL 5.5*   ALBUMIN 2.6*      PANCNo lab results found in last 7 days.    Imaging

## 2019-07-17 NOTE — PROVIDER NOTIFICATION
Lab called writing RN regarding critical Hgb of 6.8. Clifford PRITCHARD cross-cover text paged re hgb. Blood transfusion orders in. Will continue to monitor.

## 2019-07-17 NOTE — PROGRESS NOTES
07/17/19 1327   Quick Adds   Type of Visit Initial Occupational Therapy Evaluation   Living Environment   Lives With facility resident  (Pt. was in a TCU after recent hospitalization.)   Living Arrangements extended care facility   Home Accessibility no concerns   Transportation Anticipated family or friend will provide   Living Environment Comment Pt. was renting a house prior to recent hospitalization and discharge to TCU. She stated her lease is up and so her parents are moving her out of her house. She has 2 children ges 19 and 16 who are living with her 27 y.o. daughter for now as she has been in/out of hospital in the past year.    Self-Care   Usual Activity Tolerance fair   Current Activity Tolerance poor   Regular Exercise No   Equipment Currently Used at Home wheelchair, manual   Activity/Exercise/Self-Care Comment Prior to admit, pt. was in a TCU followig recent hospitalization. Pt. reported she has been in/out of hospital a lot in past year and was vague on what her status was prior.    Functional Level   Ambulation 1-->assistive equipment   Transferring 1-->assistive equipment   Toileting 1-->assistive equipment   Bathing 1-->assistive equipment   Dressing 2-->assistive person   Eating 0-->independent   Communication 0-->understands/communicates without difficulty   Swallowing 0-->swallows foods/liquids without difficulty   Cognition 0 - no cognition issues reported   Fall history within last six months yes   Number of times patient has fallen within last six months 2   Which of the above functional risks had a recent onset or change? ambulation;transferring;toileting;bathing;dressing   Prior Functional Level Comment Prior to admit, pt. was in a TCU followig recent hospitalization. Pt. reported she has been in/out of hospital a lot in past year and was vague on what her status was prior.    General Information   Onset of Illness/Injury or Date of Surgery - Date 07/17/19  (Date of admission. )    Patient/Family Goals Statement None stated.   Additional Occupational Profile Info/Pertinent History of Current Problem Pt. is a 44 year old female with PMH significant for ESLD of unclear cause (likely combination of chronic liver disease, alcohol injury, and possible medication injury), admitted on 7/17/2019 following a few days of generalized fatigue, one day of nausea, and vomiting, transferred to [facility] from her rehab facility because of abnormal labs drawn there.   Precautions/Limitations fall precautions   Cognitive Status Examination   Orientation orientation to person, place and time   Level of Consciousness alert   Follows Commands (Cognition) WNL   Memory intact   Attention No deficits were identified   Organization/Problem Solving No deficits were identified   Executive Function Initiation impaired  (Pt. tends to move slower for all mobility and tasks.)   Cognitive Comment Will continue to monitor.   Posture   Posture not impaired   Range of Motion (ROM)   ROM Comment Intact for B UE, however slowed.    Strength   Strength Comments Pt. able to use B UE's, however overall slower movement.   Coordination   Functional Limitations Decreased speed   Mobility   Bed Mobility Comments Pt. completed bed mobility for supine to sit with  SBA and verbal cues and extra time due to slower movement.   Transfer Skill: Bed to Chair/Chair to Bed   Level of [place]: Bed to Chair unable to perform  (Pt. declined to stand during session.)   Transfer Skill: Sit to Stand   Level of [place]: Sit/Stand unable to perform  (Pt. declined to stand during session.)   Transfer Skill: Toilet Transfer   Level of [place]: Toilet unable to perform  (Pt. declined to stand during session.)   Upper Body Dressing   Level of [place]: Dress Upper Body minimum assist (75% patients effort)   Physical Assist/Nonphysical Assist: Dress Upper Body 1 person assist;verbal cues;supervision;set-up required   Lower Body Dressing  "  Level of [place]: Dress Lower Body maximum assist (25% patients effort)  (Pt. having difficulty reaching LE's for dressing. )   Grooming   Level of [place]: Grooming stand-by assist  (from seated on edge of bed. )   Physical Assist/Nonphysical Assist: Grooming set-up required;supervision;verbal cues   Instrumental Activities of Daily Living (IADL)   Previous Responsibilities   (Difficult to get clear picture of prior status.)   Activities of Daily Living Analysis   Impairments Contributing to Impaired Activities of Daily Living flexibility decreased;fear and anxiety;motor control impaired;ROM decreased;strength decreased;postural control impaired;balance impaired   General Therapy Interventions   Planned Therapy Interventions ADL retraining;balance training;bed mobility training;strengthening;transfer training;home program guidelines;progressive activity/exercise   Clinical Impression   Criteria for Skilled Therapeutic Interventions Met yes, treatment indicated   OT Diagnosis Decreased independence with functional mobility and ADL's.    Assessment of Occupational Performance 3-5 Performance Deficits   Identified Performance Deficits Decreased independence with grooming, dressing, toileting and toilet transfers, bathing and tub/shower transfers.    Clinical Decision Making (Complexity) Moderate complexity   Therapy Frequency Daily   Predicted Duration of Therapy Intervention (days/wks) 5 days   Anticipated Discharge Disposition Transitional Care Facility   Risks and Benefits of Treatment have been explained. Yes   Patient, Family & other staff in agreement with plan of care Yes   [facility] [employer]-PAC TM \"6 Clicks\"   2016, Trustees of [facility], under license to [employer].  All rights reserved.   6 Clicks Short Forms Daily Activity Inpatient Short Form   [facility] AM-PAC  \"6 Clicks\" Daily Activity Inpatient Short Form   1. Putting on and taking off regular lower body clothing? 2 - A " Lot   2. Bathing (including washing, rinsing, drying)? 2 - A Lot   3. Toileting, which includes using toilet, bedpan or urinal? 2 - A Lot   4. Putting on and taking off regular upper body clothing? 3 - A Little   5. Taking care of personal grooming such as brushing teeth? 3 - A Little   6. Eating meals? 4 - None   Daily Activity Raw Score (Score out of 24.Lower scores equate to lower levels of function) 16   Total Evaluation Time   Total Evaluation Time (Minutes) 10

## 2019-07-17 NOTE — PROGRESS NOTES
CLINICAL NUTRITION SERVICES - ASSESSMENT NOTE     Nutrition Prescription    RECOMMENDATIONS FOR MDs/PROVIDERS TO ORDER:  1. Recommend daily thiamine supplementation given cirrhosis and history of bariatric surgery.    2. Once able to advance diet, recommend regular diet / small frequent meal option to optimize nutrition intake.      3. Recommend checking the following labs in acute or non-acute care setting: vitamin A, D, E, zinc, thiamine given history of bariatric surgery     Malnutrition Status:    Unable to determine due to lack of information     Recommendations already ordered by Registered Dietitian (RD):  None at this time     Future/Additional Recommendations:  High protein supplement needs pending po intake       REASON FOR ASSESSMENT  Neema Bailey is a/an 44 year old female assessed by the dietitian for Provider Order - nutritional recommendations in the setting of ESLD    Chart reviewed:  PMH: Gastric bypass surgery ( Denny-en-Y on 2002 ) and liver cirrhosis, ( multifactorial),  c/w hepatic encephalopathy, Portal hypertension. MELD 32,     --Per provider note, Admitted to [facility] on 7/17/2019, transferred from her rehab facility to [facility] after few days of generalized fatigue, one day of nausea and vomiting with hypokalemia, ( per MD, most likely 2/2 diarrhea due to lactulose, emesis and diuretic therapy). Patient had a liver biopsy suggesting cirrhosis with evidence of Drug induced liver injury, also with history of etoh disorder.     --Patient has anemia with Hgb of 6.5 with a baseline of 10 around Jan, Feb/2019 with hx of iron deficiency anemia, No signs of active upper or lower GI bleeding    --Recently admitted to OSH: 6/6-7/5/19 for sepsis, UTI, DEE DEE, HE and abdominal varices. Discharged to a TCU where she has been staying since.    NUTRITION HISTORY  Unable to obtain. Patient sound asleep.     CURRENT NUTRITION ORDERS  Diet: Low Saturated Fat/2400 mg Sodium  Intake/Tolerance: No intake , NPO  after midnight     LABS  K+: 3.1(L), Bicarb: 18 (L), on lactulose   BUN: 4 ( may reflect inadequate recent protein intake)  T.bili: 12.3 (H)  NH3: N/A on admit   Urine ketones: N/A on admit     MEDICATIONS  lactulose and rifixamin     ANTHROPOMETRICS  Height: 0 cm (Data Unavailable) 172.7 cm   Most Recent Weight: 113.4 kg (250 lb) Drier wt on admit 7/17/19  IBW: 63.6  Kg ( 178% IBW)  BMI: 38.01 kg /m2 -  Obesity Grade II BMI 35-39.9  Weight History: 3.8% net wt loss over the past 7 month   Wt Readings from Last 10 Encounters:   07/17/19 113.4 kg (250 lb)   12/20/18 117.9 kg (260 lb)   12/18/18 117.9 kg (260 lb)   12/14/18 117.9 kg (260 lb)   11/30/18 120.7 kg (266 lb)   11/26/18 121.1 kg (266 lb 14.4 oz)   11/02/18 124.3 kg (274 lb)   10/22/18 122.5 kg (270 lb)   10/18/18 91.2 kg (201 lb)   10/04/18 122.9 kg (271 lb)        Dosing Weight: 76 kg adjusted wt based on drier admit wt of 113.4 kg and IBW of 63.6 kg      ASSESSED NUTRITION NEEDS  Estimated Energy Needs: 3588-2501 kcals/day (20 - 25 kcals/kg)  Justification: Maintenance and Obese  Estimated Protein Needs:  grams protein/day (1.2 - 1.5 grams of pro/kg)  Justification: Obesity guidelines, increase need   Estimated Fluid Needs: ESLD/ Ascites   Justification: Per provider pending fluid status     PHYSICAL FINDINGS  Extremities: trace pitting edema in lower extremities bilaterally to mid shin  Skin: warm, well perfused, no rashes or bruising. +jaundice  Wound Care consult for bed sore     MALNUTRITION  % Intake: Decreased intake likely does not meet criteria  % Weight Loss: Unable to assess  Subcutaneous Fat Loss: Unable to assess  Muscle Loss: Unable to assess  Fluid Accumulation/Edema: Mild  Malnutrition Diagnosis: Unable to determine due to lack of information     NUTRITION DIAGNOSIS  Predicted inadequate nutrient intake related to presentation of N/V may hinder patients ability to tolerate PO    INTERVENTIONS  Implementation  Nutrition Education: Not  appropriate at this time due to patient condition   Medical food supplement therapy: will assess needs     Goals  Patient to consume % of nutritionally adequate meal trays TID, or the equivalent with supplements/snacks.     Monitoring/Evaluation  Progress toward goals will be monitored and evaluated per protocol.    Rachel Ortiz RD/LEO  Pager [phone]

## 2019-07-17 NOTE — ED NOTES
[facility], [place]   ED Nurse to Floor Handoff     Neema Bailey is a 44 year old female who speaks English and lives with others,  in a nursing home  They arrived in the ED by ambulance from home    ED Chief Complaint: Abnormal Labs    ED Dx;   Final diagnoses:   Hypokalemia   End stage liver disease (H)   Anemia, unspecified type         Needed?: No    Allergies:   Allergies   Allergen Reactions     Gabapentin      Other reaction(s): Edema   .  Past Medical Hx:   Past Medical History:   Diagnosis Date     Anxiety      Bilateral leg edema      Dizziness and giddiness      Hypertension      Insomnia      Iron deficiency anemia     due to chronic blood loss, vitamin B12 deficiency, Macrocytic     Migraines      BURT (nonalcoholic steatohepatitis)      Numbness and tingling     PERIPHERAL NEUROPATHY     Obese      Other chronic pain     Chronic Bilateral Low Back Pain with Bilateral Sciatica, Bilateral Knee Pain     Peripheral neuropathy      Pruritus      Restless legs      Sciatica      Seborrheic dermatitis      Severe episode of recurrent major depressive disorder, without psychotic features (H)      Sinus tachycardia      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     Uterovaginal prolapse      Vitamin D deficiency       Baseline Mental status: WDL  Current Mental Status changes: at basesline    Infection present or suspected this encounter: no  Sepsis suspected: No  Isolation type: No active isolations     Activity level - Baseline/Home:  Stand with Assist  Activity Level - Current:   Stand with Assist    Bariatric equipment needed?: No    In the ED these meds were given:   Medications   0.9% sodium chloride + KCl 20 mEq/L infusion ( Intravenous New Bag 7/17/19 0220)       Drips running?  No    Home pump  No    Current LDAs  Peripheral IV 07/17/19 Left Upper forearm (Active)   Site Assessment WDL 7/17/2019  2:45 AM   Line Status  Saline locked 7/17/2019  2:45 AM   Phlebitis Scale 0-->no symptoms 7/17/2019  2:45 AM   Infiltration Scale 0 7/17/2019  2:45 AM   Infiltration Site Treatment Method  None 7/17/2019  2:45 AM   Extravasation? No 7/17/2019  2:45 AM   Dressing Intervention New dressing  7/17/2019  2:45 AM   Number of days: 0       Peripheral IV 07/17/19 Right Upper forearm (Active)   Site Assessment WDL 7/17/2019  2:46 AM   Line Status Saline locked 7/17/2019  2:46 AM   Phlebitis Scale 0-->no symptoms 7/17/2019  2:46 AM   Infiltration Scale 0 7/17/2019  2:46 AM   Infiltration Site Treatment Method  None 7/17/2019  2:46 AM   Extravasation? No 7/17/2019  2:46 AM   Dressing Intervention New dressing  7/17/2019  2:46 AM   Number of days: 0       Urethral Catheter 16 fr (Active)   Number of days: 232       Incision/Surgical Site 11/26/18 Vagina (Active)   Number of days: 233       Labs results:   Labs Ordered and Resulted from Time of ED Arrival Up to the Time of Departure from the ED   INR - Abnormal; Notable for the following components:       Result Value    INR 3.27 (*)     All other components within normal limits   PROCALCITONIN   ABO/RH TYPE AND SCREEN   BLOOD CULTURE       Imaging Studies: No results found for this or any previous visit (from the past 24 hour(s)).    Recent vital signs:   /78   Pulse 82   Temp 98.2  F (36.8  C) (Oral)   Resp 18   Wt 117.5 kg (259 lb)   LMP 10/04/2018   SpO2 98%   BMI 39.38 kg/m      Truman Coma Scale Score: 15 (07/17/19 0222)       Cardiac Rhythm: Normal Sinus  Pt needs tele? Yes  Skin/wound Issues: Patient has dressing on her coccyx.  CDI  Code Status: Full Code    Pain control: good    Nausea control: pt had none    Abnormal labs/tests/findings requiring intervention: Hemoglobin 6.6, K 2.8.    Family present during ED course? No   Family Comments/Social Situation comments: n/a    Tasks needing completion: None    Cayetano Mathew, RN  [phone] [facility]

## 2019-07-17 NOTE — PLAN OF CARE
Pt admitted with hypokalemia and anemia. BP soft on RA. Telemetry, NSR with prolonged QT. A&Ox4. Slow to respond. SBA. Up to commode. Voiding. No BM this shift. Wound present on coxxys on admission. WOC consult placed. Low Na diet, decreased appetite. Complaints of nausea, gave Zofran x2. Pt reported itching, gave Atarax x2. MD may order itching cream. Gave 1 unit RBC, hgb check this afternoon. Plan to discharge to TCU once medically stable. Continue to monitor and follow the POC.

## 2019-07-17 NOTE — ED PROVIDER NOTES
History     Chief Complaint   Patient presents with     Abnormal Labs     HPI  Neema Bailey is a 44 year old female who presents with abnormal labs.  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  She states that she overall is been feeling reasonably all right, but today told him that she like her labs checked because they have not been checked since she is been at the TCU facility.  Labs came back noting significant hypokalemia as well as a low hemoglobin, she was transferred here.  She does incidentally note that she has had 3 episodes of emesis tonight, states it was small and nonbloody.  She has had intermittent nausea and vomiting.  She does take lactulose, continues to have stool, but states it is not appearing different than normal.  She does report some mild right upper quadrant pain, states that is chronic and unchanged.  She does state that it waxes and wanes, but is not different from her baseline.  No fever, cough, shortness of breath, dysuria.  No sore throat or stuffy nose.    Past Medical History:   Diagnosis Date     Anxiety      Bilateral leg edema      Dizziness and giddiness      Hypertension      Insomnia      Iron deficiency anemia     due to chronic blood loss, vitamin B12 deficiency, Macrocytic     Migraines      BURT (nonalcoholic steatohepatitis)      Numbness and tingling     PERIPHERAL NEUROPATHY     Obese      Other chronic pain     Chronic Bilateral Low Back Pain with Bilateral Sciatica, Bilateral Knee Pain     Peripheral neuropathy      Pruritus      Restless legs      Sciatica      Seborrheic dermatitis      Severe episode of recurrent major depressive disorder, without psychotic features (H)      Sinus tachycardia      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)     BREAST SURGERY      Breast Augmentation     COLPORRHAPHY ANTERIOR N/A 9/21/2015    Procedure: COLPORRHAPHY ANTERIOR;  Surgeon: Jairon Adams MD;  Location: [facility]     COSMETIC SURGERY      Abdominoplasty     CYSTOCELE REPAIR       CYSTOSCOPY N/A 11/26/2018    Procedure: CYSTOSCOPY;  Surgeon: Rony Melgar MD;  Location: [facility]     ENT SURGERY      tonsillectomy & adenoidectomy     GI SURGERY      Small Bowel Enterotomy Repair for SBO, EGD     HYSTERECTOMY VAGINAL, COLPORRHAPHY ANTERIOR, POSTERIOR, COMBINED N/A 11/26/2018    Procedure: VAGINAL HYSTERECTOMY, ANTERIOR AND POSTERIOR REPAIR;  Surgeon: Rony Melgar MD;  Location: [facility]     PERINEORRHAPHY N/A 11/26/2018    Procedure: PERINEOPLASTY;  Surgeon: Rony Melgar MD;  Location: [facility]     TUBAL LIGATION         History reviewed. No pertinent family history.    Social History     Tobacco Use     Smoking status: Never Smoker     Smokeless tobacco: Never Used   Substance Use Topics     Alcohol use: No     Alcohol/week: 0.0 oz     Frequency: Never     Comment:  hx of ETOH abuse       I have reviewed the Medications, Allergies, Past Medical and Surgical History, and Social History in the Epic system.    Review of Systems   Constitutional: Negative for fever.   Respiratory: Negative for shortness of breath.    Cardiovascular: Negative for chest pain.   Gastrointestinal: Positive for abdominal pain, diarrhea (due to lactulose), nausea and vomiting.   All other systems reviewed and are negative.      Physical Exam   BP: 121/68  Pulse: 95  Heart Rate: 96  Temp: 98.2  F (36.8  C)  Resp: 17  Weight: 117.5 kg (259 lb)  SpO2: 100 %      Physical Exam   Constitutional: No distress.   HENT:   Head:  Atraumatic.   Mouth/Throat: Oropharynx is clear and moist. No oropharyngeal exudate.   Eyes: Pupils are equal, round, and reactive to light. Scleral icterus is present.   Cardiovascular: Normal heart sounds and intact distal pulses.   Pulmonary/Chest: Breath sounds normal. No respiratory distress.   Abdominal: Soft. There is tenderness (Mild right upper quadrant tenderness without guarding).   Musculoskeletal: She exhibits no edema or tenderness.   Skin: Skin is warm. No rash noted. She is not diaphoretic.   Significant jaundice       ED Course        Procedures             Critical Care time:  was 30 minutes for this patient excluding procedures.  The Lactic acid level is elevated due to dehydration, at this time there is no sign of severe sepsis or septic shock.           Labs Ordered and Resulted from Time of ED Arrival Up to the Time of Departure from the ED   INR - Abnormal; Notable for the following components:       Result Value    INR 3.27 (*)     All other components within normal limits   PROCALCITONIN   ABO/RH TYPE AND SCREEN   BLOOD CULTURE            Assessments & Plan (with Medical Decision Making)   Patient had significantly low potassium as well as low hemoglobin.  Did do a stool guaiac -stool hand but was trace guaiac positive.  However, I was able to review patient's recent [facility] records in care everywhere, see that within the last 2 weeks she had a hemoglobin of 6.9-thus a hemoglobin of 6.6 truly represents minimal change.  Given this I will defer transfusion decision to primary team.  Potassium replacement was started here IV.  She had been aggressively diuresed, I do think this is the cause of her hypokalemia.  She also had a high lactate here without any clear evidence for infection, and low pro calcitonin.  Repeat lactate was trending down after 1 L of fluids.  She will be admitted to internal medicine, [place], for further stabilization and management.    Dictation Disclaimer: Some of  this Note has been completed with voice-recognition dictation software. Although errors are generally corrected real-time, there is the potential for a rare error to be present in the completed chart.      I have reviewed the nursing notes.    I have reviewed the findings, diagnosis, plan and need for follow up with the patient.       Medication List      There are no discharge medications for this visit.         Final diagnoses:   Hypokalemia   End stage liver disease (H)   Anemia, unspecified type       7/17/2019   [facility], [place], EMERGENCY DEPARTMENT     Margaret Marquez MD  07/17/19 0764

## 2019-07-17 NOTE — PLAN OF CARE
Discharge Planner OT 5A  Patient plan for discharge: TCU  Current status: Pt needs assist for all functional mobility.   Barriers to return to prior living situation: Need for assist, fatigue,  Recommendations for discharge: TCU  Rationale for recommendations: Pt will benefit from continued OT to maximize level of independence with functional mobility and ADL's.        Entered by: Juli Madison 07/17/2019 3:25 PM

## 2019-07-17 NOTE — ED TRIAGE NOTES
Pt arrives from [facility], from a nursing home, and arrives with a hemoglobin of 6.5, labs that were taken at [facility].  Pt reports feeling lethargic, was in hospital, and then to [facility] for rehab.  Pt states feeling stronger but overall condition is lethargic and feeling weak.  Per EMS previous provider reports patient is more jaundiced than 7/16.

## 2019-07-17 NOTE — PLAN OF CARE
Pt admitted from ED for hypokalemia and low hemoglobin. Pt arrived on unit via stretcher and settled into bed. MD aware of pt's arrival. Signed and held orders released. Admission questions complete. Will continue to monitor.    VSS on RA. Tele on. NSR with prolonged QT interval. A&Ox4. Slow speech. Assist of 1 with walker. Pt denies pain. Pt complaining of itchiness (likely from elevated bilirubin and jaundice). PRN Atarax given. Pt complaining of nausea. PRN IV Zofran given. Suspected pressure ulcer on coccyx. Open wound on sacrum. Both wounds present on admission. Will need WOC consult. Hemoglobin 6.8 this AM. MD aware. Plan to transfuse. Blood transfusion consent signed. Will continue to monitor.

## 2019-07-17 NOTE — PROGRESS NOTES
Admission from: ED  2 RN skin assessment completed by: Tamra Cheng and Emery Mccabe    Suspected pressure ulcer on coccyx. Open wound on sacrum. WOC to be consulted. Will continue to monitor.

## 2019-07-17 NOTE — PLAN OF CARE
1500 - 1900:    Pt A&Ox4, VSS ex hypotension on RA on tele, no c/o N/V, c/o R lower abd pain but is tolerable and declines intervention. States has been having abd pain for past few weeks. C/o itchiness - PRN benadryl cream given, does have elevated bili. Pt's skin jaundiced, sclera yellow, does have 2 open areas on bottom, assessed with MD @ bedside - cleaned this shift with wound spray and sacral mepilex applied. Did appear more lethargic at start of shift, pt did not have any BMs since yesterday (and only had 1 yesterday) - asked MDs for PRN lactulose order if pt becomes more lethargic. Pt did have 1 large BM this shift. Hgb recheck 7.4, K replacement finished - recheck @ 8 pm. LA elevated to 3.3 - 1 L LR bolus given - LA recheck also @ 8 pm. Assist x1 to BSC.    Plan: NPO @ [place] for abd US, possible para as well tomorrow. Monitor Hgb and K, discharge to TCU when stable (pt would prefer not to go back to same TCU)     Continue to monitor and follow POC

## 2019-07-18 NOTE — PROGRESS NOTES
"Social Work Services Progress Note    Hospital Day: 2  Collaborated with:  Patient, primary team Clifford Gudino    Data:  Pt is a 44-year-old female admitted to [facility] 7/17/19.     Intervention:  Pt admitted from [facility] [place]. Per primary team pt is requesting a different TCU at discharge. Met with pt to discuss. Pt states that today is not a good day to discuss this. Pt states she \"hates\" her current TCU and is hoping to discharge home with family but has not had a chance to discuss this with her family. Pt declines having SW contact her family at this time.     Assessment:  Pt requesting alternate placement    Plan:    Anticipated Disposition:  Facility:  TCU    Barriers to d/c plan:  Medical stability    Follow Up:  SW to follow for discharge planning    SHERRY New, ADASW  5th Floor   Pager [phone]  Phone [phone]        "

## 2019-07-18 NOTE — PROGRESS NOTES
[facility], [place]    Progress Note - Clifford 5 Service   Date of Admission:  7/17/2019    Assessment & Plan   Neema Bailey is a 44 year old female with PMH significant for ESLD of unclear cause (likely combination of chronic liver disease, alcohol injury, and possible medication injury), admitted on 7/17/2019 following a few days of generalized fatigue, one day of nausea, and vomiting, transferred to [facility] from her rehab facility because of abnormal labs drawn there (low hemoglobin and potassium - 6.5 and 2.6 , respectively).     Changes today:   - US negative for PVT  - lactate improving with fluids; no signs of infection   - resume home diuretics     # Acute on chronic anemia - stable   Patient is not aware of any bleeding - denies any recent bleeding/trauma, no blood in emesis, stool, or urine. Does not note any new/worse bruising. Numerous abdominal varices noted on CT C/A/P from 6/25/19 (in Care Everywhere). Most recent hgb was 6.9 from 7/5, when patient was discharged from [facility]. Hgb on admission 6.8 s/p 1 unit pRBCs with appropriate Hgb improvement. 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 MELD here is 29. Numerous abdominal varices noted on CT C/A/P from 6/25/19. Liver biopsy on 5/10/18 c/w possible alcohol injury. S/p prednisolone trial during 6/6 [facility] admission x2 (no response). History and biopsy reviewed by Dr. Leventhal here, liver injury seems more consistent with drug-induced either from Bactrim or nitrofurantoin.   - Consult Hepatology, appreciate recs  - MELD-Na labs daily   - Continue Lactulose 10g TID  - Continue Rifaximin 550mg BID + PRN to titrate to 3-4 loose stools per day   - Hydroxyzine 25-50mg PRN  for itching  - resume PTA lasix and spironolactone   - sertraline 75mg daily for pruritis   - PT/OT consult --> recommend TCU   - Wound Care consult for bed sore  MELD-Na score: 29 at 7/18/2019  7:24 AM  MELD score: 29 at 7/18/2019  7:24 AM  Calculated from:  Serum Creatinine: 0.91 mg/dL (Rounded to 1 mg/dL) at 7/18/2019  7:24 AM  Serum Sodium: 140 mmol/L (Rounded to 137 mmol/L) at 7/18/2019  7:24 AM  Total Bilirubin: 11.9 mg/dL at 7/18/2019  7:24 AM  INR(ratio): 3.40 at 7/18/2019  7:24 AM  Age: 44 years    # Hypokalemia  Possible etiologies likely d/t losses from both GI (lactulose, recent diarrhea, recent vomiting) and urine (diuretic use).  - potassium replacement protocol   - can send home with daily supplement based on needs here.      # Mild thrombocytopenia  Likely related to ESLD. Most recently platelets were [facility] discharge in 80s, currently 122.     # Alcohol use disorder:  Endorses binge drinking. Unclear on when last drink was but reports possibly sometime in May prior to her admission to [facility].  Possibly self-medicating for depression, PTSD, and prior opioid use disorder.     # Chronic pain: Has been off opioids for several years.  Pain controlled at this time      Diet: NPO per Anesthesia Guidelines for Procedure/Surgery Except for: Meds, Ice Chips  Snacks/Supplements Adult: Boost Shake; Between Meals    Fluids: s/p 1L fluids and 5% 50g albumin overnight   Lines: PIV x2   DVT Prophylaxis: Pneumatic Compression Devices  Alexander Catheter: in place, indication:    Code Status: Full Code      Disposition Plan   Expected discharge: 2 - 3 days, recommended to transitional care unit once hemoglobin stable and safe disposition plan/ TCU bed available.  Entered: Salo Schmidt 07/18/2019, 6:22 AM       The patient's care was discussed with the Attending Physician, Dr. Velazquez, Bedside Nurse, Care Coordinator/ and Patient.    Salo Horton 5 [facility]  "[facility]  Pager: [phone]  Please see sticky note for cross cover information  ______________________________________________________________________    Interval History   No acute events overnight. Feels like she is starting to retain water after IV fluids given overnight. No fever, chills, chest pain, shortness of breath. Also notes to nursing that urination feels \"not normal\" but no burning or pain. No new abdominal pain.     Data reviewed today: I reviewed all medications, new labs and imaging results over the last 24 hours.    Physical Exam   Vital Signs: Temp: 96.6  F (35.9  C) Temp src: Oral BP: 96/46 Pulse: 85 Heart Rate: 81 Resp: 16 SpO2: 98 % O2 Device: None (Room air)    Weight: 250 lbs 0 oz  General Appearance: Laying in bed, no acute distress, nontoxic   HEENT: scleral icterus, MMM, EOMI  Respiratory: no respiratory distress while laying flat, CTAB, no wheezes or crackles.   Cardiovascular: RRR, normal S1/S2, soft 2/6 systolic murmur   GI: +BS, soft, nontender, obese but not distended  Skin: jaundice, no new rashes   Other: Alert and oriented x3, fluent speech, no focal neuro deficits.      Internal Medicine Staff Addendum  Date of Service: 7/18/2019  I have seen and examined Ms. Bailey, reviewed the data and discussed the plan of care with the patient and the care team on P&FC Rounds.  I agree with the above documentation     I discussed pt's care with bedside RN, case management/social work today.  I personally reviewed labs, medications and past 24 hr notes.  Assessment/Plan/Diagnoses: plan/dx as above, which contains my edits and reflects our joint medical decision-making.     Herbert Velazquez MD  Internal Medicine/Pediatrics Hospitalist & Staff Physician   of Internal Medicine and Pediatrics  [facility]  Pager: [phone]  "

## 2019-07-18 NOTE — PROCEDURES
I was asked to evaluate pt for paracentesis.    Examined in detail.  Very small amounts of fluid seen; I do not believe that I could safely sample at bedside.  Discussed with medicine team.  Images saved in PACS.   Could consider discussion with IR whether fluid could be obtained there, depending on expected clinical utility to balance with risk.    I was asked to perform a focused abdominal ultrasound exclusively to determine whether paracentesis could be performed, and so evaluated areas most safe for paracentesis, and in which fluid reasonably likely to be present.   Interpretation of this exam is that insufficient fluid determined to be present in safe locations; team notified.  Interventional radiology referral can be considered if indicated.   Images saved in PACS.   Full report below:  Indication: ascites (suspected), new ascites.  Views: Hepatorenal: adequate,   Perisplenic: adequate,  Suprapubic: adequate  Findings:   Hepatorenal free fluid:trace  Perisplenic free fluid: trace  Suprapubic free fluid: trace  Interpretation: see above

## 2019-07-18 NOTE — PLAN OF CARE
Pt admitted with hypokalemia and anemia, currently resolved. BP soft on RA. Telemetry, NSR with prolonged QT. A&Ox4. Slow to respond. SBA. Up to commode or using bed pan. Voiding but unable to get a UA/UC d/t mixed urine. MD placed 1 time order for straight cath. BM x4 this shift. Wound present on coxxys, applied mepilex and barrier cream. Low Na diet, decreased appetite. Complaints of nausea, gave Zofran x2. Small emesis x1. Pt reported itching, gave Atarax x1. Worked with OT but PT.Plan to discharge to TCU once medically stable. SW following for new placement. Continue to monitor and follow the POC.

## 2019-07-18 NOTE — PLAN OF CARE
Discharge Planner OT   Patient plan for discharge: rehab; pt prefers to change rehab facilities   Current status: Pt needing encouragement for progression in OOB activity. Pt CGA for bed mobility and sit <> stands. Pt completed commode transfer and ambulating short distances in room with CGA and FWW. Pt needing min A for LB dressing and total A for radha cares. Pt fatigues quickly.   Barriers to return to prior living situation: medical status, level of assist for ADLs and functional mobility, undetermined living environment at discharge (pt vague and reports plans to move out of current living situation but unclear about place to transition to after discharge)   Recommendations for discharge: TCU   Rationale for recommendations: recommend continued OT intervention to address deficits in cognition, strength, balance, and endurance leading to decreased ADL I.        Entered by: Nicole Rossi 07/18/2019 3:19 PM

## 2019-07-18 NOTE — PLAN OF CARE
PT 5A: PT orders received. Pt declines therapy this morning, working with OT in PM. PT will reschedule and initiate services tomorrow.

## 2019-07-18 NOTE — PROGRESS NOTES
GASTROENTEROLOGY PROGRESS NOTE  Date of Service: 07/18/2019    ASSESSMENT:  45 yo F w/ h/o obesity s/p RNYGB with regain of weight, alcohol abuse, prior narcotic dependence, peripheral neuropathy, MARCELLUS, depression, recent ALI likely 2/2 DILI (Macrobid vs Bactrim), and protein calorie malnutrition, admitted with diarrhea, nausea, volume overload, and decompensated cirrhosis likely 2/2 EtOH (in setting of RNYGB) and NAFLD.     RECOMMENDATIONS:   - 2 gm sodium diet with protein supplementation with bedtime snack   - Resume home diuretics   - No contraindications to discharge from hepatology perspective    The patient was discussed and plan agreed upon with GI staff.    Nini Mendoza MD  Gastroenterology/Hepatology Fellow  PGY-6, [id number]  _______________________________________________________________  24 Hour Events: Nil    S: No acute events overnight. Patient denies fevers, chills. Has some abdominal pain after taking the potassium. No chest pain. Minimal shortness of breath.     O:  Blood pressure 115/56, pulse 85, temperature 95.8  F (35.4  C), temperature source Oral, resp. rate 16, weight 108.2 kg (238 lb 8 oz), last menstrual period 10/04/2018, SpO2 99 %.    Gen: Alert, NAD  HEENT: NC/AT, + scleral icterus  CV: RRR  Lungs: No murmur  Abd: Soft, NT  Skin: Jaundiced  MS: WWP  Neuro: AOx3; no asterixis    LABS:  BMP  Recent Labs   Lab 07/18/19  0724 07/17/19 2035 07/17/19  0549     --  141   POTASSIUM 3.4 3.7 3.1*   CHLORIDE 112*  --  110*   NARENDRA 8.2*  --  8.4*   CO2 20  --  18*   BUN 4*  --  4*   CR 0.91  --  0.86   *  --  96     CBC  Recent Labs   Lab 07/18/19  0724 07/17/19  1542 07/17/19  0549   WBC 8.0 7.7 7.6   RBC 2.39* 2.35* 2.08*   HGB 7.6* 7.5* 6.8*   HCT 24.5* 24.4* 21.8*   * 104* 105*   MCH 31.8 31.9 32.7   MCHC 31.0* 30.7* 31.2*   RDW 19.0* 18.5* 18.3*   * 122* 129*     INR  Recent Labs   Lab 07/18/19  0724 07/17/19  0209   INR 3.40* 3.27*     LFTs  Recent Labs   Lab  07/18/19  0724 07/17/19  0549   ALKPHOS 105 110   AST 62* 63*   ALT 20 23   BILITOTAL 11.9* 12.3*   PROTTOTAL 5.4* 5.5*   ALBUMIN 2.7* 2.6*      PANCNo lab results found in last 7 days.    MELD-Na score: 29 at 7/18/2019  7:24 AM  MELD score: 29 at 7/18/2019  7:24 AM  Calculated from:  Serum Creatinine: 0.91 mg/dL (Rounded to 1 mg/dL) at 7/18/2019  7:24 AM  Serum Sodium: 140 mmol/L (Rounded to 137 mmol/L) at 7/18/2019  7:24 AM  Total Bilirubin: 11.9 mg/dL at 7/18/2019  7:24 AM  INR(ratio): 3.40 at 7/18/2019  7:24 AM  Age: 44 years

## 2019-07-18 NOTE — PLAN OF CARE
Patient alert and oriented x 4. Vitals stable on room air. Slow to respond. Tele monitoring NSR. Up with assist of two to commode to void. Had one medium stool this shift. Open areas on bottom cleaned with wound spray, new mepilex applied. Skin jaundiced, sclera yellow. Gave PRN Atarax and Zofran for itching and nausea. No emesis this shift.   Patient NPO for abdominal US today. Lactic Acid 2.9. K 3.7. Albumin given as ordered.

## 2019-07-19 NOTE — PROGRESS NOTES
[facility]    Hepatology Follow-up    CC: Nausea, vomiting, hypokalemia, elevated liver enzymes    Dx: DILI 2/2 Bactrim vs Macrobid              Alcoholic liver cirrhosis              NAFLD              Malnutrition               Hypokalemia due to excessive K lose     24 hour events:    No acute events     Subjective:    Patient is nauseous, but no vomiting, No acute abdominal pain except for her RUQ chronic pain. No melena or hematochezia, no changes in bowel habits. No fevers, sweats or chills. Patient stated that she feels better today and that she has been able to walk more outside her room and going to the bathroom independently.        Medications  Current Facility-Administered Medications   Medication Dose Route Frequency     ertapenem (INVanz) IV  1 g Intravenous Q24H     furosemide  20 mg Oral Daily     lactulose  10 g Oral TID     melatonin  3 mg Oral At Bedtime     midodrine  12.5 mg Oral TID w/meals     pantoprazole  40 mg Oral QAM AC     polyethylene glycol  17 g Oral Daily     rifaximin  550 mg Oral BID     sertraline  75 mg Oral Daily     sodium chloride (PF)  3 mL Intracatheter Q8H     spironolactone  12.5 mg Oral Daily     cholecalciferol  5,000 Units Oral Daily       Review of systems  A 10-point review of systems was negative except for itching, but patient stated it is getting better.     Examination  /65 (BP Location: Right arm)   Pulse 99   Temp 97.7  F (36.5  C) (Oral)   Resp 16   Wt 108.2 kg (238 lb 8 oz)   LMP 10/04/2018   SpO2 96%   BMI 36.26 kg/m      Intake/Output Summary (Last 24 hours) at 7/19/2019 1329  Last data filed at 7/19/2019 0518  Gross per 24 hour   Intake 240 ml   Output 100 ml   Net 140 ml         Gen- well, NAD, A+Ox3,   HEENT: PERRL, Icteric sclera  CVS- RRR  RS- CTA  Abd- soft, non distended, RUQ chronic pain, +BS  Extr- no edema,   Neuro- no clonus no astrexsasis    Skin- no rash, jaundice  Psych- normal mood      Laboratory  Lab  Results   Component Value Date     07/19/2019    POTASSIUM 3.4 07/19/2019    CHLORIDE 111 07/19/2019    CO2 18 07/19/2019    BUN 4 07/19/2019    CR 0.85 07/19/2019       Lab Results   Component Value Date    BILITOTAL 12.3 07/19/2019    ALT 21 07/19/2019    AST 56 07/19/2019    ALKPHOS 101 07/19/2019       Lab Results   Component Value Date    WBC 8.0 07/18/2019    HGB 7.6 07/18/2019     07/18/2019     07/18/2019       Lab Results   Component Value Date    INR 3.09 07/19/2019         Radiology Reviewed       Assessment  44 year old female patient with  Past medical history of RYNGB, alcoholic liver cirrhosis c/w HE, portal hypertension, malnutrition with recent admission to OSH due to UTI s/p bactrim+Macrobid s/p acute liver injury 2/2 DILI, was admitted to [facility] due to nausea, vomiting diarrhea and hypokalemia. Patient is doing fine. She had a stable liver enzymes and toleration PO intake. She is still nauseated. No signs of upper or lower GI bleeding. Patient had a stable but low Hgb level around 7.6. Responding to PO replacement of K.     Recommendations  - Restricted sodium diet of 2 gm /day with with protein supplemented diet and late bedtime snacks to prevent ammonia build up.    - Continue on current diuretic therapy Lasix 20 mg PO 1 tab Daily                                                                Spironolactone 12.5 mg PO 1 tab Daily     - GI/Hepatology will sign off. Patient can be discharged home from a GI prospective.  - Follow up with Dr. Leventhal in 2 months. Patient will need blood tests drawen before her appointment, CBC, CMP. INR      Jose Nash MD    PGY1  Hepatology  [id number]    The patient was seen and examined.  The above assessment and plan was developed jointly with the resident and the fellow.      Mickey Bazan MD

## 2019-07-19 NOTE — PROGRESS NOTES
07/19/19 1000   Quick Adds   Type of Visit Initial PT Evaluation       Present no   Living Environment   Living Arrangements extended care facility   Home Accessibility no concerns   Transportation Anticipated family or friend will provide   Living Environment Comment Pt from TCU prior to admission, pt was at TCU for only a short time. 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 Pt's lease is currently up. As of right now, pt does not have a home option, but is working on figuring that out.    Self-Care   Usual Activity Tolerance good   Current Activity Tolerance fair   Regular Exercise No   Equipment Currently Used at Home none   Activity/Exercise/Self-Care Comment Pt IND with all mobility prior to many hospitalizations.    Functional Level Prior   Ambulation 0-->independent   Transferring 0-->independent   Toileting 0-->independent   Bathing 0-->independent   Communication 0-->understands/communicates without difficulty   Fall history within last six months yes   Number of times patient has fallen within last six months 2   Which of the above functional risks had a recent onset or change? ambulation;transferring;toileting   Prior Functional Level Comment Prior to admission, pt was IND with all mobility and cares. Prior to current admission, pt was at TCU and recieving A for all cares and mobility. Pt was using FWW at TCU.    General Information   Onset of Illness/Injury or Date of Surgery - Date 07/17/19   Referring Physician Mac Fuentes MD    Patient/Family Goals Statement to get stronger and return to PLOF   Pertinent History of Current Problem (include personal factors and/or comorbidities that impact the POC) 44 year old female with PMH significant for ESLD of unclear cause (likely combination of chronic liver disease, alcohol injury, and possible medication injury),  admitted on 7/17/2019 following a few days of generalized fatigue, one day of nausea, and vomiting, transferred to [facility] from her rehab facility because of abnormal labs drawn there (low hemoglobin and potassium - 6.5 and 2.6 , respectively).   Precautions/Limitations no known precautions/limitations   Heart Disease Risk Factors Lack of physical activity;Overweight   General Observations Pt alert and pleasent throughout evaluation and treatment. Motivated to get stronger   General Info Comments Activity order: up with assist   Cognitive Status Examination   Orientation orientation to person, place and time   Level of Consciousness alert   Follows Commands and Answers Questions 100% of the time   Personal Safety and Judgment intact   Memory intact   Cognitive Comment Pt alert and responds to questions appropriately and follows directions correctly   Pain Assessment   Patient Currently in Pain No   Integumentary/Edema   Integumentary/Edema Comments Swelling of LE   Posture    Posture Forward head position;Protracted shoulders   Range of Motion (ROM)   ROM Comment WFL per mobility   Strength   Strength Comments >3/5 LE MMT per mobility   Bed Mobility   Bed Mobility Comments Pt performs bed mobility SBA   Transfer Skills   Transfer Comments Pt performs functional transfers SBA   Gait   Gait Comments Pt ambulated 50ft with FWW CGA, overall slow gait speed, fatigued quickly   Balance   Balance no deficits were identified   Sensory Examination   Sensory Perception no deficits were identified   Coordination   Coordination no deficits were identified   Muscle Tone   Muscle Tone no deficits were identified   General Therapy Interventions   Planned Therapy Interventions gait training;strengthening;transfer training;risk factor education;home program guidelines;progressive activity/exercise;balance training   Clinical Impression   Criteria for Skilled Therapeutic Intervention yes, treatment indicated   PT Diagnosis impaired  "mobility   Influenced by the following impairments deconditioning, weakness   Functional limitations due to impairments ambulation, transfering   Clinical Presentation Stable/Uncomplicated   Clinical Presentation Rationale Clinical judgement and chart review   Clinical Decision Making (Complexity) Low complexity   Therapy Frequency 5x/week   Predicted Duration of Therapy Intervention (days/wks) 2 weeks   Anticipated Discharge Disposition Transitional Care Facility   Risk & Benefits of therapy have been explained Yes   Patient, Family & other staff in agreement with plan of care Yes   Clinical Impression Comments Pt presents with impaired mobility secondary to weakness and deconditioning. Treatment indicated to address impairments   [facility]-PAC TM \"6 Clicks\"   2016, Trustees of [facility], under license to [employer].  All rights reserved.   6 Clicks Short Forms Basic Mobility Inpatient Short Form   [facility] AM-PAC  \"6 Clicks\" V.2 Basic Mobility Inpatient Short Form   1. Turning from your back to your side while in a flat bed without using bedrails? 3 - A Little   2. Moving from lying on your back to sitting on the side of a flat bed without using bedrails? 3 - A Little   3. Moving to and from a bed to a chair (including a wheelchair)? 4 - None   4. Standing up from a chair using your arms (e.g., wheelchair, or bedside chair)? 4 - None   5. To walk in hospital room? 3 - A Little   6. Climbing 3-5 steps with a railing? 2 - A Lot   Basic Mobility Raw Score (Score out of 24.Lower scores equate to lower levels of function) 19   Total Evaluation Time   Total Evaluation Time (Minutes) 5      "

## 2019-07-19 NOTE — PROGRESS NOTES
[facility] [place]    Progress Note - Clifford 5 Service   Date of Admission:  7/17/2019    Assessment & Plan   Neema Bailey is a 44 year old female with PMH significant for ESLD of unclear cause (likely combination of chronic liver disease, alcohol injury, and possible medication injury), admitted on 7/17/2019 following a few days of generalized fatigue, one day of nausea, and vomiting, transferred to [facility] from her rehab facility because of abnormal labs drawn there (low hemoglobin and potassium - 6.5 and 2.6 , respectively).     Changes today:   - Started ertapenem for dirty UA and recent h/o ESBL e.coli uti ; awaiting Ucx   - recheck lactate in AM        # Uncomplicated cystitis   Dysuria with UA 68 WBC and large LE. Recent ESBL E. Coli UTI. Urine culture pending. Possible cause of nausea, abdominal pain.  - start ertapenem while awaiting urine culture     # Acute on chronic anemia - stable   Patient is not aware of any bleeding - denies any recent bleeding/trauma, no blood in emesis, stool, or urine. Does not note any new/worse bruising. Numerous abdominal varices noted on CT C/A/P from 6/25/19 (in Care Everywhere). Most recent hgb was 6.9 from 7/5, when patient was discharged from [facility]. Hgb on admission 6.8 s/p 1 unit pRBCs with appropriate Hgb improvement. No signs of bleeding    # ESLD (EtOH vs drug induced)  # H/o hepatic encephalopathy   Most recent MELD score of 32 obtained prior discharge from [facility] on 7/5 (was admitted there between 6/6/19 and 7/5/19 for sepsis, DEE DEE on CKD, encephalopathy). MELD here is 29. Numerous abdominal varices noted on CT C/A/P from 6/25/19. Liver biopsy on 5/10/18 c/w possible alcohol injury. S/p prednisolone trial during 6/6 [facility] admission x2 (no response). History and biopsy reviewed by Dr. Leventhal here, liver injury seems more consistent with drug-induced either from Bactrim or nitrofurantoin.    - Consult Hepatology, appreciate recs  - MELD-Na labs daily   - Continue Lactulose 10g TID  - Continue Rifaximin 550mg BID + PRN to titrate to 3-4 loose stools per day   - Hydroxyzine 25-50mg PRN for itching  - resume PTA lasix and spironolactone   - sertraline 75mg daily for pruritis   - Follow up with Dr. Leventhal in 2 months with CBC, CMP and INR  - PT/OT consult --> recommend TCU   - Wound Care consult for bed sore  MELD-Na score: 29 at 7/19/2019  7:41 AM  MELD score: 29 at 7/19/2019  7:41 AM  Calculated from:  Serum Creatinine: 0.85 mg/dL (Rounded to 1 mg/dL) at 7/19/2019  7:41 AM  Serum Sodium: 140 mmol/L (Rounded to 137 mmol/L) at 7/19/2019  7:41 AM  Total Bilirubin: 12.3 mg/dL at 7/19/2019  7:41 AM  INR(ratio): 3.09 at 7/19/2019  7:41 AM  Age: 44 years    # Hypokalemia  Possible etiologies likely d/t losses from both GI (lactulose, recent diarrhea, recent vomiting) and urine (diuretic use).  - potassium replacement protocol   - can send home with daily supplement based on needs here.      # Mild thrombocytopenia  Likely related to ESLD. Most recently platelets were [facility] discharge in 80s, currently 120-130.     # Alcohol use disorder:  Endorses binge drinking. Unclear on when last drink was but reports possibly sometime in May prior to her admission to [facility].  Possibly self-medicating for depression, PTSD, and prior opioid use disorder.     # Chronic pain: Has been off opioids for several years.  Pain controlled at this time      Diet: Snacks/Supplements Adult: Boost Shake; Between Meals  Combination Diet High Kcal/High Protein Diet, ADULT; 2 gm NA Diet  Calorie Counts    Fluids: PO intake   Lines: PIV x2   DVT Prophylaxis: Pneumatic Compression Devices  Alexander Catheter: in place, indication:    Code Status: Full Code      Disposition Plan   Expected discharge: 2 - 3 days, recommended to transitional care unit once hemoglobin stable and safe disposition plan/ TCU bed available.  Entered:  Salo Schmidt 07/19/2019, 4:25 PM       The patient's care was discussed with the Attending Physician, Dr. Bowles, Bedside Nurse, Care Coordinator/ and Patient.    Salo Roxana Horton 5 Service  [facility], [place]  Pager: [phone]  Please see sticky note for cross cover information  ______________________________________________________________________    Interval History   No acute events overnight. Reports pain with urination otherwise feeling well. Still has poor appetite. SW looking for other TCUs but patient not opposed to returning to previous TCU at discharge.     Data reviewed today: I reviewed all medications, new labs and imaging results over the last 24 hours.    Physical Exam   Vital Signs: Temp: 96.7  F (35.9  C) Temp src: Oral BP: 125/68 Pulse: 99 Heart Rate: 94 Resp: 16 SpO2: 95 % O2 Device: None (Room air)    Weight: 238 lbs 8 oz  General Appearance: Laying in bed, no acute distress, nontoxic   HEENT: scleral icterus, MMM, EOMI  Respiratory: no respiratory distress, CTAB, no wheezes or crackles.   Cardiovascular: RRR, normal S1/S2, soft 2/6 systolic murmur; 2+ LE edema   GI: +BS, soft, nontender, obese but not distended  Skin: jaundice, no new rashes   Other: Alert and oriented x3, fluent speech, no focal neuro deficits.

## 2019-07-19 NOTE — PLAN OF CARE
Patient alert and oriented. Slow to respond. Vitals stable on room air. Up with assist to BSC. Patient denied pain. Zofran given for nausea no emesis. Open areas on bottom cleaned with wound spray. Mepilex applied. Urine sample sent. No BM this shift. Continue to monitor and follow plan of care.

## 2019-07-19 NOTE — PROGRESS NOTES
Social Work Services Progress Note     Hospital Day: 3  Collaborated with:  Patient, primary team Clifford Gudino     Data:  Pt is a 44-year-old female admitted to [facility] 7/17/19. Pt may be medically stable for discharge tomorrow. TCU recommended.     Intervention:  Met with pt to discuss discharge plan. Pt understands recommendation for TCU and agreeable with this, but states nursing home setting was not good for her mental health. Pt feels she gets good therapy in the hospital but that in her previous TCU she did not receive frequent therapy and got bed sores. Discussed potential option of [facility] as this is not a nursing home setting. Pt interested in this and agreeable with referral. Also provided pt with list of TCUs in [facility] and [facility] (where pt's dtr lives). Asked pt to select any other TCUs she might be interested in.    Contacted  rehab liaison Denise with referral.     Assessment:  Pursuing TCU placement     Plan:    Anticipated Disposition:  Facility:  TCU    Barriers to d/c plan:  Medical stability    Follow Up:  SW to follow for discharge planning     SHERRY New, [facility]  5th Floor   Pager [phone]  Phone [phone]    Addendum 3:16pm: Per  rehab liaison Denise, pt appears appropriate for TCU, only concern is where pt will discharge to after TCU stay. 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 Pt's family appears to be involved and supportive. Attempted to meet with pt to update her but pt sleeping.

## 2019-07-19 NOTE — PLAN OF CARE
PT 5A: Evaluation completed and treatment initiated.   Discharge Planner PT   Patient plan for discharge: TCU or home with HHPT  Current status: Pt SBA for bed mobility and functional transfers with FWW. Ambulated 50ft with FWW CGA, pt fatigued very quickly. Pt engaged in seated LE strengthening. Pt expressed concerns of prior TCU placement. Pt is considering living with mother or friend. Therefore, home set up is unknown at this point.   Barriers to return to prior living situation: home setup/location is unknown, medical status, weakness, deconditioning   Recommendations for discharge: TCU  Rationale for recommendations: Pt will benefit from continued PT intervention in order to address weakness and deconditioning and return to PLOF.        Entered by: Huma Camilo 07/19/2019 11:08 AM

## 2019-07-19 NOTE — PLAN OF CARE
/65 (BP Location: Right arm)   Pulse 99   Temp 97.7  F (36.5  C) (Oral)   Resp 16   Wt 108.2 kg (238 lb 8 oz)   LMP 10/04/2018   SpO2 96%   BMI 36.26 kg/m      Time 8115-3869      Reason for admission: Hypokalemia, End stage liver disease,Anemia  Vitals: VSS on RA   Activity: Up w/assist 1    Pain: Denies pain  Neuro: A&Ox4, speech logical  Mood/Behavior: calm, cooperative  Cardiac: RRR, no murmurs, Tele- NSR  Respiratory: LS clear  GI/: BM x3 this shift, voiding without difficulty  Diet: 2g Na; High calorie/protein  Lines: PIV-TKO   Skin: Wounds on sacrum/coccyx, mepilex dressing to sacrum  Labs/imaging: Hgb 7.6; K 3.4; Lactic 2.3      New changes this shift: Pt started on Invanz IV for a dirty UA. PRN PO zofran given x2 and atarax x1. Lactic recheck in am. Per SW note, [facility] is considering patient.       Plan: Continue to monitor and follow POC.

## 2019-07-20 NOTE — PROGRESS NOTES
[facility], [place]    Progress Note - Clifford 5 Service   Date of Admission:  7/17/2019    Assessment & Plan   Neema Bailey is a 44 year old female with PMH significant for ESLD of unclear cause (likely combination of chronic liver disease, alcohol injury, and possible medication injury), admitted on 7/17/2019 following a few days of generalized fatigue, one day of nausea, and vomiting, transferred to [facility] from her rehab facility because of abnormal labs drawn there (low hemoglobin and potassium - 6.5 and 2.6 , respectively).     Changes today:   Waiting on TCU options  awaiting urine culture results    # Uncomplicated cystitis   Dysuria with UA 68 WBC and large LE. Recent ESBL E. Coli UTI. Urine culture pending. Possible cause of nausea, abdominal pain.  - 9/19 started on ertapenem due to recent ESBL infection, while awaiting urine culture     # Acute on chronic anemia - stable   Patient is not aware of any bleeding - denies any recent bleeding/trauma, Hgb on admission 6.8 s/p 1 unit pRBCs with appropriate Hgb improvement. No signs of bleeding    # ESLD (EtOH vs drug induced)  # H/o hepatic encephalopathy   Most recent MELD score of 32 obtained prior discharge from [facility] on 7/5 (was admitted there between 6/6/19 and 7/5/19 for sepsis, DEE DEE on CKD, encephalopathy). MELD here is 29. Numerous abdominal varices noted on CT C/A/P from 6/25/19. Liver biopsy on 5/10/18 c/w possible alcohol injury. S/p prednisolone trial during 6/6 [facility] admission x2 (no response). History and biopsy reviewed by Dr. Leventhal here, liver injury seems more consistent with drug-induced either from Bactrim or nitrofurantoin.   - Consult Hepatology, appreciate recs  - MELD-Na labs daily   - Continue Lactulose 10g TID  - Continue Rifaximin 550mg BID + PRN to titrate to 3-4 loose stools per day   - Hydroxyzine 25-50mg PRN for itching  - resume PTA lasix and spironolactone   - sertraline  75mg daily for pruritis   - Follow up with Dr. Leventhal in 2 months with CBC, CMP and INR  - PT/OT consult --> recommend TCU   - Wound Care consult for bed sore  MELD-Na score: 30 at 7/20/2019  7:57 AM  MELD score: 30 at 7/20/2019  7:57 AM  Calculated from:  Serum Creatinine: 0.96 mg/dL (Rounded to 1 mg/dL) at 7/20/2019  7:57 AM  Serum Sodium: 141 mmol/L (Rounded to 137 mmol/L) at 7/20/2019  7:57 AM  Total Bilirubin: 12.0 mg/dL at 7/20/2019  7:57 AM  INR(ratio): 3.38 at 7/20/2019  7:57 AM  Age: 44 years    # Hypokalemia  Possible etiologies likely d/t losses from both GI (lactulose, recent diarrhea, recent vomiting) and urine (diuretic use).  - potassium replacement protocol   - can send home with daily supplement based on needs here.      # Mild thrombocytopenia  Likely related to ESLD. Most recently platelets were [facility] discharge in 80s, currently 120-130.     # Alcohol use disorder:  Endorses binge drinking. Unclear on when last drink was but reports possibly sometime in May prior to her admission to [facility].  Possibly self-medicating for depression, PTSD, and prior opioid use disorder.  - consider baclofen, gabapentin as an outpatient, if cravings return  - cold also consider naltrexone, oral vs IM, if cravings return     # Chronic pain: Has been off opioids for several years.  Pain controlled at this time.  -      Diet: Snacks/Supplements Adult: Boost Shake; Between Meals  Combination Diet High Kcal/High Protein Diet, ADULT; 2 gm NA Diet  Calorie Counts    Fluids: PO intake   Lines: PIV x2   DVT Prophylaxis: Pneumatic Compression Devices  Alexander Catheter: in place, indication:    Code Status: Full Code      Disposition Plan   Expected discharge: 2 - 3 days, recommended to transitional care unit once hemoglobin stable and safe disposition plan/ TCU bed available.  Entered: Lula Mcnally       The patient's care was discussed with the Attending Physician, Bedside Nurse, Care Coordinator/Social  Worker and Patient.    Salo Horton 5 Service  [facility], [place]  Pager: [phone]  Please see sticky note for cross cover information  ______________________________________________________________________    Interval History   No acute events overnight.  Still has poor appetite. SW looking for other TCUs but patient not opposed to returning to previous TCU at discharge.     Data reviewed today: I reviewed all medications, new labs and imaging results over the last 24 hours.    Physical Exam   Vital Signs: Temp: 97.6  F (36.4  C) Temp src: Oral BP: 126/71 Pulse: 99 Heart Rate: 89 Resp: 16 SpO2: 97 % O2 Device: None (Room air)    Weight: 238 lbs 8 oz  General Appearance: Laying in bed, no acute distress, nontoxic   HEENT: scleral icterus, MMM, EOMI  Respiratory: no respiratory distress, CTAB, no wheezes or crackles.   Cardiovascular: RRR, normal S1/S2, soft 2/6 systolic murmur; 2+ LE edema   GI: +BS, soft, nontender, obese but not distended  Skin: jaundice, no new rashes   Other: Alert and oriented x3, fluent speech, no focal neuro deficits.      Internal Medicine Staff Addendum  Date of Service: 7/20/2019  I have seen and examined Ms. Bailey, reviewed the data and discussed the plan of care with the patient and the care team on P&FC Rounds.  I agree with the above documentation     I discussed pt's care with bedside RN, case management/social work today.  I personally reviewed labs, medications and past 24 hr notes.  Assessment/Plan/Diagnoses: plan/dx as above, which contains my edits and reflects our joint medical decision-making.     Herbert Velazquez MD  Internal Medicine/Pediatrics Hospitalist & Staff Physician   of Internal Medicine and Pediatrics  [facility]  Pager: [phone]

## 2019-07-20 NOTE — PLAN OF CARE
Discharge Planner OT   Patient plan for discharge: TCU  Current status: Progressing well, tolerates transfer bed>commode>recliner with CGA and FWW. Min A bathing LB, otherwise completes UB bathing and g/h tasks from seated with set up. Limited activity tolerance and SOB which impair standing ADL ability.   Barriers to return to prior living situation: weakness, deconditioning, level of assist  Recommendations for discharge: TCU  Rationale for recommendations: to progress IND with ADLS and IADLS       Entered by: Carley Courtney 07/20/2019 10:41 AM

## 2019-07-20 NOTE — PLAN OF CARE
/52 (BP Location: Left arm)   Pulse 92   Temp 96.4  F (35.8  C) (Oral)   Resp 17   Wt 108.2 kg (238 lb 8 oz)   LMP 10/04/2018   SpO2 95%   BMI 36.26 kg/m      Time 9702-2398      Reason for admission: Hypokalemia, End stage liver disease   Vitals: VSS on RA  Activity: Up  w/assist 1  Pain: Denies pain  Neuro: A&Ox4, speech logical  Mood/Behavior: calm, cooperative  Cardiac: RRR, no murmurs  Respiratory: LS clear  GI/: BM x1, voiding without difficulty  Diet: 2g Na/High calorie-protein/Calorie counts  Lines: PIV- SL  Skin: Wounds on sacrum and coccyx      New changes this shift: Pt washed up with OT and has been in recliner most of the shift. SW informed pt that [facility] has accepted her which lifted her spirits. Appetite has been a little better today.      Plan: Continue to monitor and follow POC.

## 2019-07-20 NOTE — PROGRESS NOTES
Social Work Services Progress Note    Hospital Day: 4  Date of Initial Social Work Evaluation:  Not completed   Collaborated with:  Patient, Geoffrey at  TCU     Data:  SW following for discharge placement. [facility]. She came from a TCU in [place] and does not want to go back there. Per weekday SW note,  TCU can take her on the condition that she confirm her post-TCU discharge plan. Pt told SW yesterday that she plans to stay with her mom or with a friend.    Intervention:  SW spoke with Geoffrey at [facility], who asked writer to confirm this is still pt's plan. Pt confirmed this with SW. States that she is also looking for her own apartment and that is her permament housing plan, but plans to stay with her mom or her friend after discharging from the TCU.     SW updated Geoffrey. Pt will likely be ready for discharge tomorrow or Monday, per RN.     Plan:    Anticipated Disposition:  Facility:  [facility]    Barriers to d/c plan:  None     Follow Up:  SW will continue to follow     Eryn POWELL LICSW  7/20/2019    Text paging available through [employer] on [employer] - search SOCIAL WORK    ON CALL PAGER   0800 - 1600   [phone]    ON CALL COVERAGE AFTER 1600  [phone]

## 2019-07-20 NOTE — PROGRESS NOTES
The patient was seen and examined.  The above assessment and plan was developed jointly with the resident and the fellow.      Mickey Bazan MD

## 2019-07-20 NOTE — PLAN OF CARE
Time: 1900-0730      Reason for admission: Hypokalemia (admitted 07/17)  Vitals: Vital signs:  Temp: 96.9  F (36.1  C) Temp src: Oral BP: 112/57 Pulse: 99 Heart Rate: 75 Resp: 16 SpO2: 95 % O2 Device: None (Room air)     Activity: 1 assist  Pain: C/o pain in abdomen- hot pad applied  Neuro: A/o x4  Cardiac: Tele- NSR  Respiratory: WDL  GI/: Up to bedside commode, incontinent of stool x1 overnight  Diet: 2g Na;  High bright high protein, Poor appetite  Lines: R PIV  Skin/Wounds: Wounds on sacrum/coccyx, meplex dressing CDI  Labs/Imaging: Hgb 7.6 K 3.4 Lactic 2.3      New changes this shift: Pt complaining about discomfort in abdomen, heating pad applied with some relief. Incontinence of stool x1 overnight. Pt states she is anxious about leaving- does not want to go back to prior living facility    Plan: [facility] considering patient for placement.     Continue to monitor and follow POC.

## 2019-07-21 PROBLEM — R53.81 PHYSICAL DECONDITIONING: Status: ACTIVE | Noted: 2019-01-01

## 2019-07-21 NOTE — DISCHARGE SUMMARY
[facility], [place]  Hospitalist Discharge Summary       Date of Admission:  7/17/2019  Date of Discharge:  7/21/2019  Discharging Provider: Herbert Velazquez MD  Discharge Team: Hospitalist Service, [place] 5    Discharge Diagnoses   DILI 2/2 Bactrim vs Macrobid  Alcoholic liver cirrhosis  NAFLD  Malnutrition   Hypokalemia due to excessive K loss  Acute on chronic anemia, secondary to chronic liver disease and malnutrition  Uncomplicated cystitis   Alcohol use disorder, in remission  Compression ulcer    To do Upon TCU discharge:  - consider nutrition consult at TCU  - Follow up with Dr. Leventhal in 2 months with CBC, CMP and INR  - low threshold to refer to outpatient chem dep upon discharge home  - low threshold to refer her to palliative care clinic for social work support    Follow-ups Needed After Discharge   Follow-up Appointments     Follow Up ([facility])      1. Follow up with primary care provider, Suresh Shabazz, within 14 days   for hospital follow- up.      2. Follow up in Hepatology clinic with Dr. Leventhal in 2 months. Patient   will need blood tests drawen before her appointment, CBC, CMP. INR  3. If interested in discussing alcohol cravings, Patient to follow up with   Dr. Herbert Velazquez at:  [facility] ([facility])  [address] [address]  Main number: [phone]       Appointments on [place] and/or [facility] (with [facility] or [facility]   provider or service). Call [phone] if you haven't heard regarding   these appointments within 7 days of discharge.             Unresulted Labs Ordered in the Past 30 Days of this Admission     Date and Time Order Name Status Description    7/18/2019 1600 Lactic acid whole blood In process     7/17/2019 1350 Blood culture Preliminary     7/17/2019 1350 Blood culture Preliminary         Discharge Disposition   Discharged to short-term care facility  Condition at discharge:  Stable    Hospital Course   Neema Bailey is a 44 year old female with PMH significant for ESLD of unclear cause (likely combination of chronic liver disease, alcohol injury, and possible medication injury), admitted on 7/17/2019 following a few days of generalized fatigue, one day of nausea, and vomiting, transferred to [facility] from her rehab facility because of abnormal labs drawn there (low hemoglobin and potassium - 6.5 and 2.6 , respectively).  No signs of bleeding during the hospital stay.     # Acute on chronic anemia -   Does not note any new/bleeding. Numerous abdominal varices noted on CT C/A/P from 6/25/19 (in Care Everywhere). Most recent hgb PTA was 6.9 from 7/5, when patient was discharged from [facility]. Hgb on admission 6.8 s/p 1 unit pRBCs with appropriate Hgb improvement. No signs of bleeding throughout the hospital stay.  Likely new baseline is low 7s, due to chronic liver disease and nutrition issues.  - The day of discharge, had a hgb of 6.9 that increased to 7.6 without interventions.     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 MELD here is 29. Numerous abdominal varices noted on CT C/A/P from 6/25/19. Liver biopsy on 5/10/19 c/w possible alcohol injury per pathologist read, but History and biopsy reviewed by Dr. Leventhal here, liver injury seems more consistent with drug-induced either from Bactrim or nitrofurantoin. S/p prednisolone trial during 6/6 [facility] admission x2 (no response).   - Consulted Hepatology, appreciate recs, will hopefully have some improvement in liver function over time.  Not currently a transplant candidate, but can be reconsidered over time.  Low threshold to offer  chem dep treatment once discharging from TCU.  - Continue Lactulose 10g TID  - Continue Rifaximin 550mg BID + PRN to titrate to 3-4 loose stools per day   - Hydroxyzine 25-50mg PRN for itching  - resumed PTA lasix and spironolactone   - sertraline 75mg daily for pruritis   - Follow up with Dr. Leventhal in 2 months with CBC, CMP and INR  - PT/OT consult --> recommend TCU   - Wound Care consulted for bed sore       # Hypokalemia  Possible etiologies likely d/t losses from both GI (lactulose, recent diarrhea, recent vomiting) and urine (diuretic use).  - potassium replacement protocol   - can send home with daily supplement based on needs here,, although with spironolactone restarted could manage this on its own     # Uncomplicated cystitis   Dysuria with UA 68 WBC and large LE. Recent ESBL E. Coli UTI. Urine culture now negative. Possible cause of nausea, abdominal pain.  - 7/19 started on ertapenem due to recent ESBL infection, d/tony with negative culture 7/21 after 3 days of treatment; resolved    # Mild thrombocytopenia  Likely related to ESLD. Most recently platelets were [facility] discharge in 80s, currently 120-130.     # Alcohol use disorder:  Endorses binge drinking. Unclear on when last drink was but reports possibly sometime in May prior to her admission to [facility].  Possibly self-medicating for depression, PTSD, and prior opioid use disorder.  -She reports she had an ACD done while at [facility], will try to get that into record here if she's going to be seeking care here.  - consider baclofen, gabapentin as an outpatient, if cravings return  - cold also consider naltrexone, oral vs IM, if cravings return     # Chronic pain: Has been off opioids for several years.  Pain controlled at this time.        Diet: Snacks/Supplements Adult: Boost Shake; Between Meals  Combination Diet High Kcal/High Protein Diet, ADULT; 2 gm NA Diet    Consultations This Hospital Stay   GI HEPATOLOGY ADULT IP  CONSULT  NUTRITION SERVICES ADULT IP CONSULT  PHYSICAL THERAPY ADULT IP CONSULT  OCCUPATIONAL THERAPY ADULT IP CONSULT  PALLIATIVE CARE ADULT IP CONSULT  WOUND OSTOMY CONTINENCE NURSE  IP CONSULT  INTERNAL MEDICINE PROCEDURE TEAM ADULT IP CONSULT [facility]  MEDICATION HISTORY IP PHARMACY CONSULT  VASCULAR ACCESS CARE ADULT IP CONSULT  PHYSICAL THERAPY ADULT IP CONSULT  OCCUPATIONAL THERAPY ADULT IP CONSULT    Code Status   Full Code    Time Spent on this Encounter   Herbert CHO, personally saw the patient today and spent greater than 30 minutes discharging this patient.       Herbert Velazquez MD  [facility], [place]  ______________________________________________________________________    Physical Exam   Vital Signs: Temp: 97.3  F (36.3  C) Temp src: Oral BP: 119/60 Pulse: 97 Heart Rate: 97 Resp: 18 SpO2: 96 % O2 Device: None (Room air)    Weight: 238 lbs 8 oz  General Appearance:      sitting in chair, no acute distress, nontoxic   HEENT: scleral icterus, MMM, EOMI  Respiratory: no respiratory distress, CTAB, no wheezes or crackles.   Cardiovascular: RRR, normal S1/S2, soft 2/6 systolic murmur; 2+ LE edema   GI: +BS, soft, nontender, obese but not distended  Skin: jaundice, no new rashes   Other:   Alert and oriented x3, fluent speech, no focal neuro deficits.         Primary Care Physician   Suresh Shabazz    Discharge Orders      AntiEmbolism Stockings    Bilateral below knee length.On in the morning, off at night     General info for SNF    Length of Stay Estimate: Short Term Care: Estimated # of Days <30  Condition at Discharge: Improving  Level of care:skilled   Rehabilitation Potential: Excellent  Admission H&P remains valid and up-to-date: Yes  Recent Chemotherapy: N/A  Use Nursing Home Standing Orders: Yes     Mantoux instructions    Give two-step Mantoux (PPD) Per Facility Policy Yes     Reason for your hospital stay    Dear Neema Bailey    Your were  hospitalized at [facility] with abnormal labs (low hemoglobin and potassium) and treated with replacement of both. Your potassium was likely low because of the lasix diuretic medication you are on. While your hemoglobin was low, it was actually stable from your discharge from [facility], we gave you a unit of blood and it has remained stable.  Over your hospitalization your labs have improved and today you are ready to be discharged.  If you continue to abstain from alcohol, work on losing weight, taking your medications and working with physical/occupational therapy you should continue to improve but if you develop fever, shortness of breath, light headedness, chest pain or other new symptoms please seek medical attention.    We are suggesting the following medication changes:  - start taking 20mEq of potassium daily  - Sertraline 75mg daily to help with itching   - zofran as needed for nausea     Please get the following tests done:  - BMP in 5-7 days to monitor your potassium   - CBC in 5-7 days to monitor your hemoglobin     Please set up an appointment with:  - Dr. Leventhal (liver doctor) in 2 months. The GI will help arrange this but if you haven't been called to schedule an appointment in the next few weeks please call the clinic to inquire.   - Your primary care doctor when you are discharged from the rehab facility   - If interested in discussing alcohol cravings, Patient to follow up with Dr. Herbert Velazquez at:  [facility] ([facility])  [address] [address]  Main number: [phone]    It was a pleasure meeting with you today. Thank you for allowing me and my team the privilege of caring for you today. You are the reason we are here, and I truly hope we provided you with the excellent service you deserve. Please let us know if there is anything else we can do for you so that we can be sure you are leaving completely  satisfied with your care experience.    Your hospital unit at the time of discharge is 5A so if you have any questions please call the hospital at [phone] and ask to talk to a nurse on 5A.    Take care!  Salo Schmidt MD   Internal Medicine Resident     Glucose monitor nursing POCT    Before meals and at bedtime     Daily weights    Call Provider for weight gain of more than 2 pounds per day or 5 pounds per week.     Additional Discharge Instructions    We are suggesting the following medication changes:  - start taking 20mEq of potassium daily  - Sertraline 75mg daily to help with itching   - zofran as needed for nausea     Wound care    Buttock  cleft wounds:   Superior wound:  every 3 days and prn  Cleanse wound with microklenz spray and gauze.   Cover superior wound with mepilex border dressing     Incontinence protocol using criticaid paste, including to inferior wound Every 2 hours     Activity - Up ad roslyn     Follow Up ([facility])    1. Follow up with primary care provider, Suresh Shabazz, within 14 days for hospital follow- up.      2. Follow up in Hepatology clinic with Dr. Leventhal in 2 months. Patient will need blood tests drawen before her appointment, CBC, CMP. INR  3. If interested in discussing alcohol cravings, Patient to follow up with Dr. Herbert Velazquez at:  [facility] ([facility])   [address] [address]  Main number: [phone]       Appointments on [place] and/or [facility] (with [facility] or [facility] provider or service). Call [phone] if you haven't heard regarding these appointments within 7 days of discharge.     Full Code     Physical Therapy Adult Consult    Evaluate and treat as clinically indicated.    Reason:  Decreased strength after acute illness     Occupational Therapy Adult Consult    Evaluate and treat as clinically indicated.    Reason: deconditioning due to acute illness     Contact Isolation     Advance Diet as  Tolerated    Follow this diet upon discharge: Orders Placed This Encounter      Snacks/Supplements Adult: Boost Shake; Between Meals      Calorie Counts      Combination Diet High Kcal/High Protein Diet, ADULT; 2 gm NA Diet            Discharge Medications   Current Discharge Medication List      START taking these medications    Details   ondansetron (ZOFRAN-ODT) 4 MG ODT tab Take 1 tablet (4 mg) by mouth every 6 hours as needed for nausea or vomiting    Associated Diagnoses: End stage liver disease (H)      potassium chloride ER (K-TAB) 20 MEQ CR tablet Take 1 tablet (20 mEq) by mouth daily    Associated Diagnoses: Hypokalemia      sertraline (ZOLOFT) 25 MG tablet Take 3 tablets (75 mg) by mouth daily    Associated Diagnoses: Major depressive disorder with current active episode, unspecified depression episode severity, unspecified whether recurrent         CONTINUE these medications which have NOT CHANGED    Details   furosemide (LASIX) 20 MG tablet Take 20 mg by mouth daily      hydrOXYzine (ATARAX) 25 MG tablet Take 25 mg by mouth every 6 hours as needed for itching      lactulose (CHRONULAC) 10 GM/15ML solution Take 20 g by mouth 3 times daily      Lidocaine (LIDOCARE) 4 % Patch Apply 1 patch daily at 0800 and remove at 2000. To prevent lidocaine toxicity, patient should be patch free for 12 hrs daily.      melatonin 3 MG tablet Take 3 mg by mouth At Bedtime      midodrine (PROAMATINE) 10 MG tablet Take 12.5 mg by mouth 3 times daily (with meals)      multivitamin, therapeutic (THERA-VIT) TABS tablet Take 1 tablet by mouth daily      pantoprazole sodium (PROTONIX) 40 MG packet Take 1 packet by mouth daily      polyethylene glycol (MIRALAX/GLYCOLAX) packet Take 17 g by mouth daily      rifaximin (XIFAXAN) 550 MG TABS tablet Take 550 mg by mouth 2 times daily       spironolactone (ALDACTONE) 25 MG tablet Take 12.5 mg by mouth daily      Vitamin D, Cholecalciferol, 1000 units TABS Take 1 tablet by mouth daily            Allergies   Allergies   Allergen Reactions     Gabapentin      Other reaction(s): Edema     Nitrofurantoin Other (See Comments)     drug-induced liver injury

## 2019-07-21 NOTE — H&P
[facility]  HISTORY AND PHYSICAL   Chief Complaint     Deconditioning      History of Present Illness       Neema Bailey is 44 year old year old female with hx of ESLD of unclear cause (likely combination of chronic liver disease, alcohol injury, and possible medication injury), Hepatic encephalopathy, Abnormal Hematological indices, Alcohol use disorder, Chronic pain is being admitted to  TCU for ongoing rehabilitation on 07/21/2019 after her hospitalization at [facility] hospital for generalized fatigue, one day of nausea, and vomiting, anemia, hypokalemia from 07/17/19- 07/21/19. She was given blood transfusions, potassium were replaced, and treated for uncomplicated cystitis    For details of hospitalization, please refer to the discharge summary on 07/21/2019    Currently patient reports doing okay. She reports intermittent nausea. She thinks due to taking so many pills. Last time she had emesis was 2-3 days ago. She also reports intermittent pain in the upper abdomen if she takes heavy food. She denies any chest pain, shortness of breath, cough. She feels fatigued and tired soon. She denies any lightheadedness or dizziness. She denies any burning urination. She reports 3-4 soft bowel movements in a day. Last one was today morning.              Past Medical History     Past Medical History:   Diagnosis Date     Anxiety      Bilateral leg edema      Dizziness and giddiness      Hypertension      Insomnia      Iron deficiency anemia     due to chronic blood loss, vitamin B12 deficiency, Macrocytic     Migraines      BURT (nonalcoholic steatohepatitis)      Numbness and tingling     PERIPHERAL NEUROPATHY     Obese      Other chronic pain     Chronic Bilateral Low Back Pain with Bilateral Sciatica, Bilateral Knee Pain     Peripheral neuropathy      Pruritus      Restless legs      Sciatica      Seborrheic dermatitis      Severe episode of recurrent major depressive disorder,  without psychotic features (H)      Sinus tachycardia      Substance abuse in remission (H)     h/o alcoholism had treatment at [facility]     Uterovaginal prolapse      Vitamin D deficiency          Past Surgical History     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)     BREAST SURGERY      Breast Augmentation     COLPORRHAPHY ANTERIOR N/A 9/21/2015    Procedure: COLPORRHAPHY ANTERIOR;  Surgeon: Jairon Adams MD;  Location: [facility]     COSMETIC SURGERY      Abdominoplasty     CYSTOCELE REPAIR       CYSTOSCOPY N/A 11/26/2018    Procedure: CYSTOSCOPY;  Surgeon: Royn Melgar MD;  Location: [facility]     ENT SURGERY      tonsillectomy & adenoidectomy     GI SURGERY      Small Bowel Enterotomy Repair for SBO, EGD     HYSTERECTOMY VAGINAL, COLPORRHAPHY ANTERIOR, POSTERIOR, COMBINED N/A 11/26/2018    Procedure: VAGINAL HYSTERECTOMY, ANTERIOR AND POSTERIOR REPAIR;  Surgeon: Rony Melgar MD;  Location: [facility]     PERINEORRHAPHY N/A 11/26/2018    Procedure: PERINEOPLASTY;  Surgeon: Rony Melgar MD;  Location: [facility]     TUBAL LIGATION          Social History     Social History     Socioeconomic History     Marital status: Single     Spouse name: Not on file     Number of children: Not on file     Years of education: Not on file     Highest education level: Not on file   Occupational History     Not on file   Social Needs     Financial resource strain: Not on file     Food insecurity:     Worry: Not on file     Inability: Not on file     Transportation needs:     Medical: Not on file     Non-medical: Not on file   Tobacco Use     Smoking status: Never Smoker     Smokeless tobacco: Never Used   Substance and Sexual Activity     Alcohol use: No     Alcohol/week: 0.0 oz     Frequency: Never     Comment:  hx of ETOH abuse     Drug use: No      Sexual activity: Not Currently     Partners: Male     Birth control/protection: Female Surgical     Comment: Tubal Ligation 2003   Lifestyle     Physical activity:     Days per week: Not on file     Minutes per session: Not on file     Stress: Not on file   Relationships     Social connections:     Talks on phone: Not on file     Gets together: Not on file     Attends Restorationism service: Not on file     Active member of club or organization: Not on file     Attends meetings of clubs or organizations: Not on file     Relationship status: Not on file     Intimate partner violence:     Fear of current or ex partner: Not on file     Emotionally abused: Not on file     Physically abused: Not on file     Forced sexual activity: Not on file   Other Topics Concern     Parent/sibling w/ CABG, MI or angioplasty before 65F 55M? Not Asked   Social History Narrative     Not on file          Smoking history: Denies  Alcohol history:Hx of binge drinking. Per patient, last drink was on December          Family History     Father: Reports father had lots of medical issues but does not know about them  Mother: Crohn's disease  Siblings:      Medications     Reviewed and medication reconciliation done.  No current facility-administered medications for this encounter.      Current Outpatient Medications   Medication     ondansetron (ZOFRAN-ODT) 4 MG ODT tab     potassium chloride ER (K-TAB) 20 MEQ CR tablet     sertraline (ZOLOFT) 25 MG tablet     Facility-Administered Medications Ordered in Other Encounters   Medication     0.9% sodium chloride BOLUS     Daily 2 GRAM acetaminophen limit, unless fulminent liver failure or transaminases greater than or equal to 300 - 400, then none     diphenhydrAMINE-zinc acetate (BENADRYL) 1-0.1 % cream     furosemide (LASIX) tablet 20 mg     hydrOXYzine (ATARAX) tablet 25-50 mg     lactulose (CHRONULAC) solution 10 g     lactulose (CHRONULAC) solution 20 g    Or     lactulose (CHRONULAC) solution  "for enema prep 100 g     lidocaine (LMX4) cream     lidocaine 1 % 0.1-1 mL     melatonin tablet 3 mg     midodrine (PROAMATINE) tablet 12.5 mg     naloxone (NARCAN) injection 0.1-0.4 mg     ondansetron (ZOFRAN) injection 4 mg     ondansetron (ZOFRAN-ODT) ODT tab 4 mg     pantoprazole (PROTONIX) EC tablet 40 mg     polyethylene glycol (MIRALAX/GLYCOLAX) Packet 17 g     potassium chloride (KLOR-CON) Packet 20-40 mEq     potassium chloride 10 mEq in 100 mL intermittent infusion with 10 mg lidocaine     potassium chloride 10 mEq in 100 mL sterile water intermittent infusion (premix)     potassium chloride 20 mEq in 50 mL intermittent infusion     potassium chloride ER (K-DUR/KLOR-CON M) CR tablet 20-40 mEq     rifaximin (XIFAXAN) tablet 550 mg     sertraline (ZOLOFT) tablet 75 mg     sodium chloride (PF) 0.9% PF flush 3 mL     sodium chloride (PF) 0.9% PF flush 3 mL     spironolactone (ALDACTONE) half-tab 12.5 mg     vitamin D3 (CHOLECALCIFEROL) 1000 units (25 mcg) tablet 5,000 Units          Review of Systems     10 point  review of systems negative, except for what is mentioned above      Physical Exam     General:   Vital signs:    Last menstrual period 10/04/2018.  Estimated body mass index is 36.26 kg/m  as calculated from the following:    Height as of 12/20/18: 1.727 m (5' 8\").    Weight as of 7/18/19: 108.2 kg (238 lb 8 oz).    No intake or output data in the 24 hours ending 07/21/19 1147   HEENT: Scleral icterus, conjuncitval pallor  Cardiovascular: S1, S2 normal   Respiratory: B/L CTA  Abdomen: Soft, Moderately distended, NT. BS+. Old surgical scar.   Neurology: Alert, awake,and oriented. ? Mild tremor or shake  Extremities: B/l pre tibial edema. Dark discoloration of shin              Laboratory/Imaging Studies     I have reviewed  laboratory and imaging studies in the Epic. Pertinent findings are as below    CMP  Recent Labs   Lab 07/21/19  0705 07/20/19  2120 07/20/19  0757 07/19/19  0741 07/18/19  0724 "     --  141 140 140   POTASSIUM 3.4 3.6 3.3* 3.4 3.4   CHLORIDE 109  --  112* 111* 112*   CO2 19*  --  20 18* 20   ANIONGAP 11  --  10 10 9   *  --  115* 111* 112*   BUN 4*  --  4* 4* 4*   CR 0.87  --  0.96 0.85 0.91   GFRESTIMATED 80  --  71 82 76   GFRESTBLACK >90  --  82 >90 88   NARENDRA 8.2*  --  8.2* 8.2* 8.2*   MAG 1.7  --  1.7 1.7 1.7   PROTTOTAL 5.3*  --  5.1* 5.4* 5.4*   ALBUMIN 2.5*  --  2.5* 2.6* 2.7*   BILITOTAL 12.3*  --  12.0* 12.3* 11.9*   ALKPHOS 99  --  96 101 105   AST 57*  --  53* 56* 62*   ALT 18  --  20 21 20     CBC  Recent Labs   Lab 07/21/19  0954 07/21/19  0705 07/20/19  0757 07/18/19  0724 07/17/19  1542 07/17/19  0549   WBC  --  6.2  --  8.0 7.7 7.6   RBC  --  2.19*  --  2.39* 2.35* 2.08*   HGB 7.6* 6.9*  --  7.6* 7.5* 6.8*   HCT  --  22.5*  --  24.5* 24.4* 21.8*   MCV  --  103*  --  103* 104* 105*   MCH  --  31.5  --  31.8 31.9 32.7   MCHC  --  30.7*  --  31.0* 30.7* 31.2*   RDW  --  18.4*  --  19.0* 18.5* 18.3*   PLT  --  123* 121* 130* 122* 129*     INR  Recent Labs   Lab 07/21/19  0705 07/20/19  0757 07/19/19  0741 07/18/19  0724   INR 3.33* 3.38* 3.09* 3.40*         Impression/Plan       44 year old year old female with hx of ESLD of unclear cause (likely combination of chronic liver disease, alcohol injury, and possible medication injury), Hepatic encephalopathy, Abnormal Hematological indices, Alcohol use disorder, Chronic pain is being admitted to  TCU for ongoing rehabilitation on 07/21/2019 after her hospitalization at [facility] [place] for generalized fatigue, one day of nausea, and vomiting, anemia, hypokalemia from 07/17/19- 07/21/19. She was given blood transfusions, potassium were replaced, and treated for uncomplicated cystitis    # Deconditioning: d/t liver disease, infection, dyselectrolytemia  - PT/OT consult.     # Acute on chronic anemia:  Likely new baseline is low 7s, due to chronic liver disease and nutrition issues.  Most recent hgb PTA was 6.9 from 7/5,  when patient was discharged from [facility]. She was given 1 unit pRBCs with appropriate Hgb improvement. No signs of bleeding throughout the hospital stay. Hg 7.6 gm/dl on 07/21 prior to admission to TCU  - CBC weekly  - Transfuse if Hg less than 7 gm/dl .      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 Leventhal at [facility], liver injury seems more consistent with drug-induced either from Bactrim or nitrofurantoin. She had  prednisolone trial during 6/6 [facility] admission x2 (no response).. Hopeful about improvement  in liver function over time. Not currently a transplant candidate, but can be reconsidered over time.  Numerous abdominal varices noted on CT C/A/P from 6/25/19.  MELD-Na score: 29 at 7/21/2019  7:05 AM  MELD score: 29 at 7/21/2019  7:05 AM  - Follow up with Dr. Leventhal in 2 months with CBC, CMP and INR  - Low threshold to offer chem dep treatment once discharging from TCU.      # Hx of Hepatic Encephalopathy:   Alert, awake, and oriented at this time  Reports having 3-4 soft bowel movements in last few days  - Continue Lactulose  - Continue Rifaximin 550 mg BID      # Ascites: d/t liver disease  PTA on Furosemide, and Spironolactone  - Continue diuretics  - 2 gm Na diet.     # Pruritus: d/t liver disease  On  Hydroxyzine 25-50 mg PRN, and Sertraline 75mg daily for pruritis   - Continue Hydroxyzine, and Sertraline        # Hypokalemia: d/t losses from both GI (lactulose, Recent diarrhea, recent vomiting) and urine (diuretic use). She was given potassium  On potassium supplement  - Continue      # Uncomplicated cystitis: Completed 3 day treatment  Hx of ESBL infection     # Mild thrombocytopenia: Likely related to ESLD.   Platelet level 123 on 07/21  - CBC weekly     # Alcohol use disorder:  Endorses  binge drinking. Unclear on when last drink was but reports possibly sometime in May prior to her admission to [facility].  Possibly self-medicating for depression, PTSD, and prior opioid use disorder.  -She reports she had an ACD done while at [facility], will try to get that into record here if she's going to be seeking care here.  - Social work consult     # Chronic pain: Has been off opioids for several years.  Pain controlled at this time    # Hx of Depression: PTA on Sertraline  - Continue .     # FEN: Diet: Snacks/Supplements Adult: Boost Shake; Between Meals  Combination Diet High Kcal/High Protein Diet, ADULT; 2 gm NA Diet  # Lines &Tubes: None  # Activity & Physical condition: PT/OT consult  # Prophylaxis: Mechanical. No chemical prophylaxis due to thrombocytopenia  # Social Issues:   # Code status: Full code  # Pneumococcal vaccine: Qualifies for Pneumococcal poly vaccine on discharge.

## 2019-07-21 NOTE — PLAN OF CARE
PT 5A:  Discharge Planner PT   Patient plan for discharge: TCU  Current status: Performs bed mobility and functional transfers SBA. Ambulated 75ft with FWW CGA. Engaged in LE strengthening: repeated STS SBA and seated LE strengthening exercises. Pt is very motivated to get stronger and progress toward PLOF.   Barriers to return to prior living situation: deconditioning, weakness  Recommendations for discharge: TCU  Rationale for recommendations: Pt is below baseline and will benefit from continued PT intervention in order to regain strength and activity tolerance.       Entered by: Huma Camilo 07/21/2019 9:43 AM

## 2019-07-21 NOTE — PLAN OF CARE
A&O, VSS on RA. PIV removed, catheter in tact. All belongings in 2 bags. Packet printed and sent with pt. Transport HE to  TCU at 1615.

## 2019-07-21 NOTE — PLAN OF CARE
Patient is a 44 year old female  admitted to room 29 via wheelchair/HE transport.  Patient is alert and oriented X 4. See Epic for VS and assessment.  Patient is able to transfer A1 using walker. Patient was settled into their room, shown call light, tv, mealtimes etc. Oriented to unit. Will continue monitoring pain level and VS. Notifying MD with any concerns.  Follow MD orders for cares and medications.    Level of Schooling:college  Ethnicity:  Marital Status:single  Dentures: No  Hearing Aid: No  Smoker:  No  Glasses: No  Occupation: none  Falls 0-1 mo: 0 2-6 mo: 1  Stairs prior function: Not applicable  Prior device use: none   Advanced Care Directive Referral to Social Work?No

## 2019-07-21 NOTE — PROGRESS NOTES
Calorie Count  Intake recorded for: 7/20  Total Kcals: 765 Total Protein: 26g  Kcals from Hospital Food: 765   Protein: 26g  Kcals from Outside Food (average):0  Protein: 0g  # Meals Recorded: 2 (First - 100% oatmeal w/ 2 sugar, apple juice, orange)     (Second - 100% crumb cod w/ tartar sauce, 75% mashed potatoes, 25% corn)  # Supplements Recorded: 0

## 2019-07-21 NOTE — PLAN OF CARE
A&O, VSS on RA. PIV SL. Up to commode SBA, BM x 2, voiding. New mepilex and barrier cream on coccyx. Pt repositions in bed to off load back side. Reg diet, bright counts. Able to eat nearly 100% of dinner this shift. C/o mild abdominal discomfort, uses heating pad. Potassium replaced, awaiting recheck results. Plan to discharge to TCU in 1-2 days. Calls to make needs known.

## 2019-07-21 NOTE — PLAN OF CARE
/60 (BP Location: Left arm)   Pulse 97   Temp 97.3  F (36.3  C) (Oral)   Resp 18   Wt 108.2 kg (238 lb 8 oz)   LMP 10/04/2018   SpO2 96%   BMI 36.26 kg/m      Time 5075-7183      Reason for admission: Hypokalemia   End stage liver disease, Anemia  Vitals: VSS on RA  Activity: Up SBA to pivot to bedside commode and walker for distance  Pain: Denies pain  Neuro: A&Ox4, speech logical  Mood/Behavior: calm, cooperative  Cardiac: RRR, no murmurs  Respiratory: LS clear  GI/: BM x2, voiding without difficulty  Diet: 2g Na- high calorie, protein- calorie counts  Lines: PIV--removed  Skin: Wound to sacrum, coccyx- barrier applied       Plan: Pt is to discharge to  TCU via HE @ 7655.

## 2019-07-21 NOTE — PLAN OF CARE
Physical Therapy Discharge Summary    Reason for therapy discharge:    Discharged to transitional care facility.    Progress towards therapy goal(s). See goals on Care Plan in [facility] electronic health record for goal details.  Goals partially met.  Barriers to achieving goals:   discharge from facility.    Therapy recommendation(s):    Continued therapy is recommended.  Rationale/Recommendations:  pt would benefit from continued therapy to progress IND and endurance.

## 2019-07-21 NOTE — PROGRESS NOTES
Time: 1900-0730      Reason for admission: Hypokalemia (admitted 7-17)  Vitals: Temp: 98.3  F (36.8  C) Temp src: Oral BP: 103/49 Pulse: 97 Heart Rate: 92 Resp: 18 SpO2: 94 % O2 Device: None (Room air)    Activity: SBA  Pain: C/o pain in abd. Managed with heating pad.   Neuro: A/ox4  Cardiac: Tele- NSR  Respiratory: WDL  GI/: SBA to bathroom, loose stools throughout shift  Diet: 2g Na, calorie count  Lines: R PIV  Skin/Wounds: Wound on coccyx and sacrum- barrier cream and meplex applied  Labs/Imaging: K 3.6      New changes this shift: Up to bathroom throughout shift, and up in chair most of night. Appetite is better and she ate 100% of dinner.     Plan: [facility]- need consistent discharge plan after TCU before they accept.      Continue to monitor and follow POC.

## 2019-07-21 NOTE — PROGRESS NOTES
Social Work Services Discharge Note      Patient Name:  Neema Bailey     Anticipated Discharge Date:  7/21/2019    Discharge Disposition:   TCU:  [facility]     Via HE transport @ 4:15 on 7/21/19    Following MD:       Pre-Admission Screening (PAS) online form has been completed.  The Level of Care (LOC) is:  Determined  Confirmation Code is:      [id number]    Patient/caregiver informed of referral to [facility] Line for Pre-Admission Screening for skilled nursing facility (SNF) placement and to expect a phone call post discharge from SNF.     Additional Services/Equipment Arranged:  None     Patient / Family response to discharge plan:  In agreement     Persons notified of above discharge plan:  Pt, treatment team, [facility]    Staff Discharge Instructions:  Please fax discharge orders and signed hard scripts for any controlled substances.  Please print a packet and send with patient.     CTS Handoff completed:  NO    Medicare Notice of Rights provided to the patient/family:  NO - NA    TAMARA Lacy  Weekend Social Work  [facility]  Weekend 4A, 4C, 4E, 5A 5B Pager: [phone]  Weekend 6A, 6B, 6C, 6D Pager: [phone]  Weekend 7A, 7B, 7C, 7D, 5C Pager: [phone]

## 2019-07-22 NOTE — PHARMACY-MEDICATION REGIMEN REVIEW
Pharmacy Medication Regimen Review  Neema Bailey is a 44 year old female who is currently in the Transitional Care Unit.    Assessment: All medications have an appropriate indications, durations and no unnecessary use was found.    Plan:   Continue current treatment.  Attending provider will be sent this note for review.  If there are any emergent issues noted above, pharmacist will contact provider directly by phone.      Pharmacy will periodically review the resident's medication regimen for any PRN medications not administered in > 72 hours and discontinue them. The pharmacist will discuss gradual dose reductions of psychopharmacologic medications with interdisciplinary team on a regular basis.    Please contact pharmacy if the above does not answer specific medication questions/concerns.    Background:  A pharmacist has reviewed all medications and pertinent medical history today.  Medications were reviewed for appropriate use and any irregularities found are listed with recommendations.      Current Facility-Administered Medications:      [START ON 7/24/2019] - Skin Test Reading -, , Does not apply, Q21 Days, Caleb Lewis MD     benzocaine-menthol (CEPACOL) 15-3.6 MG lozenge 1 lozenge, 1 lozenge, Buccal, Q2H PRN, Caleb Lewis MD     eucerin cream, , Topical, Q1H PRN, Caleb Lewis MD     [START ON 7/23/2019] furosemide (LASIX) tablet 20 mg, 20 mg, Oral, Daily, Caleb Lewis MD     hydrOXYzine (ATARAX) tablet 25 mg, 25 mg, Oral, Q6H PRN, Caleb Lewis MD, 25 mg at 07/21/19 1287     lactulose (CHRONULAC) solution 20 g, 20 g, Oral, TID, Caleb Lewis MD     [START ON 7/23/2019] Lidocaine (LIDOCARE) 4 % Patch 1 patch, 1 patch, Transdermal, Q24H, Caleb Lewis MD     lidocaine patch in PLACE, , Transdermal, Q8H, Caleb Lewis MD     lidocaine patch REMOVAL, , Transdermal, Q24h, Caleb Lewis MD     medication instructions, , Does not apply, Continuous PRN, Caleb Lewis MD      melatonin tablet 3 mg, 3 mg, Oral, At Bedtime, Caleb Lewis MD     midodrine (PROAMATINE) tablet 12.5 mg, 12.5 mg, Oral, TID w/meals, Caleb Lewis MD     [START ON 7/23/2019] multivitamin, therapeutic (THERA-VIT) tablet 1 tablet, 1 tablet, Oral, Daily, Caleb Lewis MD     ondansetron (ZOFRAN-ODT) ODT tab 4 mg, 4 mg, Oral, Q6H PRN, 4 mg at 07/22/19 0923 **OR** ondansetron (ZOFRAN) injection 4 mg, 4 mg, Intravenous, Q6H PRN, Caleb Lewis MD     [START ON 7/23/2019] pantoprazole (PROTONIX) EC tablet 40 mg, 40 mg, Oral, Daily, Caleb Lewis MD     [START ON 7/23/2019] polyethylene glycol (MIRALAX/GLYCOLAX) Packet 17 g, 17 g, Oral, Daily, Caleb Lewis MD     [START ON 7/23/2019] potassium chloride ER (K-DUR/KLOR-CON M) CR tablet 20 mEq, 20 mEq, Oral, Daily, Caleb Lewis MD     rifaximin (XIFAXAN) tablet 550 mg, 550 mg, Oral, BID, Caleb Lewis MD, 550 mg at 07/22/19 0905     [START ON 7/23/2019] sertraline (ZOLOFT) tablet 75 mg, 75 mg, Oral, Daily, Caleb Lewis MD     [START ON 7/23/2019] spironolactone (ALDACTONE) half-tab 12.5 mg, 12.5 mg, Oral, Daily, Caleb Lewis MD     tuberculin injection 5 Units, 5 Units, Intradermal, Q21 Days, Caleb Lewis MD, 5 Units at 07/22/19 0915     [START ON 7/23/2019] vitamin D3 (CHOLECALCIFEROL) 1000 units (25 mcg) tablet 1,000 Units, 1,000 Units, Oral, Daily, Caleb Lewis MD  No current outpatient prescriptions on file.   PMH: Acute on Chronic anemia, Alcohol liver cirrhosis, hx of ESLD of unclear cause (likely combination of chronic liver disease, alcohol injury, and possible medication injury), Hepatic encephalopathy, Ascites, Pruritus d/t liver disease, Alcohol use disorder, Chronic pain and depression

## 2019-07-22 NOTE — PROGRESS NOTES
07/22/19 0840   Quick Adds   Quick Adds Certification   Type of Visit Initial Occupational Therapy Evaluation   Living Environment   Lives With child(bret), adult;child(bret), dependent   Living Arrangements house   Transportation Anticipated car, drives self;family or friend will provide   Living Environment Comment Prior to admission to hospital in June '19 pt was living in [facility] with 2 of her kids (ages 16 and 19); since hospital/rehab pt's family has moved out of that home and pt will be discharging to her mother's and then looking for new housing (her children are staying with their older sibling); pt's mother has house with 10 JAMES and then pt can stay on main level   Self-Care   Usual Activity Tolerance moderate   Current Activity Tolerance fair   Equipment Currently Used at Home none   Activity/Exercise/Self-Care Comment Prior to Nov '18 pt was (I) with all transfers/mobility - has intermittently used FWW during hospital/rehab stays over past several months   Functional Level   Ambulation 0-->independent   Transferring 0-->independent   Toileting 0-->independent   Bathing 0-->independent   Dressing 0-->independent   Eating 0-->independent   Communication 0-->understands/communicates without difficulty   Swallowing 0-->swallows foods/liquids without difficulty   Cognition 0 - no cognition issues reported   Fall history within last six months yes   Number of times patient has fallen within last six months 2   Which of the above functional risks had a recent onset or change? ambulation;transferring;toileting;bathing;dressing;cognition;fall history   Prior Functional Level Comment Prior to June pt was (I) with most ADL/IADLs including driving - has been unable to work since Nov '18 due to medical issues   General Information   Onset of Illness/Injury or Date of Surgery - Date 07/21/19   Referring Physician Lyndon Sainz; Caleb Lewis   Patient/Family Goals Statement to build strength and endurance; be able  to get around and be (I)   Precautions/Limitations fall precautions   Weight-Bearing Status - LUE full weight-bearing   Weight-Bearing Status - RUE full weight-bearing   Weight-Bearing Status - LLE full weight-bearing   Weight-Bearing Status - RLE full weight-bearing   Cognitive Status Examination   Orientation orientation to person, place and time  (near date)   Level of Consciousness alert   Follows Commands (Cognition) follows one step commands   Memory impaired   Cognitive Comment Pt has had issues with hepatic encephalopathy; denies current difficulty; will monitor and further assess as indicated   Visual Perception   Visual Perception No deficits were identified  (no new concerns)   Sensory Examination   Sensory Quick Adds Other (describe)   Sensory Comments neuropathy in B feet   Pain Assessment   Patient Currently in Pain No   Range of Motion (ROM)   ROM Comment BUEs WFL   Strength   Strength Comments B shoulder 4/5; remainder of BUEs WFL   Transfer Skill: Sit to Stand   Level of [place]: Sit/Stand contact guard   Physical Assist/Nonphysical Assist: Sit/Stand verbal cues;1 person assist   Lower Body Dressing   Level of [place]: Dress Lower Body contact guard   Physical Assist/Nonphysical Assist: Dress Lower Body verbal cues;1 person assist   Activities of Daily Living Analysis   Impairments Contributing to Impaired Activities of Daily Living balance impaired;cognition impaired;strength decreased;sensory feedback impaired;sensation decreased   General Therapy Interventions   Planned Therapy Interventions ADL retraining;IADL retraining;bed mobility training;cognition;ROM;strengthening;stretching;transfer training;home program guidelines;progressive activity/exercise   Clinical Impression   Criteria for Skilled Therapeutic Interventions Met yes, treatment indicated   OT Diagnosis decreased ADL/IADL (I)   Influenced by the following impairments weakness, impaired balance, cognitive deficits    Assessment of Occupational Performance 5 or more Performance Deficits   Identified Performance Deficits functional transfers, toileting, bathing, dressing, household chores, medication/financial management, driving   Clinical Decision Making (Complexity) Low complexity   Therapy Frequency 6x/week   Predicted Duration of Therapy Intervention (days/wks) 10-14 days   Anticipated Equipment Needs at Discharge shower chair   Anticipated Discharge Disposition Home with Home Therapy;Home with Outpatient Therapy   Risks and Benefits of Treatment have been explained. Yes   Patient, Family & other staff in agreement with plan of care Yes   Therapy Certification   Start of Care Date 07/22/19   Certification date from 07/22/19   Certification date to 08/19/19   Medical Diagnosis liver failure   Certification I certify the need for these services furnished under this plan of treatment and while under my care.  (Physician co-signature of this document indicates review and certification of the therapy plan).   Total Evaluation Time   Total Evaluation Time (Minutes) 13

## 2019-07-22 NOTE — PROGRESS NOTES
07/22/19 1300   Quick Adds   Quick Adds Certification   Type of Visit Initial PT Evaluation      Language English   Living Environment   Lives With child(bret), adult;child(bret), dependent   Living Arrangements house   Transportation Anticipated car, drives self;family or friend will provide   Living Environment Comment PT: Prior to June '19 admission, pt was living in [facility] with 2 children. Pt will be discharging to mother's home and then looking for new home. Mother's home has 10 JAMES and then need are maintained on one level   Self-Care   Usual Activity Tolerance moderate   Current Activity Tolerance fair   Equipment Currently Used at Home none   Activity/Exercise/Self-Care Comment PT: Prior to Nov ' 18 pt was IND with all mobility - has intermittently use FWW during hospital/rehab stays   Functional Level Prior   Ambulation 0-->independent   Transferring 0-->independent   Toileting 0-->independent   Bathing 0-->independent   Communication 0-->understands/communicates without difficulty   Swallowing 0-->swallows foods/liquids without difficulty   Cognition 0 - no cognition issues reported   Fall history within last six months yes   Number of times patient has fallen within last six months 2   Which of the above functional risks had a recent onset or change? ambulation;transferring;toileting;bathing;dressing;eating;cognition;fall history   Prior Functional Level Comment IND previously to hospitalization in Nov '18, now unable to work d/t edical issues   General Information   Onset of Illness/Injury or Date of Surgery - Date 07/17/19   Referring Physician Caleb Leiws MD   Patient/Family Goals Statement To go home   Pertinent History of Current Problem (include personal factors and/or comorbidities that impact the POC) PT: Taken per chart review: Neema Bailey is 44 year old year old female with hx of ESLD of unclear cause (likely combination of chronic liver disease, alcohol injury, and  possible medication injury), Hepatic encephalopathy, Abnormal Hematological indices, Alcohol use disorder, Chronic pain is being admitted to  TCU for ongoing rehabilitation on 07/21/2019    Precautions/Limitations fall precautions   General Observations PT: Pt is lying alert and awake in semi supine with wound nurse in room. Pt able to communicate verbally wtihout difficulty.   Cognitive Status Examination   Orientation orientation to person, place and time   Level of Consciousness alert   Follows Commands and Answers Questions 100% of the time   Personal Safety and Judgment intact   Memory intact   Pain Assessment   Patient Currently in Pain No   Integumentary/Edema   Integumentary/Edema Comments PT: Pt has bout of cellulitis and on L foot that is being managed by nursing. Pt also has bed sores on sacral area also being monitored by nursing   Posture    Posture Forward head position   Posture Comments f   Range of Motion (ROM)   ROM Comment PT: Grossly WFL for B LEs   Strength   Strength Comments PT: LE grossly 3+/5   Bed Mobility   Bed Mobility Comments PT: SUP with bed mobility including sit <> supine and rolling. Pt is able to manage LE into bed with increased time.    Transfer Skills   Transfer Comments PT: STS and SPT from bed to w/c performed with SBA to FWW for upright support    Gait   Gait Comments PT: Pt ambulated with FWW, w/c follow for fatigue, SBA 70 ft x 1, 50 ft x 1. Pt limited by fatigue this date. Extremely reduced priyanka and B foot clearance from baseline, increased reliance of AD for UE support   Balance   Balance Comments PT: Able to tolerate sitting EOB unsupported and static standing with no AD x 30 sec   Sensory Examination   Sensory Perception Comments Intact fine touch in B LEs   Coordination   Coordination Comments intact finger to nose test, unable to do heel/shin test d/t weakness   Muscle Tone   Muscle Tone Comments no deficits noted, mild hypotonicity, difficult to asses d/t edema    Modality Interventions   Planned Modality Interventions TENS   General Therapy Interventions   Planned Therapy Interventions balance training;bed mobility training;cognition;fine motor coordination training;gait training;groups;joint mobilization;manual therapy;motor coordination training;neuromuscular re-education;ROM;strengthening;stretching;transfer training;wheelchair management/propulsion training;risk factor education;home program guidelines;progressive activity/exercise   Clinical Impression   Criteria for Skilled Therapeutic Intervention yes, treatment indicated   PT Diagnosis Decreased strength, endurance, balanace, and activity tolerance   Influenced by the following impairments See above   Functional limitations due to impairments Decreased ambulation, transfer status, decreased out of bed tolerance of activity   Clinical Presentation Evolving/Changing   Clinical Presentation Rationale Complicated PMH, ongoing medical concerns, age   Clinical Decision Making (Complexity) Moderate complexity   Therapy Frequency 6x/week   Predicted Duration of Therapy Intervention (days/wks) 10 days   Anticipated Discharge Disposition Home with Home Therapy   Risk & Benefits of therapy have been explained Yes   Patient, Family & other staff in agreement with plan of care Yes   Clinical Impression Comments PT: pt is a 43 y/o female who presents for PT evaluation following hospitalization for ESRD. PT evaluation revealed decreased strength, enduranc, and balance from baseline leading to decreased activity tolerance, assist required with mobility and transfers, and use of AD. Pt would benefit from skilled therapy in order to improve health outcomes, aide in proper discharge planning, and allow for return to highet possible level of function.    Therapy Certification   Start of care date 07/22/19   Certification date from 07/22/19   Certification date to 08/14/19   Medical Diagnosis ESRD   Certification I certify the need for  these services furnished under this plan of treatment and while under my care.  (Physician co-signature of this document indicates review and certification of the therapy plan).    Total Evaluation Time   Total Evaluation Time (Minutes) 25

## 2019-07-22 NOTE — PROGRESS NOTES
07/22/19 1500   General Information   Patient Profile Review See Profile for full history and prior level of function   Daily Contact with Relatives or Friends Phone call;Visit   Community Involvement   Sees Hospital ? No   Hobbies/Interests   Word Puzzles Sudoko   Music   Listens to Recorded Music Yes   Brought Own Equipment Yes   Media   Computer Has laptop here;Will use tablet/phone   TV / Movies TV;Movie list   Impression   Open to Socializing with Others Independent   Barriers to Leisure No barriers / independent   Patient, family and / or staff in agreement with Plan of Care Yes   Treatment Plan   Interested in Unit [place]? No   Type of Intervention 1:1 intervention;Structured groups   Equipment and Supplies While on Unit None needed   Assessment Assessment completed. Pt has smartphone and laptop here. Pt has 3 children that will visit occasionally with grandkids. Pt willing to participate in group activities while on unit.

## 2019-07-22 NOTE — PLAN OF CARE
C/O feeling cold. Covers herself in warm blankets. Wants an aqua K pad as thinks this will help in keeping her body warm. Sticky note left for MD by night RN. Up to BR and washed up at sink with OT. L/E edema and skin is dry. Eucerin cream ordered from SPD. ALVIN stockings too tight and very uncomfortable for pt. Tubigrip applied for LE edema which is non-pitting. Suspected pressure area/friction/shear area noted  Inner area of sacral/coccyx. Mepilex does not seem effective and falling off skin. Barrier cream applied and WOCN consult ordered.No complaints of pain. Flat affect. Jaundiced.

## 2019-07-22 NOTE — PLAN OF CARE
Patient slept between cares. A&Ox4. Denied pain/ SOB. Ambulated to bathroom with SBA and walker. Continent of bowel and bladder. PRN hydroxyzine given x1 for c/o itching to LLE with moderate relief. Lotion applied to dry skin on BLE. Mepilex to coccyx CDI. Warm blankets provided for comfort. Sticky note left for provider to request for Aqua K pad order per patient's request. Patient pleasant and cooperative with cares. Able to make needs known. Continue with POC.

## 2019-07-22 NOTE — PLAN OF CARE
Discharge Planner OT   Patient plan for discharge: to mother's home with continued therapy  Current status: OT eval completed and tx initiated. Pt able to complete transfers and mobility in room with CGA using FWW; standing tolerance at sink during ADLs limited to 2 min due to poor endurance/strength. Pt with hx of hepatic encephalopathy but denies current issues and appears generally appropriate at eval - will monitor and further assess as indicated.  Barriers to return to prior living situation: weakness, impaired balance, possible cognitive deficits  Recommendations for discharge: mother's home with continued therapy  Rationale for recommendations: to increase pt's (I) with ADL/IADLs       Entered by: Jacque Cuellar 07/22/2019 9:30 AM

## 2019-07-22 NOTE — PROGRESS NOTES
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    Patient History  According to provider note(s):  44 year old female with past medical history of morbid obesity status post Denny-en-Y gastric bypass with return of excess weight, long-standing history of severe alcohol use disorder, history of prescription narcotic dependence, peripheral neuropathy, iron deficiency anemia, and major depression who presents from a TCU with concerns of decompensated liver disease, diarrhea and nausea    Objective Data  Containment of urine/stool: Diaper    Active Diet Order  Orders Placed This Encounter      Combination Diet High Kcal/High Protein Diet, ADULT; 2 gm NA Diet      Output:   I/O last 3 completed shifts:  In: 480 [P.O.:480]  Out: -     Risk Assessment:   Sensory Perception: 4-->no impairment  Moisture: 4-->rarely moist  Activity: 3-->walks occasionally  Mobility: 4-->no limitation  Nutrition: 3-->adequate  Friction and Shear: 3-->no apparent problem  Sebas Score: 21                          Labs:   Recent Labs   Lab 19  0954 19  0705   ALBUMIN  --  2.5*   HGB 7.6* 6.9*   INR  --  3.33*   WBC  --  6.2       Physical Exam  Skin inspection: focused buttocks    Wound Location:  Tiffany cleft    7/15    7/15    7/22    Date of last photo 19  Wound History:  Wounds present on admission.  Receiving lactulose with several daily episodes of incontinent diarrhea.  States that when she was at TCU she would have to wait up to over an hour to have brief changed after  incontinent episodes.  Now trying to use commode instead of brief.   Has a Physical therapy consult ordered.  Is able to turn and reposition independently in bed. Has been getting out of bed week of 7/22 to use bathrrom    Measurements (length x width x depth, in cm)   Superior wound:  2.0  cm x 0.2 cm  x  0.1 cm    Inferior wound:  3.0 cm x 0.3 cm x 0.1 cm  Wound Base: 100 % dermis  Palpation of the wound bed: normal   Periwound Color: normal and consistent with surrounding tissue  Temperature: normal   Drainage:, none  Odor: none  Pain: minimal     Interventions  Current support surface: Standard  Atmos Air mattress  Visual inspection of wound(s) completed  Wound Care: done per plan of care  Supplies: placed at the bedside  Education provided: importance of repositioning and plan of care  Discussed plan of care with Patient

## 2019-07-22 NOTE — PLAN OF CARE
Discharge Planner PT   Patient plan for discharge: Home at Mother's home with stair requirement, will eventually be buying new home to live with children  Current status: SUP for transfers, bed mobility.   SBA for ambulation with FWW, w/c follow (~70 ft ), limited by fatigue,.  3 stairs, double UE support, CGA   Barriers to return to prior living situation: Fatigue, weakness, lack of activity tolerance  Recommendations for discharge: Home with Home PT/OT  Rationale for recommendations: to ensure safety and optimizing IND function and health outcomes upon discharge    ELOS: 10 days       Entered by: Carley Henry 07/22/2019 5:24 PM

## 2019-07-23 NOTE — PLAN OF CARE
Pt is alert and oriented x4, able to make needs known. Denies chest pain and SOB. Jaundiced. Given PRN zofran and atarax per pt request x1 this shift. Wound care to buttock cleft completed this shift. Tubigrip to BLE. Pt requests aqua K pad to keep herself warm, see sticky note. SBA with a walker upon transfers. Call light within reach, will continue to monitor.

## 2019-07-23 NOTE — PLAN OF CARE
Patient slept between cares. A&Ox4. Denied pain/ SOB. Patient called for assistance to the bathroom x2. Voided without difficulties and reported having 2 loose BMs. Barrier cream applied to buttocks. Patient assisted with turning/ repositioning after getting back in bed per request. Tubi- removed at NOC; legs elevated in bed. Patient pleasant and cooperative with cares. Able to make needs known. Continue with POC.

## 2019-07-23 NOTE — PROGRESS NOTES
"Social Work: Initial Assessment with Discharge Plan    Patient Name: Neema Bailey  : 1975  Age: 44 year old  MRN: [id number]  Completed assessment with: Patient  Admitted to TCU: 2019    Presenting Information   Date of SW assessment: 2019  Health Care Directive: Copy in Chart  Primary Health Care Agent: Patient  Secondary Health Care Agent: MARION  Living Situation: Prior to 2019, patient was living in [facility]. Patient will be discharging from TCU to mother's home in [place], [place] and then will be looking for a new home.   Previous Functional Status: Independent  DME available: See therapy notes  Patient and family understanding of hospitalization: Rehab  Cultural/Language/Spiritual Considerations: English speaking  Abuse concerns: None idnicated  BIMS: SW will complete closer to day 5  PHQ-9: SW will complete closer to day 5  PAS: confirmation number- [id number]  Has there been a level II screen?  No  Were there any recommendations in the screen? N/A  If yes, will the recommendations we incorporated into the Plan of Care?  N/A  Physical Health  Reason for admission: No diagnosis found.    Provider Information   Primary Care Physician:Suresh Shabazz. Patient stated she last saw her PCP \"a couple months ago.\" Patient was unsure about specific month/date.  : Patient stated she has a  through [facility], but is unsure what their name/number is. SW asked patient if I could reach out to patient sister and mother to ask, which patient was agreeable to.    Mental Health:   Diagnosis: History of anxiety and depression.  Current Support/Services: Patient indicated her family as being her main support system. Patient stated she no longer sees a counselor nor takes medication because she doesn't feel there's a need to.  Previous Services: Medication management and counseling.  Services Needed/Recommended: SW discussed spiritual health with patient, which " patient is open to while in TCU.    Substance Use:  Diagnosis: Patient indicated having a history of alcoholism. Stated her last alcoholic drink was in December 2018.  Current Support/Services: Family  Previous Services: Patient stated she was in treatment once.  Services Needed/Recommended: Chemical dependency referral.     Support System  Marital Status:   Family support: Patient's mother and sister are very involved in patient's care. Patient has 3 children: daughter Fallon (27), son GRADY (19), daughter Breana (16).   Other support available: Patient stated she has closer friends up [place] where her mother lives (and where she will be staying post TCU).  Gaps in support system: None    Community Resources  Current in home services: None  Previous services: None    Financial/Employment/Education  Employment Status: Disability  Income Source: [facility]  Education: Some college  Financial Concerns:  None  Insurance: FABIANO Beltran      Discharge Plan   Patient and family discharge goal: Patient hopes to discharge to her mother's house in [place], [place]  Provided Education on discharge plan: YES  Patient agreeable to discharge plan:  YES  A list of Medicare Certified Facilities was provided to the patient and/or family to encourage patient choice. Based on location and rating, patient would like referrals made to: NA  General information regarding anticipated insurance coverage and possible out of pocket cost was discussed. Patient and patient's family are aware patient may incur the cost of transportation to the facility, pending insurance payment: YES  Barriers to discharge: Medical clearance, therapy goals met, safe discharge plan    Discharge Recommendations   Disposition: Mother's home in [place], [place]  Transportation Needs: Family to provide  Name of Transportation Company and Phone: NA    Additional comments   SW met with patient today and introduce self and role of SW. Patient is open to SW services while in TCU.  Patient plans to discharge to her mother's home in [place], [place] once medical stable and therapy goals are met. SW will provide patient with care plan goals and orders today. SW will continue to follow and assist as needed.    SHERRY Santiago,[facility]    Phone: [phone]  Pager: [phone]       07/23/19 1000   Living Arrangements   Lives With parent(s)   Living Arrangements house   Primary Caregiver(s) Parent(s)   Able to Return to Prior Arrangements no   Living Arrangement Comments   (Pt will d/c to her mother's home in [place], [place])   Home Safety   Patient Feels Safe Living in Home? yes   Discharge Planning   Expected Discharge Date   (TBD)   Discharge Needs Assessment   Transportation Anticipated family or friend will provide

## 2019-07-23 NOTE — PLAN OF CARE
OT- Full shower completed using an extended bench and grab bars with Damari and setup. Alternately sitting and standing for wash and drying self. Full dressing with set up and seated SBA/CGA with ESTHER's

## 2019-07-23 NOTE — PLAN OF CARE
Quiet and pleasant. Criticaid paste applied to  cleft open area. Tubi  bilateral L/E. Had shower this morning. Frequent bowel movements. Tires easily.

## 2019-07-23 NOTE — PLAN OF CARE
Discharge Planner PT   Patient plan for discharge: Home with mother's home temporarily with stair requirement  Current status: Able to ambulate with FWW today 147ft, then 65 ft outside with SBA/SUP for weakness and fatigue. Pt limited this date by extreme fatigue, will benefit from having split 30/30 sessions to optimize tolerance  Barriers to return to prior living situation: Weakness, Fatigue  Recommendations for discharge: Home with Home PT  Rationale for recommendations: to ensure safety and highest possible return to function.  -10 min d/t fatigue.        Entered by: Carley Henyr 07/23/2019 4:34 PM

## 2019-07-24 NOTE — PLAN OF CARE
Pt c/o loose stools, in bathroom most of morning and during AM session. SBA transfer and amb to bathroom with ww. Progress as able

## 2019-07-24 NOTE — PROGRESS NOTES
Chart reviewed  Vital signs stable  Labs reviewed  MELD-Na score: 30 at 7/24/2019  6:24 AM  MELD score: 30 at 7/24/2019  6:24 AM  Hg 7.5 gm/dl  Platelet count 116  INR 3.64  No changes   Continue plan of care    Addendum:   Patient would like some thing to help with tremors.   Also reported she is having urgency, stinging sensation with urination. Denies any abdominal pain. Worried UTI may be back    A/p  Tremors most likely multifactorial due to liver disease, weakness, and possibly anxiety  Will check          Caleb Silverhal  Internal Medicine  Hospitalist  Pager no: [phone]

## 2019-07-24 NOTE — PLAN OF CARE
OT: Pt incontinent with loose stool BM upon arrival seated on toilet with call light on. Total A for radha cares and wipe LB while pt standing at 2ww.     Educated and trialed LB dressing and pt able to complete with increased ease.

## 2019-07-24 NOTE — PLAN OF CARE
Pt alert and oriented x4, able to make needs known. VSS on RA. Denies pain and SOB. Given PRN Atarax for c/o itching. Complains of hand and foot tremors. Criticaid paste applied to  cleft wound. SBA with walker for transfers. Aqua K pad in place, pt uses to keep body warm. Call light within reach, will continue to monitor.

## 2019-07-24 NOTE — PROGRESS NOTES
"SPIRITUAL HEALTH SERVICES  SPIRITUAL ASSESSMENT Progress Note  [facility] ([facility]) 4R     REFERRAL SOURCE: LOS Visit    Neema said she is not feeling well today and she \"spends most of my days in the bathroom\". She said her mom has been able to visit and a friend she grew up with that is also a .     PLAN: Follow up this week on TCU.    Margaret Gomez   Intern  Pager [phone]    "

## 2019-07-24 NOTE — PLAN OF CARE
Pt.A&Ox4. Denies pain, discomfort, CP and SOB. PCDs ordered/placed. Pt.inquired how to remove PCDs for when she needs to get up to the BR - writer reeducated on need to call for assistance as she is not yet deemed safe  to be up ambulating indep. Pt.did call for assist and ambulated with walker/SBA - steady gait. Continent lrg.loose BM. Uses aqua K pad for warmth.

## 2019-07-24 NOTE — PLAN OF CARE
Pt VSS. Pt c/o nausea this AM, given zofran prior to medications. This afternoon c/o gas, cramping and stomach upset. Order obtained for simethicone and maalox/mylanta, administered ~1520. Pt having frequent loose stools, declined 1400 lactulose. Yet to obtain urine sample. Tubigrip on BLE, BLE elevated in bed. Continue with POC for pt.

## 2019-07-25 NOTE — PROGRESS NOTES
Medicine XC:    Notified that pt had lower BP; was asymptomatic.   Nurse noted larger stool output earlier, questioned if she might be dehydrated.  H&P reviewed    Stopped by to see pt; feels well overall.  Had belly pain earlier, now resolve.  No fevers, chills, rigors  Was receiving IV requested by nurse earlier.  Will give 1L IVF over 4 hours, monitor BP closely, monitor for any other conditions that could be contributing in addition to liver disease + possible dehydration.    Lyndon Downing MD  [facility] Hospitalist      -----------------------------------    Addendum: 5am  BP remains in similar range after 1L bolus; still asymptomatic  Noted on review of MAR that propanolol started 7/24 with indication of tremor  Will discontinue.  Monitor BP closely    Lyndon Downing MD  Med-Peds Hospitalist

## 2019-07-25 NOTE — PLAN OF CARE
Pt continued with low BP today-see flowsheets. Diuretics held. Pt reports mild dizziness and mostly feeling tired/fatigued. Seen by MD, see his note. Pt received 500 ml bolus of NS per order. BLE with edema, tubigrip applied. Zofran given x1 for nausea. Multiple loose stools, lactulose dose reduced by MD. RN and MD both encouraged increased fluid intake given diarrhea and BP. Barrier paste to fissure, area pink. Continue to monitor pt status, update MD with changes/concerns.

## 2019-07-25 NOTE — PLAN OF CARE
Pt states not feeling as well this PM, had trouble getting back to the bed with nursing. States deciding if will need to go back to hospital but pt is hoping not. Bed ex only this PM. BP was low in AM, did seated ex, one walk to bathroom. Cont per POC to pt tolerance

## 2019-07-25 NOTE — PLAN OF CARE
Alert and oriented x 4. Good appetite and fluid intake. Cleaned coccyx area scant blood noted. Encouraged pt to turn and reposition. Ambulated to the bathroom with SBA.x 1 voided good and had a BM . At bedtime  B/P was 82/46 encouraged pt to drink water. Edema BLE. Paged MD for lower B/P night nurse will F/U

## 2019-07-25 NOTE — PLAN OF CARE
Patient alert and oriented x 4. Patient BP was low on evening shift. PIV started by . Patient had 0.9% sodium chloride bolus 1 liter. BP after bolus was 85/42, 76, asymptomatic. No complaints of pain, SOB, dizziness,headache or any N/V noted. Paged MD, no new orders. No BM this shift. PIV to L lower forearm intact and patent. Will continue with current plan of care.

## 2019-07-25 NOTE — PROGRESS NOTES
"S: Overnight events reviewed  Asymptomatic low BP. Received 1 liter NS bolus  Patient currently reports doing well. No lightheadedness or dizziness. Felt dizzy earlier in the morning  No chest pain, or shortness of breath. No cough. No nausea, vomiting    O: Vital signs:    Blood pressure 94/52, pulse 70, temperature 97  F (36.1  C), temperature source Oral, resp. rate 18, weight 113.4 kg (249 lb 14.4 oz), last menstrual period 10/04/2018, SpO2 93 %.  Estimated body mass index is 38 kg/m  as calculated from the following:    Height as of 12/20/18: 1.727 m (5' 8\").    Weight as of this encounter: 113.4 kg (249 lb 14.4 oz).      Alert,awake, conversing well  Scleral icterus  B/L CTA  S1, S2 normal  Soft, Moderately distended non tender    A/P    44 year old year old female with hx of ESLD of unclear cause (likely combination of chronic liver disease, alcohol injury, and possible medication injury), Hepatic encephalopathy, Abnormal Hematological indices, Alcohol use disorder, Chronic pain is being admitted to [facility]CU for ongoing rehabilitation on 07/21/2019 after her hospitalization at [facility] for generalized fatigue, one day of nausea, and vomiting, anemia, hypokalemia from 07/17/19- 07/21/19.       Hypotension: Asymptomatic. Most likely d/t Propranolol. It was started for tremor on 07/24.   Also UA with no pyuria.   - Continue to monitor      Addendum 1:50 PM: Patient report doing okay. No lightheadedness or dizziness. Feels tired, and fatigued. Reports not drinking enough fluid.  BP 99/50 mm Hg.   Hg 7.5 gm/dl  Na 139  Creatinine 0.87  - Will hold diuretics today  - Encouraged patient to drink. Will given 500 ml IV fluid for support  - Continue to monitor  - Advise RN to call MD if symptomatic  - No indication for transfer to acute hospital for now, as patient asymptomatic          Caleb Lewis  Internal Medicine  Hospitalist  Pager no: [phone]   "

## 2019-07-26 NOTE — PROGRESS NOTES
"CLINICAL NUTRITION SERVICES - ASSESSMENT NOTE     Nutrition Prescription    RECOMMENDATIONS FOR MDs/PROVIDERS TO ORDER:  Recommend daily thiamine supplementation given cirrhosis and history of bariatric surgery.     Malnutrition Status:    Patient does not meet two of the above criteria necessary for diagnosing malnutrition    Recommendations already ordered by Registered Dietitian (RD):  None at this time    Future/Additional Recommendations:  - Continue to monitor PO intake and wt trends  - Continue to offer supplements to see if the pt changes her mind     REASON FOR ASSESSMENT  Neema Bailey is a/an 44 year old female assessed by the dietitian for LOS    NUTRITION HISTORY  Per chart review: hx of ESLD of unclear cause (likely combination of chronic liver disease, alcohol injury, and possible medication injury), Hepatic encephalopathy, Abnormal Hematological indices, Alcohol use disorder, Gastric bypass surgery ( Denny-en-Y on 2002 ), Chronic pain is being admitted to  TCU for ongoing rehabilitation on 07/21/2019 after her hospitalization at [facility] for generalized fatigue, one day of nausea, and vomiting, anemia, hypokalemia from 07/17/19- 07/21/19    Per MD note 7/26: Having loose stools 6-7 times a day, No nausea/vomiting, Will check C diff    Per pt report: At home, the pt eats 2 meals per day. She lives with family and eats whatever they have purchased/prepared. She likes to eat fruit, salad, and \"fresh things like that\". The pt was very tired during our visit and was unable to provide further detail. The pt only has a few foods that she can not eat d/t hx of Denny-en-Y including rice and noodles.      CURRENT NUTRITION ORDERS  Diet: Regular    Intake/Tolerance: Pt orders 2 meals per day with not snacking. She usually orders \"brunch\". Her appetite is fair but she is very nauseous. She does not think her intake has decreased and tries to select food high in protein. The pt is not interested in " "supplements at this time.   - Pt denies any food restrictions or preferences that severely limit their menu options and feel their menu choices are adequate.     LABS  Labs reviewed  -BUN: 4 (L)  -Range of last 5 BG readings: 111-172    MEDICATIONS  Medications reviewed  -lactulose  -thera-vit  -Vitamin D3    ANTHROPOMETRICS  Ht Readings from Last 1 Encounters:   12/20/18 1.727 m (5' 8\")     Most Recent Weight: 113.4 kg (249 lb 14.4 oz)  IBW: 63.6 kg (178% IBW)  BMI: 38.5 Obesity Grade II BMI 35-39.9  Weight History: Per last RD note form 7/17 the pt has ascites so wt changes as likely d/t fluid status. Patient reports UBW of 240 lbs.  Wt Readings from Last 10 Encounters:   07/21/19 113.4 kg (249 lb 14.4 oz)   07/18/19 108.2 kg (238 lb 8 oz)   07/05/2019  130.6 kg (288 lb)   06/02/2019  125.2 kg (276 lb)   12/20/18 117.9 kg (260 lb)   12/18/18 117.9 kg (260 lb)   12/14/18 117.9 kg (260 lb)   11/30/18 120.7 kg (266 lb)   11/26/18 121.1 kg (266 lb 14.4 oz)   11/02/18 124.3 kg (274 lb)   10/22/18 122.5 kg (270 lb)   10/18/18 91.2 kg (201 lb)       Dosing Weight: 76 kg (adjusted based on actual wt of 113.4 kg and IBW of 63.6 kg)     ASSESSED NUTRITION NEEDS  Estimated Energy Needs: 9106-1723 kcals/day (25 - 30 kcals/kg)  Justification: Maintenance and Obese  Estimated Protein Needs:  grams protein/day (1.2 - 1.5 grams of pro/kg)  Justification: Increased needs and Obesity guidelines  Estimated Fluid Needs: ESLD/ Ascites   Justification: Per provider pending fluid status    PHYSICAL FINDINGS  See malnutrition section below.  Ascites     MALNUTRITION  % Intake: No decreased intake noted  % Weight Loss: Unable to assess d/t ascities  Subcutaneous Fat Loss: None observed  Muscle Loss: None observed  Fluid Accumulation/Edema: Ascites  Malnutrition Diagnosis: Patient does not meet two of the above criteria necessary for diagnosing malnutrition    NUTRITION DIAGNOSIS  Predicted inadequate nutrient intake related to " increased protein needs and frequent n/v.     INTERVENTIONS  Implementation  - Nutrition Education: Provided education on the importance of adequate intake, especially protein. Helped the pt brainstorm ideas for incorporating more protein into her diet.  - Encouraged the pt to start ordering meal TID.     Goals  Patient to consume % of nutritionally adequate meal trays TID, or the equivalent with supplements/snacks.     Monitoring/Evaluation  Progress toward goals will be monitored and evaluated per protocol.        Deepthi Karimi RD

## 2019-07-26 NOTE — PLAN OF CARE
Alert and orientedx4. BP on low side-see flowsheet. Encouraged fluids. Denies chest pain, dizziness, N/V, headache, lightheadedness. Had approx 6 loose BM today per pt. Declined lactulose. Had a shower with CNA. Tubigrips removed, PCD applied. Blanchable redness to buttocks and fissure, barrier cream applied. No concerns for bladder.  Cont plan of care  Continue to monitor BP.   Vital signs:  Temp: 97.6  F (36.4  C) Temp src: Oral BP: 93/49 Pulse: 73 Heart Rate: 68 Resp: 16 SpO2: 95 % O2 Device: None (Room air)

## 2019-07-26 NOTE — PROGRESS NOTES
Reports doing well  No lightheadedness or dizziness  Having loose stools 6-7 times a day  No nausea, vomiting  BP has improved   Will check MILDRED Lewis  Internal Medicine  Hospitalist  Pager no: [phone]

## 2019-07-26 NOTE — PLAN OF CARE
PTA: Pt orthostatic this morning, see vitals section. Agreeable to seated ex at EOB. Mild Lightheadedness with position changes.

## 2019-07-26 NOTE — PROGRESS NOTES
"SPIRITUAL HEALTH SERVICES  SPIRITUAL ASSESSMENT Progress Note  [facility] ([facility]) 4R     PRIMARY FOCUS:     Goals of care    Emotional/spiritual/Amish distress    Support for coping    REFERRAL SOURCE: Follow up visit    ILLNESS CIRCUMSTANCES:   Reviewed documentation. Reflective conversation shared with Neema which integrated elements of illness and family narratives.     Context of Serious Illness/Symptom(s) - Neema explained that she has been struggling with the symptoms of this illness for 3-4 years and in and out of the hospital for the last year. She said it took the doctors a while to find the liver disease. She said she wonders about one day be a good candidate for a transplant but for now she is working on getting stronger.    Resources for Support - Neema said her friend Kindra visits 2-3 times a week and she has had high school friends come to visit as well. She said she feels well supported.    DISTRESS:     Emotional/Spiritual/Existential Distress - Neema said because of her sickness, \"I don't even recognize myself. I used to be able to do so much and now I get so fatigued so easily\".     Mu-ism Distress - n/d    Social/Cultural/Economic Distress - Neema said she will discharge to her mother's home but she is concerned because it is so far from a hospital and many steps to enter and she has been struggling with two steps currently.    SPIRITUAL/Anglican COPING:     Faith/Kathy - n/d    Spiritual Practice(s) - She said her friend Kindra who is a  comes to pray with her regularly.    Emotional/Relational/Existential Connections - Neema said one of her friends helped raise money to help with her medical expenses and she was overwhelmed by the love of her friends.    GOALS OF CARE:    Goals of Care - Neema said she would like to be strong enough to go home.    Meaning/Sense-Making - Neema said she is mostly focused on getting stronger and getting rest.    PLAN: Follow up with Neema next week " on TCU.      Margaret Young Intern  Pager [phone]

## 2019-07-26 NOTE — PLAN OF CARE
Pt continues with low BP, seen by MD this AM. Midodrine as ordered. Encouraged PO intake, increasing fluids. Pt reports mild dizziness with standing.  Pt nauseated, zofran given. Offered gingerale, peppermint and other GI meds but pt declined. Frequent loose stools continued, MD ordered c diff sample. Unable to obtain d/t pt missing collection container multiple times. Pt on enteric precautions until ruled out.

## 2019-07-26 NOTE — PLAN OF CARE
Patient sleeps most of the shift. VS Blood pressure 103/52, pulse 77, temperature 97.6  F (36.4  C), temperature source Oral, resp. rate 16, weight 113.4 kg (249 lb 14.4 oz), last menstrual period 10/04/2018, SpO2 91 %.  No complaints of dizziness, SOB, chest pain or any N/V noted. No BM this shift. Will continue with current plan of care.

## 2019-07-27 NOTE — PLAN OF CARE
RN: Pt alert and oriented, VSS. Pt had very soft BM twice this shift, unable to send stool specimen for C-diff because stool was mixed with urine at each time. Wound care done to  cleft area. Appetite fair. Pt denies pain or dizziness this shift. Continue with POC.

## 2019-07-27 NOTE — PLAN OF CARE
Pt.A&Ox4. Sleeping between cares. Removed tubigrips from BLEs per pt.request. Applied PCDs. Uses call light appropriately. SBA/walker to BR - continent B&B - small soft>formed stool. Cdiff specimen collected and sent. Pt.requested and rec'd Atarax x1 for c/o generalized itching.

## 2019-07-27 NOTE — PLAN OF CARE
Pt A&O x4, able to make needs known. Denies pain and SOB. Given PRN simethicone for GI upset, pt reports improvement. Poor appetite. C. diff test came back negative, enteric precautions discontinued. SBA with a walker. Tubigrips in place. Abdominal binder while OOB. Call light within reach, will continue to monitor.

## 2019-07-27 NOTE — PLAN OF CARE
Discharge Planner PT   Patient plan for discharge: home to mom's house  Current status: SBA for functional mobility with WW; up to 95 feet. Activity depending on BP due to instances of orthostatic BP  Barriers to return to prior living situation: medical status, 12 stairs to enter mom's home  Recommendations for discharge: home to mom's house (if able to complete stairs),  PT  Rationale for recommendations: current status, anticipated progression if she remains medically stable       Entered by: Ruth Mendoza 07/27/2019 3:21 PM

## 2019-07-28 NOTE — PLAN OF CARE
Pt.A&Ox4. SBA/walker to BR - also self-transferred x1, reminded that she needs to call for assist as not yet safe for indep.transfers. Small BM this shift. Denies pain, discomfort, CP and SOB. Tubigrips removed per request and PCDs applied. Medicated with Atarax @ 2400 for generalized itchiness. Wears abd.binder when up and OOB.

## 2019-07-28 NOTE — PLAN OF CARE
VSS; alert and oriented x4. Denies confusion/forgetfulness. BP WDL this shift; tubi  worn and ABD binder on. BLE +2 edema. Up with SBA and walker to bathroom; tolerating well. BM x2 this shift r/t scheduled lactulose. Sclera yellowed; skin jaundiced. Wound on sacrum cleansed and barrier cream applied. Pt denying nausea this shift; tolerating high bright/protein diet with good appetite. PIV in L hand SL; WDL. Using T pump on abdomen for discomfort.  Able to make needs known. Calm and cooperative with nursing cares.

## 2019-07-28 NOTE — PLAN OF CARE
Pt alert and oriented x4, able to make needs known. Denies pain and SOB. C/o GI upset, given PRN simethicone tablet, pt reports improvement. Given PRN atarax at 1030 for c/o itching. Uses Aqua K pad for comfort. Wound cares completed to buttocks. Tubigrips in place. Wears abdominal binder while OOB. SBA with a walker. Reminded to call for assistance when getting OOB. Call light within reach, will continue to monitor.

## 2019-07-29 NOTE — PROGRESS NOTES
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    Patient History  According to provider note(s):  44 year old female with past medical history of morbid obesity status post Denny-en-Y gastric bypass with return of excess weight, long-standing history of severe alcohol use disorder, history of prescription narcotic dependence, peripheral neuropathy, iron deficiency anemia, and major depression who presents from a TCU with concerns of decompensated liver disease, diarrhea and nausea    Objective Data  Containment of urine/stool: Diaper    Active Diet Order  Orders Placed This Encounter      Combination Diet High Kcal/High Protein Diet, ADULT; 2 gm NA Diet      Output:   I/O last 3 completed shifts:  In: 360 [P.O.:360]  Out: 300 [Urine:300]    Risk Assessment:   Sensory Perception: 4-->no impairment  Moisture: 4-->rarely moist  Activity: 3-->walks occasionally  Mobility: 3-->slightly limited  Nutrition: 3-->adequate  Friction and Shear: 2-->potential problem  Sebas Score: 19                          Labs:   Recent Labs   Lab 19  1159 19  0624   ALBUMIN 2.1* 2.4*   HGB 7.5* 7.5*   INR  --  3.64*   WBC 5.8 6.2       Physical Exam  Skin inspection: focused buttocks    Wound Location:   cleft    7/15    7/22                                                                            7/29    Date of last photo 19  Wound History:  Wounds present on admission.  Received lactulose with several daily  episodes of incontinent diarrhea.  States that when she was at TCU she would have to wait up to over an hour to have brief changed after incontinent episodes.  Now trying to use commode instead of brief.   Has a Physical therapy consult ordered.  Is able to turn and reposition independently in bed. Has been getting out of bed week of 7/22 to use bathrrom    Measurements (length x width x depth, in cm)   Superior wound:  1.5  cm x 0.2 cm  x  0.1 cm    Inferior wound:  3.0 cm x 0.3 cm x 0.1 cm  Wound Base: 100 % dermis  Palpation of the wound bed: normal   Periwound Color: normal and consistent with surrounding tissue  Temperature: normal   Drainage:, none  Odor: none  Pain: minimal     Interventions  Current support surface: Standard  Atmos Air mattress  Visual inspection of wound(s) completed  Wound Care: done per plan of care  Supplies: placed at the bedside  Education provided: importance of repositioning and plan of care  Discussed plan of care with Patient

## 2019-07-29 NOTE — PROGRESS NOTES
"[facility], [place]   Internal Medicine Daily Note           Interval History/Events     Overnight events reviewed  Reports doing well  Feels nauseous at times after taking medications.   No nausea, vomiting, chest pain, shortness of breath  No fever, chills  No lightheadedness or dizziness.        Review of Systems        4 point ROS including Respiratory, CV, GI and , other than that noted above is negative      Medications   I have reviewed current medications  in the \"current medication\" section of Epic.  Relevant changes include:     Physical Exam   General:       Vital signs:    Blood pressure 112/61, pulse 87, temperature 97.4  F (36.3  C), temperature source Oral, resp. rate 18, weight 115 kg (253 lb 8 oz), last menstrual period 10/04/2018, SpO2 93 %.  Estimated body mass index is 38.54 kg/m  as calculated from the following:    Height as of 12/20/18: 1.727 m (5' 8\").    Weight as of this encounter: 115 kg (253 lb 8 oz).      Intake/Output Summary (Last 24 hours) at 7/29/2019 1004  Last data filed at 7/29/2019 0835  Gross per 24 hour   Intake 360 ml   Output 250 ml   Net 110 ml      HEENT: Scleral icterus, no pallor  Cardiovascular: S1, S2 normal.   Respiratory:  B/L CTA. Decreased breath sounds at bases.   GI/Abdomen: Soft, NT, BS+  Neurology: Alert, awake,and oriented.   Extremities: B/l pre tibial edema, thigh edema.   Skin:      Laboratory and Imaging Studies     I have reviewed  laboratory and imaging studies in the Epic. Pertinent findings are as below:    BMP  Recent Labs   Lab 07/25/19  1159 07/24/19 0624    140   POTASSIUM 3.5 3.4   CHLORIDE 109 109   NARENDRA 7.9* 8.1*   CO2 21 22   BUN 4* 3*   CR 0.87 0.84   * 116*     CBC  Recent Labs   Lab 07/25/19  1159 07/24/19  0624   WBC 5.8 6.2   RBC 2.38* 2.39*   HGB 7.5* 7.5*   HCT 23.3* 23.3*   MCV 98 98   MCH 31.5 31.4   MCHC 32.2 32.2   RDW 17.0* 16.9*   * 116*     INR  Recent Labs   Lab 07/24/19  0624 "   INR 3.64*     LFTs  Recent Labs   Lab 07/25/19  1159 07/24/19  0624   ALKPHOS 91 99   AST 51* 52*   ALT 19 18   BILITOTAL 10.4* 11.9*   PROTTOTAL 4.9* 5.3*   ALBUMIN 2.1* 2.4*      PANCNo lab results found in last 7 days.        Impression/Plan        44 year old year old female with hx of ESLD of unclear cause (likely combination of chronic liver disease, alcohol injury, and possible medication injury), Hepatic encephalopathy, Abnormal Hematological indices, Alcohol use disorder, Chronic pain is being admitted to  TCU for ongoing rehabilitation on 07/21/2019 after her hospitalization at [facility] for generalized fatigue, one day of nausea, and vomiting, anemia, hypokalemia from 07/17/19- 07/21/19. She was given blood transfusions, potassium were replaced, and treated for uncomplicated cystitis     # Deconditioning: d/t liver disease, infection, dyselectrolytemia  - PT/OT consult.      # Acute on chronic anemia:  Likely new baseline is low 7s, due to chronic liver disease and nutrition issues.  Most recent hgb PTA was 6.9 from 7/5, when patient was discharged from [facility]. She was given 1 unit pRBCs with appropriate Hgb improvement. No signs of bleeding throughout the hospital stay. Hg 7.6 gm/dl on 07/21 prior to admission to TCU. No signs of bleeding throughout TCU stay till 07/29  Hg 7.5 gm/dl on 07/25  - CBC weekly. Next on Wednesday  - Transfuse if Hg less than 7 gm/dl .      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 Leventhal at [facility], liver injury seems more consistent with drug-induced either from Bactrim or nitrofurantoin. She had  prednisolone trial during 6/6 [facility] admission x2 (no response).. Hopeful about improvement  in liver function over time. Not currently a transplant candidate, but  can be reconsidered over time.  Numerous abdominal varices noted on CT C/A/P from 6/25/19.  MELD-Na score: 30 at 7/25/2019 11:59 AM  MELD score: 30 at 7/25/2019 11:59 AM  - CMP weekly. Next on Wednesday  - Follow up with Dr. Leventhal in 2 months with CBC, CMP and INR  - Low threshold to offer chem dep treatment once discharging from TCU.        # Hx of Hepatic Encephalopathy:   Alert, awake, and oriented at this time  Reports having 3-4 soft bowel movements in last few days  - Continue Lactulose  - Continue Rifaximin 550 mg BID        # Ascites: d/t liver disease  PTA on Furosemide, and Spironolactone  - Continue diuretics  - 2 gm Na diet.     # Fluid overload, Hypervolemia: d/t liver disease  Prior to admission on TCU on Furosemide, and Spironolactone. She had soft BP, so it had to be held. BP now rising up  - Reinitiate Spironolactone, and Furosemide. On low dose due to hypotension     # Pruritus: d/t liver disease  On  Hydroxyzine 25-50 mg PRN, and Sertraline 75mg daily for pruritis   - Continue Hydroxyzine, and Sertraline        # Hypokalemia: d/t losses from both GI (lactulose, Recent diarrhea, recent vomiting) and urine (diuretic use). She was given potassium  On potassium supplement  - Continue      # Uncomplicated cystitis: Completed 3 day treatment  Hx of ESBL infection     # Mild thrombocytopenia: Likely related to ESLD.   Platelet level 105  on 07/25  - CBC weekly     # Alcohol use disorder:  Endorses binge drinking. Unclear on when last drink was but reports possibly sometime in May prior to her admission to [facility].  Possibly self-medicating for depression, PTSD, and prior opioid use disorder.  -She reports she had an ACD done while at [facility], will try to get that into record here if she's going to be seeking care here.  - Social work consult     # Chronic pain: Has been off opioids for several years.  Pain controlled at this time     # Hx of Depression: PTA on Sertraline  - Continue .     # FEN:  Diet: Snacks/Supplements Adult: Boost Shake; Between Meals  Combination Diet High Kcal/High Protein Diet, ADULT; 2 gm NA Diet  # Lines &Tubes: None  # Activity & Physical condition: PT/OT consult  # Prophylaxis: Mechanical. No chemical prophylaxis due to thrombocytopenia  # Social Issues:   # Code status: Full code  # Pneumococcal vaccine: Qualifies for Pneumococcal poly vaccine on discharge.                 Pt's care was discussed with bedside RN, patient and  during Care Team Rounds.               Caleb Lewis MD  Hospitalist ( Internal medicine)  Pager: [phone]

## 2019-07-29 NOTE — PLAN OF CARE
"Pt remains jaundiced, able to ambulate independently to the bathroom with a walker. Slightly weak but has steady gait while ambulating  within the room. Stools are watery due to Lactulose for liver cirrhosis. Declined the need for pain medication, stated \"No I'm ok\". Bilateral L/E edema noted, tubi  applied. Abdominal binder on before getting OOB to prevent dizziness. Fissure between the rectal folds, barrier cream applied. New order in place to apply Triad paste twice daily. No SOB or dizziness noted at this time. Pleasant and cooperative with cares.   "

## 2019-07-29 NOTE — PLAN OF CARE
Patient alert and able to make needs known, up indep in room, no complained of pain and no respiratory distress noted.Patient had PCD on through the night, bilateral lower extremities edema. Call light is within reach, continue to monitor

## 2019-07-29 NOTE — PLAN OF CARE
OT: SBA tub transfer bench simulated to mother's home set up. Pt interested in bench for home. Pt will benefit from sitting during bathing d/t energy conservation and safety.    Patient requires increased time to complete tasks d/t fatigue.

## 2019-07-29 NOTE — PLAN OF CARE
Patient alert and oriented x4, able to make needs known. PRN atarax given @ 2200. Declined wound dressing change. Changed on am shift. SBA. Tubi  to BLE and abdominal binder on while standing. No complaints of pain, SOB, chest pain, or nausea. Needs reminders to call for assistance before ambulating. No other concerns. Will continue to monitor.

## 2019-07-30 NOTE — PLAN OF CARE
OT: Supervision shower task with tub transfer bench, hand held shower, and grab bars alternating sitting/standing. Pt completed shaving as well. Set up provided d/t unfamiliar environment.    Notified pt today that tub transfer bench is covered by pt's insurance. Plan to send patient home with bench purchased from unit.

## 2019-07-30 NOTE — PLAN OF CARE
PT: Pt appears to be progressing toward goals, increased fatigue in the AM but was able to participate fully in PM and ambulated down to gym from room and back. Focus on Stairs this date with pt performing 3 stairs x 3 reps with double hand rail, SBA.

## 2019-07-30 NOTE — PLAN OF CARE
Patient up to bathroom independently, complained of pain and took prn Atarax with goo relief. Appeared to be sleeping most of this shift, call light is within reach and she is able to make needs known, continue plan of care

## 2019-07-30 NOTE — PLAN OF CARE
Patient alert and oriented x4, able to make needs known. Complained of back pain this pm, repositioning and turning helped to relieve discomfort. Pt requesting tubi  be removed overnight and sween cream applied to legs for itching. PRN atarax given @ 2130 for itching and discomfort. Barrier cream applied to bottom fissure. Fair appetite. Needing reminders to call for assistance before getting up to prevent blood pressure drops. Pt stated feeling dizzy when ambulating to bathroom throughout evening. Encouraged fluids. No complaints of chest pain, SOB, or nausea. Will continue to monitor.

## 2019-07-30 NOTE — PROGRESS NOTES
SW spoke with patient this morning to follow up about support system at home following TCU. SW explained that it was brought up that patient's mother, who is patient's primary support person, will be getting hip surgery sometime soon and that our team wants to make sure patient feels well supported at home. Patient clarified that her mother was going to get the surgery on patient's birthday (July 10th), but decided not to do so and doesn't plan to do so anytime soon. Patient expressed that between her mother and sister, she feels well supported and is looking forward to staying at her mother's home post TCU.     SW will continue to follow and assist as needed.    SHERRY Santiago,[facility]  TCU    Phone: [phone]  Pager: [phone]

## 2019-07-30 NOTE — PLAN OF CARE
Pt remains independent in the room, uses a walker with ambulation. Denies discomfort. Had a shower during OT session. Triad paste applied to the fissure in the rectal folds. Had 1 BM after Lactulose dose in the morning.

## 2019-07-30 NOTE — PROGRESS NOTES
Pt will have home care through [facility]. She will be seen by skilled nursing, OT and PT. Phone number: [phone], Fax: [phone].

## 2019-07-31 NOTE — PLAN OF CARE
Patient awake at the start of this shift with complained of upset stomach and nausea, she received prn Zofran and Maalox and Mylanta cocktail mix given with good relief. Patient was able to fall asleep an hour after taking medications. Up independently and ambulate to and from bathroom, tubi  off this shift, continue plan of care.

## 2019-07-31 NOTE — PLAN OF CARE
OT: -15 min d/t pt fatigue and dizziness. Pt with good effort in session today but limited d/t fatigue and required increased seated rest breaks. Vitals taken by nursing staff during session. /64. O2 96% on RA.

## 2019-07-31 NOTE — PLAN OF CARE
RN: Pt AA&O x3, able to make needs known. Pt c/o occasional dizziness when walking; pt reminded to call for assistance. Pt on Lactalose, indep to BR. INR 3.49 - monitor for bleeding. Con't POC.

## 2019-07-31 NOTE — PLAN OF CARE
VSS. Alert and orientedx4. Pleasant and cooperative. Continent of bowel and bladder. SBA for transfer and toileting. Denies any chest pain, dizziness, N/V, SOB. PCD on. Triad paste to rené cleft. BM 3x today, on lactulose.   Will continue plan of care.  Vital signs:  Temp: 98.4  F (36.9  C) Temp src: Oral BP: 110/52 Pulse: 92 Heart Rate: 91 Resp: 18 SpO2: 96 % O2 Device: None (Room air)

## 2019-08-01 NOTE — PLAN OF CARE
OT patient feeling better, able to demonstrate more consistent progress toward meeting all oT goals. OT assisting with options for A[facility] DME needed for home.

## 2019-08-01 NOTE — PLAN OF CARE
Pt tolerated standing LE ex with one rest. Able to control 4ww--working on getting one from [facility]. Did 12 stairs today--states can go into house on walkout level also.

## 2019-08-01 NOTE — PLAN OF CARE
Pt alert and oriented x4. Able to make needs known. SBA. Denies pain, denies SOB.VSS. Charge nurse applied triad cream to  cleft. Call light within reach. Continue to monitor.

## 2019-08-01 NOTE — PLAN OF CARE
RN: Pt AA&O x 3, able to make needs known. Pt taking Lactolose, has loose stools, able to get to BR independently. Bed linens changed. Abdominal binder and tubigrips on. Pt c/o nausea, zofran and Maalox given w/ good relief. Con't POC.

## 2019-08-01 NOTE — PLAN OF CARE
Pt.A&Ox4. Up indep.in room and to BR - calls for assist with PCDs. Has no c/o's pain, discomfort, CP and SOB. Appears to be sleeping well.

## 2019-08-02 NOTE — PROGRESS NOTES
"[facility], [place]   Internal Medicine Daily Note           Interval History/Events     Overnight events reviewed  Reports overall doing well  Feels burning sensation when she initiates urination. Denies any abdominal pain, flank pain  Feels nauseous with medication. No vomiting  No confusion  Having 3-4 soft bowel movements. Yesterday had almost five       Review of Systems        4 point ROS including Respiratory, CV, GI and , other than that noted above is negative      Medications   I have reviewed current medications  in the \"current medication\" section of Epic.  Relevant changes include:     Physical Exam   General:       Vital signs:    Blood pressure 108/50, pulse 98, temperature 97.9  F (36.6  C), temperature source Oral, resp. rate 18, weight 115.4 kg (254 lb 8 oz), last menstrual period 10/04/2018, SpO2 92 %.  Estimated body mass index is 38.7 kg/m  as calculated from the following:    Height as of 12/20/18: 1.727 m (5' 8\").    Weight as of this encounter: 115.4 kg (254 lb 8 oz).      Intake/Output Summary (Last 24 hours) at 7/29/2019 1004  Last data filed at 7/29/2019 0835  Gross per 24 hour   Intake 360 ml   Output 250 ml   Net 110 ml      HEENT: Scleral icterus, no pallor  Cardiovascular: S1, S2 normal.   Respiratory:  B/L CTA. Decreased breath sounds at bases.   GI/Abdomen: Soft, NT, BS+  Neurology: Alert, awake,and oriented.   Extremities: B/l pre tibial edema, thigh edema.   Skin:      Laboratory and Imaging Studies     I have reviewed  laboratory and imaging studies in the Epic. Pertinent findings are as below:    BMP  Recent Labs   Lab 07/31/19  0658      POTASSIUM 3.7   CHLORIDE 110*   NARENDRA 8.0*   CO2 22   BUN 5*   CR 0.79   *     CBC  Recent Labs   Lab 07/31/19  0658   WBC 6.8   RBC 2.47*   HGB 7.6*   HCT 23.7*   MCV 96   MCH 30.8   MCHC 32.1   RDW 16.7*   PLT 97*     INR  Recent Labs   Lab 07/31/19  0658   INR 3.49*     LFTs  Recent Labs   Lab " 07/31/19  0658   ALKPHOS 97   AST 58*   ALT 15   BILITOTAL 10.2*   PROTTOTAL 5.0*   ALBUMIN 2.1*      PANCNo lab results found in last 7 days.        Impression/Plan        44 year old year old female with hx of ESLD of unclear cause (likely combination of chronic liver disease, alcohol injury, and possible medication injury), Hepatic encephalopathy, Abnormal Hematological indices, Alcohol use disorder, Chronic pain is being admitted to  TCU for ongoing rehabilitation on 07/21/2019 after her hospitalization at [facility] for generalized fatigue, one day of nausea, and vomiting, anemia, hypokalemia from 07/17/19- 07/21/19. She was given blood transfusions, potassium were replaced, and treated for uncomplicated cystitis     # Deconditioning: d/t liver disease, infection, dys electrolytemia  Evaluated and treated by physical and occupational therapy team.   - PT/OT consult.      # Acute on chronic anemia:  Likely new baseline is low 7s, due to chronic liver disease and nutrition issues.  Most recent hgb PTA was 6.9 from 7/5, when patient was discharged from [facility]. She was given 1 unit pRBCs with appropriate Hgb improvement. No signs of bleeding throughout the hospital stay. Hg 7.6 gm/dl on 07/21 prior to admission to TCU. No signs of bleeding throughout TCU stay till 07/29  Hg 7.6 gm/dl on 07/31  - CBC weekly  - Transfuse if Hg less than 7 gm/dl .      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 Leventhal at [facility], liver injury seems more consistent with drug-induced either from Bactrim or nitrofurantoin. She had  prednisolone trial during 6/6 [facility] admission x2 (no response).. Hopeful about improvement  in liver function over time. Not currently a transplant candidate, but can be reconsidered over time.   Numerous abdominal varices noted on CT C/A/P from 6/25/19.  MELD-Na score: 30 at 7/25/2019 11:59 AM  MELD score: 30 at 7/25/2019 11:59 AM  - CMP weekly. Next on Wednesday  - Follow up with Dr. Leventhal in 2 months with CBC, CMP and INR  - Low threshold to offer chem dep treatment once discharging from TCU.        # Hx of Hepatic Encephalopathy:   Alert, awake, and oriented at this time  Reports having 3-4 soft bowel movements in last few days  - Continue Lactulose  - Continue Rifaximin 550 mg BID        # Ascites: d/t liver disease  PTA on Furosemide, and Spironolactone  - Continue diuretics  - 2 gm Na diet.     # Fluid overload, Hypervolemia: d/t liver disease  Prior to admission on TCU on Furosemide, and Spironolactone. She had soft BP, so it had to be held. It was reinitiated once BP started to come up.   - Continue Spironolactone, and Furosemide. On low dose due to hypotension     # Pruritus: d/t liver disease  On  Hydroxyzine 25-50 mg PRN, and Sertraline 75mg daily for pruritis   - Continue Hydroxyzine, and Sertraline        # Hypokalemia: d/t losses from both GI (lactulose, Recent diarrhea, recent vomiting) and urine (diuretic use). She was given potassium supplements.   On potassium supplement  - Continue      # Uncomplicated cystitis: Completed 3 day treatment  Hx of ESBL infection. Patient reports some burning urination, no abdominal pain or any systemic signs  - Check UA     # Mild thrombocytopenia: Likely related to ESLD.   Platelet level 97  on 07/31  - CBC weekly     # Alcohol use disorder:  Endorses binge drinking. Unclear on when last drink was but reports possibly sometime in May prior to her admission to [facility].  Possibly self-medicating for depression, PTSD, and prior opioid use disorder.  - Social work consult     # Chronic pain: Has been off opioids for several years.  Pain controlled at this time     # Hx of Depression: PTA on Sertraline  - Continue .     # FEN: Diet: Snacks/Supplements  Adult: Boost Shake; Between Meals  Combination Diet High Kcal/High Protein Diet, ADULT; 2 gm NA Diet  # Lines &Tubes: None  # Activity & Physical condition: PT/OT consult  # Prophylaxis: Mechanical. No chemical prophylaxis due to thrombocytopenia  # Social Issues:   # Code status: Full code  # Pneumococcal vaccine: Qualifies for Pneumococcal poly vaccine on discharge.                 Pt's care was discussed with bedside RN, patient and  during Care Team Rounds.               Caleb Lewis MD  Hospitalist ( Internal medicine)  Pager: [phone]

## 2019-08-02 NOTE — PLAN OF CARE
Pt alert and oriented. Able to make needs known. Denies pain, denies SOB. SBA. Ambulated to the bathroom x1. Last bm today. Triad paste applied to rené cleft. Call light within reach. Will continue to monitor.    Temp: 98.1  F (36.7  C) Temp src: Oral BP: 110/63 Pulse: 84   Resp: 18 SpO2: 98 % O2 Device: None (Room air)

## 2019-08-02 NOTE — PLAN OF CARE
OT tub transfer training simulated to home set up. Patient planning tub transfer bench and reacher for home. OT assisting with equipment in prep for discharge target 8/4/19.

## 2019-08-02 NOTE — PLAN OF CARE
A&O x4. CMS and Neuros intact. Pt denied pain, nausea, SOB. Pt states that she had a test for UTI last week and it was negative but is having some burning with urination and would like another test before discharge. Pt did not sleep well overnight but did not have anything else available for sleep. Pt had already taken 3 mg Melatonin at bedtime. Call light within reach and pt able to make needs known.

## 2019-08-03 NOTE — PLAN OF CARE
OT dischargetoday. Home care planned. Patient will transition to her mom's home. Patient working on set up for chem dep and psych support. AE issued. Patient home with tub transfer bench 4ww and reacher. All in patient OT goals met. Good improvement of patient activity tolerance and level of independence.

## 2019-08-03 NOTE — DISCHARGE SUMMARY
[facility]  Hospitalist Discharge Summary       Date of Admission:  7/21/2019  Date of Discharge:  8/4/2019  Discharging Provider: Yordan Hutchinson MD  - Follow up with Dr. Leventhal in 2 months with CBC, CMP and INR    Discharge Diagnoses   -End-stage liver disease  -Deconditioning  -Acute on chronic anemia  -Hepatic encephalopathy  -Fluid overload  -Hypokalemia  -Chronic pain  -Depression    Follow-ups Needed After Discharge   Follow-up Appointments     Adult [facility] Follow-up and recommended labs and tests      Follow up with primary care provider, Suresh Shabazz, within 7 days for   hospital follow- up.  The following labs/tests are recommended: CBC, CMP,   PT/INR.      Appointments on [place] and/or [facility] (with [facility] or [facility]   provider or service). Call [phone] if you haven't heard regarding   these appointments within 7 days of discharge.             Unresulted Labs Ordered in the Past 30 Days of this Admission     Date and Time Order Name Status Description    8/2/2019 1500 Urine Culture Aerobic Bacterial Preliminary     7/18/2019 1600 Lactic acid whole blood In process       These results will be followed up by -myself and pcp    Discharge Disposition   Discharged to home  Condition at discharge: Stable    Hospital Course   44 year old year old female with hx of ESLD of unclear cause (likely combination of chronic liver disease, alcohol injury, and possible medication injury), Hepatic encephalopathy, anemia, Alcohol use disorder, Chronic pain was admitted to  TCU for ongoing rehabilitation on 07/21/2019 after her hospitalization at [facility] hospital for generalized fatigue, one day of nausea, and vomiting, anemia, hypokalemia from 07/17/19- 07/21/19. She was given blood transfusions, potassium were replaced, and treated for uncomplicated cystitis     # Deconditioning: d/t liver disease, infection, electrolyte imbalance  Evaluated and treated by physical and occupational  therapy team.   - PT/OT consult.   -She is going home with home PT/OT  -She will be staying at her mother's house     # Acute on chronic anemia:  Likely new baseline is low 7s, due to chronic liver disease and nutrition issues.  Most recent hgb PTA was 6.9 from 7/5, when patient was discharged from [facility]. She was given 1 unit pRBCs with appropriate Hgb improvement. No signs of bleeding throughout the hospital stay. Hg 7.6 gm/dl on 07/21 prior to admission to TCU. No signs of bleeding throughout TCU stay till 07/29  Hg 7.6 gm/dl on 07/31  - CBC weekly-while here in TCU and then in 1 week after discharge  - Transfuse if Hg less than 7 gm/dl .      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 Leventhal at [facility], liver injury seems more consistent with drug-induced either from Bactrim or nitrofurantoin. She had  prednisolone trial during 6/6 [facility] admission x2 (no response).. Hopeful about improvement  in liver function over time. Not currently a transplant candidate, but can be reconsidered over time.  Numerous abdominal varices noted on CT C/A/P from 6/25/19.  MELD-Na score: 30 at 7/25/2019 11:59 AM  MELD score: 30 at 7/25/2019 11:59 AM  - CMP weekly. -In 1 week after discharge  - Follow up with Dr. Leventhal in 2 months with CBC, CMP and INR  - Low threshold to offer chem dep treatment once discharging from TCU.        # Hx of Hepatic Encephalopathy:   Alert, awake, and oriented at this time  Reports having 3-4 soft bowel movements in last few days  - Continue Lactulose  - Continue Rifaximin 550 mg BID        # Ascites: d/t liver disease  PTA on Furosemide, and Spironolactone  - Continue diuretics  - 2 gm Na diet.      # Fluid overload, Hypervolemia: d/t liver disease  Prior to admission on TCU on Furosemide,  and Spironolactone. She had soft BP, so it had to be held. It was reinitiated once BP started to come up.   - Continue Spironolactone, and Furosemide. On low dose due to hypotension     # Pruritus: d/t liver disease  On  Hydroxyzine 25-50 mg PRN, and Sertraline 75mg daily for pruritis   - Continue Hydroxyzine, and Sertraline        # Hypokalemia: d/t losses from both GI (lactulose, Recent diarrhea, recent vomiting) and urine (diuretic use). She was given potassium supplements.   On potassium supplement  - Continue      # Uncomplicated cystitis: Completed 3 day treatment  Hx of ESBL infection.    # Mild thrombocytopenia: Likely related to ESLD.   Platelet level 97  on 07/31  - CBC weekly-in 1 week after discharge     # Alcohol use disorder:  Endorses binge drinking. Unclear on when last drink was but reports possibly sometime in May prior to her admission to [facility].  Possibly self-medicating for depression, PTSD, and prior opioid use disorder.  - Social work consulted     # Chronic pain: Has been off opioids for several years.  Pain controlled at this time     # Hx of Depression: PTA on Sertraline  - Continue .     # FEN: Diet: Snacks/Supplements Adult: Boost Shake; Between Meals  Combination Diet High Kcal/High Protein Diet, ADULT; 2 gm NA Diet  # Lines &Tubes: None  # Activity & Physical condition: PT/OT consult  # Prophylaxis: Mechanical. No chemical prophylaxis due to thrombocytopenia  # Social Issues:   # Code status: Full code  # Pneumococcal vaccine: Qualifies for Pneumococcal poly vaccine on discharge.           Pt's care was discussed with bedside RN, patient and  during Care Team Rounds.      Consultations This Hospital Stay   OCCUPATIONAL THERAPY ADULT IP CONSULT  PHYSICAL THERAPY ADULT IP CONSULT  WOUND OSTOMY CONTINENCE NURSE  IP CONSULT    Code Status   Full Code    Time Spent on this Encounter   Yordan CHO, personally saw the patient today and spent greater than 30 minutes discharging this  patient.       Yordan Hutchinson MD  [facility] ([facility])  ______________________________________________________________________    Physical Exam   Vital Signs: Temp: 96.7  F (35.9  C) Temp src: Oral BP: 120/65 Pulse: 92 Heart Rate: 100 Resp: 18 SpO2: 93 % O2 Device: None (Room air)    Weight: 254 lbs 8 oz  HEENT: pale, scleral icterus  Cardiovascular: S1, S2 normal.   Respiratory:  B/L decreased breath sounds at bases  GI/Abdomen: Soft, NT, BS+  Neurology: Alert, awake,and oriented.   Extremities: B/l edema 2+       Primary Care Physician   Suresh Shabazz    Discharge Orders      Home care nursing referral      Home Care PT Referral for Hospital Discharge      Home Care OT Referral for Hospital Discharge      Reason for your hospital stay    -You were admitted here for liver failure.  He was sent from the hospital to the transitional care unit.  You have done well with therapy and transitional care unit- being discharged home with further follow-up as an outpatient with your physicians.     Adult [facility] Follow-up and recommended labs and tests    Follow up with primary care provider, Suresh Shabazz, within 7 days for hospital follow- up.  The following labs/tests are recommended: CBC, CMP, PT/INR.      Appointments on [place] and/or [facility] (with [facility] or [facility] provider or service). Call [phone] if you haven't heard regarding these appointments within 7 days of discharge.     Activity    Your activity upon discharge: activity as tolerated     Discharge Instructions    -Please follow-up with your doctors as recommended.  You will have some follow-up blood draw-to make sure your hemoglobin, electrolytes, kidney function are stable.     MD face to face encounter    Documentation of Face to Face and Certification for Home Health Services    I certify that patient: Neema Bailey is under my care and that I, or a nurse practitioner or physician's assistant working with me, had a  face-to-face encounter that meets the physician face-to-face encounter requirements with this patient on: 8/3/2019.    This encounter with the patient was in whole, or in part, for the following medical condition, which is the primary reason for home health care: End-stage liver disease and deconditioning due to that.    I certify that, based on my findings, the following services are medically necessary home health services: Nursing, Occupational Therapy and Physical Therapy.    My clinical findings support the need for the above services because: Nurse is needed: To assess changes in medications or other medical regimen. and To provide assessment and oversight required in the home to assure adherence to the medical plan due to: ESLD and deconditioning, Occupational Therapy Services are needed to assess and treat cognitive ability and address ADL safety due to impairment in cognition. and Physical Therapy Services are needed to assess and treat the following functional impairments: .  Unable to walk independently.    Further, I certify that my clinical findings support that this patient is homebound (i.e. absences from home require considerable and taxing effort and are for medical reasons or [employer] services or infrequently or of short duration when for other reasons) because: Leaving home is medically contraindicated for the following reason(s): Other physician ordered restriction: fall risk...    Based on the above findings. I certify that this patient is confined to the home and needs intermittent skilled nursing care, physical therapy and/or speech therapy.  The patient is under my care, and I have initiated the establishment of the plan of care.  This patient will be followed by a physician who will periodically review the plan of care.  Physician/Provider to provide follow up care: Suresh Shabazz    Attending hospital physician (the Medicare certified [place] provider): Yordan Hutchinson  Physician Signature:  See electronic signature associated with these discharge orders.  Date: 8/3/2019     Diet    Follow this diet upon discharge: Orders Placed This Encounter      Combination Diet High Kcal/High Protein Diet, ADULT; 2 gm NA Diet       Significant Results and Procedures   Results for orders placed or performed during the hospital encounter of 07/17/19   US Abdomen Limited w Abd/Pelvis Duplex Complete    Narrative    EXAM: Complete abdominal ultrasound with Doppler evaluation, 7/18/2019  8:39 AM    COMPARISON: CT abdomen pelvis 8/11/2017.    HISTORY: History of cirrhosis (etoh vs drug induced) eval of liver and  for PVT    TECHNIQUE: Right upper quadrant was scanned in standard fashion with  specialized ultrasound transducer(s) using both gray-scale, color  Doppler, and spectral flow techniques.    Findings:    Liver: Hepatic parenchyma is diffusely echogenic, measuring 16.5 cm.  No focal lesions identified.     Extrahepatic portal vein flow is antegrade, measuring 21 cm/sec. Main  portal vein diameter of 1.1 cm.  Right portal vein flow is antegrade, measuring 18 cm/sec.  Left portal vein flow is antegrade, measuring 14 cm/sec.    Flow in the hepatic artery is towards the liver and:  118 cm/sec peak systolic  0.66 resistive index.     The splenic vein is patent and flow is towards the liver.  The left,  middle, and right hepatic veins are patent with flow towards the IVC.  The IVC is patent with flow towards the heart.   The visualized aorta  is not dilated.    Gallbladder: Elongated configuration of the gallbladder, similar to  prior. No pericholecystic fluid. No evidence of choledocholithiasis.  Mild gallbladder wall thickening, measuring approximately 5.0 mm  thickness. No wall hyperemia.    Bile Ducts: Both the intra- and extrahepatic biliary system are of  normal caliber.  The common bile duct measures 6.4 mm in diameter.    Pancreas: Visualized portions of the head and body of the pancreas are  unremarkable.      Right kidney: The right kidney demonstrates normal echotexture,  without mass or hydronephrosis, measuring 9.9 cm in length.    Fluid: Abdominal ascites. Right pleural effusion.      Impression    Impression:   1. Patent hepatic vasculature. No evidence of portal venous thrombus.  2. Diffusely increased hepatic echogenicity, likely underlying  underlying intrinsic hepatic parenchymal disease.  3. Abdominal ascites and right pleural effusion.  4. Moderate gallbladder wall thickening, nonspecific. Possibly  reactive versus related to hypoalbuminemia.    I have personally reviewed the examination and initial interpretation  and I agree with the findings.    ZHANG REECE MD       Discharge Medications   Current Discharge Medication List      START taking these medications    Details   alum & mag hydroxide-simethicone (MYLANTA ES/MAALOX  ES) 400-400-40 MG/5ML SUSP suspension Take 30 mLs by mouth every 4 hours as needed for indigestion  Qty: 769 mL, Refills: 1    Associated Diagnoses: Aftercare following surgery      simethicone (MYLICON) 80 MG chewable tablet Take 1 tablet (80 mg) by mouth every 6 hours as needed for cramping  Qty: 30 tablet, Refills: 3    Associated Diagnoses: Aftercare following surgery         CONTINUE these medications which have CHANGED    Details   lactulose (CHRONULAC) 10 GM/15ML solution 15 ml tid  Qty: 1892 mL, Refills: 3    Associated Diagnoses: Liver dysfunction         CONTINUE these medications which have NOT CHANGED    Details   furosemide (LASIX) 20 MG tablet Take 20 mg by mouth daily      hydrOXYzine (ATARAX) 25 MG tablet Take 25 mg by mouth every 6 hours as needed for itching      Lidocaine (LIDOCARE) 4 % Patch Apply 1 patch daily at 0800 and remove at 2000. To prevent lidocaine toxicity, patient should be patch free for 12 hrs daily.      melatonin 3 MG tablet Take 3 mg by mouth At Bedtime      midodrine (PROAMATINE) 10 MG tablet Take 12.5 mg by mouth 3 times daily (with  meals)      multivitamin, therapeutic (THERA-VIT) TABS tablet Take 1 tablet by mouth daily      ondansetron (ZOFRAN-ODT) 4 MG ODT tab Take 1 tablet (4 mg) by mouth every 6 hours as needed for nausea or vomiting    Associated Diagnoses: End stage liver disease (H)      pantoprazole sodium (PROTONIX) 40 MG packet Take 1 packet by mouth daily      potassium chloride ER (K-TAB) 20 MEQ CR tablet Take 1 tablet (20 mEq) by mouth daily    Associated Diagnoses: Hypokalemia      rifaximin (XIFAXAN) 550 MG TABS tablet Take 550 mg by mouth 2 times daily       sertraline (ZOLOFT) 25 MG tablet Take 3 tablets (75 mg) by mouth daily    Associated Diagnoses: Major depressive disorder with current active episode, unspecified depression episode severity, unspecified whether recurrent      spironolactone (ALDACTONE) 25 MG tablet Take 12.5 mg by mouth daily      Vitamin D, Cholecalciferol, 1000 units TABS Take 1 tablet by mouth daily         STOP taking these medications       polyethylene glycol (MIRALAX/GLYCOLAX) packet Comments:   Reason for Stopping:             Allergies   Allergies   Allergen Reactions     Gabapentin      Other reaction(s): Edema     Nitrofurantoin Other (See Comments)     drug-induced liver injury

## 2019-08-03 NOTE — PROGRESS NOTES
Physical Therapy Discharge Summary    Reason for therapy discharge:    Discharged to home with home therapy.    Progress towards therapy goal(s). See goals on Care Plan in [facility] electronic health record for goal details.  Goals partially met.  Barriers to achieving goals:   Ongoing varying levels of fatigue secondary to medical condition..    Therapy recommendation(s):    Continued therapy is recommended.  Rationale/Recommendations:  Home therapy is reccommended to continue advancing pt's health outcomes, increase safety with mobility in discharge environments and improve long term functional outcomes. .

## 2019-08-03 NOTE — PLAN OF CARE
Pt complained of nausea and gastric discomfort this morning, Zofran and Simethicone were provided, pt verbalized relief afterwards. Had a shower during OT session, expressed feelings of comfort. Pt was started on Omnicef oral antibiotics for UTI. First dose was given this afternoon. Pt had a total of 3 stools this shift, refused to take the afternoon dose of Lactulose. Fissures on the rectal folds remains opened, Triad paster applied with each bathroom use. Plans in  place  to discharge home on Sunday.

## 2019-08-03 NOTE — PLAN OF CARE
Pt had 2 episodes of stool urgency, declined to take Lactulose in the morning and afternoon. Had a total of 4 loose stools today. Urine specimen collected and sent to the lab as ordered, see results for details. Fissures in the rectal folds appear to be worsening. Two fissures noted today instead of 1, pt was encouraged to dab rectal areas with  wet wipes after bowel movements. Fissures were cleansed, Triad paste applied with each bathroom use to promote healing the site. Expressed satisfaction about the new wheeled walker provided today, anticipating the plan to discharge home this weekend. Continues to decline the need for pain medication.

## 2019-08-03 NOTE — PLAN OF CARE
"Patient is alert and oriented x 4. Complained of abdomen more distended and bloated- simethicone given. Patient complained of indigestion, this RN offered maalox but patient stated \"not right now\". Denies N/V. Left sticky note to MD. Patient appears sleeping, laying in bed @ this time. Will continue with current plan of care.  "

## 2019-08-04 NOTE — PROGRESS NOTES
[facility]  DAILY PROGRESS NOTE     Neema Bailey  : 1975  MRN # [id number]  2019    Interval hx  -Discussed discharge plan with Neema yesterday and today.  -Yesterday she was feeling up for it but had several questions.  -I tried to  her-her concern is that she lives 3 hours away and her mom might not have all the resources that she needs.  -She is feeling more lethargic and has more generalized weakness today.  -We will cancel the discharge and requesting-labs and abdominal ultrasound to further diagnose the etiology for her increased weakness and lethargy.  ASSESSMENT & PLAN: Briefly, Neema Bailey is a 44 year old female    44 year old year old female with hx of ESLD of unclear cause (likely combination of chronic liver disease, alcohol injury, and possible medication injury), Hepatic encephalopathy, Abnormal Hematological indices, Alcohol use disorder, Chronic pain is being admitted to  TCU for ongoing rehabilitation on 2019 after her hospitalization at [facility] for generalized fatigue, one day of nausea, and vomiting, anemia, hypokalemia from 19- 19. She was given blood transfusions, potassium were replaced, and treated for uncomplicated cystitis     # Deconditioning: d/t liver disease, infection, dys electrolytemia  Evaluated and treated by physical and occupational therapy team.   - PT/OT consult.      # Acute on chronic anemia:  Likely new baseline is low 7s, due to chronic liver disease and nutrition issues.  Most recent hgb PTA was 6.9 from , when patient was discharged from [facility]. She was given 1 unit pRBCs with appropriate Hgb improvement. No signs of bleeding throughout the hospital stay. Hg 7.6 gm/dl on  prior to admission to TCU. No signs of bleeding throughout TCU stay till   Hg 7.6 gm/dl on   - CBC weekly  - Transfuse if Hg less than 7 gm/dl .      # ESLD (Drug induced liver injury  complicated by alcohol use and prior RnY bypass)  # Alcoholic liver cirrhosis  # NAFLD  Liver biopsy on 5/10/19 c/w possible alcohol injury per pathologist read, but hx and biopsy reviewed by Dr. Leventhal at [facility], liver injury seems more consistent with drug-induced either from Bactrim or nitrofurantoin. She had  prednisolone trial during 6/6 [facility] admission x2 (no response).. Hopeful about improvement  in liver function over time. Not currently a transplant candidate, but can be reconsidered over time.  Numerous abdominal varices noted on CT C/A/P from 6/25/19.  MELD-Na score: 30 at 7/25/2019 11:59 AM  MELD score: 30 at 7/25/2019 11:59 AM  - CMP weekly. Next on Wednesday  - Follow up with Dr. Leventhal in 2 months with CBC, CMP and INR  - Low threshold to offer chem dep treatment once discharging from TCU.        # Hx of Hepatic Encephalopathy:   Alert, awake, and oriented at this time  Reports having 3-4 soft bowel movements in last few days  - Continue Lactulose  - Continue Rifaximin 550 mg BID        # Ascites: d/t liver disease  PTA on Furosemide, and Spironolactone  - Continue diuretics  - 2 gm Na diet.      # Fluid overload, Hypervolemia: d/t liver disease  Prior to admission on TCU on Furosemide, and Spironolactone. She had soft BP, so it had to be held. It was reinitiated once BP started to come up.   - Continue Spironolactone, and Furosemide. On low dose due to hypotension     # Pruritus: d/t liver disease  On  Hydroxyzine 25-50 mg PRN, and Sertraline 75mg daily for pruritis   - Continue Hydroxyzine, and Sertraline        # Hypokalemia: d/t losses from both GI (lactulose, Recent diarrhea, recent vomiting) and urine (diuretic use). She was given potassium supplements.   On potassium supplement  - Continue      # Uncomplicated cystitis: Completed 3 day treatment  Hx of ESBL infection. Patient reports some burning urination, no abdominal pain or any systemic signs  - Check UA     # Mild  thrombocytopenia: Likely related to ESLD.   Platelet level 97  on 07/31  - CBC weekly     # Alcohol use disorder:  Endorses binge drinking. Unclear on when last drink was but reports possibly sometime in May prior to her admission to [facility].  Possibly self-medicating for depression, PTSD, and prior opioid use disorder.  - Social work consult     # Chronic pain: Has been off opioids for several years.  Pain controlled at this time     # Hx of Depression: PTA on Sertraline  - Continue .     # FEN: Diet: Snacks/Supplements Adult: Boost Shake; Between Meals  Combination Diet High Kcal/High Protein Diet, ADULT; 2 gm NA Diet  # Lines &Tubes: None  # Activity & Physical condition: PT/OT consult  # Prophylaxis: Mechanical. No chemical prophylaxis due to thrombocytopenia  # Social Issues:   # Code status: Full code  # Pneumococcal vaccine: Qualifies for Pneumococcal poly vaccine on discharge.          PHYSICAL EXAM:  Blood pressure 122/57, pulse 92, temperature 96.9  F (36.1  C), temperature source Oral, resp. rate 18, weight 115.4 kg (254 lb 8 oz), last menstrual period 10/04/2018, SpO2 92 %.    HEENT: pale, scleral icterus  Cardiovascular: S1, S2 normal.   Respiratory:  B/L decreased breath sounds at bases  GI/Abdomen: Soft, NT, BS+  Neurology: Alert, awake,and oriented.   Extremities: B/l edema 2+- compression stockings         LABS:  CMP  Recent Labs   Lab 07/31/19  0658      POTASSIUM 3.7   CHLORIDE 110*   CO2 22   ANIONGAP 9   *   BUN 5*   CR 0.79   GFRESTIMATED >90   GFRESTBLACK >90   NARENDRA 8.0*   PROTTOTAL 5.0*   ALBUMIN 2.1*   BILITOTAL 10.2*   ALKPHOS 97   AST 58*   ALT 15     CBC  Recent Labs   Lab 07/31/19  0658   WBC 6.8   RBC 2.47*   HGB 7.6*   HCT 23.7*   MCV 96   MCH 30.8   MCHC 32.1   RDW 16.7*   PLT 97*     INR  Recent Labs   Lab 07/31/19  0658   INR 3.49*       RECENT IMAGING/PROCEDURES:     Yordan Hutchinson MD  Internal Medicine

## 2019-08-04 NOTE — PLAN OF CARE
Patient alert and oriented x 4. Patient sleeps in between care. No complaints of pain an discomfort.. Patient had 1 BM this shift. No respiratory distress. Patient is scheduled to discharge today. Will continue with current plan of care.

## 2019-08-04 NOTE — PLAN OF CARE
Alert and oriented. VSS. No complaints of abdominal discomfort, or nausea. Denies chest pan, SOB, headache, numbness. Pt used PCD/ Compression stocking for edema to BLE's. Rectal fissures still open, Triad paste applied.   Plan to discharge tomorrow.  Will continue plan of care.  Vital signs:  Temp: 97.1  F (36.2  C) Temp src: Oral BP: 118/58   Heart Rate: 90 Resp: 18 SpO2: 95 % O2 Device: None (Room air)

## 2019-08-05 NOTE — PROGRESS NOTES
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    Patient History  According to provider note(s):  44 year old female with past medical history of morbid obesity status post Denny-en-Y gastric bypass with return of excess weight, long-standing history of severe alcohol use disorder, history of prescription narcotic dependence, peripheral neuropathy, iron deficiency anemia, and major depression who presents from a TCU with concerns of decompensated liver disease, diarrhea and nausea    Objective Data  Containment of urine/stool: Diaper    Active Diet Order  Orders Placed This Encounter      Combination Diet High Kcal/High Protein Diet, ADULT; 2 gm NA Diet      Diet      Output:   I/O last 3 completed shifts:  In: 960 [P.O.:960]  Out: -     Risk Assessment:   Sensory Perception: 4-->no impairment  Moisture: 3-->occasionally moist  Activity: 3-->walks occasionally  Mobility: 3-->slightly limited  Nutrition: 2-->probably inadequate  Friction and Shear: 1-->problem  Sebas Score: 16                          Labs:   Recent Labs   Lab 19  0718   ALBUMIN 2.0*   HGB 8.0*   INR 3.32*   WBC 9.0       Physical Exam  Skin inspection: focused buttocks    Wound Location:   cleft    7/15    7/29                                                                          8/5    Date of last photo 19  Wound History:  Wounds present on admission.  Received lactulose with several daily episodes of incontinent diarrhea.  States  that when she was at TCU she would have to wait up to over an hour to have brief changed after incontinent episodes.  Now trying to use commode instead of brief.   Has a Physical therapy consult ordered.  Is able to turn and reposition independently in bed. Has been getting out of bed week of 7/22 to use bathroom, having increased loose stool week of 8/5    Measurements (length x width x depth, in cm)   Superior wound:  covered by epithelium   Inferior wound:  1.7 cm x 0.2 cm x 0.1 cm  Wound Base: 100 % dermis  Palpation of the wound bed: normal   Periwound Color: normal and consistent with surrounding tissue  Temperature: normal   Drainage:, none  Odor: none  Pain: minimal     Interventions  Current support surface: Standard  Atmos Air mattress  Visual inspection of wound(s) completed  Wound Care: done per plan of care  Supplies: at bedside  Education provided:wound progress  Discussed plan of care with Patient

## 2019-08-05 NOTE — PROGRESS NOTES
"SW met with patient this morning to discuss weekend and cancellation of discharge. Patient stated that over the weekend she felt \"so weak\" and her mother did not want to take her home in that condition. SW asked patient what her thoughts were about long term care versus going home. Patient expressed that her and her mother arranged for her to return home post TCU with supports in place, and that does not want to go to long term care. Patient stated that she told her mother, \"There will be good days and there will be bad days\" upon her return home. SW expressed that the team will discuss her discharge and SW will notify her. Patient agreeable. SW will continue to follow and assist as needed.    SHERRY Santiago,[facility]  TCU    Phone: [phone]  Pager: [phone]  "

## 2019-08-05 NOTE — PLAN OF CARE
Patient alert and oriented x 4. Patient sleeps in between care. No complaints of pain an discomfort.. Patient had 1 BM this shift. No respiratory distress. . PCD on BLE. Will continue with current plan of care.

## 2019-08-05 NOTE — PLAN OF CARE
Patient alert and oriented x4, able to make needs known. Ultrasound done this am of abdominal area. See note. Pt feeling very lethargic and tired today with minimal appetite. Hoping to go home soon. Pt had incontinent BM on floor throughout room and into bathroom when trying to make it to toilet. Pt cleaned up and given new gown, housekeeping called to room to clean up room. New tubi  needed for lower extremities, passed onto evening staff. No complaints of pain, nausea, SOB, or chest pain. Will continue to monitor.

## 2019-08-05 NOTE — PLAN OF CARE
"Pt demonstrated increased weakness through the day. Assisted by staff at times to perform perineal cares due to weakness. Pt was started on Omnicef  oral antibiotic on 8/3/19 for UTI but generalized weakness persisted. Refused to take Lactulose in the morning and afternoon due to multiple loose bowel movements and the plan to discharge home today. Pt insisted that she does not want to have an incontinence episode on the way home. Pt was encouraged to increase fluid intake. When pt's mother and her sister came to the unit at 1500 to assist with discharge. Pt's family expressed the desire not to take the pt home due to the increased weakness. Pt's mother stated \"It's a 3 hour journey and it's so hard for her to put on her own pant. I don't wonna take her home\". The Provider, Dr Hutchinson was updated about the family's concerns. Discharge plan was cancelled. US and labs were ordered for the morning.   Will continue to assess pt's status and promote fluid intake. Pt has been compliant with fluid intake. Requested to have the last dose of Lactulose early at 1830. Had a total of 5 stools today.   "

## 2019-08-05 NOTE — PROGRESS NOTES
CLINICAL NUTRITION SERVICES - REASSESSMENT NOTE     Nutrition Prescription    RECOMMENDATIONS FOR MDs/PROVIDERS TO ORDER:  None today    Malnutrition Status:    Patient does not meet two of the criteria necessary for diagnosing malnutrition    Recommendations already ordered by Registered Dietitian (RD):  None today    Future/Additional Recommendations:  If PO intakes decline, re-offer supplements      EVALUATION OF THE PROGRESS TOWARD GOALS   Diet: 2 g Sodium and High Kcal/High Protein  Intake: % of meals per flowsheet. On average, pt is ordering 1980 kcal and 70 g protein daily per [employer]. This is likely meeting 100% minimum energy and 77% protein needs.       NEW FINDINGS   - Wt remains stable over the last week. Overall, she has gained 4 lbs over the last 2 weeks, possibly r/t fluid retention.     MALNUTRITION  % Intake: Decreased intake does not meet criteria  % Weight Loss: None noted  Subcutaneous Fat Loss: None observed  Muscle Loss: None observed  Fluid Accumulation/Edema: Mild  Malnutrition Diagnosis: Patient does not meet two of the above criteria necessary for diagnosing malnutrition    Previous Goals   Patient to consume % of nutritionally adequate meal trays TID, or the equivalent with supplements/snacks.  Evaluation: Met    Previous Nutrition Diagnosis  Predicted inadequate nutrient intake related to increased protein needs and frequent n/v.   Evaluation: Improving    CURRENT NUTRITION DIAGNOSIS  Predicted inadequate nutrient intake related to increased protein needs and intermittent N/V       INTERVENTIONS  Implementation  Discussed pts POC during interdisciplinary morning rounds    Goals  Patient to consume % of nutritionally adequate meal trays TID, or the equivalent with supplements/snacks.    Monitoring/Evaluation  Progress toward goals will be monitored and evaluated per protocol.      Yasmine Rainey RD, LD  Unit Pager: [phone]

## 2019-08-06 NOTE — CONSULTS
INTERVENTIONAL RADIOLOGY CONSULT NOTE    44 year old female wit history of ESLD c/b hepatic encephalopathy, ascites, abdominal varicies, chronic pain currently admitted in rehab. Most recent imaging shows bilateral effusions R>L and moderate ascites. Patient has an elevated INR due to liver disease, team working to correct patient for procedure tomorrow.    Patient is on IR schedule Wednesday 8/7/19 for a diagnostic and therapeutic paracentesis and right diagnostic and therapeutic thoracentesis.   INR corrected to 2.0 or less prior to procedure for thoracentesis.  No NPO required.  Medications to be held include: None  Consent will be done prior to procedure.     Platelet Count   Date Value Ref Range Status   08/05/2019 105 (L) 150 - 450 10e9/L Final     INR   Date Value Ref Range Status   08/05/2019 3.32 (H) 0.86 - 1.14 Final        Please contact the IR charge RN at [phone] for estimated time of procedure.     Case discussed with Dr. Avila.    Mayela Dockery PA-C  Interventional Radiology

## 2019-08-06 NOTE — PROGRESS NOTES
[facility]  PROGRESS NOTE     Neema Bailey  : 1975  MRN # [id number]  2019    Interval hx  - complains of chills, increased fatigue and weakness, SOB at rest, HEDRICK  - increased crampy abdominal pain, worst after taking AM meds, improved with defecation  - increased stool output (7-8 watery stools on , 3 so far by noon , in each case with single dose of lactulose)  - starting to look into alternative discharge options; considering staying with eldest daughter in [place] until can find her own housing  - abd US  with moderate ascites, mild bilateral (R>L) pleural effusions    ASSESSMENT & PLAN:   44 year old year old woman with decompensated ESLD (likely secondary to EtOH in setting of prior Denny-en-Y GB, with recent DILI) c/b hepatic encephalopathy, ascites, abdominal varices, as well as hx of AUD, chronic pain with hx of narcotic dependence, admitted to  TCU for ongoing rehabilitation. Previously hospitalized at [facility] -2019 with fatigue, N/V, was treated for symptomatic anemia, hypokalemia, and cystitis.     # Deconditioning:   Multifactorial, from underlying liver disease, recent hospitalizations, malabsorption from past bypass surgery. Working with physical and occupational therapy team. Barriers to continued therapy include worsening SOB/HEDRICK, possibly from increased hypervolemia.  - continue PT/OT   - continue treatment for ascites and evaluation for other etiologies of SOB as below     # SOB  # HEDRICK  Likely secondary to large right pleural effusion. Complains of chills, but no objective fever, no leukocytosis, no productive cough or obvious airspace opacity (my read) to suggest respiratory infection.   - increase diuretics as below  - IR consulted for therapeutic thoracentesis in AM     # Abdominal pain  # GERD  Pain possibly secondary to reflux, ongoing potassium supplementation. Complains of chills and crampy abdominal pain with diffuse  tenderness to palpation, but no rebound or guarding, no objective fever, no leukocytosis, no encephalopathy, no change in Cr or acidosis on BMP; overall low suspicion for SBP at this time. Contemplated C diff given crampy abdominal pain and increased stool output, improvement with defecation, but no leukocytosis or fever.   - discontinuing potassium supplementation as below  - continue PPI  - continue to monitor stool output  - IR consulted for diagnostic/therapeutic paracentesis in AM    # ESLD c/b ascites, hx of HE  Likely advanced hepatic fibrosis/cirrhosis secondary to combination of long-standing history of alcohol abuse (including following Denny-en-Y bypass), morbid obesity. Complicated by ascites, reported history of hepatic encephalopathy, abdominal varices on CT (though no EoV based on most recent endoscopy). No hx of SBP. Further recent injury leading to [facility] admission thought to be DILI from nitrofurantoin. Evaluated by inpatient hepatology during recent [facility] admission. Reportedly not a transplant candidate at this time; may be re-evaluated over time.   MELD-Na score: 29 at 8/5/2019  7:18 AM  MELD score: 29 at 8/5/2019  7:18 AM  Calculated from:  Serum Creatinine: 0.76 mg/dL (Rounded to 1 mg/dL) at 8/5/2019  7:18 AM  Serum Sodium: 138 mmol/L (Rounded to 137 mmol/L) at 8/5/2019  7:18 AM  Total Bilirubin: 11.7 mg/dL at 8/5/2019  7:18 AM  INR(ratio): 3.32 at 8/5/2019  7:18 AM  Age: 44 years  - CMP weekly  - Follow up with Dr. Leventhal in 2 months with CBC, CMP and INR  - Continue Lactulose, hold for >3 stools in 24-hour period  - Continue Rifaximin   - Continue Furosemide 20mg for now; could consider increase if remains volume overloaded despite increase in spironolactone  - Increase Spironolactone to 50mg from 12.5mg to maintain 40:100 ratio  - 2 gm Na diet, 1800mL fluid restriction  - 10mg IV Vit K + 2U FFP in AM to reverse coagulopathy prior to IR procedures (see  above)    # Hypokalemia:   Multifactorial, from GI losses (on lactulose) and renal losses (on loop diuretic). On potassium supplementation, low dose K-sparing diuretic.  - Increase spironolactone as above  - Discontinue K supplementation for now; continue to monitor     # Acute on chronic anemia:  Multifactorial. From underlying liver disease, malabsorption. New baseline appears to be with Hgb in 7s. Stable since transfer to TCU, no s/s active bleeding.  - CBC weekly  - Transfuse for Hgb<7 gm/dl    # Thrombocytopenia:   Likely secondary to ESLD. Stable since admission to TCU.  - CBC weekly    # Uncomplicated cystitis:   With reported hx of ESBL. Improved symptomatically with recent course of abx.   - continue Cefdinir, last day 8/7     # Alcohol use disorder:  Endorses binge drinking; last drink prior to May admission at [facility].  - Social work following  - Low threshold to offer chem dep treatment once discharging from TCU     # Chronic pain:   With chart history of narcotic dependence, off of opioids for several years. Pain reasonably controlled at this time.      # Pruritus:   Secondary to liver disease.  - Continue Sertraline, Hydroxyzine prn    # Hx of Depression:   - Continue Sertraline     # FEN: Diet: Snacks/Supplements Adult: Boost Shake; Between Meals  Combination Diet High Kcal/High Protein Diet, ADULT; 2 gm NA Diet; 1800mL fluid restriction  # Lines &Tubes: None  # Activity & Physical condition: PT/OT consulted  # Prophylaxis: Mechanical. No chemical prophylaxis due to thrombocytopenia  # Social Issues:   # Code status: Full code  # Pneumococcal vaccine: Qualifies for Pneumococcal poly vaccine on discharge.      PHYSICAL EXAM:  Blood pressure 116/52, pulse 98, temperature 98  F (36.7  C), temperature source Oral, resp. rate 16, weight 115.3 kg (254 lb 1.6 oz), last menstrual period 10/04/2018, SpO2 94 %.    HEENT: NC/AT. PERRL, EOMI. Scleral icterus. MMM.  Cardiovascular: RRR, S1, S2 normal, no  MRG  Respiratory: decreased breath sounds at bilateral bases, no wheezes, crackles, rhonchi  GI/Abdomen: Soft, diffusely tender to palpation, with no guarding or rebound; BS+ and normoactive  Neurology: A&Ox3, moving all extremities purposefully and without focal deficits, no asterixis  Extremities: No obvious deformities; 2+ bilateral LE edema with compression stockings in place       LABS:  CMP  Recent Labs   Lab 08/05/19  0718 07/31/19  0658    141   POTASSIUM 3.4 3.7   CHLORIDE 107 110*   CO2 23 22   ANIONGAP 8 9   * 104*   BUN 7 5*   CR 0.76 0.79   GFRESTIMATED >90 >90   GFRESTBLACK >90 >90   NARENDRA 8.1* 8.0*   PROTTOTAL 5.0* 5.0*   ALBUMIN 2.0* 2.1*   BILITOTAL 11.7* 10.2*   ALKPHOS 96 97   AST 54* 58*   ALT 16 15     CBC  Recent Labs   Lab 08/05/19  0718 07/31/19  0658   WBC 9.0 6.8   RBC 2.56* 2.47*   HGB 8.0* 7.6*   HCT 24.6* 23.7*   MCV 96 96   MCH 31.3 30.8   MCHC 32.5 32.1   RDW 17.3* 16.7*   * 97*     INR  Recent Labs   Lab 08/05/19  0718 07/31/19  0658   INR 3.32* 3.49*       RECENT IMAGING/PROCEDURES:   US ABDOMEN LIMITED  8/5/2019 9:45 AM    IMPRESSION:   Moderate volume of ascites with mild bilateral pleural effusions,  right greater than left.    The patient was discussed and the above plan was developed with the attending, Dr. Avila.    Frankie Parry MD  PGY3 Internal Medicine

## 2019-08-06 NOTE — PLAN OF CARE
Pt is alert and oriented. Walked in halls with staff x1 this shift. C/o shortness of breath when up and walking along with a non productive cough. Lungs assessed, no crackles, wheezes. Diminished to lower lobes/right. CN updated and spoke with patient, upon discussion, SOB seems to be coming from anxiety. Pt states when she thinks about her breathing it becomes worse. O2 sats 95%. Informed pt to update nurse if any changes occur. Pt also complaining of rectal discomfort, possible hemorrhoids? CN made note to discuss with MD in AM. Pt would also like to discuss US results and discharge status with MD in the morning. She would also like to speak with the social working in regards to discharge plan as well. Continue POC.

## 2019-08-06 NOTE — PLAN OF CARE
Pt is alert and oriented, remains weak but alert. No lethargic episode noted through the day. Appetite is poor to fair, encouraged to increase intake. Fluid restriction in place for 1800 ml/day. US result shows moderate volume of scites with mild bilateral pleural effusions. Pt is currently on Omniceff antibiotic until 8/11/19 for UTI. Chest x-ray was ordered as a follow up with the pleural effusions. Spironolactone dose was increased from 12.5 mg to 50 mg, additional 37.5 mg was given in the afternoon. Requested to change the schedule of Lactulose to an earlier time instead of HS to prevent sleep disturbance at night. Schedule was changed to 1800. Refused the afternoon dose of Lactulose, pt had a total of 4 stools today. Slight discoloration on the left foot. Tubi  applied to bilateral L/E, edema has decreased to 1+ on bilateral L/E.   No hemorrhoids noted in the rectum. Fissure on the upper rectal folds has healed but the lower part of the sacrum remains opened. Triad paste applied after cleansing. Currently denies SOB, able to ambulate to the bathroom. No bowel incontinence or urgency noted this shift. Pleasant and cooperative with cares. Pt is planning to discharge to her first daughter's home for about a week before travelling to her mother's place which is 3 hours away. Pt indicated that she and her mother made the plan to ensure that she is stable before travelling. Continue with POC.

## 2019-08-07 NOTE — PROVIDER NOTIFICATION
Lab called lactic acid was 2.6. Pt has fever and chills. Pls see flowsheet for VS. Moonlighter paged.

## 2019-08-07 NOTE — PLAN OF CARE
RN: Jackie PRITCHARD to update, IR called and want pt sent down now for paracentesis/thoracentesis before INR result in.  INR has been drawn after 2 units plasma infused, also see primary RN note.

## 2019-08-07 NOTE — PLAN OF CARE
Pt.sleeping between cares. Urine culture obtained / sent 0410. Scheduled for Thora/Paracentesis this afternoon. FFP 2 units administered/completed 0700 - no transfusion reactions. Has PIV (R) lower forearm. Pt.c/o feeling SOB - O2 sats 91% RA - applied O2 @ 1.5L via NC with sats to 93%. Increased to 2L currently with pt.reporting feeling better. Lactic acid recheck this AM = 1.7.

## 2019-08-07 NOTE — PROCEDURES
[facility] Care ([facility])    Procedure: IR Procedure Note  Date/Time: 8/7/2019 4:21 PM  Performed by: Papo Francois MD  Authorized by: Papo Francois MD   IR Fellow Physician:  Other(s) attending procedure: LEYDI Sandoval    UNIVERSAL PROTOCOL   Site Marked: Yes  Prior Images Obtained and Reviewed:  Yes  Required items: Required blood products, implants, devices and special equipment available    Patient identity confirmed:  Verbally with patient  Patient was reevaluated immediately before administering moderate or deep sedation or anesthesia  Confirmation Checklist:  Patient's identity using two indicators, relevant allergies, procedure was appropriate and matched the consent or emergent situation and correct equipment/implants were available  Time out: Immediately prior to the procedure a time out was called    Preparation: Patient was prepped and draped in usual sterile fashion       ANESTHESIA    Anesthesia: Local infiltration      SEDATION    Patient Sedated: No    See dictated procedure note for full details.  Findings: 1) Successful paracentesis, with minimal fluid removed (250 ccs)  2) Successful right sided thoracentesis  3) fluid sent to lab for analysis.     Specimens: fluid and/or tissue for laboratory analysis    Complications: None    Condition: Stable    Plan: Lab analysis pending    PROCEDURE   Patient Tolerance:  Patient tolerated the procedure well with no immediate complications    Time of Sedation in Minutes by Physician:  0

## 2019-08-07 NOTE — PLAN OF CARE
Pt VS WNL at start of shift. About 1830, pt was shaking uncontrollably, stating she felt so cold, chills. VS taken, see flow sheets. Triggered sepsis protocol, lactic acid came back 2.6. Moonlighter paged and came to see pt. Ordered IV abx, 500 ml fluid bolus, which is currently infusing. Urine culture still needs to be collected. Pt missed hat. VS better at 2240. C/o abdominal pain, MD did not order PRN pain meds, stated to offer mylanta, pt refused. Atarax given at bedtime. Pt states improved slightly.    Pt has thora/paracentisis scheduled for tomorrow pending labs, Vitamin K given as ordered, Plasma 2 units to be given at 0400.     Pt updated and aware of plan. Continue to monitor.

## 2019-08-07 NOTE — SIGNIFICANT EVENT
[facility] Care ([facility])    Sepsis Evaluation Progress Note    Date of Service: 08/06/2019    I was called to see Neema Bailey due to abnormal vital signs triggering the Sepsis SIRS screening alert. She is not known to have an infection (suspect UTI, however culture was growing mixed urogenital venkat).     Physical Exam    Vital Signs:  Temp: 101.3  F (38.5  C) Temp src: Oral BP: 130/48 Pulse: 107 Heart Rate: 110 Resp: 22 SpO2: 94 % O2 Device: None (Room air)      Lab:  Lactic Acid   Date Value Ref Range Status   07/20/2019 2.1 (H) 0.7 - 2.0 mmol/L Final     Lactate for Sepsis Protocol   Date Value Ref Range Status   08/06/2019 2.6 (H) 0.7 - 2.0 mmol/L Final     Comment:     Critical Value/Significant Value called to and read back by  JOSE RODRIGUES CHARGE NURSE 8/6/19 1930 TT         The patient is at baseline mental status.    The rest of their physical exam is significant for diffuse abdominal pain, +fluid wave, + bulging flanks, + jaundice, overall ill appearing. +3 pitting edema to bilateral LE.     Assessment and Plan    The SIRS and exam findings are likely due to   sepsis.     ID: The patient is currently on the following antibiotics:  Anti-infectives (From now, onward)    Start     Dose/Rate Route Frequency Ordered Stop    08/06/19 2045  cefTRIAXone (ROCEPHIN) 1 g vial to attach to  mL bag for ADULTS or NS 50 mL bag for PEDS      1 g  over 15-30 Minutes Intravenous EVERY 24 HOURS 08/06/19 2031 08/04/19 0000  cefdinir (OMNICEF) 300 MG capsule     Note to Pharmacy:  For uti    300 mg Oral EVERY 12 HOURS 08/04/19 0816 08/11/19 2359    07/21/19 2100  rifaximin (XIFAXAN) tablet 550 mg      550 mg Oral 2 TIMES DAILY 07/21/19 1651          Current antibiotic coverage requires additional antibiotics for intra abdominal source source. I suspect the patient might have SBP.     Fluid: Fluid bolus ordered.    Lab: Repeat lactic acid ordered for tomorrow am.     Disposition: The patient will remain  on the current unit. We will continue to monitor this patient closely.    Twin Aaron MD

## 2019-08-07 NOTE — PLAN OF CARE
Pt remains weak through the day, complained of intermittent SOB, oxygen applied at 1.5 L via N/C for comfort only. Had a total of 5 bowel movements today, the afternoon dose of Lactulose was held. Had an urgency episode, had BM on the floor as a result. INR result of 3.22 at 0952. Additional 2 units of FFP was ordered after the first 2 units were infused early this morning. First unit waas completed and the second unit will be completed at 1508. Plans in place to collect another INR level after the completion of the second FFP. Writer scheduled INR collection for 1515. IR called and requested to have the pt at this time. Will update Dr Avila. Pt remains jaundiced and weak. No lunch given due to NPO status.     1520: Second unit of FFP was completed as indicated. Will prepare pt for the IR procedure with Paracentesis and possible Thoracentesis. Spoke with Sri in IR. INR level was collected by lab after the FFP completion.

## 2019-08-07 NOTE — PROGRESS NOTES
"[facility]  PROGRESS NOTE     Neema Bailey  : 1975  MRN # [id number]  2019    Interval hx  - overnight, complained of \"uncontrollable\" chills, found to be febrile to 101.3F, with lactate (sepsis protocol) of 2.7  - abx switched from cefdinir to ceftriaxone, received total of 1L IVF overnight with resolution of elevated lactate  - CXR 8/6 PM with large R pleural effusion, planning for therapeutic thora  with IR  - planning for diagnostic/therapeutic para  with IR to eval for SBP    ASSESSMENT & PLAN:   44 year old year old woman with decompensated ESLD (likely secondary to EtOH in setting of prior Denny-en-Y GB, with recent DILI) c/b hepatic encephalopathy, ascites, abdominal varices, as well as hx of AUD, chronic pain with hx of narcotic dependence, admitted to [facility] from [facility] (IP -) for ongoing rehabilitation in setting of deconditioning.     # Deconditioning:   Multifactorial, from underlying liver disease, recent hospitalizations, malabsorption from past bypass surgery. Working with physical and occupational therapy team. Barriers to continued therapy include worsening SOB/HEDRICK, possibly from increased hypervolemia.  - continue PT/OT   - continue treatment for ascites and evaluation for other etiologies of SOB as below     # SOB  # HEDRICK  Likely secondary to large right pleural effusion. No productive cough or airspace opacity to suggest respiratory infection.   - diuretics as below  - IR consulted for therapeutic thoracentesis      # Abdominal pain  # GERD  Complained of chills, and crampy abdominal pain with diffuse tenderness to palpation. Initially no objective fever, but became febrile overnight 8/6. Continues to have no rebound or guarding, no encephalopathy, no change in Cr or acidosis on BMP. CBC with rising WBC but no leukocytosis; diagnostic para pending. Has had increased stool output with minimal lactulose; continuing to monitor, with low " suspicion for C diff at this time, but notably is on PPI and antibiotics, which could increase her risk.   - continue PPI  - continue to monitor stool output  - IR consulted for diagnostic/therapeutic paracentesis in AM to evaluate for SBP  - started on ceftriaxone overnight due to fever and elevated lactate; will continue for now and monitor ascitic fluid studies before discontinuing  - if para c/w SBP, will continue antibiotics, consider albumin (1.5g/kg 8/7, 1g/kg on 8/9)    # ESLD c/b ascites, hx of HE  Likely advanced hepatic fibrosis/cirrhosis secondary to combination of long-standing history of alcohol abuse (including following Denny-en-Y bypass), morbid obesity. Complicated by ascites, reported history of hepatic encephalopathy, abdominal varices on CT (though no EoV based on most recent endoscopy). No hx of SBP. Further recent injury leading to [facility] admission thought to be DILI from nitrofurantoin. Evaluated by inpatient hepatology during recent [facility] admission. Reportedly not a transplant candidate at this time; may be re-evaluated over time.   MELD-Na score: 29 at 8/7/2019  9:52 AM  MELD score: 29 at 8/7/2019  9:52 AM  Calculated from:  Serum Creatinine: 0.76 mg/dL (Rounded to 1 mg/dL) at 8/7/2019  6:06 AM  Serum Sodium: 137 mmol/L at 8/7/2019  6:06 AM  Total Bilirubin: 11.7 mg/dL at 8/5/2019  7:18 AM  INR(ratio): 3.22 at 8/7/2019  9:52 AM  Age: 44 years  - CMP weekly  - Follow up with Dr. Leventhal in 2 months with CBC, CMP and INR  - Continue Lactulose, hold for >3 stools in 24-hour period  - Continue Rifaximin   - Continue Furosemide 20mg for now; could consider increase if remains volume overloaded despite increase in spironolactone  - Increase Spironolactone to 50mg from 12.5mg to maintain 40:100 ratio  - 2 gm Na diet, 1800mL fluid restriction  - paracentesis as above    # Hypokalemia:   Multifactorial, from GI losses (on lactulose) and renal losses (on loop diuretic). On potassium  supplementation, low dose K-sparing diuretic.  - Spironolactone increased as above  - Discontinue K supplementation; continue to monitor     # Acute on chronic anemia:  Multifactorial. From underlying liver disease, malabsorption. New baseline appears to be with Hgb in 7s. Stable since transfer to TCU, no s/s active bleeding.  - CBC weekly  - Transfuse for Hgb<7 gm/dl    # Thrombocytopenia:   Likely secondary to ESLD. Stable since admission to TCU.  - CBC weekly    # Uncomplicated cystitis:   With reported hx of ESBL. Improved symptomatically with recent course of abx. Completed 4 days of cefdinir for presumed recurrent UTI, but UCx demonstrated only urogenital venkat; now on ceftriaxone.   - continue ceftriaxone as above     # Alcohol use disorder:  Endorses binge drinking; last drink prior to May admission at [facility].  - Social work following  - Low threshold to offer chem dep treatment once discharging from TCU     # Chronic pain:   With chart history of narcotic dependence, off of opioids for several years. Pain reasonably controlled at this time.      # Pruritus:   Secondary to liver disease.  - Continue Sertraline, Hydroxyzine prn    # Hx of Depression:   - Continue Sertraline     # FEN: Diet: Snacks/Supplements Adult: Boost Shake; Between Meals  Combination Diet High Kcal/High Protein Diet, ADULT; 2 gm NA Diet; 1800mL fluid restriction  # Lines &Tubes: None  # Activity & Physical condition: PT/OT consulted  # Prophylaxis: Mechanical. No chemical prophylaxis due to thrombocytopenia  # Social Issues:   # Code status: Full code  # Pneumococcal vaccine: Qualifies for Pneumococcal poly vaccine on discharge.      PHYSICAL EXAM:  Blood pressure 105/45, pulse 92, temperature 97.6  F (36.4  C), temperature source Oral, resp. rate 18, weight 115.3 kg (254 lb 1.6 oz), last menstrual period 10/04/2018, SpO2 95 %.    HEENT: NC/AT. PERRL, EOMI. Scleral icterus. MMM.  Cardiovascular: RRR, S1, S2 normal, no  MRG  Respiratory: decreased breath sounds at bilateral bases, no wheezes, crackles, rhonchi  GI/Abdomen: Soft, diffusely tender to palpation but primarily in RUQ, epigastrium, no guarding or rebound; BS+ and normoactive  Neurology: A&Ox3, moving all extremities purposefully and without focal deficits, no asterixis  Extremities: No obvious deformities; 1+ bilateral LE edema with compression stockings in place       LABS:  CMP  Recent Labs   Lab 08/07/19  0606 08/05/19  0718    138   POTASSIUM 3.7 3.4   CHLORIDE 108 107   CO2 21 23   ANIONGAP 8 8   * 117*   BUN 6* 7   CR 0.76 0.76   GFRESTIMATED >90 >90   GFRESTBLACK >90 >90   NARENDRA 7.9* 8.1*   PROTTOTAL 5.1* 5.0*   ALBUMIN  --  2.0*   BILITOTAL  --  11.7*   ALKPHOS  --  96   AST  --  54*   ALT  --  16     CBC  Recent Labs   Lab 08/05/19  0718   WBC 9.0   RBC 2.56*   HGB 8.0*   HCT 24.6*   MCV 96   MCH 31.3   MCHC 32.5   RDW 17.3*   *     INR  Recent Labs   Lab 08/05/19  0718   INR 3.32*       RECENT IMAGING/PROCEDURES:   CXR 8/6/2019  IMPRESSION:   Large right pleural effusion. No pneumothorax appreciated.    The patient was discussed and the above plan was developed with the attending, Dr. Avila.    Frankie Parry MD  PGY3 Internal Medicine

## 2019-08-08 NOTE — PROGRESS NOTES
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    Patient History  According to provider note(s):  44 year old female with past medical history of morbid obesity status post Denny-en-Y gastric bypass with return of excess weight, long-standing history of severe alcohol use disorder, history of prescription narcotic dependence, peripheral neuropathy, iron deficiency anemia, and major depression who presents from a TCU with concerns of decompensated liver disease, diarrhea and nausea    Objective Data  Containment of urine/stool: Diaper    Active Diet Order  Orders Placed This Encounter      Combination Diet High Kcal/High Protein Diet, ADULT; 2 gm NA Diet      Diet      Output:   I/O last 3 completed shifts:  In: 1395 [P.O.:480]  Out: 1250 [Drains:1250]    Risk Assessment:   Sensory Perception: 4-->no impairment  Moisture: 3-->occasionally moist(D/T Urgency)  Activity: 3-->walks occasionally  Mobility: 3-->slightly limited  Nutrition: 2-->probably inadequate  Friction and Shear: 1-->problem(Fissure between rectal folds)  Sebas Score: 16                          Labs:   Recent Labs   Lab 19  1514  19  0951 19  0718   ALBUMIN  --   --   --  2.0*   HGB  --   --  7.7* 8.0*   INR 1.98*   < >  --  3.32*   WBC  --   --  10.5 9.0    < > = values in this interval not displayed.       Physical Exam  Skin inspection: focused buttocks    Wound Location:  Tiffany cleft    7/15    7/29                                                                           8/5    Date of last photo 8/5/19  Wound History:  Wounds present on admission.  Received lactulose with several daily episodes of incontinent diarrhea.  States that when she was at TCU she would have to wait up to over an hour to have brief changed after incontinent episodes.  Now trying to use commode instead of brief.   Has a Physical therapy consult ordered.  Is able to turn and reposition independently in bed. Has been getting out of bed week of 7/22 to use bathroom, having increased loose stool week of 8/5 8/8: reassessment due to increased stools earlier this week.  Superior wound remains intact, Inferior wound stable.    Measurements (length x width x depth, in cm)   Superior wound:  covered by epithelium   Inferior wound:  1.7 cm x 0.2 cm x 0.1 cm   Wound Base: 100 % dermis  Palpation of the wound bed: normal   Periwound Color: normal and consistent with surrounding tissue  Temperature: normal   Drainage:, none  Odor: none  Pain: minimal     Interventions  Current support surface: Standard  Atmos Air mattress  Visual inspection of wound(s) completed  Wound Care: done per plan of care  Supplies: at bedside  Education provided:wound progress  Discussed plan of care with Patient

## 2019-08-08 NOTE — PLAN OF CARE
"Pt.appears to be sleeping well. Awake during initial rounds and reported feeling much better after thora/paracentesis yesterday, \"I can breathe now\". Removed tubigrips per request and applied PCDs. Has no c/o's pain, discomfort, CP and SOB throughout shift. New PIV placed yesterday (L) lower f/a.   "

## 2019-08-08 NOTE — PLAN OF CARE
Pt verbalized feelings of strength and increased endurance level after paracentesis and Thoracentesis on 8/7/19. Alert and oriented, no lethargic episode noted. Pt reported that she is able to breathe better. Currently denies SOB, declined the need to use oxygen through the day. Pt remains jaundiced with minor discoloration on the LLE. Tremors on bilateral U/E, Dr Avila was updated. Daily weight was initiated. Weight after breakfast was 257 pounds. Consumed 50 % of breakfast. Diet was changed to low fat diet with Na < 2400 mg. Order in place to resume OT/PT. Fluid instruction changed from 1800 ml/day to 1500 ml. Incentive spirometer was provided, pt is able to tolerate at 1000. Tubi  applied to bilateral L/E. Continue with POC.

## 2019-08-08 NOTE — PROGRESS NOTES
She feels better. Sob is better. Cough has improved. Energy level is better.   AFter cutting back on lactulose abdominal pain is better too.   Has irritated skin at the bottom, so she feels burning while urinating, but its in the skin.     No SBP on Ascites tap  Thoracentesis looks transudative as well     Over all doing good. Cot Rocephin for 7 days and then stop.   Shoot for 3 Bms per day  Do IS  Fluid restirction for 1800 ml per day  2 gm salt diet.   Keep Aldactone and Lasix current dose.   Check BMP tomorrow.   Plan to keep her over the weekend.   PT, OT to reassess.

## 2019-08-08 NOTE — PLAN OF CARE
Pt is alert and oriented x4. Low fat diet w/ Na <2400 mg. 1500 mL FR. Contact precautions maintained. Pt continues to report how her breathing is better after the paracentesis and thoracentesis on 807. Denies SOB and chest pain. Verbalized mild discomfort from her procedure but refused need for any intervention at this time. Bruising noted on LUQ from procedure. Intermittent tremors on LUE, informed Charge. Loose BM x4, per pt; held Lactulose. PIV on left forearm intact and infused scheduled Rocephin w/ no concerns noted. Wound care completed to rené cleft, per orders - no drainage noted. Remains jaundiced. PCD on BLE- edema on BLE and bilater hips. Showered this shift. Continue w/ POC.     Temp: 98.2  F (36.8  C) Temp src: Oral BP: 122/74 Pulse: 96 Heart Rate: 91 Resp: 18 SpO2: 95 % O2 Device: None (Room air)

## 2019-08-08 NOTE — PLAN OF CARE
Pt is alert and oriented x4. Indicated that she felt much better and able to breathe better after her Thoracentesis (1000 ml output) and Paracentesis (250 mL output) procedure. INR 1.98 prior to procedure. Sat at 95% on room air. Denies SOB and chest pain. No BM on evening shift; held Lactulose due to BM x1 during AM shift. Pt remains jaundiced and weak throughout shift, ate 50% of dinner. Provided lemon lime and Popsicle after dinner. PIV on right forearm infiltrated. Flyer placed PIV on left forearm and infused scheduled Rocephin w/ no concerns noted. Tubigrips on BLE in place. Continue w/ POC.     Temp: 97.8  F (36.6  C) Temp src: Oral BP: 96/56 Pulse: 86 Heart Rate: 88 Resp: 16 SpO2: (P) 95 % O2 Device: (P) None (Room air)

## 2019-08-09 NOTE — PLAN OF CARE
OT informal interview with patient post physical therapy assessment. Patient without OT needs at this time but will participate with physical therapy for conditioning training prior to discharge.

## 2019-08-09 NOTE — PLAN OF CARE
Patient alert and oriented x4, able to make needs known. Feeling sore today in bilateral lower extremities with weakness during transfers. No complaints of pain, SOB, chest pain or nausea. Bruising to areas where thoracentesis/paracentesis was done this week. Had a BM this am after administering lactulose, held miralax per pt request. Remains yellow to skin and sclera.  cleft wound care complete with applying barrier cream to area. Pt had no other concerns, will continue to monitor.

## 2019-08-09 NOTE — PROGRESS NOTES
08/09/19 0800   Quick Adds   Quick Adds Certification   Type of Visit Initial PT Evaluation      Language English   Living Environment   Lives With parent(s)   Living Arrangements house   Transportation Anticipated family or friend will provide   Living Environment Comment Per pt she was supposed to be d/c to her mom's house but might now be going to her daughters who is closer to the [facility], 1 step to enter then all living on main level.    Self-Care   Usual Activity Tolerance moderate   Current Activity Tolerance fair   Regular Exercise No   Equipment Currently Used at Home none   Activity/Exercise/Self-Care Comment Per pt she was IND prior to start of hospitalizations back in April.  Was supposed to d/c last week but then medically got worse, has been moving around room IND for the most part.     Functional Level Prior   Ambulation 0-->independent   Transferring 0-->independent   Toileting 0-->independent   Bathing 0-->independent   Communication 0-->understands/communicates without difficulty   Swallowing 0-->swallows foods/liquids without difficulty   Cognition 0 - no cognition issues reported   Fall history within last six months yes   Number of times patient has fallen within last six months 2   Which of the above functional risks had a recent onset or change? ambulation   Prior Functional Level Comment Pt IND prior to admission.    General Information   Onset of Illness/Injury or Date of Surgery - Date 07/21/19   Referring Physician Caleb Lewis MD   Patient/Family Goals Statement Pt wants to go home   Pertinent History of Current Problem (include personal factors and/or comorbidities that impact the POC) 44 year old year old woman with decompensated ESLD (likely secondary to EtOH in setting of prior Denny-en-Y GB, with recent DILI) c/b hepatic encephalopathy, ascites, abdominal varices, as well as hx of AUD, chronic pain with hx of narcotic dependence, admitted to [facility]  (IP 7/17-7/21) for ongoing rehabilitation in setting of deconditioning   Precautions/Limitations fall precautions   Weight-Bearing Status - LUE full weight-bearing   Weight-Bearing Status - RUE full weight-bearing   Weight-Bearing Status - LLE full weight-bearing   Weight-Bearing Status - RLE full weight-bearing   Heart Disease Risk Factors Smoking   General Observations Pt has 4WW and shower chair ready for d/c home (was already issued them)   General Info Comments Activity: up with assist   Cognitive Status Examination   Orientation orientation to person, place and time   Level of Consciousness alert   Follows Commands and Answers Questions 100% of the time   Personal Safety and Judgment intact   Memory intact   Pain Assessment   Patient Currently in Pain No   Integumentary/Edema   Integumentary/Edema Comments Pt with B LE edema into upper thighs as well, has gotten better since then thora/paracintesis   Posture    Posture Forward head position;Protracted shoulders   Range of Motion (ROM)   ROM Comment ROM WFL, limited in LE's due to swelling/edema   Strength   Strength Comments B LE's grossly 4/5, generalzied weakness, fatigues quickly   Bed Mobility   Bed Mobility Comments IND   Transfer Skills   Transfer Comments Damari with use of walker   Gait   Gait Comments SBA with 4WW, decrased priyanka and step length   Balance   Balance Comments Impaired standing balance due to LE weakness/swelling, lateral sway when standing no UE support    Sensory Examination   Sensory Perception no deficits were identified   Coordination   Coordination no deficits were identified   Muscle Tone   Muscle Tone no deficits were identified   General Therapy Interventions   Planned Therapy Interventions balance training;gait training;neuromuscular re-education;ROM;strengthening;stretching;transfer training;home program guidelines;progressive activity/exercise;risk factor education   Clinical Impression   Criteria for Skilled Therapeutic  Intervention yes, treatment indicated   PT Diagnosis Generalized weakness and instability   Influenced by the following impairments Decreased B LE strength, impaired balance, increased LE swelling   Functional limitations due to impairments Inability to complete functional mobility at baseline level of functioning   Clinical Presentation Stable/Uncomplicated   Clinical Presentation Rationale At this time medically stable   Clinical Decision Making (Complexity) Moderate complexity   Therapy Frequency 6x/week   Predicted Duration of Therapy Intervention (days/wks) 5 days   Anticipated Equipment Needs at Discharge   (Has 4WW and shower chair)   Anticipated Discharge Disposition Home with Home Therapy   Risk & Benefits of therapy have been explained Yes   Patient, Family & other staff in agreement with plan of care Yes   Clinical Impression Comments Pt would benefit from IP PT to progress overall strngth adn balance to ensure safety with d/c home.    Therapy Certification   Start of care date 08/09/19   Certification date from 08/09/19   Certification date to 09/06/19   Medical Diagnosis ESRD   Certification I certify the need for these services furnished under this plan of treatment and while under my care.  (Physician co-signature of this document indicates review and certification of the therapy plan).    Total Evaluation Time   Total Evaluation Time (Minutes) 20

## 2019-08-09 NOTE — PLAN OF CARE
Pt alert and oriented. Able to make needs known. Denies pain, denies SOB. Tubigrips off throughout the night, PCDs on. No new concerns. Call light within reach. Continue with POC.

## 2019-08-10 NOTE — PLAN OF CARE
Patient alert and oriented x4, able to make needs known. No complaints of chest pain, SOB, or nausea. Small linear cut on RUQ of abdomen where procedure was done earlier this week continues to drain purulent/wattery fluid. ABD and pressure dressing changed x1 this shfit. Had two BM's today. Swollen in bilateral lower extremities, with tremors to upper extremities. No other concerns.

## 2019-08-10 NOTE — PLAN OF CARE
Pt A&Ox4, able to make needs known. Denies pain and SOB. IV abx infused without complication. PIV saline locked. Bruising to left abdomen from thoracentesis/paracentesis site on . Wound care to  cleft complete. SBA/ walker with transfers. Call light within reach, will continue to monitor.

## 2019-08-10 NOTE — PLAN OF CARE
PT: mod I in room with 4WW, SBA out in hallways for monitoring of energy due to early fatigue. Focused on ambulation, standing exercises and floor bike for endurance training. Patient reports possibly discharge Monday or Tuesday. Recommend  PT

## 2019-08-10 NOTE — PLAN OF CARE
Pt alert and oriented. Able to make needs known. Denies pain, denies SOB. BLE Tubigrip intact. PCDs on. SBA. Ambulated to the bathroom x1. Had a bm. Has a cut on RLQ abdominal area probably where she had the paracentesis, leaking moderate light yellow fluid. Area cleansed, ABD pad and pressure tape applied. Call light within reach. Continue to monitor pt.

## 2019-08-11 NOTE — PLAN OF CARE
Pt alert and oriented. Able to make needs known. Denies pain, denies SOB. BLE Tubigrip intact. PCDs on. SBA. Ambulated to the bathroom x2. Has a linear cut on RLQ abdominal area leaking light yellow watery fluid. Dressing changed. Call light within reach. Continue to monitor pt.

## 2019-08-11 NOTE — PLAN OF CARE
Patient is independent with bathroom,reports tremors in both lower and upper extremities.Both abdominal binder and tubi  in place.Pt weight taken per orders.

## 2019-08-11 NOTE — PLAN OF CARE
Pt A&Ox4, able to make needs known. Denies pain, SOB, and nausea. ABD and pressure tape dressing CDI. IV antibiotic infused without complication. PIV saline locked. SBA/ walker with transfers. Call light within reach, will continue to monitor.

## 2019-08-12 NOTE — CONSULTS
8/12/2019    CD consult acknowledged. Per chart review, pt is discharging tomorrow. Due to limited availability, CD will not be able to meet with pt before her discharge. Per chart, it appears her [employer] MA has elapsed. If it turns out she does have active insurance, she can call Behavioral Intake at [phone] to make an outpatient appt.   If her insurance is not active and she needs [facility] funding, she can go through her county of residence to receive CD services.     Kristine Del Castillo, [facility]  [phone]

## 2019-08-12 NOTE — PLAN OF CARE
Pt alert and oriented. Able to make needs known. Denies pain, denies SOB. PCDs on. SBA. Ambulated to the bathroom. RLQ dressing CDI. Call light within reach. Continue to monitor pt.

## 2019-08-12 NOTE — PROGRESS NOTES
Physical Therapy Discharge Summary    Reason for therapy discharge:    Discharged to home with home therapy.  All goals and outcomes met, no further needs identified.    Progress towards therapy goal(s). See goals on Care Plan in [facility] electronic health record for goal details.  Goals met    Therapy recommendation(s):    Continued therapy is recommended.  Rationale/Recommendations:  Home PT to optimize safety and fucntion upon discharge to mother's home. Pt requires PT to address endurance and strength deficits. .

## 2019-08-12 NOTE — PLAN OF CARE
VSS stable. Patient's K+ 3.3 and Dr. Avila aware and orders for Potassium po x 1. See Mar.  Patient took her am medications late D/T she wanted to eat breakfast first. Patient will be discharging tomorrow 8/13/19.   Patient had nausea this afternoon all medications given late D/T this. Dressing removed from right side, no drainage this shift. Patient postponed care all day D/T not feeling well and she just is very slow in all her cares.

## 2019-08-12 NOTE — PROGRESS NOTES
Patient seen for discomfort, slight pain left axillary, upper lateral chest area.  Reports mostly as a nagging pain.  Worse with deep inspiration or cough.  Denies any trauma or injury.  No worsening cough or shortness of breath.  No fever chills.  Slightly tender on palpation over left lower axillary area/ upper lateral chest area. No bruising or swelling noted.   CXR done: unchanged from prior. No clear e.o PNA. Mostly pleural effusion. Atelectasis R>L.   Patient reassured.   Try acetaminophen 650 mg po x1  Lidoderm patch panel.     Maryann NEGRON.   CTM  Page prn    Christopher Reid MD   Hospitalist (Internal Medicine)  Pager: [phone].

## 2019-08-12 NOTE — PLAN OF CARE
RN: Pt C/O pressure aching pain in left axilla and under the left breast area when lying in bed, and it change to sharp pain when ambulating.  Web paged ML. Wait for order.

## 2019-08-12 NOTE — PLAN OF CARE
Pt A&Ox4, able to make needs known. Denies chest pain and SOB. Pt complained of pressure in left axilla and under left breast, changing to sharp pain when lifting arm. MD ordered x-ray; found no significant changes. Lidocaine patch and tylenol ordered. IV abx infused without complications. Linear cut on RLQ leaking yellow watery fluid, dressing changed at 1740. SBA/walker. Call light within reach, will continue to monitor.

## 2019-08-12 NOTE — PROGRESS NOTES
"SPIRITUAL HEALTH SERVICES  SPIRITUAL ASSESSMENT Progress Note  [facility] [place] [facility]     REFERRAL SOURCE: Follow up    Neema said she is feeling very tired today and looking forward to her discharge tomorrow. She said she \"appreciated you stopping by\" but was not interested in a visit today.    PLAN: No plan to follow up per discharge.    Margaret Gomez   Intern  Pager [phone]    "

## 2019-08-13 NOTE — PLAN OF CARE
Patient alert and able to make needs known, denies pain and respiratory distress. Unable to verbally states how she was feeling during the night, has emesis bag at side of bed. Patient was encourage to call for assistance when needed, she states understanding.

## 2019-08-13 NOTE — PLAN OF CARE
RN: Pt AA&O x3, independent in room; took shower; did self cares of  cleft per pt. IV ABX infused in left PIV w/ no s/s of complications; CDI and patent. PCD's on at HS; Atarax given DT anxiety. Pt to discharge Tues,  but pt is concerned about the plan and does not feel safe going to mother's house which is 3 hours away. Pt called Fallon (dtr) who called writer to express concerns about pt going to her mother's house. Fallon would like pt to stay at [facility] until Monday, when Fallon will return home and can take mom to her house. Fallon would like a phone call in the morning to discuss other options ([phone]). Email sent to Rebecca for f/up. Con't POC.

## 2019-08-13 NOTE — PLAN OF CARE
Discharge: Mother here to  patient and drive her to sister's home where she will be staying until her daughter returns from a vacation. Discharge instructions given to patient, questions answered, and she verbalizes understanding. Discharge medications, shower chair, and all personal, belongings sent with pt.

## 2019-08-13 NOTE — DISCHARGE SUMMARY
[facility]  Discharge Summary    Neema Bailey MRN# [id number]   YOB: 1975 Age: 44 year old     Date of Admission:  7/21/2019  Date of Discharge:  8/13/19  Admitting Physician:  Caleb Lewis MD  Discharge Physician:  Osvaldo Avila MD  Discharging Service:  Internal Medicine     Primary Provider: Suresh Shabazz          Discharge Diagnosis:     Primary Discharge Diagnosis:     # Deconditioning Multifactorial, from underlying liver disease, recent hospitalizations, malabsorption from past bypass surgery.   # Large right pleural effusion.  # ESLD c/b ascites, hx of HE, Alcohol use disorder, has quit drinking ETOH  # Hypokalemia   # Acute on chronic anemia  # Thrombocytopenia  # Uncomplicated cystitis  # Chronic pain    # Pruritus  # Hx of Depression     Past Medical History:   Diagnosis Date     Anxiety      Bilateral leg edema      Dizziness and giddiness      Hypertension      Insomnia      Iron deficiency anemia     due to chronic blood loss, vitamin B12 deficiency, Macrocytic     Migraines      BURT (nonalcoholic steatohepatitis)      Numbness and tingling     PERIPHERAL NEUROPATHY     Obese      Other chronic pain     Chronic Bilateral Low Back Pain with Bilateral Sciatica, Bilateral Knee Pain     Peripheral neuropathy      Pruritus      Restless legs      Sciatica      Seborrheic dermatitis      Severe episode of recurrent major depressive disorder, without psychotic features (H)      Sinus tachycardia      Substance abuse in remission (H)     h/o alcoholism had treatment at [facility]     Uterovaginal prolapse      Vitamin D deficiency                     Discharge Medications:     Current Discharge Medication List      START taking these medications    Details   alum & mag hydroxide-simethicone (MYLANTA ES/MAALOX  ES) 400-400-40 MG/5ML SUSP suspension Take 20 mLs by mouth every 4 hours as needed for indigestion  Qty: 355 mL, Refills: 1    Associated Diagnoses:  Aftercare following surgery      pantoprazole (PROTONIX) 40 MG EC tablet Take 1 tablet (40 mg) by mouth daily  Qty: 30 tablet, Refills: 0    Associated Diagnoses: End stage liver disease (H)      simethicone (MYLICON) 80 MG chewable tablet Take 1 tablet (80 mg) by mouth every 6 hours as needed for cramping  Qty: 30 tablet, Refills: 3    Associated Diagnoses: Aftercare following surgery         CONTINUE these medications which have CHANGED    Details   furosemide (LASIX) 20 MG tablet Take 1 tablet (20 mg) by mouth daily  Qty: 30 tablet, Refills: 0    Associated Diagnoses: End stage liver disease (H)      hydrOXYzine (ATARAX) 25 MG tablet Take 1 tablet (25 mg) by mouth every 6 hours as needed for itching  Qty: 30 tablet, Refills: 0    Associated Diagnoses: End stage liver disease (H)      lactulose (CHRONULAC) 10 GM/15ML solution 15 ml tid  Qty: 1892 mL, Refills: 3    Associated Diagnoses: Liver dysfunction      midodrine (PROAMATINE) 10 MG tablet Take 1 tablet (10 mg) by mouth 3 times daily (with meals)  Qty: 90 tablet, Refills: 0    Associated Diagnoses: End stage liver disease (H)      multivitamin, therapeutic (THERA-VIT) TABS tablet Take 1 tablet by mouth daily  Qty: 30 tablet, Refills: 0    Associated Diagnoses: End stage liver disease (H)      ondansetron (ZOFRAN-ODT) 4 MG ODT tab Take 1 tablet (4 mg) by mouth every 6 hours as needed for nausea or vomiting  Qty: 30 tablet, Refills: 0    Associated Diagnoses: End stage liver disease (H)      rifaximin (XIFAXAN) 550 MG TABS tablet Take 1 tablet (550 mg) by mouth 2 times daily  Qty: 60 tablet, Refills: 0    Associated Diagnoses: End stage liver disease (H)      sertraline (ZOLOFT) 25 MG tablet Take 3 tablets (75 mg) by mouth daily  Qty: 90 tablet, Refills: 0    Associated Diagnoses: Major depressive disorder with current active episode, unspecified depression episode severity, unspecified whether recurrent      spironolactone (ALDACTONE) 25 MG tablet Take 2  tablets (50 mg) by mouth daily  Qty: 60 tablet, Refills: 0    Associated Diagnoses: End stage liver disease (H)      Vitamin D, Cholecalciferol, 1000 units TABS Take 1 tablet (1,000 Units) by mouth daily  Qty: 30 tablet, Refills: 0    Associated Diagnoses: End stage liver disease (H)         STOP taking these medications       Lidocaine (LIDOCARE) 4 % Patch Comments:   Reason for Stopping:         melatonin 3 MG tablet Comments:   Reason for Stopping:         pantoprazole sodium (PROTONIX) 40 MG packet Comments:   Reason for Stopping:         polyethylene glycol (MIRALAX/GLYCOLAX) packet Comments:   Reason for Stopping:         potassium chloride ER (K-TAB) 20 MEQ CR tablet Comments:   Reason for Stopping:                    Hospital Course:   44 year old year old woman with decompensated ESLD (likely secondary to EtOH in setting of prior Denny-en-Y GB, with recent DILI) c/b hepatic encephalopathy, ascites, abdominal varices, as well as hx of AUD, chronic pain with hx of narcotic dependence, admitted to [facility] from [facility] (IP 7/17-7/21) for ongoing rehabilitation in setting of deconditioning.     # Deconditioning:   Multifactorial, from underlying liver disease, recent hospitalizations, malabsorption from past bypass surgery. Working with physical and occupational therapy team. Barriers to continued therapy include worsening SOB/HEDRICK. CXR showed large right pleural effusion which was tapped for 1 L. Following that her SOB was much better. She is on fluid restriction of 1500 ml and 2 g salt diet. She is doing well. Being discharged home with home care RN, PT, OT.      # ESLD c/b ascites, hx of HE    Likely advanced hepatic fibrosis/cirrhosis secondary to combination of long-standing history of alcohol abuse (including following Denny-en-Y bypass), morbid obesity. Complicated by ascites, reported history of hepatic encephalopathy, abdominal varices on CT (though no EoV based on most recent endoscopy). No hx of  SBP. Further recent injury leading to [facility] admission thought to be DILI from nitrofurantoin. Evaluated by inpatient hepatology during recent [facility] admission. Reportedly not a transplant candidate at this time; may be re-evaluated over time.   MELD-Na score: 29 at 8/7/2019  9:52 AM  MELD score: 29 at 8/7/2019  9:52 AM  Calculated from:  Serum Creatinine: 0.76 mg/dL (Rounded to 1 mg/dL) at 8/7/2019  6:06 AM  Serum Sodium: 137 mmol/L at 8/7/2019  6:06 AM  Total Bilirubin: 11.7 mg/dL at 8/5/2019  7:18 AM  INR(ratio): 3.22 at 8/7/2019  9:52 AM  Age: 44 years    - Follow up with Dr. Leventhal in 2 months with CBC, CMP and INR  - Continue Lactulose, hold for >3 stools in 24-hour period  - Continue Rifaximin   - Continue Furosemide 20mg daily and Spironolactone to 50mg daily. Dose of spiranolactone increased. So we have stopped Kdur supplements of 20 Meq daily. If she continues to drop, may need like 10 Meq daily supplements. Check BMP in 4 days of discharge.   - 2 gm Na diet, 1500mL fluid restriction  - she had paracentesis for 250 ml and thoracentesis for 1 L while here for sob. No evidence of SBP or pleural infection noted. She did feel better after she was tapped.        # Acute Abdominal pain, Chronic pain issues.   # GERD    Complained of chills, and crampy abdominal pain with diffuse tenderness to palpation. Initially no objective fever, but became febrile overnight 8/6. Continues to have no rebound or guarding, no encephalopathy, no change in Cr or acidosis on BMP. CBC with rising WBC but no leukocytosis. Paracentesis did not show SBP  - continue PPI  - sometimes lactulose can cause gas pain.    - this resolved own its own.       # Hypokalemia:     Multifactorial, from GI losses (on lactulose) and renal losses (on loop diuretic). She was on potassium of 20 meq daily.   - Spiranolactone increased from 12.5 mg to 50 mg which more of typical dose for ascites with 20 mg lasix in a ratio of 40:100  (lasix:spiranolactone). So we stopped Kdur supplements.   - Monitor K levels. If continues to be low, start 10 MEQ kdur supplements daily.   - Check BMP in 4 days     # Acute on chronic anemia:  Multifactorial. From underlying liver disease, malabsorption. New baseline appears to be with Hgb in 7s. Stable since transfer to TCU, no s/s active bleeding.    # Thrombocytopenia:   Likely secondary to ESLD. Stable since admission to TCU.     # Uncomplicated cystitis:     With reported hx of ESBL. Improved symptomatically with recent course of abx. Completed 4 days of cefdinir for presumed recurrent UTI, but UCx demonstrated only urogenital venkat; Sp showed persistent pyuria. So was treated for one week of ceftriaxone. Responded well. Fever resolved.    # Alcohol use disorder:    Endorses binge drinking; last drink prior to May admission at [facility].  -chemical dependency consult requested here in patient. Can send referral as out pt as well.   -she wants to quit drinking ETOH     # Chronic pain:   With chart history of narcotic dependence, off of opioids for several years. Pain reasonably controlled at this time.      # Pruritus:   Secondary to liver disease.  - Continue Sertraline, Hydroxyzine prn     # Hx of Depression:   - Continue Sertraline     # FEN: Diet: Snacks/Supplements Adult: Boost Shake; Between Meals  Combination Diet High Kcal/High Protein Diet, ADULT; 2 gm NA Diet; 1500mL fluid restriction  # Lines &Tubes: None  # Activity & Physical condition: PT/OT consulted  # Prophylaxis: Mechanical. No chemical prophylaxis due to thrombocytopenia  # Social Issues:   # Code status: Full code  # Pneumococcal vaccine: Qualifies for Pneumococcal poly vaccine on discharge.                Discharge Disposition:   Discharged to home           Condition on Discharge:   Discharge condition: Stable   Code status on discharge: Full Code           Procedures:     Thoracentesis on Right side for 1 Liter  Paracentesis for 250  ml only, as that much we could pull out         Final Day of Progress before Discharge:       Physical Exam:  Blood pressure 118/65, pulse 97, temperature 97.3  F (36.3  C), temperature source Oral, resp. rate 20, weight 115.8 kg (255 lb 4.8 oz), last menstrual period 10/04/2018, SpO2 94 %.  GENERAL: Alert and oriented x 3. NAD.   HEENT: Anicteric sclera. PERRL. Mucous membranes moist and without lesions.   CV: RRR. S1, S2. No murmurs appreciated.   RESPIRATORY: Effort normal. Lungs CTAB with no wheezing, rales, rhonchi.   GI: Abdomen soft and non distended with normoactive bowel sounds present in all quadrants. No tenderness, rebound, guarding.      Data:  All laboratory data reviewed             Significant Results:     Results for orders placed or performed during the hospital encounter of 07/21/19   IR Procedure Note    Narrative    Ppao Francois MD     8/7/2019  4:22 PM  [facility])    Procedure: IR Procedure Note  Date/Time: 8/7/2019 4:21 PM  Performed by: Papo Francois MD  Authorized by: Papo Francois MD   IR Fellow Physician:  Other(s) attending procedure: LEYDI Sandoval    UNIVERSAL PROTOCOL   Site Marked: Yes  Prior Images Obtained and Reviewed:  Yes  Required items: Required blood products, implants, devices and special   equipment available    Patient identity confirmed:  Verbally with patient  Patient was reevaluated immediately before administering moderate or deep   sedation or anesthesia  Confirmation Checklist:  Patient's identity using two indicators, relevant   allergies, procedure was appropriate and matched the consent or emergent   situation and correct equipment/implants were available  Time out: Immediately prior to the procedure a time out was called    Preparation: Patient was prepped and draped in usual sterile fashion       ANESTHESIA    Anesthesia: Local infiltration      SEDATION    Patient Sedated: No    See dictated procedure note for full  details.  Findings: 1) Successful paracentesis, with minimal fluid removed (250 ccs)  2) Successful right sided thoracentesis  3) fluid sent to lab for analysis.     Specimens: fluid and/or tissue for laboratory analysis    Complications: None    Condition: Stable    Plan: Lab analysis pending    PROCEDURE   Patient Tolerance:  Patient tolerated the procedure well with no immediate   complications    Time of Sedation in Minutes by Physician:  0   US Abdomen Limited    Narrative    EXAMINATION: US ABDOMEN LIMITED  8/5/2019 9:45 AM      CLINICAL HISTORY: -?if enough ascites to tap    COMPARISON: Abdominal ultrasound 7/18/2019        FINDINGS:  Limited ultrasonographic evaluation to assess ascites volume. The  partially visualized liver is grossly normal in contour with increased  echogenicity. There is moderate free fluid within the abdomen. There  are mild bilateral pleural effusions, right greater than left.      Impression    IMPRESSION:   Moderate volume of ascites with mild bilateral pleural effusions,  right greater than left.    I have personally reviewed the examination and initial interpretation  and I agree with the findings.    PADMINI HDZ MD   XR Chest 2 Views    Narrative    XR CHEST 2 VW  8/6/2019 2:52 PM      HISTORY: increased SOB, HEDRICK, ESLD?of unclear cause (likely combination  of chronic liver disease, alcohol injury, and possible medication  injury), Hepatic encephalopathy, Abnormal Hematological indices,  Alcohol use disorder    COMPARISON: Chest x-ray 6/17/2016    FINDINGS:   PA and lateral radiographic views of the chest. Stable  cardiomediastinal silhouette. Pulmonary vasculature distinct on the  left, not on the right. Large right pleural effusion. No pneumothorax  appreciated.      Impression    IMPRESSION:   Large right pleural effusion. No pneumothorax appreciated.    I have personally reviewed the examination and initial interpretation  and I agree with the findings.    BELEN WATERS MD    IR Thoracentesis    Narrative    PRE-PROCEDURE DIAGNOSIS:  Pleural effusion    POST-PROCEDURE DIAGNOSIS:  Same    PROCEDURE:  Ultrasound-guided right thoracentesis    Impression    IMPRESSION:  Completed ultrasound-guided diagnostic and therapeutic  thoracentesis.  A total of 1000 mL of clear yellow pleural fluid  aspirated from the right pleural space. 30 mL obtained and sent to lab  for analysis per order. No immediate complication.    ----------    CLINICAL HISTORY:  Pleural effusion; thoracentesis requested. 44 year  old female wit history of ESLD c/b hepatic encephalopathy, ascites,  abdominal varices, chronic pain currently admitted in rehab. Most  recent imaging shows bilateral effusions R>L and moderate ascites.  Patient has an elevated INR due to liver disease, team corrected prior  to procedure.    PERFORMED BY:  Mayela Dockery PA-C    CONSENT:  The patient understood the limitations, alternatives, and  risks of the procedure and agreed to the procedure.  Written informed  consent was obtained and is documented in the patient record.    MEDICATIONS:  No intravenous sedation was administered.  A 10:1 volume  mixture of 1% lidocaine without epinephrine buffered with 8.4%  bicarbonate solution was used for local anesthesia.      NURSING:  The patient was placed on continuous vital signs monitoring.   Vital signs were monitored by nursing staff under my supervision.      DESCRIPTION:  Ultrasound was used to evaluate a pleural fluid  collection.  The skin overlying the fluid collection was prepped and  draped in the usual sterile fashion and anesthetized.    Under continuous ultrasound visualization, a centesis needle/catheter  system was advanced into the fluid collection on a slip tip syringe.   Once fluid was aspirated, the catheter was advanced off the needle  into the pleural space and the needle was removed.  A 3-way stopcock  and extension tubing was connected to the catheter and fluid was  drained. The  catheter was then removed on patient respiratory  expiration. A small effusion remains. The puncture site was dressed in  the usual sterile fashion.    COMPLICATIONS:  No immediate concerns; the patient remained stable  throughout the procedure and tolerated it well.    ESTIMATED BLOOD LOSS:  Minimal    SPECIMENS:  30 mL clear yellow pleural fluid sent to lab as requested    BRADFORD DOCKERY PA-C   IR Paracentesis    Narrative    PRE-PROCEDURE DIAGNOSIS:  Ascites    POST-PROCEDURE DIAGNOSIS:  Same    PROCEDURE:  Ultrasound-guided diagnostic and therapeutic paracentesis    Impression    IMPRESSION:  Completed ultrasound-guided diagnostic and therapeutic  paracentesis. A total of 250 mL clear yellow fluid aspirated from the  abdomen. 30 cc sent to lab for analysis. No immediate complication.      ----------    CLINICAL HISTORY:  Ascites; ultrasound-guided paracentesis requested.  44 year old female wit history of ESLD c/b hepatic encephalopathy,  ascites, abdominal varices, chronic pain currently admitted in rehab.  Most recent imaging shows bilateral effusions R>L and moderate  ascites. Patient has an elevated INR due to liver disease. Right  thoracentesis done during same encounter.    PERFORMED BY:  Bradford Dockery PA-C    CONSENT:  The patient understood the limitations, alternatives, and  risks of the procedure and agreed to the procedure.  Written informed  consent was obtained and is documented in the patient record.    MEDICATIONS:  No intravenous sedation was administered.  A 10:1 volume  mixture of 1% lidocaine without epinephrine buffered with 8.4%  bicarbonate solution was used for local anesthesia.  Intravenous  albumin was available (see nursing documentation for administration  record).      DESCRIPTION:  Ascites fluid was identified on limited abdominal  ultrasound exam and picture is documented in the patient's record.   The abdomen was prepped and draped in the usual sterile fashion.   Color ultrasound  was used to assess the subcutaneous tissues. No  vessels were identified in the region of planned puncture. Local  anesthesia was achieved.  Under ultrasound guidance, a 5-Greenlandic  straight centesis needle/catheter was advanced into the peritoneal  space with ascites fluid return.  Needle was removed.  The catheter  was connected to drainage container.  Ascites was aspirated.  Catheter  was removed on completion of drainage and sterile dressing was  applied.     COMPLICATIONS:  No immediate concerns; the patient remained stable  throughout the procedure and tolerated it well.    ESTIMATED BLOOD LOSS:  Minimal    SPECIMENS: 30 cc clear yellow fluid sent to lab    BRADFORD BE PA-C   XR Chest 2 Views    Narrative    Exam: XR CHEST 2 VW, 8/11/2019 9:43 PM    Indication: L chest pain    Comparison: 8/6/2019, ultrasound 8/7/2019    Findings:   PA and lateral views of the chest. Cardiac silhouette is mildly  enlarged similar to prior. There is a large right pleural effusion  which is slightly decreased in size from 8/6/2019. No left pleural  effusion. No pneumothorax. Streaky bibasilar atelectasis. No acute  osseous abnormalities. Visualized upper abdomen is unremarkable.      Impression    Impression: Large right pleural effusion which is slightly decreased  from 8/6/2019. Streaky bibasilar right greater than left opacities,  favor atelectasis over consolidation.    I have personally reviewed the examination and initial interpretation  and I agree with the findings.    TIMOTEO VALADEZ MD   CBC with platelets differential   Result Value Ref Range    WBC 6.2 4.0 - 11.0 10e9/L    RBC Count 2.39 (L) 3.8 - 5.2 10e12/L    Hemoglobin 7.5 (L) 11.7 - 15.7 g/dL    Hematocrit 23.3 (L) 35.0 - 47.0 %    MCV 98 78 - 100 fl    MCH 31.4 26.5 - 33.0 pg    MCHC 32.2 31.5 - 36.5 g/dL    RDW 16.9 (H) 10.0 - 15.0 %    Platelet Count 116 (L) 150 - 450 10e9/L    Diff Method Automated Method     % Neutrophils 55.0 %    % Lymphocytes 29.9 %    %  Monocytes 10.4 %    % Eosinophils 3.9 %    % Basophils 0.5 %    % Immature Granulocytes 0.3 %    Nucleated RBCs 0 0 /100    Absolute Neutrophil 3.4 1.6 - 8.3 10e9/L    Absolute Lymphocytes 1.9 0.8 - 5.3 10e9/L    Absolute Monocytes 0.7 0.0 - 1.3 10e9/L    Absolute Eosinophils 0.2 0.0 - 0.7 10e9/L    Absolute Basophils 0.0 0.0 - 0.2 10e9/L    Abs Immature Granulocytes 0.0 0 - 0.4 10e9/L    Absolute Nucleated RBC 0.0    Comprehensive metabolic panel   Result Value Ref Range    Sodium 140 133 - 144 mmol/L    Potassium 3.4 3.4 - 5.3 mmol/L    Chloride 109 94 - 109 mmol/L    Carbon Dioxide 22 20 - 32 mmol/L    Anion Gap 9 3 - 14 mmol/L    Glucose 116 (H) 70 - 99 mg/dL    Urea Nitrogen 3 (L) 7 - 30 mg/dL    Creatinine 0.84 0.52 - 1.04 mg/dL    GFR Estimate 84 >60 mL/min/[1.73_m2]    GFR Estimate If Black >90 >60 mL/min/[1.73_m2]    Calcium 8.1 (L) 8.5 - 10.1 mg/dL    Bilirubin Total 11.9 (H) 0.2 - 1.3 mg/dL    Albumin 2.4 (L) 3.4 - 5.0 g/dL    Protein Total 5.3 (L) 6.8 - 8.8 g/dL    Alkaline Phosphatase 99 40 - 150 U/L    ALT 18 0 - 50 U/L    AST 52 (H) 0 - 45 U/L   INR   Result Value Ref Range    INR 3.64 (H) 0.86 - 1.14   UA with Microscopic reflex to Culture   Result Value Ref Range    Color Urine Lisa     Appearance Urine Clear     Glucose Urine Negative NEG^Negative mg/dL    Bilirubin Urine Large (A) NEG^Negative    Ketones Urine 5 (A) NEG^Negative mg/dL    Specific Gravity Urine 1.015 1.003 - 1.035    Blood Urine Negative NEG^Negative    pH Urine 7.0 5.0 - 7.0 pH    Protein Albumin Urine 10 (A) NEG^Negative mg/dL    Urobilinogen mg/dL 0.2 0.0 - 2.0 mg/dL    Nitrite Urine Negative NEG^Negative    Leukocyte Esterase Urine Negative NEG^Negative    Source Midstream Urine     WBC Urine 4 0 - 5 /HPF    RBC Urine <1 0 - 2 /HPF    Squamous Epithelial /HPF Urine 5 (H) 0 - 1 /HPF    Mucous Urine Present (A) NEG^Negative /LPF    Hyaline Casts 2 0 - 2 /LPF    Amorphous Crystals Few (A) NEG^Negative /HPF   CBC with platelets  differential   Result Value Ref Range    WBC 5.8 4.0 - 11.0 10e9/L    RBC Count 2.38 (L) 3.8 - 5.2 10e12/L    Hemoglobin 7.5 (L) 11.7 - 15.7 g/dL    Hematocrit 23.3 (L) 35.0 - 47.0 %    MCV 98 78 - 100 fl    MCH 31.5 26.5 - 33.0 pg    MCHC 32.2 31.5 - 36.5 g/dL    RDW 17.0 (H) 10.0 - 15.0 %    Platelet Count 105 (L) 150 - 450 10e9/L    Diff Method Automated Method     % Neutrophils 56.2 %    % Lymphocytes 31.5 %    % Monocytes 6.6 %    % Eosinophils 5.0 %    % Basophils 0.5 %    % Immature Granulocytes 0.2 %    Nucleated RBCs 0 0 /100    Absolute Neutrophil 3.3 1.6 - 8.3 10e9/L    Absolute Lymphocytes 1.8 0.8 - 5.3 10e9/L    Absolute Monocytes 0.4 0.0 - 1.3 10e9/L    Absolute Eosinophils 0.3 0.0 - 0.7 10e9/L    Absolute Basophils 0.0 0.0 - 0.2 10e9/L    Abs Immature Granulocytes 0.0 0 - 0.4 10e9/L    Absolute Nucleated RBC 0.0    Comprehensive metabolic panel   Result Value Ref Range    Sodium 139 133 - 144 mmol/L    Potassium 3.5 3.4 - 5.3 mmol/L    Chloride 109 94 - 109 mmol/L    Carbon Dioxide 21 20 - 32 mmol/L    Anion Gap 9 3 - 14 mmol/L    Glucose 172 (H) 70 - 99 mg/dL    Urea Nitrogen 4 (L) 7 - 30 mg/dL    Creatinine 0.87 0.52 - 1.04 mg/dL    GFR Estimate 80 >60 mL/min/[1.73_m2]    GFR Estimate If Black >90 >60 mL/min/[1.73_m2]    Calcium 7.9 (L) 8.5 - 10.1 mg/dL    Bilirubin Total 10.4 (H) 0.2 - 1.3 mg/dL    Albumin 2.1 (L) 3.4 - 5.0 g/dL    Protein Total 4.9 (L) 6.8 - 8.8 g/dL    Alkaline Phosphatase 91 40 - 150 U/L    ALT 19 0 - 50 U/L    AST 51 (H) 0 - 45 U/L   CBC with platelets differential   Result Value Ref Range    WBC 6.8 4.0 - 11.0 10e9/L    RBC Count 2.47 (L) 3.8 - 5.2 10e12/L    Hemoglobin 7.6 (L) 11.7 - 15.7 g/dL    Hematocrit 23.7 (L) 35.0 - 47.0 %    MCV 96 78 - 100 fl    MCH 30.8 26.5 - 33.0 pg    MCHC 32.1 31.5 - 36.5 g/dL    RDW 16.7 (H) 10.0 - 15.0 %    Platelet Count 97 (L) 150 - 450 10e9/L    Diff Method Automated Method     % Neutrophils 57.9 %    % Lymphocytes 29.8 %    % Monocytes 8.4  %    % Eosinophils 3.0 %    % Basophils 0.6 %    % Immature Granulocytes 0.3 %    Nucleated RBCs 0 0 /100    Absolute Neutrophil 3.9 1.6 - 8.3 10e9/L    Absolute Lymphocytes 2.0 0.8 - 5.3 10e9/L    Absolute Monocytes 0.6 0.0 - 1.3 10e9/L    Absolute Eosinophils 0.2 0.0 - 0.7 10e9/L    Absolute Basophils 0.0 0.0 - 0.2 10e9/L    Abs Immature Granulocytes 0.0 0 - 0.4 10e9/L    Absolute Nucleated RBC 0.0    Comprehensive metabolic panel   Result Value Ref Range    Sodium 141 133 - 144 mmol/L    Potassium 3.7 3.4 - 5.3 mmol/L    Chloride 110 (H) 94 - 109 mmol/L    Carbon Dioxide 22 20 - 32 mmol/L    Anion Gap 9 3 - 14 mmol/L    Glucose 104 (H) 70 - 99 mg/dL    Urea Nitrogen 5 (L) 7 - 30 mg/dL    Creatinine 0.79 0.52 - 1.04 mg/dL    GFR Estimate >90 >60 mL/min/[1.73_m2]    GFR Estimate If Black >90 >60 mL/min/[1.73_m2]    Calcium 8.0 (L) 8.5 - 10.1 mg/dL    Bilirubin Total 10.2 (H) 0.2 - 1.3 mg/dL    Albumin 2.1 (L) 3.4 - 5.0 g/dL    Protein Total 5.0 (L) 6.8 - 8.8 g/dL    Alkaline Phosphatase 97 40 - 150 U/L    ALT 15 0 - 50 U/L    AST 58 (H) 0 - 45 U/L   INR   Result Value Ref Range    INR 3.49 (H) 0.86 - 1.14   UA with Microscopic reflex to Culture   Result Value Ref Range    Color Urine Yellow     Appearance Urine Clear     Glucose Urine Negative NEG^Negative mg/dL    Bilirubin Urine Small (A) NEG^Negative    Ketones Urine Negative NEG^Negative mg/dL    Specific Gravity Urine 1.007 1.003 - 1.035    Blood Urine Negative NEG^Negative    pH Urine 6.0 5.0 - 7.0 pH    Protein Albumin Urine Negative NEG^Negative mg/dL    Urobilinogen mg/dL Normal 0.0 - 2.0 mg/dL    Nitrite Urine Positive (A) NEG^Negative    Leukocyte Esterase Urine Small (A) NEG^Negative    Source Midstream Urine     WBC Urine 11 (H) 0 - 5 /HPF    RBC Urine 0 0 - 2 /HPF    Bacteria Urine Many (A) NEG^Negative /HPF    Squamous Epithelial /HPF Urine <1 0 - 1 /HPF    Mucous Urine Present (A) NEG^Negative /LPF     *Note: Due to a large number of results and/or  encounters for the requested time period, some results have not been displayed. A complete set of results can be found in Results Review.      Recent Results (from the past 48 hour(s))   XR Chest 2 Views    Narrative    Exam: XR CHEST 2 VW, 8/11/2019 9:43 PM    Indication: L chest pain    Comparison: 8/6/2019, ultrasound 8/7/2019    Findings:   PA and lateral views of the chest. Cardiac silhouette is mildly  enlarged similar to prior. There is a large right pleural effusion  which is slightly decreased in size from 8/6/2019. No left pleural  effusion. No pneumothorax. Streaky bibasilar atelectasis. No acute  osseous abnormalities. Visualized upper abdomen is unremarkable.      Impression    Impression: Large right pleural effusion which is slightly decreased  from 8/6/2019. Streaky bibasilar right greater than left opacities,  favor atelectasis over consolidation.    I have personally reviewed the examination and initial interpretation  and I agree with the findings.    TIMOTEO VALADEZ MD                Pending Results:   Unresulted Labs Ordered in the Past 30 Days of this Admission     Date and Time Order Name Status Description    8/6/2019 2200 Plasma prepare order unit In process     8/6/2019 1512 Anaerobic bacterial culture Preliminary     7/18/2019 1600 Lactic acid whole blood In process                   Discharge Instructions and Follow-Up:     Discharge Procedure Orders   Home care nursing referral   Referral Priority: Routine Referral Type: Home Health Therapies & Aides   Number of Visits Requested: 1     Home Care PT Referral for Hospital Discharge   Referral Priority: Routine Referral Type: Home Health Therapies & Aides   Number of Visits Requested: 1     Home Care OT Referral for Hospital Discharge   Referral Priority: Routine   Number of Visits Requested: 1     Reason for your hospital stay   Order Comments: -You were admitted here for liver failure.  He was sent from the hospital to the transitional  care unit.  You have done well with therapy and transitional care unit- being discharged home with further follow-up as an outpatient with your physicians.     Adult [facility] Follow-up and recommended labs and tests   Order Comments: Follow up with primary care provider, Suresh Shabazz, within 7 days for hospital follow- up.  The following labs/tests are recommended: CBC, CMP, PT/INR.      Appointments on [place] and/or [facility] (with [facility] or [facility] provider or service). Call [phone] if you haven't heard regarding these appointments within 7 days of discharge.     Activity   Order Comments: Your activity upon discharge: activity as tolerated     Order Specific Question Answer Comments   Is discharge order? Yes      Discharge Instructions   Order Comments: -Please follow-up with your doctors as recommended.  You will have some follow-up blood draw-to make sure your hemoglobin, electrolytes, kidney function are stable.     MD face to face encounter   Order Comments: Documentation of Face to Face and Certification for Home Health Services    I certify that patient: Neema Bailey is under my care and that I, or a nurse practitioner or physician's assistant working with me, had a face-to-face encounter that meets the physician face-to-face encounter requirements with this patient on: 8/3/2019.    This encounter with the patient was in whole, or in part, for the following medical condition, which is the primary reason for home health care: End-stage liver disease and deconditioning due to that.    I certify that, based on my findings, the following services are medically necessary home health services: Nursing, Occupational Therapy and Physical Therapy.    My clinical findings support the need for the above services because: Nurse is needed: To assess changes in medications or other medical regimen. and To provide assessment and oversight required in the home to assure adherence to the medical plan  due to: ESLD and deconditioning, Occupational Therapy Services are needed to assess and treat cognitive ability and address ADL safety due to impairment in cognition. and Physical Therapy Services are needed to assess and treat the following functional impairments: .  Unable to walk independently.    Further, I certify that my clinical findings support that this patient is homebound (i.e. absences from home require considerable and taxing effort and are for medical reasons or [employer] services or infrequently or of short duration when for other reasons) because: Leaving home is medically contraindicated for the following reason(s): Other physician ordered restriction: fall risk...    Based on the above findings. I certify that this patient is confined to the home and needs intermittent skilled nursing care, physical therapy and/or speech therapy.  The patient is under my care, and I have initiated the establishment of the plan of care.  This patient will be followed by a physician who will periodically review the plan of care.  Physician/Provider to provide follow up care: Suresh Shabazz    Attending hospital physician (the Medicare certified [place] provider): Yordan Hutchinson  Physician Signature: See electronic signature associated with these discharge orders.  Date: 8/3/2019     Diet   Order Comments: Follow this diet upon discharge: Orders Placed This Encounter      Combination Diet High Kcal/High Protein Diet, ADULT; 2 gm NA Diet  Fluid restriction 1500 ml per day     Order Specific Question Answer Comments   Is discharge order? Yes             Osvaldo Avila  Internal Medicine Staff Hospitalist  [phone]  August 13, 2019        Time spent on patient: 35 minutes total including face to face and coordinating care time reviewing current illness, any medication changes, and the care plan for today.

## 2019-08-14 NOTE — TELEPHONE ENCOUNTER
"Hospital/TCU/ED for chronic condition Discharge Protocol    \"Hi, my name is Nj Lopez, a registered nurse, and I am calling from [facility].  I am calling to follow up and see how things are going for you after your recent emergency visit/hospital/TCU stay.\"    Tell me how you are doing now that you are home?\" taking it easy      Discharge Instructions    \"Let's review your discharge instructions.  What is/are the follow-up recommendations?  Pt. Response: follow up with doctor Harika.    \"Has an appointment with your primary care provider been scheduled?\"   Yes. (confirm)    \"When you see the provider, I would recommend that you bring your medications with you.\"    Medications    \"Tell me what changed about your medicines when you discharged?\"    Changes to chronic meds?    0-1    \"What questions do you have about your medications?\"    None     New diagnoses of heart failure, COPD, diabetes, or MI?    No              Post Discharge Medication Reconciliation Status: discharge medications reconciled, continue medications without change.    Was MTM referral placed (*Make sure to put transitions as reason for referral)?   No    Call Summary    \"What questions or concerns do you have about your recent visit and your follow-up care?\"     none    \"If you have questions or things don't continue to improve, we encourage you contact us through the main clinic number (give number).  Even if the clinic is not open, triage nurses are available 24/7 to help you.     We would like you to know that our clinic has extended hours (provide information).  We also have urgent care (provide details on closest location and hours/contact info)\"      \"Thank you for your time and take care!\"             "

## 2019-08-14 NOTE — PROGRESS NOTES
Due to pt deciding to reside with her aunt in [facility] (rather than [place], [place]) - agency to provide home care was changed to [facility] [phone]. Pt's start of care to be Monday, August 19th - per her request. Home care agency informed that home OT was not indicated and that a BMP would be drawn on Monday (8/19). Pt given home care agency's phone number as well as this writer's number for any additional follow up questions or concerns. Pt verbalized understanding of the plan. Rebecca Watts RN on 8/14/2019 at 9:36 AM

## 2019-08-14 NOTE — DISCHARGE INSTRUCTIONS
Follow up with Dr. Leventhal in 2 months with CBC, CMP and INR    Home care services to be provided by [facility] - phone number [phone]. Start of care to be Monday, August 19th. Address for home health services is: [address] [place] [address]. Pt will need BMP drawn on Monday, August 19th.     No home OT services required at this time.

## 2019-08-19 NOTE — ED TRIAGE NOTES
"Neema comes to the ED today with her Mother for evaluation of multiple complaints.  She was discharged from a TCU last week after a long hospital stay for \"liver issues\".  Contacted her doctor today and reported that she was experiencing generalized swelling, low back pain, bilateral shoulder pain, and SOB.  Ideally would have liked to be scheduled for an outpatient thoracentesis and paracentesis however those procedures could not be done today so she was referred to the ED by her doctor for evaluation.    "

## 2019-08-19 NOTE — ED NOTES
"Notified by registration that patient states he is going to \"pass out\".  Patient sitting upright on her walker seat and appears in no acute distress.  She states she is nauseous and would like to lie down.  Vss.  Offered reassrance due to wait.  Patient declined a wheelchair or blanket.  Provided an emesis bag.  "

## 2019-08-20 PROBLEM — R65.10 SIRS (SYSTEMIC INFLAMMATORY RESPONSE SYNDROME) (H): Status: ACTIVE | Noted: 2019-01-01

## 2019-08-20 NOTE — PROGRESS NOTES
SPIRITUAL HEALTH SERVICES  SPIRITUAL ASSESSMENT Progress Note (Palliative Focus)  [facility] ([place]) 4C    REFERRAL SOURCE: Palliative care initial spiritual assessment    Brief supportive visit with patient Neema Kim, who said she would prefer a visit tomorrow if possible, as she was about to work with therapies.     Plan: I will follow for spiritual support while Palliative Care is consulted.    Alyssa Mota  Palliative   Pager [phone]  [facility] Inpatient Team Consult pager [phone] (M-F 8-4:30)  After-hours Answering Service [phone]

## 2019-08-20 NOTE — ED PROVIDER NOTES
History     Chief Complaint   Patient presents with     Shortness of Breath     Back Pain     Shoulder Pain     Leg Swelling     HPI  Neema Bailey is a 44 year old female with a history of ESLD c/b hepatic encephalopathy, ascites, and varices, HTN, chronic pain/sciatica, polysubstance abuse, and morbid obesity who [facility] for decompensated ESLD. During her stay, she underwent both paracentesis (250 mL removed) and thoracentesis (1 L) with improvement of symptoms. At discharge, patient was instructed to continue her Lactulose, Rifaximin, Furosemide 20 mg, and Spironolactone 50 mg and discontinue her potassium supplements.     Today, she presents complaining of BLE edema, low back pain most significant on the left and shortness of breath with increasing cough. The patient reports she's had worsening swelling in her legs up to her hips and abdomen over the past few days. The patient reports it is becoming more difficult for her to walk secondary to these symptoms and has felt both dyspnea and chest pain with exertion as well. Furthermore, she's had recent chills, abdominal pain (especially when eating), nausea, dry heaves, weakness, and fatigue. Patient denies vomiting or hematemesis, fevers, melena, or urinary symptoms. She states she has about 3-5 bowel movements a day with her lactulose but has not yet taken it today.    Per chart review, the patient was seen by her regular care provider on 8/16 for these symptoms. Her workup then was notable for an elevated ammonia (despite the patient taking Lactulose every day), elevated bilirubin, anemia (Hgb of 9.8), and INR of 2.6. Patient's PCP instructed her to increase her furosemide to 40 mg and start taking a potassium chloride 20 mEq tablet a day.       Past Medical History:   Diagnosis Date     Anxiety      Bilateral leg edema      Chronic kidney disease      Dizziness and giddiness      Hypertension      Insomnia       Iron deficiency anemia     due to chronic blood loss, vitamin B12 deficiency, Macrocytic     Migraines      BURT (nonalcoholic steatohepatitis)      Numbness and tingling     PERIPHERAL NEUROPATHY     Obese      Other chronic pain     Chronic Bilateral Low Back Pain with Bilateral Sciatica, Bilateral Knee Pain     Peripheral neuropathy      Pruritus      Restless legs      Sciatica      Seborrheic dermatitis      Severe episode of recurrent major depressive disorder, without psychotic features (H)      Sinus tachycardia      Substance abuse in remission (H)     h/o alcoholism had treatment at 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)     BREAST SURGERY      Breast Augmentation     COLPORRHAPHY ANTERIOR N/A 9/21/2015    Procedure: COLPORRHAPHY ANTERIOR;  Surgeon: Jairon Adams MD;  Location: [facility]     COSMETIC SURGERY      Abdominoplasty     CYSTOCELE REPAIR       CYSTOSCOPY N/A 11/26/2018    Procedure: CYSTOSCOPY;  Surgeon: Rony Melgar MD;  Location: [facility]     ENT SURGERY      tonsillectomy & adenoidectomy     GI SURGERY      Small Bowel Enterotomy Repair for SBO, EGD     HYSTERECTOMY VAGINAL, COLPORRHAPHY ANTERIOR, POSTERIOR, COMBINED N/A 11/26/2018    Procedure: VAGINAL HYSTERECTOMY, ANTERIOR AND POSTERIOR REPAIR;  Surgeon: Rony Melgar MD;  Location: [facility]     IR PARACENTESIS  8/7/2019     IR THORACENTESIS  8/7/2019     PERINEORRHAPHY N/A 11/26/2018    Procedure: PERINEOPLASTY;  Surgeon: Rony Melgar MD;  Location: [facility]     TUBAL LIGATION         History reviewed. No pertinent family history.    Social History     Tobacco Use     Smoking status: Never Smoker     Smokeless tobacco: Never Used   Substance Use Topics     Alcohol use: No      "Alcohol/week: 0.0 oz     Frequency: Never     Comment: Pt reports being a previou heavy drinker, reports quitting earlier this year       Current Facility-Administered Medications   Medication     furosemide (LASIX) tablet 20 mg     ondansetron (ZOFRAN) injection 4 mg     Current Outpatient Medications   Medication     alum & mag hydroxide-simethicone (MYLANTA ES/MAALOX  ES) 400-400-40 MG/5ML SUSP suspension     furosemide (LASIX) 20 MG tablet     hydrOXYzine (ATARAX) 25 MG tablet     lactulose (CHRONULAC) 10 GM/15ML solution     midodrine (PROAMATINE) 10 MG tablet     multivitamin, therapeutic (THERA-VIT) TABS tablet     ondansetron (ZOFRAN-ODT) 4 MG ODT tab     pantoprazole (PROTONIX) 40 MG EC tablet     rifaximin (XIFAXAN) 550 MG TABS tablet     sertraline (ZOLOFT) 25 MG tablet     simethicone (MYLICON) 80 MG chewable tablet     spironolactone (ALDACTONE) 25 MG tablet     Vitamin D, Cholecalciferol, 1000 units TABS        Allergies   Allergen Reactions     Bactrim [Sulfamethoxazole W/Trimethoprim]      Gabapentin      Other reaction(s): Edema     Nitrofurantoin Other (See Comments)     drug-induced liver injury     I have reviewed the Medications, Allergies, Past Medical and Surgical History, and Social History in the Epic system.    Review of Systems   Constitutional: Positive for chills and fatigue. Negative for fever.   Respiratory: Positive for shortness of breath (w/ exertion).    Cardiovascular: Positive for chest pain (w/ exertion) and leg swelling.   Gastrointestinal: Positive for abdominal pain and nausea. Negative for blood in stool and vomiting.   Genitourinary: Negative for dysuria, frequency, hematuria and urgency.   Musculoskeletal: Positive for back pain (lower) and joint swelling (hips, shoulders).   Neurological: Positive for tremors and weakness.       Physical Exam   BP: 112/79  Pulse: 111  Heart Rate: 98  Temp: 98.6  F (37  C)  Resp: 16  Height: 172.7 cm (5' 8\")  Weight: 115.8 kg (255 lb 3 " oz)  SpO2: 100 %      Physical Exam    General: patient is alert and oriented, uncomfortable appearing  Head: atraumatic and normocephalic   EENT: dry mucus membranes without tonsillar erythema or exudates, pupils round and reactive, icteric sclera  Neck: supple with full range of motion, no meningismus  Cardiovascular: regular rate and rhythm, extremities warm and well perfused, 2+ pitting bilateral lower extremity edema up to the abdomen   Pulmonary: Diminished breath sounds at the bases bilaterally  Abdomen: soft, mild epigastric and right upper quadrant tenderness to palpation, no rebound, no appreciable fluid wave present musculoskeletal: normal range of motion of the extremities and able to range the hips bilaterally as well as knees without discomfort  Neurological: alert and oriented, moving all extremities symmetrically, gait normal   Skin: warm, dry, no erythema or localized skin lesions noted    ED Course        Procedures       8:35 PM  The patient was seen and examined by Dr. Hook in Room 6.          EKG Interpretation:      Interpreted by Allison Hook  Time reviewed: 1950  Symptoms at time of EKG: dyspnea   Rhythm: sinus tachycardia  Rate: Tachycardia  Axis: Normal  Ectopy: none  Conduction: normal  ST Segments/ T Waves: low voltage  Q Waves: nonspecific  Comparison to prior: Unchanged    Clinical Impression: sinus tachycardia                Critical Care time:  was 30 minutes for this patient excluding procedures.       The patient has signs of Severe Sepsis as evidenced by:    1. 2 SIRS criteria, AND  2. Suspected infection, AND   3. Organ dysfunction: Lactic Acid > 1.9    Time severe sepsis diagnosis confirmed = 2000 as this was the time when Lactate resulted, and the level was > 1.9      3 Hour Severe Sepsis Bundle Completion:  1. Initial Lactic Acid Result:   Recent Labs   Lab Test 08/20/19  0045 08/19/19  2257 08/19/19 1952   LACT 3.0* 3.9* 4.9*     2. Blood Cultures before Antibiotics:  Yes  3. Broad Spectrum Antibiotics Administered: Yes     Anti-infectives (From admission through now)    Start     Dose/Rate Route Frequency Ordered Stop    08/19/19 2330  vancomycin (VANCOCIN) 2,500 mg in sodium chloride 0.9 % 500 mL intermittent infusion      2,500 mg  over 2 Hours Intravenous ONCE 08/19/19 2228 08/20/19 0205    08/19/19 2110  ertapenem (INVanz) 1 g vial to attach to  mL bag      1 g  over 30 Minutes Intravenous ONCE 08/19/19 2109 08/19/19 2221        4. 2000 ml of IV fluids.  Ideal body weight: 63.9 kg (140 lb 14 oz)  Adjusted ideal body weight: 84.6 kg (186 lb 9.6 oz)    Severe Sepsis reassessment:  1. Repeat Lactic Acid Level: 3.0  2. MAP>65 after initial IVF bolus, will continue to monitor fluid status and vital signs               Labs Ordered and Resulted from Time of ED Arrival Up to the Time of Departure from the ED   CBC WITH PLATELETS DIFFERENTIAL   INR   PARTIAL THROMBOPLASTIN TIME   TROPONIN I   AMMONIA   TSH   NT PROBNP INPATIENT   BASIC METABOLIC PANEL   LACTIC ACID WHOLE BLOOD   UA MACROSCOPIC WITH REFLEX TO MICRO AND CULTURE   PERIPHERAL IV CATHETER   CARDIAC CONTINUOUS MONITORING   BLOOD CULTURE   BLOOD CULTURE            Assessments & Plan (with Medical Decision Making)   Lynn is a 44 year old female with a history of ESLD c/b hepatic encephalopathy, ascites, and varices, HTN, chronic pain/sciatica, polysubstance abuse, and morbid obesity who [facility] 8/13[facility]19 for decompensated ESLD and presents to the emergency department with increasing shortness of breath, nausea and generalized myalgias.  Patient has a rectal temp of 100.2.  She has been tachycardic and hypotensive and has received 1.5L normal saline and 500 cc of albumin.  Patient said left initial lactate was noted to be 4.9 with a white count of 17.  CRP is not significantly elevated at 16 and procalcitonin 0.12.  She does have a history of lactic acidosis in setting of hepatic  disease but history is concerning for secondary infection as well.  She was empirically treated with ertapenem and vancomycin given a history of ESBL UTIs.  Her urine today is not consistent with a UTI.  She does have a large recurrent right pleural effusion.  An abdominal imaging she does not have significant recurrent ascites that would be amenable for aspiration at this time.  She does have a distended gallbladder and wall thickening concerning for possible cholecystitis.  In addition she is noted to have a loculated fluid collection in the subcutaneous tissue along the left iliac crest which is the area of tenderness.  Discussed with general surgery regarding her gallbladder findings as well as orthopedic surgery.  She will have an ultrasound of the gallbladder for further evaluation and pending general surgery consultation.  On discussion with orthopedic surgery recommended possible IR aspiration of the fluid collection.  She does not have evidence of tach arthritis on exam at this time.  Patient will be admitted to the ICU for continued IV antibiotics and further work-up and management.    I have reviewed the nursing notes.    I have reviewed the findings, diagnosis, plan and need for follow up with the patient.    New Prescriptions    No medications on file       Final diagnoses:   Hepatic encephalopathy (H)   Pleural effusion   Sepsis, due to unspecified organism (H)   I, Uriel Barrera, am serving as a trained medical scribe to document services personally performed by Allison Hook MD, based on the provider's statements to me.   IAllison MD, was physically present and have reviewed and verified the accuracy of this note documented by Uriel Barrera.    8/19/2019   [facility], EMERGENCY DEPARTMENT     Allison Hook MD  08/20/19 0214

## 2019-08-20 NOTE — ED NOTES
"ED Triage Provider Note  [facility]  Encounter Date: Aug 19, 2019    History:  Chief Complaint   Patient presents with     Shortness of Breath     Back Pain     Shoulder Pain     Leg Swelling     Neema Bailey is a 44 year old female with a past medical history of polysubstance abuse, morbid obesity, uterine prolapse, hypokalemia, and physical deconditioning who presents to the ED with multiple complaints.  The patient was discharged from a TCU last week after a long hospital stay for \"liver issues.\"  The patient started to experience generalized swelling, low back pain, bilateral shoulder pain, and shortness of breath over the last few days.  She contacted her doctor today and was sent here.  The patient states she would ideally have liked to be scheduled for an outpatient thoracentesis and paracentesis has symptoms improved last time she underwent these procedures; however these procedures were not available today so she was referred to the emergency department.  She is accompanied today in the emergency department by her mother.    The patient notes that initially they thought her liver problems were due to a remote history of alcohol abuse, patient quit drinking 7 months ago.  However, patient also notes she was on multiple courses of antibiotics for a chronic urinary tract infection after having a urinary catheter placed.    Of note, while patient was waiting in the waiting room, the patient felt as if she were about to lose consciousness.  The patient was noted to be sitting upright in her walker in no apparent distress at that time.  The patient also complained of nausea at that time.  Vital signs were stable at that time.  Patient was provided with an emesis bag.    Review of Systems:  General: No fevers, positive for chills  Skin: No rash or diaphoresis, positive for jaundice  Eyes: No eye redness or discharge  Respiratory: occasional cough, positive for " "SOB  Gastrointestinal: Positive for nausea  Genitourinary: No urinary frequency, hematuria, or dysuria  Neurologic: No numbness or weakness, positive for tremors  Hematologic/Lymphatic/Immunologic: Positive for leg swelling and easy bruising/bleeding      Exam:  /47   Temp 98.6  F (37  C) (Oral)   Resp 16   Ht 1.727 m (5' 8\")   Wt 115.8 kg (255 lb 3 oz)   LMP 10/04/2018   SpO2 100%   Breastfeeding? No   BMI 38.80 kg/m    General: in mild distress. Appears stated age.   Skin: jaundiced  Neck: supple, nl ROM  Cardio: tachycardic, BLE swelling, extremities well perfused  Resp: Mildly increased work of breathing, slightly increased normal respiratory rate with oxygen saturations maintained in the 90s on room air, distant breath sounds  Abdomen: no TTP, soft  Extremities: BLE swelling is present, intact distal pulses  Neuro: Alert. Disoriented, CN II-XII grossly intact. Generalized weakness    Medical Decision Making:  Patient arriving to the ED with problem as above. A medical screening exam was performed. Lab and medication orders initiated from Triage. The patient is appropriate to be immediately roomed.      Steffi Hameed MD on 8/19/2019 at 7:31 PM     Steffi Hameed MD  08/19/19 2019    "

## 2019-08-20 NOTE — PROGRESS NOTES
MICU PROGRESS NOTE  Neema Bailey ([id number]) admitted on 8/19/2019  Primary care provider: Suresh Shabazz       ASSESSMENT & PLAN    Neema Bailey is a 44 year-old female with PMHx of ESLD complicated by hepatic encephalopathy, portal hypertension, ascites, and varices, polysubstance use disorder, morbid obesity s/p gastric bypass in 2002 and with multiple recent hospitalizations for decompensated cirrhosis admitted to the MICU for severe sepsis 2/2 e. coli bacteremia, now hemodynamically stable and on ertapenem.     Changes Today:  - Analgesia:      - First line: acetaminophen PRN     - Second line: hydromorphone PRN  - Give additional albumin 12.5 g  - Titrate levophed to maintain MAP goal >60  - BCx 8/19: positive for e. Coli  - Continue ertapenem 1 g daily   - Continue Vancomycin  --------------------------------------------------------------------    Neuro/Psych  # History of hepatic encephalopathy   Ammonia 123 on admit. Patient has been taking home lactulose and rifaximin. Currently alert, oriented, and mentating appropriately.   - Continue PTA lactulose 10 g TID  - PRN lactulose in addition to scheduled for goal of 3-4 stools/day   - Continue PTA rifaximin 550 mg BID    # Major depressive disorder  - Continue PTA sertraline 25 mg QD    # Generalized anxiety disorder  - Continue PTA hydroxyzine PRN    Sedation: none  Analgesia:   - First line: acetaminophen PRN  - Second line: hydromorphone PRN    Cardiovascular  # Hypotension   In the setting of severe sepsis. Blood pressure baseline 110s/60s, on midodrine 10 mg TID at home. Now on levophed and s/p albumin. EKG 8/19 with NSR and no abnormalities.Bedside echo 8/20 without gross reduction in cardiac function or evidence of right heart strain.  - Continue PTA midodrine 10 mg TID   - Titrate levophed to maintain MAP goal >60  - Strict I&Os  - Formal Echo today    Respiratory  # Dyspnea   # Right pleural effusion   Suspect that  recurrent right pleural effusions are the cause of her progressive shortness of breath over the past 2-3 days. Diagnostic thoracentesis on 8/8/19 with transudative fluid. 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 SOB could also represent heart failure given chronic peripheral edema and mildly elevated BNP on admission. PE also considered, however no signs/symptoms of DVT and not requiring oxygen until after IV fluid administration. Bedside echo without obvious right heart strain.   - Formal echocardiogram today  - Consider therapeutic thoracentesis if worsening respiratory status    Renal/Fluids/Electolytes  Baseline Cr: 0.7-0.8    No active issues.    Gastrointestinal  # Abdominal pain  Diffuse, initially reported more in the RUQ, but denies today. Benign exam. Abdominal US unchanged from prior and neg for thromboses. Did show gall bladder wall thickening but Cholecystitis unlikely as stable. Lipase wnl. SBP possible but will defer paracentesis as it would not  given BCx positive for e. coli and patient already on ertapenem.       # End-stage liver disease; Multifactorial: alcohol, BURT, and medication induced liver injury   -ascites   -portal HTN   -abdominal varicies   -HE   # Transaminitis   Liver biopsy from 5/10/19 consistent with possible injury 2/2 alcohol use per pathologist read, but reviewed by Hepatology during last hospitalization (7/17-21), and suspects this is more consistent with drug-related injury (Bactrim vs Macrobid).  No hx of SBP, HCC, or hepatorenal syndrome. Abdominal U/S with dopplers with no splenic vein thrombosis and patent vasculature.   - Continue to monitor LFTs  - Holding PTA lasix and spironolactone in setting of hypotension    MELD-Na score: 31 at 8/20/2019  5:50 AM  MELD score: 31 at 8/20/2019  5:50 AM  Calculated from:  Serum Creatinine: 0.78 mg/dL (Rounded to 1 mg/dL) at 8/20/2019  4:31 AM  Serum Sodium: 140 mmol/L (Rounded to 137 mmol/L) at  8/20/2019  4:31 AM  Total Bilirubin: 11.6 mg/dL at 8/20/2019  4:31 AM  INR(ratio): 3.87 at 8/20/2019  5:50 AM  Age: 44 years     Nutrition: NPO except meds    Infectious Disease   # Severe sepsis  #E.coli bacteremia   BCx 8/19 positive for e. coli. Possible source is SBP vs subcutaneous fluid collection along left iliac crest, however per discussion with Radiology, unlikely.  CXR with R pleural effusion and possible consolidation but favoring atelectasis as normal procal and non-productive cough. Empyema also possible. Cholecystitis also considered in the setting of gallbladder wall thickening and distension, however abdominal US 8/20 mostly unchanged from 7/2019, making acute cholecystitis less likely. Urinalysis not suggestive of UTI.  Patient has a history of ESBL (E coli) bacteremia and VRE (E. Faecium) UTI in 1/2019--however, <20,000 colonies pointing against this being a true infection so will not cover VRE at the this time. Started on ertapenem & vancomycin.   - Ertapenem 1 g daily   - Vancomycin  - Follow blood cultures   Cultures:  BCx 8/19: positive for e. coli    Antimicrobials:  Ertapenem 1g daily (8/19 - *)  Vancomycin  (8/19 - *)    Hematology  Hemoglobin improved from baseline of ~7 in the setting of chronic liver disease and malnutrition.   - Continue to monitor CBC    Endocrine  No known issues     Skin/MSK  # ?Soft tissue fluid collection  Seen on CT. Unclear if infectious process. In discussion with Radiology, fluid collection is unlikely to represent abscess but cannot be ruled out on imaging alone.    Goals of Care  Pt requests palliative care consult  -placed and discussed with palliative today     Prophylaxis:  DVT: SCDs   GI: PPI  Family: Mother had been present in ED  Disposition: Critically Ill  Code Status: Full    Patient seen and discussed with staff attending, Dr. Wolff.     Aldair Sotomayor, MS4  [employer]  MICU 4C    Resident/Fellow Attestation   Marilynn CHO  "Yordy, was present with the medical student who participated in the service and in the documentation of the note.  I have verified the history and personally performed the physical exam and medical decision making.  I agree with the assessment and plan of care as documented in the note.         Marilynn Scales MD  Medicine-Pediatrics PGY3  Pager # [phone]     INTERVAL HISTORY:   NAEON.Nursing notes reviewed. Pt endorses feeling weak and \"crappy\" this morning but more calm than when she came in. Clinically appears to be stabilizing. She requests a palliative care consult    OBJECTIVE     Temp:  [98.2  F (36.8  C)-98.7  F (37.1  C)] 98.2  F (36.8  C)  Pulse:  [] 103  Heart Rate:  [] 93  Resp:  [14-38] 17  BP: ()/() 100/46  SpO2:  [90 %-100 %] 94 %    Resp: 17    Intake/Output Summary (Last 24 hours) at 8/20/2019 1339  Last data filed at 8/20/2019 1331  Gross per 24 hour   Intake 4029.81 ml   Output 200 ml   Net 3829.81 ml       Vitals:    08/19/19 1752 08/20/19 0330   Weight: 115.8 kg (255 lb 3 oz) 118.6 kg (261 lb 7.5 oz)     Physical Exam  GEN: NAD, converses appropriately  NEURO: AOx4, CN II-XII intact, strength 5/5 throughout, sensation intact and equal bilaterally  HEENT: NCAT, PERRL, EOMI, scleral icterus is present, lips are cracked with dried blood present  RESP: Diminished lung sounds at the bases R > L, otherwise CTAB  CV: RRR, no MRG  ABD: soft, distended, mild diffuse tenderness to palpation  EXT: 2+ pitting edema  SKIN: No erythema    Data:  All laboratory and imaging reviewed by me.    Recent Results (from the past 24 hour(s))   XR Chest 2 Views    Narrative    Exam: XR CHEST 2 VW, 8/19/2019 8:20 PM    Indication: jose    Comparison: 8/11/2019    Findings:   AP and lateral views of the chest. The cardiac silhouette is unchanged  from prior. There is a large right pleural effusion which is increased  in size from 11/20/2019. There are adjacent streaky opacities " and  streaky left basilar opacities. No pneumothorax. Visualized upper  abdomen is unremarkable. No acute osseous abnormalities. There are  degenerative changes of the right acromioclavicular joint.      Impression    Impression:   1. Large right pleural effusion which is increased in size from  8/11/2019.  2. Streaky bibasilar opacities favored to represent pulmonary edema  and atelectasis over infectious.      I have personally reviewed the examination and initial interpretation  and I agree with the findings.    GREGORIO MIR MD   CT Chest/Abdomen/Pelvis w Contrast    Narrative    CT CHEST/ABDOMEN/PELVIS W CONTRAST 8/19/2019 11:26 PM    History: Abd pain, unspecified; ; abdominal pain, low back pain,  vomiting    Comparison: Same day chest x-ray, ultrasound 7/18/2019, CT abdomen  pelvis 8/11/2017    Technique: Helical CT acquisition from the lung apices to the pubic  symphysis was obtained withintravenous contrast. Axial, coronal, and  sagittal reconstructions were obtained and reviewed.    Contrast: iopamidol (ISOVUE-370) solution 135 mL    Findings:     CHEST:     Lungs: There is a large right-sided pleural effusion with complete  collapse of the right lower and middle lobes and subsegmental  atelectasis of the right upper lobe and left lower lobe. Scattered  calcified pulmonary granuloma. No pneumothorax.  Airways: Central tracheobronchial tree is clear.  Vessels: Main pulmonary artery and aorta are normal in caliber. No  central pulmonary embolism. Normal three-vessel arch.  Heart: Heart size is normal without pericardial effusion.  Lymph nodes: No suspicious mediastinal or hilar lymphadenopathy.  Calcified mediastinal and hilar lymph nodes.  Thyroid: Within normal limits.  Esophagus: Within normal limits    ABDOMEN PELVIS:    Liver: Normal parenchymal attenuation without focal mass.  Biliary system: Gallbladder is distended with mucosal hyperenhancement  and mild amount of wall thickening. No radiopaque  gallstones.. No  intrahepatic or extrahepatic biliary ductal dilatation.  Pancreas: No focal mass or dilation of the main pancreatic duct.  Stomach: Postoperative changes of Denny-en-Y gastric bypass.  Spleen: Spleen is enlarged measuring 13.3 cm in length. There are  multiple wedge-shaped hypodensities throughout superior aspect of the  spleen.  Adrenal glands: Within normal limits.  Kidneys: No focal mass, hydronephrosis, or stone.  Bladder: Within normal limits.  Reproductive organs: Uterus and ovaries are not visualized.  Colon: Colon is relatively decompressed with scattered noninflamed  diverticuli.  Appendix: Not confidently identified.  Small Bowel: No dilated loops of bowel. Gastrojejunal and  jejunojejunal anastomoses are within normal limits.  Lymph nodes: No intra-abdominal or pelvic lymphadenopathy.  Vasculature: Major intra-abdominal vasculature is patent. Multiple  predominantly splenorenal upper abdominal varices.    Mild intra-abdominal ascites.    Bones and soft tissues: Bilateral breast augmentation. Moderate  anasarca predominantly around the mid abdomen. There is a 5.1 x 2.6 cm  fluid collection in the subcutaneous fat of the left pelvic region.  There is a thin mildly hyperattenuating rim surrounding the  collection. No suspicious osseous lesion. Degenerative changes L5-S1  with disc space narrowing, endplate sclerosis, and marginal  osteophytes.      Impression    IMPRESSION:   1. Large right-sided pleural effusion with complete collapse of the  right lower and middle lobes with subsegmental consolidation of the  right upper lobe. Cannot exclude underlying infection.   2. Although the liver does not appear overtly cirrhotic, there are  findings of portal hypertension in this patient with history of BURT  with splenomegaly, upper abdominal varices, and mild intra-abdominal  ascites.  3. Gallbladder is distended with mild wall thickening. This is  nonspecific in the setting of likely cirrhotic  liver with ascites.  This appears unchanged compared to ultrasound dated 7/18/2019 given  differences in technique  4. Findings suggestive of acute to subacute splenic infarctions.  5. There is a nonspecific, possibly encapsulated, 5.1 x 2.6 cm fluid  collection in the subcutaneous fat of the left pelvic region.    I have personally reviewed the examination and initial interpretation  and I agree with the findings.    ZHANG REECE MD   US Abdomen Limited (RUQ)    Narrative    EXAMINATION: Limited Abdominal Ultrasound, 8/20/2019 1:49 AM     COMPARISON: Abdominal ultrasound 7/18/2019    HISTORY: Abdominal pain, vomiting, sepsis. Evaluate for cholecystitis.    FINDINGS:   Fluid: Large pleural effusion and small to moderate volume ascites.    Liver: Measures 14.5 cm. Demonstrates increased proximal echogenicity  with mildly nodular contour. No focal mass..     Gallbladder: Gallbladder sludge with mild gallbladder wall thickening  measuring up to 3 mm. There is a mild amount of pericholecystic fluid.  Equivocal sonographic Mensah sign due to pain medication. No wall  hyperemia.    Bile Ducts: Both the intra- and extrahepatic biliary system are of  normal caliber.  The common bile duct measures 4 mm in diameter.    Pancreas: Visualized portions of the head and body of the pancreas are  unremarkable.     Kidney: The right kidney measures 9.4 cm long. There is no  hydronephrosis or hydroureter, no shadowing renal calculi, or solid  mass.     There is retrograde flow within the splenic and main portal vein.      Impression    IMPRESSION:   Gallbladder sludge with mild gallbladder wall thickening or  pericholecystic fluid.  These findings other than interval development  of gallbladder sludge are unchanged since 7/18/2019 and are  nonspecific in the setting of likely cirrhosis. This makes acute  cholecystitis unlikely.  Cirrhotic appearing liver with new retrograde flow in the splenic and  main portal vein. There is  mild to moderate volume ascites with large  right-sided pleural effusion consistent with portal hypertension. No  focal mass.    I have personally reviewed the examination and initial interpretation  and I agree with the findings.    ZHANG REECE MD   US Abd/Pelvis Duplex Complete Portable    Narrative    EXAMINATION: TEMPORARY 8/20/2019 5:14 AM     COMPARISON: Abdominal ultrasound 7/18/2019    HISTORY: concern for portal vein thrombosis and/or splenic artery  thrombosis/infarction in patient with cirrhosis    TECHNIQUE: The abdomen was scanned in standard fashion with  specialized ultrasound transducer(s) using both gray-scale, color  Doppler, and spectral flow techniques.    Findings:  Large right-sided pleural effusion. Small intra-abdominal ascites    Extrahepatic portal vein flow is retrograde at 30 cm/s.  Right portal vein flow is retrograde, measuring 22 cm/s.  Left portal vein flow is retrograde, measuring 41 cm/s.    Flow in the hepatic artery is towards the liver with normal monophasic  low-resistance waveform:  153 cm/s peak systolic  0.62 resistive index.     The splenic vein is patent and flow is away from the liver.  The left,  middle, and right hepatic veins are patent with flow towards the IVC.  The IVC is patent with flow towards the heart.      Splenic artery: 69 cm/s towards the spleen with resistive index of  0.68. Normal monophasic low-resistance waveform.    Spleen: The spleen measures 15.4 cm in length.      Impression    Impression:   1.  Patent right upper quadrant Doppler evaluation with retrograde  flow in the portal and splenic veins. No portal vein thrombosis.  2.  Patent splenic artery.  3.  Large right-sided pleural effusion with small intraabdominal  ascites.  Previously visualized splenic infarctions are not well delineated on  this examination.    I have personally reviewed the examination and initial interpretation  and I agree with the findings.    ZHANG REECE MD   XR  Chest Port 1 View    Narrative    XR CHEST PORT 1 VW  8/20/2019 6:11 AM      HISTORY: follow up pleural effusion; concern for worsening pulmonary  edema    COMPARISON: Chest x-ray and CT chest abdomen pelvis 8/19/2019    TECHNIQUE: Semiupright frontal view of the chest    FINDINGS: Stable moderate to large right-sided pleural effusion with  associated consolidation/atelectasis of the right middle and lower  lobes. Mild interstitial opacities. No appreciable pneumothorax.  Cardiac mediastinal silhouette is within normal limits. Trachea is  midline. Upper abdomen is unremarkable. No suspicious osseous lesion.      Impression    IMPRESSION:   1. Stable moderate to large right-sided pleural effusion with  associated consolidation/atelectasis of the right middle and lower  lobes.   2. Bilateral interstitial opacities, likely pulmonary edema.    I have personally reviewed the examination and initial interpretation  and I agree with the findings.    ZHANG REECE MD

## 2019-08-20 NOTE — CONSULTS
[facility]  ORTHOPAEDIC SURGERY CONSULT - HISTORY AND PHYSICAL    DATE OF CONSULT: 8/20/2019 8:44 AM    REQUESTING PROVIDER: Margaret Metz MD - N Staff.    CC: possible left pelvis fluid collection     DATE OF INJURY: NA    HISTORY OF PRESENT ILLNESS:   Ms. Bailey is a 44 year-old female with PMHx of end-stage liver disease complicated by hepatic encephalopathy and varices, HTN, chronic pain/sciatica, polysubstance abuse, and morbid obesity with multiple recent hospitalizations for decompensated cirrhosis admitted to the MICU for severe sepsis of multiple possible sources, currently hemodynamically stable.     Orthopaedics consulted regarding CT CAP with possible nonspecific pelvic fluid collection. Patient currently denying left hip pain.     Nursing and physician Emergency Department notes reviewed and HPI updated to reflect their ED course.     PAST MEDICAL HISTORY:   Past Medical History:   Diagnosis Date     Anxiety      Bilateral leg edema      Chronic kidney disease      Dizziness and giddiness      Hypertension      Insomnia      Iron deficiency anemia     due to chronic blood loss, vitamin B12 deficiency, Macrocytic     Migraines      BURT (nonalcoholic steatohepatitis)      Numbness and tingling     PERIPHERAL NEUROPATHY     Obese      Other chronic pain     Chronic Bilateral Low Back Pain with Bilateral Sciatica, Bilateral Knee Pain     Peripheral neuropathy      Pruritus      Restless legs      Sciatica      Seborrheic dermatitis      Severe episode of recurrent major depressive disorder, without psychotic features (H)      Sinus tachycardia      Substance abuse in remission (H)     h/o alcoholism had treatment at [facility]     Uterovaginal prolapse      Vitamin D deficiency        PAST SURGICAL HISTORY:   Past Surgical History:   Procedure Laterality Date     ABDOMEN SURGERY      gastric bypass in 2002 (MELY-EN-Y)     APPENDECTOMY       BACK SURGERY      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)     BREAST SURGERY      Breast Augmentation     COLPORRHAPHY ANTERIOR N/A 9/21/2015    Procedure: COLPORRHAPHY ANTERIOR;  Surgeon: Jairon Adams MD;  Location: [facility]     COSMETIC SURGERY      Abdominoplasty     CYSTOCELE REPAIR       CYSTOSCOPY N/A 11/26/2018    Procedure: CYSTOSCOPY;  Surgeon: Rony Melgar MD;  Location: [facility]     ENT SURGERY      tonsillectomy & adenoidectomy     GI SURGERY      Small Bowel Enterotomy Repair for SBO, EGD     HYSTERECTOMY VAGINAL, COLPORRHAPHY ANTERIOR, POSTERIOR, COMBINED N/A 11/26/2018    Procedure: VAGINAL HYSTERECTOMY, ANTERIOR AND POSTERIOR REPAIR;  Surgeon: Rony Melgar MD;  Location: [facility]     IR PARACENTESIS  8/7/2019     IR THORACENTESIS  8/7/2019     PERINEORRHAPHY N/A 11/26/2018    Procedure: PERINEOPLASTY;  Surgeon: Rony Melgar MD;  Location: [facility]     TUBAL LIGATION           MEDICATIONS:   Prior to Admission medications    Medication Sig Last Dose Taking? Auth Provider   alum & mag hydroxide-simethicone (MYLANTA ES/MAALOX  ES) 400-400-40 MG/5ML SUSP suspension Take 20 mLs by mouth every 4 hours as needed for indigestion   Caleb Lewis MD   furosemide (LASIX) 20 MG tablet Take 1 tablet (20 mg) by mouth daily   Osvaldo Avila MD   hydrOXYzine (ATARAX) 25 MG tablet Take 1 tablet (25 mg) by mouth every 6 hours as needed for itching   Osvaldo Avila MD   lactulose (CHRONULAC) 10 GM/15ML solution 15 ml tid   Osvaldo Avila MD   midodrine (PROAMATINE) 10 MG tablet Take 1 tablet (10 mg) by mouth 3 times daily (with meals)   Osvaldo Avila MD   multivitamin, therapeutic (THERA-VIT) TABS tablet Take 1 tablet by mouth daily   Osvaldo Avila MD   ondansetron (ZOFRAN-ODT) 4 MG ODT tab Take 1 tablet (4 mg) by mouth every 6 hours as needed for nausea or vomiting   Osvaldo Avila MD   pantoprazole (PROTONIX) 40 MG EC tablet Take 1  tablet (40 mg) by mouth daily   Osvaldo Avila MD   rifaximin (XIFAXAN) 550 MG TABS tablet Take 1 tablet (550 mg) by mouth 2 times daily   Osvaldo Avila MD   sertraline (ZOLOFT) 25 MG tablet Take 3 tablets (75 mg) by mouth daily   Osvaldo Avila MD   simethicone (MYLICON) 80 MG chewable tablet Take 1 tablet (80 mg) by mouth every 6 hours as needed for cramping   Caleb Lewis MD   spironolactone (ALDACTONE) 25 MG tablet Take 2 tablets (50 mg) by mouth daily   Osvaldo Avila MD   Vitamin D, Cholecalciferol, 1000 units TABS Take 1 tablet (1,000 Units) by mouth daily   Osvaldo Avila MD       ALLERGIES:   Bactrim [sulfamethoxazole w/trimethoprim]; Gabapentin; and Nitrofurantoin    SOCIAL HISTORY:   Lives alone.     FAMILY HISTORY:  Patient denies known family history of bleeding, clotting, or anesthesia related complications.     REVIEW OF SYSTEMS:   See HPI      PHYSICAL EXAM:   Vitals:    08/20/19 0745 08/20/19 0800 08/20/19 0815 08/20/19 0830   BP: (!) 87/40 100/45 98/45 102/48   BP Location:       Pulse:       Resp:       Temp:       TempSrc:       SpO2: 93% 94% 94% 94%   Weight:       Height:         General: Awake, alert, appropriate, following commands, NAD.  Neuro: CN II-XII grossly intact.   Psych: Appropriate affect.   Skin: No rashes,  skin color normal.  HEENT: Normal.   Lungs: Breathing comfortably and nonlabored, no wheezes or stridor noted.  Heart/Cardiovascular: Regular pulse, no peripheral cyanosis.  Pelvis: Stable to AP and Lateral compression, non-tender.      Right Lower Extremity: 2+ pitting edema. No deformity, skin intact. No significant tenderness to palpation over thigh, knee, leg, ankle/foot. No pain with ROM hip/knee/ankle. Motor intact distally TA/GSC/EHL/FHL with 5/5 strength. SILT sp/dp/tibial/saph/sural nerves. DP/PT pulses palpable, 2+, toes warm and well perfused.     Left Lower Extremity: 2+ pitting edema.No deformity, skin  "intact. No significant tenderness to palpation over thigh, knee, leg, ankle/foot. No pain with ROM hip/knee/ankle. Motor intact distally TA/GSC/EHL/FHL with 5/5 strength. SILT sp/dp/tibial/saph/sural nerves. DP/PT pulses palpable, 2+, toes warm and well perfused.     LABS:  Hemoglobin   Date Value Ref Range Status   08/20/2019 7.6 (L) 11.7 - 15.7 g/dL Final   08/19/2019 9.6 (L) 11.7 - 15.7 g/dL Final     WBC   Date Value Ref Range Status   08/20/2019 20.9 (H) 4.0 - 11.0 10e9/L Final     Platelet Count   Date Value Ref Range Status   08/20/2019 121 (L) 150 - 450 10e9/L Final     INR   Date Value Ref Range Status   08/19/2019 2.94 (H) 0.86 - 1.14 Final     Creatinine   Date Value Ref Range Status   08/20/2019 0.78 0.52 - 1.04 mg/dL Final     Glucose   Date Value Ref Range Status   08/20/2019 86 70 - 99 mg/dL Final       IMAGING:  CT CAP: per prelim read \"There is a nonspecific, possibly encapsulated, 5.1 x 2.6 cm fluid  collection in the subcutaneous fat of the left pelvic region\"     Per orthopaedics read: no obvious fluid collection visualized around the left hip joint.     ASSESSMENT AND PLAN:     Ms. Bailey is a 44 year-old female with PMHx of end-stage liver disease complicated by hepatic encephalopathy and varices, HTN, chronic pain/sciatica, polysubstance abuse, and morbid obesity with multiple recent hospitalizations for decompensated cirrhosis admitted to the MICU for severe sepsis of multiple possible sources, currently hemodynamically stable.     Orthopedic consult for possible pelvic fluid accumulation.  Spoke with the body radiologist this morning and unsure as to what this fluid collection could be.  Does not appear to be around the hip or surrounding the joint.  This may be an incidental finding.    If source of sepsis is unable to be determined, radiology may perform IR aspiration of this proposed fluid collection.    Orthopedics will sign off.    Assessment and Plan discussed with Dr. Simpson, " Orthopaedic Surgery.     Carrol Cloud MD  Orthopedic Surgery PGY-4  [phone]

## 2019-08-20 NOTE — CONSULTS
[facility]  Palliative Care Consultation Note    Patient: Neema Bailey  Date of Admission:  8/19/2019    Requesting Clinician / Team: Marilynn Scales MD  Reason for consult: patient request    Recommendations:    Agree with acetaminophen PRN and hydromorphone IV PRN    Will involve palliative care  and  support    Appreciate unit SW assistance with CD assessment and disposition planning when she is more stable    Goals of care: restorative at this time    These recommendations have been discussed with primary team, unit SW.      Thank you for the opportunity to participate in the care of this patient and family. Our team: will continue to follow.     During regular M-F work hours -- if you are not sure who specifically to contact -- please contact us by sending a text page to our team consult pager at [phone].    After regular work hours and on weekends/holidays, you can call our answering service at [phone]. Also, who's on call for us is available in [employer].       Assessments:  Neema Bailey is a 44 year old female with a history of ESLD c/b hepatic encephalopathy, portal hypertension, ascites, and varices, polysubstance use disorder, obesity s/p gastric bypass, multiple recent hospitalizations for decompensated liver cirrhosis who presented 8/19 with severe sepsis.    Today, the patient was seen for:  Pain  ESLD  Severe sepsis    I visited with Neema. I introduced palliative care and ways in which we can be helpful including symptom management, decisional support (goals of care), and additional support.     Pain: reports pain in her abdomen, back, and shoulders, benefiting from the IV hydromorphone. No side effects noted. Discussed likely transition to PO pain medications.     Prognosis, Goals, & Planning:      Functional Status just prior to hospitalization: 3 (Capable of only limited self-care; needs help  with ADLs; in bed/chair >50% of waking hours)      Prognosis, Goals, and/or Advance Care Planning were addressed today: Yes        Summary/Comments: patient is aware of how sick she is and states she was told she would likely live months without a liver transplant, she was made aware of hospice and she feels she is not ready for that at this time. She is still interested in having a CD assessment and working on the steps in order to get a liver transplant. Discussed her team feels at this point we can get her to leave the hospital likely if she continues to make progress forward. Discussed concern if she gets sicker at times she may not be well enough to leave the hospital. Discussed the course of liver disease broadly with her.       Patient's decision making preferences: independently          Patient has decision-making capacity today for complex decisions: Yes            I have concerns about the patient/family's health literacy today: No           Patient has a completed Health Care Directive: Reportedly yes, but not available to us currently.      Code status: full code    Coping, Meaning, & Spirituality:   Mood, coping, and/or meaning in the context of serious illness were addressed today: Yes  Summary/Comments: she is overwhelmed as is her family, she would appreciate counseling and wants to do this without family present.    Social:     Living situation: does not have own home, staying with sister in her basement, working on moving into daughters home.    Key family / caregivers: mother, daughter, grandchildren    Occupational history: not discussed    Substance use history: yes    History of Present Illness:  History gathered today from: medical chart    Neema Bailey is a 44 year old female with a history of ESLD c/b hepatic encephalopathy, portal hypertension, ascites, and varices, polysubstance use disorder, obesity s/p gastric bypass, multiple recent hospitalizations for decompensated liver  cirrhosis who presented 8/19 with severe sepsis.    Palliative care consulted 8/20 per patient request.    Key Palliative Symptom Data:  # Pain severity the last 12 hours: moderate  # Dyspnea severity the last 12 hours: none  # Nausea severity the last 12 hours: none  # Anxiety severity the last 12 hours: none    Patient is on opioids: assessed and bowels ok/no needed changes to plan of care today.    ROS:  Comprehensive ROS is reviewed and is negative except as here & per HPI:      Past Medical History:  Past Medical History:   Diagnosis Date     Anxiety      Bilateral leg edema      Chronic kidney disease      Dizziness and giddiness      Hypertension      Insomnia      Iron deficiency anemia     due to chronic blood loss, vitamin B12 deficiency, Macrocytic     Migraines      BURT (nonalcoholic steatohepatitis)      Numbness and tingling     PERIPHERAL NEUROPATHY     Obese      Other chronic pain     Chronic Bilateral Low Back Pain with Bilateral Sciatica, Bilateral Knee Pain     Peripheral neuropathy      Pruritus      Restless legs      Sciatica      Seborrheic dermatitis      Severe episode of recurrent major depressive disorder, without psychotic features (H)      Sinus tachycardia      Substance abuse in remission (H)     h/o alcoholism had treatment at [facility]     Uterovaginal prolapse      Vitamin D deficiency         Past Surgical History:  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)     BREAST SURGERY      Breast Augmentation     COLPORRHAPHY ANTERIOR N/A 9/21/2015    Procedure: COLPORRHAPHY ANTERIOR;  Surgeon: Jairon Adams MD;  Location: [facility]     COSMETIC SURGERY      Abdominoplasty     CYSTOCELE REPAIR       CYSTOSCOPY N/A 11/26/2018    Procedure: CYSTOSCOPY;  Surgeon: Rony Melgar MD;  Location: [facility]     ENT SURGERY       tonsillectomy & adenoidectomy     GI SURGERY      Small Bowel Enterotomy Repair for SBO, EGD     HYSTERECTOMY VAGINAL, COLPORRHAPHY ANTERIOR, POSTERIOR, COMBINED N/A 11/26/2018    Procedure: VAGINAL HYSTERECTOMY, ANTERIOR AND POSTERIOR REPAIR;  Surgeon: Rony Melgar MD;  Location: [facility]     IR PARACENTESIS  8/7/2019     IR THORACENTESIS  8/7/2019     PERINEORRHAPHY N/A 11/26/2018    Procedure: PERINEOPLASTY;  Surgeon: Rony Melgar MD;  Location: [facility]     TUBAL LIGATION           Family History:  History reviewed. No pertinent family history.      Allergies:  Allergies   Allergen Reactions     Bactrim [Sulfamethoxazole W/Trimethoprim]      Gabapentin      Other reaction(s): Edema     Nitrofurantoin Other (See Comments)     drug-induced liver injury        Medications:  I have reviewed this patient's medication profile and medications from this hospitalization.   Noted scheduled meds are:  Lactulose TID  protonix daily  Sertraline 75 mg daily    Noted PRN meds are:  acetaminophen PRN- x1  hydromorphone IV- x1  (0.2 mg)  Ondansetron PRN- x1  Simethicone PRN- x1      Physical Exam:  Vital Signs: Temp: 98.2  F (36.8  C) Temp src: Oral BP: 97/46 Pulse: 103 Heart Rate: 91 Resp: 17 SpO2: 97 % O2 Device: None (Room air) Oxygen Delivery: 2 LPM  Weight: 261 lbs 7.45 oz  Constitutional: Awake, alert, cooperative, no apparent distress  ENT: scleral icterus  Lungs: No increased work of breathing  Neurologic: Awake, alert, oriented to name, place and time.    Neuropsychiatric: Normal affect  Skin: No rashes    Data reviewed:  Recent lab data reviewed, my comments on pertinents:   Creatinine 0.78  GFR . 90  Sodium 140  Potassium 3.9  WBC 20.9  Hemoglobin 7.6  Platelets 121    KANDACE Ghotra CNS  Palliative Care Consult Team  Pager: [phone]     Total time spent was 70 minutes,  >50% of time was spent counseling and/or coordination of care regarding goals of care and symptom management.

## 2019-08-20 NOTE — PLAN OF CARE
"Admitted/transferred from: [facility]   Reason for admission/transfer: Hypotension/severe sepsis   Patient status upon admission/transfer: Pt A&Ox4. Mentating appropriately. Jaundice throughout. PERRLA. Sinus tach. BP's soft. MAPs <60. 1L NC. C/o dry, nonproductive cough. Continent of urine and stool. C/o generalized pain especially in back. Feels like \"knives stabbing into back.\" 2 pressure injuries on coccyx.   Interventions: Labs drawn. 750mL of albumin given. 1 dose of lactulose given in ED with no results. Ultrasound obtained. Mepilex placed on coccyx and WOC order placed. Levophed gtt started at 0645 d/t MAPs <60 and no response to fluid or albumin.   Plan: Blood cultures pending. Monitor BPs closely. Possible thora? PICC or central line placement needed for pressors.   2 RN skin assessment: completed by Erendira Brian and Carrol García   Result of skin assessment and interventions/actions: 2 pressure injuries noted on coccyx area. Cleaned and foam Mepilex placed. WOC consult placed. Petechiae in BLE. Skin jaundice and dry.   Height, weight, drug calc weight: done  Patient belongings (see Flowsheet - Adult Profile for details): remain with patient.   MDRO education (if applicable):  Contact isolation for ESBL.   "

## 2019-08-20 NOTE — PHARMACY-VANCOMYCIN DOSING SERVICE
Pharmacy Vancomycin Initial Note  Date of Service 2019  Patient's  1975  44 year old, female    Indication: Sepsis    Current estimated CrCl = Estimated Creatinine Clearance: 120.5 mL/min (based on SCr of 0.78 mg/dL).    Creatinine for last 3 days  2019:  7:52 PM Creatinine 0.78 mg/dL    Recent Vancomycin Level(s) for last 3 days  No results found for requested labs within last 72 hours.      Vancomycin IV Administrations (past 72 hours)      No vancomycin orders with administrations in past 72 hours.                Nephrotoxins and other renal medications (From now, onward)    Start     Dose/Rate Route Frequency Ordered Stop    19 1100  vancomycin (VANCOCIN) 1,750 mg in sodium chloride 0.9 % 500 mL intermittent infusion      1,750 mg  over 2 Hours Intravenous EVERY 12 HOURS 19 2228      19 2330  vancomycin (VANCOCIN) 2,500 mg in sodium chloride 0.9 % 500 mL intermittent infusion      2,500 mg  over 2 Hours Intravenous ONCE 19 2228            Contrast Orders - past 72 hours (72h ago, onward)    None                Plan:  1.  Start vancomycin  2500 mg IV x1, followed by 1750 mg IV q12h.   2.  Goal Trough Level: 15-20 mg/L   3.  Pharmacy will check trough levels as appropriate in 1-3 Days.    4. Serum creatinine levels will be ordered daily for the first week of therapy and at least twice weekly for subsequent weeks.    5. [place] method utilized to dose vancomycin therapy: Method 2    Robert Madden, Pharm.D.
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
## 2019-08-20 NOTE — H&P
[facility]      MICU History and Physicial  Nemeasylvia Bailey MRN: [id number]  1975  Date of Admission:8/19/2019  Primary care provider: Suresh Shabazz      Assessment and Plan:     Ms. Bailey is a 44 year-old female with PMHx of end-stage liver disease complicated by hepatic encephalopathy and varices, HTN, chronic pain/sciatica, polysubstance abuse, and morbid obesity with multiple recent hospitalizations for decompensated cirrhosis admitted to the MICU for severe sepsis of multiple possible sources, currently hemodynamically stable.     PLAN:  ===NEURO===  # History of hepatic encephalopathy   Ammonia 123. Patient has been taking home lactulose and rifaximin. Currently alert, oriented, and mentating appropriately.   - Lactulose 10 g TID  - Rifaximin 550 mg BID    # Major depressive disorder  - PTA sertraline 25 mg daily   - Hydroxyzine PRN    Sedation: none    ===CARDIOVASCULAR===  # Hypotension   In the setting of severe sepsis. Blood pressure baseline 110s/60s, on midodrine 10 mg TID at home. In the ED, patient hypotensive to 100/50s, improved with 2L IVF resuscitation. Upon arrival to the floor, MAPs maintained the upper 50s/low 60s.   - Give AM dose of midodrine 10 mg early   - Albumin 37.5 g  - MAP goal >60  - Will initiate pressors if needed  - Monitor I&Os    ===PULMONARY===  # Dyspnea   # Right pleural effusion   Suspect that recurrent right pleural effusions are the cause of her progressive shortness of breath over the past 2-3 days. Diagnostic thoracentesis on 8/8/19 with transudative fluid. 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 This could also represent heart failure given chronic peripheral edema and mildly elevated BNP on admission. PE also considered, however no signs/symptoms of DVT: lower extremities equally edematous and warm to touch, and not requiring oxygen until after IV fluid administration. Bedside echo without gross reduction in cardiac  function and no obvious right heart strain. The pleural effusion remains a more likely cause of her shortness of breath. Held off on thoracentesis overnight due to INR ~3.0 and patient was stable on 2L NC.  - Repeat CXR  - Echocardiogram  - Consider CTA chest if shortness of breath not improved with thoracentesis    ===GASTROINTESTINAL===  # Acute abdominal pain  Diffuse, worse in the RUQ. Multiple possible etiologies. CT abdomen notable for acute vs subacute splenic infarcts and abdominal US with retrograde flow in splenic and portal veins. Per chart review, there were similar findings on liver US with dopplers in 5/2019 at OSH. This could also represent ascites in the setting of cirrhosis, however no large pockets visualized on bedside US. Cholecystitis considered given gallbladder distension and wall thickening, however these are stable appearing changes when compared to US from 7/2019, making acute cholecystitis less likely. Lipase normal. This could also represent acute hepatitis or SBP.   - Abdominal US with doppler to rule out splenic and/or portal vein thrombosis  - EtOH, tylenol level   - Consider IR consult to better assess for ascites pocket, diagnostic paracentesis      # Transaminitis  # End-stage liver disease   Likely advanced hepatic fibrosis/cirrhosis secondary to combination of long-standing history of alcohol use disorder, BURT, and Denny-en-Y gastric bypass. Liver biopsy from 5/10/19 consistent with possible alcochol injury per pathologist read, but reviewed by Hepatology during last hospitalization (7/17-21), and suspects this is more consistent with drug-related injury (Bactrim vs Macrobid). ESLD complicated by ascites, abdominal varices, and hepatic encephalopathy. AST moderately elevated at 134 and bilirubin elevated to 14.2. This could represent an acute hepatitis vs portal vein thrombosis vs progression of cirrhosis.    MELD-Na score: 28 at 8/20/2019  4:31 AM  MELD score: 28 at 8/19/2019   7:52 PM  Calculated from:  Serum Creatinine: 0.78 mg/dL (Rounded to 1 mg/dL) at 8/19/2019  7:52 PM  Serum Sodium: 140 mmol/L (Rounded to 137 mmol/L) at 8/20/2019  4:31 AM  Total Bilirubin: 14.2 mg/dL at 8/19/2019  7:52 PM  INR(ratio): 2.94 at 8/19/2019  7:52 PM  Age: 44 years     - Continue to monitor LFTs  - Repeat CMP in AM  - Holding PTA Lasix and spironolactone in setting of hypotension  - ?Hepatology consult    Nutrition: NPO except meds    ===RENAL===  No active issues.    ===HEME/ONC===  # Chronic macrocytic anemia  Hemoglobin improved from baseline of ~7 in the setting of chronic liver disease and malnutrition.   - Continue to monitor CBC    ===ENDOCRINE===  # Hypothyroidism  TSH 6.46, not on replacement. Asymptomatic. Unlikely contributing to current presentation.   - Free T4    ===INFECTIOUS DISEASE===  # Severe sepsis  Patient meets severe sepsis criteria given fever of 102, tachycardia, tachypnea, leukocytosis of 17.0, and lactic acidosis. Lactate initially 4.9, improved to 3.0 with 2L IVF. Multiple possible sources. Most likely considerations are SBP in the setting of sudden onset abdominal pain, and hx of ESLD and ascites and skin/soft tissue infection on left hip with possible encapsulated fluid collection along left iliac crest. Pneumonia is also a possibility given SOB and possible bibasilar opacities on CXR. Cholecystitis also considered in the setting of gallbladder wall thickening and distension, however abdominal US confirms that these findings are mostly unchanged from 7/2019, making acute cholecystitis less likely. Urinalysis not suggestive of UTI. Given normal procal at 0.12, this could also represent a localized process, including potential splenic infarct (see GI for further discussion). Patient has a history of ESBL (E coli) bacteremia and VRE (E. Faecium) UTI in 1/2019. Started on ertapenem & vancomycin. Given history of VRE and ?skin/soft tissue infection, could consider switching vanco  for dapto or linezolid to cover VRE if does not clinically improve. Would also consider IR consult for possible fluid aspiration/culture of left hip pocket.  - Ertapenem 1 g daily   - Vancomycin  - Follow blood cultures   - Diagnostic paracentesis and thoracentesis if possible  - Consider ID consult given history of multi-drug resistant organisms    Antimicrobials:  Ertapenem 1g daily (8/19 - *)    ===SKIN/MSK===  # ?Soft tissue fluid collection  Seen on CT. Unclear if infectious process.    Prophylaxis:  DVT: SCDs   GI: PPI  Family: Mother had been present in ED  Disposition: Critically Ill    Code Status: Full    Dior Landa, MS4  [facility]             Chief Complaint:   Weakness, shortness of breath         History of Present Illness:     Ms. Bailey is a 44 year-old female with PMHx of end-stage liver disease complicated by hepatic encephalopathy and varices, HTN, chronic pain/sciatica, polysubstance abuse, and morbid obesity with multiple recent hospitalizations for decompensated cirrhosis who presented with fevers/chills, weakness, shortness of breath, and worsening peripheral edema.    The patient was discharged form a TCU on 8/13 following a hospitalization from 7/17-21 for decompensated cirrhosis s/p therapeutic paracentesis and thoracentesis. Since TCU discharge, the patient reports increasing shortness of breath, particularly over the last 2-3 days. Associated symptoms include non-productive cough. Today, the patient developed acute worsening of her breathing symptoms and new fevers/chills (febrile to 102 at home) and generalized weakness. She is also concerned about worsening peripheral edema that is making it harder for her to walk. Today, the patient's symptoms acutely worsened. She reports fevers/chills (fever at home to 102) and generalized weakness. No known sick contacts. She has been taking all of her home medications as prescribed.     In the ED, the  patient was tachycardic, tachypneic, and hypotensive and was found to have a WBC count of 17, lactate of 4.9, and elevated CRP. Procal was normal. Blood pressures improved and lactate downtrended with 2L IVF bolus. O2 sats maintained on 2L NC. The patient was started on etrapenem and vancomycin given history of ESBL E coli and VRE, and transferred to the MICU service given severe sepsis and concern for potential need for pressors.           Review of Systems:   10 point ROS negative except as noted in HPI.         Past Medical History:   Medical History reviewed.   Past Medical History:   Diagnosis Date     Anxiety      Bilateral leg edema      Chronic kidney disease      Dizziness and giddiness      Hypertension      Insomnia      Iron deficiency anemia     due to chronic blood loss, vitamin B12 deficiency, Macrocytic     Migraines      BURT (nonalcoholic steatohepatitis)      Numbness and tingling     PERIPHERAL NEUROPATHY     Obese      Other chronic pain     Chronic Bilateral Low Back Pain with Bilateral Sciatica, Bilateral Knee Pain     Peripheral neuropathy      Pruritus      Restless legs      Sciatica      Seborrheic dermatitis      Severe episode of recurrent major depressive disorder, without psychotic features (H)      Sinus tachycardia      Substance abuse in remission (H)     h/o alcoholism had treatment at [facility]     Uterovaginal prolapse      Vitamin D deficiency              Past Surgical History:   Surgical History reviewed.   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)     BREAST SURGERY      Breast Augmentation     COLPORRHAPHY ANTERIOR N/A 9/21/2015    Procedure: COLPORRHAPHY ANTERIOR;  Surgeon: Jairon Adams MD;  Location: [facility]     COSMETIC SURGERY      Abdominoplasty     CYSTOCELE REPAIR       CYSTOSCOPY N/A 11/26/2018     Procedure: CYSTOSCOPY;  Surgeon: Rony Melgar MD;  Location: [facility]     ENT SURGERY      tonsillectomy & adenoidectomy     GI SURGERY      Small Bowel Enterotomy Repair for SBO, EGD     HYSTERECTOMY VAGINAL, COLPORRHAPHY ANTERIOR, POSTERIOR, COMBINED N/A 11/26/2018    Procedure: VAGINAL HYSTERECTOMY, ANTERIOR AND POSTERIOR REPAIR;  Surgeon: Rony Melgar MD;  Location: [facility]     IR PARACENTESIS  8/7/2019     IR THORACENTESIS  8/7/2019     PERINEORRHAPHY N/A 11/26/2018    Procedure: PERINEOPLASTY;  Surgeon: Rony Melgar MD;  Location: [facility]     TUBAL LIGATION               Social History:   Social History reviewed.  Social History     Tobacco Use     Smoking status: Never Smoker     Smokeless tobacco: Never Used   Substance Use Topics     Alcohol use: No     Alcohol/week: 0.0 oz     Frequency: Never     Comment: Pt reports being a previou heavy drinker, reports quitting earlier this year             Family History:   Family History reviewed.   History reviewed. No pertinent family history.          Allergies:     Allergies   Allergen Reactions     Bactrim [Sulfamethoxazole W/Trimethoprim]      Gabapentin      Other reaction(s): Edema     Nitrofurantoin Other (See Comments)     drug-induced liver injury             Medications:   Medications Reviewed.   Current Facility-Administered Medications   Medication     alum & mag hydroxide-simethicone (MYLANTA ES/MAALOX  ES) suspension 20 mL     ertapenem (INVanz) 1 g vial to attach to  mL bag     hydrOXYzine (ATARAX) tablet 25 mg     lactulose (CHRONULAC) solution 10 g     magnesium sulfate 2 g in NS intermittent infusion (PharMEDium or FV Cmpd)     magnesium sulfate 4 g in 100 mL sterile water (premade)     midodrine (PROAMATINE) tablet 10 mg     ondansetron (ZOFRAN-ODT) ODT tab 4 mg     pantoprazole (PROTONIX) EC tablet 40 mg     potassium chloride (KLOR-CON) Packet 20-40 mEq     potassium chloride 10 mEq in 100 mL intermittent infusion  "with 10 mg lidocaine     potassium chloride 10 mEq in 100 mL sterile water intermittent infusion (premix)     potassium chloride 20 mEq in 50 mL intermittent infusion     potassium chloride ER (K-DUR/KLOR-CON M) CR tablet 20-40 mEq     potassium phosphate 10 mmol in D5W 250 mL intermittent infusion     potassium phosphate 15 mmol in D5W 250 mL intermittent infusion     potassium phosphate 20 mmol in D5W 250 mL intermittent infusion     potassium phosphate 20 mmol in D5W 500 mL intermittent infusion     potassium phosphate 25 mmol in D5W 500 mL intermittent infusion     rifaximin (XIFAXAN) tablet 550 mg     sertraline (ZOLOFT) tablet 75 mg     simethicone (MYLICON) chewable tablet 80 mg     vancomycin (VANCOCIN) 1,750 mg in sodium chloride 0.9 % 500 mL intermittent infusion             Physical Exam:   Vitals were reviewed.  Blood pressure 92/41, pulse 104, temperature 98.2  F (36.8  C), temperature source Axillary, resp. rate 24, height 1.727 m (5' 8\"), weight 118.6 kg (261 lb 7.5 oz), last menstrual period 10/04/2018, SpO2 95 %, not currently breastfeeding.    General: Alert, NAD, awake, laying in bed, conversive  Skin: Jaundiced, scattered telangectasias  HEENT: dry mucous membranes with dried blood on cracked lips, EOM intact, scleral icterus  CV: tahcycardic, normal S1 S2, no murmur, clicks, rubs  Resp: Diminished breath sounds on right  Abd: Soft, mildly diffuse tenderness to palpation most prominent in RUQ  Extremities: significant 2+ pitting edema to thighs bilaterally  Neuro: No lateralizing symptoms or focal neurologic deficits; asterixis present; moving all extremities independently          Data:   No intake/output data recorded.    ROUTINE LABS (Last four results)  [facility]  Recent Labs   Lab 08/20/19  0431 08/19/19  1952 08/13/19  1042    137  --    POTASSIUM 3.9 4.0 3.7   CHLORIDE 106 103  --    CO2 PENDING 20  --    ANIONGAP PENDING 13  --    GLC PENDING 102*  --    BUN PENDING 6*  --    CR PENDING " 0.78  --    GFRESTIMATED PENDING >90  --    GFRESTBLACK PENDING >90  --    NARENDRA PENDING 9.2  --    PROTTOTAL PENDING 6.8  --    ALBUMIN PENDING 2.7*  --    BILITOTAL PENDING 14.2*  --    ALKPHOS PENDING 150  --    AST PENDING 134*  --    ALT PENDING 41  --      CBC  Recent Labs   Lab 08/20/19  0431 08/19/19 1952   WBC 20.9* 17.0*   RBC 2.37* 3.04*   HGB 7.6* 9.6*   HCT 23.8* 30.9*    102*   MCH 32.1 31.6   MCHC 31.9 31.1*   RDW 17.0* 17.1*   * 184     INR  Recent Labs   Lab 08/19/19 1952   INR 2.94*     Arterial Blood GasNo lab results found in last 7 days.    Microbiology: Blood culture pending    IMAGING  All imaging independently reviewed.    CT abdomen/pelvis:  1. Large right-sided pneumothorax with complete collapse of the right lower and middle lobes with subsegmental consolidation of the right  upper lobe. Cannot exclude underlying infection.   2. Although the liver does not appear overtly cirrhotic, there are findings of portal hypertension in this patient with history of BURT  with splenomegaly, upper abdominal varices, and mild intra-abdominal ascites.  3. Gallbladder is distended with mild wall thickening. This is nonspecific in the setting of likely cirrhotic liver with ascites. This appears unchanged compared to ultrasound dated 7/18/2019 given differences in technique  4. Findings suggestive of acute to subacute splenic infarctions.  5. There is a nonspecific, possibly encapsulated, 5.1 x 2.6 cm fluid collection in the subcutaneous fat of the left pelvic region.    Abdominal US:  1.  Gallbladder sludge with mild gallbladder wall thickening or pericholecystic fluid.  These findings other than interval development  of gallbladder sludge are unchanged since 7/18/2019 and are nonspecific in the setting of likely cirrhosis.  2.  Cirrhotic appearing liver with new retrograde flow in the splenic and main portal vein. There is mild to moderate volume ascites with  large right-sided pleural  effusion consistent with portal hypertension. No focal mass.    Chest X ray:  1. Large right pleural effusion which is increased in size from 8/11/2019.  2. Streaky bibasilar opacities favored to represent pulmonary edema and atelectasis over infectious.

## 2019-08-20 NOTE — PLAN OF CARE
ICU End of Shift Summary. See flowsheets for vital signs and detailed assessment.    Changes this shift: Right TL PICC inserted this am. Pt comfortable on 2L NC, reports shortness of breath and labored breathing when moving around/up to the commode. Levo titrated between off and 0.03 to maintain MAPs >60. One PRN dose of dilaudid given this am, pt felt pain was under control rest of shift. Regular diet ordered. Two stools today, PRN lactulose ordered to achieve stool goal of 3-4/day.    Plan: Wean levo as able, prn lactulose for stool goal. Update team of any changes.

## 2019-08-20 NOTE — PLAN OF CARE
PT / 4C -     Discharge Planner PT   Patient plan for discharge: return to sister's home  Current status: PT evaluation completed.  Patient requires Mod A for lying>sitting transfer with elevated HOB.  Transferred sitting>standing with step-pivot transfer + CGA.  Ambulated short distance into hallway with 4WW + CGA.   Barriers to return to prior living situation: 16 stairs, level of assist, LE edema  Recommendations for discharge: TCU  Rationale for recommendations: Recommend discharge to TCU at this time, however pending improved alertness and continued progression over LOS, Neema might be safe to return to sister's home with PRN assist from family.  However she must achieve 16 stairs to reach living arrangements.        Entered by: Eleanor Kiser 08/20/2019 3:35 PM

## 2019-08-20 NOTE — PROGRESS NOTES
"CLINICAL NUTRITION SERVICES - ASSESSMENT NOTE     Nutrition Prescription    RECOMMENDATIONS FOR MDs/PROVIDERS TO ORDER:  Consider oral supplements with start of PO diet. Please order once daily Ensure Plus.    Malnutrition Status:    Non-severe malnutrition in the context of chronic illness.    Future/Additional Recommendations:  If aggressive POC is desired, consider NG placement for nutrition therapy. Recommend Nutren 1.5 @ 15 mL/hr and adv by 10 mL q4h until @ goal rate of 55 mL/hr to provide 1980 kcals (26 kcal/kg/day), 90 g PRO (1.2 g/kg/day), 1003 mL H2O, 232 g CHO and no fiber daily.  With TF, also order daily Prosource, 1 pkt TID to provide an additional 120 kcal and 33 g PRO to fully meet PRO needs (total 123 g/day or 1.6 g PRO/kg).         REASON FOR ASSESSMENT  Neema Bailey is a 44 year old female assessed by the dietitian for Interdisciplinary Rounds (has 2 pressure injury spots on coccyx, per chart. 'No indicators present' checked on Admission Screen).     NUTRITION HISTORY  Pt has been seen x 3 over the past 3-4 weeks by RD with frequent hospitalizations. No major nutrition-related issues noted. Suspect with frequent hospitalizations and AMS pt's PO intake may have been suboptimal.     Pt found to have 2 pressure ulcers in chart review, and a WOC RN eval is pending.     CURRENT NUTRITION ORDERS  Diet: NPO  Intake/Tolerance: n/a    LABS  Labs reviewed- negative urinary ketones 8/19    MEDICATIONS  Medications reviewed- Includes PO Lactulose; no vit/min ordered.     ANTHROPOMETRICS  Height: 172.7 cm (5' 8\")  Admission Weight: 115.8 kg (8/19/19)  Most Recent Weight: 118.6 kg (261 lb 7.5 oz)    IBW: 63.6 kg  BMI: Obesity Grade II BMI 35-39.9  Weight History:   Wt Readings from Last 10 Encounters:   08/20/19 118.6 kg (261 lb 7.5 oz)   08/12/19 115.8 kg (255 lb 4.8 oz)   07/18/19 108.2 kg (238 lb 8 oz)   07/17/19 117.5 kg (259 lb)   12/20/18 117.9 kg (260 lb)   12/18/18 117.9 kg (260 lb)   12/14/18 " 117.9 kg (260 lb)   11/30/18 120.7 kg (266 lb)   11/26/18 121.1 kg (266 lb 14.4 oz)   11/02/18 124.3 kg (274 lb)       Dosing Weight: 77 kg (adj wt using adm wt)    ASSESSED NUTRITION NEEDS  Estimated Energy Needs: 8701-7449 kcals/day (20 - 25 kcals/kg)  Justification: Obese, wound healing  Estimated Protein Needs: 115+ g/day (1.5+ g/kg)  Justification: Wound healing  Estimated Fluid Needs: Per provider pending fluid status    PHYSICAL FINDINGS  See malnutrition section below.  Obesity  Cachexia  Jaundice   Pressure injury    MALNUTRITION  % Intake: Unable to assess, likely suboptimal over the past month  % Weight Loss: None noted  Subcutaneous Fat Loss: None observed  Muscle Loss: Scapular bone, Thoracic region (clavicle, acromium bone, deltoid, trapezius, pectoral) and Upper arm (bicep, tricep): mild  Fluid Accumulation/Edema: Moderate- LE pitting edema  Malnutrition Diagnosis: Non-severe malnutrition in the context of chronic illness.     NUTRITION DIAGNOSIS  Inadequate protein-energy intake related to AMS, poor PO, pain as evidenced by pt with frequent hospitalizations over the past month, edema and mild muscle wasting c/w non-severe malnutrition.       INTERVENTIONS  Implementation  Nutrition Education: Not appropriate at this time due to patient condition   Collaboration with other providers - MICU rounds    Goals  Pt to consume % of nutritionally adequate meal trays TID, or the equivalent with supplements/snacks.     Monitoring/Evaluation  Progress toward goals will be monitored and evaluated per protocol.    Sayda Frank, DENILSON, LD  (MICU dietitian, pgr- [phone])

## 2019-08-20 NOTE — PROGRESS NOTES
WO Nurse Inpatient Wound Assessment   Reason for consultation: Evaluate and treat coccyx wound     Assessment  Intergluteal cleft wound due to Moisture Associated Skin Damage (MASD)- fissure  Status: initial assessment    Coccyx and sacrum with intact epidermis without any erythema.    Treatment Plan  Intergluteal cleft wound: Every 3 days     Cleanse the area with NS and pat dry.    Apply No sting film barrier to periwound skin.    Cover wound with Sacral Mepilex (#[id number])    Change dressing Q 3 days.    Turn and reposition Q 2hrs.    Ensure pt has Chris-cushion while sitting up in the chair.  FYI- If pt has constant incontinent loose stools needing dressing changes Q shift please discontinue the Mepilex dressing and apply criticaid barrier paste BID and PRN.     Orders Written  Recommended provider order: None  WOC Nurse follow-up plan:weekly  Nursing to notify the Provider(s) and re-consult the WOC Nurse if wound(s) deteriorates or new skin concern.    Patient History  According to provider note(s):  Ms. Bailey is a 44 year-old female with PMHx of end-stage liver disease complicated by hepatic encephalopathy and varices, HTN, chronic pain/sciatica, polysubstance abuse, and morbid obesity with multiple recent hospitalizations for decompensated cirrhosis admitted to the MICU for severe sepsis of multiple possible sources, currently hemodynamically stable.     Objective Data  Containment of urine/stool: External catheter    Active Diet Order  Orders Placed This Encounter      NPO for Medical/Clinical Reasons Except for: Meds, Ice Chips      Output:   I/O last 3 completed shifts:  In: 3250 [I.V.:500; IV Piggyback:2000]  Out: -     Risk Assessment:   Sensory Perception: 3-->slightly limited  Moisture: 3-->occasionally moist  Activity: 3-->walks occasionally  Mobility: 3-->slightly limited  Nutrition: 2-->probably inadequate  Friction and Shear: 2-->potential problem  Sebas Score: 16                           Labs:   Recent Labs   Lab 08/20/19  0550 08/20/19  0431 08/19/19 1952   ALBUMIN  --  2.4* 2.7*   HGB  --  7.6* 9.6*   INR 3.87*  --  2.94*   WBC  --  20.9* 17.0*   CRP  --   --  16.0*       Physical Exam  Skin inspection: focused coccyx, sacrum, BL buttocks and intergluteal cleft    Wound Location:  Intergluteal cleft  Date of last photo Not taken, pt was nauseated and wanted to have HOB raised sooner.  Wound History: Pt has a hx of diarrhea. Now diarrhea resolved and has purewick in place.  Measurements (length x width x depth, in cm) 2.1 cm x 0.5 cm  x  0.1 cm -fissure  Wound Base: 100 % dermis  Tunneling N/A  Undermining N/A  Palpation of the wound bed: normal   Periwound skin:immediate skin is  intact, noted dry peeling epithelium on right buttock cheek- healed tissue from previous moisture issue  Color: normal and consistent with surrounding tissue  Temperature: normal   Drainage:, scant  Description of drainage: serous  Odor: none  Pain: denies ,     Interventions  Current support surface: Standard  Atmos Air mattress  Current off-loading measures: Foam padding and Pillows under calves  Visual inspection of wound(s) completed  Wound Care: Area assessed and placed same Mepilex  Supplies: floor stock  Education provided: importance of repositioning, plan of care and wound progress  Discussed plan of care with Patient and Nurse    Karly Rodrigez RN

## 2019-08-20 NOTE — PROGRESS NOTES
08/20/19 1524   Quick Adds   Type of Visit Initial PT Evaluation       Present no   Language English   Living Environment   Living Environment Comment Patient recently discharged from TCU about a week ago and was staying in sisters basement; must perform ~ 16 stairs (8 stairs, platform, 8 stairs) to reach living arrangements.  Was using 4WW for all mobility.  Mother was present to assist patient as needed. Was working to move into daughters home.  Patient no longer has an apartment of her own.    Self-Care   Usual Activity Tolerance moderate   Current Activity Tolerance fair   Regular Exercise No   Equipment Currently Used at Home walker, rolling;shower chair   Functional Level Prior   Ambulation 1-->assistive equipment  (4WW)   Transferring 1-->assistive equipment  (4WW)   Toileting 0-->independent   Bathing 0-->independent   Communication 0-->understands/communicates without difficulty   Swallowing 0-->swallows foods/liquids without difficulty   Cognition 0 - no cognition issues reported   Which of the above functional risks had a recent onset or change? none   Prior Functional Level Comment Patient reports performing ADLs MOD I over the last week.    General Information   Onset of Illness/Injury or Date of Surgery - Date 08/19/19   Referring Physician Lupe Zhao MD   Pertinent History of Current Problem (include personal factors and/or comorbidities that impact the POC) Ms. Bailey is a 44 year-old female with PMHx of end-stage liver disease complicated by hepatic encephalopathy and varices, HTN, chronic pain/sciatica, polysubstance abuse, and morbid obesity with multiple recent hospitalizations for decompensated cirrhosis admitted to the MICU for severe sepsis of multiple possible sources, currently hemodynamically stable.    Precautions/Limitations fall precautions   Weight-Bearing Status - LUE full weight-bearing   Weight-Bearing Status - RUE full weight-bearing  "  Weight-Bearing Status - LLE full weight-bearing   Weight-Bearing Status - RLE full weight-bearing   General Info Comments Activity: up ad roslyn   Cognitive Status Examination   Orientation orientation to person, place and time   Level of Consciousness alert   Follows Commands and Answers Questions 100% of the time   Personal Safety and Judgment intact   Pain Assessment   Patient Currently in Pain Yes, see Vital Sign flowsheet   Integumentary/Edema   Integumentary/Edema Comments Noted edema in B LE - requested an edema consult.    Posture    Posture Forward head position;Protracted shoulders   Range of Motion (ROM)   ROM Comment B LE WNL/WFL   Strength   Strength Comments B LE > 3/5 secondary to functional mobility    Bed Mobility   Bed Mobility Comments Mod A with elevated HOB   Transfer Skills   Transfer Comments CGA   Gait   Gait Comments 4WW + CGA   Coordination   Coordination no deficits were identified   Muscle Tone   Muscle Tone no deficits were identified   Clinical Impression   Criteria for Skilled Therapeutic Intervention yes, treatment indicated   PT Diagnosis Impaired functional mobility   Influenced by the following impairments Impaired cognition, LE edema, LE strength   Functional limitations due to impairments Impaired bed mobility, transfers, gait, stairs    Clinical Presentation Evolving/Changing   Clinical Presentation Rationale clinical judgement; 2+ personal factors/body systems involved.    Clinical Decision Making (Complexity) Moderate complexity   Therapy Frequency 5x/week   Predicted Duration of Therapy Intervention (days/wks) 8/28/19   Anticipated Discharge Disposition Transitional Care Facility;Home with Home Therapy   Risk & Benefits of therapy have been explained Yes   Patient, Family & other staff in agreement with plan of care Yes   [facility] AM-PAC  \"6 Clicks\" V.2 Basic Mobility Inpatient Short Form   1. Turning from your back to your side while in a flat bed without using " bedrails? 2 - A Lot   2. Moving from lying on your back to sitting on the side of a flat bed without using bedrails? 2 - A Lot   3. Moving to and from a bed to a chair (including a wheelchair)? 3 - A Little   4. Standing up from a chair using your arms (e.g., wheelchair, or bedside chair)? 3 - A Little   5. To walk in hospital room? 3 - A Little   6. Climbing 3-5 steps with a railing? 2 - A Lot   Basic Mobility Raw Score (Score out of 24.Lower scores equate to lower levels of function) 15   Total Evaluation Time   Total Evaluation Time (Minutes) 7

## 2019-08-20 NOTE — TELEPHONE ENCOUNTER
RECORDS RECEIVED FROM: Internal/Care Everywhere   DATE RECEIVED: 9-19-19   NOTES STATUS DETAILS   OFFICE NOTE from referring provider Internal Hospital discharge order from 7-17-19   OFFICE NOTES from other specialists N/A    DISCHARGE SUMMARY from hospital Internal Last admission at time of writing is 8-19-19   MEDICATION LIST Internal    LIVER BIOSPY (IF APPLICABLE)      PATHOLOGY REPORTS  N/A    IMAGING     ENDOSCOPY (IF AVAILABLE) Care Everywhere Report 5-9-19   COLONOSCOPY (IF AVAILABLE) N/A    ULTRASOUND LIVER Internal 8-20-19   CT OF ABDOMEN Internal 8-19-19   MRI OF LIVER N/A    FIBROSCAN, US ELASTOGRAPHY, FIBROSIS SCAN, MR ELASTOGRAPHY N/A    LABS     HEPATIC PANEL (LIVER PANEL) Internal 8-19-19   BASIC METABOLIC PANEL Internal 8-19-19   COMPLETE METABOLIC PANEL Internal 8-20-19   COMPLETE BLOOD COUNT (CBC) Internal 8-20-19   INTERNATIONAL NORMALIZED RATIO (INR) Internal 8-19-19   HEPATITIS C ANTIBODY Internal 10-4-18   HEPATITIS C VIRAL LOAD/PCR N/A    HEPATITIS C GENOTYPE N/A    HEPATITIS B SURFACE ANTIGEN Internal 10-4-18   HEPATITIS B SURFACE ANTIBODY N/A    HEPATITIS B DNA QUANT LEVEL N/A    HEPATITIS B CORE ANTIBODY Internal 10-4-18

## 2019-08-20 NOTE — ED NOTES
"ED Triage Provider Note  [facility]  Encounter Date: Aug 19, 2019    History:  Chief Complaint   Patient presents with     Shortness of Breath     Back Pain     Shoulder Pain     Leg Swelling     Neema Bailey is a 44 year old female with a past medical history of polysubstance abuse, morbid obesity, uterine prolapse, hypokalemia, and physical deconditioning who presents to the ED with multiple complaints.  The patient was discharged from a TCU last week after a long hospital stay for \"liver issues.\"  The patient started to experience generalized swelling, low back pain, bilateral shoulder pain, and shortness of breath.  She contacted her doctor today and was sent here.  The patient would ideally have liked to be scheduled for an outpatient thoracentesis and paracentesis however these procedures were not available today so she was referred to the emergency department.  She is accompanied today by her mother.    The patient notes that initially they thought her liver problems were due to a remote history of alcohol abuse, patient quit drinking 7 months ago.  However, patient also notes she was on multiple courses of antibiotics for a chronic urinary tract infection after having a urinary catheter placed.    Of note, while patient was waiting in the waiting room, the patient felt as if she were about to lose consciousness.  The patient was noted to be sitting upright in her walker in no apparent distress at that time.  The patient also complained of nausea at that time.  Vital signs were stable at that time.  Patient was provided with an emesis bag.    Review of Systems:  Review Of Systems  General: No fevers, positive for chills  Skin: No rash or diaphoresis, positive for jaundice  Eyes: No eye redness or discharge  Ears/Nose/Throat: No rhinorrhea or nasal congestion  Respiratory: No cough, positive for SOB  Cardiovascular: No chest pain or palpitations  Gastrointestinal: Positive " "for nausea  Genitourinary: No urinary frequency, hematuria, or dysuria  Musculoskeletal: No arthralgias or myalgias  Neurologic: No numbness or weakness, positive for tremors  Hematologic/Lymphatic/Immunologic: Positive for leg swelling and easy bruising/bleeding  Endocrine: No polyuria/polydypsia      Exam:  /47   Temp 98.6  F (37  C) (Oral)   Resp 16   Ht 1.727 m (5' 8\")   Wt 115.8 kg (255 lb 3 oz)   LMP 10/04/2018   SpO2 100%   Breastfeeding? No   BMI 38.80 kg/m    General: in mild distress. Appears stated age.   Skin: jaundiced  Neck: supple, nl ROM  Cardio: tachycardic, BLE swelling, extremities well perfused  Resp: Mildly increased work of breathing, slightly increased normal respiratory rate with oxygen saturations maintained in the 90s on room air, distant breath sounds  Abdomen: no TTP, soft  Extremities: BLE swelling is present, intact distal pulses  Neuro: Alert. Disoriented, CN II-XII grossly intact. Generalized weakness    Medical Decision Making:  Patient arriving to the ED with problem as above. A medical screening exam was performed. Lab and medication orders initiated from Triage. The patient is appropriate to be immediately roomed.      Steffi Hameed MD on 8/19/2019 at 7:31 PM     Steffi Hameed MD  08/19/19 2326    "

## 2019-08-20 NOTE — ED NOTES
[facility] [place]   ED Nurse to Floor Handoff     Neema Bailey is a 44 year old female who speaks English and lives alone,  in a home  They arrived in the ED by car from home    ED Chief Complaint: Shortness of Breath; Back Pain; Shoulder Pain; and Leg Swelling    ED Dx;   Final diagnoses:   Hepatic encephalopathy (H)   Pleural effusion   Sepsis, due to unspecified organism (H)         Needed?: No    Allergies:   Allergies   Allergen Reactions     Bactrim [Sulfamethoxazole W/Trimethoprim]      Gabapentin      Other reaction(s): Edema     Nitrofurantoin Other (See Comments)     drug-induced liver injury   .  Past Medical Hx:   Past Medical History:   Diagnosis Date     Anxiety      Bilateral leg edema      Chronic kidney disease      Dizziness and giddiness      Hypertension      Insomnia      Iron deficiency anemia     due to chronic blood loss, vitamin B12 deficiency, Macrocytic     Migraines      BURT (nonalcoholic steatohepatitis)      Numbness and tingling     PERIPHERAL NEUROPATHY     Obese      Other chronic pain     Chronic Bilateral Low Back Pain with Bilateral Sciatica, Bilateral Knee Pain     Peripheral neuropathy      Pruritus      Restless legs      Sciatica      Seborrheic dermatitis      Severe episode of recurrent major depressive disorder, without psychotic features (H)      Sinus tachycardia      Substance abuse in remission (H)     h/o alcoholism had treatment at [facility]     Uterovaginal prolapse      Vitamin D deficiency       Baseline Mental status: WDL  Current Mental Status changes: at basesline    Infection present or suspected this encounter: yes other unknown at this point  Sepsis suspected: Yes  Isolation type: Contact     Activity level - Baseline/Home:  Stand with Assist  Activity Level - Current:   Stand with Assist    Bariatric equipment needed?: No    In the ED these meds were given:   Medications   vancomycin (VANCOCIN) 1,750 mg in  sodium chloride 0.9 % 500 mL intermittent infusion (has no administration in time range)   vancomycin (VANCOCIN) 2,500 mg in sodium chloride 0.9 % 500 mL intermittent infusion (2,500 mg Intravenous New Bag 19 0005)   albumin human 5 % injection 25 g (has no administration in time range)   furosemide (LASIX) tablet 20 mg (20 mg Oral Given 19)   ondansetron (ZOFRAN) injection 4 mg (4 mg Intravenous Given 19)   0.9% sodium chloride BOLUS (0 mLs Intravenous Stopped 19)   ertapenem (INVanz) 1 g vial to attach to  mL bag (0 g Intravenous Stopped 19)   lactulose (CHRONULAC) solution 10 g (10 g Oral Given 19)   iopamidol (ISOVUE-370) solution 135 mL (135 mLs Intravenous Given 19)   sodium chloride (PF) 0.9% PF flush 84 mL (84 mLs Intravenous Given 19)   0.9% sodium chloride BOLUS (0 mLs Intravenous Stopped 19)   fentaNYL (PF) (SUBLIMAZE) injection 25 mcg (25 mcg Intravenous Given 19)       Drips running?  Yes Vancomycin and Albumin infusing    Home pump  No    Current LDAs  Peripheral IV 19 Left (Active)   Number of days: 13       Peripheral IV 19 Right Lower forearm (Active)   Site Assessment WDL 2019  7:55 PM   Line Status Saline locked 2019  7:55 PM   Phlebitis Scale 0-->no symptoms 2019  7:55 PM   Number of days: 1       Urethral Catheter 16 fr (Active)   Number of days: 266       Wound 19 Sacrum Other (comment) Open area rené cleft dermatitis (Active)   Number of days: 34       Incision/Surgical Site 18 Vagina (Active)   Number of days: 267       Labs results:   Labs Ordered and Resulted from Time of ED Arrival Up to the Time of Departure from the ED   CBC WITH PLATELETS DIFFERENTIAL - Abnormal; Notable for the following components:       Result Value    WBC 17.0 (*)     RBC Count 3.04 (*)     Hemoglobin 9.6 (*)     Hematocrit 30.9 (*)      (*)     MCHC 31.1 (*)     RDW  17.1 (*)     Absolute Neutrophil 14.6 (*)     All other components within normal limits   INR - Abnormal; Notable for the following components:    INR 2.94 (*)     All other components within normal limits   PARTIAL THROMBOPLASTIN TIME - Abnormal; Notable for the following components:    PTT 51 (*)     All other components within normal limits   AMMONIA - Abnormal; Notable for the following components:    Ammonia 123 (*)     All other components within normal limits   TSH - Abnormal; Notable for the following components:    TSH 6.46 (*)     All other components within normal limits   UA MACROSCOPIC WITH REFLEX TO MICRO AND CULTURE - Abnormal; Notable for the following components:    Bilirubin Urine Moderate (*)     Mucous Urine Present (*)     Hyaline Casts 12 (*)     All other components within normal limits   NT PROBNP INPATIENT - Abnormal; Notable for the following components:    N-Terminal Pro BNP Inpatient 461 (*)     All other components within normal limits   BASIC METABOLIC PANEL - Abnormal; Notable for the following components:    Glucose 102 (*)     Urea Nitrogen 6 (*)     All other components within normal limits   LACTIC ACID WHOLE BLOOD - Abnormal; Notable for the following components:    Lactic Acid 4.9 (*)     All other components within normal limits   HEPATIC PANEL - Abnormal; Notable for the following components:    Bilirubin Direct 8.5 (*)     Bilirubin Total 14.2 (*)     Albumin 2.7 (*)      (*)     All other components within normal limits   CRP INFLAMMATION - Abnormal; Notable for the following components:    CRP Inflammation 16.0 (*)     All other components within normal limits   ISTAT  GASES LACTATE MIRANDA POCT - Abnormal; Notable for the following components:    Ph Venous 7.52 (*)     PCO2 Venous 26 (*)     Lactic Acid 3.9 (*)     All other components within normal limits   ISTAT  GASES LACTATE MIRANDA POCT - Abnormal; Notable for the following components:    Ph Venous 7.50 (*)     PCO2  Venous 25 (*)     PO2 Venous 61 (*)     Bicarbonate Venous 20 (*)     Lactic Acid 3.0 (*)     All other components within normal limits   TROPONIN I   LIPASE   PROCALCITONIN   PERIPHERAL IV CATHETER   CARDIAC CONTINUOUS MONITORING   ISTAT CG4 GASES LACTATE MIRANDA NURSING POCT   ISTAT CG4 GASES LACTATE MIRANDA NURSING POCT   BLOOD CULTURE   BLOOD CULTURE       Imaging Studies:   Recent Results (from the past 24 hour(s))   XR Chest 2 Views    Narrative    Exam: XR CHEST 2 VW, 8/19/2019 8:20 PM    Indication: jose    Comparison: 8/11/2019    Findings:   AP and lateral views of the chest. The cardiac silhouette is unchanged  from prior. There is a large right pleural effusion which is increased  in size from 11/20/2019. There are adjacent streaky opacities and  streaky left basilar opacities. No pneumothorax. Visualized upper  abdomen is unremarkable. No acute osseous abnormalities. There are  degenerative changes of the right acromioclavicular joint.      Impression    Impression:   1. Large right pleural effusion which is increased in size from  8/11/2019.  2. Streaky bibasilar opacities favored to represent pulmonary edema  and atelectasis over infectious.      I have personally reviewed the examination and initial interpretation  and I agree with the findings.    GREGORIO MIR MD   CT Chest/Abdomen/Pelvis w Contrast    Impression    IMPRESSION:   1. Large right-sided pneumothorax with complete collapse of the right  lower and middle lobes with subsegmental consolidation of the right  upper lobe. Cannot exclude underlying infection.   2. Although the liver does not appear overtly cirrhotic, there are  findings of portal hypertension in this patient with history of BURT  with splenomegaly, upper abdominal varices, and mild intra-abdominal  ascites.  3. Gallbladder is distended with mild wall thickening. This is  nonspecific in the setting of likely cirrhotic liver with ascites.  This appears unchanged compared to ultrasound  "dated 7/18/2019 given  differences in technique  4. Findings suggestive of acute to subacute splenic infarctions.  5. There is a nonspecific, possibly encapsulated, 5.1 x 2.6 cm fluid  collection in the subcutaneous fat of the left pelvic region.       Recent vital signs:   BP (!) 89/35   Pulse 114   Temp 98.2  F (36.8  C) (Oral)   Resp 14   Ht 1.727 m (5' 8\")   Wt 115.8 kg (255 lb 3 oz)   LMP 10/04/2018   SpO2 93%   Breastfeeding? No   BMI 38.80 kg/m      [place] Coma Scale Score: 15 (08/19/19 2021)       Cardiac Rhythm: Tachycardia  Pt needs tele? Yes  Skin/wound Issues: None    Code Status: Full Code    Pain control: fair    Nausea control: good    Abnormal labs/tests/findings requiring intervention: See Lab Results    Family present during ED course? Yes   Family Comments/Social Situation comments: Mother was at bedside, but has left.    Tasks needing completion: None    Annamarie Cui, RN  [phone] [facility] ED      "

## 2019-08-21 NOTE — PROGRESS NOTES
Medicine Acceptance Note     Neema Bailey is a 44-year-old female with a past medical history of end-stage liver disease complicated by hepatic encephalopathy, ascites and and varices, history of alcohol abuse in remission and opiate abuse, obesity status post gastric bypass was admitted to the MICU for severe sepsis now found to have E. coli bacteremia, improving with IV antibiotics now off pressors.  1 of 2 blood cultures from 8/19 are positive for E. Coli,  ESBL positive and thus she remains on IV ertapenem.  Source of her bacteremia is unclear as her urine culture was fairly unremarkable.  Other sources of infection include ascites and right-sided pleural effusion which have not yet been assessed given her INR.  On exam today she is fully oriented, no asterixis with decreased right-sided lung sounds and a benign abdominal exam.  She notes that her outpatient hepatologist is Dr. Leventhal, note that hepatology has not formally been consulted on this patient while she is admitted admitted.  Palliative care was also involved in her care earlier this admission.  Plan will be to continue IV antibiotics, and consider paracentesis and/or thoracentesis in the morning to rule out/evaluate source of her bacteremia.    Patient seen and discussed with Dr. Surya Bustillo MD   PGY-3   [phone]

## 2019-08-21 NOTE — PROGRESS NOTES
8/20/19 CD consult acknowledged. Due to limited availability, there is no exact time frame on a CD assessment at the current time.  The soonest pt might be seen is 8/23/19.  If patient is appropriate for discharge prior to meeting with CD, and they have active insurance, an outpatient evaluation can be scheduled by calling [facility] at [phone].   Per chart review: pt's medical complexity poses a potential barrier to placement in a CD treatment facility.  If pt is open to the location, she may be appropriate for [facility] in [place], [place], which includes CD treatment.  Of note, CD evaluations are only valid for 45 days.  If plan is for pt to transition to a TCU upon discharge from the hospital, and she won't be appropriate for CD treatment within 45 days from the day of assessment, a mobile  can meet with pt at the TCU, when she is closer to being appropriate for discharge from the TCU.    Lyn River, [facility]  [phone]

## 2019-08-21 NOTE — PROGRESS NOTES
MICU PROGRESS NOTE  Neema Bailey ([id number]) admitted on 8/19/2019  Primary care provider: Suresh Shabazz       ASSESSMENT & PLAN    Neema Bailey is a 44 year-old female with PMHx of ESLD complicated by hepatic encephalopathy, portal hypertension, ascites, and varices, polysubstance use disorder, morbid obesity s/p gastric bypass in 2002 and with multiple recent hospitalizations for decompensated cirrhosis admitted to the MICU for severe sepsis 2/2 e. coli bacteremia, now hemodynamically stable and on IV abx.     Changes Today:  - Chemical dependency consult ordered  - Discontinue hydromorphone PRN, as patient's acute abdominal pain has resolved and tx of chronic back pain with opioids is inappropriate, particularly in the context of substance use disorder  - Restart PTA spironolactone today given resolution of hypotension, if tolerates could restart lasix as well   - Continue ertapenem 1 g QD, E.coli susceptibilities resulted in ESBL   - Discontinue vancomycin  - Start vitamin K x 3 days for INR reversal    --------------------------------------------------------------------    Neuro/Psych  # History of hepatic encephalopathy   Ammonia 123 on admit. Patient has been taking home lactulose and rifaximin. Currently alert, oriented, and mentating appropriately.   - Continue PTA scheduled lactulose 10 g TID  - PRN lactulose in addition to scheduled for goal of 3-4 stools/day   - Continue PTA rifaximin 550 mg BID    # Major depressive disorder  - Continue PTA sertraline 25 mg QD    # Generalized anxiety disorder  - Continue PTA hydroxyzine PRN    # Substance use disorder  Patient reports last drink was February 2019, endorses hx of rx opioid dependence, and remote hx of inhaled substance use. Per GI note in 7/17/19, chemical dependency evaluation is critical component of her liver transplant candidacy assessment.  - Chemical dependency consult ordered    Sedation: none  Analgesia:   - First  line: acetaminophen PRN  - Discontinue hydromorphone PRN, as patient's acute abdominal pain has resolved and tx of chronic back pain with opioids is inappropriate, particularly in the context of substance use disorder    Cardiovascular  # Hypotension - resolved  In the setting of severe sepsis. Blood pressure baseline 110s/60s, on midodrine 10 mg TID at home. Now on levophed and s/p albumin. EKG 8/19 with NSR and no abnormalities. Bedside echo 8/20 without gross reduction in cardiac function or evidence of right heart strain. Formal echocardiogram 8/20 1:30 PM with EF of 65-70% with hyperkinetic LV but no diastolic dysfunction, valvular abnormalities, or pericardial effusion. Levophed off since 6pm 8/20. MAPs were ~70-75 overnight 8/20-8/21.  - Continue PTA midodrine 10 mg TID   - MAP goal >60  - Strict I&Os  - Restart PTA spironolactone today given resolution of hypotension  - Continue holding PTA lasix for now     Respiratory  # Dyspnea   # Right pleural effusion   Suspect that recurrent right pleural effusions are the cause of her progressive shortness of breath over the past 2-3 days. Diagnostic thoracentesis on 8/8/19 with transudative fluid. 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 SOB could also represent heart failure given chronic peripheral edema and mildly elevated BNP on admission. PE also considered, however no signs/symptoms of DVT and not requiring oxygen until after IV fluid administration. Formal echo 8/20 without right heart strain.   - Consider therapeutic thoracentesis if worsening respiratory status    Renal/Fluids/Electolytes  Baseline Cr: 0.7-0.8    No active issues.    Gastrointestinal  # Abdominal pain  Diffuse, initially reported more in the RUQ, but denies since admission. Benign exam. Abdominal US unchanged from prior and neg for thromboses. Did show gall bladder wall thickening but cholecystitis unlikely as stable. Lipase wnl. SBP possible but will defer paracentesis  as it would not  given BCx positive for e. coli and patient already on IV abx.     # End-stage liver disease; Multifactorial: alcohol, BURT, and medication induced liver injury   -ascites   -portal HTN   -abdominal varices   -HE   # Transaminitis   Liver biopsy from 5/10/19 consistent with possible injury 2/2 alcohol use per pathologist read, but reviewed by Hepatology during last hospitalization (7/17-21), and suspects this is more consistent with drug-related injury (Bactrim vs Macrobid). No hx of SBP, HCC, or hepatorenal syndrome. Abdominal U/S with dopplers 8/20/19 with no splenic vein thrombosis and patent vasculature.   - Continue to monitor LFTs  - Restart PTA spironolactone today given resolution of hypotension  - Continue holding PTA lasix until 8/22    MELD-Na score: 30 at 8/21/2019  3:48 AM  MELD score: 30 at 8/21/2019  3:48 AM  Calculated from:  Serum Creatinine: 0.72 mg/dL (Rounded to 1 mg/dL) at 8/21/2019  3:48 AM  Serum Sodium: 139 mmol/L (Rounded to 137 mmol/L) at 8/21/2019  3:48 AM  Total Bilirubin: 11.1 mg/dL at 8/21/2019  3:48 AM  INR(ratio): 3.64 at 8/21/2019  3:48 AM  Age: 44 years     Nutrition: Advance diet as tolerated    Infectious Disease   # Severe sepsis  # E. coli bacteremia  BCx 8/19 positive for e. coli, unlikely to be ESBL. Possible source is SBP vs subcutaneous fluid collection along left iliac crest, however per discussion with Radiology, unlikely.  CXR with R pleural effusion and possible consolidation but favoring atelectasis as normal procal and non-productive cough. Empyema also possible. Cholecystitis also considered in the setting of gallbladder wall thickening and distension, however abdominal US 8/20 mostly unchanged from 7/2019, making acute cholecystitis less likely. Urinalysis not suggestive of UTI.  Patient has a history of ESBL (E coli) bacteremia and VRE (E. Faecium) UTI in 1/2019--however, <20,000 colonies pointing against this being a true infection  so will not cover VRE at the this time. Started on ertapenem & vancomycin 8/19. E. Coli, ESBL. No MRSA growth to date.  - Continueertapenem 1 g QD  - Discontinue vancomycin  - Follow blood cultures     Cultures:  UCx 8/19: positive for 10k-50k UG venkat  BCx 8/19: 1/2 positive for e. coli    Antimicrobials:  Ertapenem 1g QD (8/19 - 8/20)  Vancomycin  (8/19 - 8/21)  Ceftriaxone 2g QD (8/21-*)    Hematology    # Chronic Macrocytic Anemia   baseline of ~7 in the setting of chronic liver disease and malnutrition. Patient with MCV of 101 concerning for B12 deficiency, in the context of hx of gastric bypass and alcohol use disorder. However, B12 level on 7/17/19 exceeded normal limits. Received 1upRBCs 8/21 for Hgb 6.7, likely dilutional/lab draw related. No active signs of bleeding.   - Continue to monitor CBC  - Start vitamin K IV 10mg x 3 days for elevated INR    # Chronic Thrombocytopenia  2/2 to hepatic cirrhosis/impaired synthesis. No evidence of active bleed.      Endocrine  No known issues     Skin/MSK  # ?Soft tissue fluid collection  Seen on CT. In discussion with Radiology, fluid collection is unlikely to represent abscess.     Goals of Care  Pt requests palliative care consult  - Placed and discussed with palliative    Prophylaxis:  DVT: SCDs   GI: PPI  Disposition: Improving, transfer to general medicine  Code Status: Full    Patient seen and discussed with staff attending, Dr. Dewayne Sotomayor, MS4  [facility] [employer]  [facility]      Resident/Fellow Attestation   I, Marilynn Scales, was present with the medical student who participated in the service and in the documentation of the note.  I have verified the history and personally performed the physical exam and medical decision making.  I agree with the assessment and plan of care as documented in the note.      Marilynn Scales MD  Medicine-Pediatrics PGY3  Pager # [phone]      Attending note:  Patient seen,  examined and discussed with the Resident physicians. All data reviewed. Agree with assessment and plan as outlined in the above note.    Lara Buchanan MD  [phone]     INTERVAL HISTORY/Subjective:   NAEON. Nursing notes reviewed. Received 1 unitpRBC for low hgb. Potassium replaced. Patient reports that her pain and nausea are improving, continues to endorse chronic low back pain. Neema confirms that she would like a chemical dependency evaluation during this hospitalization.    OBJECTIVE     Temp:  [97.5  F (36.4  C)-98.2  F (36.8  C)] 97.7  F (36.5  C)  Heart Rate:  [] 85  Resp:  [13-30] 16  BP: ()/(36-69) 106/53  SpO2:  [90 %-98 %] 94 %    Resp: 16    Intake/Output Summary (Last 24 hours) at 8/20/2019 1339  Last data filed at 8/20/2019 1331  Gross per 24 hour   Intake 4029.81 ml   Output 200 ml   Net 3829.81 ml       Vitals:    08/19/19 1752 08/20/19 0330   Weight: 115.8 kg (255 lb 3 oz) 118.6 kg (261 lb 7.5 oz)     Physical Exam  GEN: NAD, converses appropriately  NEURO: AOx4, CN II-XII intact, strength 5/5 throughout, sensation intact and equal bilaterally  HEENT: NCAT, PERRL, EOMI, scleral icterus is present  RESP: Diminished lung sounds at the bases R > L, otherwise CTAB  CV: RRR, no MRG  ABD: soft, distended, mild diffuse tenderness to palpation  EXT: 2+ pitting edema  SKIN: No erythema    Data:  All laboratory and imaging reviewed by me.    Recent Results (from the past 24 hour(s))   XR Chest Port 1 View    Narrative    XR CHEST PORT 1 VW  8/20/2019 12:12 PM      HISTORY: PICC position    COMPARISON: Chest x-ray same day, chest abdomen and pelvis CT  8/19/2019.    FINDINGS:   AP radiograph of the chest. Right upper extremity PICC tip projects  over the right atrium.    Stable cardiac mediastinal silhouette, partially obscured on the  right. Pulmonary vasculature is discrete on the left. Normal lung  volumes on the left. Trachea is midline. Slightly increased large  right pleural effusion with  overlying streaky opacities. Perihilar and  retrocardiac hazy opacities. No pleural effusion on the left. No  pneumothorax appreciated.      Impression    IMPRESSION:   1. Right upper extremity PICC tip projects over the right atrium.  2. Large right pleural effusion, slightly increased from prior exam  with overlying streaky opacities favoring atelectasis vs infection.  3. Perihilar and retrocardiac hazy opacities favoring atelectasis  superimposed upon mild pulmonary vascular congestion.    I have personally reviewed the examination and initial interpretation  and I agree with the findings.    BELEN WATERS MD

## 2019-08-21 NOTE — PROGRESS NOTES
"[facility] Unit 4C  Palliative Care Initial Spiritual Assessment    Patient: Neema Bailey  Date of Admission:  8/19/2019  Reason for consult: pain/symptom management and patient/family coping      Summary and Recommendations:  Patient Neema Bailey finds hope in relationships with her family and motivation \"to live\" in spending time with grandchildren. As a result, she wants to engage in recovery in ways that will help her receive a liver transplant. She finds comfort in prayer (Faith roots, currently personal spirituality) and requested prayer for \"getting on the list\".     Palliative Care Spiritual Health will follow.    Alyssa Mota  Palliative   Pager [phone]  [facility] Inpatient Team Consult pager [phone] (M-F 8-4:30)  After-hours Answering Service [phone]      Assessments:   Visit with patient Neema Bailey at her bedside. Neema was appreciative of  presence and requested follow up throughout hospitalization if possible.    Distress:  Distress in the context of serious illness was assessed today:    Existential/spiritual/emotional distress: Yes; Neema wants to live to receive a liver transplant. She worries at times that her disease is affecting her ability to function.      Confucianism distress:  No.      Social/economic/relational distress:  Not assessed/unable to assess.    Coping, Meaning, & Spirituality:   Coping, meaning, and/or spirituality in the context of serious illness were assessed today:    Spiritual background and preferences: personal spirituality (Faith [employer] roots)      Beliefs, rituals, and practices: prayer - Neema prays often, and has friends who support her in prayer. She requested prayer for \"getting on the list\" for transplant.      Meaning-making: in relationships with family      Strengths and resources: family, martha, desire for recovery    Prognosis, Goals, & Planning:     Prognosis, Goals, " and/or Advance Care Planning were assessed today: Yes - Neema hopes to qualify for a liver transplant.      Preferred language: English      Patient's decision making preferences: independently      I have concerns about the patient/family's health literacy today: Yes      Patient has a completed Health Care Directive: Reportedly yes, but not available to us currently.      Code status per chart review: full code    Key Palliative Symptom Data:  # Nausea severity the last 12 hours: moderate - of which she said nursing was aware and addressing      Interventions:  I offered emotional and spiritual support through reflective listening that affirmed emotions and meaning-making, and through prayer at Neema's request.

## 2019-08-21 NOTE — PLAN OF CARE
ICU End of Shift Summary. See flowsheets for vital signs and detailed assessment.    Changes this shift: A&Ox4. Up to commode with SBA. Room air. Lactulose held d/t stool goal already being met. 1U PRBC given for Hgb 6.7. K+ replaced. Recheck at 1000.     Plan: Possible transfer. Continue to closely monitor and notify MICU team of any changes.

## 2019-08-21 NOTE — PLAN OF CARE
"ICU End of Shift Summary. See flowsheets for vital signs and detailed assessment.    Changes this shift: Remained on 2L NC per pt comfort, admits to feeling slightly more \"short of breath\" this evening; MD notified via text page -- consider CXR? VSS. Poor appetite, encourage PO. Continue lactulose. Hgb recheck stable. KCL replaced this AM, recheck WNL.     Plan:  Continue to monitor and implement POC, notify provider of changes. Transfer to [facility] when bed available.      Problem: Gastrointestinal Condition Comorbidity  Goal: Gastrointestinal Condition  Description  Patient comorbidity will be monitored for signs and symptoms of Gastrointestinal condition.  Problems will be absent, minimized or managed by discharge/transition of care.  Outcome: No Change     Problem: Mood Alteration Comorbidity  Goal: Mood Alteration Comorbidity  Description  Patient comorbidity will be monitored for signs and symptoms of Mood Alteration condition.  Problems will be absent, minimized or managed by discharge/transition of care.  Outcome: No Change     "

## 2019-08-21 NOTE — PROGRESS NOTES
Antimicrobial Stewardship Team Note    Antimicrobial Stewardship Program - A joint venture between [facility] and  Physicians to optimize antibiotic management.  NOT a formal consult - Restricted Antimicrobial Review     Patient: Neema Bailey  MRN: [id number]  Allergies: Bactrim [sulfamethoxazole w/trimethoprim]; Gabapentin; and Nitrofurantoin    Brief Summary: Neema is a 44 year old female who presented to the ED on 8/19 with BLE edema, low back pain, and shortness of breath with increased cough. 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 She was in a TCU from 7/21 - 8/13 for her decompensated ESLD.     HPI: In addition to her edema, back pain, and shortness of breath, Neema states that she has had recent chills, abdominal pain (especially when eating), nausea, dry heaves, weakness, and fatigue.  Her WBC upon admission was 17K; it slightly increased to 20.9K on 8/20, and is down to 8.3K on 8/21. CRP upon admission 16 and procalcitonin was 0.12. She was initiated on ertapenem and vancomycin. Neema has been afebrile throughout her hospitalization. Of note, the patient has a history of ESBL (E coli) bacteremia and VRE (E. Faecium) UTI in 1/2019.     A blood culture from the right arm from 8/19 grew E. coli per Verigene, it is susceptible to meropenem. It is a possible ESBL E. coli, further susceptibilities are in process.  A CT of the chest/ abdomen/pelvis showed a nonspecific, possibly encapsulated fluid collection in the subcutaneous fat of the left pelvic region. A urine culture showed 10-50K of mixed urogenital venkat. In terms of her abdominal pain, an abdomen US showed gallbladder distension and wall thickening, however these are stable appearing changes when compared to US from 7/2019, making acute cholecystitis less likely; her lipase is normal at 107. Neema's nares are MRSA/MSSA PCR  negative. There was thought that her progressive shortness of breath could be caused by her pleural effusion.  A chest x-ray from 8/19 shows a large right pleural effusion which is increased in size from 8/11/2019. A CT showed large right-sided pleural effusion with complete collapse of the right lower and middle lobes with subsegmental consolidation of the right upper lobe. As previously mentioned, she did not have an elevated procalcitonin (0.12).          Active Anti-infective Medications   (From admission, onward)                Start     Stop    08/20/19 2100  ertapenem  1 g,   Intravenous,   EVERY 24 HOURS     Sepsis        08/30/19 2059 08/20/19 1200  vancomycin (VANCOCIN) injection  1,750 mg,   Intravenous,   EVERY 12 HOURS     Sepsis        --    08/20/19 0800  rifaximin  550 mg,   Oral,   2 TIMES DAILY     hepatic encephalopathy        --          Assessment:   [E. Coli BSI of unclear source (likely IA)]: Most likely source of infection SBP due to the patient's sudden onset abdominal pain,  history of ESLD and ascites. An alternative source to consider is the pelvic fluid collection if a repeat blood culture returns positive or if the patient decompensates. MRSA is not a likely organism of concern given the positive blood culture for E. Coli and lack of isolation on other cultures.  The urine culture from this hospitalization only had normal venkat; at this time, there is little concern for infection with VRE, or gram positive organisms in general. A paracentesis would not change the course of therapy due to the patient's positive blood culture, though they may help with confirmation of diagnosis of SBP. Ertapenem is appropriate in this patient due to the isolate's susceptibility profile.  The patient will require IV antibiotics to complete the duration of therapy due to the susceptibility profile.     Recommendations:  Agree with discontinuation of vancomycin.   Continue ertapenem 1 gram IV every 24  hours x 10-14 days (through 8/29/2019 at the earliest).      Discussed with ID Staff  Maryjane Alexander, PharmD, BCIDP and Dr. Sherlyn Lewis, KeyonnaD IV Student  [phone]  Pager [phone]    Vital Signs/Clinical Features:  Vitals         08/19 0700  -  08/20 0659 08/20 0700  -  08/21 0659 08/21 0700  -  08/21 1418   Most Recent    Temp ( F) 98.2 -  98.7    97.5 -  98.2    97.5 -  97.8     97.8 (36.6)    Pulse 99 -  118      103       103    Heart Rate 98 -  116    72 -  105    83 -  88     88    Resp 14 -  38    13 -  30    14 -  23     17    BP 80/32 -  127/46    82/59 -  114/66    102/57 -  115/57     102/57    SpO2 (%) 90 -  100    90 -  98    90 -  97     96            Labs  Estimated Creatinine Clearance: 132.2 mL/min (based on SCr of 0.72 mg/dL).  Recent Labs   Lab Test 08/07/19  0606 08/09/19  0636 08/12/19  0804 08/19/19  1952 08/20/19  0431 08/21/19  0348   CR 0.76 0.73 0.65 0.78 0.78 0.72       Recent Labs   Lab Test 07/31/19  0658 08/05/19  0718 08/07/19  0951 08/12/19  0804 08/19/19  1952 08/20/19  0431 08/21/19  0348 08/21/19  0915 08/21/19  1325   WBC 6.8 9.0 10.5 9.0 17.0* 20.9* 8.3  --   --    ANEU 3.9 6.4 7.6 5.7 14.6*  --  5.7  --   --    ALYM 2.0 1.8 2.1 2.1 1.6  --  1.5  --   --    MARGARITA 0.6 0.6 0.6 0.9 0.6  --  0.7  --   --    AEOS 0.2 0.1 0.2 0.3 0.1  --  0.3  --   --    HGB 7.6* 8.0* 7.7* 8.8* 9.6* 7.6* 6.7* 7.4* 7.5*   HCT 23.7* 24.6* 23.8* 27.3* 30.9* 23.8* 22.1*  --   --    MCV 96 96 98 96 102* 100 101*  --   --    PLT 97* 105* 94* 136* 184 121* 105*  --   --        Recent Labs   Lab Test 07/31/19  0658 08/05/19  0718 08/12/19  0804 08/19/19 1952 08/20/19  0431 08/21/19  0348   BILITOTAL 10.2* 11.7* 10.2* 14.2* 11.6* 11.1*   ALKPHOS 97 96 125 150 110 97   ALBUMIN 2.1* 2.0* 2.2* 2.7* 2.4* 2.4*   AST 58* 54* 52* 134* 102* 84*   ALT 15 16 19 41 28 26       Recent Labs   Lab Test 07/17/19  0209  08/07/19  0606 08/19/19 1952 08/19/19  2257 08/20/19  0045 08/20/19  0431 08/20/19  1804    PCAL 0.05  --   --  0.12  --   --   --   --    LACT  --    < > 1.7 4.9* 3.9* 3.0* 3.2* 1.9   CRP  --   --   --  16.0*  --   --   --   --     < > = values in this interval not displayed.             Culture Results:  7-Day Micro Results       Procedure Component Value Units Date/Time    Cell count with differential fluid     Order Status:  Canceled Lab Status:  No result     Specimen:  Pleural fluid     Fluid Culture Aerobic Bacterial     Order Status:  Canceled Lab Status:  No result     Specimen:  Pleural fluid     Glucose fluid     Order Status:  Canceled Lab Status:  No result     Specimen:  Pleural fluid     Gram stain     Order Status:  Canceled Lab Status:  No result     Specimen:  Pleural fluid     Lactate dehydrogenase fluid     Order Status:  Canceled Lab Status:  No result     Specimen:  Pleural fluid     Protein fluid     Order Status:  Canceled Lab Status:  No result     Specimen:  Pleural fluid     Anaerobic bacterial culture     Order Status:  Canceled Lab Status:  No result     Specimen:  Pleural fluid     Methicillin Resist/Sens S. aureus PCR [[id number]] Collected:  08/20/19 0432    Order Status:  Completed Lab Status:  Final result Updated:  08/20/19 0609    Specimen:  Nares      Specimen Description Nares     Methicillin Resist/Sens S. aureus PCR Negative     Comment: MRSA Negative: SA Negative  MRSA and Staphylococcus aureus target DNA not   detected, presumed negative for MRSA and SA colonization or the number of   bacteria present may be below the limit of detection for the assay. FDA   approved assay performed using [employer] GeneXpert(R) real-time PCR.         Urine Culture Aerobic Bacterial [[id number]] Collected:  08/19/19 9073    Order Status:  Completed Lab Status:  Final result Updated:  08/21/19 0711    Specimen:  Midstream Urine      Specimen Description Midstream Urine     Special Requests Specimen received in preservative     Culture Micro 10,000 to 50,000 colonies/mL  mixed urogenital  venkat  Susceptibility testing not routinely done      Blood culture [[id number]] Collected:  08/19/19 2055    Order Status:  Completed Lab Status:  Preliminary result Updated:  08/21/19 0304    Specimen:  Blood from Arm, Right      Specimen Description Blood Right Arm     Special Requests Received in aerobic bottle only     Culture Micro No growth after 2 days    Blood culture [[id number]]  (Abnormal)  (Susceptibility) Collected:  08/19/19 1952    Order Status:  Completed Lab Status:  Preliminary result Updated:  08/21/19 1236    Specimen:  Blood from Arm, Right      Specimen Description Blood Right Arm     Special Requests Received in aerobic bottle only     Culture Micro Cultured on the 1st day of incubation:  Escherichia coli        Critical Value/Significant Value, preliminary result only, called to and read back by   Juan Luis Pires RN 08/20/2019 @1041 [facility]        Possible ESBL (extended spectrum Beta lactmase) . Await confirmation. ESBL   producers are resistant to all cephalosporins (including 3rd generation). ESBL producers   usually are susceptible to amikacin, imipenem and meropenem.        Opal Allen 4C notified of probable ESBL 8/21/19 1035 dg      Additional susceptibilities in progress  8/21/19        (Note)  POSITIVE for E.COLI by Verigene multiplex nucleic acid test. Final  identification and antimicrobial susceptibility testing will be  verified by standard methods. Verigene test will not distinguish  E.coli from Shigella species including S.dysenteriae, S.flexneri,  S.boydii, and S.sonnei. Specimens containing Shigella species or  E.coli will be reported as Positive for E.coli.    Specimen tested with Verigene multiplex, gram-negative blood culture  nucleic acid test for the following targets: Acinetobacter sp.,  Citrobacter sp., Enterobacter sp., Proteus sp., E. coli, K.  pneumoniae/oxytoca, P. aeruginosa, and the following resistance  markers: CTXM, KPC, NDM, VIM, IMP and OXA.    Critical  Value/Significant Value called to and read back by lisa lares rn @1247 8/20/19. scg        Susceptibility       Escherichia coli (1)       Antibiotic Interpretation Sensitivity Method Status    AMIKACIN Sensitive <=2.0 ug/mL CHRISTIANO Preliminary    AMPICILLIN Resistant >32.0 ug/mL CHRISTIANO Preliminary    AMPICILLIN/SULBACTAM Resistant >32.0 ug/mL CHRISTIANO Preliminary    CEFAZOLIN [*]  Resistant >64.0 ug/mL CHRISTIANO Preliminary    CIPROFLOXACIN Resistant >4.0 ug/mL CHRISTIANO Preliminary    GENTAMICIN Sensitive <=1.0 ug/mL CHRISTIANO Preliminary    LEVOFLOXACIN Resistant >4.0 ug/mL CHRISTIANO Preliminary    TOBRAMYCIN Sensitive <=1.0 ug/mL CHRISTIANO Preliminary    Trimethoprim/Sulfa Resistant >32.0/608.0 ug/mL CHRISTIANO Preliminary    MEROPENEM Sensitive <=0.25 ug/mL CHRISTIANO Preliminary     Enterobacteriaceae that are susceptible to meropenem are usually susceptible   to ertapenem.    Piperacillin/Tazo Sensitive <=4.0 ug/mL CHRISTIANO Preliminary    Ext Spect B Lac Prod [*]  Resistant Positive  CHRISTIANO Preliminary               [*]   Suppressed Antibiotic                           Recent Labs   Lab Test 10/04/18  1118 07/19/19  0444 07/24/19  2315 08/02/19  1500 08/19/19  2135   URINEPH 6.0 6.0 7.0 6.0 6.5   NITRITE Negative Negative Negative Positive* Negative   LEUKEST Small* Large* Negative Small* Negative   WBCU 5-10* 68* 4 11* 3                   Recent Labs   Lab Test 07/27/19  0315   CDBPCT Negative       Imaging: Ct Chest/abdomen/pelvis W Contrast    Result Date: 8/20/2019  CT CHEST/ABDOMEN/PELVIS W CONTRAST 8/19/2019 11:26 PM History: Abd pain, unspecified; ; abdominal pain, low back pain, vomiting Comparison: Same day chest x-ray, ultrasound 7/18/2019, CT abdomen pelvis 8/11/2017 Technique: Helical CT acquisition from the lung apices to the pubic symphysis was obtained withintravenous contrast. Axial, coronal, and sagittal reconstructions were obtained and reviewed. Contrast: iopamidol (ISOVUE-370) solution 135 mL Findings: CHEST: Lungs: There is a large right-sided  pleural effusion with complete collapse of the right lower and middle lobes and subsegmental atelectasis of the right upper lobe and left lower lobe. Scattered calcified pulmonary granuloma. No pneumothorax. Airways: Central tracheobronchial tree is clear. Vessels: Main pulmonary artery and aorta are normal in caliber. No central pulmonary embolism. Normal three-vessel arch. Heart: Heart size is normal without pericardial effusion. Lymph nodes: No suspicious mediastinal or hilar lymphadenopathy. Calcified mediastinal and hilar lymph nodes. Thyroid: Within normal limits. Esophagus: Within normal limits ABDOMEN PELVIS: Liver: Normal parenchymal attenuation without focal mass. Biliary system: Gallbladder is distended with mucosal hyperenhancement and mild amount of wall thickening. No radiopaque gallstones.. No intrahepatic or extrahepatic biliary ductal dilatation. Pancreas: No focal mass or dilation of the main pancreatic duct. Stomach: Postoperative changes of Denny-en-Y gastric bypass. Spleen: Spleen is enlarged measuring 13.3 cm in length. There are multiple wedge-shaped hypodensities throughout superior aspect of the spleen. Adrenal glands: Within normal limits. Kidneys: No focal mass, hydronephrosis, or stone. Bladder: Within normal limits. Reproductive organs: Uterus and ovaries are not visualized. Colon: Colon is relatively decompressed with scattered noninflamed diverticuli. Appendix: Not confidently identified. Small Bowel: No dilated loops of bowel. Gastrojejunal and jejunojejunal anastomoses are within normal limits. Lymph nodes: No intra-abdominal or pelvic lymphadenopathy. Vasculature: Major intra-abdominal vasculature is patent. Multiple predominantly splenorenal upper abdominal varices. Mild intra-abdominal ascites. Bones and soft tissues: Bilateral breast augmentation. Moderate anasarca predominantly around the mid abdomen. There is a 5.1 x 2.6 cm fluid collection in the subcutaneous fat of the left  pelvic region. There is a thin mildly hyperattenuating rim surrounding the collection. No suspicious osseous lesion. Degenerative changes L5-S1 with disc space narrowing, endplate sclerosis, and marginal osteophytes.     IMPRESSION: 1. Large right-sided pleural effusion with complete collapse of the right lower and middle lobes with subsegmental consolidation of the right upper lobe. Cannot exclude underlying infection. 2. Although the liver does not appear overtly cirrhotic, there are findings of portal hypertension in this patient with history of BURT with splenomegaly, upper abdominal varices, and mild intra-abdominal ascites. 3. Gallbladder is distended with mild wall thickening. This is nonspecific in the setting of likely cirrhotic liver with ascites. This appears unchanged compared to ultrasound dated 7/18/2019 given differences in technique 4. Findings suggestive of acute to subacute splenic infarctions. 5. There is a nonspecific, possibly encapsulated, 5.1 x 2.6 cm fluid collection in the subcutaneous fat of the left pelvic region. I have personally reviewed the examination and initial interpretation and I agree with the findings. ZHANG REECE MD    Xr Chest 2 Views    Result Date: 8/19/2019  Exam: XR CHEST 2 VW, 8/19/2019 8:20 PM Indication: jose Comparison: 8/11/2019 Findings: AP and lateral views of the chest. The cardiac silhouette is unchanged from prior. There is a large right pleural effusion which is increased in size from 11/20/2019. There are adjacent streaky opacities and streaky left basilar opacities. No pneumothorax. Visualized upper abdomen is unremarkable. No acute osseous abnormalities. There are degenerative changes of the right acromioclavicular joint.     Impression: 1. Large right pleural effusion which is increased in size from 8/11/2019. 2. Streaky bibasilar opacities favored to represent pulmonary edema and atelectasis over infectious.  I have personally reviewed the examination  and initial interpretation and I agree with the findings. GREGORIO MIR MD    Us Abdomen Limited (ruq)    Result Date: 8/20/2019  EXAMINATION: Limited Abdominal Ultrasound, 8/20/2019 1:49 AM COMPARISON: Abdominal ultrasound 7/18/2019 HISTORY: Abdominal pain, vomiting, sepsis. Evaluate for cholecystitis. FINDINGS: Fluid: Large pleural effusion and small to moderate volume ascites. Liver: Measures 14.5 cm. Demonstrates increased proximal echogenicity with mildly nodular contour. No focal mass.. Gallbladder: Gallbladder sludge with mild gallbladder wall thickening measuring up to 3 mm. There is a mild amount of pericholecystic fluid. Equivocal sonographic Mensah sign due to pain medication. No wall hyperemia. Bile Ducts: Both the intra- and extrahepatic biliary system are of normal caliber.  The common bile duct measures 4 mm in diameter. Pancreas: Visualized portions of the head and body of the pancreas are unremarkable. Kidney: The right kidney measures 9.4 cm long. There is no hydronephrosis or hydroureter, no shadowing renal calculi, or solid mass. There is retrograde flow within the splenic and main portal vein.     IMPRESSION: Gallbladder sludge with mild gallbladder wall thickening or pericholecystic fluid.  These findings other than interval development of gallbladder sludge are unchanged since 7/18/2019 and are nonspecific in the setting of likely cirrhosis. This makes acute cholecystitis unlikely. Cirrhotic appearing liver with new retrograde flow in the splenic and main portal vein. There is mild to moderate volume ascites with large right-sided pleural effusion consistent with portal hypertension. No focal mass. I have personally reviewed the examination and initial interpretation and I agree with the findings. ZHANG REECE MD    Us Abd/pelvis Duplex Complete Portable    Result Date: 8/20/2019  EXAMINATION: TEMPORARY 8/20/2019 5:14 AM COMPARISON: Abdominal ultrasound 7/18/2019 HISTORY: concern for  portal vein thrombosis and/or splenic artery thrombosis/infarction in patient with cirrhosis TECHNIQUE: The abdomen was scanned in standard fashion with specialized ultrasound transducer(s) using both gray-scale, color Doppler, and spectral flow techniques. Findings: Large right-sided pleural effusion. Small intra-abdominal ascites Extrahepatic portal vein flow is retrograde at 30 cm/s. Right portal vein flow is retrograde, measuring 22 cm/s. Left portal vein flow is retrograde, measuring 41 cm/s. Flow in the hepatic artery is towards the liver with normal monophasic low-resistance waveform: 153 cm/s peak systolic 0.62 resistive index. The splenic vein is patent and flow is away from the liver.  The left, middle, and right hepatic veins are patent with flow towards the IVC. The IVC is patent with flow towards the heart.  Splenic artery: 69 cm/s towards the spleen with resistive index of 0.68. Normal monophasic low-resistance waveform. Spleen: The spleen measures 15.4 cm in length.     Impression: 1.  Patent right upper quadrant Doppler evaluation with retrograde flow in the portal and splenic veins. No portal vein thrombosis. 2.  Patent splenic artery. 3.  Large right-sided pleural effusion with small intraabdominal ascites. Previously visualized splenic infarctions are not well delineated on this examination. I have personally reviewed the examination and initial interpretation and I agree with the findings. ZHANG REECE MD    Xr Chest Port 1 View    Result Date: 8/20/2019  XR CHEST PORT 1 VW  8/20/2019 12:12 PM  HISTORY: PICC position COMPARISON: Chest x-ray same day, chest abdomen and pelvis CT 8/19/2019. FINDINGS: AP radiograph of the chest. Right upper extremity PICC tip projects over the right atrium. Stable cardiac mediastinal silhouette, partially obscured on the right. Pulmonary vasculature is discrete on the left. Normal lung volumes on the left. Trachea is midline. Slightly increased large right  pleural effusion with overlying streaky opacities. Perihilar and retrocardiac hazy opacities. No pleural effusion on the left. No pneumothorax appreciated.     IMPRESSION: 1. Right upper extremity PICC tip projects over the right atrium. 2. Large right pleural effusion, slightly increased from prior exam with overlying streaky opacities favoring atelectasis vs infection. 3. Perihilar and retrocardiac hazy opacities favoring atelectasis superimposed upon mild pulmonary vascular congestion. I have personally reviewed the examination and initial interpretation and I agree with the findings. BELEN WATERS MD    Xr Chest Port 1 View    Result Date: 8/20/2019  XR CHEST PORT 1 VW  8/20/2019 6:11 AM  HISTORY: follow up pleural effusion; concern for worsening pulmonary edema COMPARISON: Chest x-ray and CT chest abdomen pelvis 8/19/2019 TECHNIQUE: Semiupright frontal view of the chest FINDINGS: Stable moderate to large right-sided pleural effusion with associated consolidation/atelectasis of the right middle and lower lobes. Mild interstitial opacities. No appreciable pneumothorax. Cardiac mediastinal silhouette is within normal limits. Trachea is midline. Upper abdomen is unremarkable. No suspicious osseous lesion.     IMPRESSION: 1. Stable moderate to large right-sided pleural effusion with associated consolidation/atelectasis of the right middle and lower lobes. 2. Bilateral interstitial opacities, likely pulmonary edema. I have personally reviewed the examination and initial interpretation and I agree with the findings. ZHANG REECE MD

## 2019-08-22 NOTE — PROVIDER NOTIFICATION
Lab staff called, reports ascites samples too dark (bile green); unable to run results due to color. Direct MD to call lab if any further questions. MD notified at [phone]. Continue with POC.

## 2019-08-22 NOTE — PROGRESS NOTES
[facility], [place]    Internal Medicine Progress Note - [facility] Service    Main Plans for Today   Diagnostic paracentesis-->bilious appearing fluid, unable to run per lab   Continue Ertapenem   MRCP to rule out bile leak   Hepatology consulted     Assessment & Plan   Neema Bailey is a 44 year-old female with PMHx of ESLD complicated by hepatic encephalopathy, portal hypertension, ascites, and varices, polysubstance use disorder, morbid obesity s/p gastric bypass in 2002 and with multiple recent hospitalizations for decompensated cirrhosis admitted to the MICU for severe sepsis 2/2 e. coli bacteremia, source unclear.     #Decompensated  Cirrhosis (multifactorial)  #Hepatic encephalopathy   #Sepsis secondary to ESBL bacteremia   #Concern for bile leak   Patient with decompensated end-stage liver disease, likely multifactorial given history of alcohol abuse although may be a component of iatrogenic secondary to medication such as Bactrim  based on pathology findings.  She presented with sepsis requiring vasopressors and was noted to have 1 of 2 cultures on presentation positive for E. coli, with sensitivities notable for ESBL.  She was transitioned carboplatinum and has now been on ertapenem for 3 days.  Clinically she appears slightly better today, given there is no obvious source for her bacteremia planned to get a diagnostic paracentesis at bedside which was performed.  Discussion with the CAPS team there was a clear fluid pocket adjacent to the liver which was aspirated although the fluid appeared much darker than usual and green in appearance.  The labs unable to run normal chemistries given the dark appearance, I did discuss this with the chemistry resident.  Overall she appears clinically unchanged following the procedure with a benign abdominal exam, however given concern for possible bile leak we will obtain an MRCP to assess her biliary anatomy.  Her exam seems very benign,  bile leak could explain her presentation and bacteremia.  If she were to decompensate overnight we need to cover broadly, would transition her to meropenem and vancomycin although she is stable at this time.  Given her complex history and plans to follow-up with Dr. Leventhal I will get hepatology involved as well.  -Continue IV ertapenem (day 3)  -Diagnostic paracentesis, unable to run analysis due to appearance   -MRCP to rule out bile leak   -Hepatology consulted  -Trend MELD labs   -Continue lactulose and Xifaxan   -Continue aldactone   -Vitamin K challenge (Day 3)     #Hepatic hydrothorax   #Hypoxic respiratory failure   Large right-sided pleural effusion, unable to wean off 2 L oxygen at this time.  Given her INR would not aspirate and less absolutely necessary.  Respiratory status remained stable.  We will continue to monitor and consider thoracentesis if needed.    #Chronic microcytic anemia   Required 1 unit of packed red blood cells yesterday, no source of bleeding identified.  She responded to the transfusion appropriately.  We will continue to monitor for acute blood loss.    #History of substance use disorder   Has reportedly been sober from alcohol since February.  Also with a remote history of opioid abuse we will try to limit narcotics over treat her acute pain.    #Major depression  #Generalized anxiety   We will continue her prior to admission Zoloft.  Patient had requested palliative care to see her earlier this admission and they offered some support although no significant issues with symptom management at this time.  We will discuss further with palliative care if necessary.    #Thrombocytopenia   Mild, stable in the setting of end-stage liver disease.  Possibly a consumptive process.  We will continue to monitor.    Diet: Low sodium (NPO prior to MRCP   Fluids: None   DVT Prophylaxis: Pneumatic Compression Devices  Code Status: Full Code    Disposition Plan   Expected discharge: 4 - 7 days,  recommended to prior living arrangement once antibiotic plan established.     Entered: Asael Bustillo 08/22/2019, 3:54 PM   Information in the above section will display in the discharge planner report.      The patient's care was discussed with the attending physician, Dr. London.    Asael Bustillo MD  Internal Medicine PGY-3   [phone]     Please see sticky note for cross cover information    Interval History   No acute events overnight. Patient having some mild left sided abdominal pain. Feels weak and tired, not near her usual self. Would like to try to eat. No fevers.     4 point ROS conducted, with pertinent positives/negatives discussed above.    Physical Exam   Vital Signs: Temp: 98.1  F (36.7  C) Temp src: Oral BP: 120/67 Pulse: 103 Heart Rate: 96 Resp: 15 SpO2: 92 % O2 Device: Nasal cannula Oxygen Delivery: 2 LPM  Weight: 266 lbs 11.2 oz  General Appearance: Chronically ill appearing, jaundiced, oriented x3   Respiratory: Decreased bilateral breath sounds, no wheezing   Cardiovascular: S1/S2, RRR. No murmurs   GI: Soft, firm in the LLQ. Mild tenderness to the right of the umbilicus without rebound or Mensah sign.   Ext: 1+ pitting edema of the lower extremities   Neuro: Oriented x3, no asterixis        Data   Labs/Imaging/Vitals/Meds: Reviewed in Epic

## 2019-08-22 NOTE — PLAN OF CARE
ICU End of Shift Summary. See flowsheets for vital signs and detailed assessment.    Changes this shift: O2 stat >90% on 2L via NC. Dyspneic on exertion.  Patient did complain of worsening SOB at the beginning of shift but no additional complaints for the rest of the night. PRN dilaudid given x3 for back and bilateral shoulder pain. Patient sleeping/resting upon reassessment. Up with assist of 1 and walker to bedside commode.  Potassium this morning was 3.9, currently being replaced per protocol. Patient had 1 small BM overnight.     Plan: Possible transfer to the floor. Will continue to monitor and follow POC per [facility] service.

## 2019-08-22 NOTE — PROCEDURES
Consult and Procedure Service - Procedure Note    Attending: Suri   Resident: n/a   Indication: diagnostic paracentesis  Risk Assessment: moderate. Fibrinogen borderline low. Replacing during procedure  Pre-procedure diagnosis: ascites   Post-procedure diagnosis: ascites  Procedure name: paracentesis    The risks and benefits of the procedure were explained to patient who expressed understanding and opted to proceed.  Consent was obtained and placed in the chart.  A time out was performed.  An area of ascites was located with ultrasound and marked in the RLQ quadrant; the area was prepped and draped in the usual sterile fashion.  5 ml of 1% lidocaine was instilled with a 5Fr needle and ascites located under real-time guidance. 40 ml of dark colored fluid was removed and sent for analysis and the area dressed. Procedure went well. Fluid aspirated appeared quite dark, consistent with bile, though in RLQ with apparent ascites by ultrasound. Concern communicated to team. Depending on analysis would consider doing biliary drain.     Patient tolerated the procedure well. Please contact the Consult and Procedure Service if any complications or concerns arise.     GABBI SAVAGE MD, [facility]  Internal Medicine Hospitalist & Staff Physician  [facility]  Pager: [phone]  [email or web address]    DOS:  August 22, 2019

## 2019-08-22 NOTE — PLAN OF CARE
"PT-Edema-4C- Cancel, pt just off commode and set up for meal tray. Pt reports she has \"farrow\" compression at baseline, reports compression is in 4WW basket, upon inspection of basket pt appeared to have BLE compression stockings similar to tubigrip or comperm, but PT did not find farrow garments. Will evaluate for edema needs tomorrow, and go back into baseline compression if able/garments present, or trial GCB if indicated.   "

## 2019-08-22 NOTE — PLAN OF CARE
ICU End of Shift Summary. See flowsheets for vital signs and detailed assessment.    Changes this shift: Continues to require 2L NC; attempts to wean down, 88% on RA; goal keep above 90%. Alert and oriented x4; however forgetful and continues to drift off to sleep during med administrations and eating meals. Paracentesis at bedside, completed without issues, dark bile colored fluids aspirated, MD team aware. GI consulted, attempt to visit at bedside. Pt weak, requires assist x2-3 with gait belt and walker; pt also reports weaker than normal this AM. Chronic back pain, Tylenol given; Dilaudid discontinued by MD; pt reports toleratble pain. PT/lymphedema visit pt at bedside. Fibrinogen 95, Cryo given without issues.     Addendum: MD at bedside, c/o dark green aspiration from ascites. Pain control discussion with pt. Last meal/fluids at 1530. Called MRI at 1524, possible tomorrow.     Plan: Continue to wean off oxygen as tolerated. Continue to encourage pt to ambulate/sit in chair for meals. NPO for MRCP (at least 4-6 hrs). Monitor pain.

## 2019-08-22 NOTE — PROGRESS NOTES
"Writer attempted to meet with pt to complete CD evaluation.  Pt was drowsy, her speech was slurred, tangential.  Writer was unable to obtain information from pt.  Pt stated that she isn't feel \"sharp\" today and asked, \"what is wrong with me?\" on a few occasions.  Pt stated that she didn't think she could get through an assessment today; she followed this statement with, \"but I don't want them to get the wrong idea.\"  Pt was not able to answer 1 question in 30 minutes.  CD consult will remain open and writer will attempt to meet with pt on 8/27/19.    Lyn River, [facility]  [phone]  "

## 2019-08-23 NOTE — PLAN OF CARE
ICU End of Shift Summary. See flowsheets for vital signs and detailed assessment.    Changes this shift: HIDA scan completed, unable to wean to Ra. Tylenol given q4h throughout shift for chronic lower back pain.    Plan:  NPO at MN for paracentesis in the am (FFP to be given at 0500 prior & INR check). Possible MRCP.      Problem: Gastrointestinal Condition Comorbidity  Goal: Gastrointestinal Condition  Description  Patient comorbidity will be monitored for signs and symptoms of Gastrointestinal condition.  Problems will be absent, minimized or managed by discharge/transition of care.  8/23/2019 1735 by Racquel Mcbride, RN  Outcome: No Change     Problem: Mood Alteration Comorbidity  Goal: Mood Alteration Comorbidity  Description  Patient comorbidity will be monitored for signs and symptoms of Mood Alteration condition.  Problems will be absent, minimized or managed by discharge/transition of care.  8/23/2019 1735 by Racquel Mcbride, RN  Outcome: No Change

## 2019-08-23 NOTE — PROGRESS NOTES
[facility], [place]    Progress Note - [facility] Service        Date of Admission:  8/19/2019    Assessment & Plan   Neema Bailey is a 44 year-old female with PMHx of ESLD complicated by hepatic encephalopathy, portal hypertension, ascites, and varices, polysubstance use disorder, morbid obesity s/p gastric bypass in 2002 and with multiple recent hospitalizations for decompensated cirrhosis admitted to the MICU for severe sepsis 2/2 e. coli bacteremia, source unclear.     Main Plans for Today:  - HIDA Scan to evaluate for bile leak  - Repeat paracentesis  - MRCP if HIDA scan negative  - Hepatology Consulted, appreciate recommendations  - Continue antibiotics     #Decompensated  Cirrhosis (multifactorial)  #Hepatic encephalopathy   #Sepsis secondary to ESBL bacteremia   #Concern for bile leak   Patient with decompensated end-stage liver disease, likely multifactorial given history of alcohol abuse although may be a component of iatrogenic secondary to medication such as Bactrim  based on pathology findings.  She presented with sepsis requiring vasopressors and was noted to have 1 of 2 cultures on presentation positive for E. coli, with sensitivities notable for ESBL. She has been on ertapenem. Diagnostic paracentesis performed at bedside on 8/22 by CAPS team. Per the CAPS team, fluid appeared much darker than usual and green in appearance.  The labs unable to run normal chemistries given the dark appearance. Ordered MRCP after discussion with radiology. Hepatology consulted on 8/22 and recommended HIDA scan prior to MRCP and repeat paracentesis.  -Hepatology Consulted, appreciate recommendations  -HIDA Scan  -MRCP ordered if HIDA negative  -Repeat diagnostic paracentesis by CAPS  -Continue IV ertapenem (day 4)  -Trend MELD labs   -Continue lactulose and Xifaxan   -Continue aldactone   -Vitamin K challenge (Day 3)      #Hepatic hydrothorax   #Hypoxic respiratory failure   Large  right-sided pleural effusion, unable to wean off 2 L oxygen at this time.  Given her INR would not aspirate and less absolutely necessary.  Respiratory status remained stable.  We will continue to monitor and consider thoracentesis if needed, potentially tomorrow pending bile leak workup.     #Chronic microcytic anemia   Required 1 unit of packed red blood cells yesterday, no source of bleeding identified.  She responded to the transfusion appropriately.  We will continue to monitor for acute blood loss.     #History of substance use disorder   Has reportedly been sober from alcohol since February.  Also with a remote history of opioid abuse we will try to limit narcotics over treat her acute pain.     #Major depression  #Generalized anxiety   We will continue her prior to admission Zoloft.  Patient had requested palliative care to see her earlier this admission and they offered some support although no significant issues with symptom management at this time.  We will discuss further with palliative care if necessary.     #Thrombocytopenia   Mild, stable in the setting of end-stage liver disease.  Possibly a consumptive process.  We will continue to monitor.     Diet: NPO for Medical/Clinical Reasons Except for: Meds    Fluids: N/A  Lines: N/A  DVT Prophylaxis: Pneumatic Compression Devices  Alexander Catheter: not present  Code Status: Full Code      Disposition Plan   Expected discharge: 4 - 7 days, recommended to TCU vs Home once antibiotic plan established, mental status at baseline and O2 use less than 0 liters/minute.  Entered: Paresh London MD 08/23/2019, 11:17 AM       The patient's care was discussed with the Bedside Nurse, Care Coordinator/, Patient and Hepatology Consultant.    Paresh London MD  [address]  Pager: [phone]  Please see sticky note for cross cover  "information  ______________________________________________________________________    Interval History   No acute events overnight. MCRP not able to be done yesterday. Denies any new or worsening abdominal pain but remains tender. Continues to not feel like herself.    4 Point Review of Systems otherwise negative.     Data reviewed today: I reviewed all medications, new labs and imaging results over the last 24 hours. I personally reviewed no images or EKG's today.    Physical Exam   /59 (BP Location: Left arm)   Pulse 103   Temp 98.1  F (36.7  C) (Oral)   Resp 19   Ht 1.727 m (5' 8\")   Wt 120.7 kg (266 lb 3.2 oz)   LMP 10/04/2018   SpO2 96%   Breastfeeding? No   BMI 40.48 kg/m    General: AAOx3, ill appearing woman in NAD  Skin: jaundice  CV: RRR, normal S1S2, no murmur  Resp: CTAB, no wheeze, rhonchi   Abd: Soft, diffusely tender, distended, BS+, no masses appreciated  Extremities: warm and well perfused, bilateral lower extremity edema      Data   Recent Labs   Lab 08/23/19  0411 08/22/19  0429 08/21/19  1325  08/21/19  0348  08/19/19  1952   WBC 11.2* 10.9  --   --  8.3   < > 17.0*   HGB 7.9* 8.5* 7.5*   < > 6.7*   < > 9.6*    100  --   --  101*   < > 102*   PLT 99* 124*  --   --  105*   < > 184   INR 3.35* 2.95*  --   --  3.64*   < > 2.94*    142  --   --  139   < > 137   POTASSIUM 3.8 3.8 4.1  --  3.3*   < > 4.0   CHLORIDE 112* 109  --   --  108   < > 103   CO2 22 22  --   --  19*   < > 20   BUN 5* 5*  --   --  6*   < > 6*   CR 0.64 0.67  --   --  0.72   < > 0.78   ANIONGAP 6 10  --   --  12   < > 13   NARENDRA 8.6 8.4*  --   --  8.3*   < > 9.2   GLC 91 99  --   --  89   < > 102*   ALBUMIN 2.3* 2.6*  --   --  2.4*   < > 2.7*   PROTTOTAL 5.1* 5.8*  --   --  5.0*   < > 6.8   BILITOTAL 10.5* 12.4*  --   --  11.1*   < > 14.2*   ALKPHOS 104 114  --   --  97   < > 150   ALT 27 37  --   --  26   < > 41   AST 88* 98*  --   --  84*   < > 134*   LIPASE  --   --   --   --   --   --  107   TROPI  " --   --   --   --   --   --  <0.015    < > = values in this interval not displayed.     No results found for this or any previous visit (from the past 24 hour(s)).

## 2019-08-23 NOTE — PROGRESS NOTES
[facility]  Palliative Care Social Work Note:    Patient Info:  Neema Bailey is a 44 year old female with a history of ESLD c/b hepatic encephalopathy, portal hypertension, ascites, and polysubstance use disorder.    Brief summary of visit: I met with Neema and her sister today to introduce myself and my role on the team. Neema was very receptive to my visit and interested in sharing. She wondered about what her plans could be to move forward including discussing her hopes for CD eval and treatment along with housing and home support. I provided Neema with reassurance that those plans would come together and we saw how her health progressed. She understood that getting out of the ICU would be the first step. She also shared with me that her children and grandchildren are very important to her. Spending time with them and going on a road trip are some of her hopes if she gets better.       Date of Admission: 8/19/2019    Reason for consult: Patient and family support    Sources of information: Patient  Family member Sister    Recommendations & Plan:  I will follow up with Neema next week on Friday. If additional palliative SW is needed prior to that, please contact the full time palliative .      These recommendations have been discussed with patient, sister, and palliative care team.    Symptoms & Concerns Addressed Today:  Emotional Neema continues to have significant health concerns and is in the ICU at this time. She shared that the doctors give her less than three months to live and she is hoping to get a CD eval and go to treatment to get on the transplant list.     Strengths Identified:    Neema is open about her use with me and eager to get a CD eval.     Relationships & Support:  Aspects of relationships and support assessed today:    Identified family members: Mother, Sister, children grandchildren    Professional supports:     Family coping: Family appears  to be coping appropriately.    Bereavement Risk concerns:     Coping, Mental Health & Adjustment to Illness:   Anxiety  Substance Use    Goals, Decision Making & Advance Care Planning:   Prognosis, Goals, and/or Advance Care Planning were assessed today: No  Preferred language: English  Patient's decision making preferences: not assessed  I have concerns about the patient/family's health literacy today: No  Patient has a completed Health Care Directive: No.   Code status per chart review: full code    Key Palliative Symptom Data:  We are not helping to manage these symptoms currently in this patient.      Clinical Social Work Interventions:   Assessment of palliative specific issues    Introduction of Palliative clinical social work interventions  Facilitation of processing of thoughts/feelings  Psychoeducation      Caroline POWELL, TAMARA  [facility] Palliative [facility]   Pager: [phone]    [facility] Inpatient Team Consult pager [phone] (M-F 8-4:30)  After-hours Answering Service [phone]

## 2019-08-23 NOTE — CONSULTS
GASTROENTEROLOGY CONSULTATION      Date of Admission:  8/19/2019          ASSESSMENT AND RECOMMENDATIONS:   Assessment:  Neema Bailey is a 44 year old female with a past medical history of decompensated cirrhosis secondary to alcohol in the setting of Denny-en-Y gastric bypass complicated by ascites and hepatic encephalopathy, depression, anxiety, prior substance abuse with narcotics and alcohol, chronic pain, migraines, and hypertension with multiple recent admissions including one with recent hematology consult for drug-induced liver injury on cirrhosis who was admitted on August 20 with severe sepsis secondary to E. coli ESBL bacteremia of unclear etiology requiring ICU admission.  During evaluation she was found to have bilious appearing ascites prompting hepatology consultation.    1. ESBL E Coli Bacteremia  Unclear etiology.  Last positive blood culture August 19.  Urinalysis without evidence of urinary tract infection. Recommend further evaluation with diagnostic paracentesis, thoracentesis, and aspiration of left-sided pelvic and flank fluid collection.  Also recommend further evaluation of bilious ascites for possible bile leak with HIDA scan and MRCP.    2. Decompensated Cirrhosis  Etiology: EtOH. MELD-Na: 29. Has not been seen in outpatient clinic due to frequent hospitalizations.    - Hepatic Encephalpathy: On lactulose/rifaximin. Not currently encephalopathic.    - Esophageal Varices: Intraabdominal varices on CT imaging       *Most recent endoscopy: 5/2019 - no esophageal varices.    - Ascites: Present; last diagnostic paracentesis 8/22/19 - no SBP, but many studies unable to be run       *Diuretics - On furosemide 20 mg daily (currently being held) and spironolactone 50 mg (currently receiving) daily as outpatient.   - Coagulopathy: Present; INR 3.35   - Hepatoma: U/S 8/19/19 - no focal mass   - Transplant Evaluation: Has not undergone liver transplant evaluation.      Recommendations:    - HIDA scan and MR/MRCP   - Repeat diagnostic paracenteses, diagnostic thoracenteses, and aspiration of the left pelvic/flank collection   - Continue with lactulose and rifaximin   - Gastroenterology team will continue to follow with you.    Gastroenterology follow up recommendations: Pending clinical course; will ultimately follow-up as outpatient with Dr. Leventhal.     Thank you for involving us in this patient's care. Please do not hesitate to contact the GI service with any questions or concerns.     Pt care plan discussed with Dr. Leventhal, GI staff physician.    Nini Mendoza MD  Gastroenterology/Hepatology Fellow  [id number], [id number]  -------------------------------------------------------------------------------------------------------------------          Chief Complaint:   We were asked by Dr. London of Internal Medicine to evaluate this patient with concern for bilious ascites; cirrhosis.    History is obtained from the patient and the medical record.          History of Present Illness:   Neema Bailey is a 44 year old female with a past medical history of decompensated cirrhosis secondary to alcohol in the setting of Denny-en-Y gastric bypass complicated by ascites and hepatic encephalopathy, depression, anxiety, prior substance abuse with narcotics and alcohol, chronic pain, migraines, and hypertension with multiple recent admissions including one with recent hematology consult for drug-induced liver injury on cirrhosis who was admitted on August 20 with severe sepsis secondary to E. coli ESBL bacteremia of unclear etiology requiring ICU admission.  During evaluation she was found to have bilious appearing ascites prompting hepatology consultation.    Patient reports being in her usual state of health at the rehab facility until a few days prior to admission.  She noted increasing cough, shortness of breath, weakness, and chills while working with physical therapy on the stairs.  She thought this  was because she was full of fluid.  Labs subsequently returned that she was anemic and needed a transfusion and it was recommended by her rehab facility physicians that she present to a [facility] hospital for blood transfusion, however she requested to be transferred to [facility].  She also reports diffuse abdominal pain that is more localized to the right upper quadrant and was 10 out of 10 in severity prior to presentation.  She notes this pain is ongoing but she has realized that if she does not move pain is much improved.  She has nausea without vomiting every morning.  She has not had anything to eat in 3 days due to n.p.o. status but also does not have an appetite.  Her lower extremity edema is baseline.  She does not feel confused but feels her thinking is off.  She has 3-5+ bowel movements per day, without melena or hematochezia.  She does not remember any fevers prior to presentation but she had not been taking her temperature.            Past Medical History:   Reviewed and edited as appropriate  Past Medical History:   Diagnosis Date     Anxiety      Bilateral leg edema      Chronic kidney disease      Dizziness and giddiness      Hypertension      Insomnia      Iron deficiency anemia     due to chronic blood loss, vitamin B12 deficiency, Macrocytic     Migraines      BURT (nonalcoholic steatohepatitis)      Numbness and tingling     PERIPHERAL NEUROPATHY     Obese      Other chronic pain     Chronic Bilateral Low Back Pain with Bilateral Sciatica, Bilateral Knee Pain     Peripheral neuropathy      Pruritus      Restless legs      Sciatica      Seborrheic dermatitis      Severe episode of recurrent major depressive disorder, without psychotic features (H)      Sinus tachycardia      Substance abuse in remission (H)     h/o alcoholism had treatment at [address]     Uterovaginal prolapse      Vitamin D deficiency             Past Surgical History:   Reviewed and edited as  appropriate   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)     BREAST SURGERY      Breast Augmentation     COLPORRHAPHY ANTERIOR N/A 9/21/2015    Procedure: COLPORRHAPHY ANTERIOR;  Surgeon: Jairon Adams MD;  Location: [facility]     COSMETIC SURGERY      Abdominoplasty     CYSTOCELE REPAIR       CYSTOSCOPY N/A 11/26/2018    Procedure: CYSTOSCOPY;  Surgeon: Rony Melgar MD;  Location: [facility]     ENT SURGERY      tonsillectomy & adenoidectomy     GI SURGERY      Small Bowel Enterotomy Repair for SBO, EGD     HYSTERECTOMY VAGINAL, COLPORRHAPHY ANTERIOR, POSTERIOR, COMBINED N/A 11/26/2018    Procedure: VAGINAL HYSTERECTOMY, ANTERIOR AND POSTERIOR REPAIR;  Surgeon: Rony Melgar MD;  Location: [facility]     IR PARACENTESIS  8/7/2019     IR THORACENTESIS  8/7/2019     PERINEORRHAPHY N/A 11/26/2018    Procedure: PERINEOPLASTY;  Surgeon: Rony Melgar MD;  Location: [facility]     TUBAL LIGATION              Previous Endoscopy:   No results found for this or any previous visit.         Social History:   Reviewed and edited as appropriate  Social History     Socioeconomic History     Marital status: Single     Spouse name: Not on file     Number of children: Not on file     Years of education: Not on file     Highest education level: Not on file   Occupational History     Not on file   Social Needs     Financial resource strain: Not on file     Food insecurity:     Worry: Not on file     Inability: Not on file     Transportation needs:     Medical: Not on file     Non-medical: Not on file   Tobacco Use     Smoking status: Never Smoker     Smokeless tobacco: Never Used   Substance and Sexual Activity     Alcohol use: No     Alcohol/week: 0.0 oz     Frequency: Never     Comment: Pt reports being a previou heavy drinker, reports quitting earlier  this year     Drug use: No     Sexual activity: Not Currently     Partners: Male     Birth control/protection: Female Surgical     Comment: Tubal Ligation 2003   Lifestyle     Physical activity:     Days per week: Not on file     Minutes per session: Not on file     Stress: Not on file   Relationships     Social connections:     Talks on phone: Not on file     Gets together: Not on file     Attends Buddhist service: Not on file     Active member of club or organization: Not on file     Attends meetings of clubs or organizations: Not on file     Relationship status: Not on file     Intimate partner violence:     Fear of current or ex partner: Not on file     Emotionally abused: Not on file     Physically abused: Not on file     Forced sexual activity: Not on file   Other Topics Concern     Parent/sibling w/ CABG, MI or angioplasty before 65F 55M? Not Asked   Social History Narrative    ** Merged History Encounter **                 Family History:   Reviewed and edited as appropriate  History reviewed. No pertinent family history.        Allergies:   Reviewed and edited as appropriate     Allergies   Allergen Reactions     Bactrim [Sulfamethoxazole W/Trimethoprim]      Gabapentin      Other reaction(s): Edema     Nitrofurantoin Other (See Comments)     drug-induced liver injury            Medications:     Medications Prior to Admission   Medication Sig Dispense Refill Last Dose     alum & mag hydroxide-simethicone (MYLANTA ES/MAALOX  ES) 400-400-40 MG/5ML SUSP suspension Take 20 mLs by mouth every 4 hours as needed for indigestion 355 mL 1      furosemide (LASIX) 20 MG tablet Take 1 tablet (20 mg) by mouth daily 30 tablet 0      hydrOXYzine (ATARAX) 25 MG tablet Take 1 tablet (25 mg) by mouth every 6 hours as needed for itching 30 tablet 0      lactulose (CHRONULAC) 10 GM/15ML solution 15 ml tid 1892 mL 3      midodrine (PROAMATINE) 10 MG tablet Take 1 tablet (10 mg) by mouth 3 times daily (with meals) 90 tablet 0   "    multivitamin, therapeutic (THERA-VIT) TABS tablet Take 1 tablet by mouth daily 30 tablet 0      ondansetron (ZOFRAN-ODT) 4 MG ODT tab Take 1 tablet (4 mg) by mouth every 6 hours as needed for nausea or vomiting 30 tablet 0      pantoprazole (PROTONIX) 40 MG EC tablet Take 1 tablet (40 mg) by mouth daily 30 tablet 0      rifaximin (XIFAXAN) 550 MG TABS tablet Take 1 tablet (550 mg) by mouth 2 times daily 60 tablet 0      sertraline (ZOLOFT) 25 MG tablet Take 3 tablets (75 mg) by mouth daily 90 tablet 0      simethicone (MYLICON) 80 MG chewable tablet Take 1 tablet (80 mg) by mouth every 6 hours as needed for cramping 30 tablet 3      spironolactone (ALDACTONE) 25 MG tablet Take 2 tablets (50 mg) by mouth daily 60 tablet 0      Vitamin D, Cholecalciferol, 1000 units TABS Take 1 tablet (1,000 Units) by mouth daily 30 tablet 0              Review of Systems:     A complete 10-point review of systems was performed and is negative except as noted in the HPI           Physical Exam:   BP 98/53 (BP Location: Left arm)   Pulse 103   Temp 98.1  F (36.7  C) (Oral)   Resp 19   Ht 1.727 m (5' 8\")   Wt 120.7 kg (266 lb 3.2 oz)   LMP 10/04/2018   SpO2 95%   Breastfeeding? No   BMI 40.48 kg/m    Wt:   Wt Readings from Last 2 Encounters:   08/23/19 120.7 kg (266 lb 3.2 oz)   08/12/19 115.8 kg (255 lb 4.8 oz)      Constitutional: cooperative, pleasant, not dyspneic/diaphoretic, no acute distress  Eyes: Sclera icteric  Ears/nose/mouth/throat: Normal oropharynx without ulcers or exudate, mucus membranes moist, hearing intact  Neck: supple  CV: No edema  Respiratory: Unlabored breathing  Lymph: No axillary, submandibular, supraclavicular lymphadenopathy  Abd: Distended, +bs, no hepatosplenomegaly, Diffuse TTP, significant RUQ TTP  Skin: no rash; + jaundice  Neuro: AAO x 3, No asterixis  Psych: Normal affect  MSK: WWP; + BLE edema         Data:   Labs and imaging below were independently reviewed and " 71 yo male with septic and cardiogenic shock noted to have coagulopathy on labs corrected on repeat testing. Possible lab error vs med effect (heparin)  - heparin gtt held, coags corrected c/w med effect  - monitor Hb and signs of bleeding  - cont management as per primary team  - no contraindication for surgery as planned from heme/onc standpoint. interpreted    BMP  Recent Labs   Lab 08/23/19  0411 08/22/19  0429 08/21/19  1325 08/21/19  0348 08/20/19  0431    142  --  139 140   POTASSIUM 3.8 3.8 4.1 3.3* 3.9   CHLORIDE 112* 109  --  108 106   NARENDRA 8.6 8.4*  --  8.3* 8.4*   CO2 22 22  --  19* 20   BUN 5* 5*  --  6* 7   CR 0.64 0.67  --  0.72 0.78   GLC 91 99  --  89 86     CBC  Recent Labs   Lab 08/23/19 0411 08/22/19  0429 08/21/19  0348 08/20/19  0431   WBC 11.2* 10.9  --  8.3 20.9*   RBC 2.60* 2.68*  --  2.19* 2.37*   HGB 7.9* 8.5*   < > 6.7* 7.6*   HCT 26.0* 26.8*  --  22.1* 23.8*    100  --  101* 100   MCH 30.4 31.7  --  30.6 32.1   MCHC 30.4* 31.7  --  30.3* 31.9   RDW 17.6* 18.3*  --  16.3* 17.0*   PLT 99* 124*  --  105* 121*    < > = values in this interval not displayed.     INR  Recent Labs   Lab 08/23/19 0411 08/22/19  0429 08/21/19  0348 08/20/19  0550   INR 3.35* 2.95* 3.64* 3.87*     LFTs  Recent Labs   Lab 08/23/19 0411 08/22/19  0429 08/21/19  0348 08/20/19  0431   ALKPHOS 104 114 97 110   AST 88* 98* 84* 102*   ALT 27 37 26 28   BILITOTAL 10.5* 12.4* 11.1* 11.6*   PROTTOTAL 5.1* 5.8* 5.0* 5.6*   ALBUMIN 2.3* 2.6* 2.4* 2.4*      PANC  Recent Labs   Lab 08/19/19  1952   LIPASE 107     MELD-Na score: 29 at 8/23/2019  4:11 AM  MELD score: 29 at 8/23/2019  4:11 AM  Calculated from:  Serum Creatinine: 0.64 mg/dL (Rounded to 1 mg/dL) at 8/23/2019  4:11 AM  Serum Sodium: 140 mmol/L (Rounded to 137 mmol/L) at 8/23/2019  4:11 AM  Total Bilirubin: 10.5 mg/dL at 8/23/2019  4:11 AM  INR(ratio): 3.35 at 8/23/2019  4:11 AM  Age: 44 years     Imaging: Reviewed.    CT A/P 8/19/19  1. Large right-sided pleural effusion with complete collapse of the  right lower and middle lobes with subsegmental consolidation of the  right upper lobe. Cannot exclude underlying infection.   2. Although the liver does not appear overtly cirrhotic, there are  findings of portal hypertension in this patient with history of BURT  with splenomegaly, upper abdominal  varices, and mild intra-abdominal  ascites.  3. Gallbladder is distended with mild wall thickening. This is  nonspecific in the setting of likely cirrhotic liver with ascites.  This appears unchanged compared to ultrasound dated 7/18/2019 given  differences in technique  4. Findings suggestive of acute to subacute splenic infarctions.  5. There is a nonspecific, possibly encapsulated, 5.1 x 2.6 cm fluid  collection in the subcutaneous fat of the left pelvic region.

## 2019-08-23 NOTE — CONSULTS
IR consulted for aspiration of Left pelvic subcutaneous fluid collection    This is a 44 year-old female with PMHx of ESLD complicated by hepatic encephalopathy, portal hypertension, ascites, and varices, polysubstance use disorder, morbid obesity s/p gastric bypass in 2002 and with multiple recent hospitalizations for decompensated cirrhosis admitted to the MICU for severe sepsis 2/2 e. coli bacteremia, source unclear. CT scan from 8/19 shows multiple possible sources of infection- large right pleural effusion, ascites, distended gallbladder, and left pelvic subcutaneous fat fluid collection. MELD is currently 29. INR is 3.35. HIDA scan and MRCP have been scheduled to eval gallbladder and liver. CAPS to repeat paracentesis. Team is requesting aspiration of subcutaneous fluid collection to eval for infection source. Reportedly, on exam this area feels like pitting edema.    Case and imaging was reviewed with Dr. Craig and Dr. Koch from IR. Left pelvic subcutaneous fluid collection looks like edema on imaging. Aspiration of this when the INR is elevated is not without bleeding risk. If the HIDA, MRCP, and paracentesis do not identify a more obvious source of infection, and the INR can be brought down to closer to 2.0, IR would consider offering an aspiration.     Reviewed with Dr. London. Any intervention required by IR, based on the imaging findings, will require INR correction. He confirms this and will call the IR on-call as needed over the weekend.    Ailyn Walters DNP, APRN  Interventional Radiology   Pager: [phone]

## 2019-08-23 NOTE — PLAN OF CARE
"ICU End of Shift Summary. See flowsheets for vital signs and detailed assessment.    Changes this shift:  Patient expressed frustration and irritability with medical team.  She expressed that she did not understand why her pain medications were discontinued and what her treatment plan was since the MRI/MRCP was not completed as planned.  She also expressed being upset about being NPO.  Writer provided emotional support and ongoing explanations as appropriate.  No change in patient's behavior.  Cross-cover resident notified.  Patient's daughter was also updated on POC.  PRN tylenol was offered multiple times for pain, patient intermittently agreed to take the med (see flowsheet and MAR) but did state that tylenol \"does nothing.\"  Throughout the night, patient refused offers of cares, repositioning, and toileting although able to make needs known.  Potassium was 3.8 this morning, currently being replaced.    Plan: Possibly complete MRI/MRCP procedure today, MRI checklist completed. Address pain management with team, continue to monitor and follow POC.    "

## 2019-08-24 NOTE — PLAN OF CARE
"/58 (BP Location: Left arm)   Pulse 97   Temp 98.1  F (36.7  C) (Axillary)   Resp 25   Ht 1.727 m (5' 8\")   Wt 122.8 kg (270 lb 11.6 oz)   LMP 10/04/2018   SpO2 90%   Breastfeeding? No   BMI 41.16 kg/m      Neuro: A&Ox4. Quiet, garbled speech. Appears withdrawn   Cardiac: VSS. BPs improved throughout the day. SR.   Respiratory:  On 3L oxygen via NC. RR 24-28. R thora completed today and 1.5L fluid was removed. Pt stated feeling a little better but still very uncomfortable and SOB.   GI/: Incontinent of urine and xL loose BM. Receiving scheduled lactulose  Diet/appetite: Pt was NPO all day for MRCP. Currently back on DD3 with nectar thickened liquids. Poor appetite and c/o nausea  Activity:  Assist of 2 to turn/repo.   Pain: Using aqua K pad, repositioning, and aromatherapy for back and abdominal pain.   Skin: No new skin deficits noted. Mepilex intact on bottom  LDA's: DL PICC. PIV saline locked    Plan: R dima completed today. Received 1U FFP prior to thora. INR 2.6. Unable to do para per MD. Pt did not tolerate MRCP -- continuously coughed while lying flat. Dr. London notified by MRI tech. Will continue to monitor and follow POC. Notify MD with any changes or concerns.   "

## 2019-08-24 NOTE — PLAN OF CARE
"Neuro: A&Ox4. Garbled speech, slow to respond, forgetful. Follows commands.  Cardiac: SR. HR 90's. *See provider notification for low BPs*  MAPs 60-70. BP 95/46   Pulse 97   Temp 98.6  F (37  C) (Oral)   Resp 26   Ht 1.727 m (5' 8\")   Wt 122.8 kg (270 lb 11.6 oz)   LMP 10/04/2018   SpO2 93%   Breastfeeding? No   BMI 41.16 kg/m      Respiratory: Sating >95% on 2-3L NC. Requiring up to 6L NC when turning in bed. Increased dyspnea, RR 24-28, labored and shallow, crackles in lungs. Chest Xray done and MD reviewed xray wants to give lasix but BP too low (see provider notification).   GI/: No UOP overnight (MD aware). Bladder scanned for 190 mL.   Diet/appetite: NPO for testing in am.  Activity:  Assist of 2, turn and repo q2h.  Pain: Intolerable. Spoke with MD and 325 tylenol and essential oils encouraged.  Skin: No new deficits noted.  LDA's: PICC, PIV    2 unit(s) FFP given, INR recheck at 0800 and lactic check for sepsis protocol (unable to obtain earlier with blood products running).    Plan: Continue with POC. Notify primary team with changes.   "

## 2019-08-24 NOTE — PROGRESS NOTES
Transferred to: 6B at 2150  Status at time of transfer: Pt stable although c/o worsening shortness of breath. O2 needs stable with 2L NC (no change). Clifford PRITCHARD at bedside to address increased shortness of breath and worsening pain. Chest XR ordered. No new pain medications ordered at this time. Pt was explained risks of pain medications and offered essential oils and warm packs.   Belongings: backpack, purse, and cell phone sent with pt.  Alexander removed? (if no, why?): yes  Chart and medications: sent with pt.  Family notified: Pt's mom, Roxy, contacted and notified of transfer.

## 2019-08-24 NOTE — PROCEDURES
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  Consent was obtained and placed in the chart and the patient was placed on pulse oximetry.  A time out was performed.    An ultrasound probe was used to identify an area of pleural fluid in the R hemithorax.  This area was prepped and draped in the usual sterile fashion.  10 ml of 1% lidocaine was instilled and fluid located using ultrasound guidance.  A small incision was  made with an 11 blade.  The thoracentesis catheter and needle were inserted above the rib until fluid obtained then the needle removed.    Using a one-way valve, 1500 ml of dark straw colored fluid was removed. A specimen was sent for analysis.    The catheter was removed with the patient exhaling and an occlusive dressing placed.       Ultrasound revealed normal lung sliding after the procedure and a chest radiograph is pending.    The tolerated the procedure well.  Please contact the Consult and Procedure Service if any concerns or complications arise.     Rod Mccord MD on 8/24/2019 at 2:02 PM

## 2019-08-24 NOTE — PROGRESS NOTES
[facility], [place]    Progress Note - Maramy 5 Service        Date of Admission:  8/19/2019    Assessment & Plan   Neema Bailey is a 44 year-old female with PMHx of ESLD complicated by hepatic encephalopathy, portal hypertension, ascites, and varices, polysubstance use disorder, morbid obesity s/p gastric bypass in 2002 and with multiple recent hospitalizations for decompensated cirrhosis admitted to the MICU for severe sepsis 2/2 e. coli bacteremia, source unclear.     Main Plans for Today:  - HIDA Scan negative yesterday, awaiting MR  - CAPS to evaluate for para/thora today  - Continue ertapenem  - FFP prior to para/thora      # Decompensated  Cirrhosis (multifactorial)  # Hepatic encephalopathy   # Sepsis secondary to ESBL bacteremia   # Concern for bile leak   # Coagulopathy of liver disease  Patient with decompensated end-stage liver disease, likely multifactorial given history of alcohol abuse although may be a component of iatrogenic secondary to medication such as Bactrim  based on pathology findings.  She presented with sepsis requiring vasopressors and was noted to have 1 of 2 cultures on presentation positive for E. coli, with sensitivities notable for ESBL. She has been on ertapenem. Diagnostic paracentesis performed at bedside on 8/22 by CAPS team. Per the CAPS team, fluid appeared much darker than usual and green in appearance.  The labs unable to run normal chemistries given the dark appearance. Ordered MRCP after discussion with radiology. Hepatology consulted on 8/22 and recommended HIDA scan prior to MRCP and repeat paracentesis. HIDA scan negative for bile leak, awaiting MR.   -Hepatology Consulted, appreciate recommendations  -MRCP ordered if HIDA negative  -Thoracentesis and repeat diagnostic paracentesis by CAPS  -Continue IV ertapenem (day 4)  -Trend MELD labs   -Continue lactulose and Xifaxan   -Continue aldactone   -completed 3 days of Vitamin K   -FFP  given prior to para/thora  -Could do TEG if signs/symptoms of active bleeding to determine coagulopathy     # Hepatic hydrothorax   # Hypoxic respiratory failure   Large right-sided pleural effusion, unable to wean off 2 L oxygen at this time.  Given her INR would not aspirate and less absolutely necessary.  Respiratory status worsening.  - CAPS to perform thoracentesis     # Chronic microcytic anemia   Required 1 unit of packed red blood cells yesterday, no source of bleeding identified.  She responded to the transfusion appropriately.  We will continue to monitor for acute blood loss.     # History of substance use disorder   Has reportedly been sober from alcohol since February.  Also with a remote history of opioid abuse we will try to limit narcotics over treat her acute pain.     # Major depression  # Generalized anxiety   We will continue her prior to admission Zoloft.  Patient had requested palliative care to see her earlier this admission and they offered some support although no significant issues with symptom management at this time.  We will discuss further with palliative care if necessary.     # Thrombocytopenia   Mild, stable in the setting of end-stage liver disease.  Possibly a consumptive process.  We will continue to monitor.     Diet: NPO per Anesthesia Guidelines for Procedure/Surgery Except for: Meds    Fluids: N/A  Lines: N/A  DVT Prophylaxis: Pneumatic Compression Devices  Alexander Catheter: not present  Code Status: Full Code      Disposition Plan   Expected discharge: 4 - 7 days, recommended to TCU vs Home once antibiotic plan established, mental status at baseline and O2 use less than 0 liters/minute.  Entered: Paresh London MD 08/24/2019, 12:41 PM       The patient's care was discussed with the Bedside Nurse, Care Coordinator/, Patient and Hepatology Consultant.    Paresh London MD  [address], [place]  Pager:  "[phone]  Please see sticky note for cross cover information  ______________________________________________________________________    Interval History   No acute events overnight. Continues to feel very poorly. Breathing is getting worse with cough and abdominal pain. Denies any nausea or vomiting. Awaiting MR, dima rahman.     4 Point Review of Systems otherwise negative.     Data reviewed today: I reviewed all medications, new labs and imaging results over the last 24 hours. I personally reviewed no images or EKG's today.    Physical Exam   /49   Pulse 97   Temp 98.4  F (36.9  C)   Resp 28   Ht 1.727 m (5' 8\")   Wt 122.8 kg (270 lb 11.6 oz)   LMP 10/04/2018   SpO2 96%   Breastfeeding? No   BMI 41.16 kg/m    General: AAOx3, ill appearing woman in NAD  Skin: jaundice  CV: RRR, normal S1S2, no murmur  Resp: CTAB, no wheeze, rhonchi   Abd: Soft, diffusely tender but most in RUQ, distended, BS+, no masses appreciated  Extremities: warm and well perfused, bilateral lower extremity edema      Data   Recent Labs   Lab 08/24/19  0834 08/24/19  0445 08/23/19  0411 08/22/19  0429  08/19/19  1952   WBC  --  12.2* 11.2* 10.9   < > 17.0*   HGB  --  8.3* 7.9* 8.5*   < > 9.6*   MCV  --  100 100 100   < > 102*   PLT  --  102* 99* 124*   < > 184   INR 2.60* 3.72* 3.35* 2.95*   < > 2.94*   NA  --  142 140 142   < > 137   POTASSIUM  --  4.1 3.8 3.8   < > 4.0   CHLORIDE  --  110* 112* 109   < > 103   CO2  --  23 22 22   < > 20   BUN  --  7 5* 5*   < > 6*   CR  --  0.66 0.64 0.67   < > 0.78   ANIONGAP  --  8 6 10   < > 13   NARENDRA  --  8.4* 8.6 8.4*   < > 9.2   GLC  --  80 91 99   < > 102*   ALBUMIN  --  2.3* 2.3* 2.6*   < > 2.7*   PROTTOTAL  --  5.2* 5.1* 5.8*   < > 6.8   BILITOTAL  --  9.8* 10.5* 12.4*   < > 14.2*   ALKPHOS  --  108 104 114   < > 150   ALT  --  33 27 37   < > 41   AST  --  116* 88* 98*   < > 134*   LIPASE  --   --   --   --   --  107   TROPI  --   --   --   --   --  <0.015    < > = values in this " interval not displayed.     Recent Results (from the past 24 hour(s))   NM HepatOBiliary Scan    Narrative    Examination:  NM HEPATOBILIARY SCAN      Date: 8/23/2019 2:57 PM.    Indication:   Diagnostic paracentesis yesterday with bilious drainage  - evaluate for bile leak     Additional Information: none    Technique:    The patient received 6.6 mCi of Tc-99m Choletec intravenously. Images  were obtained out through 60 minutes.   At 60 minutes the patient  received [Amt of CCK] mcg of CCK over [Infusion Time] minutes. An  additional 45 minutes of images were obtained after the gall bladder  contraction intervention.    Findings:    There is slow clearance of the radionuclide from the blood pool into  the liver suggesting underlying hepatocellular dysfunction. By 15  minutes there is clear visualization of the intrahepatic ducts as well  as the upper common bile duct.  At 25 minutes there is emptying from  the common bile duct into the small bowel. No evidence of bile leak.  Enterogastric reflux was not present. At 60 minutes there is still  residual radionuclide activity within the blood pool indicating  hepatocellular dysfunction or hepatitis. There is secondary excretion  into the kidneys.      Impression    Impression:    1. No changes to indicate a bile leak.  2. Abnormal clearance of the ring nuclide from the blood pool  indicating underlying hepatocellular dysfunction or hepatitis.    I have personally reviewed the examination and initial interpretation  and I agree with the findings.    GREGORIO MIR MD   XR Chest Port 1 View    Narrative    XR CHEST PORT 1 VW  8/23/2019 11:19 PM      HISTORY: SOB, known hydrothorax    COMPARISON: Chest x-ray 8/20/2019    TECHNIQUE: Semiupright frontal view of the chest    FINDINGS: Relatively stable moderate to large right sided pleural  effusion given differences in positioning with associated  consolidation/atelectasis of the right lower lobe. Small sided  pleural  effusion. No appreciable pneumothorax. Bilateral mixed interstitial  patchy airspace opacities. Cardiac mediastinal silhouette is within  normal limits. The trachea is midline. The upper abdomen is  unremarkable. Right-sided PICC line with tip projecting over the  superior cavoatrial junction. No suspicious osseous lesion.      Impression    IMPRESSION:   1. Stable large right-sided pleural effusion given differences in  positioning with associated consolidation/atelectasis of the right  lower lobe. Small left-sided pleural effusion.  2. Bilateral mixed interstitial patchy airspace opacities, edema  versus infection versus ARDS.    I have personally reviewed the examination and initial interpretation  and I agree with the findings.    BELEN WATERS MD

## 2019-08-24 NOTE — PLAN OF CARE
PT/EDEMA orders received. Pt has 1-2+ pitting edema in LEs. HOLDING compression at this time 2/2 concern for pusing additional fluid up towards abdomen. Oxygen saturation decreasing to 91% with talking. Per RN, oxygen desaturation with rolling for use of bed pan. Declines PT session/attempt OOB mobility 2/2 nausea. Thoracentesis planned for today. PT/edema rescheduled.

## 2019-08-24 NOTE — PROGRESS NOTES
Transfer  Transferred from: 4C  Via:bed  Reason for transfer:Pt appropriate for 6B- improved patient condition  Family: Aware of transfer  Belongings: Received with pt  Chart: Received with pt  Medications: Meds received from old unit with pt  2 RN Skin Assessment Completed By: Isha SWAN RN, [place] NEGRON (float float)   Report received from: JAMIL Pablo 4C  Pt status: AxOx4, VSS, withdrawn from care

## 2019-08-24 NOTE — PROVIDER NOTIFICATION
"Time of notification: 4:01 AM  Provider notified: Clifford CC *[id number]  Patient status: Pt complaint of increased SOB. Xray done, RR 26-28 with shallow/labored breathing. Pt states \"I can't do this much longer, get this out of me.\" Pt also complaining of intolerable pain. Prn tylenol given.    BP lower with MAP 60-70.   Also, no UOP overnight. Bladder scanned for 190 mL.  Temp:  [97.6  F (36.4  C)-98.2  F (36.8  C)] 98.2  F (36.8  C)  Heart Rate:  [] 96  Resp:  [18-31] 24  BP: ()/(47-75) 97/47  SpO2:  [93 %-97 %] 93 %  Orders received: Recheck BP as would like to give lasix but BP is low.  MAP 62-64.    0515: BP now with MAP 75 and receiving two units of FFP. Lasix now? Spoke with MD and no lasix at this time, continue to monitor.   "

## 2019-08-25 NOTE — PLAN OF CARE
Care Provided: [id number]    Temp: 98  F (36.7  C) Temp src: Axillary BP: 90/61  Heart Rate: 95 Resp: (!) 36 SpO2: 99 % O2 Device: BiPAP/CPAP 30%    Neuro: Somnolent/lethargic. Arouses to voice; moans when touched. PERRLA-4. Slow response time, but appropriate interaction. Scheduled Lactulose x2.   Cardiac: SR 90s.   Respiratory: RR started in 20s, by end of shift consistently >30's-40's bpm. Pt on BiPAP 35-40% most of shift pt consistently asks to have mask off. Oxymask 8-10L during BiPAP breaks (post PO meds/lactulose), saturations good, but work of breathing increased. Coarse/diminished.  GI/: Alexander placed; 175cc tea colored urine returned; oliguric. No BM on shift.   Diet/appetite: NPO d/t BiPAP. Pt tolerating sips of nectar thickened water with meds & PO lactulose. BiPAP left off 30min after PO intake.   Activity: Repositioned Q2h on shift for pressure relief.   Pain: Pt complains of generalized discomfort, not wanting staff to touch if not needed. Offered tyenol, pt declined.   Skin: Jaundice with bruises throughout. +2/+3 generalized edema. Mepilex intact over coccyx/sacrum. Excoriation perineum & fissues in groin, barrier cream applied. Bruising noted under left blood pressure r/t edema and frequent BP readings, sleeve applied under cuff. No other new deficits noted.    LDAs:    - Alexander  - PICC    On shift: CT head/abdomin/pelvis. Alexander placed. Declining respiratory status, with decreased mentation.     Plan: Will continue to monitor pt closely and notify primary team with any changes.

## 2019-08-25 NOTE — PROCEDURES
Brief limited abdominal ultrasound to assess for paracentesis.    On abdominal ultrasound, ascites noted, but less than previous and no safe pocket at this time.    Will defer to IR to attempt on 8/26, Monday.     Images included.    Rod Mccord MD on 8/25/2019 at 9:54 AM

## 2019-08-25 NOTE — PHARMACY-VANCOMYCIN DOSING SERVICE
Pharmacy Vancomycin Initial Note  Date of Service 2019  Patient's  1975  44 year old, female    Indication: Sepsis    Current estimated CrCl = Estimated Creatinine Clearance: 122.8 mL/min (based on SCr of 0.78 mg/dL).    Creatinine for last 3 days  2019:  4:11 AM Creatinine 0.64 mg/dL  2019:  4:45 AM Creatinine 0.66 mg/dL;  8:43 PM Creatinine 0.72 mg/dL  2019:  6:04 AM Creatinine 0.78 mg/dL    Recent Vancomycin Level(s) for last 3 days  No results found for requested labs within last 72 hours.      Vancomycin IV Administrations (past 72 hours)      No vancomycin orders with administrations in past 72 hours.                Nephrotoxins and other renal medications (From now, onward)    Start     Dose/Rate Route Frequency Ordered Stop    19 2130  vancomycin (VANCOCIN) 1,750 mg in sodium chloride 0.9 % 250 mL intermittent infusion      1,750 mg (central catheter)  over 60 Minutes Intravenous EVERY 12 HOURS 19 0924      19 0930  vancomycin (VANCOCIN) 2,500 mg in sodium chloride 0.9 % 250 mL intermittent infusion      2,500 mg (central catheter)  over 60 Minutes Intravenous ONCE 19 0924            Contrast Orders - past 72 hours (72h ago, onward)    Start     Dose/Rate Route Frequency Ordered Stop    19 1330  technetium Tc 99m mebrofenin (CHOLETEC) radioisotope injection 4.8-7.2 millicurie      4.8-7.2 millicurie Intravenous ONCE 19 1321 19 1350                Plan:  1.  Start vancomycin  2500 mg (21mg/kg) IV once,> Given decline in urine output, will change to intermittent dosing and check a vancomycin level 24H post dose. 2.  Goal Trough Level: 15-20 mg/L   3.  Pharmacy will check trough levels as appropriate in 1-3 Days.    4. Serum creatinine levels will be ordered daily for the first week of therapy and at least twice weekly for subsequent weeks.    5. [place] method utilized to dose vancomycin therapy: Method 2    Aparna Nazario, PharmD,  BCPS

## 2019-08-25 NOTE — PROVIDER NOTIFICATION
Time of notification: 0800  Provider notified: MD Surya - Clifford 5 *[id number]    Patient status: Pt BP 80/30s MAPs <60 since ~0300. Pt more somnolent/lethargic than previous, but alert once aroused. RR 28-32.     Orders received: MD at bedside to assess; consult with critical care to discuss further interventions. Pulmonary consult ordered.     Update ~1200: MD close by with increased monitoring. MAPs more consistently 50-55, pt RR more consistently in 30's. Concern that no UOP all night.     Orders: Place joseph. Orders for CT head/abdomen/pelvis.     Update 1300: Preparing pt for transport to CT with RN & RT alongside. Pt BP 70/30s, MAPs<50 x3 readings; RR in 40s on BiPAP. MD to bedside to assess.     Orders: BPs noted to increase with pt lying flat - SBP 90s, MAP>65, but lowers when HOB raised. Decided to continue with plan on CT and assess once patient returns to 6B. Transferred with Resource float & RT.

## 2019-08-25 NOTE — PROVIDER NOTIFICATION
Time of notification: 2000 PM  Provider notified: Clifford CC *[id number], Princess Bennett Jeremy  Patient status: Pt. Hypotensive BP 70's/40's MAP consistently 44-55. Pt complaint of increased WOB and requiring 8L oxymask to maintain sats >93%. RR 38-44, shallow and labored. Lungs- coarse  Temp:  [97.7  F (36.5  C)-98.7  F (37.1  C)] 98.2  F (36.8  C)  Pulse:  [] 104  Heart Rate:  [] 104  Resp:  [22-42] 30  BP: ()/(30-60) 93/48  FiO2 (%):  [50 %] 50 %  SpO2:  [90 %-98 %] 98 %    Orders received:   2000- 25 g Albumin with no improvement in BP  2024- Rapid response called. MD at bedside. Lactic check, xray, CBC, VBG, order for BiPAP to help with work of breathing.  At 2200 BP improved slightly with MAPs 55-65. MD aware. Okay with MAP >60. Improved WOB with Bipap on, RR 30 and resting more comfortable. Will continue to monitor at this time. Lactic recheck at 0100.

## 2019-08-25 NOTE — CONSULTS
[facility]    Pulmonary, Allergy, Critical Care and Sleep Medicine    Initial Consultation  08/25/2019    Neema Bailey MRN# [id number]   Age: 44 year old YOB: 1975     Date of Admission: 8/19/2019  Reason for Consultation: hypoxia  Requesting Team: Clifford Gudino    Primary care provider: Suresh Shabazz     Assessment and Recommendations:      Ms Bailey is a 43 yo F w decompensated cirrhosis (secondary to EtOH/polysubstance and possible contribution from Bactrim) c/b EV and HE, chronic pain, and morbid obesity who has multiple recent hospital admissions for decompensated cirrhosis, who presented 8/20 with dyspnea, edema, and fevers found to be in septic shock from ESBL Ecoli bacteremia. Off pressors since 8/21 and transferred to floor. However in last 24 hours has had respiratory decompensation with need for BiPAP this morning.     # Acute hypoxic respiratory failure  # Bilateral pleural effusions, R>L  # Bilateral airspace opacification, c/f pneumonia    Appears to have had rapid respiratory detioration in last 36 hours, reflected on imaging with increased bilateral airspace disease and need for BiPAP. Imaging with worsening hypotension raises concern for new pneumonia. Unfortunately patient is likely unable to produce sputum while on BiPAP. Agree with empirically broadening antibiotics from ertapenem to vanc/darci/azithro. She is tachypneic 30s-40 and somnolent. Can continue BiPAP for WOB. Would be concerned for hypercapnea (none so far, while on BiPAP), so would monitor blood gasses closely with low threshold to transfer to MICU if becomes more somnolent, demonstrates increasing hypercarbia, or if hypotension progresses to shock.     Cardiac less likely - volume up but is hypotensive and likely intravascularly deplete (BP improves with legs raised); TTE ok.   Pleural effusions - unlikely contributing significantly. Patient is s/p thoras with effusion recurrence,  and given that she tolerated these effusions earlier, they are unlikely contributing to her current new/worsening critical status.    Recs  - Can check strep/legionella ur ag. Unfortunately likely not able to expectorate while on BiPAP  - Continue BiPAP  - Monitor VBGs q4h   - Agree with broadened antibiotics, vanc/darci/azithro per primary  - Can drain pleural effusions prn  - Would prefer vasopressors over fluid at this time given volume up (+11lb since admission), but defer to primary  - Low threshold for MICU transfer    Seen and discussed with Dr Jak Martines MD  Pulmonary and Critical Care Fellow   Pager [phone]    Chief Complaint and History of Present Illness:    CC:  hypoxia    HPI:   Ms Bailey is a 43 yo F w decompensated cirrhosis (secondary to EtOH/polysubstance and possible contribution from Bactrim) c/b EV and HE, chronic pain, and morbid obesity who has multiple recent hospital admissions for decompensated cirrhosis, who presented 8/20 with dyspnea, edema, and fevers found to be in septic shock from ESBL Ecoli bacteremia. Off pressors since 8/21 and transferred to floor. However in last 24 hours has had respiratory decompensation with need for BiPAP this morning. Patient unable to provide history today due to lethargy.       Review of Systems:  Complete 12 point ROS negative unless mentioned in HPI    Histories, Prior to Admission Medications, Allergies:    Past Medical History:  Past Medical History:   Diagnosis Date     Anxiety      Bilateral leg edema      Chronic kidney disease      Dizziness and giddiness      Hypertension      Insomnia      Iron deficiency anemia     due to chronic blood loss, vitamin B12 deficiency, Macrocytic     Migraines      BURT (nonalcoholic steatohepatitis)      Numbness and tingling     PERIPHERAL NEUROPATHY     Obese      Other chronic pain     Chronic Bilateral Low Back Pain with Bilateral Sciatica, Bilateral Knee Pain     Peripheral neuropathy       Pruritus      Restless legs      Sciatica      Seborrheic dermatitis      Severe episode of recurrent major depressive disorder, without psychotic features (H)      Sinus tachycardia      Substance abuse in remission (H)     h/o alcoholism had treatment at [facility]     Uterovaginal prolapse      Vitamin D deficiency        Past Surgical History:  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)     BREAST SURGERY      Breast Augmentation     COLPORRHAPHY ANTERIOR N/A 9/21/2015    Procedure: COLPORRHAPHY ANTERIOR;  Surgeon: Jairon Adams MD;  Location: [facility]     COSMETIC SURGERY      Abdominoplasty     CYSTOCELE REPAIR       CYSTOSCOPY N/A 11/26/2018    Procedure: CYSTOSCOPY;  Surgeon: Rony Melgar MD;  Location: [facility]     ENT SURGERY      tonsillectomy & adenoidectomy     GI SURGERY      Small Bowel Enterotomy Repair for SBO, EGD     HYSTERECTOMY VAGINAL, COLPORRHAPHY ANTERIOR, POSTERIOR, COMBINED N/A 11/26/2018    Procedure: VAGINAL HYSTERECTOMY, ANTERIOR AND POSTERIOR REPAIR;  Surgeon: Rony Melgar MD;  Location: [facility]     IR PARACENTESIS  8/7/2019     IR THORACENTESIS  8/7/2019     PERINEORRHAPHY N/A 11/26/2018    Procedure: PERINEOPLASTY;  Surgeon: Rony Melgar MD;  Location: [facility]     TUBAL LIGATION         Past Social History:  Social History     Socioeconomic History     Marital status: Single     Spouse name: Not on file     Number of children: Not on file     Years of education: Not on file     Highest education level: Not on file   Occupational History     Not on file   Social Needs     Financial resource strain: Not on file     Food insecurity:     Worry: Not on file     Inability: Not on file     Transportation needs:     Medical: Not on file     Non-medical: Not on file   Tobacco Use     Smoking status:  Never Smoker     Smokeless tobacco: Never Used   Substance and Sexual Activity     Alcohol use: No     Alcohol/week: 0.0 oz     Frequency: Never     Comment: Pt reports being a previou heavy drinker, reports quitting earlier this year     Drug use: No     Sexual activity: Not Currently     Partners: Male     Birth control/protection: Female Surgical     Comment: Tubal Ligation 2003   Lifestyle     Physical activity:     Days per week: Not on file     Minutes per session: Not on file     Stress: Not on file   Relationships     Social connections:     Talks on phone: Not on file     Gets together: Not on file     Attends [employer] service: Not on file     Active member of club or organization: Not on file     Attends meetings of clubs or organizations: Not on file     Relationship status: Not on file     Intimate partner violence:     Fear of current or ex partner: Not on file     Emotionally abused: Not on file     Physically abused: Not on file     Forced sexual activity: Not on file   Other Topics Concern     Parent/sibling w/ CABG, MI or angioplasty before 65F 55M? Not Asked   Social History Narrative    ** Merged History Encounter **            Family History:  History reviewed. No pertinent family history.    Medications:    heparin lock flush  5-10 mL Intracatheter Q24H     lactulose  20 g Oral TID     meropenem  1 g Intravenous Q8H     midodrine  10 mg Oral TID w/meals     pantoprazole  40 mg Oral QAM AC     rifaximin  550 mg Oral BID     sertraline  75 mg Oral Daily     vancomycin place crump - receiving intermittent dosing  1 each Does not apply See Admin Instructions     acetaminophen, alum & mag hydroxide-simethicone, heparin lock flush, heparin lock flush, hydrOXYzine, hypromellose-dextran, lactulose, lidocaine 4%, lidocaine (buffered or not buffered), magnesium sulfate, magnesium sulfate, naloxone, - MEDICATION INSTRUCTIONS -, ondansetron, - MEDICATION INSTRUCTIONS -, potassium chloride, potassium  chloride with lidocaine, potassium chloride, potassium chloride, potassium chloride, potassium phosphate (KPHOS) in D5W IV, potassium phosphate (KPHOS) in D5W IV, potassium phosphate (KPHOS) in D5W IV, potassium phosphate (KPHOS) in D5W IV, potassium phosphate (KPHOS) in D5W IV, simethicone, sodium chloride (PF)    Allergies:     Allergies   Allergen Reactions     Bactrim [Sulfamethoxazole W/Trimethoprim]      Gabapentin      Other reaction(s): Edema     Nitrofurantoin Other (See Comments)     drug-induced liver injury       Physical Exam:    Temp:  [97.6  F (36.4  C)-98.2  F (36.8  C)] 98.1  F (36.7  C)  Pulse:  [] 91  Heart Rate:  [] 99  Resp:  [22-42] 34  BP: ()/(30-61) 103/45  FiO2 (%):  [30 %-50 %] 40 %  SpO2:  [89 %-100 %] 99 %    Intake/Output Summary (Last 24 hours) at 8/25/2019 1613  Last data filed at 8/25/2019 1542  Gross per 24 hour   Intake 1170 ml   Output 275 ml   Net 895 ml     General: laying in bed, somnolent, jaundiced  HEENT: NCAT, MMM, BiPAP in place  Eyes: EOMI grossly, icteric  Neck: no palpable lymphadenopathy, no JVD noted  Chest: tachypneic, on BiPAP, coarse b/l, decreased on R  Cardiac: Tachycardic, regular rhythm, no m/g  Abdomen: Soft, non tender, normoactive BS  Extremities: No LE edema, no deformities  Neuro: A&Ox3, no focal deficits  Skin: no rash noted, no wounds  Derm: jaundice, no rashes    Laboratory, imaging, and microbiologic data:    All laboratory and imaging data reviewed.        Physician Attestation   I, Yefri Benedict MD, saw this patient with  and agree with the findings and plan of care as documented in the note.      I personally reviewed vital signs, medications, labs and imaging.    Yefri Benedict MD  Date of Service (when I saw the patient): 08/25/19

## 2019-08-25 NOTE — PROVIDER NOTIFICATION
-------------------CRITICAL LAB VALUE-------------------    Lab Value: Ammonia 147  Time of notification: 1:30 PM  MD notified: MD Surya - Clifford 5   Patient status: Unchanged from previous; BPs remain soft, RR >30s & on BiPAP. Awakes, orientated; but very somnolent.     Orders received: MD closeby, updated on ammonia level. Pt going down to CT now & will receive lactulose upon return. Continuing to monitor closely.

## 2019-08-25 NOTE — PLAN OF CARE
"Neuro: A&Ox4. Withdrawn. Garbled speech, forgetful. Slow to respond but follows commands.  Cardiac: SR. Low BP, MAPs 45-65 (see provider notifications). Most recent MAP 65. BP 93/42   Pulse 91   Temp 97.9  F (36.6  C) (Axillary)   Resp 30   Ht 1.727 m (5' 8\")   Wt 119.9 kg (264 lb 5.3 oz)   LMP 10/04/2018   SpO2 100%   Breastfeeding? No   BMI 40.19 kg/m     Respiratory: Sating >95% BiPAP 50% FiO2. RR improved to 30 with BiPAP. Trialed oxymask at 15L and pt desatted to mid 80's. See provider notifications.  GI/: Adequate urine output. BM X1, Incontinent.  Diet/appetite: DD3, Nectar, NPO due to BiPAP  Activity:  Assist of 2, turn and repo q2h. More lethargic and requiring more assistance with turns and cares.  Pain: Intolerable. Repositioned, tylenol.  Skin: Fissure to coccyx.  LDA's:PICC    Plan: Continue with POC. Notify primary team with changes.   "

## 2019-08-25 NOTE — PLAN OF CARE
PT/EDEMA- CANCEL- not medcaly appropriate for PT/edema today. Pt rad a rapid response called last night, low BPs, fluctuating mental status, possible tranfer to ICU pending status. PT/edema evaluation rescheduled.

## 2019-08-25 NOTE — PROGRESS NOTES
General acute hospital, [place]    Progress Note - Mar[facility] 5 Service        Date of Admission:  8/19/2019    Assessment & Plan   Neema Bailey is a 44 year-old female with PMHx of ESLD complicated by hepatic encephalopathy, portal hypertension, ascites, and varices, polysubstance use disorder, morbid obesity s/p gastric bypass in 2002 and with multiple recent hospitalizations for decompensated cirrhosis admitted to the MICU for severe sepsis 2/2 e. coli bacteremia, source unclear.     Main Plans for Today:  - Worsening respiratory status overnight so started on bipap  - Blood pressures remain low ~90s/40s with MAPS 55-60  - Discussed with ICU, will remain on current unit for now and get Pulmonary Consult  - Broaden antibiotics to meropenem and vancomycin  - If blood pressures improve, will diurese as patient is significantly fluid up, limit IVF as much as able  - Unable to tolerate MRCP yesterday, will obtain CT abdomen/pelvis if stable     # Decompensated Cirrhosis (multifactorial)  # Hepatic encephalopathy   # Severe Sepsis secondary to ESBL bacteremia   # Concern for bile leak on paracentesis  # Coagulopathy of liver disease  Patient with decompensated end-stage liver disease, likely multifactorial given history of alcohol abuse although may be a component of iatrogenic secondary to medication such as Bactrim based on pathology findings.  She presented with sepsis requiring vasopressors and was noted to have 1 of 2 cultures on presentation positive for E. coli, with sensitivities notable for ESBL. Etiology of ESBL unclear at this time. Suspect gallbladder. Started on ertapenem. Diagnostic paracentesis performed at bedside on 8/22 by CAPS team. Per the CAPS team, fluid appeared much darker than usual and green in appearance.  The labs unable to run. Concern for bile leak. Ordered MRCP after discussion with radiology. Hepatology consulted on 8/22 and recommended HIDA scan prior to MRCP and  repeat paracentesis. HIDA scan negative for bile leak, MR unable to be performed due to patient inability to lay flat. Repeat paracentesis unable to be performed by CAPS. Decompensated further overnight on 8/24. Antibiotics broadened to vancomycin and meropenem on 8/25.  - Hepatology Consulted, appreciate recommendations  - HIDA negative, MRCP unable to be performed  - Broaden antibiotics to vancomycin and meropenem, discontinue ertapenem  - Trend MELD labs, check ammonia  - Continue lactulose and Xifaxan   - Discontinue spironolactone for now given low blood pressures  - completed 3 days of Vitamin K   - FFP given prior to para/thora on 8/24  - Will pursue CT abdomen and pelvis when able to tolerate     # Hepatic hydrothorax   # Acute Hypoxic respiratory failure   Large right-sided pleural effusion, unable to wean off 2 L oxygen initially. Thoracentesis performed on 8/24 with 1.5L removed. Worsening respiratory status overnight 8/24-25. Repeat CXR with reaccumulated pleural effusion and pulmonary edema. Started on bipap and oxygenating well. ABG normal with PaO2 105 on 40% FiO2. Normal PCO2. Discussed with ICU and will remain on current unit for now with Pulmonary/Critical Care consult.  - Pulmonary/Critical Care Consult.  - Thoracentesis performed on 8/24 with 1.5L removed  - Continue bipap for work of breathing  - Reduce IVF as much as possible  - If pressures allow diurese to remove volume     # Chronic microcytic anemia   Required 1 unit of packed red blood cells this admission, no source of bleeding identified.  She responded to the transfusion appropriately.  We will continue to monitor for acute blood loss. Hemoglobin remains stable and no signs of bleeding.      # History of substance use disorder   Has reportedly been sober from alcohol since February.  Also with a remote history of opioid abuse we will try to limit narcotics over treat her acute pain.     # Major depression  # Generalized anxiety   We  "will continue her prior to admission Zoloft.  Patient had requested palliative care to see her earlier this admission and they offered some support although no significant issues with symptom management at this time.  We will discuss further with palliative care if necessary.     # Thrombocytopenia   Mild, stable in the setting of end-stage liver disease.  Possibly a consumptive process.  We will continue to monitor.     Diet: NPO for Medical/Clinical Reasons Except for: No Exceptions    Fluids: N/A  Lines: N/A  DVT Prophylaxis: Pneumatic Compression Devices  Alexander Catheter: not present  Code Status: Full Code      Disposition Plan   Expected discharge: 4 - 7 days, recommended to TCU vs Home once antibiotic plan established, mental status at baseline and O2 use less than 0 liters/minute.  Entered: Paresh London MD 08/25/2019, 11:24 AM       The patient's care was discussed with the Bedside Nurse, Care Coordinator/, Patient and Hepatology Consultant.    Paresh London MD  [address]  Pager: [phone]  Please see sticky note for cross cover information  ______________________________________________________________________    Interval History   Worsening respiratory status overnight with lower blood pressures. Started on bipap. Discussed with ICU overnight and this morning and patient to remain on current unit. Patient reports pain in her knees and neck. Intermittently somnolent but awakes to voice.     4 Point Review of Systems otherwise negative.     Data reviewed today: I reviewed all medications, new labs and imaging results over the last 24 hours. I personally reviewed no images or EKG's today.    Physical Exam   BP 90/44   Pulse 91   Temp 97.6  F (36.4  C) (Axillary)   Resp (!) 38   Ht 1.727 m (5' 8\")   Wt 119.9 kg (264 lb 5.3 oz)   LMP 10/04/2018   SpO2 96%   Breastfeeding? No   BMI 40.19 kg/m    General: somnolent, awakes " to voice, oriented to person and place, toxc appearing woman in NAD  Skin: jaundice  CV: RRR, normal S1S2, no murmur  Resp: CTAB, no wheeze, rhonchi   Abd: Soft, diffusely tender but most in RUQ, distended, BS+, no masses appreciated  Extremities: warm and well perfused, bilateral lower extremity edema      Data   Recent Labs   Lab 08/25/19  0604 08/24/19  2043 08/24/19  0834 08/24/19  0445  08/19/19 1952   WBC 13.5* 13.8*  --  12.2*   < > 17.0*   HGB 8.2* 8.1*  --  8.3*   < > 9.6*   * 100  --  100   < > 102*   PLT 91* 100*  --  102*   < > 184   INR 2.92*  --  2.60* 3.72*   < > 2.94*    140  --  142   < > 137   POTASSIUM 4.4 4.5  --  4.1   < > 4.0   CHLORIDE 110* 110*  --  110*   < > 103   CO2 22 21  --  23   < > 20   BUN 9 8  --  7   < > 6*   CR 0.78 0.72  --  0.66   < > 0.78   ANIONGAP 8 10  --  8   < > 13   NARENDRA 8.8 8.6  --  8.4*   < > 9.2   GLC 87 88  --  80   < > 102*   ALBUMIN 2.7*  --   --  2.3*   < > 2.7*   PROTTOTAL 5.5*  --   --  5.2*   < > 6.8   BILITOTAL 9.8*  --   --  9.8*   < > 14.2*   ALKPHOS 112  --   --  108   < > 150   ALT 35  --   --  33   < > 41   *  --   --  116*   < > 134*   LIPASE  --   --   --   --   --  107   TROPI  --   --   --   --   --  <0.015    < > = values in this interval not displayed.     Recent Results (from the past 24 hour(s))   XR Chest Port 1 View    Narrative    XR CHEST PORT 1 VW  8/24/2019 2:50 PM      HISTORY: s/p thoracentesis    COMPARISON: Chest radiograph 8/22/2019    FINDINGS:   Single AP view of the chest. Right upper extremity PICC tip projects  at the cavoatrial junction. Trachea is midline. Cardiac and  mediastinal silhouette are within normal limits. Pulmonary vasculature  is indistinct. Diffuse hazy airspace and interstitial opacities not  significantly changed. Unchanged small left pleural effusion.  Decreased moderate right pleural effusion. No pneumothorax.    Visualized portions of the abdomen, soft tissues and osseous thorax  are  unremarkable.      Impression    IMPRESSION:   1. No pneumothorax visualized.  2. Decreased now moderate right pleural effusion.  3. Unchanged in left pleural effusion.  4. No significant change in diffuse mixed interstitial and airspace  opacities.    I have personally reviewed the examination and initial interpretation  and I agree with the findings.    TAYLER QUINTERO MD   XR Chest Port 1 View    Narrative    XR CHEST PORT 1 VW  8/24/2019 8:53 PM      HISTORY: Increasing O2 requirement, thoracentesis today    COMPARISON: 8/24/2019 at 1446    FINDINGS:   Single view of the chest demonstrates increase in bilateral, left  greater than right pleural effusions and perihilar mixed interstitial  and airspace opacities. Progression of the right hilar alveolar  opacity with air bronchograms. Stable position of the right arm PICC  line.  No pneumothorax.      Impression    IMPRESSION:   1. Increase in size of right greater than left pleural effusions.  2. Overall increase in perihilar mixed interstitial airspace opacities  favoring severe pulmonary edema, although severe infection, ARDS, or  diffuse alveolar hemorrhage can be considered in the appropriate  clinical setting.    I have personally reviewed the examination and initial interpretation  and I agree with the findings.    TAYLER QUINTERO MD

## 2019-08-26 NOTE — PLAN OF CARE
"BP (!) 88/35   Pulse 100   Temp 98.7  F (37.1  C) (Axillary)   Resp (!) 41   Ht 1.727 m (5' 8\")   Wt 120 kg (264 lb 8.8 oz)   LMP 10/04/2018   SpO2 96%   Breastfeeding? No   BMI 40.22 kg/m      Patient's BP's remain soft. MAP's hovering low 50's to low 60's. RR remains upper 30's to upper 40's. Remains lethargic/somnolent. Confused of time. Has worn Bipap all shift starting from 00:00 at 40% FiO2. Patient will de-sat to 70's when Bipap is removed. NPO. Rectal tube in place, scheduled enema given. Alexander in place with low output. Repositioned q 2 hours. Clifford cross-cover updated of patient's continued condition. Continue to monitor.  "

## 2019-08-26 NOTE — PROGRESS NOTES
Transfer  Transferred to: 4A  Via: bed  Reason for transfer: Pt decompensating and needing higher level care.  Family: MD to contact family.   Belongings: Sitting walker, purse, backpack (cell phone placed in front pocket) transferred with patient.   Chart: Delivered with pt to next unit  Medications: Meds sent to new unit with pt  Report given to: JAMIL Coffman    Pt status:  Neuro: Somnolent/lethargic. Arouses to voice; moans when touched. PERRLA-5.  Cardiac: STach 100s.   Respiratory: RR started in 36-40s. Pt on BiPAP 50%; work of breathing increased. Coarse/diminished.  GI/: Alexander- 400cc/2h tea colored urine. Rectal tube in place.   Diet/appetite: NPO d/t BiPAP.   Activity: Repositioned Q2h on shift for pressure relief.   Skin: Jaundice with bruises throughout. +2/+3 generalized edema. Mepilex intact over coccyx/sacrum. Excoriation perineum & fissues in groin, barrier cream applied. Bruising under left blood pressure r/t edema and frequent BP readings, sleeve applied under cuff.     LDAs:    - Alexander  - PICC  - Rectal Tube

## 2019-08-26 NOTE — PROGRESS NOTES
[facility], [place]    Progress Note - Maramy 5 Service        Date of Admission:  8/19/2019    Assessment & Plan   Neema Bailey is a 44 year-old female with PMHx of ESLD complicated by hepatic encephalopathy, portal hypertension, ascites, and varices, polysubstance use disorder, morbid obesity s/p gastric bypass in 2002 and with multiple recent hospitalizations for decompensated cirrhosis admitted to the MICU on 8/20 for for severe sepsis 2/2 e. coli bacteremia transferred to step-down soon after that. Initially source was considered to be gall bladder, underwent HIDA scan which was negative. Unable to do MRCP. CT chest abd pelvic showed re-accumulation of pleural effusion (s/p 1.5 L of fluid removed during thoracentesis on 8/22) and increased areas of groundglass attenuation superimposed on interlobular septal thickening. S/p paracentesis X2 (initial para non-diagnostic, and second one done on 8/24 negative for SBP).  Was initially de-escalated to erta but abx escalated up to meropenum and vancomycin on 8/25 because of worsening clinical status.     This morning pt is tacypnic to 40s, alternating between 15 L NC and bipap on 50% fio2, she is tachycardic to 110s, hypotensive with MAPS <55 (mainly 51-53), elevated lactate 4.1 (up from 3.3), and oliguric. Discussed with MICU for pt to be transferred to the ICU for pressors and possible diuresis while on pressors.     Pt appears to be severely volume overloaded. CT chest findings of groundglass opacities are likely edema vs infection. Pt is on meropenum and vancomycin. However wbc is elevated to 17K. This could be reactive vs true infectious process. The likely explanation for decompensation is respiratory failure from volume over load vs infection.     Main Plans for Today:  - transfer to the ICU     Please see notes bellow for details of hospital course by problem list.      # Decompensated Cirrhosis (multifactorial)  # Hepatic  encephalopathy   # Severe Sepsis secondary to ESBL bacteremia   # Concern for bile leak on paracentesis  # Coagulopathy of liver disease  Patient with decompensated end-stage liver disease, likely multifactorial given history of alcohol abuse although may be a component of iatrogenic secondary to medication such as Bactrim based on pathology findings.  She presented with sepsis requiring vasopressors and was noted to have 1 of 2 cultures on presentation positive for E. coli, with sensitivities notable for ESBL. Etiology of ESBL unclear at this time. Suspect gallbladder. Started on ertapenem. Diagnostic paracentesis performed at bedside on 8/22 by CAPS team. Per the CAPS team, fluid appeared much darker than usual and green in appearance.  The labs unable to run. Concern for bile leak. Ordered MRCP after discussion with radiology. Hepatology consulted on 8/22 and recommended HIDA scan prior to MRCP and repeat paracentesis. HIDA scan negative for bile leak, MR unable to be performed due to patient inability to lay flat. Repeat paracentesis on 8/24 negative for SBP. Decompensated further overnight on 8/24. Antibiotics broadened to vancomycin and meropenem on 8/25. CT chest abdomen pelvic on 8/25 with pleural effusion and worsening groundglass opacities.  - Hepatology Consulted, appreciate recommendations  - cont Broaden antibiotics vancomycin and meropenem  - Trend MELD labs  - Continue lactulose and Xifaxan   - Discontinue spironolactone for now given low blood pressures  - s/p 3 days of Vitamin K   - FFP given prior to para/thora on 8/24    MELD-Na score: 28 at 8/26/2019  3:20 AM  MELD score: 28 at 8/26/2019  3:20 AM  Calculated from:  Serum Creatinine: 1.01 mg/dL at 8/26/2019  3:20 AM  Serum Sodium: 142 mmol/L (Rounded to 137 mmol/L) at 8/26/2019  3:20 AM  Total Bilirubin: 9.4 mg/dL at 8/26/2019  3:20 AM  INR(ratio): 3.26 at 8/26/2019  3:20 AM  Age: 44 years    # Lactic acidosis: likely due to hypotension from  infection vs pulmonary edema.   - lactate up to 4.1 from 2.2 yesterday  - unable to give fluids due to volume overloaded yet hypotensive. Pt likely third spacing, although albumin is 2.5  - is on abx with vancomycin and meropenum. No sings of fungal infection but consider if clinically worsens   - likely would benefit from pressors and diuresis  - repeat lactic acid this afternoon     # oligoria: only put out 40 ml over 4 hrs overnight. Cr. Is still within normal limits but doubled since yesterday. Likely due to hypoperfusion from persistent hypotension.   - cont to monitor   - may improve with pressors     # Hepatic hydrothorax   # Acute Hypoxic respiratory failure   Large right-sided pleural effusion, unable to wean off 2 L oxygen initially. Thoracentesis performed on 8/24 with 1.5L removed. Worsening respiratory status overnight 8/24-25. Repeat CXR with reaccumulated pleural effusion and pulmonary edema. Initially placed on bipap but oxygen requirement went up to 50% and tachypnic to 40s. Discussed with ICU team and agreed with transfering the pt to the ICU for further management.  - Continue bipap for work of breathing  - Reduce IVF as much as possible  - If pressures allow diurese to remove volume     # Chronic microcytic anemia   Required 1 unit of packed red blood cells this admission, no source of bleeding identified.  She responded to the transfusion appropriately.  We will continue to monitor for acute blood loss. Hemoglobin remains stable and no signs of bleeding.      # History of substance use disorder   Has reportedly been sober from alcohol since February.  Also with a remote history of opioid abuse we will try to limit narcotics over treat her acute pain.     # Major depression  # Generalized anxiety   We will continue her prior to admission Zoloft.  Patient had requested palliative care to see her earlier this admission and they offered some support although no significant issues with symptom  "management at this time.  We will discuss further with palliative care if necessary.     # Thrombocytopenia   Mild, stable in the setting of end-stage liver disease.  Possibly a consumptive process.  We will continue to monitor.     Diet: NPO for Medical/Clinical Reasons Except for: No Exceptions    Fluids: N/A  Lines: N/A  DVT Prophylaxis: Pneumatic Compression Devices  Alexander Catheter: in place, indication: Strict 1-2 Hour I&O  Code Status: Full Code      Disposition Plan   Expected discharge: transfer to the ICU for further management of hypotension and worsening respiratory status.   Entered: Racheal HIGGINS MD 08/26/2019, 6:40 AM       The patient's care was discussed with the Bedside Nurse, Care Coordinator/, Patient and Hepatology Consultant and staff Dr. London who agree with the plan.    Racheal HIGGINS MD  Internal medicine resident, PGY 3  P-  ______________________________________________________________________    Interval History     Pt remains critically ill overnight. HR in 40s, on bipap with fio2 of 40% and when off bipap at 15 L NC, still tachycardic. MAP is <55 on the 2 checks, HR consistently over 100. Lactate elevated to 3.3.  Pt also appears altered. Responsive but not communicating with me coherently.     4 Point Review of Systems otherwise negative.     Data reviewed today: I reviewed all medications, new labs and imaging results over the last 24 hours. I personally reviewed no images or EKG's today.    Physical Exam   BP (!) 86/32   Pulse 102   Temp 98.7  F (37.1  C) (Axillary)   Resp (!) 42   Ht 1.727 m (5' 8\")   Wt 120 kg (264 lb 8.8 oz)   LMP 10/04/2018   SpO2 95%   Breastfeeding? No   BMI 40.22 kg/m    General: somnolent, awakes to voice, unable to answer orientation questions  Skin: jaundice  CV: RRR, normal S1S2, no murmur  Resp: diffuse crackles, no wheeze, rhonchi   Abd: Soft, diffusely tender but most in RUQ, distended, BS+, no masses " appreciated  Extremities: warm and well perfused, bilateral lower extremity edema      Data   Recent Labs   Lab 08/26/19  0320 08/25/19  0604 08/24/19  2043 08/24/19  0834  08/19/19  1952   WBC 16.0* 13.5* 13.8*  --    < > 17.0*   HGB 8.5* 8.2* 8.1*  --    < > 9.6*   * 103* 100  --    < > 102*   * 91* 100*  --    < > 184   INR 3.26* 2.92*  --  2.60*   < > 2.94*    141 140  --    < > 137   POTASSIUM 4.6 4.4 4.5  --    < > 4.0   CHLORIDE 112* 110* 110*  --    < > 103   CO2 20 22 21  --    < > 20   BUN 13 9 8  --    < > 6*   CR 1.01 0.78 0.72  --    < > 0.78   ANIONGAP 10 8 10  --    < > 13   NARENDRA 8.6 8.8 8.6  --    < > 9.2   GLC 80 87 88  --    < > 102*   ALBUMIN 2.5* 2.7*  --   --    < > 2.7*   PROTTOTAL 5.4* 5.5*  --   --    < > 6.8   BILITOTAL 9.4* 9.8*  --   --    < > 14.2*   ALKPHOS 116 112  --   --    < > 150   ALT 37 35  --   --    < > 41   * 132*  --   --    < > 134*   LIPASE  --   --   --   --   --  107   TROPI  --   --   --   --   --  <0.015    < > = values in this interval not displayed.     Recent Results (from the past 24 hour(s))   CT Chest/Abdomen/Pelvis w Contrast    Narrative    EXAMINATION: CT CHEST/ABDOMEN/PELVIS W CONTRAST, 8/25/2019 2:00 PM    TECHNIQUE:  Helical CT images from the thoracic inlet through the  symphysis pubis were obtained  with contrast. Contrast dose: 135ml  isovue 370    COMPARISON: CT 8/19/2019    HISTORY: Increasing abdominal pain in patient with ESBL bacteremia and  recent paracentesis with bile aspirated.    FINDINGS:    Chest: Moderate right pleural effusion decreased in size from prior.  Small left pleural effusion increased in size from prior. There are  diffuse patchy areas of groundglass attenuation superimposed on  interlobular septal thickening and scattered patchy consolidative  opacities throughout most the lung fields with slight sparing of the  left lower lung. These findings have substantially increased since the  prior Dense  consolidation adjacent to the right pleural effusion.  Scattered calcified granulomas. No pneumothorax. The central tracheal  bronchial tree is clear.    The heart is not enlarged. The ascending aorta and main pulmonary  artery are normal caliber. Right PICC distal tip is at the cavoatrial  junction. No large pericardial effusion. Thyroid is unremarkable.  Calcified mediastinal nodes. No new or enlarging mediastinal or hilar  lymphadenopathy. Bilateral breast implants.    Abdomen and pelvis: Denny-en-Y gastric bypass. No abnormally dilated  loops of bowel. No focal hepatic lesions. The portal system appears  grossly patent. The gallbladder appears to be folded on itself,  somewhat hydropic, measuring 5 cm in diameter. There are no dense  gallstones identified. No dilation of the common bile duct. No  intrahepatic biliary ductal dilation. The pancreas is diminutive and  atrophic. Splenomegaly similar to prior with multiple wedge-shaped  hypodensities in the periphery also similar to prior. Extensive  portosystemic collaterals. The adrenal glands are unremarkable. There  is no hydronephrosis or nephrolithiasis. The bladder is decompressed  around a Alexander catheter with a small amount of air in the balloon.  Hysterectomy. No adnexal masses are visualized. The small and large  bowel are normal caliber. No intra-abdominal free air. The appendix is  not visualized. There is moderate volume free fluid in the abdomen in  the upper quadrants and a small amount of the pelvis. This fluid  measures simple which argues against hemorrhage. Extensive  subcutaneous edema about the abdomen and pelvis and chest. Nonspecific  mesenteric haziness suggestive of fluid overload.     No new or enlarging lymphadenopathy in the abdomen or pelvis. No  abdominal aortic aneurysm. Recanalized periumbilical vein. Left upper  quadrant varices.    Bones and soft tissues: Mild degenerative changes of the thoracolumbar  spine. No suspicious osseous  lesions. 4.8 x 2.8 x 6.8 cm fluid  collection in the left subcutaneous pelvic region adjacent to the  lateral aspect of the superior iliac crest which previously measured  4.9 x 2.7 x 7.1 cm. Lumbar spine laminectomy changes.      Impression    IMPRESSION:   1. Increased areas of groundglass attenuation superimposed on  interlobular septal thickening with additional patchy consolidative  opacities throughout most of the lung fields with slight sparing of  the left lower lung which are increased from 8/19/2019. Findings are  compatible provided history of infection. Pulmonary edema should be  considered among others.  2. Moderate right pleural effusion is decreased in size from prior,  and small left pleural effusion is increased in size from prior. There  is a dense consolidative opacity adjacent to the right effusion which  may represent atelectasis or infection.  3a. Findings of portal hypertension in a somewhat noncirrhotic  appearing liver including splenomegaly, portosystemic collaterals, and  moderate free fluid in the abdomen and pelvis which is slightly  increased from prior.  3b. Somewhat focal perihepatic fluid. If bilaterally consistent with,  given history, nuclear medicine hepatobiliary scan could be  considered. MRI with Eovist would also provide additional information.  4. Distended gallbladder with mild wall thickening similar to prior  CT, which is nonspecific in the setting of liver disease and ascites.  Right upper quadrant ultrasound could be considered if indicated.  5. Redemonstration of wedge-shaped hypodensities in the spleen  suggestive of embolic infarctions.  6. Relatively unchanged, nonspecific, possibly encapsulated fluid  collection in the subcutaneous tissue of the left superior pelvis.     I have personally reviewed the examination and initial interpretation  and I agree with the findings.    TAYLER QUINTERO MD

## 2019-08-26 NOTE — ANESTHESIA CARE TRANSFER NOTE
Patient: Neema Bailey    * No procedures listed *    Diagnosis: * No pre-op diagnosis entered *  Diagnosis Additional Information: No value filed.    Anesthesia Type:   No value filed.     Note:  Airway :ETT  Patient transferred to:ICU  ICU Handoff: Call for PAUSE to initiate/utilize ICU HANDOFF, Identified Patient, Identified Responsible Provider, Reviewed the Pertinent Medical History, Discussed Surgical Course, Reviewed Intra-OP Anesthesia Management and Issues during Anesthesia, Set Expectations for Post Procedure Period and Allowed Opportunity for Questions and Acknowledgement of Understanding      Vitals: (Last set prior to Anesthesia Care Transfer)              Electronically Signed By: KANDACE Guadalupe CRNA  August 26, 2019  4:28 PM

## 2019-08-26 NOTE — PROVIDER NOTIFICATION
Clifford cross-cover notified of LA 3.3. Also, RN updated MD on patient's overall status. HR tachy 100's. Afebrile. Lethargic/somnolent and confused to time. BP's remain soft 80's-90's systolic with MAP's 50's-low 60's. RR continues at upper 30- mid 40 breaths per minute. Bipap has been on since 00:00 at 40%. When off Bipap, patient was on 15L Oxi plus with oxygen sat's 90-91%. MD to assess at bedside.

## 2019-08-26 NOTE — PHARMACY-VANCOMYCIN DOSING SERVICE
Pharmacy Vancomycin Note  Date of Service 2019  Patient's  1975   44 year old, female    Indication: Sepsis  Goal Trough Level: 15-20 mg/L  Day of Therapy: 2  Current Vancomycin regimen:  Intermittent dosing    Current estimated CrCl = Estimated Creatinine Clearance: 94.8 mL/min (based on SCr of 1.01 mg/dL).    Culture Data:   Blood culture: in progress   Strep pneumo antigen negative   pleural fluid: negative    Creatinine for last 3 days  2019:  4:45 AM Creatinine 0.66 mg/dL;  8:43 PM Creatinine 0.72 mg/dL  2019:  6:04 AM Creatinine 0.78 mg/dL  2019:  3:20 AM Creatinine 1.01 mg/dL    Recent Vancomycin Levels (past 3 days)  2019:  8:53 AM Vancomycin Level 21.4 mg/L    Vancomycin IV Administrations (past 72 hours)                   vancomycin (VANCOCIN) 2,500 mg in sodium chloride 0.9 % 250 mL intermittent infusion (mg) 2,500 mg New Bag 19 1030                Nephrotoxins and other renal medications (From now, onward)    Start     Dose/Rate Route Frequency Ordered Stop    19 0930  norepinephrine (LEVOPHED) 16 mg in  mL infusion      0.03-0.4 mcg/kg/min × 118.6 kg (Dosing Weight)  3.3-44.5 mL/hr  Intravenous CONTINUOUS 19 0929      19 1501  vancomycin place crump - receiving intermittent dosing      1 each Does not apply SEE ADMIN INSTRUCTIONS 19 1502               Contrast Orders - past 72 hours (72h ago, onward)    Start     Dose/Rate Route Frequency Ordered Stop    19 1330  iopamidol (ISOVUE-370) solution 135 mL      135 mL Intravenous ONCE 19 1322 19 1335          Interpretation of levels and current regimen:  Vancomycin level of 21.4 mcg/mL drawn  0853 is a 23 hour level that is not a true trough not at steady state.    Urine output has significantly declined and patient is currently in DEE DEE.    Plan:  1. Recommend to switch to intermittent vancomycin dosing.  2.  Pharmacy will check a level on  AM  labs to determine future dosing.  3. Serum creatinine levels will be ordered daily for the first week of therapy and at least twice weekly for subsequent weeks.      Giovana Perez RPH        .

## 2019-08-26 NOTE — PROVIDER NOTIFICATION
-------------------CRITICAL LAB VALUE-------------------    Lab Value: Lactic 4.1  Time of notification: 8:00 AM  MD notified: *[id number]    Patient status: RRT called d/t elevated lactic acid & decompensated clinical picture. Pt somnolent/lethargic. Temp: 97.8  F (36.6  C) Temp src: Axillary BP: (!) 87/34 Pulse: 104 Heart Rate: 102 Resp: (!) 43 SpO2: 95 % O2 Device: BiPAP/CPAP 50%. RR consistently in 40s on BiPAP, HR >100s. Alexander in place ~20cc/hr tea colored urine.    Orders received: Pt transfer to . Continue to monitor closely during process; Resource float at bedside.

## 2019-08-26 NOTE — PLAN OF CARE
Neuro: Intermittently alert and orientated.   Cardiac: Sinus tach. BP 80s-90s/30s-40s; transfer to ICU if MAPs consistently under 55.   Respiratory: Sating high 90s on 40% bipap; however bipap has only been on intermittently d/t dry heaving.  Currently sating 90-93% on 15L oxymask.    GI/: oliguria; averaging 25ml/hr - team aware. 3 large watery stools; rectal tube placed.  Diet/appetite: Not able to take oral medications at 2000.  Took a considerable amount of time to get patient to take crushed midodrine in pudding at 1800 - patient just held in mouth and attempted to spit it out a few times.  Given PRN zofran x2 and compazine x1; worked for some time but pt is dry heaving again currently.  Activity:  Assist of 2 to reposition in bed.  Pain: At acceptable level on current regimen.   Skin: No new deficits noted.  LDA's: Right TL PICC saline locked.      Plan: Continue with POC. Notify primary team with changes.

## 2019-08-26 NOTE — PROGRESS NOTES
8/26/2019    Per chart, pt has decompensated. CD will continue to monitor and attempt to see when appropriate.     Kristine Del Castillo, [facility]  [phone]

## 2019-08-26 NOTE — PROGRESS NOTES
[facility], [place]    Sepsis Evaluation Progress Note    Date of Service: 08/26/2019    I was called to see Neema Bailey due to abnormal vital signs triggering the Sepsis SIRS screening alert. She is known to have an infection.     Physical Exam    Vital Signs:  Temp: 98.7  F (37.1  C) Temp src: Axillary BP: (!) 89/42 Pulse: 98 Heart Rate: 100 Resp: (!) 37 SpO2: 97 % O2 Device: BiPAP/CPAP Oxygen Delivery: 15 LPM(bipap taken off d/t dry-heaving)    Lab:  Lactic Acid   Date Value Ref Range Status   08/26/2019 3.3 (H) 0.7 - 2.0 mmol/L Final     Lactate for Sepsis Protocol   Date Value Ref Range Status   08/06/2019 2.6 (H) 0.7 - 2.0 mmol/L Final     Comment:     Critical Value/Significant Value called to and read back by  JOSE RODRIGUES CHARGE NURSE 8/6/19 1930 TT         The patient has signs of altered level of consciousness suspicious for encephalopathy .    The rest of their physical exam is significant for diminished lung sounds at the b/l bases, increased O2 requirements from day - requiring increased BiPAP use. Tachycardia to the low 100s, 3+ edema of the LEs & edema in the b/l UEs (unchanged from prior)    Assessment and Plan    The SIRS and exam findings are likely due to decompensated cirrhosis , there is no sign of sepsis at this time.    Disposition: The patient will remain on the current unit. We will continue to monitor this patient closely.    Princess Borrero MD

## 2019-08-26 NOTE — ANESTHESIA PROCEDURE NOTES
ANESTHESIOLOGY RESIDENT/CRNA INTUBATION NOTE  Indication for intubation: respiratory insufficiency, altered level of consciousness, airway protection.  Provider Ordering Intubation: Perlman, David Morris, MD  History regarding the most recent potassium obtained: Yes  History regarding renal failure obtained: Yes  History of presence or absence of CVA/stroke was obtained: Yes  History of presence or absence of NM disorder obtained: Yes  Post Intubation: No apparent complications, Sedation to be ordered by primary/ICU team, Primary/ICU team to review CXR, Vent settings by primary/ICU team, ETT secured and Report given to primary nurse and/or team

## 2019-08-26 NOTE — PROGRESS NOTES
GASTROENTEROLOGY PROGRESS NOTE    ASSESSMENT:  44 year old woman with a history of decompensated EtOH cirrhosis c/b portal hypertension manifesting with ascites, hepatic encephalopathy and splenomegaly, MO s/p RYGB 2002, polysubstance abuse, HTN, MDD, KAYODE, chronic pain, recent admission for suspected drug-induced liver injury on cirrhosis, admitted with severe sepsis 2/2 ESBL E. coli bacteremia of unclear source, found to have bilious ascites on 8/22 paracentesis with no SBP. Hepatology consulted for evaluation. HIDA 8/23 with no finding suggestive of bile leak. Unable to tolerate laying supine for MRCP, was not performed; however, given negative HIDA, likely less important to obtain MRCP at this point. Patient's antibiotics were appropriately broadened over weekend given her clinical decompensation, requiring critical care; now intubated and on vasopressors. No fungal coverage currently. S/p thoracentesis 8/24 for 1.5 L, which was transudative, culture NGTD. Patient with elevated INR; given her underlying liver disease, INR is not a reliable laboratory marker of bleeding risk, should not limit ongoing paracenteses. Ammonia elevated, though unclear if this is contributing to patient's encephalopathy prior to intubation. Lactic acidosis, leukocytosis, tachycardia, tachypnea and hypotension all more consistent with shock, most likely of an infectious etiology. Patient also in gross volume overload. Would emphasize protein calorie supplementation.     RECOMMENDATIONS:  - Would start micafungin for fungal coverage, continue other broad spectrum antibiotics  - No need for MRCP at this time given negative HIDA  - Recommend repeat paracentesis with cell count with differential, culture, albumin, total protein, bilirubin  - Continue lactulose, rifaximin  - Emphasize protein calorie supplementation with enteric feeding now that intubated    Thank you for allowing us to participate in this patient's care. The GI team will  "continue to follow. Please contact the GI team with any questions or concerns.    The patient was discussed with Dr. Thomas Leventhal, who agrees with the assessment and plan.    Julius Fajardo MD PhD  Internal Medicine PGY-3  Pager [phone]  ______________________________________________________________  S: Overnight, patient continued to decompensate, with worsening tachypnea to 40s and encephalopathy. Alternated between 15 L NC and BiPAP on 50% FiO2. Was tachycardic and hypotensive. Overnight also triggered sepsis protocol, found to have lactate of 4.1. Given patient's acute decompensation, decision was made to transfer patient to MICU for critical care. On evaluation this morning in MICU, patient was altered, responding to verbal statements with grunting and other verbal acknowledgement, not following verbal commands, withdrawing from verbal stimuli. Appeared restless and in distress. Unable to perform 10-point review of systems given patient's clinical status.    O:  Blood pressure 110/50, pulse 120, temperature 98.4  F (36.9  C), temperature source Axillary, resp. rate 20, height 1.727 m (5' 8\"), weight 120 kg (264 lb 8.8 oz), last menstrual period 10/04/2018, SpO2 97 %, not currently breastfeeding.    General: Middle-aged encephalopathic woman in moderate respiratory distress  HEENT: AT/NC, not opening eyes, BiPAP mask in place  Cardiovascular: Tachycardic, regular rhythm, no m/r/g, 3+ BLE edema, palpable peripheral pulses  Abdominal: Soft, distended due to habitus, nontender, hypoactive bowel sounds  Musculoskeletal: Obese bulk, no obvious joint abnormalities  Neurologic: Encephalopathic, not opening eyes spontaneously, responding verbally though incoherently to verbal statements, not responding to verbal commands, withdrawing to painful stimuli      LABS:  [facility]  Recent Labs   Lab 08/26/19  0320 08/25/19  0604 08/24/19  2043 08/24/19  0445    141 140 142   POTASSIUM 4.6 4.4 4.5 4.1   CHLORIDE " 112* 110* 110* 110*   NARENDRA 8.6 8.8 8.6 8.4*   CO2 20 22 21 23   BUN 13 9 8 7   CR 1.01 0.78 0.72 0.66   GLC 80 87 88 80     CBC  Recent Labs   Lab 08/26/19  0320 08/25/19  0604 08/24/19  2043 08/24/19  0445   WBC 16.0* 13.5* 13.8* 12.2*   RBC 2.74* 2.69* 2.57* 2.59*   HGB 8.5* 8.2* 8.1* 8.3*   HCT 27.9* 27.7* 25.7* 25.8*   * 103* 100 100   MCH 31.0 30.5 31.5 32.0   MCHC 30.5* 29.6* 31.5 32.2   RDW 17.7* 17.4* 17.9* 17.7*   * 91* 100* 102*     INR  Recent Labs   Lab 08/26/19  0320 08/25/19  0604 08/24/19  0834 08/24/19  0445   INR 3.26* 2.92* 2.60* 3.72*     LFTs  Recent Labs   Lab 08/26/19  0320 08/25/19  0604 08/24/19  0445 08/23/19  0411   ALKPHOS 116 112 108 104   * 132* 116* 88*   ALT 37 35 33 27   BILITOTAL 9.4* 9.8* 9.8* 10.5*   PROTTOTAL 5.4* 5.5* 5.2* 5.1*   ALBUMIN 2.5* 2.7* 2.3* 2.3*      PANC  Recent Labs   Lab 08/19/19  1952   LIPASE 107     MELD-Na score: 28 at 8/26/2019  3:20 AM  MELD score: 28 at 8/26/2019  3:20 AM  Calculated from:  Serum Creatinine: 1.01 mg/dL at 8/26/2019  3:20 AM  Serum Sodium: 142 mmol/L (Rounded to 137 mmol/L) at 8/26/2019  3:20 AM  Total Bilirubin: 9.4 mg/dL at 8/26/2019  3:20 AM  INR(ratio): 3.26 at 8/26/2019  3:20 AM  Age: 44 years

## 2019-08-26 NOTE — PLAN OF CARE
PT-Edema-6B->4AB- Cancel, per RN pt not appropriate for PT/Edema this date, pt lethargic/somnolent, transferring to a higher level of care.

## 2019-08-27 PROBLEM — N17.9 ACUTE KIDNEY FAILURE, UNSPECIFIED (H): Status: ACTIVE | Noted: 2019-01-01

## 2019-08-27 NOTE — PLAN OF CARE
Discharge Planner PT  PT Re-Eval Completed  Patient plan for discharge: Unable to state  Current status: Pt orally intubated, sedated on VC-AC with 60% FiO2, PEEP 8. PT performed PROM all joints and planes BUE and BLE, pts mother educated and handout issued for PROM  Barriers to return to prior living situation: medical status, prolonged immobility and decreased strength, edema, respiratory status  Recommendations for discharge: TCU  Rationale for recommendations: Pt would benefit from additional skilled PT to address ROM, strength, functional mobility and endurance.        Entered by: Eleanor Perales 08/27/2019 4:34 PM

## 2019-08-27 NOTE — PROGRESS NOTES
[facility], [place]    Progress Note  [facility] Intensive Care     Date of Admission:  8/19/2019    Assessment & Plan   Neema Bailey is a 44 year old female with history of multifactorial ESLD c/b HE, EV, ascites, polysubstance abuse, morbid obesity s/p gastric bypass in 2002 who presents as a transfer from the floor for acute hypoxic respiratory and altered mental status, notably recently discharged from ICU 8/21 after a 2-day stay for severe sepsis with hypotension 2/2 ESBL E coli bacteremia of unknown source.    Now intubated and sedated, on pressor support.    Changes today:  - CXR today to re-evaluate opacities  - bronch today   - possible a-line  - discontinue azithromycin  - continue albumin challenge  - free water  - start enteral feeding post-procedure  - lactulose PO instead of per rectum    Neurology:  Hepatic/Toxic-metabolic encephalopathy: Elevated ammonia to 147 on 8/25 with lethargy. Normal BUN, no other sedating meds given. Ammonia uptrending on ICU day 2, unclear what correlation with mental status.  - PO lactulose scheduled and per [facility] protocol  - limit sedating medications    Sedation:  - fentanyl/versed gtt    Depression  - PTA sertraline    Cardiovascular:  Shock, multifactorial: in the setting of cirrhosis with third spacing and possible infection underlying. Rising lactate during hospital stay from 1.9 after transfer out of ICU to 4.3 on the day of transfer back. Bedside TTE with hyperdynamic LV, 1.78cm IVC with minimal respiratory variation while on positive pressure ventilation. Cardiac function on formal TTE 8/20 hyperdynamic with normal PASP.  - levophed, titrate for MAPs > 60    Pulmonary:        Acute hypoxic respiratory failure  Transudative R-sided pleural effusion, moderate  CT chest 8/25 notable for GGO, interlobular septal thickening, patchy consolidative opacities. Differential includes pulmonary edema, developing ARDS,  hepatopulmonary syndrome, pneumonia, atypical infection. P/F ratio 149. Intubated for increased work of breathing and hypoxemia. Improving on MICU day 2 with decreasing FIO2.  - holding off on diuresis in the setting of shock  - will continue to re-assess volume status  - CXR to re-evaluate infiltrates/opacities; pending results, will bronch with lavage  - routine vent cares  - may consider TTE with bubble study to evaluate for hepatopulmonary, though unlikely to  at this point    Ventilation Mode: CMV/AC  (Continuous Mandatory Ventilation/ Assist Control)  FiO2 (%): 60 %  Rate Set (breaths/minute): 20 breaths/min  Tidal Volume Set (mL): 500 mL  PEEP (cm H2O): 8 cmH2O  Oxygen Concentration (%): 60 %  Resp: 24      Renal  Oliguric DEE DEE 2/2 ATN, prerenal, hepatorenal syndrome: Baseline creatinine 0.6, increased to 1.0 in the setting of hypotension. Recent nephrotoxins also include IV contrast 8/25 during CT scan, and now administration of vancomycin. Urine output decreasing 8/23. UA 8/22 notable for mild proteinuria, small LE, hyaline casts. FENA and FEBUN consistent with prerenal azotemia. Repeat UA 8/27 demonstrating some ketones  - strict I/Os with Alexander anchored  - albumin challenge ongoing, day 2 of 3 100g  - avoid nephrotoxins  - Renal consult, appreciate assistance  - daily BMP  - PTA midodrine 10mg tid    Hypernatremia: Free water deficit of 2.1L for goal sodium 140.  - D10W 80/hr for correction in 24h - will transition to free water flush enterally post-bronch  - Na checks q8h    ID:         Recent ESBL E coli bacteremia   Possible sepsis due to unknown source : Blood culture 1/2 positive 8/19 for ESBL E coli. No followup blood cultures obtained until 8/22, which remain NGTD. Previous urine culture, transudative pleural fluid culture, and ascites fluid culture currently NGTD. S pneumo and legionella urine antigen negative. Procalcitonin 0.64, increased from 8/19 0.12. Leukocytosis increasing.  Clinically stable.  - BCx 8/26 x 2 pending NGTD  - lavage today via bronch  - crytococcal antigen in serum (not covered by micafungin)  - trend CBC  - consider removal of vancomycin    Antimicrobials:  Micafungin (8/26 -   Meropenem (8/25 -  Azithromycin (8/25 - 8/27)  Vancomycin (8/20, 8/25-  Ertapenem (8/19 - 8/24)      GI  ESLD, multifactorial c/b portal hypertension with ascites, esophageal varices: Last EGD 5/2019 with no varices. Last US 8/20 with no mass, no portal vein thrombosis.  - Hepatology consulted, appreciate recs  - currently not undergoing transplant evaluation  - rifaxamin, lactulose as above for HE protocol  - holding PTA diuretics furosemid 20mg, spironolactone 50mg  - daily MELD labs    MELD-Na score: 30 at 8/27/2019  4:55 AM  MELD score: 30 at 8/27/2019  4:55 AM  Calculated from:  Serum Creatinine: 1.20 mg/dL at 8/27/2019  4:55 AM  Serum Sodium: 145 mmol/L (Rounded to 137 mmol/L) at 8/27/2019  4:55 AM  Total Bilirubin: 9.5 mg/dL at 8/27/2019  4:55 AM  INR(ratio): 3.32 at 8/27/2019  4:55 AM  Age: 44 years    Hyperbilirubinemia with gallbladder wall thickening: noted on CT abd/pelvis 8/25. Negative HIDA scan. Tenderness to palpation on exam.  - continue to monitor    Mild transaminitis: consistent with critical illness.  - check CK    Mild reflux esophagitis  - PPI daily    Nutrition  At risk for malnutrition: Albumin 2.5 in the setting of liver disease.  - start enteral nutrition today after bronch    Endocrine:  At risk for hypoglycemia  - hypoglycemia protocol, q4h glucose checks while NPO  - D10W as above, will start feeding post procedure    Heme/Onc:  Macrocytic anemia: Baseline hemoglobin 8-9 in the last 6 months.  - daily CBC, transfuse Hgb > 7    Thrombocytopenia, chronic due to liver disease: Baseline ~120s in the last 6 months.  - trend    DVT Prophylaxis: Pneumatic Compression Devices, will discuss prophylactic anticoagulation in AM  GI Prophylaxis: PPI    Restraints: Restraints for  medical healing needed: YES    Family update by me today: Yes     Amy Bray    I have seen and discussed this patient with Dr. Perlman Erica Ting, MD  Internal Medicine/Pediatrics, PGY3  [facility]  (p) [phone]      Code Status   Full Code    Subjective  RN notes reviewed. Minimal urine output ongoing without significant improvement in mental status. Able to wean slightly on vent settings FiO2. Some mild edema and leakage from extremities. Clear liquid in rectal tube likely just lactulose.    Review of Systems   Review of systems not obtained due to patient factors - mental status    Physical Exam   Temp: 98.4  F (36.9  C) Temp src: Axillary Temp  Min: 97.8  F (36.6  C)  Max: 99.4  F (37.4  C) BP: 111/50 Pulse: 113 Heart Rate: 112 Resp: 24 SpO2: 96 % O2 Device: Mechanical Ventilator Oxygen Delivery: Other (Comments)(45 L)  Vital Signs with Ranges  Temp:  [97.8  F (36.6  C)-99.4  F (37.4  C)] 98.4  F (36.9  C)  Pulse:  [] 113  Heart Rate:  [] 112  Resp:  [19-55] 24  BP: ()/(27-73) 111/50  FiO2 (%):  [50 %-100 %] 60 %  SpO2:  [90 %-99 %] 96 %  263 lbs 14.25 oz    Constitutional: intubated and sedated  Eyes: PERRL, scleral icterus  ENT: Normocephalic, without obvious abnormality, atraumatic, orally intubated  Respiratory: CTAB, improved aeration bilaterally, synchronous with vent, no wheezes/rhonchi/rales appreciated  Cardiovascular: Normal apical impulse, tachycardic, regular rhythm, normal S1 and S2, no S3 or S4, and no murmur noted.  GI: normal bowel sounds, soft, non-distended, nontender on palpation, no masses palpated, no hepatosplenomegaly, clear yellow drainage  Genitourinary:  Alexander in place draining clear yellow urine  Skin: No bruising or bleeding, normal skin color, texture, turgor, no redness, warmth, or swelling, no rashes, no lesions, no abnormal moles, nails normal without discoloration or clubbing and no jaundice; R PICC in place without erythema or drainage  over site  Musculoskeletal: pitting edema bilaterally in the LE to the knees 2+, Tone is normal.  Neurologic: Opens eyes to command, not localizing to pain    Data   Results for orders placed or performed during the hospital encounter of 08/19/19 (from the past 24 hour(s))   Blood gas venous   Result Value Ref Range    Ph Venous 7.43 7.32 - 7.43 pH    PCO2 Venous 30 (L) 40 - 50 mm Hg    PO2 Venous 43 25 - 47 mm Hg    Bicarbonate Venous 20 (L) 21 - 28 mmol/L    Base Deficit Venous 3.8 mmol/L    FIO2 40%    Lactic acid whole blood   Result Value Ref Range    Lactic Acid 4.1 (HH) 0.7 - 2.0 mmol/L   Vancomycin level   Result Value Ref Range    Vancomycin Level 21.4 mg/L   XR Chest Port 1 View    Narrative    Exam: XR CHEST PORT 1 VW, 8/26/2019 10:24 AM    Indication: 43yo F admitted to MICU for acute respiratory failure    Comparison: 8/25/2019, 8/24/2019.    Findings:   Single AP view of the chest. Right approximately PICC tip projects  over the superior cavoatrial junction. The central tracheobronchial  tree is patent. No pneumothorax. Right greater than left pleural  effusions with overlying bibasilar opacities. Diffuse patchy airspace  opacities throughout the lungs bilaterally. The visualized upper  abdomen is unremarkable. No acute osseous abnormality.      Impression    Impression:   1. Diffuse patchy airspace opacities bilaterally, concerning for  ongoing infection and/or pulmonary edema, not significantly changed  from prior examinations.  2. Small right pleural effusion, improved from prior examinations.  Stable small left pleural effusion. Unchanged associated bibasilar  atelectasis and/or consolidation.    I have personally reviewed the examination and initial interpretation  and I agree with the findings.    BELEN WATERS MD   Procalcitonin   Result Value Ref Range    Procalcitonin 0.64 ng/ml   Blood gas venous   Result Value Ref Range    Ph Venous 7.39 7.32 - 7.43 pH    PCO2 Venous 33 (L) 40 - 50 mm Hg     PO2 Venous 52 (H) 25 - 47 mm Hg    Bicarbonate Venous 20 (L) 21 - 28 mmol/L    Base Deficit Venous 4.7 mmol/L    FIO2 50%    Lactic acid whole blood   Result Value Ref Range    Lactic Acid 4.1 (HH) 0.7 - 2.0 mmol/L   Strep pneumo Agn Urine or CSF   Result Value Ref Range    Specimen Description Urine     S Pneumoniae Antigen       Negative, no Streptococcus pneumoniae antigen detected by immunochromatographic membrane   assay. A negative Streptococcus pneumoniae antigen result does not rule out infection with   Streptococcus pneumoniae.     Legionella pneumonia antigen urine   Result Value Ref Range    Specimen Description Urine     L Pneumo Urine Antigen       Presumptive negative for Legionella pneumophilia serogroup 1 antigen in urine, suggesting   no recent or current infection.  Infection due to Legionella cannot be ruled out, since   other serogroups and species may cause disease, antigen may not be present in urine in   early infection, and the level of antigen present in the urine may be below detectable   limits of the test.     Fractional excretion of sodium   Result Value Ref Range    Creatinine Urine 146 mg/dL    Sodium Urine mmol/L 5 mmol/L    %FENA <0.1 %   Urea nitrogen random urine with Creat Ratio   Result Value Ref Range    Urea Nitrogen, Ur 143 mg/dL    Urea Nitrogen Urine g/g Cr 1 g/g Cr   Blood gas arterial   Result Value Ref Range    pH Arterial 7.43 7.35 - 7.45 pH    pCO2 Arterial 29 (L) 35 - 45 mm Hg    pO2 Arterial 62 (L) 80 - 105 mm Hg    Bicarbonate Arterial 19 (L) 21 - 28 mmol/L    Base Deficit Art 4.3 mmol/L    FIO2 70    Lactic acid whole blood   Result Value Ref Range    Lactic Acid 4.3 (HH) 0.7 - 2.0 mmol/L   Creatinine   Result Value Ref Range    Creatinine 1.06 (H) 0.52 - 1.04 mg/dL    GFR Estimate 63 >60 mL/min/[1.73_m2]    GFR Estimate If Black 73 >60 mL/min/[1.73_m2]   Sodium   Result Value Ref Range    Sodium 146 (H) 133 - 144 mmol/L   Blood culture   Result Value Ref Range     Specimen Description Blood Unspecified Site     Special Requests Received in anaerobic bottle only     Culture Micro No growth after 9 hours    Blood culture   Result Value Ref Range    Specimen Description Blood Unspecified Site     Special Requests Received in aerobic bottle only     Culture Micro No growth after 9 hours    XR Chest Port 1 View    Narrative    Exam: XR CHEST PORT 1 VW, 8/26/2019 4:48 PM    Indication: ETT placement    Comparison: Same day 1011    Findings:   AP radiograph the chest. Endotracheal tube distal tip projects 3.9 cm  above the nishant. Right PICC distal tip projects over the low superior  vena cava. Cardiac silhouette is unchanged from prior. There are mixed  interstitial and hazy airspace opacities bilaterally which are similar  to prior. More dense hazy opacification of the lower lung fields.  Small left and small-to-moderate right pleural effusions. No  pneumothorax.      Impression    Impression:   1. Endotracheal tube distal tip projects 3.9 cm above the nishant.  2. Mixed interstitial and hazy airspace opacities are relatively  unchanged from prior and likely represent pulmonary edema possibly  with superimposed atelectasis or infection.  3. Small left and small-to-moderate right pleural effusion.    I have personally reviewed the examination and initial interpretation  and I agree with the findings.    TIMOTEO VALADEZ MD   Blood gas arterial and oxyhgb   Result Value Ref Range    pH Arterial 7.40 7.35 - 7.45 pH    pCO2 Arterial 31 (L) 35 - 45 mm Hg    pO2 Arterial 119 (H) 80 - 105 mm Hg    Bicarbonate Arterial 19 (L) 21 - 28 mmol/L    FIO2 80     Oxyhemoglobin Arterial 98 92 - 100 %    Base Deficit Art 4.9 mmol/L   Lactic acid whole blood   Result Value Ref Range    Lactic Acid 3.9 (H) 0.7 - 2.0 mmol/L   CBC with platelets differential   Result Value Ref Range    WBC 19.0 (H) 4.0 - 11.0 10e9/L    RBC Count 2.66 (L) 3.8 - 5.2 10e12/L    Hemoglobin 8.3 (L) 11.7 - 15.7 g/dL     Hematocrit 26.4 (L) 35.0 - 47.0 %    MCV 99 78 - 100 fl    MCH 31.2 26.5 - 33.0 pg    MCHC 31.4 (L) 31.5 - 36.5 g/dL    RDW 17.6 (H) 10.0 - 15.0 %    Platelet Count 125 (L) 150 - 450 10e9/L    Diff Method Automated Method     % Neutrophils 71.3 %    % Lymphocytes 13.5 %    % Monocytes 8.8 %    % Eosinophils 1.1 %    % Basophils 0.3 %    % Immature Granulocytes 5.0 %    Nucleated RBCs 0 0 /100    Absolute Neutrophil 13.6 (H) 1.6 - 8.3 10e9/L    Absolute Lymphocytes 2.6 0.8 - 5.3 10e9/L    Absolute Monocytes 1.7 (H) 0.0 - 1.3 10e9/L    Absolute Eosinophils 0.2 0.0 - 0.7 10e9/L    Absolute Basophils 0.1 0.0 - 0.2 10e9/L    Abs Immature Granulocytes 1.0 (H) 0 - 0.4 10e9/L    Absolute Nucleated RBC 0.0    Comprehensive metabolic panel   Result Value Ref Range    Sodium 145 (H) 133 - 144 mmol/L    Potassium 4.6 3.4 - 5.3 mmol/L    Chloride 113 (H) 94 - 109 mmol/L    Carbon Dioxide 19 (L) 20 - 32 mmol/L    Anion Gap 13 3 - 14 mmol/L    Glucose 76 70 - 99 mg/dL    Urea Nitrogen 21 7 - 30 mg/dL    Creatinine 1.20 (H) 0.52 - 1.04 mg/dL    GFR Estimate 54 (L) >60 mL/min/[1.73_m2]    GFR Estimate If Black 63 >60 mL/min/[1.73_m2]    Calcium 8.7 8.5 - 10.1 mg/dL    Bilirubin Total 9.5 (H) 0.2 - 1.3 mg/dL    Albumin 3.0 (L) 3.4 - 5.0 g/dL    Protein Total 5.7 (L) 6.8 - 8.8 g/dL    Alkaline Phosphatase 113 40 - 150 U/L    ALT 48 0 - 50 U/L     (H) 0 - 45 U/L   INR   Result Value Ref Range    INR 3.32 (H) 0.86 - 1.14   PTT (AM Draw)   Result Value Ref Range    PTT 55 (H) 22 - 37 sec   Ammonia (AM Draw)   Result Value Ref Range    Ammonia 224 (HH) 10 - 50 umol/L   Magnesium   Result Value Ref Range    Magnesium 2.3 1.6 - 2.3 mg/dL   Phosphorus   Result Value Ref Range    Phosphorus 5.0 (H) 2.5 - 4.5 mg/dL   Vancomycin level   Result Value Ref Range    Vancomycin Level 15.6 mg/L   Blood gas arterial   Result Value Ref Range    pH Arterial 7.44 7.35 - 7.45 pH    pCO2 Arterial 28 (L) 35 - 45 mm Hg    pO2 Arterial 93 80 - 105 mm  Hg    Bicarbonate Arterial 19 (L) 21 - 28 mmol/L    Base Deficit Art 4.4 mmol/L    FIO2 60    Lactic acid whole blood   Result Value Ref Range    Lactic Acid 4.2 (HH) 0.7 - 2.0 mmol/L

## 2019-08-27 NOTE — PROCEDURES
Bronchoscopy with BAL         Indication:   Hypoxic respiratory failure          Consent:   Obtained from Mother, in the chart            Pre-medication:   The patient is on mechanical ventilation   Lidocaine 1%: subglottic ml 6  Bolus medications:  Versed 1mg ggt, Fentanyl 50mcg gtt            Procedure Summary:   Time out was performed.   The bronchoscope inserted through ET tube      Airway examination:  Exam of trachea and bronchus of the right and left bronchial tree to the sub-segmental level revealed no endobronchial lesion. There was yellowish-greenish thick secretions noted in the LLL, which were suctioned.     Procedure:  BAL performed from RML. 120cc instilled with > 60cc return.   The patient tolerated the procedure well without undue discomfort, hypotension or arrhythmia. The procedure was performed in the ICU and vital sign parameters were monitored.         Complications:   No immediate complications.  Sample sent for   -cell count   -microbiology.     This procedure was performed by Ritchie Yee MD, under direct supervision of PERLMAN, DAVID

## 2019-08-27 NOTE — PROGRESS NOTES
Antimicrobial Stewardship Team Note    Antimicrobial Stewardship Program - A joint venture between [facility] and  Physicians to optimize antibiotic management.  NOT a formal consult - Restricted Antimicrobial Review     Patient: Neema Bailey  MRN: [id number]  Allergies: Bactrim [sulfamethoxazole w/trimethoprim]; Gabapentin; and Nitrofurantoin    Brief Summary: Neema is a 44 year old female who presented to the ED on 8/19 with BLE edema, low back pain, and shortness of breath with increased cough. 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 She was in a TCU from 7/21 - 8/13 for her decompensated ESLD. She has a history of ESBL (E coli) bacteremia and VRE (E. Faecium) UTI in 1/2019.    HPI: In addition to her edema, back pain, and shortness of breath, Neema states that she has had recent chills, abdominal pain (especially when eating), nausea, dry heaves, weakness, and fatigue.  Her WBC upon admission was 17K; it slightly increased to 20.9K on 8/20, and is down to 8.3K on 8/21; it has been increasing since 8/23 and has risen to 19K on 8/27. On admission, she was started on ertapenem and vancomycin; vancomycin was discontinued after one day of therapy. A blood culture from the right arm from 8/19 grew ESBL E. coli, which was susceptible to meropenem/ertapenem.  Since that date, her blood cultures have been negative and she continued on ertapenem monotherapy.     On 8/25, ertapenem was discontinued and coverage was broadened to meropenem, azithromycin, and vancomycin because of respiratory decompensation overnight, with altered mental status change and acute hypoxic respiratory failure.   Neema required increasing pressor and ventilation support. Pulmonary was consulted and reported that she ?ppears to have had rapid respiratory deterioration in last 36 hours, reflected on imaging with  increased bilateral airspace disease and need for BiPAP. Imaging with worsening hypotension raises concern for new pneumonia.?The read from the CT was ?ncreased areas of groundglass attenuation superimposed on interlobular septal thickening with additional patchy consolidative opacities throughout most of the lung fields with slight sparing of the left lower lung which are increased from 8/19/2019. Findings are compatible provided history of infection.?Micafungin was also started on 8/26 for fungal coverage.     Neema's nares are MRSA/MSSA PCR negative. Other tests to note include a previous urine culture, transudative pleural fluid culture, and ascites fluid culture currently NGTD. S pneumo and legionella urine antigen negative. Procalcitonin was 0.64 on 8/26, increased from 8/19 (0.12). A CT from 8/20 of the chest/ abdomen/pelvis showed a nonspecific, possibly encapsulated fluid collection in the subcutaneous fat of the left pelvic region. Of note, she has been afebrile throughout this hospitalization.         Active Anti-infective Medications   (From admission, onward)                Start     Stop    08/20/19 2100  ertapenem  1 g,   Intravenous,   EVERY 24 HOURS     Sepsis        08/30/19 2059 08/20/19 1200  vancomycin (VANCOCIN) injection  1,750 mg,   Intravenous,   EVERY 12 HOURS     Sepsis        --    08/20/19 0800  rifaximin  550 mg,   Oral,   2 TIMES DAILY     hepatic encephalopathy        --          Assessment:   [PNA]: Neema has a negative urine antigen for Legionella pneumonia; this negates the need for azithromycin. It is important to keep in mind the limitations of the urine antigen as it only detects Pneumophilia serogroup 1. The patient does not have signs/symptoms consistent with other Legionella pneumonias, thus discontinuation of azithromycin is appropriate.  Additionally Neema has a negative MRSA nares PCR screen. Given the high negative predictive value of the PCR, MRSA pneumonia is not a  concern and vancomycin can be discontinued. A cryptococcal serum antigen may be of benefit in this situation as the empiric micafungin would not cover Cryptococcus. An ET sputum culture has not been obtained; if possible this would be beneficial to obtain in order to narrow and direct antimicrobial therapy. A respiratory virus panel could be considered to rule out other infectious processes contributing to the patient's respiratory decline. An ID consult would also be warranted given this patient's complex infectious disease course.     [ESBL E. coli BSI 2/2 unknown source]: On 8/21, AMT rounded and stated that the  most likely source of infection SBP due to the patient's sudden onset abdominal pain, history of ESLD and ascites. An alternative source to consider is the pelvic fluid collection if a repeat blood culture returns positive or if the patient decompensates.  The source of the ESBL E. coli bacteremia has not been identified and the  possibly encapsulated fluid collection in the subcutaneous fat of the left pelvic region  could be a possible source.     Recommendations:  Discontinue vancomycin and azithromycin.   Continue meropenem and micafungin.   Highly consider cryptococcus serum antigen, ET sputum sample, and respiratory viral panel.   Consider an ultrasound of pelvis area.  Consult ID for further infectious workup.     Discussed with ID Staff  Maryjane Alexander, PharmD, BCIDP and Dr. Evelyne Lewis, PharmD IV Student  [phone]  Pager [phone]    Vital Signs/Clinical Features:  Vitals         08/25 0700  -  08/26 0659 08/26 0700  -  08/27 0659 08/27 0700  -  08/27 1306   Most Recent    Temp ( F) 97.6 -  98.7    97.8 -  99.4      99.4     99.4 (37.4)    Pulse 77 -  106    95 -  146       113    Heart Rate 77 -  107    98 -  124    112 -  118     117    Resp 28 -  44    19 -  55    22 -  26     23    BP 76/40 -  105/53    83/27 -  117/45    89/35 -  108/41     96/36    SpO2 (%) 90 -  99    90 -   99    95 -  98     97            Labs  Estimated Creatinine Clearance: 79.7 mL/min (A) (based on SCr of 1.2 mg/dL (H)).  Recent Labs   Lab Test 08/24/19 0445 08/24/19 2043 08/25/19  0604 08/26/19  0320 08/26/19  1231 08/27/19  0455   CR 0.66 0.72 0.78 1.01 1.06* 1.20*       Recent Labs   Lab Test 08/21/19 0348 08/23/19 0411 08/24/19 0445 08/24/19 2043 08/25/19  0604 08/26/19  0320 08/27/19  0455   WBC 8.3   < > 11.2* 12.2* 13.8* 13.5* 16.0* 19.0*   ANEU 5.7  --  8.5* 8.7*  --  9.8* 11.8* 13.6*   ALYM 1.5  --  1.4 1.9  --  1.8 1.8 2.6   MARGARITA 0.7  --  1.0 1.2  --  1.3 1.7* 1.7*   AEOS 0.3  --  0.2 0.3  --  0.2 0.1 0.2   HGB 6.7*   < > 7.9* 8.3* 8.1* 8.2* 8.5* 8.3*   HCT 22.1*   < > 26.0* 25.8* 25.7* 27.7* 27.9* 26.4*   *   < > 100 100 100 103* 102* 99   *   < > 99* 102* 100* 91* 112* 125*    < > = values in this interval not displayed.       Recent Labs   Lab Test 08/22/19 0429 08/23/19 0411 08/24/19 0445 08/25/19 0604 08/26/19 0320 08/27/19  0455   BILITOTAL 12.4* 10.5* 9.8* 9.8* 9.4* 9.5*   ALKPHOS 114 104 108 112 116 113   ALBUMIN 2.6* 2.3* 2.3* 2.7* 2.5* 3.0*   AST 98* 88* 116* 132* 170* 260*   ALT 37 27 33 35 37 48       Recent Labs   Lab Test 07/17/19  0209  08/19/19  1952  08/26/19  0324 08/26/19  0746 08/26/19  1119 08/26/19  1231 08/26/19  1818 08/27/19  0505   PCAL 0.05  --  0.12  --   --   --  0.64  --   --   --    LACT  --    < > 4.9*   < > 3.3* 4.1* 4.1* 4.3* 3.9* 4.2*   CRP  --   --  16.0*  --   --   --   --   --   --   --     < > = values in this interval not displayed.       Recent Labs   Lab Test 08/27/19  0455   VANCOMYCIN 15.6       Culture Results:  7-Day Micro Results       Procedure Component Value Units Date/Time    Blood culture [] Collected:  08/26/19 1250    Order Status:  Completed Lab Status:  Preliminary result Updated:  08/27/19 0644    Specimen:  Blood      Specimen Description Blood Unspecified Site     Special Requests Received in anaerobic bottle only      Culture Micro No growth after 9 hours    Blood culture [] Collected:  08/26/19 1250    Order Status:  Completed Lab Status:  Preliminary result Updated:  08/27/19 0644    Specimen:  Blood from Central Line      Specimen Description Blood Unspecified Site     Special Requests Received in aerobic bottle only     Culture Micro No growth after 9 hours    Strep pneumo Agn Urine or CSF [] Collected:  08/26/19 1123    Order Status:  Completed Lab Status:  Final result Updated:  08/26/19 1345    Specimen:  Urine      Specimen Description Urine     S Pneumoniae Antigen Negative, no Streptococcus pneumoniae antigen detected by immunochromatographic membrane   assay. A negative Streptococcus pneumoniae antigen result does not rule out infection with   Streptococcus pneumoniae.      Fluid Culture Aerobic Bacterial [[id number]] Collected:  08/25/19 0956    Order Status:  Completed Lab Status:  Preliminary result Updated:  08/26/19 1141    Specimen:  Pleural fluid      Specimen Description Fluid PLEURAL     Culture Micro Culture negative monitoring continues    Cell count with differential fluid [[id number]] Collected:  08/24/19 1353    Order Status:  Canceled Lab Status:  No result     Specimen:  Pleural fluid     Cell count with differential fluid [[id number]] Collected:  08/24/19 1350    Order Status:  Completed Lab Status:  Final result Updated:  08/24/19 2005    Specimen:  Pleural fluid      Body Fluid Analysis Source Pleural fluid     % Neutrophils Fluid 23 %      % Lymphocytes Fluid 13 %      % Other Cells Fluid 64 %      Comment: Other cells are Monocytes, Macrophages, and Mesothelial cells.        Color Fluid Yellow     Appearance Fluid Cloudy     WBC Fluid 195 /uL      Comment: No reference ranges have been established.  This result should be interpreted   in the context of the patient's clinical condition and compared to   simultaneous measurement in the patient's blood.  Refer to Lab Guide for   specific interpretive  guidelines.         Lactate dehydrogenase fluid [[id number]] Collected:  08/24/19 1350    Order Status:  Completed Lab Status:  Final result Updated:  08/24/19 1440    Specimen:  Pleural fluid      LD Fluid Source Pleural fluid     Lactate Dehydrogenase Fluid 56 U/L      Comment: No reference ranges have been established.  This result should be interpreted   in the context of the patient's clinical condition and compared to   simultaneous measurement in the patient's blood.  Refer to Lab Guide for   specific interpretive guidelines.         Protein fluid [[id number]] Collected:  08/24/19 1350    Order Status:  Completed Lab Status:  Final result Updated:  08/24/19 1440    Specimen:  Pleural fluid      Protein Total Fluid Source Pleural fluid     Protein Total Fluid 1.1 g/dL      Comment: No reference ranges have been established.  This result should be interpreted   in the context of the patient's clinical condition and compared to   simultaneous measurement in the patient's blood.  Refer to Lab Guide for   specific interpretive guidelines.         Anaerobic bacterial culture [[id number]] Collected:  08/24/19 1350    Order Status:  Canceled Lab Status:  No result     Specimen:  Pleural fluid     Gram stain [[id number]] Collected:  08/24/19 1350    Order Status:  Completed Lab Status:  Final result Updated:  08/25/19 1145    Specimen:  Pleural fluid      Specimen Description Pleural fluid     Gram Stain No organisms seen      Few  WBC'S seen  Rare  PMNs seen        Quantification of host cells and microbiological organisms was done on a cytocentrifuged   preparation.      Fluid Culture Aerobic Bacterial     Order Status:  Canceled Lab Status:  No result     Specimen:  Pleural fluid     Gram stain     Order Status:  Canceled Lab Status:  No result     Specimen:  Pleural fluid     Anaerobic bacterial culture     Order Status:  Canceled Lab Status:  No result     Specimen:  Pleural fluid     fluid culture - Aerobic Bacterial     Order  Status:  No result Lab Status:  No result     Specimen:  Ascites Fluid     Amylase  fluid     Order Status:  No result Lab Status:  No result     Specimen:  Ascites Fluid     Anaerobic bacterial culture     Order Status:  No result Lab Status:  No result     Specimen:  Ascites Fluid     Bilirubin fluid     Order Status:  No result Lab Status:  No result     Specimen:  Ascites Fluid     Triglyceride Fluid     Order Status:  No result Lab Status:  No result     Specimen:  Ascites Fluid     Albumin fluid     Order Status:  No result Lab Status:  No result     Specimen:  Ascites Fluid     Protein fluid     Order Status:  No result Lab Status:  No result     Specimen:  Ascites Fluid     Gram stain     Order Status:  No result Lab Status:  No result     Specimen:  Ascites Fluid     Bilirubin fluid [[id number]] Collected:  08/22/19 1400    Order Status:  Canceled Lab Status:  No result     Specimen:  Ascites Fluid     Triglyceride Fluid [[id number]] Collected:  08/22/19 1400    Order Status:  Canceled Lab Status:  No result     Specimen:  Ascites Fluid     Amylase  fluid [[id number]] Collected:  08/22/19 1400    Order Status:  Canceled Lab Status:  No result     Specimen:  Ascites Fluid     Cell count with differential fluid [[id number]] Collected:  08/22/19 1130    Order Status:  Completed Lab Status:  Final result Updated:  08/22/19 1450    Specimen:  Ascites      Body Fluid Analysis Source Ascites     Color Fluid Green     Appearance Fluid Cloudy     WBC Fluid -- /uL      No reference ranges have been established.  This result should be interpreted in the   context of the patient's clinical condition and compared to simultaneous measurement in   the patient's blood.  Refer to Lab Guide for specific interpretive guidelines.       Comment: 1       Albumin fluid [[id number]] Collected:  08/22/19 1130    Order Status:  Completed Lab Status:  Final result Updated:  08/22/19 1253    Specimen:  Ascites      Albumin Fluid Source Ascites      Albumin Fluid 0.1 g/dL      Comment: No reference ranges have been established.  This result should be interpreted   in the context of the patient's clinical condition and compared to   simultaneous measurement in the patient's blood.  Refer to Lab Guide for   specific interpretive guidelines.         Protein fluid [[id number]] Collected:  08/22/19 1130    Order Status:  Completed Lab Status:  Final result Updated:  08/22/19 1431    Specimen:  Ascites      Protein Total Fluid Source Ascites     Protein Total Fluid INVALID g/dL      Comment: UNABLE TO RUN NOTIFIED MONICA HENDRICKSON 8/22/19 1426 4C BERTRAM       Gram stain [[id number]] Collected:  08/22/19 1130    Order Status:  Completed Lab Status:  Final result Updated:  08/22/19 1433    Specimen:  Ascites      Specimen Description Ascites Fluid     Special Requests --     Specimen leaked in transit.  Due to possible contamination, results should be interpreted   with caution.       Gram Stain No organisms seen      No WBC's seen      Quantification of host cells and microbiological organisms was done on a cytocentrifuged   preparation.      fluid culture - Aerobic Bacterial [[id number]] Collected:  08/22/19 1130    Order Status:  Completed Lab Status:  Final result Updated:  08/27/19 0710    Specimen:  Ascites from Abdomen      Specimen Description Ascites Fluid     Special Requests --     Specimen leaked in transit.  Due to possible contamination, results should be interpreted   with caution.       Culture Micro No growth    Glucose fluid [[id number]] Collected:  08/22/19 1130    Order Status:  Completed Lab Status:  Final result Updated:  08/22/19 1431    Specimen:  Ascites      Glucose Fluid Source Ascites     Glucose Fluid INVALID mg/dL      Comment: UNABLE TO RUN NOTIFIED MONICA HENDRICKSON 8/22/19 1426 4C BERTRAM       Lactate dehydrogenase fluid [[id number]] Collected:  08/22/19 1130    Order Status:  Completed Lab Status:  Final result Updated:  08/22/19 1431    Specimen:  Ascites      LD Fluid  Source Ascites     Lactate Dehydrogenase Fluid INVALID U/L      Comment: UNABLE TO RUN NOTIFIED MONICA HENDRICKSON 8/22/19 1426 4C BERTRAM       Anaerobic bacterial culture [[id number]] Collected:  08/22/19 1130    Order Status:  Completed Lab Status:  Preliminary result Updated:  08/23/19 0947    Specimen:  Ascites from Abdomen      Specimen Description Ascites Fluid     Special Requests --     Not received in an anaerobic transport container.  Specimen leaked in transit.  Due to possible contamination, results should be interpreted   with caution.       Culture Micro Culture negative monitoring continues    Amylase  fluid [[id number]] Collected:  08/22/19 1130    Order Status:  Completed Lab Status:  Final result Updated:  08/22/19 1300    Specimen:  Ascites      Amylase Fluid Source Ascites     Amylase Fluid 8 U/L      Comment: No reference ranges have been established.  This result should be interpreted   in the context of the patient's clinical condition and compared to   simultaneous measurement in the patient's blood.  Refer to Lab Guide for   specific interpretive guidelines.         Bilirubin fluid [[id number]] Collected:  08/22/19 1130    Order Status:  Completed Lab Status:  Final result Updated:  08/22/19 1431    Specimen:  Ascites      Bilirubin Fluid Source Ascites     Bilirubin Fluid INVALID     Comment: UNABLE TO RUN NOTIFIED MONICA HENDRICKSON 8/22/19 1426 4C BERTRAM       Triglyceride Fluid [[id number]] Collected:  08/22/19 1130    Order Status:  Completed Lab Status:  Final result Updated:  08/22/19 1431    Specimen:  Ascites      Triglyceride Fluid Source Ascites     Triglyceride Fluid INVALID mg/dL      Comment: UNABLE TO RUN NOTIFIED MONICA HENDRICKSON 8/22/19 1426 4C BERTRAM       Blood culture [[id number]] Collected:  08/22/19 0720    Order Status:  Completed Lab Status:  Preliminary result Updated:  08/27/19 0615    Specimen:  Blood      Specimen Description Blood Left Hand     Special Requests Received in aerobic bottle only     Culture Micro No  growth after 5 days    Blood culture [[id number]] Collected:  08/22/19 0720    Order Status:  Completed Lab Status:  Preliminary result Updated:  08/27/19 0615    Specimen:  Blood      Specimen Description Blood Right Hand     Special Requests Received in aerobic bottle only     Culture Micro No growth after 5 days    Blood culture     Order Status:  Canceled Lab Status:  No result     Specimen:  Blood     Blood culture     Order Status:  Canceled Lab Status:  No result     Specimen:  Blood     Blood culture     Order Status:  Canceled Lab Status:  No result     Specimen:  Blood             Recent Labs   Lab Test 07/24/19  2315 08/02/19  1500 08/19/19  2135 08/22/19  2033 08/27/19  0905   URINEPH 7.0 6.0 6.5 6.0 5.5   NITRITE Negative Positive* Negative Negative Negative   LEUKEST Negative Small* Negative Small* Small*   WBCU 4 11* 3 4 9*                   Recent Labs   Lab Test 07/27/19  0315   CDBPCT Negative       Imaging: Ct Chest/abdomen/pelvis W Contrast    Result Date: 8/25/2019  EXAMINATION: CT CHEST/ABDOMEN/PELVIS W CONTRAST, 8/25/2019 2:00 PM TECHNIQUE:  Helical CT images from the thoracic inlet through the symphysis pubis were obtained  with contrast. Contrast dose: 135ml isovue 370 COMPARISON: CT 8/19/2019 HISTORY: Increasing abdominal pain in patient with ESBL bacteremia and recent paracentesis with bile aspirated. FINDINGS: Chest: Moderate right pleural effusion decreased in size from prior. Small left pleural effusion increased in size from prior. There are diffuse patchy areas of groundglass attenuation superimposed on interlobular septal thickening and scattered patchy consolidative opacities throughout most the lung fields with slight sparing of the left lower lung. These findings have substantially increased since the prior Dense consolidation adjacent to the right pleural effusion. Scattered calcified granulomas. No pneumothorax. The central tracheal bronchial tree is clear. The heart is not  enlarged. The ascending aorta and main pulmonary artery are normal caliber. Right PICC distal tip is at the cavoatrial junction. No large pericardial effusion. Thyroid is unremarkable. Calcified mediastinal nodes. No new or enlarging mediastinal or hilar lymphadenopathy. Bilateral breast implants. Abdomen and pelvis: Denny-en-Y gastric bypass. No abnormally dilated loops of bowel. No focal hepatic lesions. The portal system appears grossly patent. The gallbladder appears to be folded on itself, somewhat hydropic, measuring 5 cm in diameter. There are no dense gallstones identified. No dilation of the common bile duct. No intrahepatic biliary ductal dilation. The pancreas is diminutive and atrophic. Splenomegaly similar to prior with multiple wedge-shaped hypodensities in the periphery also similar to prior. Extensive portosystemic collaterals. The adrenal glands are unremarkable. There is no hydronephrosis or nephrolithiasis. The bladder is decompressed around a Alexander catheter with a small amount of air in the balloon. Hysterectomy. No adnexal masses are visualized. The small and large bowel are normal caliber. No intra-abdominal free air. The appendix is not visualized. There is moderate volume free fluid in the abdomen in the upper quadrants and a small amount of the pelvis. This fluid measures simple which argues against hemorrhage. Extensive subcutaneous edema about the abdomen and pelvis and chest. Nonspecific mesenteric haziness suggestive of fluid overload. No new or enlarging lymphadenopathy in the abdomen or pelvis. No abdominal aortic aneurysm. Recanalized periumbilical vein. Left upper quadrant varices. Bones and soft tissues: Mild degenerative changes of the thoracolumbar spine. No suspicious osseous lesions. 4.8 x 2.8 x 6.8 cm fluid collection in the left subcutaneous pelvic region adjacent to the lateral aspect of the superior iliac crest which previously measured 4.9 x 2.7 x 7.1 cm. Lumbar spine  laminectomy changes.     IMPRESSION: 1. Increased areas of groundglass attenuation superimposed on interlobular septal thickening with additional patchy consolidative opacities throughout most of the lung fields with slight sparing of the left lower lung which are increased from 8/19/2019. Findings are compatible provided history of infection. Pulmonary edema should be considered among others. 2. Moderate right pleural effusion is decreased in size from prior, and small left pleural effusion is increased in size from prior. There is a dense consolidative opacity adjacent to the right effusion which may represent atelectasis or infection. 3a. Findings of portal hypertension in a somewhat noncirrhotic appearing liver including splenomegaly, portosystemic collaterals, and moderate free fluid in the abdomen and pelvis which is slightly increased from prior. 3b. Somewhat focal perihepatic fluid. If bilaterally consistent with, given history, nuclear medicine hepatobiliary scan could be considered. MRI with Eovist would also provide additional information. 4. Distended gallbladder with mild wall thickening similar to prior CT, which is nonspecific in the setting of liver disease and ascites. Right upper quadrant ultrasound could be considered if indicated. 5. Redemonstration of wedge-shaped hypodensities in the spleen suggestive of embolic infarctions. 6. Relatively unchanged, nonspecific, possibly encapsulated fluid collection in the subcutaneous tissue of the left superior pelvis. I have personally reviewed the examination and initial interpretation and I agree with the findings. TAYLER QUINTERO MD    Xr Chest Port 1 View    Result Date: 8/26/2019  Exam: XR CHEST PORT 1 VW, 8/26/2019 4:48 PM Indication: ETT placement Comparison: Same day 1011 Findings: AP radiograph the chest. Endotracheal tube distal tip projects 3.9 cm above the nishant. Right PICC distal tip projects over the low superior vena cava. Cardiac silhouette  is unchanged from prior. There are mixed interstitial and hazy airspace opacities bilaterally which are similar to prior. More dense hazy opacification of the lower lung fields. Small left and small-to-moderate right pleural effusions. No pneumothorax.     Impression: 1. Endotracheal tube distal tip projects 3.9 cm above the nishant. 2. Mixed interstitial and hazy airspace opacities are relatively unchanged from prior and likely represent pulmonary edema possibly with superimposed atelectasis or infection. 3. Small left and small-to-moderate right pleural effusion. I have personally reviewed the examination and initial interpretation and I agree with the findings. TIMOTEO VALADEZ MD    Xr Chest Port 1 View    Result Date: 8/26/2019  Exam: XR CHEST PORT 1 VW, 8/26/2019 10:24 AM Indication: 43yo F admitted to MICU for acute respiratory failure Comparison: 8/25/2019, 8/24/2019. Findings: Single AP view of the chest. Right approximately PICC tip projects over the superior cavoatrial junction. The central tracheobronchial tree is patent. No pneumothorax. Right greater than left pleural effusions with overlying bibasilar opacities. Diffuse patchy airspace opacities throughout the lungs bilaterally. The visualized upper abdomen is unremarkable. No acute osseous abnormality.     Impression: 1. Diffuse patchy airspace opacities bilaterally, concerning for ongoing infection and/or pulmonary edema, not significantly changed from prior examinations. 2. Small right pleural effusion, improved from prior examinations. Stable small left pleural effusion. Unchanged associated bibasilar atelectasis and/or consolidation. I have personally reviewed the examination and initial interpretation and I agree with the findings. BELEN WATERS MD    Xr Chest Port 1 View    Result Date: 8/24/2019  XR CHEST PORT 1 VW  8/24/2019 8:53 PM  HISTORY: Increasing O2 requirement, thoracentesis today COMPARISON: 8/24/2019 at 1446 FINDINGS: Single view of  the chest demonstrates increase in bilateral, left greater than right pleural effusions and perihilar mixed interstitial and airspace opacities. Progression of the right hilar alveolar opacity with air bronchograms. Stable position of the right arm PICC line.  No pneumothorax.     IMPRESSION: 1. Increase in size of right greater than left pleural effusions. 2. Overall increase in perihilar mixed interstitial airspace opacities favoring severe pulmonary edema, although severe infection, ARDS, or diffuse alveolar hemorrhage can be considered in the appropriate clinical setting. I have personally reviewed the examination and initial interpretation and I agree with the findings. TAYLER QUINTERO MD    Xr Chest Port 1 View    Result Date: 8/24/2019  XR CHEST PORT 1 VW  8/24/2019 2:50 PM  HISTORY: s/p thoracentesis COMPARISON: Chest radiograph 8/22/2019 FINDINGS: Single AP view of the chest. Right upper extremity PICC tip projects at the cavoatrial junction. Trachea is midline. Cardiac and mediastinal silhouette are within normal limits. Pulmonary vasculature is indistinct. Diffuse hazy airspace and interstitial opacities not significantly changed. Unchanged small left pleural effusion. Decreased moderate right pleural effusion. No pneumothorax. Visualized portions of the abdomen, soft tissues and osseous thorax are unremarkable.     IMPRESSION: 1. No pneumothorax visualized. 2. Decreased now moderate right pleural effusion. 3. Unchanged in left pleural effusion. 4. No significant change in diffuse mixed interstitial and airspace opacities. I have personally reviewed the examination and initial interpretation and I agree with the findings. TAYLER QUINTERO MD    Xr Chest Port 1 View    Result Date: 8/24/2019  XR CHEST PORT 1 VW  8/23/2019 11:19 PM  HISTORY: SOB, known hydrothorax COMPARISON: Chest x-ray 8/20/2019 TECHNIQUE: Semiupright frontal view of the chest FINDINGS: Relatively stable moderate to large right sided pleural  effusion given differences in positioning with associated consolidation/atelectasis of the right lower lobe. Small sided pleural effusion. No appreciable pneumothorax. Bilateral mixed interstitial patchy airspace opacities. Cardiac mediastinal silhouette is within normal limits. The trachea is midline. The upper abdomen is unremarkable. Right-sided PICC line with tip projecting over the superior cavoatrial junction. No suspicious osseous lesion.     IMPRESSION: 1. Stable large right-sided pleural effusion given differences in positioning with associated consolidation/atelectasis of the right lower lobe. Small left-sided pleural effusion. 2. Bilateral mixed interstitial patchy airspace opacities, edema versus infection versus ARDS. I have personally reviewed the examination and initial interpretation and I agree with the findings. MD Michael WATKINS Hepatobiliary Scan    Result Date: 8/23/2019  Examination:  NM HEPATOBILIARY SCAN  Date: 8/23/2019 2:57 PM. Indication:   Diagnostic paracentesis yesterday with bilious drainage - evaluate for bile leak Additional Information: none Technique: The patient received 6.6 mCi of Tc-99m Choletec intravenously. Images were obtained out through 60 minutes.   At 60 minutes the patient received [Amt of CCK] mcg of CCK over [Infusion Time] minutes. An additional 45 minutes of images were obtained after the gall bladder contraction intervention. Findings: There is slow clearance of the radionuclide from the blood pool into the liver suggesting underlying hepatocellular dysfunction. By 15 minutes there is clear visualization of the intrahepatic ducts as well as the upper common bile duct.  At 25 minutes there is emptying from the common bile duct into the small bowel. No evidence of bile leak. Enterogastric reflux was not present. At 60 minutes there is still residual radionuclide activity within the blood pool indicating hepatocellular dysfunction or hepatitis. There is  secondary excretion into the kidneys.     Impression: 1. No changes to indicate a bile leak. 2. Abnormal clearance of the ring nuclide from the blood pool indicating underlying hepatocellular dysfunction or hepatitis. I have personally reviewed the examination and initial interpretation and I agree with the findings. GREGORIO MIR MD

## 2019-08-27 NOTE — PROGRESS NOTES
CLINICAL NUTRITION SERVICES - REASSESSMENT NOTE     Nutrition Prescription    RECOMMENDATIONS FOR MDs/PROVIDERS TO ORDER:  Now that pt has enteral access, please enter consult for Nutrition Services- TF assess and order. RD will initiate TF of Nutren 1.5 @ 15 mL/hr and adv by 10 mL q4h until @ goal rate of 55 mL/hr to provide 1980 kcals (26 kcal/kg/day), 90 g PRO (1.2 g/kg/day), 1003 mL H2O, 232 g CHO and no fiber daily.  With TF, also order daily Prosource, 1 pkt TID to provide an additional 120 kcal and 33 g PRO to fully meet PRO needs (total 123 g/day or 1.6 g PRO/kg).   With TF, also order daily Certavite to help meet micronutrient needs.   With TF, also order 500 mg/day x 10 days of vitamin C d/t high likelihood of scurvy.     Due to h/o both gastric bypass and EtOH abuse, would also order daily Thiamin (100 mg/day), Folate, vitamin D and vitamin B12.    Malnutrition Status:    Severe malnutrition in the context of chronic illness.     Recommendations already ordered by Registered Dietitian (RD):  Discussed initiating TF now that pt has enteral access.     Future/Additional Recommendations:  Metabolic cart study to better evaluate caloric requirements given volume status and morbid obesity.       EVALUATION OF THE PROGRESS TOWARD GOALS   Diet: NPO  Nutrition Support: none  Intake: pt was eating 0-50% of meals prior to being made NPO on .     NEW FINDINGS   Radiology has completed the placement of a 10 Fr NGT.     Pt has a fissure in her rené cleft.     Positive urine ketones this AM. Expect pt to be pan-deficient in multiple vit/min and trace elements given her h/o both gastric bypass and drug & EtOH abuse. Her PMH contains a list of things that could be r/t micronutrient deficiencies (ex: LE edema, dizzines, numbness and tingling/peripheral neuropathy, dermatitis, anemia). She has jaundice, bloody gums (fracisco on bottom), corkscrew hairs on her shins, spider angiomas, and areas of broken blood vessels on her  bilateral arms. Pt likely has scurvy given bleeding gums and corkscrew hairs on her shins.     MALNUTRITION  % Intake: </= 50% for >/= 5 days   % Weight Loss: None noted  Subcutaneous Fat Loss: None observed  Muscle Loss: Scapular bone:  moderate, Thoracic region (clavicle, acromium bone, deltoid, trapezius, pectoral):  moderate, Upper arm (bicep, tricep): moderate, Lower arm  (forearm): moderate, Dorsal hand: mild, Upper leg (quadricep, hamstring):  moderate, and Posterior calf: moderate (difficult to assess given morbid obesity)  Fluid Accumulation/Edema: Moderate (3+ LE edema)  Malnutrition Diagnosis: Severe malnutrition in the context of chronic illness.     Previous Goals   Pt to consume % of nutritionally adequate meal trays TID, or the equivalent with supplements/snacks.  Evaluation: Not met, no longer applicable.    Previous Nutrition Diagnosis  Inadequate protein-energy intake  Evaluation: No change    CURRENT NUTRITION DIAGNOSIS  Inadequate protein-energy intake related to mental and resp status inhibiting ability to take PO, EtOH and drug abuse, gastric bypass as evidenced by pt NPO x 3 days and had been eating 0-50% of meals prior to that, pt with multiple physical signs of micronutrient deficiencies, pt with muscle wasting and significant edema c/w severe malnutrition.     INTERVENTIONS  Implementation  Collaboration with other providers- MICU rounds, discussed TF start.     Goals  Total avg nutritional intake to meet a minimum of 20 kcal/kg and 1.5 g PRO/kg daily (per dosing wt 77 kg).    Monitoring/Evaluation  Progress toward goals will be monitored and evaluated per protocol.    Sayda Frank, DENILSON, LD  (MICU dietitian, [phone]- [phone])

## 2019-08-27 NOTE — PLAN OF CARE
OT: holding OT today, per RN pt with minimal to no command following. PT currently following for ROM and early mobility, will initiate OT as indicated.

## 2019-08-27 NOTE — CONSULTS
8/27/2019    Pt is intubated at this time. Pt is not appropriate for chem dep consult. Please reorder consult when pt becomes alert and orientated fully and is able to participate in an hour long interview.     Kristine Del Castillo, [facility]  [phone]

## 2019-08-27 NOTE — PROGRESS NOTES
"Bronchoscopy Risk Assessment Guidelines      A. Patient symptoms to consider when assessing pulmonary TB risk are:    I. Cough greater than 3 weeks; and fever, hemoptysis, pleuritic chest    pain, weight loss greater than 10 lbs, night sweats, fatigue, infiltrates on    upper lobes or superior segments of lower lobes, cavitation on chest    x-ray.   B. Patient risk factors to consider when assessing pulmonary TB risk are:    I. Exposure to known TB case, foreign-born persons (within 5 years of    arrival to [place]), residence in a crowded setting (correctional facility,     long-term care center, etc.), persons with HIV or immunosuppression.    Patients with symptoms and risk factors should generally be considered \"suspect risk\" and bronchoscopies should be performed in airborne precautions.    This patient has NO KNOWN RISK of Tuberculosis (proceed with bronchoscopy)    Specimens sent: yes  Complications: None  Scope used: #[id number] Slim  Attending Physician: Dr. Perlman PAUL D GUSTAFSON, RT on 8/27/2019 at 3:47 PM  "

## 2019-08-27 NOTE — PROGRESS NOTES
"   08/27/19 1415   Quick Adds   Type of Visit PT Re-evaluation   Living Environment   Lives With child(bret), dependent   Living Environment Comment Per chart review \"Patient recently discharged from TCU about a week ago and was staying in sisters basement; must perform ~ 16 stairs (8 stairs, platform, 8 stairs) to reach living arrangements.  Was using 4WW for all mobility.  Mother was present to assist patient as needed. Was working to move into daughters home.  Patient no longer has an apartment of her own.\" Unable to obtain further PLOF information, pt orally intubated and sedated.    Self-Care   Usual Activity Tolerance moderate   Current Activity Tolerance poor   Regular Exercise No   Equipment Currently Used at Home walker, rolling;shower chair   Functional Level Prior   Ambulation 1-->assistive equipment   Transferring 1-->assistive equipment   Toileting 0-->independent   Bathing 0-->independent   Communication 0-->understands/communicates without difficulty   Swallowing 0-->swallows foods/liquids without difficulty   Cognition 0 - no cognition issues reported   Fall history within last six months yes   Number of times patient has fallen within last six months 2   Which of the above functional risks had a recent onset or change? ambulation;transferring;toileting;bathing;dressing;eating;swallowing;cognition   Prior Functional Level Comment Per chart review pt was recently discharged from TCU and was using 4WW for ambulation.    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 Pt currently intubated and sedated.    Precautions/Limitations fall precautions;oxygen therapy device and L/min   Cognitive Status Examination   Orientation not oriented to person, place or time   Level of Consciousness sedated;intubated;unresponsive   Follows Commands and Answers Questions unresponsive   Personal Safety and Judgment other (Must comment)  (B wrist restraints intact)   Integumentary/Edema   Integumentary/Edema Comments Pt with BLE 1+ to 2+ soft pitting edema in BLE, would not currently recommend interventions as pt intubated and sedated unable to identify if compression wraps were a source of pain.   Range of Motion (ROM)   ROM Comment BLE WNL with the exception of B ankles to neutral only   Bed Mobility   Bed Mobility Comments Pt is Dep A for bed mob.   General Therapy Interventions   Planned Therapy Interventions bed mobility training;gait training;neuromuscular re-education;strengthening;transfer training;progressive activity/exercise   Clinical Impression   Criteria for Skilled Therapeutic Intervention yes, treatment indicated   PT Diagnosis Impaired functional mobility, decreased ROM   Influenced by the following impairments decreased ROM, decreased strength from prolonged hospital admission and immobility, edema   Functional limitations due to impairments bed mob, transfers, gait   Clinical Presentation Evolving/Changing   Clinical Presentation Rationale PMHx, clinical judgement, current presentation   Clinical Decision Making (Complexity) Moderate complexity   Therapy Frequency 3x/week   Predicted Duration of Therapy Intervention (days/wks) 9/10/19   Anticipated Discharge Disposition Transitional Care Facility   Risk & Benefits of therapy have been explained Yes   Patient, Family & other staff in agreement with plan of care Yes   [facility] AM-PAC  \"6 " "Clicks\" V.2 Basic Mobility Inpatient Short Form   1. Turning from your back to your side while in a flat bed without using bedrails? 1 - Total   2. Moving from lying on your back to sitting on the side of a flat bed without using bedrails? 1 - Total   3. Moving to and from a bed to a chair (including a wheelchair)? 1 - Total   4. Standing up from a chair using your arms (e.g., wheelchair, or bedside chair)? 1 - Total   5. To walk in hospital room? 1 - Total   6. Climbing 3-5 steps with a railing? 1 - Total   Basic Mobility Raw Score (Score out of 24.Lower scores equate to lower levels of function) 6   Total Evaluation Time   Total Evaluation Time (Minutes) 5     "

## 2019-08-27 NOTE — PHARMACY-CONSULT NOTE
Pharmacy Tube Feeding Consult    Medication reviewed for administration by feeding tube and for potential food/drug interactions.    Recommendation: No changes are needed at this time.     Pharmacy will continue to follow as new medications are ordered.    Opal Bond, PharmD

## 2019-08-27 NOTE — PHARMACY-VANCOMYCIN DOSING SERVICE
Pharmacy Vancomycin Note  Date of Service 2019  Patient's  1975   44 year old, female    Indication: Sepsis  Goal Trough Level: 15-20 mg/L  Day of Therapy: 3  Current Vancomycin regimen:  Intermittent     Current estimated CrCl = Estimated Creatinine Clearance: 79.7 mL/min (A) (based on SCr of 1.2 mg/dL (H)).    Creatinine for last 3 days  2019:  8:43 PM Creatinine 0.72 mg/dL  2019:  6:04 AM Creatinine 0.78 mg/dL  2019:  3:20 AM Creatinine 1.01 mg/dL; 12:31 PM Creatinine 1.06 mg/dL  2019:  4:55 AM Creatinine 1.20 mg/dL    Recent Vancomycin Levels (past 3 days)  2019:  4:55 AM Vancomycin Level 15.6 mg/L     Vancomycin IV Administrations (past 72 hours)                   vancomycin (VANCOCIN) 2,000 mg in sodium chloride 0.9 % 500 mL intermittent infusion (mg) 2,000 mg New Bag 19 1038                Nephrotoxins and other renal medications (From now, onward)    Start     Dose/Rate Route Frequency Ordered Stop    19 0930  norepinephrine (LEVOPHED) 16 mg in  mL infusion      0.03-0.4 mcg/kg/min × 118.6 kg (Dosing Weight)  3.3-44.5 mL/hr  Intravenous CONTINUOUS 19 0929      19 1501  vancomycin place crump - receiving intermittent dosing      1 each Does not apply SEE ADMIN INSTRUCTIONS 19 1502               Contrast Orders - past 72 hours (72h ago, onward)    Start     Dose/Rate Route Frequency Ordered Stop    19 0915  barium sulfate (EZ PAQUE) oral suspension 96%       Oral ONCE 19 0901 19 1013    19 1330  iopamidol (ISOVUE-370) solution 135 mL      135 mL Intravenous ONCE 19 1322 19 1335          Interpretation of levels and current regimen:  Trough level is  Therapeutic  Has serum creatinine changed > 50% in last 72 hours: increase 0.78-->1.2  Urine output:  diminished urine output  Renal Function: Worsening    Plan:  1.  Re-dose 2000mg IV x1, going to bronch with BAL - f/u on need for vancomycin  tomorrow 8/28.  2. Serum creatinine levels will be ordered daily.      Marie Brown, PharmD  [facility] Pharmacist  [phone]  #[phone]          .

## 2019-08-27 NOTE — PLAN OF CARE
Transferred from  to  at 0930 for respiratory distress. See MD note.   Neuro: Sedated. Bilateral pupils equal and reactive. As of 1800 withdraws to pain in all for extremities. Sclera yellow. Restrained.   Cardiac: Sinus Tachy. HR teens low 120s.  Map maintained > 60. See flow sheet for levo titration.   Respiratory: Bipap Fio2 50% until 1550. Intubated. CMV. Fio2 80%. . Resp 20. Peep 8. Small amount of brown/red tinged sputum via ETT.   GI: Schedule lactulose enemas. Rectal tube in place. Watery liquid green stool. NPO.   : Low UOP. Urine ella in color. MD aware. Albumin given x1.   IV:  R PICC. L PIV.   Gtts:  Fentanyl at 50 mcg/hr, Levo titrated see flowsheet, Versed at 1 mg/hr.  Skin: Ecchymotic on L arm. Jaundice in appearance. Perineal area excoriated.  Sacral meiplex in place. Blanchable erythema. Barrier cream appled.   PLAN: Continue to monitor closely. Notify MD of any acute changes.

## 2019-08-27 NOTE — PROGRESS NOTES
Nephrology Initial Consult  August 27, 2019      Neema Bailey MRN:[id number] YOB: 1975  Date of Admission:8/19/2019  Primary care provider: Suresh Shabazz  Requesting physician: Paresh London, *    ASSESSMENT AND RECOMMENDATIONS:   DEE DEE-Baseline Cr of 0.6, up to 1.2 with etiology likely KELLY with 135cc of contrast and also high dose Vanco on admit (although level was not high when checked).  HRS is in DDx and likely has some HRS physiology but Dr Nur and I did look at urine microscopy and saw granular casts suggestive of at least some degree of ATN.  No specific intervention needed today, expect Cr to improve if indeed it is related to contrast and vanco.  UA unremarkable, bili not high enough to likely be causing contrast nephropathy, kidneys were normal size on CT, no hydro on limited US.  Will follow for need for HD, pressor needs improving, cultures pending.     -DEE DEE, likely ATN from contrast/vanco, following for need for HD.      Volume-Total body volume overload but much of it 3rd spaced.  Has received albumin and overall is up ~4kg from admit wt, UOP minimal, can hold on further volume expansion for now.     Electrolytes/pH-No acute issues.  K 4.8, no ongoing intake (not on TF), bicarb 19.      BMD- Ca 8.7, Mg and Phos reasonable.      Nutrition-Deferred.      Seen and discussed with Dr Nur    Recommendations were communicated to primary team via verbal communication.     Ernst Rowan, KANDACE CNS  Clinical Nurse Specialist  [phone]    REASON FOR CONSULT: Requested to evaluate 44 yom for management of DEE DEE in setting of ESLD.     HISTORY OF PRESENT ILLNESS:  Neema Bailey is a 44 year old female with ESLD c/b HE, ascites and EV, polysubstance abuse, obesity s/p gastric bypass 2002 with recent admission for E. Coli bacteremia (source unclear), re-admitted 8/19/19 with SOB, leg swelling and suspected infection.  Cr at baseline is 0.6, up to 1.2 today with  likely culprits of contrast given for CT (for abd pain) and vanco given for suspected infection.      PAST MEDICAL HISTORY:  Reviewed with patient on 08/27/2019 , had frequent UTI's with recent hysterectomy earlier this year, otherwise no updates.   Past Medical History:   Diagnosis Date     Anxiety      Bilateral leg edema      Chronic kidney disease      Dizziness and giddiness      Hypertension      Insomnia      Iron deficiency anemia     due to chronic blood loss, vitamin B12 deficiency, Macrocytic     Migraines      BURT (nonalcoholic steatohepatitis)      Numbness and tingling     PERIPHERAL NEUROPATHY     Obese      Other chronic pain     Chronic Bilateral Low Back Pain with Bilateral Sciatica, Bilateral Knee Pain     Peripheral neuropathy      Pruritus      Restless legs      Sciatica      Seborrheic dermatitis      Severe episode of recurrent major depressive disorder, without psychotic features (H)      Sinus tachycardia      Substance abuse in remission (H)     h/o alcoholism had treatment at [facility]     Uterovaginal prolapse      Vitamin D deficiency        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)     BREAST SURGERY      Breast Augmentation     COLPORRHAPHY ANTERIOR N/A 9/21/2015    Procedure: COLPORRHAPHY ANTERIOR;  Surgeon: Jairon Adams MD;  Location: [facility]     COSMETIC SURGERY      Abdominoplasty     CYSTOCELE REPAIR       CYSTOSCOPY N/A 11/26/2018    Procedure: CYSTOSCOPY;  Surgeon: Rony Melgar MD;  Location: [facility]     ENT SURGERY      tonsillectomy & adenoidectomy     GI SURGERY      Small Bowel Enterotomy Repair for SBO, EGD     HYSTERECTOMY VAGINAL, COLPORRHAPHY ANTERIOR, POSTERIOR, COMBINED N/A 11/26/2018    Procedure: VAGINAL HYSTERECTOMY, ANTERIOR AND POSTERIOR REPAIR;  Surgeon: Rony Melgar MD;   Location: [facility]     IR PARACENTESIS  8/7/2019     IR THORACENTESIS  8/7/2019     PERINEORRHAPHY N/A 11/26/2018    Procedure: PERINEOPLASTY;  Surgeon: Rony Melgar MD;  Location: [facility]     TUBAL LIGATION          MEDICATIONS:  PTA Meds  Prior to Admission medications    Medication Sig Last Dose Taking? Auth Provider   lactulose (CHRONULAC) 10 GM/15ML solution 15 ml tid Past Week at Unknown time Yes Osvaldo Avila MD   midodrine (PROAMATINE) 10 MG tablet Take 1 tablet (10 mg) by mouth 3 times daily (with meals) 8/25/2019 at Unknown time Yes Osvaldo Avila MD   ondansetron (ZOFRAN-ODT) 4 MG ODT tab Take 1 tablet (4 mg) by mouth every 6 hours as needed for nausea or vomiting Past Week at Unknown time Yes Osvaldo Avila MD   rifaximin (XIFAXAN) 550 MG TABS tablet Take 1 tablet (550 mg) by mouth 2 times daily Past Week at Unknown time Yes Osvaldo Avila MD   sertraline (ZOLOFT) 25 MG tablet Take 3 tablets (75 mg) by mouth daily Past Week at Unknown time Yes Osvaldo Avila MD   alum & mag hydroxide-simethicone (MYLANTA ES/MAALOX  ES) 400-400-40 MG/5ML SUSP suspension Take 20 mLs by mouth every 4 hours as needed for indigestion Unknown at Unknown time  Caleb Lewis MD   furosemide (LASIX) 20 MG tablet Take 1 tablet (20 mg) by mouth daily Unknown at Unknown time  Osvaldo Avila MD   hydrOXYzine (ATARAX) 25 MG tablet Take 1 tablet (25 mg) by mouth every 6 hours as needed for itching Unknown at Unknown time  Osvaldo Avila MD   multivitamin, therapeutic (THERA-VIT) TABS tablet Take 1 tablet by mouth daily Unknown at Unknown time  Osvaldo Avila MD   pantoprazole (PROTONIX) 40 MG EC tablet Take 1 tablet (40 mg) by mouth daily Unknown at Unknown time  KhOsvaldo bucio MD   simethicone (MYLICON) 80 MG chewable tablet Take 1 tablet (80 mg) by mouth every 6 hours as needed for cramping Unknown at Unknown time  Debbie  MD Caleb   spironolactone (ALDACTONE) 25 MG tablet Take 2 tablets (50 mg) by mouth daily Unknown at Unknown time  Osvaldo Avila MD   Vitamin D, Cholecalciferol, 1000 units TABS Take 1 tablet (1,000 Units) by mouth daily Unknown at Unknown time  Osvaldo Avila MD      Current Meds    albumin human  100 g Intravenous Daily     ascorbic acid  500 mg Oral Daily     folic acid  500 mcg Oral or Feeding Tube Daily     heparin lock flush  5-10 mL Intracatheter Q24H     lactulose  20 g Oral or Feeding Tube 4x Daily     lidocaine (PF)         [START ON 8/28/2019] meropenem  1 g Intravenous Q12H     micafungin  100 mg Intravenous Q24H     multivitamins w/minerals  15 mL Per Feeding Tube Daily     pantoprazole (PROTONIX) IV  40 mg Intravenous Daily with breakfast     protein modular  1 packet Per Feeding Tube TID     rifaximin  550 mg Oral BID     sertraline  75 mg Oral Daily     vancomycin place crump - receiving intermittent dosing  1 each Does not apply See Admin Instructions     cyanocobalamin  100 mcg Oral or Feeding Tube Daily     vitamin B1  100 mg Oral or Feeding Tube Daily     vitamin C  500 mg Oral or Feeding Tube Daily     Infusion Meds    - MEDICATION INSTRUCTIONS -       IV fluid REPLACEMENT ONLY       dextrose 80 mL/hr at 08/27/19 1448     fentaNYL 50 mcg/hr (08/27/19 0600)     midazolam Stopped (08/27/19 1600)     - MEDICATION INSTRUCTIONS -       norepinephrine 0.21 mcg/kg/min (08/27/19 1205)     - MEDICATION INSTRUCTIONS -         ALLERGIES:    Allergies   Allergen Reactions     Bactrim [Sulfamethoxazole W/Trimethoprim]      Gabapentin      Other reaction(s): Edema     Nitrofurantoin Other (See Comments)     drug-induced liver injury       REVIEW OF SYSTEMS:  A 10 point review of systems was negative except as noted above.    SOCIAL HISTORY:   Social History     Socioeconomic History     Marital status: Single     Spouse name: Not on file     Number of children: Not on file     Years  of education: Not on file     Highest education level: Not on file   Occupational History     Not on file   Social Needs     Financial resource strain: Not on file     Food insecurity:     Worry: Not on file     Inability: Not on file     Transportation needs:     Medical: Not on file     Non-medical: Not on file   Tobacco Use     Smoking status: Never Smoker     Smokeless tobacco: Never Used   Substance and Sexual Activity     Alcohol use: No     Alcohol/week: 0.0 oz     Frequency: Never     Comment: Pt reports being a previou heavy drinker, reports quitting earlier this year     Drug use: No     Sexual activity: Not Currently     Partners: Male     Birth control/protection: Female Surgical     Comment: Tubal Ligation    Lifestyle     Physical activity:     Days per week: Not on file     Minutes per session: Not on file     Stress: Not on file   Relationships     Social connections:     Talks on phone: Not on file     Gets together: Not on file     Attends Gnosticism service: Not on file     Active member of club or organization: Not on file     Attends meetings of clubs or organizations: Not on file     Relationship status: Not on file     Intimate partner violence:     Fear of current or ex partner: Not on file     Emotionally abused: Not on file     Physically abused: Not on file     Forced sexual activity: Not on file   Other Topics Concern     Parent/sibling w/ CABG, MI or angioplasty before 65F 55M? Not Asked   Social History Narrative    ** Merged History Encounter **          Reviewed with patient's family, no changes to social hx.     FAMILY MEDICAL HISTORY:   Reviewed with pt's family, no hx of primary renal issues in family, no members on dialysis.     PHYSICAL EXAM:   Temp  Av.1  F (36.7  C)  Min: 97.5  F (36.4  C)  Max: 99.5  F (37.5  C)      Pulse  Av.4  Min: 77  Max: 146 Resp  Av.4  Min: 7  Max: 55  FiO2 (%)  Av.9 %  Min: 30 %  Max: 100 %  SpO2  Av.2 %  Min: 85 %  Max:  "100 %       /53   Pulse 113   Temp 99.5  F (37.5  C) (Axillary)   Resp (!) 31   Ht 1.727 m (5' 8\")   Wt 119.7 kg (263 lb 14.3 oz)   LMP 10/04/2018   SpO2 93%   Breastfeeding? No   BMI 40.12 kg/m     Date 08/27/19 0700 - 08/28/19 0659   Shift 7320-1910 8313-4295 1005-4904 24 Hour Total   INTAKE   I.V. 505   505   Shift Total(mL/kg) 505(4.22)   505(4.22)   OUTPUT   Urine 70   70   Stool 800   800   Shift Total(mL/kg) 870(7.27)   870(7.27)   Weight (kg) 119.7 119.7 119.7 119.7      Admit Weight: 115.8 kg (255 lb 3 oz)     GENERAL APPEARANCE: Intubated and sedated.   EYES:  + scleral icterus, pupils equal  HENT: mouth without ulcers or lesions  PULM: lungs clear to auscultation, equal air movement, no cyanosis or clubbing  CV: regular rhythm, normal rate, no rub     -JVP not elevated     -edema +1-2  GI: soft, non-tender, nondistended, bowel sounds are +  MS: no evidence of inflammation in joints, no muscle tenderness  NEURO: Intubated and sedated. normal    LABS:   CMP  Recent Labs   Lab 08/27/19  0455 08/26/19  1231 08/26/19  0320 08/25/19  0604 08/24/19  2043 08/24/19  0445   * 146* 142 141 140 142   POTASSIUM 4.6  --  4.6 4.4 4.5 4.1   CHLORIDE 113*  --  112* 110* 110* 110*   CO2 19*  --  20 22 21 23   ANIONGAP 13  --  10 8 10 8   GLC 76  --  80 87 88 80   BUN 21  --  13 9 8 7   CR 1.20* 1.06* 1.01 0.78 0.72 0.66   GFRESTIMATED 54* 63 67 >90 >90 >90   GFRESTBLACK 63 73 77 >90 >90 >90   NARENDRA 8.7  --  8.6 8.8 8.6 8.4*   MAG 2.3  --  2.2  --   --   --    PHOS 5.0*  --   --   --   --   --    PROTTOTAL 5.7*  --  5.4* 5.5*  --  5.2*   ALBUMIN 3.0*  --  2.5* 2.7*  --  2.3*   BILITOTAL 9.5*  --  9.4* 9.8*  --  9.8*   ALKPHOS 113  --  116 112  --  108   *  --  170* 132*  --  116*   ALT 48  --  37 35  --  33     CBC  Recent Labs   Lab 08/27/19  0455 08/26/19  0320 08/25/19  0604 08/24/19  2043   HGB 8.3* 8.5* 8.2* 8.1*   WBC 19.0* 16.0* 13.5* 13.8*   RBC 2.66* 2.74* 2.69* 2.57*   HCT 26.4* 27.9* " 27.7* 25.7*   MCV 99 102* 103* 100   MCH 31.2 31.0 30.5 31.5   MCHC 31.4* 30.5* 29.6* 31.5   RDW 17.6* 17.7* 17.4* 17.9*   * 112* 91* 100*     INR  Recent Labs   Lab 08/27/19  0455 08/26/19  0320 08/25/19  0604 08/24/19  0834 08/24/19  0445   INR 3.32* 3.26* 2.92* 2.60* 3.72*   PTT 55* 50* 54*  --  62*     ABG  Recent Labs   Lab 08/27/19  0505 08/26/19  1818 08/26/19  1231 08/26/19  1119  08/25/19  0906   PH 7.44 7.40 7.43  --   --  7.41   PCO2 28* 31* 29*  --   --  35   PO2 93 119* 62*  --   --  105   HCO3 19* 19* 19*  --   --  22   O2PER 60 80 70 50%   < > 40    < > = values in this interval not displayed.      URINE STUDIES  Recent Labs   Lab Test 08/27/19  0905 08/22/19  2033 08/19/19  2135 08/02/19  1500  10/04/18  1118   COLOR Dark Yellow Dark Yellow Orange Yellow   < > Yellow   APPEARANCE Slightly Cloudy Slightly Cloudy Clear Clear   < > Clear   URINEGLC Negative Negative Negative Negative   < > Negative   URINEBILI Moderate* Moderate* Moderate* Small*   < > Moderate*   URINEKETONE 10* Negative Negative Negative   < > 15*   SG 1.030 1.020 1.013 1.007   < > 1.025   UBLD Moderate* Negative Negative Negative   < > Large*   URINEPH 5.5 6.0 6.5 6.0   < > 6.0   PROTEIN 30* 10* Negative Negative   < > Trace*   UROBILINOGEN  --   --   --   --   --  >=8.0   NITRITE Negative Negative Negative Positive*   < > Negative   LEUKEST Small* Small* Negative Small*   < > Small*   RBCU 5* 0 1 0   < > 5-10*   WBCU 9* 4 3 11*   < > 5-10*    < > = values in this interval not displayed.     No lab results found.  PTH  No lab results found.  IRON STUDIES  Recent Labs   Lab Test 08/21/19  0348 07/17/19  1542 07/17/19  0549   IRON 37 91  --    * 106*  --    IRONSAT 29 86*  --    CHELSY 166 372* 384*

## 2019-08-27 NOTE — PROGRESS NOTES
Admitted/transferred from: 6B  Reason for admission/transfer:   Increased Oxygen needs  Patient status upon admission/transfer: Unstable  Interventions: Levo started.    Plan: Manage hemodynamics and monitor respiratory status closely  2 RN skin assessment: completed by Meghna Perry  Result of skin assessment and interventions/actions: Perineal excoriated. Barrier cream applied. Rectal tube in place.   Height, weight, drug calc weight: done  Patient belongings: At bedside.   MDRO education (if applicable): Contact for ESBl

## 2019-08-27 NOTE — PLAN OF CARE
Neuro: Pt somnolent/sedated. PERRL 5+. Pt moves extremities spontaneously, but does not follow commands. Bilateral wrist restraints on for safety r/t pulling at ETT   CV: HR sinus tach 110's-120's with no ectopy noted. MAP goal > 60. Levo being titrated to meet parameters. tmax 99.4.  Pulm: Pt intubated. See orders for vent settings. Secretions yellow/tan thick. Lungs coarse/diminished.    GI: Schedule lactulose enemas q 4 hrs d/t encephalopathy. Rectal tube in place. NPO. Unable to obtain OG or NG access for PO meds until radiology places d/t gastric bypass hx.  : Alexander in place. Low UOP, tea colored. MD notified at 2200.   Skin: Ecchymotic on L arm. Jaundice skin. Perineal area and coccyx excoriated. Blanchable erythema, skin peeling, bleedingSacral meiplex in changed.    IV:  R TL PICC. L PIV.   Gtts:  Fentanyl at 50 mcg/hr, Levo titrated see flowsheet, Versed at 2 mg/hr.  Labs: Ammonia 224, Lactic 4.2. BMP panel trending up. WBC 19.0. MD notified.    PLAN: Continue to monitor closely. Notify MD of any acute changes.

## 2019-08-27 NOTE — PROGRESS NOTES
WOC Nurse Inpatient Wound Assessment   Reason for consultation: Evaluate and treat coccyx wound     Assessment  Intergluteal cleft wound due to Moisture Associated Skin Damage (MASD)- fissure  Status: follow up assessment, stable    Coccyx and sacrum with intact epidermis without any erythema.    Treatment Plan  Intergluteal cleft wound: Every 3 days     Cleanse the area with NS and pat dry.    Apply No sting film barrier to periwound skin.    Cover wound with Sacral Mepilex (#[id number])    Change dressing Q 3 days.    Turn and reposition Q 2hrs.    Ensure pt has Chris-cushion while sitting up in the chair.  FYI- If pt has constant incontinent loose stools needing dressing changes Q shift please discontinue the Mepilex dressing and apply criticaid barrier paste BID and PRN.     Orders Written  Recommended provider order: None  WOC Nurse follow-up plan:weekly  Nursing to notify the Provider(s) and re-consult the WOC Nurse if wound(s) deteriorates or new skin concern.    Patient History  According to provider note(s):  Ms. Bailey is a 44 year-old female with PMHx of end-stage liver disease complicated by hepatic encephalopathy and varices, HTN, chronic pain/sciatica, polysubstance abuse, and morbid obesity with multiple recent hospitalizations for decompensated cirrhosis admitted to the MICU for severe sepsis of multiple possible sources, currently hemodynamically stable.     Objective Data  Containment of urine/stool: Internal fecal management due to watery stool- receiving lactulose Q 4hrs    Active Diet Order  Orders Placed This Encounter      NPO for Medical/Clinical Reasons Except for: No Exceptions      Output:   I/O last 3 completed shifts:  In: 693.78 [I.V.:693.78]  Out: 6027 [Urine:327; Stool:5700]    Risk Assessment:   Sensory Perception: 2-->very limited  Moisture: 2-->very moist  Activity: 1-->bedfast  Mobility: 2-->very limited  Nutrition: 2-->probably inadequate  Friction and Shear: 1-->problem  Sebas  Score: 10                          Labs:   Recent Labs   Lab 08/27/19  0455   ALBUMIN 3.0*   HGB 8.3*   INR 3.32*   WBC 19.0*       Physical Exam  Skin inspection: focused coccyx, sacrum, BL buttocks and intergluteal cleft    Wound Location:  Intergluteal cleft  Date of last photo Not taken, needed two staff to assess the area  Wound History: Pt has a hx of diarrhea. Now has rectal tube in place  Measurements (length x width x depth, in cm) 2 cm x 0.5 cm  x  0.1 cm -fissure (stable but no significant changes)  Wound Base: 100 % dermis  Tunneling N/A  Undermining N/A  Palpation of the wound bed: normal   Periwound skin:immediate skin is  intact, dry peeling epithelium   Color: normal and consistent with surrounding tissue  Temperature: normal   Drainage:, scant  Description of drainage: bloody  Odor: none  Pain: denies  and unable to assess due to  sedation ,     Interventions  Current support surface: Standard  Low air loss mattress  Current off-loading measures: Foam padding and Pillows under calves  Visual inspection of wound(s) completed  Wound Care: Area assessed and placed same Mepilex  Supplies: floor stock  Education provided: importance of repositioning, plan of care and wound progress  Discussed plan of care with Patient and Nurse    Karly Rodrigez RN

## 2019-08-27 NOTE — PROGRESS NOTES
[facility]  Palliative Care Daily Progress Note       Recommendations & Counseling       Full code and restorative goals of care    Appreciate palliative care  and  involvement as needed    Will continue to follow as needed to navigate symptoms, goals of care, and support            Assessments          Neema Bailey is a 44 year old female with a history of ESLD c/b hepatic encephalopathy, portal hypertension, ascites, and polysubstance use disorder. Hospital course complicated by respiratory failure and is intubated and sedated.    Today, the patient was seen for:  ESLD  Respiratory failure    I visited with Mother- Roxy today. I introduced palliative care and ways in which we can be helpful including symptom management, decisional support (goals of care), and additional support. She is aware of palliative care from [facility]. Discussed our involvement was per Neema's request, discussed our services but its really been the support from our counselors and chaplains that Neema has benefited from.     Prognosis, Goals, or Advance Care Planning was addressed today with: Yes.    Discussed with mother the overall goals of care as currently restorative and attempting to find out what caused this respiratory decline. Discussed likely having more information in the coming day or two. Offered our support in decision making in the future.    Mood, coping, and/or meaning in the context of serious illness were addressed today: Yes.    Summary/Comments: patient is currently looking after her own father who is on hospice, he is fairly able bodied and can care for himself. She is staying with her daughter. Also Roxy's  is away with work and so her support seems more thin than normal. Seems to be coping ok at this time versus not open to processing things more.             Interval History:     Chart review/discussion with unit or clinical team members:    Notes reviewed, worsening respiratory status and concern for shock, not intubated and sedated, going to get a bronch today.    Per patient or family/caregivers today:  Patient is intubated and sedated  Mother at bedside     Key Palliative Symptoms:  # Pain severity the last 12 hours: none  # Dyspnea severity the last 12 hours: none  # Nausea severity the last 12 hours: none  # Anxiety severity the last 12 hours: none    Patient is on opioids: bowels not assessed today.           Review of Systems:     Besides above, ROS was reviewed and is unremarkable          Medications:     I have reviewed this patient's medication profile and medications during this hospitalization.    Noted meds:    Fentanyl drip  Midazolam drip           Physical Exam:   Vitals were reviewed  Temp: 99.4  F (37.4  C) Temp src: Axillary BP: (!) 96/36 Pulse: 113 Heart Rate: 117 Resp: 23 SpO2: 97 % O2 Device: Mechanical Ventilator      Intake/Output Summary (Last 24 hours) at 8/27/2019 1548  Last data filed at 8/27/2019 1000  Gross per 24 hour   Intake 1158.78 ml   Output 4817 ml   Net -3658.22 ml     Constitutional: intubated, sedated, no apparent distress, and appears stated age.  ENT: ET tube secured.  Lungs: No increased work of breathing on ventilator  Neurologic: sedated  Skin: No rashes, jaundiced             Data Reviewed:     Reviewed recent pertinent imaging, comments:     Reviewed recent labs, comments:   Sodium 145  Potassium 4.6  Creatinine 1.20  GFR 54  Albumin 3.0  Bilirubin 9.5  Ammonia 224    Hemoglobin 8.3  Platelets 125    KANDACE Ghotra CNS  Palliative Care Consult Team  Pager: [phone]     Total time spent was 20 minutes,  >50% of time was spent counseling and/or coordination of care regarding goals of care.

## 2019-08-27 NOTE — PROGRESS NOTES
[facility], [place]    History and Physical  [facility] Intensive Care     Date of Admission:  8/19/2019    Assessment & Plan   Neema Bailey is a 44 year old female with history of multifactorial ESLD c/b HE, EV, ascites, polysubstance abuse, morbid obesity s/p gastric bypass in 2002 who presents as a transfer from the floor for acute hypoxic respiratory and altered mental status, notably recently discharged from ICU 8/21 after a 2-day stay for severe sepsis with hypotension 2/2 ESBL E coli bacteremia of unknown source.    Now intubated and sedated, on pressor support.    Neurology:  Hepatic/Toxic-metabolic encephalopathy: Elevated ammonia to 147 on 8/25 with lethargy. Normal BUN, no other sedating meds given.  - recheck ammonia in AM  - lactulose per rectum q4h  - obtain OG access for PO lactulose  - limit sedating medications    Sedation:  - fentanyl/versed gtt    Depression  - PTA sertraline    Cardiovascular:  Shock, multifactorial: in the setting of cirrhosis with third spacing and possible infection underlying. Rising lactate during hospital stay from 1.9 after transfer out of ICU to 4.3 on the day of transfer back. Bedside TTE with hyperdynamic LV, 1.78cm IVC with minimal respiratory variation while on positive pressure ventilation. Cardiac function on formal TTE 8/20 hyperdynamic with normal PASP.  - levophed, titrate for MAPs > 60    Pulmonary:        Acute hypoxic respiratory failure  Transudative R-sided pleural effusion, moderate  CT chest 8/25 notable for GGO, interlobular septal thickening, patchy consolidative opacities. Differential includes pulmonary edema, developing ARDS, hepatopulmonary syndrome, pneumonia, atypical infection. P/F ratio 149. Intubated for increased work of breathing and hypoxemia.  - holding off on diuresis in the setting of shock  - will continue to re-assess volume status  - consider bronchoscopy in AM for lavage  - routine vent  cares    Ventilation Mode: CMV/AC  (Continuous Mandatory Ventilation/ Assist Control)  FiO2 (%): 80 %  Rate Set (breaths/minute): 20 breaths/min  Tidal Volume Set (mL): 500 mL  PEEP (cm H2O): (S) 8 cmH2O  Oxygen Concentration (%): 80 %  Resp: 24      Renal  Oliguric DEE DEE 2/2 ATN, prerenal, hepatorenal syndrome: Baseline creatinine 0.6, increased to 1.0 in the setting of hypotension. Recent nephrotoxins also include IV contrast 8/25 during CT scan, and now administration of vancomycin. Urine output decreasing 8/23. UA 8/22 notable for mild proteinuria, small LE, hyaline casts.  - strict I/Os with Alexander anchored  - FENA, FEBUN to evaluate for etiology  - avoid nephrotoxins  - albumin challenge 50g today, reassess in AM  - daily BMP  - PTA midodrine 10mg tid, holding while on pressors and without enteral access    ID:         ESBL E coli bacteremia due to unknown source : Blood culture 1/2 positive 8/19 for ESBL E coli. No followup blood cultures obtained until 8/22, which remain NGTD. Previous urine culture, transudative pleural fluid culture, and ascites fluid culture currently NGTD. S pneumo and legionella urine antigen negative. Procalcitonin 0.64, increased from 8/19 0.12. Leukocytosis increasing.  - BCx 8/26 x 2 pending NGTD  - attempt sputum cultures  - trend CBC    Antimicrobials:  Micafungin (8/26 -   Meropenem (8/25 -  Azithromycin (8/25 -   Vancomycin (8/20, 8/25-  Ertapenem (8/19 - 8/24)      GI  ESLD, multifactorial c/b portal hypertension with ascites, esophageal varices: Last EGD 5/2019 with no varices. Last US 8/20 with no mass, no portal vein thrombosis.  - Hepatology consulted, appreciate recs  - currently not undergoing transplant evaluation  - rifaxamin, lactulose as above for HE protocol  - holding PTA diuretics furosemid 20mg, spironolactone 50mg  - daily MELD labs    MELD-Na score: 28 at 8/26/2019  3:20 AM  MELD score: 28 at 8/26/2019  3:20 AM  Calculated from:  Serum Creatinine: 1.01 mg/dL at  8/26/2019  3:20 AM  Serum Sodium: 142 mmol/L (Rounded to 137 mmol/L) at 8/26/2019  3:20 AM  Total Bilirubin: 9.4 mg/dL at 8/26/2019  3:20 AM  INR(ratio): 3.26 at 8/26/2019  3:20 AM  Age: 44 years    Hyperbilirubinemia with gallbladder wall thickening: noted on CT abd/pelvis 8/25. Negative HIDA scan. Tenderness to palpation on exam.  - continue to monitor    Mild reflux esophagitis  - PPI daily    Nutrition  At risk for malnutrition: Albumin 2.5 in the setting of liver disease.  - discuss initiation of enteral nutrition in AM    Endocrine:  At risk for hypoglycemia  - hypoglycemia protocol, q4h glucose checks while NPO    Heme/Onc:  Macrocytic anemia: Baseline hemoglobin 8-9 in the last 6 months.  - daily CBC, transfuse Hgb > 7    Thrombocytopenia, chronic due to liver disease: Baseline ~120s in the last 6 months.  - trend    DVT Prophylaxis: Pneumatic Compression Devices, will discuss prophylactic anticoagulation in AM  GI Prophylaxis: PPI    Restraints: Restraints for medical healing needed: YES    Family update by me today: Yes     Amy Bray    I have seen and discussed this patient with Dr. Perlman Erica Ting, MD  Internal Medicine/Pediatrics, PGY3  [facility]  (p) [phone]      Code Status   Full Code    Primary Care Physician   Suresh Shabazz    Chief Complaint   Acute hypoxic respiratory failure, altered mental status    Unable to obtain a history from the patient due to critical condition and mental status    History of Present Illness   Neema Bailey is a 44 year old female with history of multifactorial ESLD c/b HE, EV, ascites, polysubstance abuse, morbid obesity s/p gastric bypass in 2002 who presents with altered mental status and acute hypoxic respiratory failure to the ICU. Recent history is notable for admission to the MICU 8/19 for severe sepsis 2/2 ESBL E coli bacteremia with unknown source requiring pressors, transferred back to the floor on 8/21 on IV ertapenem.  Workup during her stay in the ICU included a negative urine culture, 1/2 blood cultures positive for ESBL E coli on 8/19. After transfer out of the ICU, she continued to have L-sided abdominal pain. Peritoneal fluid from the perihepatic space was tapped and concerning based on appearance for bile leak on 8/22. HIDA scan and abdominal imaging negative for bile leak. Evaluation of thoracentesis fluid was also done with removal of 1.5L and negative for pathology, consistent with hepatic hydrothorax. Notably, patient received 3 units of FFP in preparation for procedures, as well as 1 unit of pRBCs. During this period, began to experience increasing difficulty breathing and became hypotensive and hypoxic the night prior to ICU transfer. She was started on BIPAP without significant improvement, and Pulmonary consult was obtained, recommending infectious workup and monitoring with blood gases. Antibiotics were broadened from ertapenem to vanc/darci/azithromycin to treat for possible pulmonary source. Given that the patient failed to improve and continued to be lethargic, she was transferred to ICU for acute hypoxic respiratory failure and altered mental status.    Past Medical History    I have reviewed this patient's medical history and updated it with pertinent information if needed.   Past Medical History:   Diagnosis Date     Anxiety      Bilateral leg edema      Chronic kidney disease      Dizziness and giddiness      Hypertension      Insomnia      Iron deficiency anemia     due to chronic blood loss, vitamin B12 deficiency, Macrocytic     Migraines      BURT (nonalcoholic steatohepatitis)      Numbness and tingling     PERIPHERAL NEUROPATHY     Obese      Other chronic pain     Chronic Bilateral Low Back Pain with Bilateral Sciatica, Bilateral Knee Pain     Peripheral neuropathy      Pruritus      Restless legs      Sciatica      Seborrheic dermatitis      Severe episode of recurrent major depressive disorder,  without psychotic features (H)      Sinus tachycardia      Substance abuse in remission (H)     h/o alcoholism had treatment at [facility]     Uterovaginal prolapse      Vitamin D deficiency        Past Surgical History   I have reviewed this patient's surgical history and updated it with pertinent information if needed.  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)     BREAST SURGERY      Breast Augmentation     COLPORRHAPHY ANTERIOR N/A 9/21/2015    Procedure: COLPORRHAPHY ANTERIOR;  Surgeon: Jairon Adams MD;  Location: [facility]     COSMETIC SURGERY      Abdominoplasty     CYSTOCELE REPAIR       CYSTOSCOPY N/A 11/26/2018    Procedure: CYSTOSCOPY;  Surgeon: Rony Melgar MD;  Location: [facility]     ENT SURGERY      tonsillectomy & adenoidectomy     GI SURGERY      Small Bowel Enterotomy Repair for SBO, EGD     HYSTERECTOMY VAGINAL, COLPORRHAPHY ANTERIOR, POSTERIOR, COMBINED N/A 11/26/2018    Procedure: VAGINAL HYSTERECTOMY, ANTERIOR AND POSTERIOR REPAIR;  Surgeon: Rony Melgar MD;  Location: [facility]     IR PARACENTESIS  8/7/2019     IR THORACENTESIS  8/7/2019     PERINEORRHAPHY N/A 11/26/2018    Procedure: PERINEOPLASTY;  Surgeon: Rony Melgar MD;  Location: [facility]     TUBAL LIGATION         Prior to Admission Medications   Prior to Admission Medications   Prescriptions Last Dose Informant Patient Reported? Taking?   Vitamin D, Cholecalciferol, 1000 units TABS Unknown at Unknown time  No No   Sig: Take 1 tablet (1,000 Units) by mouth daily   alum & mag hydroxide-simethicone (MYLANTA ES/MAALOX  ES) 400-400-40 MG/5ML SUSP suspension Unknown at Unknown time  No No   Sig: Take 20 mLs by mouth every 4 hours as needed for indigestion   furosemide (LASIX) 20 MG tablet Unknown at Unknown time  No No   Sig: Take 1 tablet (20 mg) by  mouth daily   hydrOXYzine (ATARAX) 25 MG tablet Unknown at Unknown time  No No   Sig: Take 1 tablet (25 mg) by mouth every 6 hours as needed for itching   lactulose (CHRONULAC) 10 GM/15ML solution Past Week at Unknown time  No Yes   Sig: 15 ml tid   midodrine (PROAMATINE) 10 MG tablet 8/25/2019 at Unknown time  No Yes   Sig: Take 1 tablet (10 mg) by mouth 3 times daily (with meals)   multivitamin, therapeutic (THERA-VIT) TABS tablet Unknown at Unknown time  No No   Sig: Take 1 tablet by mouth daily   ondansetron (ZOFRAN-ODT) 4 MG ODT tab Past Week at Unknown time  No Yes   Sig: Take 1 tablet (4 mg) by mouth every 6 hours as needed for nausea or vomiting   pantoprazole (PROTONIX) 40 MG EC tablet Unknown at Unknown time  No No   Sig: Take 1 tablet (40 mg) by mouth daily   rifaximin (XIFAXAN) 550 MG TABS tablet Past Week at Unknown time  No Yes   Sig: Take 1 tablet (550 mg) by mouth 2 times daily   sertraline (ZOLOFT) 25 MG tablet Past Week at Unknown time  No Yes   Sig: Take 3 tablets (75 mg) by mouth daily   simethicone (MYLICON) 80 MG chewable tablet Unknown at Unknown time  No No   Sig: Take 1 tablet (80 mg) by mouth every 6 hours as needed for cramping   spironolactone (ALDACTONE) 25 MG tablet Unknown at Unknown time  No No   Sig: Take 2 tablets (50 mg) by mouth daily      Facility-Administered Medications: None     Allergies   Allergies   Allergen Reactions     Bactrim [Sulfamethoxazole W/Trimethoprim]      Gabapentin      Other reaction(s): Edema     Nitrofurantoin Other (See Comments)     drug-induced liver injury       Social History   I have reviewed this patient's social history and updated it with pertinent information if needed. Neema Pritchardcarolyn  reports that she has never smoked. She has never used smokeless tobacco. She reports that she does not drink alcohol or use drugs.    Family History   I have reviewed this patient's family history and updated it with pertinent information if needed.    History reviewed. No pertinent family history.    Review of Systems   Review of systems not obtained due to patient factors - mental status    Physical Exam   Temp: 98.4  F (36.9  C) Temp src: Axillary Temp  Min: 97.8  F (36.6  C)  Max: 98.7  F (37.1  C) BP: 112/49 Pulse: 123 Heart Rate: 122 Resp: 24 SpO2: 98 % O2 Device: Mechanical Ventilator Oxygen Delivery: Other (Comments)(45 L)  Vital Signs with Ranges  Temp:  [97.8  F (36.6  C)-98.7  F (37.1  C)] 98.4  F (36.9  C)  Pulse:  [] 123  Heart Rate:  [] 122  Resp:  [19-55] 24  BP: ()/(27-73) 112/49  FiO2 (%):  [40 %-100 %] 80 %  SpO2:  [90 %-99 %] 98 %  264 lbs 8.83 oz    Constitutional: Lethargic, arouses to voice  Eyes: PERRL, scleral icterus  ENT: Normocephalic, without obvious abnormality, atraumatic, BIPAP mask in place  Respiratory: Tachypneic, increased work of breathing with accessory muscle use on BIPAP, decreased breath sounds diffusely on the R side, CTA on the L, no wheezes/rhonchi/rales appreciated  Cardiovascular: Normal apical impulse, tachycardic, regular rhythm, normal S1 and S2, no S3 or S4, and no murmur noted.  GI: normal bowel sounds, soft, non-distended, appears tender to palpation diffusely perhaps R>L, no masses palpated, no hepatosplenomegaly, rectal tube in place draining dark brown liquid stool  Genitourinary:  Alexander in place draining dark orange urine  Skin: No bruising or bleeding, normal skin color, texture, turgor, no redness, warmth, or swelling, no rashes, no lesions, no abnormal moles, nails normal without discoloration or clubbing and no jaundice; R PICC in place without erythema or drainage over site  Musculoskeletal: pitting edema bilaterally in the LE to the knees 2+, Tone is normal.  Neurologic: Opens eyes to command, not localizing to pain    Data   Results for orders placed or performed during the hospital encounter of 08/19/19 (from the past 24 hour(s))   CBC with platelets differential   Result Value Ref  Range    WBC 16.0 (H) 4.0 - 11.0 10e9/L    RBC Count 2.74 (L) 3.8 - 5.2 10e12/L    Hemoglobin 8.5 (L) 11.7 - 15.7 g/dL    Hematocrit 27.9 (L) 35.0 - 47.0 %     (H) 78 - 100 fl    MCH 31.0 26.5 - 33.0 pg    MCHC 30.5 (L) 31.5 - 36.5 g/dL    RDW 17.7 (H) 10.0 - 15.0 %    Platelet Count 112 (L) 150 - 450 10e9/L    Diff Method Automated Method     % Neutrophils 73.8 %    % Lymphocytes 11.3 %    % Monocytes 10.5 %    % Eosinophils 0.7 %    % Basophils 0.4 %    % Immature Granulocytes 3.3 %    Nucleated RBCs 0 0 /100    Absolute Neutrophil 11.8 (H) 1.6 - 8.3 10e9/L    Absolute Lymphocytes 1.8 0.8 - 5.3 10e9/L    Absolute Monocytes 1.7 (H) 0.0 - 1.3 10e9/L    Absolute Eosinophils 0.1 0.0 - 0.7 10e9/L    Absolute Basophils 0.1 0.0 - 0.2 10e9/L    Abs Immature Granulocytes 0.5 (H) 0 - 0.4 10e9/L    Absolute Nucleated RBC 0.0    Comprehensive metabolic panel   Result Value Ref Range    Sodium 142 133 - 144 mmol/L    Potassium 4.6 3.4 - 5.3 mmol/L    Chloride 112 (H) 94 - 109 mmol/L    Carbon Dioxide 20 20 - 32 mmol/L    Anion Gap 10 3 - 14 mmol/L    Glucose 80 70 - 99 mg/dL    Urea Nitrogen 13 7 - 30 mg/dL    Creatinine 1.01 0.52 - 1.04 mg/dL    GFR Estimate 67 >60 mL/min/[1.73_m2]    GFR Estimate If Black 77 >60 mL/min/[1.73_m2]    Calcium 8.6 8.5 - 10.1 mg/dL    Bilirubin Total 9.4 (H) 0.2 - 1.3 mg/dL    Albumin 2.5 (L) 3.4 - 5.0 g/dL    Protein Total 5.4 (L) 6.8 - 8.8 g/dL    Alkaline Phosphatase 116 40 - 150 U/L    ALT 37 0 - 50 U/L     (H) 0 - 45 U/L   INR   Result Value Ref Range    INR 3.26 (H) 0.86 - 1.14   PTT (AM Draw)   Result Value Ref Range    PTT 50 (H) 22 - 37 sec   Magnesium   Result Value Ref Range    Magnesium 2.2 1.6 - 2.3 mg/dL   Lactic acid whole blood   Result Value Ref Range    Lactic Acid 3.3 (H) 0.7 - 2.0 mmol/L   Blood gas venous   Result Value Ref Range    Ph Venous 7.43 7.32 - 7.43 pH    PCO2 Venous 30 (L) 40 - 50 mm Hg    PO2 Venous 43 25 - 47 mm Hg    Bicarbonate Venous 20 (L) 21 -  28 mmol/L    Base Deficit Venous 3.8 mmol/L    FIO2 40%    Lactic acid whole blood   Result Value Ref Range    Lactic Acid 4.1 (HH) 0.7 - 2.0 mmol/L   Vancomycin level   Result Value Ref Range    Vancomycin Level 21.4 mg/L   XR Chest Port 1 View    Narrative    Exam: XR CHEST PORT 1 VW, 8/26/2019 10:24 AM    Indication: 43yo F admitted to MICU for acute respiratory failure    Comparison: 8/25/2019, 8/24/2019.    Findings:   Single AP view of the chest. Right approximately PICC tip projects  over the superior cavoatrial junction. The central tracheobronchial  tree is patent. No pneumothorax. Right greater than left pleural  effusions with overlying bibasilar opacities. Diffuse patchy airspace  opacities throughout the lungs bilaterally. The visualized upper  abdomen is unremarkable. No acute osseous abnormality.      Impression    Impression:   1. Diffuse patchy airspace opacities bilaterally, concerning for  ongoing infection and/or pulmonary edema, not significantly changed  from prior examinations.  2. Small right pleural effusion, improved from prior examinations.  Stable small left pleural effusion. Unchanged associated bibasilar  atelectasis and/or consolidation.    I have personally reviewed the examination and initial interpretation  and I agree with the findings.    BELEN WATERS MD   Procalcitonin   Result Value Ref Range    Procalcitonin 0.64 ng/ml   Blood gas venous   Result Value Ref Range    Ph Venous 7.39 7.32 - 7.43 pH    PCO2 Venous 33 (L) 40 - 50 mm Hg    PO2 Venous 52 (H) 25 - 47 mm Hg    Bicarbonate Venous 20 (L) 21 - 28 mmol/L    Base Deficit Venous 4.7 mmol/L    FIO2 50%    Lactic acid whole blood   Result Value Ref Range    Lactic Acid 4.1 (HH) 0.7 - 2.0 mmol/L   Strep pneumo Agn Urine or CSF   Result Value Ref Range    Specimen Description Urine     S Pneumoniae Antigen       Negative, no Streptococcus pneumoniae antigen detected by immunochromatographic membrane   assay. A negative  Streptococcus pneumoniae antigen result does not rule out infection with   Streptococcus pneumoniae.     Legionella pneumonia antigen urine   Result Value Ref Range    Specimen Description Urine     L Pneumo Urine Antigen       Presumptive negative for Legionella pneumophilia serogroup 1 antigen in urine, suggesting   no recent or current infection.  Infection due to Legionella cannot be ruled out, since   other serogroups and species may cause disease, antigen may not be present in urine in   early infection, and the level of antigen present in the urine may be below detectable   limits of the test.     Blood gas arterial   Result Value Ref Range    pH Arterial 7.43 7.35 - 7.45 pH    pCO2 Arterial 29 (L) 35 - 45 mm Hg    pO2 Arterial 62 (L) 80 - 105 mm Hg    Bicarbonate Arterial 19 (L) 21 - 28 mmol/L    Base Deficit Art 4.3 mmol/L    FIO2 70    Lactic acid whole blood   Result Value Ref Range    Lactic Acid 4.3 (HH) 0.7 - 2.0 mmol/L   XR Chest Port 1 View    Narrative    Exam: XR CHEST PORT 1 VW, 8/26/2019 4:48 PM    Indication: ETT placement    Comparison: Same day 1011    Findings:   AP radiograph the chest. Endotracheal tube distal tip projects 3.9 cm  above the nishant. Right PICC distal tip projects over the low superior  vena cava. Cardiac silhouette is unchanged from prior. There are mixed  interstitial and hazy airspace opacities bilaterally which are similar  to prior. More dense hazy opacification of the lower lung fields.  Small left and small-to-moderate right pleural effusions. No  pneumothorax.      Impression    Impression:   1. Endotracheal tube distal tip projects 3.9 cm above the nishant.  2. Mixed interstitial and hazy airspace opacities are relatively  unchanged from prior and likely represent pulmonary edema possibly  with superimposed atelectasis or infection.  3. Small left and small-to-moderate right pleural effusion.    I have personally reviewed the examination and initial  interpretation  and I agree with the findings.    TIMOTEO VALADEZ MD   Blood gas arterial and oxyhgb   Result Value Ref Range    pH Arterial 7.40 7.35 - 7.45 pH    pCO2 Arterial 31 (L) 35 - 45 mm Hg    pO2 Arterial 119 (H) 80 - 105 mm Hg    Bicarbonate Arterial 19 (L) 21 - 28 mmol/L    FIO2 80     Oxyhemoglobin Arterial 98 92 - 100 %    Base Deficit Art 4.9 mmol/L   Lactic acid whole blood   Result Value Ref Range    Lactic Acid 3.9 (H) 0.7 - 2.0 mmol/L

## 2019-08-28 NOTE — PROGRESS NOTES
Notified Resident at 1610 PM regarding change in condition.  Fio2 post bronch at 90%. Patient desat to mid 80s on Fio2 80%. Respirations in mid 30s.     Spoke with: Amy Padilla    Orders were not obtained.    Comments: Continue to monitor.

## 2019-08-28 NOTE — PROCEDURES
Procedure:   HD catheter placement         Location:   Left internal jugular          Indication:   Dialysis/ CRRT          Consent:   Obtained from the patient/family. Omitted due to medical emergency.  Risks, benefits and alternatives discussed. Risks include bleeding, infection, vascular aneurysm or rupture.         Procedure Summary:   US guidance: Yes   Protection/Precaution measures: Mask with eye protection, cap, gown, gloves were used: Yes  A time-out was performed.   The central line was flushed with sterile saline. The patient's Left neck region was prepped and draped in conventional sterile fashion. Anesthesia was achieved with 1% lidocaine. The introducer needle was inserted into the Left IJ , then venous blood was withdrawn into the syringe. The syringe was removed and the guide wire was advanced through the introducer needle. A small incision was made with a scalpel and the introducer needle was removed. A dilator was introduced through the wire followed by the venous catheter that was sutured.   Good blood return from each port.         Complications:   No immediate complications.  CXR ordered/pending for position check and pneumothorax.    This procedure is performed by Joselito Dugan MD.

## 2019-08-28 NOTE — PROCEDURES
Procedure:   Central venous catheter placement        Location:   Right internal jugular          Indication:   Hypotension, need for IV access         Consent:   Obtained from the patient/family. Omitted due to medical emergency.  Risks, benefits and alternatives discussed. Risks include bleeding, infection, vascular aneurysm or rupture.         Procedure Summary:   US guidance: Yes    Protection/Precaution measures: Mask with eye protection, cap, gown, gloves were used: Yes  A time-out was performed.   The central line was flushed with sterile saline. The patient's Right Neck area was prepped and draped in conventional sterile fashion. Anesthesia was achieved with 1% lidocaine. The introducer needle was inserted into the right IJ , then venous blood was withdrawn into the syringe. The syringe was removed and the guide wire was advanced through the introducer needle. A small incision was made with a scalpel and the introducer needle was removed. A dilator was introduced through the wire followed by the venous catheter that was sutured.   Good blood return from each port.         Complications:   No immediate complications.  CXR ordered/pending for position check and pneumothorax.    This procedure is performed by Joselito Dugan MD.

## 2019-08-28 NOTE — PROGRESS NOTES
Admitted/transferred from:   Reason for admission/transfer: MICU patient   Patient status upon admission/transfer: intubated sedated   Interventions: none  Plan: watch respiratory statues and fluid status   2 RN skin assessment: completed by Ellen Ballard   Result of skin assessment and interventions/actions: Coccyx/sacrum stage 2 with mepilex on. Bottom is red and excoriated. Bruising all over. Weepy skin. Heels are intact/  Height, weight, drug calc weight: done  Patient belongings (see Flowsheet - Adult Profile for details): with patient   MDRO education (if applicable): yes

## 2019-08-28 NOTE — PLAN OF CARE
Neuro: Unarousable. Noted to not withdraw to pain at 1545. MD notified. Pupils +5-6 Bilateral pupils equal and reactive.  Sclera yellow. Receiving scheduled and PRN doses of Lactulose. See flowsheet.   Cardiac: Sinus Tachy. HR teens low 120s.  SBP > 100.. See flow sheet for levo titration.   Respiratory:  As of 1745. CMV. Fio2 80%. . Resp 20. Peep 10. Small amount of brown/bilious secretions via ETT. Resp rate noted to be in mid 30s. See provider Notified notes. Patient unable to tolerate laying flat long as of 1830. Desats to mid to upper 80s.   GI: Rectal tube in place. Watery liquid green stool. NPO. NGT placed this morning. Per, MD Amy Bray to  hold TF for now.  Receiving scheduled and prn doses of lactulose.   : Low UOP. Urine ella in color. MD aware. Albumin given x1.   IV:  R PICC. L PIV.   Gtts:  Fentanyl at 75 mcg/hr, Levo titrated see flowsheet,   Skin: Ecchymotic on L arm. Jaundice in appearance. Perineal area excoriated.  Sacral meiplex in place. Blanchable erythema. Barrier cream appled.   PLAN: Continue to monitor closely. Notify MD of any acute changes.

## 2019-08-28 NOTE — PROVIDER NOTIFICATION
Notified Resident at 1710 PM regarding change in condition.  Fio2 100%. Peep 8. . Resp mid 30s.     Spoke with: Amy Bray  Orders were obtained.    Comments:  Conditional ABG released. Stat CXR.

## 2019-08-28 NOTE — PLAN OF CARE
Neuro- pupils 6+, sluggish- unchanged. Unresponsive to painful stimuli. Fentanyl gtt @ 50.   Pulm- remains on CMV settings, FiO2 40-80% after starting flolan able to come down on FiO2. PEEP of 10, , RR 20. Lung sounds coarse/diminished bases. Minimal secretions out of ETT.   CV- Sinus tach, -120. Titrating levo to SBP >95, see flowsheet for details.   GI/- Alexander with minimal UOP, average of 10mL/hr-team aware, no new orders @ this time. Rectal tube with moderate output, pt getting PRN PO lactulose q2h for encephalopathy.   Lines- R TL PICC. L PIV.     Plan- Cont to monitor, follow POC and update team with changes/concerns.

## 2019-08-28 NOTE — PROGRESS NOTES
Nephrology Progress Note  08/28/2019       Neema Bailey is a 44 yof w/ESLD c/b HE, ascites and EV, polysubstance abuse, obesity s/p gastric bypass 2002 with recent admission for E. Coli bacteremia (source unclear), re-admitted 8/19/19 with SOB, leg swelling and suspected infection.  Cr at baseline is 0.6, on rise since admission with likely culprits of contrast given for CT (for abd pain) and vanco given for suspected infection.  Nephrology consulted for management of DEE DEE and possible HD.      Interval History  Mrs Bailey's Cr is up slightly from 1.2=>1.4 in the past 24h, UOP was ~200cc yesterday and so far today has 200cc so on track to double, although still oliguric and not matching intake.  Trying 120mg of lasix to see if we can augment UOP for pulm optimization.  Slight Cr and relatively increased UOP (along with non-active urine sediment yesterday) suggests she is likely to recover in next few days but if UOP does not  we may need to consider RRT for volume reasons, on one pressor would likely plan for CRRT but can hold on intervention today, so far is net negative although this is mostly GI losses.      ASSESSMENT AND RECOMMENDATIONS:   DEE DEE-Baseline Cr of 0.6, up to 1.2 with etiology likely KELLY with 135cc of contrast and also high dose Vanco on admit (although level was not high when checked).  HRS is in DDx and likely has some HRS physiology but Dr Nur and I did look at urine microscopy and saw granular casts suggestive of at least some degree of ATN.  No specific intervention needed today, expect Cr to improve if indeed it is related to contrast and vanco.  UA unremarkable, bili not high enough to likely be causing contrast nephropathy, kidneys were normal size on CT, no hydro on limited US.  Will follow for need for HD, pressor needs improving, cultures pending.                  -DEE DEE, likely ATN from contrast/vanco, following for need for HD.  Most likely issue will be volume.                     Volume-Total body volume overload but much of it 3rd spaced.  Has received albumin and overall is up ~5kg from admit wt which is consistent with I/O's.  Difficult to determine ideal wt, in reviewing clinic notes from more stable situations she was in 110-115kg range although still had issues related to ESLD.  Likely some component of pulm edema with regards to pulm status but will see if we can try to match intake with loop diuretics today, still on pressor.       Electrolytes/pH-No acute issues.  K 4.0, no ongoing intake (not on TF), bicarb 19.       BMD- Ca 8.3, Mg and Phos reasonable.       Nutrition-Deferred.       Seen and discussed with Dr Nur     Recommendations were communicated to primary team via verbal communication.        KANDACE Lovelace CNS  Clinical Nurse Specialist  [phone]  Attestation:  This patient has been seen, examined and evaluated by me, Faye Nur MD as a shared visit with the NP/PA above.    In brief:  Neema Bailey is a 44 year old female  With Acute kidney injury in the setting of liver disease, respiratory failure and sepsis. Cr at baseline is around 0.6 and has doubled but is relatively stable today. Her respiratory status, however, is worse, She is at a PEEP of 12, increased FIO2, on flolan.     I personally reviewed major complaints, physical findings, investigations, medications, lab values, vital signs, I/O's and the overall management plan.      My key findings:  Gen: intubated and sedated  Lungs: coarse BS B   Card: tachy, no rub  Abd: not distended.  Ext: edema B LE    Hypotensive, on pressors    Key management decisions made by me:  1. DEE DEE: Based upon the small delta in cr change and increase UOP she is behaving, from a renal standpoint, like she is in recovery. No indications from an electrolyte standpoint to intervene, however, I have concerned regarding her ability to manage volume.     2 Volume: 5kg up from admit here although I  "don't know how volume up she was on admit (in other words, I don't know what her total volume excess may be). Her exam suggests third spaced fluid. Renal function shouldn't preclude our ability to manage volume with diuretics although her situation is tenuous. Trial of lasix today but if we cannot demonstrate response and/or her renal function is not improved tomorrow then we must consider initiating CRRT based upon likely trajectory. Her CT is not consistent with pure volume overload and while we must not allow her to sig worsen and accumulate fluid, removal of fluid will not resolve her issue.     3. Infection/Meds: meropenem, vanco and micafungin. No adjustment necessary and following vanco levels.     Faye Nur MD   Date of service (date I saw patient) August 28, 2019          Review of Systems:   I reviewed the following systems:  ROS not done due to vent/sedation.       Physical Exam:   I/O last 3 completed shifts:  In: 2982.97 [I.V.:1582.97; Other:1000]  Out: 2971 [Urine:271; Stool:2700]   /49   Pulse 118   Temp 99.9  F (37.7  C) (Axillary)   Resp (!) 64   Ht 1.727 m (5' 8\")   Wt 120.5 kg (265 lb 10.5 oz)   LMP 10/04/2018   SpO2 96%   Breastfeeding? No   BMI 40.39 kg/m       GENERAL APPEARANCE: Intubated and sedated.   EYES:  + scleral icterus, pupils equal  HENT: mouth without ulcers or lesions  PULM: lungs clear to auscultation, equal air movement, no cyanosis or clubbing  CV: regular rhythm, normal rate, no rub     -JVP not elevated     -edema +2  GI: soft, non-tender, nondistended, bowel sounds are +  MS: no evidence of inflammation in joints, no muscle tenderness  NEURO: Intubated and sedated. normal    Labs:   All labs reviewed by me  Electrolytes/Renal -   Recent Labs   Lab Test 08/28/19  0424 08/27/19  2332 08/27/19  0455 08/26/19  1231 08/26/19  0320  08/20/19  0431   * 147* 145* 146* 142   < > 140   POTASSIUM 4.0  --  4.6  --  4.6   < > 3.9   CHLORIDE 116*  --  113*  " --  112*   < > 106   CO2 19*  --  19*  --  20   < > 20   BUN 28  --  21  --  13   < > 7   CR 1.39*  --  1.20* 1.06* 1.01   < > 0.78   *  --  76  --  80   < > 86   NARENDRA 8.3*  --  8.7  --  8.6   < > 8.4*   MAG  --   --  2.3  --  2.2  --  1.7   PHOS  --   --  5.0*  --   --   --  3.5    < > = values in this interval not displayed.       CBC -   Recent Labs   Lab Test 08/28/19  0830 08/28/19  0424 08/27/19  0455   WBC 19.8* 16.7* 19.0*   HGB 7.3* 7.1* 8.3*   PLT 66* 87* 125*       LFTs -   Recent Labs   Lab Test 08/28/19  0705 08/28/19  0424 08/27/19  0455 08/26/19  0320   ALKPHOS  --  96 113 116   BILITOTAL 9.2* 9.9* 9.5* 9.4*   ALT  --  51* 48 37   AST  --  277* 260* 170*   PROTTOTAL  --  6.2* 5.7* 5.4*   ALBUMIN  --  3.7 3.0* 2.5*       Iron Panel -   Recent Labs   Lab Test 08/21/19  0348 07/17/19  1542   IRON 37 91   IRONSAT 29 86*   CHELSY 166 372*           Current Medications:    [START ON 8/29/2019] ascorbic acid  500 mg Oral or Feeding Tube Daily     folic acid  500 mcg Oral or Feeding Tube Daily     heparin lock flush  5-10 mL Intracatheter Q24H     lactulose  20 g Oral or Feeding Tube 4x Daily     meropenem  1 g Intravenous Q12H     micafungin  100 mg Intravenous Q24H     multivitamins w/minerals  15 mL Per Feeding Tube Daily     pantoprazole (PROTONIX) IV  40 mg Intravenous Daily with breakfast     protein modular  1 packet Per Feeding Tube TID     rifaximin  550 mg Oral or Feeding Tube BID     [START ON 8/29/2019] sertraline  75 mg Oral or Feeding Tube Daily     vancomycin place crump - receiving intermittent dosing  1 each Does not apply See Admin Instructions     cyanocobalamin  100 mcg Oral or Feeding Tube Daily     vitamin B1  100 mg Oral or Feeding Tube Daily     vitamin C  500 mg Oral or Feeding Tube Daily       - MEDICATION INSTRUCTIONS -       IV fluid REPLACEMENT ONLY       epoprostenol (VELETRI) 20 mcg/mL in sterile water inhalation solution 20 ng/kg/min (08/28/19 1224)     fentaNYL 25 mcg/hr  (08/28/19 1300)     - MEDICATION INSTRUCTIONS -       norepinephrine 0.19 mcg/kg/min (08/28/19 1300)     - MEDICATION INSTRUCTIONS -       vasopressin (PITRESSIN) infusion ADULT (40 mL) Stopped (08/28/19 1110)

## 2019-08-28 NOTE — PROGRESS NOTES
Notified Resident at 1545 PM regarding change in condition.  RASS of -5. Not withdrawing to stimulus.     Spoke with: Pedrito Yee MD    Orders were obtained.    Comments: Versed Dc'd. Patient is receiving prn doses of lactulose.

## 2019-08-28 NOTE — PROGRESS NOTES
GASTROENTEROLOGY PROGRESS NOTE    ASSESSMENT:  44 year old woman with a history of decompensated EtOH cirrhosis c/b portal hypertension manifesting with ascites, hepatic encephalopathy and splenomegaly, MO s/p RYGB 2002, polysubstance abuse, HTN, MDD, KAYODE, chronic pain, recent admission for suspected drug-induced liver injury on cirrhosis, admitted with severe sepsis 2/2 ESBL E. coli bacteremia of unclear source, found to have bilious ascites on 8/22 paracentesis with no SBP. Hepatology consulted for evaluation of bilious ascites, cirrhosis. HIDA 8/23 with no finding suggestive of bile leak. Patient requiring critical care currently, decompensation likely due to sepsis of unclear source presently, suspect infectious etiology. On broad spectrum antibiotics for ESBL E. coli bacteremia, last positive blood culture 8/19. Patient on broad spectrum antimicrobials and antifungal presently. Unclear if hepatic encephalopathy is significantly contributing to patient's current decompensation, as ammonia levels and degree of encephalopathy do not always correlate. Kidney injury felt to be most likely due to contrast induced nephropathy/ATN by Nephrology. Liver function tests are stable currently, but prognosis remains guarded. Most likely has moderate ARDS given P:F ratio of 156, now on maximum Veletri. CT A/P on 8/25 noted stable subcutaneous, possibly encapsulated fluid collection on lateral aspect of L superior pelvis; as this is one potential source that has not been explored, may be worth touching base with IR for aspiration and culture. Lastly, patient with elevated INR; given her underlying liver disease, INR is not a reliable laboratory marker of bleeding risk, should not limit ongoing paracenteses.    Cirrhosis checklist:  Etiology: EtOH  Portal hypertension: Yes, manifesting with hepatic encephalopathy, ascites, abdominal varices, no TIPS  Hepatic encephalopathy: Yes, on lactulose and rifaximin  Ascites:  "Present  Coagulopathy: INR 3.57  Thrombocytopenia: Mild, platelets 103  Varices: None seen on 5/2019 EGD  HCC screening: Last RUQ U/S 8/19/19 with no focal mass  Thrombosis: Last RUQ U/S 8/19/19 with no thrombosis  Transplant candidacy: Not listed  Vaccinations: Up to date    MELD-Na score: 32 at 8/28/2019  1:45 PM  MELD score: 32 at 8/28/2019  7:05 AM  Calculated from:  Serum Creatinine: 1.39 mg/dL at 8/28/2019  4:24 AM  Serum Sodium: 148 mmol/L (Rounded to 137 mmol/L) at 8/28/2019  1:45 PM  Total Bilirubin: 9.2 mg/dL at 8/28/2019  7:05 AM  INR(ratio): 3.57 at 8/28/2019  4:24 AM  Age: 44 years    RECOMMENDATIONS:  - Would recommend touching base with IR regarding subcutaneous encapsulated fluid collection on lateral aspect of L superior pelvis  - No other new recommendations  - Continue broad spectrum antibiotics and antifungal  - Continue lactulose, rifaximin  - Continue enteric feeding    Thank you for allowing us to participate in this patient's care. The GI team will continue to follow. Please contact the GI team with any questions or concerns.    The patient was discussed and plan agreed upon with GI staff.    Julius Fajardo MD PhD  Internal Medicine PGY-3  Pager [phone]  ______________________________________________________________  S: Patient stopped withdrawing to painful stimuli 8/27 around 1600. Initially had decreased pressor requirements, but overnight started needing additional pressors. Primary MICU team added vasopressin today. Also maxed out on Veletri infusion, FiO2 fluctuating. The primary team plans to obtain invasive hemodynamic monitoring, and are considering CRRT given persistent oliguria in setting of potential contrast induced nephropathy with ATN.     O:  Blood pressure 107/55, pulse 116, temperature 99.9  F (37.7  C), temperature source Axillary, resp. rate (!) 37, height 1.727 m (5' 8\"), weight 120.5 kg (265 lb 10.5 oz), last menstrual period 10/04/2018, SpO2 (!) 89 %, not " currently breastfeeding.    General: Intubated, sedated  HEENT: AT/NC, ETT in place  Cardiovascular: Tachycardic, regular rhythm, no m/r/g, 3+ BLE edema, palpable peripheral pulses  Respiratory: Coarse inspiratory and expiratory rhonchi bilaterally, no rales or wheezing, ETT in place  Abdominal: Soft, distended due to habitus, nontender, hypoactive bowel sounds  Musculoskeletal: Obese bulk, no obvious joint abnormalities  Neurologic: Intubated, sedated      LABS:  BMP  Recent Labs   Lab 08/28/19  1345 08/28/19  0424 08/27/19  2332 08/27/19  0455 08/26/19  1231 08/26/19  0320 08/25/19  0604   * 148* 147* 145* 146* 142 141   POTASSIUM  --  4.0  --  4.6  --  4.6 4.4   CHLORIDE  --  116*  --  113*  --  112* 110*   NARENDRA  --  8.3*  --  8.7  --  8.6 8.8   CO2  --  19*  --  19*  --  20 22   BUN  --  28  --  21  --  13 9   CR  --  1.39*  --  1.20* 1.06* 1.01 0.78   GLC  --  131*  --  76  --  80 87     CBC  Recent Labs   Lab 08/28/19  1345 08/28/19  0830 08/28/19  0424 08/27/19  0455   WBC 24.6* 19.8* 16.7* 19.0*   RBC 2.37* 2.29* 2.33* 2.66*   HGB 7.5* 7.3* 7.1* 8.3*   HCT 24.4* 23.3* 23.7* 26.4*   * 102* 102* 99   MCH 31.6 31.9 30.5 31.2   MCHC 30.7* 31.3* 30.0* 31.4*   RDW 18.1* 18.0* 18.0* 17.6*   * 66* 87* 125*     INR  Recent Labs   Lab 08/28/19  0424 08/27/19  0455 08/26/19  0320 08/25/19  0604   INR 3.57* 3.32* 3.26* 2.92*     LFTs  Recent Labs   Lab 08/28/19  0705 08/28/19  0424 08/27/19  0455 08/26/19  0320 08/25/19  0604   ALKPHOS  --  96 113 116 112   AST  --  277* 260* 170* 132*   ALT  --  51* 48 37 35   BILITOTAL 9.2* 9.9* 9.5* 9.4* 9.8*   PROTTOTAL  --  6.2* 5.7* 5.4* 5.5*   ALBUMIN  --  3.7 3.0* 2.5* 2.7*      PANCNo lab results found in last 7 days.

## 2019-08-28 NOTE — PROGRESS NOTES
GASTROENTEROLOGY PROGRESS NOTE    ASSESSMENT:  44 year old woman with a history of decompensated EtOH cirrhosis c/b portal hypertension manifesting with ascites, hepatic encephalopathy and splenomegaly, [place] s/p RYGB 2002, polysubstance abuse, HTN, MDD, KAYODE, chronic pain, recent admission for suspected drug-induced liver injury on cirrhosis, admitted with severe sepsis 2/2 ESBL E. coli bacteremia of unclear source, found to have bilious ascites on 8/22 paracentesis with no SBP. Hepatology consulted for evaluation of bilious ascites, cirrhosis. HIDA 8/23 with no finding suggestive of bile leak. Patient requiring critical care currently, decompensation likely due to sepsis of unclear source presently, suspect infectious etiology. On broad spectrum antibiotics for ESBL E. coli bacteremia, last positive blood culture 8/19. Patient on broad spectrum antimicrobials and antifungal presently. Unclear if hepatic encephalopathy is significantly contributing to patient's current decompensation, as ammonia levels and degree of encephalopathy do not always correlate. The patient is undergoing albumin challenge for suspected hepatorenal syndrome, with kidney function persistently impaired and low urine output. Liver function tests trended upward a bit overnight. Prognosis is guarded presently. Burgeoning ARDS given P:F ratio of 149, though FiO2 improving. Lastly, patient with elevated INR; given her underlying liver disease, INR is not a reliable laboratory marker of bleeding risk, should not limit ongoing paracenteses.    Cirrhosis checklist:  Etiology: EtOH  Portal hypertension: Yes, manifesting with hepatic encephalopathy, ascites, abdominal varices, no TIPS  Hepatic encephalopathy: Yes, on lactulose or rifaximin  Ascites: Present  Coagulopathy: INR 3.32  Thrombocytopenia: Mild, platelets 125  Varices: None seen on 5/2019 EGD  HCC screening: Last RUQ U/S 8/19/19 with no focal mass  Thrombosis: Last RUQ U/S 8/19/19 with no  "thrombosis  Transplant candidacy: Not listed  Vaccinations: Up to date    MELD-Na score: 30 at 8/27/2019  4:55 AM  MELD score: 30 at 8/27/2019  4:55 AM  Calculated from:  Serum Creatinine: 1.20 mg/dL at 8/27/2019  4:55 AM  Serum Sodium: 145 mmol/L (Rounded to 137 mmol/L) at 8/27/2019  4:55 AM  Total Bilirubin: 9.5 mg/dL at 8/27/2019  4:55 AM  INR(ratio): 3.32 at 8/27/2019  4:55 AM  Age: 44 years    RECOMMENDATIONS:  - No new recommendations today  - Continue broad spectrum antibiotics  - Continue lactulose, rifaximin as ordered (changed from IA to PO)  - Continue enteric feeding    Thank you for allowing us to participate in this patient's care. The GI team will continue to follow. Please contact the GI team with any questions or concerns.    The patient was discussed with Dr. Thomas Leventhal, who agrees with the assessment and plan.    Julius Fajardo MD PhD  Internal Medicine PGY-3  Pager [phone]  ______________________________________________________________  S: Patient intubated 8/26 for persistent tachypnea and hypoxia. Overnight, agitated, pulling on lines, soft mechanical restraints applied. This afternoon, patient intubated, sedated. Family at bedside asking appropriate questions. Patient underwent bronchoscopy today. Unable to obtain review of systems due to patient's condition.     O:  Blood pressure (!) 96/36, pulse 113, temperature 99.5  F (37.5  C), temperature source Axillary, resp. rate 23, height 1.727 m (5' 8\"), weight 119.7 kg (263 lb 14.3 oz), last menstrual period 10/04/2018, SpO2 97 %, not currently breastfeeding.    General: Intubated, sedated  HEENT: AT/NC, ETT in place  Cardiovascular: Tachycardic, regular rhythm, no m/r/g, 3+ BLE edema, palpable peripheral pulses  Respiratory: Coarse inspiratory and expiratory rhonchi bilaterally, no rales or wheezing, ETT in place  Abdominal: Soft, distended due to habitus, nontender, hypoactive bowel sounds  Musculoskeletal: Obese bulk, no " obvious joint abnormalities  Neurologic: Intubated, sedated, no clonus appreciated    LABS:  BMP  Recent Labs   Lab 08/27/19  0455 08/26/19  1231 08/26/19  0320 08/25/19  0604 08/24/19  2043   * 146* 142 141 140   POTASSIUM 4.6  --  4.6 4.4 4.5   CHLORIDE 113*  --  112* 110* 110*   NARENDRA 8.7  --  8.6 8.8 8.6   CO2 19*  --  20 22 21   BUN 21  --  13 9 8   CR 1.20* 1.06* 1.01 0.78 0.72   GLC 76  --  80 87 88     CBC  Recent Labs   Lab 08/27/19  0455 08/26/19  0320 08/25/19  0604 08/24/19  2043   WBC 19.0* 16.0* 13.5* 13.8*   RBC 2.66* 2.74* 2.69* 2.57*   HGB 8.3* 8.5* 8.2* 8.1*   HCT 26.4* 27.9* 27.7* 25.7*   MCV 99 102* 103* 100   MCH 31.2 31.0 30.5 31.5   MCHC 31.4* 30.5* 29.6* 31.5   RDW 17.6* 17.7* 17.4* 17.9*   * 112* 91* 100*     INR  Recent Labs   Lab 08/27/19  0455 08/26/19  0320 08/25/19  0604 08/24/19  0834   INR 3.32* 3.26* 2.92* 2.60*     LFTs  Recent Labs   Lab 08/27/19 0455 08/26/19  0320 08/25/19  0604 08/24/19  0445   ALKPHOS 113 116 112 108   * 170* 132* 116*   ALT 48 37 35 33   BILITOTAL 9.5* 9.4* 9.8* 9.8*   PROTTOTAL 5.7* 5.4* 5.5* 5.2*   ALBUMIN 3.0* 2.5* 2.7* 2.3*      PANCNo lab results found in last 7 days.

## 2019-08-28 NOTE — PROGRESS NOTES
[facility], [place]    Progress Note  [facility] Intensive Care     Date of Admission:  8/19/2019    Assessment & Plan   Neema Bailey is a 44 year old female with history of multifactorial ESLD c/b HE, EV, ascites, polysubstance abuse, morbid obesity s/p gastric bypass in 2002 who presents as a transfer from the floor for acute hypoxic respiratory and altered mental status, notably recently discharged from ICU 8/21 after a 2-day stay for severe sepsis with hypotension 2/2 ESBL E coli bacteremia of unknown source.    In the last 24h, increasing hypoxia after bronch, mildly improved after Flolan administration. Concern for developing ARDS picture secondary to pneumonia vs aspiration pneumonitis.    Changes today:  - continue lactulose  - add vasopressin  - lung-protective ventilation  - CXR to re-evaluate, mildly improved today  - TTE with bubble study if ongoing hypoxemia  - lasix 120mg for trial of diuresis  - enteral feeding, free water flushes  - q8h sodium checks  - check urine culture  - hemolysis labs, follow CBC  - consider proning and paralyzing if ongoing hypoxia    Neurology:  Hepatic/Toxic-metabolic encephalopathy: Elevated ammonia to 147 on 8/25 with lethargy. Normal BUN, no other sedating meds given. Ammonia uptrending on ICU day 2, unclear what correlation with mental status.  - PO lactulose scheduled and per [facility] protocol    Sedation:  - fentanyl/versed gtt for RASS -3 to -4 in the setting of critical respiratory illness    Depression  - PTA sertraline    Cardiovascular:  Shock, multifactorial: in the setting of cirrhosis with third spacing and possible infection underlying. Rising lactate during hospital stay from 1.9 after transfer out of ICU to 4.3 on the day of transfer back. Bedside TTE with hyperdynamic LV, 1.78cm IVC with minimal respiratory variation while on positive pressure ventilation. Cardiac function on formal TTE 8/20 hyperdynamic with  normal PASP.  - levophed, titrate for MAPs > 60  - vasopressin    Pulmonary:        Acute hypoxic respiratory failure  Developing ARDS, suspected  Transudative R-sided pleural effusion, moderate  CT chest 8/25 notable for GGO, interlobular septal thickening, patchy consolidative opacities. Differential includes pulmonary edema, developing ARDS, hepatopulmonary syndrome, pneumonia, atypical infection, severe aspiration pneumonitis.. P/F ratio 149. Intubated for increased work of breathing and hypoxemia. Initially improving on MICU readmission day 2 and then worsened after bronchial lavage on MICU day 3 with worsening hypoxemia. BAL notable for significant bilious secretions that were suctioned. Subsequently, started on Flolan with some improvement.  - trial of diuresis; per renal, do not necessarily suspect hypervolemia contributing significantly to respiratory status  - daily CXR to re-evaluate  - routine vent cares  - TTE with bubble study to evaluate for shunt  - consider prone and paralyzing later this evening if ongoing hypoxia  - lung-protective ventilation    Ventilation Mode: CMV/AC  (Continuous Mandatory Ventilation/ Assist Control)  FiO2 (%): 40 %  Rate Set (breaths/minute): 20 breaths/min  Tidal Volume Set (mL): 500 mL  PEEP (cm H2O): 10 cmH2O  Oxygen Concentration (%): 50 %  Resp: 21      Renal  Oliguric DEE DEE 2/2 ATN, prerenal, hepatorenal syndrome: Baseline creatinine 0.6, increased to 1.0 in the setting of hypotension. Recent nephrotoxins also include IV contrast 8/25 during CT scan, and now administration of vancomycin. Urine output decreasing 8/23. UA 8/22 notable for mild proteinuria, small LE, hyaline casts. FENA and FEBUN consistent with prerenal azotemia. Repeat UA 8/27 demonstrating some ketones  - strict I/Os with Alexander anchored  - stopped albumin challenge per renal recs  - avoid nephrotoxins  - Renal consult, appreciate assistance  - daily BMP  - holding PTA midodrine given on stronger  pressors    Hypernatremia: Free water deficit  - free water flush via G-tube  - Na checks q8h    ID:         Recent ESBL E coli bacteremia   Possible sepsis due to unknown source : Blood culture 1/2 positive 8/19 for ESBL E coli. No followup blood cultures obtained until 8/22, which remain NGTD. Previous urine culture, transudative pleural fluid culture, and ascites fluid culture currently NGTD. S pneumo and legionella urine antigen negative. Procalcitonin uptrending but not significantly. Leukocytosis increasing. Cryptococcal antigen negative.  - BCx 8/26 x 2 pending NGTD  - trend CBC  - consider tapping pelvic fluid collection noted on CT if not improving    Cultures:  UCx 8/27 pending  Bronchial lavage 8/27 NGTD  BCx 8/26 x2 NGTD  Pleural fluid culture 8/25 NGTD  Ascites fluid culture 8/22 NGTD    Antimicrobials:  Micafungin (8/26 -   Meropenem (8/25 -  Vancomycin (8/20, 8/25-    Azithromycin (8/25 - 8/27)  Ertapenem (8/19 - 8/24)      GI  ESLD, multifactorial c/b portal hypertension with ascites, esophageal varices: Last EGD 5/2019 with no varices. Last US 8/20 with no mass, no portal vein thrombosis.  - Hepatology consulted, appreciate recs  - currently not undergoing transplant evaluation  - rifaxamin, lactulose as above for HE protocol  - holding PTA diuretics furosemid 20mg, spironolactone 50mg  - daily MELD labs    MELD-Na score: 33 at 8/28/2019  4:24 AM  MELD score: 33 at 8/28/2019  4:24 AM  Calculated from:  Serum Creatinine: 1.39 mg/dL at 8/28/2019  4:24 AM  Serum Sodium: 148 mmol/L (Rounded to 137 mmol/L) at 8/28/2019  4:24 AM  Total Bilirubin: 9.9 mg/dL at 8/28/2019  4:24 AM  INR(ratio): 3.57 at 8/28/2019  4:24 AM  Age: 44 years    Hyperbilirubinemia with gallbladder wall thickening: noted on CT abd/pelvis 8/25. Negative HIDA scan. Tenderness to palpation on exam.  - continue to monitor    Mild transaminitis: consistent with critical illness and mild myopathy.  - trend CK, daily MELD labs    Mild  reflux esophagitis  - PPI daily    Nutrition  At risk for malnutrition:  - feeding per nutrition    Endocrine:  At risk for hypoglycemia  - hypoglycemia protocol, q4h glucose checks while on tube feeds    Heme/Onc:  Macrocytic anemia: Baseline hemoglobin 8-9 in the last 6 months. 1 point hemoglobin drop today without any clear signs or symptoms of bleeding. Hemolysis labs suggestive; however, hemoglobin stable currently. Associated with worse thrombocytopenia today.  - daily CBC, transfuse Hgb > 7  - follow up peripheral smear    Thrombocytopenia, chronic due to liver disease: Baseline ~120s in the last 6 months.  - trend    DVT Prophylaxis: Pneumatic Compression Devices, will discuss prophylactic anticoagulation in AM  GI Prophylaxis: PPI    Restraints: Restraints for medical healing needed: YES    Family update by me today: Yes     Amy Bray    I have seen and discussed this patient with Dr. Perlman Erica Ting, MD  Internal Medicine/Pediatrics, PGY3  [facility]  (p) [phone]      Code Status   Full Code    Subjective  RN notes reviewed. Ongoing oliguria though more recently. Patient adequately sedated, minimally responsive to stimuli. Mom present at bedside and updated.    Review of Systems   Review of systems not obtained due to patient factors - mental status    Physical Exam   Temp: 98.8  F (37.1  C) Temp src: Axillary Temp  Min: 98.8  F (37.1  C)  Max: 99.5  F (37.5  C) BP: (!) 94/36 Pulse: 114 Heart Rate: 113 Resp: 21 SpO2: 95 % O2 Device: Mechanical Ventilator    Vital Signs with Ranges  Temp:  [98.8  F (37.1  C)-99.5  F (37.5  C)] 98.8  F (37.1  C)  Pulse:  [110-115] 114  Heart Rate:  [107-118] 113  Resp:  [11-34] 21  BP: ()/(35-64) 94/36  FiO2 (%):  [40 %-80 %] 40 %  SpO2:  [88 %-100 %] 95 %  265 lbs 10.47 oz    Constitutional: intubated and sedated  Eyes: Pupils 6mm PERRL, scleral icterus  ENT: Normocephalic, without obvious abnormality, atraumatic, orally  intubated  Respiratory: CTAB, improved aeration bilaterally, synchronous with vent, no wheezes/rhonchi/rales appreciated  Cardiovascular: Normal apical impulse, tachycardic, regular rhythm, normal S1 and S2, no S3 or S4, and no murmur noted.  GI: normal bowel sounds, soft, non-distended, nontender on palpation, no masses palpated, no hepatosplenomegaly, brown stool on rectal tube  Genitourinary:  Alexander in place draining clear yellow urine  Skin: No bruising or bleeding, normal skin color, texture, turgor, no redness, warmth, or swelling, no rashes, no lesions, no abnormal moles, nails normal without discoloration or clubbing and no jaundice; R PICC in place without erythema or drainage over site  Musculoskeletal: pitting edema bilaterally in the LE to the knees 2+, Tone is normal.  Neurologic: sedated, unresponsive to pain    Data   Results for orders placed or performed during the hospital encounter of 08/19/19 (from the past 24 hour(s))   Glucose by meter   Result Value Ref Range    Glucose 68 (L) 70 - 99 mg/dL   Glucose by meter   Result Value Ref Range    Glucose 81 70 - 99 mg/dL   UA with Microscopic reflex to Culture   Result Value Ref Range    Color Urine Dark Yellow     Appearance Urine Slightly Cloudy     Glucose Urine Negative NEG^Negative mg/dL    Bilirubin Urine Moderate (A) NEG^Negative    Ketones Urine 10 (A) NEG^Negative mg/dL    Specific Gravity Urine 1.030 1.003 - 1.035    Blood Urine Moderate (A) NEG^Negative    pH Urine 5.5 5.0 - 7.0 pH    Protein Albumin Urine 30 (A) NEG^Negative mg/dL    Urobilinogen mg/dL 4.0 (H) 0.0 - 2.0 mg/dL    Nitrite Urine Negative NEG^Negative    Leukocyte Esterase Urine Small (A) NEG^Negative    Source Midstream Urine     WBC Urine 9 (H) 0 - 5 /HPF    RBC Urine 5 (H) 0 - 2 /HPF    Bacteria Urine Few (A) NEG^Negative /HPF    Squamous Epithelial /HPF Urine 1 0 - 1 /HPF    Transitional Epi 1 0 - 1 /HPF    Mucous Urine Present (A) NEG^Negative /LPF    Hyaline Casts 39 (H) 0  - 2 /LPF   XR Feeding Tube Placement    Narrative    Feeding tube placement.    Comparison: CT chest abdomen and pelvis, 8/25/2019.    History: Feeding tube needed for nutrition.Gastric bypass in 2002.    Fluoroscopy time:  1.1 minutes    Technique: After injection of Xylocaine gel into the right nostril, a  feeding tube was advanced under fluoroscopic guidance.    Findings: The feeding tube is advanced with the tip in the jejunum.  Small amount of barium was injected to define anatomy.      Impression    Impression: Uncomplicated feeding tube placement with tip in the  jejunum.     I, TIMOTEO VALADEZ MD, attest that I was present for all critical  portions of the procedure and was immediately available to provide  guidance and assistance during the remainder of the procedure.       I have personally reviewed the examination and initial interpretation  and I agree with the findings.    TIMOTEO VALADEZ MD   Glucose by meter   Result Value Ref Range    Glucose 74 70 - 99 mg/dL   Cryptococcus antigen   Result Value Ref Range    Specimen Description Blood     Cryptococcal Antigen Test Negative for cryptococcal antigen    XR Chest Port 1 View    Narrative    XR CHEST PORT 1 VW  8/27/2019 2:05 PM      HISTORY: hypoxia    COMPARISON: 8/26/2019    FINDINGS:   Single AP radiograph of the chest. Endotracheal tube tip approximately  4.1 cm above the nishant. New enteric tube tip courses beyond the field  of view with small volume of contrast in the left upper quadrant.  Right upper extremity PICC tip projects over the low SVC. Cardiac  silhouette is similar in size. Lung volumes are similar with unchanged  diffuse mixed interstitial and airspace opacities favoring the lower  lung fields. Stable small-to-moderate right pleural effusion and  increased left small-to-moderate pleural effusion.      Impression    IMPRESSION:   1. Unchanged diffuse mixed interstitial and airspace opacities, likely  pulmonary edema, possibly with  superimposed infection/atelectasis.  2. Small-to-moderate bilateral pleural effusions.    I have personally reviewed the examination and initial interpretation  and I agree with the findings.    BELEN WATERS MD   Cell count with differential fluid - BAL Site 1   Result Value Ref Range    Body Fluid Analysis Source Bronchial lavage     % Neutrophils Fluid 88 %    % Lymphocytes Fluid 1 %    % Other Cells Fluid 11 %    Color Fluid Yellow     Appearance Fluid Slightly Cloudy     WBC Fluid 630 /uL   Gram stain - BAL Site 1   Result Value Ref Range    Specimen Description Bronchial lavage SITE1     Gram Stain <25 PMNs/low power field     Gram Stain No organisms seen    Koh prep - BAL Site 1   Result Value Ref Range    Specimen Description Bronchial lavage SITE1     KOH Prep No fungal elements seen    Glucose by meter   Result Value Ref Range    Glucose 103 (H) 70 - 99 mg/dL   XR Chest Port 1 View    Narrative    Exam: XR CHEST PORT 1 VW, 8/27/2019 5:11 PM    Indication: post-bronch increased hypoxia    Comparison: Same day 1348    Findings:   AP radiograph of the chest. Endotracheal tube distal tip projects 4.3  cm above the nishant. Enteric tube courses below the diaphragm and out  of the field-of-view. Small amount contrast in the stomach. Right PICC  distal tip projects over the low superior vena cava.    Cardiac silhouette is unchanged. There are mixed interstitial and hazy  airspace opacities which are similar to prior. No pneumothorax.  Small-to-moderate right pleural effusion with adjacent basilar  opacities, slightly increased.      Impression    Impression:   1. Mixed interstitial and hazy airspace opacities bilaterally similar  to prior likely represents pulmonary edema possibly with superimposed  atelectasis or infection.  2. Slightly increased right pleural effusion and adjacent atelectasis  versus consolidation.     I have personally reviewed the examination and initial interpretation  and I agree with the  findings.    DAVID RENTERIA DO   Blood gas arterial   Result Value Ref Range    pH Arterial 7.35 7.35 - 7.45 pH    pCO2 Arterial 30 (L) 35 - 45 mm Hg    pO2 Arterial 64 (L) 80 - 105 mm Hg    Bicarbonate Arterial 17 (L) 21 - 28 mmol/L    Base Deficit Art 8.2 mmol/L    FIO2 90    Lactic acid whole blood   Result Value Ref Range    Lactic Acid 5.1 (HH) 0.7 - 2.0 mmol/L   Oxyhemoglobin   Result Value Ref Range    Oxyhemoglobin Arterial 87 (L) 92 - 100 %   Glucose by meter   Result Value Ref Range    Glucose 134 (H) 70 - 99 mg/dL   Glucose by meter   Result Value Ref Range    Glucose 128 (H) 70 - 99 mg/dL   Blood gas arterial with oxyhemoglobin (1200)   Result Value Ref Range    pH Arterial 7.35 7.35 - 7.45 pH    pCO2 Arterial 34 (L) 35 - 45 mm Hg    pO2 Arterial 107 (H) 80 - 105 mm Hg    Bicarbonate Arterial 19 (L) 21 - 28 mmol/L    FIO2 80     Oxyhemoglobin Arterial 97 92 - 100 %    Base Deficit Art 6.3 mmol/L   Sodium whole blood   Result Value Ref Range    Sodium 147 (H) 133 - 144 mmol/L   CBC with platelets differential   Result Value Ref Range    WBC 16.7 (H) 4.0 - 11.0 10e9/L    RBC Count 2.33 (L) 3.8 - 5.2 10e12/L    Hemoglobin 7.1 (L) 11.7 - 15.7 g/dL    Hematocrit 23.7 (L) 35.0 - 47.0 %     (H) 78 - 100 fl    MCH 30.5 26.5 - 33.0 pg    MCHC 30.0 (L) 31.5 - 36.5 g/dL    RDW 18.0 (H) 10.0 - 15.0 %    Platelet Count 87 (L) 150 - 450 10e9/L    Diff Method Automated Method     % Neutrophils 76.2 %    % Lymphocytes 13.1 %    % Monocytes 5.8 %    % Eosinophils 1.2 %    % Basophils 0.2 %    % Immature Granulocytes 3.5 %    Nucleated RBCs 0 0 /100    Absolute Neutrophil 12.7 (H) 1.6 - 8.3 10e9/L    Absolute Lymphocytes 2.2 0.8 - 5.3 10e9/L    Absolute Monocytes 1.0 0.0 - 1.3 10e9/L    Absolute Eosinophils 0.2 0.0 - 0.7 10e9/L    Absolute Basophils 0.0 0.0 - 0.2 10e9/L    Abs Immature Granulocytes 0.6 (H) 0 - 0.4 10e9/L    Absolute Nucleated RBC 0.0    Comprehensive metabolic panel   Result Value Ref Range     Sodium 148 (H) 133 - 144 mmol/L    Potassium 4.0 3.4 - 5.3 mmol/L    Chloride 116 (H) 94 - 109 mmol/L    Carbon Dioxide 19 (L) 20 - 32 mmol/L    Anion Gap 12 3 - 14 mmol/L    Glucose 131 (H) 70 - 99 mg/dL    Urea Nitrogen 28 7 - 30 mg/dL    Creatinine 1.39 (H) 0.52 - 1.04 mg/dL    GFR Estimate 45 (L) >60 mL/min/[1.73_m2]    GFR Estimate If Black 53 (L) >60 mL/min/[1.73_m2]    Calcium 8.3 (L) 8.5 - 10.1 mg/dL    Bilirubin Total 9.9 (H) 0.2 - 1.3 mg/dL    Albumin 3.7 3.4 - 5.0 g/dL    Protein Total 6.2 (L) 6.8 - 8.8 g/dL    Alkaline Phosphatase 96 40 - 150 U/L    ALT 51 (H) 0 - 50 U/L     (H) 0 - 45 U/L   INR   Result Value Ref Range    INR 3.57 (H) 0.86 - 1.14   PTT (AM Draw)   Result Value Ref Range    PTT 55 (H) 22 - 37 sec

## 2019-08-28 NOTE — PROCEDURES
Arterial Line Procedure note:     PROCEDURE:   Ultrasound guided Right Femoral Artery     INDICATION: Blood pressure monitoring, shock    PROCEDURE :   Ritchie Yee MD         CONSENT: Obtained and in the paper chart    UNIVERSAL PROTOCOL: Patient Identification was verified, time out was performed, site marking was done. Imaging data reviewed. Full Barrier precaution done: Hands washed, mask, gloves, gown, cap and eye protection all used.     PROCEDURE SUMMARY:   An Lasha's test showed good flow through both the radial and ulnar arteries.  The patient was prepped and draped in the usual sterile manner using chlorhexidine scrub. 1% lidocaine was used to numb the region. The Right Femoral artery was palpated and visualized on ultrasound.  The artery was successfully cannulated on the first pass. Pulsatile, arterial blood was visualized and the artery was then threaded with a guide wire using the Seldinger technique.  The needle was removed and ultrasound visualized the wire in the artery, a catheter was threaded over the wire, the wire was removed, and the catheter was sutured into place. A good wave-form was obtained. The patient tolerated the procedure well without any immediate complications. The area was cleaned and a dressing was applied.     Dr. PERLMAN, DAVID was present during the Procedure     ESTIMATED BLOOD LOSS: 5mL

## 2019-08-29 NOTE — PROGRESS NOTES
"CRRT STATUS NOTE    DATA:  Time:  6:07 PM  Pressures WNL:  YES  Filter Status:  WDL    Problems Reported/Alarms Noted:  none    Supplies Present:  YES    ASSESSMENT:  Patient Net Fluid Balance:  +900 since midnight  Vital Signs:  /51 (BP Location: Left arm)   Pulse 105   Temp 97.5  F (36.4  C) (Axillary)   Resp 24   Ht 1.727 m (5' 8\")   Wt 123 kg (271 lb 2.7 oz)   LMP 10/04/2018   SpO2 98%   Breastfeeding? No   BMI 41.23 kg/m    Labs:  K 3.7, creatinine 1.03, hgb 8.2 WBC 28.5  Goals of Therapy:  50cc/hr net negative as BP's allow, thanks    INTERVENTIONS:   Supine patient from prone.    PLAN:  Continue with current plan of care. Change circuit Q 72 hours and prn.  Contact CRRT Resource RN at [phone] with questions or concerns.    "

## 2019-08-29 NOTE — PLAN OF CARE
ICU End of Shift Summary. See flowsheets for vital signs and detailed assessment.    Changes this shift: Proned/paralyzed and started CRRT around 2200 8/28. Tolerating goal I=O. Weaned PEEP and FiO2 per ABGs. TF advanced to 20 ml/hr. Started using Volume view, values slightly improving. Calcium chloride given per protocol. PRN dose of lactulose given for rise in ammonia this am. (Need to address current PRNs based on mental status as these are no longer applicable). BIS monitor positional; difficult to obtain reliable reading. Mom, Roxy, update over the phone this morning at her request.    Plan:  Address lactulose/stool goals, continue to closely monitor ABGs and supinate later today. Consider fluid removal with CRRT. Update family with changes.

## 2019-08-29 NOTE — PLAN OF CARE
ICU End of Shift Summary. See flowsheets for vital signs and detailed assessment.    Changes this shift: Patient now on peep 15, 100%. Started flow track and volume control. ART line placed, central lines placed, HD line placed. Going to start CRRT and prone and paralyzed. See labs. Gave 120 lasix, with no response.     Plan:  CRRT, prone and paralyze. Watch labs closely

## 2019-08-29 NOTE — PROGRESS NOTES
8/29/2019:    Social Work Services Progress Note    Hospital Day: 18  Date of Initial Social Work Evaluation:  Previous hospitalization 7/23/19  Collaborated with:  Chart review, [facility] Medical records, palliative care    Data:  Neema Bailey is a 44 year old female patient admitted to [facility] on 8/19/19 due to SIRS (systemic inflammatory response syndrome), hepatic encephalopathy, pleural effusion, and sepsis.     Intervention:    Able to collect Health Care Directive- sent to [employer] for validation and to be added to the patient's chart. Per palliative care, another family member also brought in a copy to confirm that patient's mother is Health Care Agent.     Patient remains critically ill with family at bedside.      Assessment:  Patient critically ill.    Plan:    Anticipated Disposition:  TBD    Barriers to d/c plan:  Critical illness, medical clearance, discharge planning    Follow Up:  Sw following.      SHERRY Hubbard, [facility]  ICU 4A & 4C   Ph: [phone]  Pager: [phone]

## 2019-08-29 NOTE — PLAN OF CARE
PT 4C / Discharge Planner PT   Patient plan for discharge: unknown  Current status: Patient orally intubated, sedated and paralyzed, placed in supine position this afternoon. Provided PROM to BUE/BLE within restrictions of lines, VSS throughout on VC-AC FiO2 40% PEEP 12.  Barriers to return to prior living situation: medical status  Recommendations for discharge: deferred due to medical status  Rationale for recommendations: Patient remains critically ill in ICU, will update discharge recs when better able to assess functional mobility.       Entered by: Parmjit Lei 08/29/2019 5:18 PM

## 2019-08-29 NOTE — PLAN OF CARE
OT 4C: Cancel and continue to HOLD.  Pt remains critically ill, proned, paralyzed, not appropriate for therapy intervention.  PT to follow for PROM and mobility once appropriate.  OT will HOLD.

## 2019-08-29 NOTE — PROGRESS NOTES
Nephrology Progress Note  08/29/2019         Neema Bailey is a 44 yof w/ESLD c/b HE, ascites and EV, polysubstance abuse, obesity s/p gastric bypass 2002 with recent admission for E. Coli bacteremia (source unclear), re-admitted 8/19/19 with SOB, leg swelling and suspected infection.  Cr at baseline is 0.6, on rise since admission with likely culprits of contrast given for CT (for abd pain) and vanco given for suspected infection.  Nephrology consulted for management of DEE DEE and possible HD.       Interval History  Mrs Bailey was started on CRRT yesterday and proned to try to improve oxygenation.  Pulling 50cc/hr today, labs stable, UOP dwindling and so UF becoming a larger factor in I/O's, continuing CRRT.       ASSESSMENT AND RECOMMENDATIONS:   DEE DEE-Baseline Cr of 0.6, up to 1.2 with etiology likely KELLY with 135cc of contrast and also high dose Vanco on admit (although level was not high when checked).  HRS is in DDx and likely has some HRS physiology but Dr Nur and I did look at urine microscopy and saw granular casts suggestive of at least some degree of ATN.  Started CRRT 8/28 due to rapidly worsening resp status to try to pull some fluid and optimize pulm status, continuing with tenuous hemodynamics.                   -DEE DEE, likely ATN from contrast/vanco, sepsis.       -CRRT, 0-50cc/hr, 4k baths and standard Rx, line is temp [facility]       Volume-Total body volume overload but much of it 3rd spaced.  Up ~8kg since admit and likely ~10kg overall, trying to pull 50cc/hr to help pulm status although pulm edema is not largest factor with pulm status.       Electrolytes/pH-No acute issues.  K 4.0, stable on CRRT, bicarb 20.       BMD- Ca 7.9, Mg and Phos reasonable.       Nutrition-Nutren 1.5 TF at goal.      Seen and discussed with Dr Nur     Recommendations were communicated to primary team via verbal communication.     KANDACE Lovelace CNS  Clinical Nurse  "Specialist  [phone]    Attestation:  This patient has been seen, examined and evaluated by me, Faye Nur MD as a shared visit with the NP/PA above.    In brief:  Neema Bailey is a 44 year old female with acute repiratory failure. Yesterday we had a line placed due to respiratory decompensation. It was felt that due to her volume and worsening status she would require support. CRRT was started last night.     I personally reviewed major complaints, physical findings, investigations, medications, lab values, vital signs, I/O's and the overall management plan.      My key findings:  Acute respiratory distress  Volume   DEE DEE    Key management decisions made by me:  CRRT. Volume is not our primary issue so goals should be to keep I=O if possible. We can take fluid once she is more stable.     Faye Nur MD   Date of service (date I saw patient) Aug 29,2019    Review of Systems:   I reviewed the following systems:  ROS not done due to vent/sedation.     Physical Exam:   I/O last 3 completed shifts:  In: 4425.25 [I.V.:1668.25; Other:12; NG/GT:2275]  Out: 4308 [Urine:210; Other:1798; Stool:2300]   /51 (BP Location: Left arm)   Pulse 105   Temp 97.5  F (36.4  C) (Axillary)   Resp 25   Ht 1.727 m (5' 8\")   Wt 123 kg (271 lb 2.7 oz)   LMP 10/04/2018   SpO2 94%   Breastfeeding? No   BMI 41.23 kg/m       GENERAL APPEARANCE: Intubated and sedated.   EYES:  + scleral icterus, pupils equal  HENT: mouth without ulcers or lesions  PULM: lungs clear to auscultation, equal air movement, no cyanosis or clubbing  CV: regular rhythm, normal rate, no rub     -JVP not elevated     -edema +2  GI: soft, non-tender, nondistended, bowel sounds are +  MS: no evidence of inflammation in joints, no muscle tenderness  NEURO: Intubated and sedated. normal    Labs:   All labs reviewed by me  Electrolytes/Renal -   Recent Labs   Lab Test 08/29/19  1026 08/29/19  0419 08/28/19  2239 08/28/19  1617  " 08/27/19  0455    146* 148* 148*   < > 145*   POTASSIUM 3.8 4.0 3.9 4.0   < > 4.6   CHLORIDE 115* 115* 117* 118*   < > 113*   CO2 22 20 21 18*   < > 19*   BUN 24 28 34* 34*   < > 21   CR 1.10* 1.23* 1.43* 1.59*   < > 1.20*   * 108* 121* 128*   < > 76   NARENDRA 7.9* 7.9* 7.8* 8.4*   < > 8.7   MAG  --  2.4*  --  2.4*  --  2.3   PHOS 4.7* 5.0* 5.4* 5.3*  --  5.0*    < > = values in this interval not displayed.       CBC -   Recent Labs   Lab Test 08/29/19  1026 08/29/19  0419 08/28/19  2239   WBC 28.7* 28.1* 25.4*   HGB 6.9* 7.2* 7.2*   PLT 70* 70* 82*       LFTs -   Recent Labs   Lab Test 08/29/19  1026 08/29/19  0419 08/28/19  2239   ALKPHOS 142 101 96   BILITOTAL 9.4* 10.0* 9.5*   ALT 54* 54* 58*   * 284* 287*   PROTTOTAL 5.5* 5.6* 5.8*   ALBUMIN 3.4 3.5 3.4       Iron Panel -   Recent Labs   Lab Test 08/21/19  0348 07/17/19  1542   IRON 37 91   IRONSAT 29 86*   CHELSY 166 372*           Current Medications:    artificial tears   Both Eyes Q6H     ascorbic acid  500 mg Oral or Feeding Tube Daily     dornase alpha  2.5 mg Inhalation Daily     folic acid  500 mcg Oral or Feeding Tube Daily     heparin lock flush  5-10 mL Intracatheter Q24H     lactulose  20 g Oral or Feeding Tube Q6H     meropenem  1 g Intravenous Q8H     micafungin  100 mg Intravenous Q24H     multivitamins w/minerals  15 mL Per Feeding Tube Daily     pantoprazole (PROTONIX) IV  40 mg Intravenous Daily with breakfast     protein modular  1 packet Per Feeding Tube TID     rifaximin  550 mg Oral or Feeding Tube BID     sertraline  75 mg Oral or Feeding Tube Daily     cyanocobalamin  100 mcg Oral or Feeding Tube Daily     vitamin B1  100 mg Oral or Feeding Tube Daily       cisatracurium (NIMBEX) infusion ADULT 3 mcg/kg/min (08/29/19 9293)     - MEDICATION INSTRUCTIONS -       IV fluid REPLACEMENT ONLY       CRRT replacement solution 12.5 mL/kg/hr (08/29/19 3109)     epoprostenol (VELETRI) 20 mcg/mL in sterile water inhalation solution 20  ng/kg/min (08/29/19 1309)     fentaNYL 250 mcg/hr (08/29/19 1500)     midazolam 10 mg/hr (08/29/19 1500)     - MEDICATION INSTRUCTIONS -       - MEDICATION INSTRUCTIONS -       norepinephrine 0.28 mcg/kg/min (08/29/19 1521)     - MEDICATION INSTRUCTIONS -       CRRT replacement solution 1.66 mL/kg/hr (08/28/19 7798)     CRRT replacement solution 15 mL/kg/hr (08/29/19 0908)     vasopressin (PITRESSIN) infusion ADULT (40 mL) Stopped (08/28/19 1110)

## 2019-08-29 NOTE — PROGRESS NOTES
[facility], [place]    Progress Note  [facility] Intensive Care     Date of Admission:  8/19/2019    Assessment & Plan   Neema Bailey is a 44 year old female with history of multifactorial ESLD c/b HE, EV, ascites, polysubstance abuse, morbid obesity s/p gastric bypass in 2002 who presents as a transfer from the floor for acute hypoxic respiratory failure and altered mental status, notably recently discharged from ICU 8/21 after a 2-day stay for severe sepsis with hypotension 2/2 ESBL E coli bacteremia of unknown source.    In the last 24h, was proned with improvement in respiratory status. Was turned back to supine and respiratory status remained stable. CRRT began pulling fluid off to try to help respiratory status.     Changes today:  - prone until 2pm, improvement in respiratory status  - flip to supine, stable with respiratory status  - CRRT now taking 50 mL/hr off  - c. Diff sent, in process  - s/p 1U blood    Neurology:  Hepatic/Toxic-metabolic encephalopathy: Elevated ammonia to 147 on 8/25 with lethargy. Normal BUN, no other sedating meds given. Ammonia stabilized. 226 on 8/29.   - PO lactulose scheduled and per [facility] protocol    Sedation:  - fentanyl/versed gtt for RASS -3 to -4 in the setting of critical respiratory illness  - cisatracurium for paralysis for prone    Depression  - PTA sertraline    Cardiovascular:  Shock, multifactorial: in the setting of cirrhosis with third spacing and possible infection underlying. Rising lactate during hospital stay from 1.9 after transfer out of ICU to 4.3 on the day of transfer back. Bedside TTE with hyperdynamic LV, 1.78cm IVC with minimal respiratory variation while on positive pressure ventilation. Cardiac function on formal TTE 8/20 hyperdynamic with normal PASP.  - levophed, titrate for MAPs > 60    Pulmonary:        Acute hypoxic respiratory failure  Developing ARDS, suspected  Transudative R-sided pleural  effusion, moderate  CT chest 8/25 notable for GGO, interlobular septal thickening, patchy consolidative opacities. Differential includes pulmonary edema, developing ARDS, hepatopulmonary syndrome, pneumonia, atypical infection, severe aspiration pneumonitis.. P/F ratio 149. Intubated for increased work of breathing and hypoxemia. Initially improving on MICU readmission day 2 and then worsened after bronchial lavage on MICU day 3 with worsening hypoxemia. BAL notable for significant bilious secretions that were suctioned. Subsequently, started on Flolan with some improvement. Improvement after being prone for ~12h on 8/29, and remained stable after flipping to supine.   - on CRRT, will pull off 50cc/hr   - daily CXR to re-evaluate  - routine vent cares  - lung-protective ventilation    Ventilation Mode: CMV/AC  (Continuous Mandatory Ventilation/ Assist Control)  FiO2 (%): 40 %  Rate Set (breaths/minute): 25 breaths/min  Tidal Volume Set (mL): 350 mL  PEEP (cm H2O): 12 cmH2O  Oxygen Concentration (%): 40 %  Resp: 24      Renal  Oliguric DEE DEE 2/2 ATN, prerenal, hepatorenal syndrome: Baseline creatinine 0.6, increased to 1.0 in the setting of hypotension. Recent nephrotoxins also include IV contrast 8/25 during CT scan, and now administration of vancomycin. Urine output decreasing 8/23. UA 8/22 notable for mild proteinuria, small LE, hyaline casts. FENA and FEBUN consistent with prerenal azotemia. Repeat UA 8/27 demonstrating some ketones  - strict I/Os with Alexander anchored  - stopped albumin challenge per renal recs  - avoid nephrotoxins  - Renal consult, appreciate assistance  - daily BMP  - holding PTA midodrine given on stronger pressors  - CRRT pulling off 50cc/hr    Hypernatremia: Free water deficit. Improved on CRRT.   - free water flush via G-tube  - Na checks q8h    ID:         Recent ESBL E coli bacteremia   Possible sepsis due to unknown source : Blood culture 1/2 positive 8/19 for ESBL E coli. No followup  blood cultures obtained until 8/22, which remain NGTD. Previous urine culture, transudative pleural fluid culture, and ascites fluid culture currently NGTD. S pneumo and legionella urine antigen negative. Procalcitonin uptrending but not significantly. Leukocytosis increasing. Cryptococcal antigen negative.  - BCx 8/26 x 2 NGTD  - trend CBC  - consider tapping pelvic fluid collection noted on CT if not improving    Cultures:  UCx 8/27 No growth   Bronchial lavage 8/27 NGTD  BCx 8/26 x2 NGTD  Pleural fluid culture 8/25 NGTD  Ascites fluid culture 8/22 NGTD    Antimicrobials:  Micafungin (8/26 -   Meropenem (8/25 -  Vancomycin (8/20, 8/25-    Azithromycin (8/25 - 8/27)  Ertapenem (8/19 - 8/24)      GI  ESLD, multifactorial c/b portal hypertension with ascites, esophageal varices: Last EGD 5/2019 with no varices. Last US 8/20 with no mass, no portal vein thrombosis.  - Hepatology consulted, appreciate recs  - currently not undergoing transplant evaluation  - rifaxamin, lactulose as above for HE protocol  - holding PTA diuretics furosemid 20mg, spironolactone 50mg  - daily MELD labs    MELD-Na score: 31 at 8/29/2019  3:53 PM  MELD score: 31 at 8/29/2019  3:53 PM  Calculated from:  Serum Creatinine: 1.03 mg/dL at 8/29/2019  3:53 PM  Serum Sodium: 141 mmol/L (Rounded to 137 mmol/L) at 8/29/2019  3:53 PM  Total Bilirubin: 10.7 mg/dL at 8/29/2019  3:53 PM  INR(ratio): 3.87 at 8/29/2019  4:19 AM  Age: 44 years    Hyperbilirubinemia with gallbladder wall thickening: noted on CT abd/pelvis 8/25. Negative HIDA scan. Tenderness to palpation on exam.  - continue to monitor    Mild transaminitis: consistent with critical illness and mild myopathy.  - trend CK, daily MELD labs    Mild reflux esophagitis  - PPI daily    Nutrition  At risk for malnutrition:  - feeding per nutrition    Endocrine:  At risk for hypoglycemia  - hypoglycemia protocol, q4h glucose checks while on tube feeds    Heme/Onc:  Macrocytic anemia: Baseline  hemoglobin 8-9 in the last 6 months. 1 point hemoglobin drop today without any clear signs or symptoms of bleeding. Hemolysis labs suggestive; however, hemoglobin stable currently. Associated with worse thrombocytopenia today. Hgb drop to 6.9 on 8/29, 1U pRBCs given.   - daily CBC, transfuse Hgb > 7  - follow up peripheral smear    Thrombocytopenia, chronic due to liver disease: Baseline ~120s in the last 6 months.  - trend    DVT Prophylaxis: Pneumatic Compression Devices, will discuss prophylactic anticoagulation in AM  GI Prophylaxis: PPI    Restraints: Restraints for medical healing needed: YES    Family update by me today: Yes     Lilliana Jacobs    I have seen and discussed this patient with Dr. Perlman Mary Katherine Shoemaker, MD  Medicine-Pediatrics, PGY-1  Pager [phone]    Code Status   Full Code    Subjective  RN notes reviewed. Prone overnight until 2pm. Improvement in respiratory status. At 5pm, pupils were minimally responsive, this has happened before this admission. Will continue to monitor. Update mother and daughter over the phone. Daughter is healthcare decision maker and prefers to be the decision maker. Ok to update mother with clinical status as well.     Review of Systems   Review of systems not obtained due to patient factors - mental status    Physical Exam   Temp: 97.5  F (36.4  C) Temp src: Axillary Temp  Min: 97.4  F (36.3  C)  Max: 98.3  F (36.8  C) BP: 103/51 Pulse: 105 Heart Rate: 87 Resp: 24 SpO2: 98 % O2 Device: Mechanical Ventilator    Vital Signs with Ranges  Temp:  [97.4  F (36.3  C)-98.3  F (36.8  C)] 97.5  F (36.4  C)  Pulse:  [105-110] 105  Heart Rate:  [] 87  Resp:  [20-32] 24  BP: (103-108)/(49-56) 103/51  MAP:  [48 mmHg-143 mmHg] 64 mmHg  Arterial Line BP: ()/(8-86) 99/43  FiO2 (%):  [40 %-100 %] 40 %  SpO2:  [91 %-100 %] 98 %  271 lbs 2.65 oz    Constitutional: intubated and sedated  Eyes: Pupils non-reactive, scleral icterus, subconjunctival  hemorrhages bilaterally, periorbital edema bilaterally  ENT: Normocephalic, without obvious abnormality, atraumatic, orally intubated  Respiratory: CTAB, improved aeration bilaterally, synchronous with vent, no wheezes/rhonchi/rales appreciated  Cardiovascular: Normal apical impulse, tachycardic, regular rhythm, normal S1 and S2, no S3 or S4, and no murmur noted.  GI: normal bowel sounds, soft, non-distended, nontender on palpation, no masses palpated, no hepatosplenomegaly, brown stool on rectal tube  Genitourinary:  Alexander in place draining clear yellow urine  Skin: No bruising or bleeding, normal skin color, texture, turgor, no redness, warmth, or swelling, no rashes, no lesions, no abnormal moles, nails normal without discoloration or clubbing and no jaundice; R PICC in place without erythema or drainage over site  Musculoskeletal: pitting edema bilaterally in the LE to the knees 2+, Tone is normal.  Neurologic: sedated, unresponsive to pain    Data   Results for orders placed or performed during the hospital encounter of 08/19/19 (from the past 24 hour(s))   Blood gas arterial   Result Value Ref Range    pH Arterial 7.28 (L) 7.35 - 7.45 pH    pCO2 Arterial 38 35 - 45 mm Hg    pO2 Arterial 387 (H) 80 - 105 mm Hg    Bicarbonate Arterial 18 (L) 21 - 28 mmol/L    Base Deficit Art 8.0 mmol/L    FIO2 100.0    Blood gas arterial   Result Value Ref Range    pH Arterial 7.23 (L) 7.35 - 7.45 pH    pCO2 Arterial 45 35 - 45 mm Hg    pO2 Arterial 200 (H) 80 - 105 mm Hg    Bicarbonate Arterial 19 (L) 21 - 28 mmol/L    Base Deficit Art 8.4 mmol/L    FIO2 60.0    Comprehensive metabolic panel   Result Value Ref Range    Sodium 148 (H) 133 - 144 mmol/L    Potassium 3.9 3.4 - 5.3 mmol/L    Chloride 117 (H) 94 - 109 mmol/L    Carbon Dioxide 21 20 - 32 mmol/L    Anion Gap 10 3 - 14 mmol/L    Glucose 121 (H) 70 - 99 mg/dL    Urea Nitrogen 34 (H) 7 - 30 mg/dL    Creatinine 1.43 (H) 0.52 - 1.04 mg/dL    GFR Estimate 44 (L) >60  mL/min/[1.73_m2]    GFR Estimate If Black 51 (L) >60 mL/min/[1.73_m2]    Calcium 7.8 (L) 8.5 - 10.1 mg/dL    Bilirubin Total 9.5 (H) 0.2 - 1.3 mg/dL    Albumin 3.4 3.4 - 5.0 g/dL    Protein Total 5.8 (L) 6.8 - 8.8 g/dL    Alkaline Phosphatase 96 40 - 150 U/L    ALT 58 (H) 0 - 50 U/L     (H) 0 - 45 U/L   Phosphorus   Result Value Ref Range    Phosphorus 5.4 (H) 2.5 - 4.5 mg/dL   CBC with platelets differential   Result Value Ref Range    WBC 25.4 (H) 4.0 - 11.0 10e9/L    RBC Count 2.33 (L) 3.8 - 5.2 10e12/L    Hemoglobin 7.2 (L) 11.7 - 15.7 g/dL    Hematocrit 24.6 (L) 35.0 - 47.0 %     (H) 78 - 100 fl    MCH 30.9 26.5 - 33.0 pg    MCHC 29.3 (L) 31.5 - 36.5 g/dL    RDW 17.7 (H) 10.0 - 15.0 %    Platelet Count 82 (L) 150 - 450 10e9/L    Diff Method Automated Method     % Neutrophils 80.0 %    % Lymphocytes 9.5 %    % Monocytes 5.1 %    % Eosinophils 1.0 %    % Basophils 0.2 %    % Immature Granulocytes 4.2 %    Nucleated RBCs 0 0 /100    Absolute Neutrophil 20.3 (H) 1.6 - 8.3 10e9/L    Absolute Lymphocytes 2.4 0.8 - 5.3 10e9/L    Absolute Monocytes 1.3 0.0 - 1.3 10e9/L    Absolute Eosinophils 0.3 0.0 - 0.7 10e9/L    Absolute Basophils 0.1 0.0 - 0.2 10e9/L    Abs Immature Granulocytes 1.1 (H) 0 - 0.4 10e9/L    Absolute Nucleated RBC 0.0    Blood gas arterial   Result Value Ref Range    pH Arterial 7.23 (L) 7.35 - 7.45 pH    pCO2 Arterial 46 (H) 35 - 45 mm Hg    pO2 Arterial 218 (H) 80 - 105 mm Hg    Bicarbonate Arterial 19 (L) 21 - 28 mmol/L    Base Deficit Art 7.7 mmol/L    FIO2 60.0    Oxyhemoglobin   Result Value Ref Range    Oxyhemoglobin Arterial 99 92 - 100 %   Blood gas arterial   Result Value Ref Range    pH Arterial 7.26 (L) 7.35 - 7.45 pH    pCO2 Arterial 44 35 - 45 mm Hg    pO2 Arterial 132 (H) 80 - 105 mm Hg    Bicarbonate Arterial 20 (L) 21 - 28 mmol/L    Base Deficit Art 7.1 mmol/L    FIO2 45.0    Calcium ionized whole blood   Result Value Ref Range    Calcium Ionized Whole Blood 4.4 4.4 -  5.2 mg/dL   Oxyhemoglobin   Result Value Ref Range    Oxyhemoglobin Arterial 97 92 - 100 %   CK total   Result Value Ref Range    CK Total 927 (H) 30 - 225 U/L   Lactate Dehydrogenase   Result Value Ref Range    Lactate Dehydrogenase 682 (H) 81 - 234 U/L   Calcium, ionized whole blood (AM Draw)   Result Value Ref Range    Calcium Ionized Whole Blood 4.2 (L) 4.4 - 5.2 mg/dL   Comprehensive metabolic panel   Result Value Ref Range    Sodium 146 (H) 133 - 144 mmol/L    Potassium 4.0 3.4 - 5.3 mmol/L    Chloride 115 (H) 94 - 109 mmol/L    Carbon Dioxide 20 20 - 32 mmol/L    Anion Gap 11 3 - 14 mmol/L    Glucose 108 (H) 70 - 99 mg/dL    Urea Nitrogen 28 7 - 30 mg/dL    Creatinine 1.23 (H) 0.52 - 1.04 mg/dL    GFR Estimate 53 (L) >60 mL/min/[1.73_m2]    GFR Estimate If Black 61 >60 mL/min/[1.73_m2]    Calcium 7.9 (L) 8.5 - 10.1 mg/dL    Bilirubin Total 10.0 (H) 0.2 - 1.3 mg/dL    Albumin 3.5 3.4 - 5.0 g/dL    Protein Total 5.6 (L) 6.8 - 8.8 g/dL    Alkaline Phosphatase 101 40 - 150 U/L    ALT 54 (H) 0 - 50 U/L     (H) 0 - 45 U/L   Phosphorus   Result Value Ref Range    Phosphorus 5.0 (H) 2.5 - 4.5 mg/dL   CBC with platelets differential   Result Value Ref Range    WBC 28.1 (H) 4.0 - 11.0 10e9/L    RBC Count 2.24 (L) 3.8 - 5.2 10e12/L    Hemoglobin 7.2 (L) 11.7 - 15.7 g/dL    Hematocrit 23.2 (L) 35.0 - 47.0 %     (H) 78 - 100 fl    MCH 32.1 26.5 - 33.0 pg    MCHC 31.0 (L) 31.5 - 36.5 g/dL    RDW 18.0 (H) 10.0 - 15.0 %    Platelet Count 70 (L) 150 - 450 10e9/L    Diff Method Automated Method     % Neutrophils 80.8 %    % Lymphocytes 9.3 %    % Monocytes 4.5 %    % Eosinophils 1.2 %    % Basophils 0.2 %    % Immature Granulocytes 4.0 %    Nucleated RBCs 0 0 /100    Absolute Neutrophil 22.7 (H) 1.6 - 8.3 10e9/L    Absolute Lymphocytes 2.6 0.8 - 5.3 10e9/L    Absolute Monocytes 1.3 0.0 - 1.3 10e9/L    Absolute Eosinophils 0.3 0.0 - 0.7 10e9/L    Absolute Basophils 0.1 0.0 - 0.2 10e9/L    Abs Immature Granulocytes  1.1 (H) 0 - 0.4 10e9/L    Absolute Nucleated RBC 0.0    INR   Result Value Ref Range    INR 3.87 (H) 0.86 - 1.14   PTT (AM Draw)   Result Value Ref Range    PTT 64 (H) 22 - 37 sec   Magnesium   Result Value Ref Range    Magnesium 2.4 (H) 1.6 - 2.3 mg/dL   Ammonia (AM Draw)   Result Value Ref Range    Ammonia 226 (HH) 10 - 50 umol/L   XR Chest Port 1 View    Narrative    XR CHEST PORT 1 VW  8/29/2019 6:26 AM      HISTORY: pulmonary edema v hepatopulmonary syndrome    COMPARISON: 8/28/2019    FINDINGS:   Single AP radiograph of the chest. Endotracheal tube tip projects 6.8  cm above the nishant. Right upper chimney PICC tip projects over the  SVC/right atrial junction. Left IJ central line tip projects over the  SVC/right atrial junction. Enteric tube tip courses beyond the  field-of-view. The cardiac silhouette is similar in size. Increased  lung volumes with mildly increased mixed interstitial and airspace  opacities in the right lung base. Small right pleural effusion.      Impression    IMPRESSION:   1. Increased lung volumes with mildly increased mixed interstitial and  airspace opacities in the right lung base, pulmonary edema versus  infection.  2. Small right pleural effusion.    I have personally reviewed the examination and initial interpretation  and I agree with the findings.    BELEN WATERS MD   Comprehensive metabolic panel   Result Value Ref Range    Sodium 144 133 - 144 mmol/L    Potassium 3.8 3.4 - 5.3 mmol/L    Chloride 115 (H) 94 - 109 mmol/L    Carbon Dioxide 22 20 - 32 mmol/L    Anion Gap 7 3 - 14 mmol/L    Glucose 122 (H) 70 - 99 mg/dL    Urea Nitrogen 24 7 - 30 mg/dL    Creatinine 1.10 (H) 0.52 - 1.04 mg/dL    GFR Estimate 60 (L) >60 mL/min/[1.73_m2]    GFR Estimate If Black 70 >60 mL/min/[1.73_m2]    Calcium 7.9 (L) 8.5 - 10.1 mg/dL    Bilirubin Total 9.4 (H) 0.2 - 1.3 mg/dL    Albumin 3.4 3.4 - 5.0 g/dL    Protein Total 5.5 (L) 6.8 - 8.8 g/dL    Alkaline Phosphatase 142 40 - 150 U/L    ALT 54  (H) 0 - 50 U/L     (H) 0 - 45 U/L   Phosphorus   Result Value Ref Range    Phosphorus 4.7 (H) 2.5 - 4.5 mg/dL   CBC with platelets differential   Result Value Ref Range    WBC 28.7 (H) 4.0 - 11.0 10e9/L    RBC Count 2.14 (L) 3.8 - 5.2 10e12/L    Hemoglobin 6.9 (LL) 11.7 - 15.7 g/dL    Hematocrit 22.4 (L) 35.0 - 47.0 %     (H) 78 - 100 fl    MCH 32.2 26.5 - 33.0 pg    MCHC 30.8 (L) 31.5 - 36.5 g/dL    RDW 17.7 (H) 10.0 - 15.0 %    Platelet Count 70 (L) 150 - 450 10e9/L    Diff Method Automated Method     % Neutrophils 79.1 %    % Lymphocytes 9.5 %    % Monocytes 5.2 %    % Eosinophils 3.0 %    % Basophils 0.2 %    % Immature Granulocytes 3.0 %    Nucleated RBCs 0 0 /100    Absolute Neutrophil 22.7 (H) 1.6 - 8.3 10e9/L    Absolute Lymphocytes 2.7 0.8 - 5.3 10e9/L    Absolute Monocytes 1.5 (H) 0.0 - 1.3 10e9/L    Absolute Eosinophils 0.9 (H) 0.0 - 0.7 10e9/L    Absolute Basophils 0.1 0.0 - 0.2 10e9/L    Abs Immature Granulocytes 0.9 (H) 0 - 0.4 10e9/L    Absolute Nucleated RBC 0.0     Platelet Estimate Confirming automated cell count    ABO/Rh type and screen   Result Value Ref Range    Units Ordered 1     ABO B     RH(D) Pos     Antibody Screen Neg     Test Valid Only At          [facility]    Specimen Expires 09/01/2019     Crossmatch Red Blood Cells    Blood component   Result Value Ref Range    Unit Number [id number]     Blood Component Type Red Blood Cells Leukocyte Reduced     Division Number 00     Status of Unit Released to care unit 08/29/2019 1227     Blood Product Code [id number]     Unit Status ISS    Blood gas arterial   Result Value Ref Range    pH Arterial 7.26 (L) 7.35 - 7.45 pH    pCO2 Arterial 47 (H) 35 - 45 mm Hg    pO2 Arterial 74 (L) 80 - 105 mm Hg    Bicarbonate Arterial 21 21 - 28 mmol/L    Base Deficit Art 5.9 mmol/L    FIO2 40    Comprehensive metabolic panel   Result Value Ref Range    Sodium 141 133 - 144 mmol/L    Potassium 3.7 3.4  - 5.3 mmol/L    Chloride 112 (H) 94 - 109 mmol/L    Carbon Dioxide 21 20 - 32 mmol/L    Anion Gap 8 3 - 14 mmol/L    Glucose 150 (H) 70 - 99 mg/dL    Urea Nitrogen 21 7 - 30 mg/dL    Creatinine 1.03 0.52 - 1.04 mg/dL    GFR Estimate 65 >60 mL/min/[1.73_m2]    GFR Estimate If Black 75 >60 mL/min/[1.73_m2]    Calcium 7.6 (L) 8.5 - 10.1 mg/dL    Bilirubin Total 10.7 (H) 0.2 - 1.3 mg/dL    Albumin 3.3 (L) 3.4 - 5.0 g/dL    Protein Total 5.7 (L) 6.8 - 8.8 g/dL    Alkaline Phosphatase 141 40 - 150 U/L    ALT 61 (H) 0 - 50 U/L     (H) 0 - 45 U/L   Phosphorus   Result Value Ref Range    Phosphorus 4.3 2.5 - 4.5 mg/dL   CBC with platelets differential   Result Value Ref Range    WBC 28.5 (H) 4.0 - 11.0 10e9/L    RBC Count 2.73 (L) 3.8 - 5.2 10e12/L    Hemoglobin 8.2 (L) 11.7 - 15.7 g/dL    Hematocrit 27.3 (L) 35.0 - 47.0 %     78 - 100 fl    MCH 30.0 26.5 - 33.0 pg    MCHC 30.0 (L) 31.5 - 36.5 g/dL    RDW 20.6 (H) 10.0 - 15.0 %    Platelet Count 63 (L) 150 - 450 10e9/L    Diff Method Automated Method     % Neutrophils 78.8 %    % Lymphocytes 9.1 %    % Monocytes 5.0 %    % Eosinophils 3.6 %    % Basophils 0.2 %    % Immature Granulocytes 3.3 %    Nucleated RBCs 0 0 /100    Absolute Neutrophil 22.4 (H) 1.6 - 8.3 10e9/L    Absolute Lymphocytes 2.6 0.8 - 5.3 10e9/L    Absolute Monocytes 1.4 (H) 0.0 - 1.3 10e9/L    Absolute Eosinophils 1.0 (H) 0.0 - 0.7 10e9/L    Absolute Basophils 0.1 0.0 - 0.2 10e9/L    Abs Immature Granulocytes 0.9 (H) 0 - 0.4 10e9/L    Absolute Nucleated RBC 0.0    Calcium, ionized whole blood (1200)   Result Value Ref Range    Calcium Ionized Whole Blood 4.4 4.4 - 5.2 mg/dL   Clostridium difficile toxin B PCR   Result Value Ref Range    Specimen Description Feces     C Diff Toxin B PCR Negative NEG^Negative

## 2019-08-29 NOTE — PROGRESS NOTES
[facility]  Palliative Care Daily Progress Note       Recommendations & Counseling       HCD in paper chart, decision maker is daughter, alternate is Roxy Hawkdarren- mother, would recommend talking with daughter if she wants to act as a decision maker, if she does not then the mother is the decision maker    Goals of care: Per family today (mother, father, sister) goals are restorative and full code at this time    Will involve palliative care  and/or  for patient and family support    Goals of care discussions are appropriate            Assessments          Neema Bailey is a 44 year old female with a history of ESLD c/b hepatic encephalopathy, portal hypertension, ascites, and polysubstance use disorder. Hospital course complicated by respiratory failure and is intubated and sedated. Now worsened and requiring flolan and is prone and paralyzed.     Today, the patient was seen for:  ESLD  Respiratory failure  DEE DEE requiring CRRT    Prognosis, Goals, or Advance Care Planning was addressed today with: Yes.    Met with patient's mother, father, and sister today. Discussed how sick she is right now and family very much understands this. Family feels that she has been sick before and was being told at [facility] that she is dying and needs hospice. Patient adamantly refused hospice and family feels that she would want to keep going. Things that are important to her are her family, children, and grandchildren. Discussed what Neema's limit was to her care, and discussed that she would not want to live in a wheelchair or be dependent on a ventilator. Mother names that she is not going to give up home for her daughter (patient).     Social: Sister is an ombudsman for the state. She has three children ranging in ages 27-16, and the 27 has grandchildren and is currently expecting.    Mood, coping, and/or meaning in the context of serious illness were addressed  today: Yes.  Summary/Comments: family coping well at this time            Interval History:     Chart review/discussion with unit or clinical team members:   Notes reviewed, worsening respiratory status, proned and paralyzed, started on CRRT.    Per patient or family/caregivers today:  Intubated, sedated, prone  Family present and supportive    Key Palliative Symptoms:  We are not helping to manage these symptoms currently in this patient.               Review of Systems:     Besides above, ROS was reviewed and is unremarkable          Medications:     I have reviewed this patient's medication profile and medications during this hospitalization.    Noted meds:    Lactulose 20 g q6h  protonix 40 mg daily  Sertraline 75 mg daily  Cis drip  velteri continuous neb  Fentanyl drip  Versed drip             Physical Exam:   Vitals were reviewed  Temp: 98  F (36.7  C) Temp src: Axillary BP: 103/51 Pulse: 105 Heart Rate: 103 Resp: 25 SpO2: 95 % O2 Device: Mechanical Ventilator      Intake/Output Summary (Last 24 hours) at 8/29/2019 1001  Last data filed at 8/29/2019 0900  Gross per 24 hour   Intake 3673.61 ml   Output 3519 ml   Net 154.61 ml     Constitutional: intubated, sedated, proned, no apparent distress  Lungs: No increased work of breathing on ventilator  Neurologic: sedated  Skin: No rashes             Data Reviewed:       Reviewed recent labs, comments:   Sodium 146  Potassium 4.0  Creatinine 1.23  GFR 53  WBC 28.1  Hemoglobin 7.2  Platelets 70    KANDACE Ghotra CNS  Palliative Care Consult Team  Pager: [phone]     Total time spent was 35 minutes,  >50% of time was spent counseling and/or coordination of care regarding symptom management.

## 2019-08-29 NOTE — PLAN OF CARE
ICU End of Shift Summary. See flowsheets for vital signs and detailed assessment.    Changes this shift: Patient tolerated proning for 16 hours, and was returned to supine position at 1500. Remained on stable vent settings,  FiO2 increased to 50% at 1700 d/t PaO2 74%. Levophed titrated up throughout the day, now 0.28, pressures drops usually related to turns. Pt received 1 unit RBCs this am for hgb 6.9. CRRT continues, pt anuric. Lactulose q6h, c-diff returned negative. Tube feeds at goal rate of 55ml/hr. Pupils 3mm & fixed this afternoon, sx has evaluated.    Plan:  Continue CRRT with fluid pulls, no further proning needed at present.    Problem: Gastrointestinal Condition Comorbidity  Goal: Gastrointestinal Condition  Description  Patient comorbidity will be monitored for signs and symptoms of Gastrointestinal condition.  Problems will be absent, minimized or managed by discharge/transition of care.  8/29/2019 1818 by Racquel Mcbride, RN  Outcome: No Change      Problem: Mood Alteration Comorbidity  Goal: Mood Alteration Comorbidity  Description  Patient comorbidity will be monitored for signs and symptoms of Mood Alteration condition.  Problems will be absent, minimized or managed by discharge/transition of care.  8/29/2019 1818 by Racquel Mcbride, RN  Outcome: No Change

## 2019-08-29 NOTE — PROGRESS NOTES
CRRT STATUS NOTE    DATA:  Time:  06:24 AM  Pressures WNL:  YES  Filter Status:  WDL    Problems Reported/Alarms Noted:  None reported    Supplies Present:  YES    ASSESSMENT:  Patient Net Fluid Balance:  Net negative 661 ml yesterday ( Stool output) and net negative 53 ml since midnight.  Vital Signs:  Temp 97.4, HR 96, BP 95/41(60), Sats 100%  Labs:  Potassium 4, BUN 28, Creat 1.23, Phos 5, I bright 4.2, Mag 2.4, Hgb 7.2, Platelet 70, WBC 28.1, INR 3.87  Goals of Therapy:  Intake = Output    INTERVENTIONS:   Initiated CRRT after proning patient.     PLAN:  Continue with current plan of care. Change circuit Q 72 hours and prn.  Contact CRRT Resource RN at [phone] with questions or concerns.

## 2019-08-29 NOTE — PROGRESS NOTES
"[facility]  08/29/19    Hepatology Follow-up    CC: Weakness, shortness of breath    Dx: ESBL Bacteremia, unclear source   ARDS   Multi-system organ failure   Decompensated EtOH/BUTR Cirrhosis   H/o RNYGB    24 hour events: Decreasing O2 needs, now paralyzed and proned    Subjective:   - Paralyzed, sedated; unable to complete ROS    Medications  Current Facility-Administered Medications   Medication Dose Route Frequency     artificial tears   Both Eyes Q6H     ascorbic acid  500 mg Oral or Feeding Tube Daily     dornase alpha  2.5 mg Inhalation Daily     folic acid  500 mcg Oral or Feeding Tube Daily     heparin lock flush  5-10 mL Intracatheter Q24H     lactulose  20 g Oral or Feeding Tube Q6H     meropenem  1 g Intravenous Q8H     micafungin  100 mg Intravenous Q24H     multivitamins w/minerals  15 mL Per Feeding Tube Daily     pantoprazole (PROTONIX) IV  40 mg Intravenous Daily with breakfast     protein modular  1 packet Per Feeding Tube TID     rifaximin  550 mg Oral or Feeding Tube BID     sertraline  75 mg Oral or Feeding Tube Daily     cyanocobalamin  100 mcg Oral or Feeding Tube Daily     vitamin B1  100 mg Oral or Feeding Tube Daily       Review of systems  A 10-point review of systems was negative.    Examination  /51 (BP Location: Left arm)   Pulse 105   Temp 95.1  F (35.1  C) (Tympanic)   Resp 24   Ht 1.727 m (5' 8\")   Wt 123 kg (271 lb 2.7 oz)   LMP 10/04/2018   SpO2 98%   Breastfeeding? No   BMI 41.23 kg/m      Intake/Output Summary (Last 24 hours) at 8/29/2019 1846  Last data filed at 8/29/2019 1825  Gross per 24 hour   Intake 5807.18 ml   Output 5003 ml   Net 804.18 ml       Gen- jaundiced  CVS- Tachycardic  RS- Ventilated  Abd- Unable to examine due to prone positioning  Extr- Cool, edematous  Neuro- Paralyzed  Skin- no rash  Lym- no LAD    Laboratory  Lab Results   Component Value Date     08/29/2019    POTASSIUM 3.7 08/29/2019    CHLORIDE 112 " 08/29/2019    CO2 21 08/29/2019    BUN 21 08/29/2019    CR 1.03 08/29/2019       Lab Results   Component Value Date    BILITOTAL 10.7 08/29/2019    ALT 61 08/29/2019     08/29/2019    ALKPHOS 141 08/29/2019       Lab Results   Component Value Date    WBC 28.5 08/29/2019    HGB 8.2 08/29/2019     08/29/2019    PLT 63 08/29/2019       Lab Results   Component Value Date    INR 3.87 08/29/2019     MELD-Na score: 31 at 8/29/2019  3:53 PM  MELD score: 31 at 8/29/2019  3:53 PM  Calculated from:  Serum Creatinine: 1.03 mg/dL at 8/29/2019  3:53 PM  Serum Sodium: 141 mmol/L (Rounded to 137 mmol/L) at 8/29/2019  3:53 PM  Total Bilirubin: 10.7 mg/dL at 8/29/2019  3:53 PM  INR(ratio): 3.87 at 8/29/2019  4:19 AM  Age: 44 years    Radiology: Reviewed.     Assessment  44 year old female with h/o decompensated cirrhosis 2/2 EtOH and BURT in setting of RNYGB admitted with sepsis 2/2 ESBL bacteremia (unclear source) with initial improvement on antibiotics but subsequent deterioration clinically and transfer back to ICU with septic shock, ARDS and multi-system organ failure.     Recommendations   - Ongoing investigations of etiology for ESBL bacteremia   - Blood cultures q48-72h   - Supportive care per primary team   - Hepatology will continue to follow along with you    Staffed with Dr. Bazan, hepatology attending.     Nini Mendoza MD  Hepatology  #[id number]    The patient was seen and examined.  The above assessment and plan was developed jointly with the fellow.      Mickey Bazan MD

## 2019-08-29 NOTE — PROGRESS NOTES
CRRT INITIATION NOTE    Consent for CRRT Completed: Yes  Patient s Vascular Access: Left internal jugular Catheter              Placement Confirmed: Yes  Manufacture:  TESSIE pt is prone  Model: TESSIE pt is prone  Length/Luxembourger Size:  TESSIE pt is prone  Flush Volume:      DATA:  Procedure:  CVVHDF  Start Time:  2213  Machine#:  5  Filter:  M 150  Blood Flow:  180  ML/min  Pre-Replacement Solution: Phoxillum 4K /2.5  Post-Replacement Solution:  Phoxillum 4K /2.5  Dialysate Solution: Phoxillum 4K /2.5  Pre-Replacement Solution Rate:  1800 mL/hr  Post-Replacement Solution Rate:  200 mL/hr  Dialysate Flow Rate:  1500 mL/hr   Patient Removal Rate:  0 mL/hr  Anticoagulation Type and Rate:  NA    ASSESSMENT:  How Patient Tolerated Initiation:   Vital Signs:  Temp 97.7, HR 98, /44 (68), Sats 99%  Initial Pressures:  Access:  -172  Filter:  134  Return:  87  TMP:  82  Change in Filter Pressure:  11      INTERVENTIONS:  Pt is prone, initially access and filter pressures high. Extreme positive return alarms. Lines reversed.     PLAN:  CRRT as order. Intake =out. Continue with current plan of care. Change circuit Q 72 hours and prn. Contact CRRT Resource RN at [phone] with questions or concerns.

## 2019-08-30 NOTE — PROGRESS NOTES
CRRT STATUS NOTE    DATA:  Time:  6:33 AM  Pressures WNL:  YES  Filter Status:  WDL    Problems Reported/Alarms Noted:  High access pressures.    Supplies Present:  YES    ASSESSMENT:  Patient Net Fluid Balance:  +2.2L yesterday/+575 since midnight.  Vital Signs: T: 95.5; HR: 94-98; MAP: 52-66; SPO2: %  Labs:  Reviewed  Goals of Therapy:  50mL/hr net negative as BP's allow.     INTERVENTIONS:   Lines reversed. BP dropped when CRRT restarted. CRRT set to zero and pressors maxed.Team notified. 3rd pressor added. Pt BP stabilized. CRRT remains set at zero.     PLAN:  Continue fluid removal as tolerated per goals of therapy.  Check filter daily.  Change filter q 72 hrs and PRN.  Please contact the CRRT resource RN at [phone] with any questions/concerns.

## 2019-08-30 NOTE — PROGRESS NOTES
Focus:  Palliative Care    D:  Neema Bailey is a 44 year old female with a history of ESLD c/b hepatic encephalopathy, portal hypertension, ascites, and polysubstance use disorder.    I: I attempted to meet with family today to provide ongoing support. Family had left for the day and was not available. Neema is currently intubated and sedated and unable to participate in visits.       A:  No assessment at this time.     P:  I will palliative SW team and we will plan to follow up again next week.     Caroline POWELL, [facility]  Casual Palliative Care   Pager: [phone]    [facility] Inpatient Team Consult pager [phone] (M-F 8-4:30)  After-hours Answering Service [phone]

## 2019-08-30 NOTE — PROGRESS NOTES
"[facility]  08/30/19    Hepatology Follow-up    CC:      Weakness, shortness of breath     Dx:      ESBL Bacteremia, unclear source              ARDS              Multi-system organ failure              Decompensated EtOH/BURT Cirrhosis              H/o RNYGB    24 hour events: Now on three pressors; no longer paralyzed; O2 requirements down-trending    Subjective:   - Remains intubated, sedated    Medications  Current Facility-Administered Medications   Medication Dose Route Frequency     artificial tears   Both Eyes Q6H     ascorbic acid  500 mg Oral or Feeding Tube Daily     dornase alpha  2.5 mg Inhalation Daily     folic acid  500 mcg Oral or Feeding Tube Daily     heparin lock flush  5-10 mL Intracatheter Q24H     lactulose  20 g Oral or Feeding Tube Q6H     meropenem  1 g Intravenous Q8H     micafungin  100 mg Intravenous Q24H     multivitamins w/minerals  15 mL Per Feeding Tube Daily     [START ON 8/31/2019] pantoprazole  40 mg Oral or Feeding Tube QAM AC     protein modular  1 packet Per Feeding Tube TID     rifaximin  550 mg Oral or Feeding Tube BID     sertraline  75 mg Oral or Feeding Tube Daily     cyanocobalamin  100 mcg Oral or Feeding Tube Daily     vitamin B1  100 mg Oral or Feeding Tube Daily       Review of systems  A 10-point review of systems was negative.    Examination  /51 (BP Location: Left arm)   Pulse 105   Temp 98.5  F (36.9  C) (Axillary)   Resp 26   Ht 1.727 m (5' 8\")   Wt 126.1 kg (278 lb)   LMP 10/04/2018   SpO2 90%   Breastfeeding? No   BMI 42.27 kg/m      Intake/Output Summary (Last 24 hours) at 8/30/2019 1456  Last data filed at 8/30/2019 1400  Gross per 24 hour   Intake 6383.97 ml   Output 4352 ml   Net 2031.97 ml       Gen- Ill, intubated, sedated  CVS- Tachycardic  RS- Ventilated  Abd- Soft  Extr- WWP; edematous  Neuro- Sedated  Skin- no rash; + jaundice  Lym- no LAD    Laboratory  Lab Results   Component Value Date     08/30/2019 "    POTASSIUM 3.7 08/30/2019    CHLORIDE 112 08/30/2019    CO2 22 08/30/2019    BUN 16 08/30/2019    CR 0.84 08/30/2019       Lab Results   Component Value Date    BILITOTAL 10.7 08/30/2019    ALT 68 08/30/2019     08/30/2019    ALKPHOS 171 08/30/2019       Lab Results   Component Value Date    WBC 23.3 08/30/2019    HGB 7.7 08/30/2019     08/30/2019    PLT 45 08/30/2019       Lab Results   Component Value Date    INR 4.00 08/30/2019     MELD-Na score: 31 at 8/30/2019  9:50 AM  MELD score: 31 at 8/30/2019  9:50 AM  Calculated from:  Serum Creatinine: 0.84 mg/dL (Rounded to 1 mg/dL) at 8/30/2019  9:50 AM  Serum Sodium: 140 mmol/L (Rounded to 137 mmol/L) at 8/30/2019  9:50 AM  Total Bilirubin: 10.7 mg/dL at 8/30/2019  9:50 AM  INR(ratio): 4.00 at 8/30/2019  3:49 AM  Age: 44 years    Radiology: Reviewed.     Assessment  44 year old female with h/o decompensated cirrhosis 2/2 EtOH and BURT in setting of RNYGB admitted with sepsis 2/2 ESBL bacteremia (unclear source) with initial improvement on antibiotics but subsequent deterioration clinically and transfer back to ICU with septic shock, ARDS and multi-system organ failure. Source of bacteremia remains unclear.    Recommendations   - Recommend discussion with IR regarding aspiration/sampling for culture/gram stain of collection in pelvis/flank   - Ongoing supportive care per primary team   - GI will follow peripherally over the weekend; call with questions    Staffed with Dr. Bazan, hepatology attending.     Nini Mendoza MD   Hepatology  #[id number]    The patient was seen and examined.  The above assessment and plan was developed jointly with the fellow.      Mickey Bazan MD

## 2019-08-30 NOTE — PROGRESS NOTES
[facility], [place]    Progress Note  [facility] Intensive Care     Date of Admission:  8/19/2019    Assessment & Plan   Neema Bailey is a 44 year old female with history of multifactorial ESLD c/b HE, EV, ascites, polysubstance abuse, morbid obesity s/p gastric bypass in 2002 who presents as a transfer from the floor for acute hypoxic respiratory failure and altered mental status, notably recently discharged from ICU 8/21 after a 2-day stay for severe sepsis with hypotension 2/2 ESBL E coli bacteremia of unknown source.    Remains improved in supine positioning, will turn off paralytics today and continue to monitor respiratory status. Infectious markers continue to improve with clinical improvement and downtrending PVPI and EVLW consistent with response to CRRT UF. Low level hemolysis in the setting of ARDs, with some worsening thrombocytopenia, continue to monitor for now.    Changes today:  - paralytics off  - vaso to 2.4 from 4, reassess BPs  - check fungal studies  - continue to follow CBC, consider Hematology consult if worsening  - decreased free water flushes    Neurology:  Hepatic/Toxic-metabolic encephalopathy: Elevated ammonia to 147 on 8/25 with lethargy. Normal BUN, no other sedating meds given. Ammonia downtrending with PO lactulose and CRRT. Pupils reactive, intermittently sluggish and dilated without focal findings while sedated.  - PO lactulose scheduled and per [facility] protocol; goal 1L of stool a day  - trend ammonia    Sedation:  - fentanyl/versed gtt for RASS -3 to -4 in the setting of critical respiratory illness  - cis 8/28-8/30, off today    Depression  - PTA sertraline    Cardiovascular:  Shock, multifactorial: in the setting of cirrhosis with third spacing and possible infection underlying. Rising lactate during hospital stay from 1.9 after transfer out of ICU to 4.3 on the day of transfer back. Bedside TTE with hyperdynamic LV, 1.78cm IVC  with minimal respiratory variation while on positive pressure ventilation. Cardiac function on formal TTE 8/20 hyperdynamic with normal PASP. Repeat TTE with ongoing hyperdynamic changes, no evidence of shunting on bubble study.  - levophed, titrate for MAPs > 60  - vasopressin at 2.4    Pulmonary:        Acute hypoxic respiratory failure  Developing ARDS, suspected  Transudative R-sided pleural effusion, moderate  CT chest 8/25 notable for GGO, interlobular septal thickening, patchy consolidative opacities. Differential includes pulmonary edema, developing ARDS, hepatopulmonary syndrome, pneumonia, atypical infection, severe aspiration pneumonitis.. P/F ratio 149. Intubated for increased work of breathing and hypoxemia. Initially improving on MICU readmission day 2 and then worsened after bronchial lavage on MICU day 3 with worsening hypoxemia. BAL notable for significant bilious secretions that were suctioned. Subsequently, started on Flolan with some improvement. Improvement after being prone for ~12h on 8/29, and remained stable after flipping to supine.   - on CRRT, remove 50cc/hr  - daily CXR to re-evaluate  - routine vent cares  - lung-protective ventilation    Ventilation Mode: CMV/AC  (Continuous Mandatory Ventilation/ Assist Control)  FiO2 (%): 60 %  Rate Set (breaths/minute): 25 breaths/min  Tidal Volume Set (mL): 350 mL  PEEP (cm H2O): 12 cmH2O  Oxygen Concentration (%): 60 %  Resp: 25      Renal  Oliguric DEE DEE 2/2 KELLY, possible hepatorenal syndrome, ATN: Baseline creatinine 0.6, increased to 1.0 in the setting of hypotension. Recent nephrotoxins also include IV contrast 8/25 during CT scan, and now administration of vancomycin. Urine output decreasing 8/23. UA 8/22 notable for mild proteinuria, small LE, hyaline casts. FENA and FEBUN consistent with prerenal azotemia. Repeat UA 8/27 demonstrating some ketones, did not improve with 2 days of albumin challenge.  - strict I/Os with Alexander anchored  - stopped  albumin challenge per renal recs  - avoid nephrotoxins  - Renal consult, appreciate assistance  - daily BMP  - holding PTA midodrine given on stronger pressors  - CRRT    Hypernatremia: Free water deficit. Improved on CRRT.   - decrease free water flushing, continue BMP checks    ID:         Recent ESBL E coli bacteremia   Possible sepsis due to unknown source : Blood culture 1/2 positive 8/19 for ESBL E coli. No followup blood cultures obtained until 8/22, which remain NGTD. Previous urine culture, transudative pleural fluid culture, and ascites fluid culture currently NGTD. S pneumo and legionella urine antigen negative. Procalcitonin uptrending but not significantly. Leukocytosis increasing. Cryptococcal antigen negative.  - BCx 8/26 x 2 NGTD  - trend CBC  - consider tapping pelvic fluid collection noted on CT if not improving  - consider discontinuation of micafungin in the next couple days pending fungal studies    Cultures:  UCx 8/27 No growth   Bronchial lavage 8/27 NGTD  BCx 8/26 x2 NGTD  Pleural fluid culture 8/25 NGTD  Ascites fluid culture 8/22 NGTD    Antimicrobials:  Micafungin (8/26 -   Meropenem (8/25 -    Vancomycin (8/20, 8/25-8/27)  Azithromycin (8/25 - 8/27)  Ertapenem (8/19 - 8/24)      GI  ESLD, multifactorial c/b portal hypertension with ascites, esophageal varices: Last EGD 5/2019 with no varices. Last US 8/20 with no mass, no portal vein thrombosis.  - Hepatology consulted, appreciate recs  - currently not undergoing transplant evaluation  - rifaxamin, lactulose as above for HE protocol  - holding PTA diuretics furosemid 20mg, spironolactone 50mg  - daily MELD labs    MELD-Na score: 31 at 8/30/2019  3:49 AM  MELD score: 31 at 8/30/2019  3:49 AM  Calculated from:  Serum Creatinine: 0.91 mg/dL (Rounded to 1 mg/dL) at 8/30/2019  3:49 AM  Serum Sodium: 141 mmol/L (Rounded to 137 mmol/L) at 8/30/2019  3:49 AM  Total Bilirubin: 10.8 mg/dL at 8/30/2019  3:49 AM  INR(ratio): 4.00 at 8/30/2019  3:49  AM  Age: 44 years    Hyperbilirubinemia with gallbladder wall thickening: noted on CT abd/pelvis 8/25. Negative HIDA scan. Tenderness to palpation on exam.  - continue to monitor    Mild transaminitis: consistent with critical illness and mild myopathy.  - trend CK, daily MELD labs    Mild reflux esophagitis  - PPI daily    Nutrition  At risk for malnutrition:  - feeding per nutrition    Endocrine:  At risk for hypoglycemia  - hypoglycemia protocol, q4h glucose checks while on tube feeds    Heme/Onc:  Macrocytic anemia: Baseline hemoglobin 8-9 in the last 6 months. 1 point hemoglobin drop today without any clear signs or symptoms of bleeding. Hemolysis labs suggestive; however, hemoglobin stable currently. Associated with worse thrombocytopenia today. Hgb drop to 6.9 on 8/29, 1U pRBCs given. Smear consistent with some degree of hemolysis.  - daily CBC, transfuse Hgb > 7  - continue to monitor  - heme consult if worsening labs concerning for hemolysis    Thrombocytopenia, chronic due to liver disease: Baseline ~120s in the last 6 months. Worsening in the setting of ARDS. No heparin exposure. Labs consistent with hemolysis as above  - trend    DVT Prophylaxis: Pneumatic Compression Devices  GI Prophylaxis: PPI    Restraints: Restraints for medical healing needed: YES    Family update by me today: Yes     Amy Bray    I have seen and discussed this patient with Dr. Perlman Erica Ting, MD  Internal Medicine/Pediatrics, PGY3  [facility]  (p) [phone]      Code Status   Full Code    Subjective  RN notes reviewed. Pupils improved overnight, and free water flushes changed. Some hypotension, treated with addition of phenylephrine, now off. Ongoing stool output at goal. Minimal urine output ongoing.    Review of Systems   Review of systems not obtained due to patient factors - mental status    Physical Exam   Temp: 98.2  F (36.8  C) Temp src: Tympanic Temp  Min: 95.1  F (35.1  C)  Max: 98.2  F (36.8   C)     Heart Rate: 94 Resp: 25 SpO2: 94 % O2 Device: Mechanical Ventilator    Vital Signs with Ranges  Temp:  [95.1  F (35.1  C)-98.2  F (36.8  C)] 98.2  F (36.8  C)  Heart Rate:  [] 94  Resp:  [24-26] 25  MAP:  [48 mmHg-108 mmHg] 54 mmHg  Arterial Line BP: ()/(30-86) 98/35  FiO2 (%):  [40 %-60 %] 60 %  SpO2:  [89 %-100 %] 94 %  278 lbs 0 oz    Constitutional: intubated and sedated  Eyes: Pupils equal and reactive, scleral icterus, periorbital edema bilaterally  ENT: Normocephalic, without obvious abnormality, atraumatic, orally intubated  Respiratory: CTAB, improved aeration bilaterally, synchronous with vent, no wheezes/rhonchi/rales appreciated  Cardiovascular: Normal apical impulse, tachycardic, regular rhythm, normal S1 and S2, no S3 or S4, and no murmur noted.  GI: normal bowel sounds, soft, non-distended, nontender on palpation, no masses palpated, no hepatosplenomegaly, brown stool on rectal tube  Genitourinary:  Alexander in place draining dark yellow urine  Skin: No bruising or bleeding, normal skin color, texture, turgor, no redness, warmth, or swelling, no rashes, no lesions, no abnormal moles, nails normal without discoloration or clubbing and no jaundice; R PICC in place without erythema or drainage over site  Musculoskeletal: pitting edema bilaterally in the LE to the knees 2+, Tone is normal.  Neurologic: sedated, unresponsive to pain    Data   Results for orders placed or performed during the hospital encounter of 08/19/19 (from the past 24 hour(s))   Comprehensive metabolic panel   Result Value Ref Range    Sodium 144 133 - 144 mmol/L    Potassium 3.8 3.4 - 5.3 mmol/L    Chloride 115 (H) 94 - 109 mmol/L    Carbon Dioxide 22 20 - 32 mmol/L    Anion Gap 7 3 - 14 mmol/L    Glucose 122 (H) 70 - 99 mg/dL    Urea Nitrogen 24 7 - 30 mg/dL    Creatinine 1.10 (H) 0.52 - 1.04 mg/dL    GFR Estimate 60 (L) >60 mL/min/[1.73_m2]    GFR Estimate If Black 70 >60 mL/min/[1.73_m2]    Calcium 7.9 (L) 8.5 -  10.1 mg/dL    Bilirubin Total 9.4 (H) 0.2 - 1.3 mg/dL    Albumin 3.4 3.4 - 5.0 g/dL    Protein Total 5.5 (L) 6.8 - 8.8 g/dL    Alkaline Phosphatase 142 40 - 150 U/L    ALT 54 (H) 0 - 50 U/L     (H) 0 - 45 U/L   Phosphorus   Result Value Ref Range    Phosphorus 4.7 (H) 2.5 - 4.5 mg/dL   CBC with platelets differential   Result Value Ref Range    WBC 28.7 (H) 4.0 - 11.0 10e9/L    RBC Count 2.14 (L) 3.8 - 5.2 10e12/L    Hemoglobin 6.9 (LL) 11.7 - 15.7 g/dL    Hematocrit 22.4 (L) 35.0 - 47.0 %     (H) 78 - 100 fl    MCH 32.2 26.5 - 33.0 pg    MCHC 30.8 (L) 31.5 - 36.5 g/dL    RDW 17.7 (H) 10.0 - 15.0 %    Platelet Count 70 (L) 150 - 450 10e9/L    Diff Method Automated Method     % Neutrophils 79.1 %    % Lymphocytes 9.5 %    % Monocytes 5.2 %    % Eosinophils 3.0 %    % Basophils 0.2 %    % Immature Granulocytes 3.0 %    Nucleated RBCs 0 0 /100    Absolute Neutrophil 22.7 (H) 1.6 - 8.3 10e9/L    Absolute Lymphocytes 2.7 0.8 - 5.3 10e9/L    Absolute Monocytes 1.5 (H) 0.0 - 1.3 10e9/L    Absolute Eosinophils 0.9 (H) 0.0 - 0.7 10e9/L    Absolute Basophils 0.1 0.0 - 0.2 10e9/L    Abs Immature Granulocytes 0.9 (H) 0 - 0.4 10e9/L    Absolute Nucleated RBC 0.0     Platelet Estimate Confirming automated cell count    ABO/Rh type and screen   Result Value Ref Range    Units Ordered 1     ABO B     RH(D) Pos     Antibody Screen Neg     Test Valid Only At          [facility]    Specimen Expires 09/01/2019     Crossmatch Red Blood Cells    Blood component   Result Value Ref Range    Unit Number [id number]     Blood Component Type Red Blood Cells Leukocyte Reduced     Division Number 00     Status of Unit Released to care unit     Blood Product Code [id number]     Unit Status ISS    Blood gas arterial   Result Value Ref Range    pH Arterial 7.26 (L) 7.35 - 7.45 pH    pCO2 Arterial 47 (H) 35 - 45 mm Hg    pO2 Arterial 74 (L) 80 - 105 mm Hg    Bicarbonate Arterial 21 21 - 28  mmol/L    Base Deficit Art 5.9 mmol/L    FIO2 40    Comprehensive metabolic panel   Result Value Ref Range    Sodium 141 133 - 144 mmol/L    Potassium 3.7 3.4 - 5.3 mmol/L    Chloride 112 (H) 94 - 109 mmol/L    Carbon Dioxide 21 20 - 32 mmol/L    Anion Gap 8 3 - 14 mmol/L    Glucose 150 (H) 70 - 99 mg/dL    Urea Nitrogen 21 7 - 30 mg/dL    Creatinine 1.03 0.52 - 1.04 mg/dL    GFR Estimate 65 >60 mL/min/[1.73_m2]    GFR Estimate If Black 75 >60 mL/min/[1.73_m2]    Calcium 7.6 (L) 8.5 - 10.1 mg/dL    Bilirubin Total 10.7 (H) 0.2 - 1.3 mg/dL    Albumin 3.3 (L) 3.4 - 5.0 g/dL    Protein Total 5.7 (L) 6.8 - 8.8 g/dL    Alkaline Phosphatase 141 40 - 150 U/L    ALT 61 (H) 0 - 50 U/L     (H) 0 - 45 U/L   Phosphorus   Result Value Ref Range    Phosphorus 4.3 2.5 - 4.5 mg/dL   CBC with platelets differential   Result Value Ref Range    WBC 28.5 (H) 4.0 - 11.0 10e9/L    RBC Count 2.73 (L) 3.8 - 5.2 10e12/L    Hemoglobin 8.2 (L) 11.7 - 15.7 g/dL    Hematocrit 27.3 (L) 35.0 - 47.0 %     78 - 100 fl    MCH 30.0 26.5 - 33.0 pg    MCHC 30.0 (L) 31.5 - 36.5 g/dL    RDW 20.6 (H) 10.0 - 15.0 %    Platelet Count 63 (L) 150 - 450 10e9/L    Diff Method Automated Method     % Neutrophils 78.8 %    % Lymphocytes 9.1 %    % Monocytes 5.0 %    % Eosinophils 3.6 %    % Basophils 0.2 %    % Immature Granulocytes 3.3 %    Nucleated RBCs 0 0 /100    Absolute Neutrophil 22.4 (H) 1.6 - 8.3 10e9/L    Absolute Lymphocytes 2.6 0.8 - 5.3 10e9/L    Absolute Monocytes 1.4 (H) 0.0 - 1.3 10e9/L    Absolute Eosinophils 1.0 (H) 0.0 - 0.7 10e9/L    Absolute Basophils 0.1 0.0 - 0.2 10e9/L    Abs Immature Granulocytes 0.9 (H) 0 - 0.4 10e9/L    Absolute Nucleated RBC 0.0    Calcium, ionized whole blood (1200)   Result Value Ref Range    Calcium Ionized Whole Blood 4.4 4.4 - 5.2 mg/dL   Clostridium difficile toxin B PCR   Result Value Ref Range    Specimen Description Feces     C Diff Toxin B PCR Negative NEG^Negative   Sodium   Result Value Ref  Range    Sodium 142 133 - 144 mmol/L   Blood gas arterial   Result Value Ref Range    pH Arterial 7.27 (L) 7.35 - 7.45 pH    pCO2 Arterial 46 (H) 35 - 45 mm Hg    pO2 Arterial 91 80 - 105 mm Hg    Bicarbonate Arterial 21 21 - 28 mmol/L    Base Deficit Art 5.6 mmol/L    FIO2 50    Comprehensive metabolic panel   Result Value Ref Range    Sodium 142 133 - 144 mmol/L    Potassium 3.5 3.4 - 5.3 mmol/L    Chloride 111 (H) 94 - 109 mmol/L    Carbon Dioxide 22 20 - 32 mmol/L    Anion Gap 9 3 - 14 mmol/L    Glucose 153 (H) 70 - 99 mg/dL    Urea Nitrogen 21 7 - 30 mg/dL    Creatinine 1.01 0.52 - 1.04 mg/dL    GFR Estimate 67 >60 mL/min/[1.73_m2]    GFR Estimate If Black 77 >60 mL/min/[1.73_m2]    Calcium 7.3 (L) 8.5 - 10.1 mg/dL    Bilirubin Total 10.8 (H) 0.2 - 1.3 mg/dL    Albumin 3.1 (L) 3.4 - 5.0 g/dL    Protein Total 5.5 (L) 6.8 - 8.8 g/dL    Alkaline Phosphatase 168 (H) 40 - 150 U/L    ALT 65 (H) 0 - 50 U/L     (H) 0 - 45 U/L   Phosphorus   Result Value Ref Range    Phosphorus 3.7 2.5 - 4.5 mg/dL   CBC with platelets differential   Result Value Ref Range    WBC 27.1 (H) 4.0 - 11.0 10e9/L    RBC Count 2.63 (L) 3.8 - 5.2 10e12/L    Hemoglobin 7.9 (L) 11.7 - 15.7 g/dL    Hematocrit 26.6 (L) 35.0 - 47.0 %     (H) 78 - 100 fl    MCH 30.0 26.5 - 33.0 pg    MCHC 29.7 (L) 31.5 - 36.5 g/dL    RDW 21.2 (H) 10.0 - 15.0 %    Platelet Count 35 (LL) 150 - 450 10e9/L    Diff Method Automated Method     % Neutrophils 77.0 %    % Lymphocytes 10.4 %    % Monocytes 4.7 %    % Eosinophils 4.2 %    % Basophils 0.3 %    % Immature Granulocytes 3.4 %    Nucleated RBCs 0 0 /100    Absolute Neutrophil 20.9 (H) 1.6 - 8.3 10e9/L    Absolute Lymphocytes 2.8 0.8 - 5.3 10e9/L    Absolute Monocytes 1.3 0.0 - 1.3 10e9/L    Absolute Eosinophils 1.1 (H) 0.0 - 0.7 10e9/L    Absolute Basophils 0.1 0.0 - 0.2 10e9/L    Abs Immature Granulocytes 0.9 (H) 0 - 0.4 10e9/L    Absolute Nucleated RBC 0.1    Blood gas arterial   Result Value Ref Range     pH Arterial 7.26 (L) 7.35 - 7.45 pH    pCO2 Arterial 49 (H) 35 - 45 mm Hg    pO2 Arterial 97 80 - 105 mm Hg    Bicarbonate Arterial 22 21 - 28 mmol/L    Base Deficit Art 5.2 mmol/L    FIO2 60.0    CK total   Result Value Ref Range    CK Total 692 (H) 30 - 225 U/L   Blood gas arterial   Result Value Ref Range    pH Arterial 7.27 (L) 7.35 - 7.45 pH    pCO2 Arterial 50 (H) 35 - 45 mm Hg    pO2 Arterial 72 (L) 80 - 105 mm Hg    Bicarbonate Arterial 23 21 - 28 mmol/L    Base Deficit Art 4.4 mmol/L    FIO2 60.0    Ammonia (AM Draw)   Result Value Ref Range    Ammonia 164 (HH) 10 - 50 umol/L   Calcium, ionized whole blood (AM Draw)   Result Value Ref Range    Calcium Ionized Whole Blood 4.2 (L) 4.4 - 5.2 mg/dL   Comprehensive metabolic panel   Result Value Ref Range    Sodium 141 133 - 144 mmol/L    Potassium 3.6 3.4 - 5.3 mmol/L    Chloride 111 (H) 94 - 109 mmol/L    Carbon Dioxide 22 20 - 32 mmol/L    Anion Gap 8 3 - 14 mmol/L    Glucose 150 (H) 70 - 99 mg/dL    Urea Nitrogen 18 7 - 30 mg/dL    Creatinine 0.91 0.52 - 1.04 mg/dL    GFR Estimate 76 >60 mL/min/[1.73_m2]    GFR Estimate If Black 88 >60 mL/min/[1.73_m2]    Calcium 7.2 (L) 8.5 - 10.1 mg/dL    Bilirubin Total 10.8 (H) 0.2 - 1.3 mg/dL    Albumin 3.1 (L) 3.4 - 5.0 g/dL    Protein Total 5.4 (L) 6.8 - 8.8 g/dL    Alkaline Phosphatase 179 (H) 40 - 150 U/L    ALT 64 (H) 0 - 50 U/L     (H) 0 - 45 U/L   Phosphorus   Result Value Ref Range    Phosphorus 3.5 2.5 - 4.5 mg/dL   CBC with platelets differential   Result Value Ref Range    WBC 25.6 (H) 4.0 - 11.0 10e9/L    RBC Count 2.65 (L) 3.8 - 5.2 10e12/L    Hemoglobin 7.9 (L) 11.7 - 15.7 g/dL    Hematocrit 26.5 (L) 35.0 - 47.0 %     78 - 100 fl    MCH 29.8 26.5 - 33.0 pg    MCHC 29.8 (L) 31.5 - 36.5 g/dL    RDW 21.1 (H) 10.0 - 15.0 %    Platelet Count 31 (LL) 150 - 450 10e9/L    Diff Method Automated Method     % Neutrophils 75.6 %    % Lymphocytes 9.6 %    % Monocytes 5.5 %    % Eosinophils 5.0 %    %  Basophils 0.4 %    % Immature Granulocytes 3.9 %    Nucleated RBCs 0 0 /100    Absolute Neutrophil 19.4 (H) 1.6 - 8.3 10e9/L    Absolute Lymphocytes 2.5 0.8 - 5.3 10e9/L    Absolute Monocytes 1.4 (H) 0.0 - 1.3 10e9/L    Absolute Eosinophils 1.3 (H) 0.0 - 0.7 10e9/L    Absolute Basophils 0.1 0.0 - 0.2 10e9/L    Abs Immature Granulocytes 1.0 (H) 0 - 0.4 10e9/L    Absolute Nucleated RBC 0.1    INR   Result Value Ref Range    INR 4.00 (H) 0.86 - 1.14   PTT (AM Draw)   Result Value Ref Range    PTT 67 (H) 22 - 37 sec   Magnesium   Result Value Ref Range    Magnesium 2.3 1.6 - 2.3 mg/dL   XR Chest Port 1 View    Narrative    Exam: XR CHEST PORT 1 VW, 8/30/2019 6:02 AM    Indication: pulmonary edema v hepatopulmonary syndrome    Comparison: 8/29/2019    Findings:   AP view of the chest. Endotracheal tube with the tip in the mid  thoracic trachea. Right approach PICC line with the tip at the  superior cavoatrial junction. Left and right IJ approach venous line  with the tips projecting at the level of the right atrium. Enteric  tube with the distal end extending beyond the field-of-view.    Cardiac silhouette is partially obscured. Mixed interstitial and  patchy airspace opacities with increased atelectasis versus  consolidation in both lower lobes. Bilateral layering pleural  effusions. No pneumothorax. No acute findings in the upper abdomen.      Impression    Impression:   1. Mixed interstitial and patchy airspace opacities with increased  atelectasis versus consolidation in both lower lobes, suggestive of  worsening pulmonary edema.  2. Bilateral layering pleural effusions.    I have personally reviewed the examination and initial interpretation  and I agree with the findings.    DAVID RENTERIA, DO

## 2019-08-30 NOTE — PHARMACY-ADMISSION MEDICATION HISTORY
Admission Medication History     Due to patient current clinical status, unable to complete medication history at this time.  Will reassess if patient s status changes.      Marie Brown, PharmD  [facility] Pharmacist  [phone]

## 2019-08-30 NOTE — PROGRESS NOTES
CRRT STATUS NOTE    DATA:  Time:  1830  Pressures WNL:  YES  Filter Status:  WDL  Problems Reported/Alarms Noted:  none  Supplies Present:  YES, restocked    ASSESSMENT:  Patient Net Fluid Balance:  +2.2L yesterday, -600mL today thus far.  Vital Signs:  Remains on 3 pressors, but slowly weaning them. /44 (62). HR 98. Spo2 93% with paralytic turned off today, on 55% Fio2 ventilator with PEEP 12. T 98.6F.   Labs:  Lytes stable. NH3 164. Plt 25. INR 4.0.   Goals of Therapy:   Net  -50cc/hr as BP's allow per 8/29 order. Meeting goals of therapy.     INTERVENTIONS:   None needed at this time. Pressors being weaned and pulling fluid lightly.     PLAN:  Continue CRRT to meet goals of therapy.   Contact CRRT RN [phone] with concerns.

## 2019-08-30 NOTE — PLAN OF CARE
ICU End of Shift Summary. See flowsheets for vital signs and detailed assessment.    Changes this shift: Supine t/o night. CRRT restarted @ approx 2045 on 8/29. Pt did not tolerate re-start well. BP dropped to 70s/20s MAP 40s despite max dose of levo @ 0.4 mcg/min) and max vaso at 4 units/hr which was turned up while waiting for additional orders. Phenylephrine added as 3rd line pressor with good results. BP continue to be labile/unstable and unable to pull even I=O from CRRT for several hours. Overall net positive d/t these issues. Alexander removed d/t anuria. Calcium chloride replaced per protocol. FiO2 increased to 60% for sats (ABG at time looked good). No vent changes following last gas per MD. PLT now in critically low range, MD aware, no active signs of bleeding. FWF cut in half to 50 q1h d/t normalized Na.     Plan:  Closely monitor bp and pull fluid on CRRT as able, wean pressors if able beginning with phenylephrine. Continue serial ABGs to assess for decline in resp status/need to prone.

## 2019-08-30 NOTE — PLAN OF CARE
ICU End of Shift Summary. See flowsheets for vital signs and detailed assessment.    Changes this shift: Paralytic dc'd this AM, pt tolerated well, trending ABGs. 55% FiO2, PEEP 12, full strength flolan. Remains on norepi, vaso, and phenyl. Vaso dose decreased and other pressor needs stable. Tolerating pull on CRRT to goal net negative 50mls/hr.     Plan: Wean sedation. Continue to monitor resp status closely. Continue CRRT, goal net even to negative 50mls/hr.

## 2019-08-30 NOTE — PROGRESS NOTES
Nephrology Progress Note  08/30/2019         Neema Bailey is a 44 yof w/ESLD c/b HE, ascites and EV, polysubstance abuse, obesity s/p gastric bypass 2002 with recent admission for E. Coli bacteremia (source unclear), re-admitted 8/19/19 with SOB, leg swelling and suspected infection.  Cr at baseline is 0.6, on rise since admission with likely culprits of contrast given for CT (for abd pain) and vanco given for suspected infection.  Nephrology consulted for management of DEE DEE and possible HD.       Interval History  Mrs Baliey continues on CRRT, was net positive yesterday but much of this was free H2O which will disperse intracellularly and will not contribute to intravascular volume.  No major change in plan from Nephrology standpoint, can stop free water and will try to pull 50cc/hr as BP's allow today.       ASSESSMENT AND RECOMMENDATIONS:   DEE DEE-Baseline Cr of 0.6, up to 1.2 with etiology likely KELLY with 135cc of contrast and also high dose Vanco on admit (although level was not high when checked).  HRS is in DDx and likely has some HRS physiology but Dr Nur and I did look at urine microscopy and saw granular casts suggestive of at least some degree of ATN.  Started CRRT 8/28 due to rapidly worsening resp status to try to pull some fluid and optimize pulm status, continuing with tenuous hemodynamics.                   -DEE DEE, likely ATN from contrast/vanco, sepsis.                   -CRRT, 0-100cc/hr, 4k baths and standard Rx, line is temp [facility]       Volume-Total body volume overload but much of it 3rd spaced.  Up ~10kg since admit, trying to pull 50cc/hr to help pulm status although pulm edema is not largest factor with pulm status.       Electrolytes/pH-No acute issues.  K 3.7, stable on CRRT, bicarb 22.       BMD- Ca 7.3, Mg and Phos reasonable.       Nutrition-Nutren 1.5 TF at goal.      Seen and discussed with Dr Nur     Recommendations were communicated to primary team via verbal  "communication.        Ernst Rowan, APRN CNS  Clinical Nurse Specialist  [phone]      Attestation:  This patient has been seen, examined and evaluated by me, Faye Nur MD as a shared visit with the NP/PA above.    In brief:  Neema Bailey is a 44 year old female with acute kidney injury likely due to contrast and some component of drug. Granular casts on Urine sedmiment (personally reviewed). Sodium is improved with the free water.     I personally reviewed major complaints, physical findings, investigations, medications, lab values, vital signs, I/O's and the overall management plan.      My key findings:  Remains volume overloaded  Sodium is improved and now normalized  Acidosis is improved    Key management decisions made by me:    Reduce free water  Cont to UF as tolerated but remember that fluid pulls are slow and done over 12-25 hour shifts. As such, intermittent volume (ABx, blood etc) accumulate rapidly but are removed slowly. Watch 24 hour totals rather than hourly or even 4 hour totals.     Faye Nur MD   Date of service (date I saw patient) aug 30 2019    Review of Systems:   I reviewed the following systems:  ROS not done due to vent/sedation.     Physical Exam:   I/O last 3 completed shifts:  In: 6383.97 [I.V.:2715.97; Other:203; NG/GT:2120]  Out: 4352 [Urine:18; Other:3984; Stool:350]   /51 (BP Location: Left arm)   Pulse 105   Temp 98.6  F (37  C) (Axillary)   Resp 24   Ht 1.727 m (5' 8\")   Wt 126.1 kg (278 lb)   LMP 10/04/2018   SpO2 96%   Breastfeeding? No   BMI 42.27 kg/m       GENERAL APPEARANCE: Intubated and sedated.   EYES:  + scleral icterus, pupils equal  HENT: mouth without ulcers or lesions  PULM: lungs clear to auscultation, equal air movement, no cyanosis or clubbing  CV: regular rhythm, normal rate, no rub     -JVP not elevated     -edema +2  GI: soft, non-tender, nondistended, bowel sounds are +  MS: no evidence of inflammation in " joints, no muscle tenderness  NEURO: Intubated and sedated. normal    Labs:   All labs reviewed by me  Electrolytes/Renal -   Recent Labs   Lab Test 08/30/19  0950 08/30/19  0349 08/29/19 2209 08/29/19  0419  08/28/19  1617    141 142   < > 146*   < > 148*   POTASSIUM 3.7 3.6 3.5   < > 4.0   < > 4.0   CHLORIDE 112* 111* 111*   < > 115*   < > 118*   CO2 22 22 22   < > 20   < > 18*   BUN 16 18 21   < > 28   < > 34*   CR 0.84 0.91 1.01   < > 1.23*   < > 1.59*   * 150* 153*   < > 108*   < > 128*   NARENDRA 7.3* 7.2* 7.3*   < > 7.9*   < > 8.4*   MAG  --  2.3  --   --  2.4*  --  2.4*   PHOS 3.3 3.5 3.7   < > 5.0*   < > 5.3*    < > = values in this interval not displayed.       CBC -   Recent Labs   Lab Test 08/30/19  0950 08/30/19  0349 08/29/19 2209   WBC 23.3* 25.6* 27.1*   HGB 7.7* 7.9* 7.9*   PLT 45* 31* 35*       LFTs -   Recent Labs   Lab Test 08/30/19  0950 08/30/19  0349 08/29/19 2209   ALKPHOS 171* 179* 168*   BILITOTAL 10.7* 10.8* 10.8*   ALT 68* 64* 65*   * 318* 324*   PROTTOTAL 5.2* 5.4* 5.5*   ALBUMIN 2.8* 3.1* 3.1*       Iron Panel -   Recent Labs   Lab Test 08/21/19  0348 07/17/19  1542   IRON 37 91   IRONSAT 29 86*   CHELSY 166 372*           Current Medications:    artificial tears   Both Eyes Q6H     ascorbic acid  500 mg Oral or Feeding Tube Daily     dornase alpha  2.5 mg Inhalation Daily     folic acid  500 mcg Oral or Feeding Tube Daily     heparin lock flush  5-10 mL Intracatheter Q24H     lactulose  20 g Oral or Feeding Tube Q6H     meropenem  1 g Intravenous Q8H     micafungin  100 mg Intravenous Q24H     multivitamins w/minerals  15 mL Per Feeding Tube Daily     [START ON 8/31/2019] pantoprazole  40 mg Oral or Feeding Tube QAM AC     protein modular  1 packet Per Feeding Tube TID     rifaximin  550 mg Oral or Feeding Tube BID     sertraline  75 mg Oral or Feeding Tube Daily     cyanocobalamin  100 mcg Oral or Feeding Tube Daily     vitamin B1  100 mg Oral or Feeding Tube Daily        - MEDICATION INSTRUCTIONS -       IV fluid REPLACEMENT ONLY       CRRT replacement solution 12.5 mL/kg/hr (08/30/19 1337)     epoprostenol (VELETRI) 20 mcg/mL in sterile water inhalation solution 20 ng/kg/min (08/30/19 1604)     fentaNYL 150 mcg/hr (08/30/19 1600)     midazolam 8 mg/hr (08/30/19 1600)     - MEDICATION INSTRUCTIONS -       - MEDICATION INSTRUCTIONS -       norepinephrine 0.31 mcg/kg/min (08/30/19 1600)     - MEDICATION INSTRUCTIONS -       phenylephrine 0.5 mcg/kg/min (08/30/19 1600)     CRRT replacement solution 1.66 mL/kg/hr (08/29/19 1945)     CRRT replacement solution 15 mL/kg/hr (08/30/19 1330)     vasopressin (PITRESSIN) infusion ADULT (40 mL) 2.4 Units/hr (08/30/19 1600)

## 2019-08-31 NOTE — CONSULTS
[facility] Hematology-Oncology New Consult          Neema Bailey MRN# [id number]   Age: 44 year old YOB: 1975   Date of Admission: 8/19/2019     Reason for consult:Thrombocytopenia, hemolysis in the setting of ARDS and ESLD           Assessment and Recommendations:     Assessment:  Neema Bailey is a 44 year old female with complex PMHX now with severe septic shock from ESBL E.coli bacteremia w MODS ARDS requiring intubation and mechanical ventilation, hepatic and toxic metabolic encephalopathy, DEE DEE on CRRT.Baseline Plt around   K. platelet count now in the 20s to 30s , with evidence of bruising around the insertion of IV lines. Sometimes it is very hard to distinguish between DIC and liver dysfunction from cirrhosis.  Thrombocytopenia is multifactorial cirrhosis, sepsis, and antibiotic exposure. Less likely to be TMA, TTP, HIT. Baseline hemoglobin 8-9 in the last 6 months.  Hgb drop to 6.9 on 8/29, 1U pRBCs given. Last smear on 8/28 with small amount o schistocytes. Anemia overall stable. Anemia is probably related to intravascular hemolysis from DIC and bone marrow suppression from sepsis.      Recommendations:  -Continue supportive measures for sepsis including antibiotics  -Transfuse for goal hemoglobin more than 7,  platelets more than 20K , and fibrinogen of 100  -Monitor daily CBC and fibrinogen  -Avoid prophylactic AC until Plts are consistently above 30K  -Trend LDH every 48-72 hrs    Pt discussed with Dr. Sexton who agrees with the plan.      Ben Hernandez MD  Hematology Oncology fellow  [phone]          HPI:   Pt was intubated and sedated, most of the Hx I from chart review. Ms. 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  She was admitted on 8/20.    Of note she was discharged to TCU on 8/13 following a hospitalization from 7/17-21 for decompensated cirrhosis s/p therapeutic paracentesis and thoracentesis. Since TCU discharge, the had increased shortness of breath, a/w non-productive cough and fever. Pt was hemodynamically unstable with signs of severe sepsis and the patient was started on etrapenem and vancomycin given history of ESBL E coli and VRE, and transferred to the MICU service given severe sepsis and concern for potential need for pressors.     During the course of hospitalization she has developed several complications including hypoxemic respiratory failure, that progressed to ARDS requiring intubation and mechanical ventilation proning for 1 or 2 days.  She also developed hepatic and toxic metabolic encephalopathy, septic shock from E.coli baceteremia requiring vasopressor support, multiorgan dysfunction, including DEE DEE on CRRT. She is recovering from ARDS perspective as O2 requirment is improving and she is on broad spectrum antibiotics.     Hematology was consulted for thrombocytopenia and anemia.         Review of system: Complete ROS otherwise negative         Past Medical History:     Past Medical History:   Diagnosis Date     Anxiety      Bilateral leg edema      Chronic kidney disease      Dizziness and giddiness      Hypertension      Insomnia      Iron deficiency anemia     due to chronic blood loss, vitamin B12 deficiency, Macrocytic     Migraines      BURT (nonalcoholic steatohepatitis)      Numbness and tingling     PERIPHERAL NEUROPATHY     Obese      Other chronic pain     Chronic Bilateral Low Back Pain with Bilateral Sciatica, Bilateral Knee Pain     Peripheral neuropathy      Pruritus      Restless legs      Sciatica      Seborrheic dermatitis      Severe episode of recurrent major depressive disorder,  without psychotic features (H)      Sinus tachycardia      Substance abuse in remission (H)     h/o alcoholism had treatment at [facility]     Uterovaginal prolapse      Vitamin D deficiency              Past Surgical History:     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)     BREAST SURGERY      Breast Augmentation     COLPORRHAPHY ANTERIOR N/A 9/21/2015    Procedure: COLPORRHAPHY ANTERIOR;  Surgeon: Jairon Adams MD;  Location: [facility]     COSMETIC SURGERY      Abdominoplasty     CYSTOCELE REPAIR       CYSTOSCOPY N/A 11/26/2018    Procedure: CYSTOSCOPY;  Surgeon: Rony Melgar MD;  Location: [facility]     ENT SURGERY      tonsillectomy & adenoidectomy     GI SURGERY      Small Bowel Enterotomy Repair for SBO, EGD     HYSTERECTOMY VAGINAL, COLPORRHAPHY ANTERIOR, POSTERIOR, COMBINED N/A 11/26/2018    Procedure: VAGINAL HYSTERECTOMY, ANTERIOR AND POSTERIOR REPAIR;  Surgeon: Rony Melgar MD;  Location: [facility]     IR PARACENTESIS  8/7/2019     IR THORACENTESIS  8/7/2019     PERINEORRHAPHY N/A 11/26/2018    Procedure: PERINEOPLASTY;  Surgeon: Rony Melgar MD;  Location: [facility]     TUBAL LIGATION               Social History:     Social History     Socioeconomic History     Marital status: Single     Spouse name: Not on file     Number of children: Not on file     Years of education: Not on file     Highest education level: Not on file   Occupational History     Not on file   Social Needs     Financial resource strain: Not on file     Food insecurity:     Worry: Not on file     Inability: Not on file     Transportation needs:     Medical: Not on file     Non-medical: Not on file   Tobacco Use     Smoking status: Never Smoker     Smokeless tobacco: Never Used   Substance and Sexual Activity     Alcohol use: No     Alcohol/week: 0.0  oz     Frequency: Never     Comment: Pt reports being a previou heavy drinker, reports quitting earlier this year     Drug use: No     Sexual activity: Not Currently     Partners: Male     Birth control/protection: Female Surgical     Comment: Tubal Ligation 2003   Lifestyle     Physical activity:     Days per week: Not on file     Minutes per session: Not on file     Stress: Not on file   Relationships     Social connections:     Talks on phone: Not on file     Gets together: Not on file     Attends Shinto service: Not on file     Active member of club or organization: Not on file     Attends meetings of clubs or organizations: Not on file     Relationship status: Not on file     Intimate partner violence:     Fear of current or ex partner: Not on file     Emotionally abused: Not on file     Physically abused: Not on file     Forced sexual activity: Not on file   Other Topics Concern     Parent/sibling w/ CABG, MI or angioplasty before 65F 55M? Not Asked   Social History Narrative    ** Merged History Encounter **                  Family History:   History reviewed. No pertinent family history.             Allergies:   .   Allergies   Allergen Reactions     Bactrim [Sulfamethoxazole W/Trimethoprim]      Gabapentin      Other reaction(s): Edema     Nitrofurantoin Other (See Comments)     drug-induced liver injury             Medications:     Current Facility-Administered Medications   Medication     acetaminophen (TYLENOL) tablet 325 mg     acetylcysteine (MUCOMYST) 20 % nebulizer solution 2 mL     alum & mag hydroxide-simethicone (MYLANTA ES/MAALOX  ES) suspension 20 mL     artificial tears ophthalmic ointment     ascorbic acid (vitamin C) chewable tablet 500 mg     calcium chloride 1 g in sodium chloride 0.9 % 100 mL intermittent infusion     Daily 2 GRAM acetaminophen limit, unless fulminent liver failure or transaminases greater than or equal to 300 - 400, then none     dextrose 10 % 1,000 mL infusion      glucose gel 15-30 g    Or     dextrose 50 % injection 25-50 mL    Or     glucagon injection 1 mg     dialysate for CVVHD & CVVHDF (Phoxillum BK4/2.5)     epoprostenol (VELETRI) 20 mcg/mL in sterile water inhalation solution     fentaNYL (SUBLIMAZE) infusion     folic acid (FOLATE) oral solution 500 mcg     heparin lock flush 10 UNIT/ML injection 2-5 mL     heparin lock flush 10 UNIT/ML injection 5-10 mL     heparin lock flush 10 UNIT/ML injection 5-10 mL     hydrocortisone sodium succinate PF (solu-CORTEF) injection 50 mg     HYDROmorphone (DILAUDID) injection 0.1-0.2 mg     hydrOXYzine (ATARAX) tablet 25 mg     hypromellose-dextran (ARTIFICAL TEARS) 0.1-0.3 % ophthalmic solution 1 drop     lactulose (CHRONULAC) solution 20 g     lactulose (CHRONULAC) solution 20 g    Or     lactulose (CHRONULAC) solution for enema prep 100 g     lidocaine (LMX4) cream     lidocaine 1 % 0.1-1 mL     magnesium sulfate 2 g in NS intermittent infusion (PharMEDium or FV Cmpd)     meropenem (MERREM) 1 g vial to attach to  mL bag     micafungin (MYCAMINE) 100 mg in sodium chloride 0.9 % 100 mL intermittent infusion     miconazole (MICATIN/MICRO GUARD) 2 % powder     midazolam (VERSED) drip - ADULT 100 mg/100 mL in NS PRE-MIX     midazolam (VERSED) injection 1-4 mg     multivitamins w/minerals (CERTAVITE) liquid 15 mL     naloxone (NARCAN) injection 0.1-0.4 mg     No heparin required     No lozenges or gum should be given while patient on BIPAP/AVAPS/AVAPS AE     norepinephrine (LEVOPHED) 16 mg in  mL infusion     ondansetron (ZOFRAN-ODT) ODT tab 4 mg    Or     ondansetron (ZOFRAN) injection 4 mg     pantoprazole (PROTONIX) 2 mg/mL suspension 40 mg     Patient may continue current oral medications     phenylephrine (WHIT-SYNEPHRINE) 50 mg in sodium chloride 0.9 % 250 mL infusion     POST-filter replacement solution for CVVHD & CVVHDF (Phoxillum BK4/2.5)     potassium chloride 20 mEq in 50 mL intermittent infusion     PRE-filter  "replacement solution for CVVHD & CVVHDF (Phoxillum BK4/2.5)     prochlorperazine (COMPAZINE) injection 10 mg    Or     prochlorperazine (COMPAZINE) tablet 10 mg    Or     prochlorperazine (COMPAZINE) Suppository 25 mg     protein modular (PROSOURCE TF) 1 packet     rifaximin (XIFAXAN) tablet 550 mg     sertraline (ZOLOFT) tablet 75 mg     simethicone (MYLICON) chewable tablet 80 mg     sodium chloride (PF) 0.9% PF flush 10-20 mL     sodium phosphate 20 mmol in D5W 100 mL intermittent infusion     vasopressin (VASOSTRICT) 40 Units in D5W 40 mL infusion     vitamin B-12 (CYANOCOBALAMIN) tablet 100 mcg     vitamin B1 (THIAMINE) tablet 100 mg           Vital signs:  Temp: 97.9  F (36.6  C) Temp src: Core     Heart Rate: 102 Resp: 25 SpO2: 97 % O2 Device: Mechanical Ventilator Oxygen Delivery: Other (Comments)(45 L) Height: 172.7 cm (5' 8\") Weight: 123.6 kg (272 lb 7.8 oz)  Estimated body mass index is 41.43 kg/m  as calculated from the following:    Height as of this encounter: 1.727 m (5' 8\").    Weight as of this encounter: 123.6 kg (272 lb 7.8 oz).    Physical exam:    Gen: Well built and nourished, not in any acute distress  HEENT: Mucous Membrane Moist, no oral lesions, no pallor, icterus  Neck: supple,   Lymph Nodes: no cervical, axillary LAD   CVS: Regular Rate Rhythm, no murmurs  Resp: CTA, no added sounds  GI: Soft, non-tender, no Hepatospleenomegaly  Extremities: no edema   Skin: no bruises  Neuro: AAOx4. Cranial nerves grossly intact. Strength 5/5 throughout. Sensations grossly intact.          Data:   The labs and imaging were reviewed.      .  Recent Results (from the past 24 hour(s))   Blood gas arterial    Collection Time: 08/30/19  3:44 PM   Result Value Ref Range    pH Arterial 7.27 (L) 7.35 - 7.45 pH    pCO2 Arterial 51 (H) 35 - 45 mm Hg    pO2 Arterial 79 (L) 80 - 105 mm Hg    Bicarbonate Arterial 23 21 - 28 mmol/L    Base Deficit Art 4.0 mmol/L    FIO2 50.0    Comprehensive metabolic panel    " Collection Time: 08/30/19  3:44 PM   Result Value Ref Range    Sodium 141 133 - 144 mmol/L    Potassium 3.9 3.4 - 5.3 mmol/L    Chloride 112 (H) 94 - 109 mmol/L    Carbon Dioxide 23 20 - 32 mmol/L    Anion Gap 6 3 - 14 mmol/L    Glucose 121 (H) 70 - 99 mg/dL    Urea Nitrogen 15 7 - 30 mg/dL    Creatinine 0.85 0.52 - 1.04 mg/dL    GFR Estimate 82 >60 mL/min/[1.73_m2]    GFR Estimate If Black >90 >60 mL/min/[1.73_m2]    Calcium 7.2 (L) 8.5 - 10.1 mg/dL    Bilirubin Total 11.0 (H) 0.2 - 1.3 mg/dL    Albumin 2.9 (L) 3.4 - 5.0 g/dL    Protein Total 5.4 (L) 6.8 - 8.8 g/dL    Alkaline Phosphatase 188 (H) 40 - 150 U/L    ALT 68 (H) 0 - 50 U/L     (H) 0 - 45 U/L   Phosphorus    Collection Time: 08/30/19  3:44 PM   Result Value Ref Range    Phosphorus 3.0 2.5 - 4.5 mg/dL   CBC with platelets differential    Collection Time: 08/30/19  3:44 PM   Result Value Ref Range    WBC 22.7 (H) 4.0 - 11.0 10e9/L    RBC Count 2.54 (L) 3.8 - 5.2 10e12/L    Hemoglobin 7.9 (L) 11.7 - 15.7 g/dL    Hematocrit 25.3 (L) 35.0 - 47.0 %     78 - 100 fl    MCH 31.1 26.5 - 33.0 pg    MCHC 31.2 (L) 31.5 - 36.5 g/dL    RDW 21.1 (H) 10.0 - 15.0 %    Platelet Count 25 (LL) 150 - 450 10e9/L    Diff Method Automated Method     % Neutrophils 72.8 %    % Lymphocytes 9.1 %    % Monocytes 7.7 %    % Eosinophils 5.8 %    % Basophils 0.5 %    % Immature Granulocytes 4.1 %    Nucleated RBCs 0 0 /100    Absolute Neutrophil 16.5 (H) 1.6 - 8.3 10e9/L    Absolute Lymphocytes 2.1 0.8 - 5.3 10e9/L    Absolute Monocytes 1.8 (H) 0.0 - 1.3 10e9/L    Absolute Eosinophils 1.3 (H) 0.0 - 0.7 10e9/L    Absolute Basophils 0.1 0.0 - 0.2 10e9/L    Abs Immature Granulocytes 0.9 (H) 0 - 0.4 10e9/L    Absolute Nucleated RBC 0.1     Platelet Estimate Confirming automated cell count    Lactate Dehydrogenase    Collection Time: 08/30/19  3:44 PM   Result Value Ref Range    Lactate Dehydrogenase 552 (H) 81 - 234 U/L   Fibrinogen activity (AM Draw)    Collection Time:  08/30/19  5:56 PM   Result Value Ref Range    Fibrinogen 105 (L) 200 - 420 mg/dL   Comprehensive metabolic panel    Collection Time: 08/30/19  9:53 PM   Result Value Ref Range    Sodium 140 133 - 144 mmol/L    Potassium 4.2 3.4 - 5.3 mmol/L    Chloride 110 (H) 94 - 109 mmol/L    Carbon Dioxide 24 20 - 32 mmol/L    Anion Gap 6 3 - 14 mmol/L    Glucose 149 (H) 70 - 99 mg/dL    Urea Nitrogen 14 7 - 30 mg/dL    Creatinine 0.79 0.52 - 1.04 mg/dL    GFR Estimate 89 >60 mL/min/[1.73_m2]    GFR Estimate If Black >90 >60 mL/min/[1.73_m2]    Calcium 7.2 (L) 8.5 - 10.1 mg/dL    Bilirubin Total 11.6 (H) 0.2 - 1.3 mg/dL    Albumin 3.0 (L) 3.4 - 5.0 g/dL    Protein Total 5.4 (L) 6.8 - 8.8 g/dL    Alkaline Phosphatase 188 (H) 40 - 150 U/L    ALT 72 (H) 0 - 50 U/L     (H) 0 - 45 U/L   Phosphorus    Collection Time: 08/30/19  9:53 PM   Result Value Ref Range    Phosphorus 3.2 2.5 - 4.5 mg/dL   CBC with platelets differential    Collection Time: 08/30/19  9:53 PM   Result Value Ref Range    WBC 21.4 (H) 4.0 - 11.0 10e9/L    RBC Count 2.59 (L) 3.8 - 5.2 10e12/L    Hemoglobin 7.9 (L) 11.7 - 15.7 g/dL    Hematocrit 25.7 (L) 35.0 - 47.0 %    MCV 99 78 - 100 fl    MCH 30.5 26.5 - 33.0 pg    MCHC 30.7 (L) 31.5 - 36.5 g/dL    RDW 21.0 (H) 10.0 - 15.0 %    Platelet Count 20 (LL) 150 - 450 10e9/L    Diff Method Automated Method     % Neutrophils 82.6 %    % Lymphocytes 4.9 %    % Monocytes 6.6 %    % Eosinophils 3.1 %    % Basophils 0.5 %    % Immature Granulocytes 2.3 %    Nucleated RBCs 0 0 /100    Absolute Neutrophil 17.7 (H) 1.6 - 8.3 10e9/L    Absolute Lymphocytes 1.1 0.8 - 5.3 10e9/L    Absolute Monocytes 1.4 (H) 0.0 - 1.3 10e9/L    Absolute Eosinophils 0.7 0.0 - 0.7 10e9/L    Absolute Basophils 0.1 0.0 - 0.2 10e9/L    Abs Immature Granulocytes 0.5 (H) 0 - 0.4 10e9/L    Absolute Nucleated RBC 0.1    Fibrinogen activity (AM Draw)    Collection Time: 08/30/19  9:53 PM   Result Value Ref Range    Fibrinogen 109 (L) 200 - 420 mg/dL    Blood gas arterial    Collection Time: 08/30/19  9:53 PM   Result Value Ref Range    pH Arterial 7.26 (L) 7.35 - 7.45 pH    pCO2 Arterial 51 (H) 35 - 45 mm Hg    pO2 Arterial 79 (L) 80 - 105 mm Hg    Bicarbonate Arterial 23 21 - 28 mmol/L    Base Deficit Art 4.3 mmol/L    FIO2 60.0    Platelets prepare order unit    Collection Time: 08/30/19 11:44 PM   Result Value Ref Range    Blood Component Type PLT Pheresis     Units Ordered 1    Blood component    Collection Time: 08/30/19 11:44 PM   Result Value Ref Range    Unit Number [id number]     Blood Component Type PlateletPheresis,LeukoRed Irrad (Part 3)     Division Number 00     Status of Unit Released to care unit 08/31/2019 0033     Blood Product Code E0996Y88     Unit Status ISS    CK total    Collection Time: 08/31/19  4:35 AM   Result Value Ref Range    CK Total 378 (H) 30 - 225 U/L   Blood gas arterial    Collection Time: 08/31/19  4:35 AM   Result Value Ref Range    pH Arterial 7.26 (L) 7.35 - 7.45 pH    pCO2 Arterial 53 (H) 35 - 45 mm Hg    pO2 Arterial 75 (L) 80 - 105 mm Hg    Bicarbonate Arterial 23 21 - 28 mmol/L    Base Deficit Art 3.7 mmol/L    FIO2 65.0    Ammonia (AM Draw)    Collection Time: 08/31/19  4:35 AM   Result Value Ref Range    Ammonia 213 (HH) 10 - 50 umol/L   Calcium, ionized whole blood (AM Draw)    Collection Time: 08/31/19  4:35 AM   Result Value Ref Range    Calcium Ionized Whole Blood 4.2 (L) 4.4 - 5.2 mg/dL   Comprehensive metabolic panel    Collection Time: 08/31/19  4:35 AM   Result Value Ref Range    Sodium 140 133 - 144 mmol/L    Potassium 4.2 3.4 - 5.3 mmol/L    Chloride 110 (H) 94 - 109 mmol/L    Carbon Dioxide 23 20 - 32 mmol/L    Anion Gap 7 3 - 14 mmol/L    Glucose 166 (H) 70 - 99 mg/dL    Urea Nitrogen 14 7 - 30 mg/dL    Creatinine 0.74 0.52 - 1.04 mg/dL    GFR Estimate >90 >60 mL/min/[1.73_m2]    GFR Estimate If Black >90 >60 mL/min/[1.73_m2]    Calcium 7.1 (L) 8.5 - 10.1 mg/dL    Bilirubin Total 11.5 (H) 0.2 - 1.3  mg/dL    Albumin 2.9 (L) 3.4 - 5.0 g/dL    Protein Total 5.3 (L) 6.8 - 8.8 g/dL    Alkaline Phosphatase 186 (H) 40 - 150 U/L    ALT 65 (H) 0 - 50 U/L     (H) 0 - 45 U/L   Phosphorus    Collection Time: 08/31/19  4:35 AM   Result Value Ref Range    Phosphorus 3.4 2.5 - 4.5 mg/dL   CBC with platelets differential    Collection Time: 08/31/19  4:35 AM   Result Value Ref Range    WBC 22.3 (H) 4.0 - 11.0 10e9/L    RBC Count 2.55 (L) 3.8 - 5.2 10e12/L    Hemoglobin 7.8 (L) 11.7 - 15.7 g/dL    Hematocrit 25.4 (L) 35.0 - 47.0 %     78 - 100 fl    MCH 30.6 26.5 - 33.0 pg    MCHC 30.7 (L) 31.5 - 36.5 g/dL    RDW 20.9 (H) 10.0 - 15.0 %    Platelet Count 17 (LL) 150 - 450 10e9/L    Diff Method Automated Method     % Neutrophils 88.0 %    % Lymphocytes 4.1 %    % Monocytes 3.9 %    % Eosinophils 0.2 %    % Basophils 0.3 %    % Immature Granulocytes 3.5 %    Nucleated RBCs 0 0 /100    Absolute Neutrophil 19.7 (H) 1.6 - 8.3 10e9/L    Absolute Lymphocytes 0.9 0.8 - 5.3 10e9/L    Absolute Monocytes 0.9 0.0 - 1.3 10e9/L    Absolute Eosinophils 0.0 0.0 - 0.7 10e9/L    Absolute Basophils 0.1 0.0 - 0.2 10e9/L    Abs Immature Granulocytes 0.8 (H) 0 - 0.4 10e9/L    Absolute Nucleated RBC 0.1    INR    Collection Time: 08/31/19  4:35 AM   Result Value Ref Range    INR 3.85 (H) 0.86 - 1.14   PTT (AM Draw)    Collection Time: 08/31/19  4:35 AM   Result Value Ref Range    PTT 71 (H) 22 - 37 sec   Fibrinogen activity (AM Draw)    Collection Time: 08/31/19  4:35 AM   Result Value Ref Range    Fibrinogen 99 (LL) 200 - 420 mg/dL   Procalcitonin    Collection Time: 08/31/19  4:35 AM   Result Value Ref Range    Procalcitonin 0.61 ng/ml   Platelets prepare order unit    Collection Time: 08/31/19  5:08 AM   Result Value Ref Range    Blood Component Type PLT Pheresis     Units Ordered 1    Blood component    Collection Time: 08/31/19  5:08 AM   Result Value Ref Range    Unit Number [id number]     Blood Component Type PlateletPheresis  LeukoReduced Irradiated     Division Number 00     Status of Unit Released to care unit 08/31/2019 0949     Blood Product Code Q1595G59     Unit Status ISS    Cryoprecipitate prepare order unit    Collection Time: 08/31/19 10:24 AM   Result Value Ref Range    Blood Component Type Cryoprecipitate     Units Ordered 10    Blood component    Collection Time: 08/31/19 10:24 AM   Result Value Ref Range    Unit Number [id number]     Blood Component Type 5 cryoprecipitate units pooled     Division Number 00     Status of Unit Released to care unit 08/31/2019 1358     Blood Product Code L2784Q42     Unit Status ISS    Comprehensive metabolic panel    Collection Time: 08/31/19 12:11 PM   Result Value Ref Range    Sodium 141 133 - 144 mmol/L    Potassium 3.7 3.4 - 5.3 mmol/L    Chloride 111 (H) 94 - 109 mmol/L    Carbon Dioxide 23 20 - 32 mmol/L    Anion Gap 7 3 - 14 mmol/L    Glucose 151 (H) 70 - 99 mg/dL    Urea Nitrogen 13 7 - 30 mg/dL    Creatinine 0.77 0.52 - 1.04 mg/dL    GFR Estimate >90 >60 mL/min/[1.73_m2]    GFR Estimate If Black >90 >60 mL/min/[1.73_m2]    Calcium 7.5 (L) 8.5 - 10.1 mg/dL    Bilirubin Total 11.3 (H) 0.2 - 1.3 mg/dL    Albumin 2.9 (L) 3.4 - 5.0 g/dL    Protein Total 5.3 (L) 6.8 - 8.8 g/dL    Alkaline Phosphatase 192 (H) 40 - 150 U/L    ALT 66 (H) 0 - 50 U/L     (H) 0 - 45 U/L   Phosphorus    Collection Time: 08/31/19 12:11 PM   Result Value Ref Range    Phosphorus 3.7 2.5 - 4.5 mg/dL   CBC with platelets differential    Collection Time: 08/31/19 12:11 PM   Result Value Ref Range    WBC 29.0 (H) 4.0 - 11.0 10e9/L    RBC Count 2.56 (L) 3.8 - 5.2 10e12/L    Hemoglobin 7.7 (L) 11.7 - 15.7 g/dL    Hematocrit 25.8 (L) 35.0 - 47.0 %     (H) 78 - 100 fl    MCH 30.1 26.5 - 33.0 pg    MCHC 29.8 (L) 31.5 - 36.5 g/dL    RDW 20.3 (H) 10.0 - 15.0 %    Platelet Count 25 (LL) 150 - 450 10e9/L    Diff Method Automated Method     % Neutrophils 86.4 %    % Lymphocytes 3.9 %    % Monocytes 6.0 %    %  Eosinophils 0.1 %    % Basophils 0.1 %    % Immature Granulocytes 3.5 %    Nucleated RBCs 0 0 /100    Absolute Neutrophil 25.0 (H) 1.6 - 8.3 10e9/L    Absolute Lymphocytes 1.1 0.8 - 5.3 10e9/L    Absolute Monocytes 1.7 (H) 0.0 - 1.3 10e9/L    Absolute Eosinophils 0.0 0.0 - 0.7 10e9/L    Absolute Basophils 0.0 0.0 - 0.2 10e9/L    Abs Immature Granulocytes 1.0 (H) 0 - 0.4 10e9/L    Absolute Nucleated RBC 0.1    Fibrinogen activity (AM Draw)    Collection Time: 08/31/19 12:11 PM   Result Value Ref Range    Fibrinogen 114 (L) 200 - 420 mg/dL   Sputum Culture Aerobic Bacterial    Collection Time: 08/31/19 12:12 PM   Result Value Ref Range    Specimen Description Sputum Endotracheal     Special Requests       Specimen leaked in transit.  Due to possible contamination, results should be interpreted   with caution.      Culture Micro PENDING    Gram stain    Collection Time: 08/31/19 12:12 PM   Result Value Ref Range    Specimen Description Sputum Endotracheal     Special Requests       Specimen leaked in transit.  Due to possible contamination, results should be interpreted   with caution.      Gram Stain PENDING            Results for orders placed or performed during the hospital encounter of 08/19/19   XR Chest 2 Views    Narrative    Exam: XR CHEST 2 VW, 8/19/2019 8:20 PM    Indication: jose    Comparison: 8/11/2019    Findings:   AP and lateral views of the chest. The cardiac silhouette is unchanged  from prior. There is a large right pleural effusion which is increased  in size from 11/20/2019. There are adjacent streaky opacities and  streaky left basilar opacities. No pneumothorax. Visualized upper  abdomen is unremarkable. No acute osseous abnormalities. There are  degenerative changes of the right acromioclavicular joint.      Impression    Impression:   1. Large right pleural effusion which is increased in size from  8/11/2019.  2. Streaky bibasilar opacities favored to represent pulmonary edema  and atelectasis  over infectious.      I have personally reviewed the examination and initial interpretation  and I agree with the findings.    GREGORIO MIR MD   CT Chest/Abdomen/Pelvis w Contrast    Narrative    CT CHEST/ABDOMEN/PELVIS W CONTRAST 8/19/2019 11:26 PM    History: Abd pain, unspecified; ; abdominal pain, low back pain,  vomiting    Comparison: Same day chest x-ray, ultrasound 7/18/2019, CT abdomen  pelvis 8/11/2017    Technique: Helical CT acquisition from the lung apices to the pubic  symphysis was obtained withintravenous contrast. Axial, coronal, and  sagittal reconstructions were obtained and reviewed.    Contrast: iopamidol (ISOVUE-370) solution 135 mL    Findings:     CHEST:     Lungs: There is a large right-sided pleural effusion with complete  collapse of the right lower and middle lobes and subsegmental  atelectasis of the right upper lobe and left lower lobe. Scattered  calcified pulmonary granuloma. No pneumothorax.  Airways: Central tracheobronchial tree is clear.  Vessels: Main pulmonary artery and aorta are normal in caliber. No  central pulmonary embolism. Normal three-vessel arch.  Heart: Heart size is normal without pericardial effusion.  Lymph nodes: No suspicious mediastinal or hilar lymphadenopathy.  Calcified mediastinal and hilar lymph nodes.  Thyroid: Within normal limits.  Esophagus: Within normal limits    ABDOMEN PELVIS:    Liver: Normal parenchymal attenuation without focal mass.  Biliary system: Gallbladder is distended with mucosal hyperenhancement  and mild amount of wall thickening. No radiopaque gallstones.. No  intrahepatic or extrahepatic biliary ductal dilatation.  Pancreas: No focal mass or dilation of the main pancreatic duct.  Stomach: Postoperative changes of Denny-en-Y gastric bypass.  Spleen: Spleen is enlarged measuring 13.3 cm in length. There are  multiple wedge-shaped hypodensities throughout superior aspect of the  spleen.  Adrenal glands: Within normal limits.  Kidneys:  No focal mass, hydronephrosis, or stone.  Bladder: Within normal limits.  Reproductive organs: Uterus and ovaries are not visualized.  Colon: Colon is relatively decompressed with scattered noninflamed  diverticuli.  Appendix: Not confidently identified.  Small Bowel: No dilated loops of bowel. Gastrojejunal and  jejunojejunal anastomoses are within normal limits.  Lymph nodes: No intra-abdominal or pelvic lymphadenopathy.  Vasculature: Major intra-abdominal vasculature is patent. Multiple  predominantly splenorenal upper abdominal varices.    Mild intra-abdominal ascites.    Bones and soft tissues: Bilateral breast augmentation. Moderate  anasarca predominantly around the mid abdomen. There is a 5.1 x 2.6 cm  fluid collection in the subcutaneous fat of the left pelvic region.  There is a thin mildly hyperattenuating rim surrounding the  collection. No suspicious osseous lesion. Degenerative changes L5-S1  with disc space narrowing, endplate sclerosis, and marginal  osteophytes.      Impression    IMPRESSION:   1. Large right-sided pleural effusion with complete collapse of the  right lower and middle lobes with subsegmental consolidation of the  right upper lobe. Cannot exclude underlying infection.   2. Although the liver does not appear overtly cirrhotic, there are  findings of portal hypertension in this patient with history of BURT  with splenomegaly, upper abdominal varices, and mild intra-abdominal  ascites.  3. Gallbladder is distended with mild wall thickening. This is  nonspecific in the setting of likely cirrhotic liver with ascites.  This appears unchanged compared to ultrasound dated 7/18/2019 given  differences in technique  4. Findings suggestive of acute to subacute splenic infarctions.  5. There is a nonspecific, possibly encapsulated, 5.1 x 2.6 cm fluid  collection in the subcutaneous fat of the left pelvic region.    I have personally reviewed the examination and initial interpretation  and I  agree with the findings.    ZHANG REECE MD   US Abdomen Limited (RUQ)    Narrative    EXAMINATION: Limited Abdominal Ultrasound, 8/20/2019 1:49 AM     COMPARISON: Abdominal ultrasound 7/18/2019    HISTORY: Abdominal pain, vomiting, sepsis. Evaluate for cholecystitis.    FINDINGS:   Fluid: Large pleural effusion and small to moderate volume ascites.    Liver: Measures 14.5 cm. Demonstrates increased proximal echogenicity  with mildly nodular contour. No focal mass..     Gallbladder: Gallbladder sludge with mild gallbladder wall thickening  measuring up to 3 mm. There is a mild amount of pericholecystic fluid.  Equivocal sonographic Mensah sign due to pain medication. No wall  hyperemia.    Bile Ducts: Both the intra- and extrahepatic biliary system are of  normal caliber.  The common bile duct measures 4 mm in diameter.    Pancreas: Visualized portions of the head and body of the pancreas are  unremarkable.     Kidney: The right kidney measures 9.4 cm long. There is no  hydronephrosis or hydroureter, no shadowing renal calculi, or solid  mass.     There is retrograde flow within the splenic and main portal vein.      Impression    IMPRESSION:   Gallbladder sludge with mild gallbladder wall thickening or  pericholecystic fluid.  These findings other than interval development  of gallbladder sludge are unchanged since 7/18/2019 and are  nonspecific in the setting of likely cirrhosis. This makes acute  cholecystitis unlikely.  Cirrhotic appearing liver with new retrograde flow in the splenic and  main portal vein. There is mild to moderate volume ascites with large  right-sided pleural effusion consistent with portal hypertension. No  focal mass.    I have personally reviewed the examination and initial interpretation  and I agree with the findings.    ZHANG REECE MD   US Abd/Pelvis Duplex Complete Portable    Narrative    EXAMINATION: TEMPORARY 8/20/2019 5:14 AM     COMPARISON: Abdominal ultrasound  7/18/2019    HISTORY: concern for portal vein thrombosis and/or splenic artery  thrombosis/infarction in patient with cirrhosis    TECHNIQUE: The abdomen was scanned in standard fashion with  specialized ultrasound transducer(s) using both gray-scale, color  Doppler, and spectral flow techniques.    Findings:  Large right-sided pleural effusion. Small intra-abdominal ascites    Extrahepatic portal vein flow is retrograde at 30 cm/s.  Right portal vein flow is retrograde, measuring 22 cm/s.  Left portal vein flow is retrograde, measuring 41 cm/s.    Flow in the hepatic artery is towards the liver with normal monophasic  low-resistance waveform:  153 cm/s peak systolic  0.62 resistive index.     The splenic vein is patent and flow is away from the liver.  The left,  middle, and right hepatic veins are patent with flow towards the IVC.  The IVC is patent with flow towards the heart.      Splenic artery: 69 cm/s towards the spleen with resistive index of  0.68. Normal monophasic low-resistance waveform.    Spleen: The spleen measures 15.4 cm in length.      Impression    Impression:   1.  Patent right upper quadrant Doppler evaluation with retrograde  flow in the portal and splenic veins. No portal vein thrombosis.  2.  Patent splenic artery.  3.  Large right-sided pleural effusion with small intraabdominal  ascites.  Previously visualized splenic infarctions are not well delineated on  this examination.    I have personally reviewed the examination and initial interpretation  and I agree with the findings.    ZHANG REECE MD   XR Chest Port 1 View    Narrative    XR CHEST PORT 1 VW  8/20/2019 6:11 AM      HISTORY: follow up pleural effusion; concern for worsening pulmonary  edema    COMPARISON: Chest x-ray and CT chest abdomen pelvis 8/19/2019    TECHNIQUE: Semiupright frontal view of the chest    FINDINGS: Stable moderate to large right-sided pleural effusion with  associated consolidation/atelectasis of the  right middle and lower  lobes. Mild interstitial opacities. No appreciable pneumothorax.  Cardiac mediastinal silhouette is within normal limits. Trachea is  midline. Upper abdomen is unremarkable. No suspicious osseous lesion.      Impression    IMPRESSION:   1. Stable moderate to large right-sided pleural effusion with  associated consolidation/atelectasis of the right middle and lower  lobes.   2. Bilateral interstitial opacities, likely pulmonary edema.    I have personally reviewed the examination and initial interpretation  and I agree with the findings.    ZHANG REECE MD   XR Chest Port 1 View    Narrative    XR CHEST PORT 1 VW  8/20/2019 12:12 PM      HISTORY: PICC position    COMPARISON: Chest x-ray same day, chest abdomen and pelvis CT  8/19/2019.    FINDINGS:   AP radiograph of the chest. Right upper extremity PICC tip projects  over the right atrium.    Stable cardiac mediastinal silhouette, partially obscured on the  right. Pulmonary vasculature is discrete on the left. Normal lung  volumes on the left. Trachea is midline. Slightly increased large  right pleural effusion with overlying streaky opacities. Perihilar and  retrocardiac hazy opacities. No pleural effusion on the left. No  pneumothorax appreciated.      Impression    IMPRESSION:   1. Right upper extremity PICC tip projects over the right atrium.  2. Large right pleural effusion, slightly increased from prior exam  with overlying streaky opacities favoring atelectasis vs infection.  3. Perihilar and retrocardiac hazy opacities favoring atelectasis  superimposed upon mild pulmonary vascular congestion.    I have personally reviewed the examination and initial interpretation  and I agree with the findings.    BELEN WATERS MD   NM HepatOBiliary Scan    Narrative    Examination:  NM HEPATOBILIARY SCAN      Date: 8/23/2019 2:57 PM.    Indication:   Diagnostic paracentesis yesterday with bilious drainage  - evaluate for bile leak      Additional Information: none    Technique:    The patient received 6.6 mCi of Tc-99m Choletec intravenously. Images  were obtained out through 60 minutes.   At 60 minutes the patient  received [Amt of CCK] mcg of CCK over [Infusion Time] minutes. An  additional 45 minutes of images were obtained after the gall bladder  contraction intervention.    Findings:    There is slow clearance of the radionuclide from the blood pool into  the liver suggesting underlying hepatocellular dysfunction. By 15  minutes there is clear visualization of the intrahepatic ducts as well  as the upper common bile duct.  At 25 minutes there is emptying from  the common bile duct into the small bowel. No evidence of bile leak.  Enterogastric reflux was not present. At 60 minutes there is still  residual radionuclide activity within the blood pool indicating  hepatocellular dysfunction or hepatitis. There is secondary excretion  into the kidneys.      Impression    Impression:    1. No changes to indicate a bile leak.  2. Abnormal clearance of the ring nuclide from the blood pool  indicating underlying hepatocellular dysfunction or hepatitis.    I have personally reviewed the examination and initial interpretation  and I agree with the findings.    GREGORIO MIR MD   XR Chest Port 1 View    Narrative    XR CHEST PORT 1 VW  8/23/2019 11:19 PM      HISTORY: SOB, known hydrothorax    COMPARISON: Chest x-ray 8/20/2019    TECHNIQUE: Semiupright frontal view of the chest    FINDINGS: Relatively stable moderate to large right sided pleural  effusion given differences in positioning with associated  consolidation/atelectasis of the right lower lobe. Small sided pleural  effusion. No appreciable pneumothorax. Bilateral mixed interstitial  patchy airspace opacities. Cardiac mediastinal silhouette is within  normal limits. The trachea is midline. The upper abdomen is  unremarkable. Right-sided PICC line with tip projecting over the  superior  cavoatrial junction. No suspicious osseous lesion.      Impression    IMPRESSION:   1. Stable large right-sided pleural effusion given differences in  positioning with associated consolidation/atelectasis of the right  lower lobe. Small left-sided pleural effusion.  2. Bilateral mixed interstitial patchy airspace opacities, edema  versus infection versus ARDS.    I have personally reviewed the examination and initial interpretation  and I agree with the findings.    BELEN WATERS MD   XR Chest Port 1 View    Narrative    XR CHEST PORT 1 VW  8/24/2019 2:50 PM      HISTORY: s/p thoracentesis    COMPARISON: Chest radiograph 8/22/2019    FINDINGS:   Single AP view of the chest. Right upper extremity PICC tip projects  at the cavoatrial junction. Trachea is midline. Cardiac and  mediastinal silhouette are within normal limits. Pulmonary vasculature  is indistinct. Diffuse hazy airspace and interstitial opacities not  significantly changed. Unchanged small left pleural effusion.  Decreased moderate right pleural effusion. No pneumothorax.    Visualized portions of the abdomen, soft tissues and osseous thorax  are unremarkable.      Impression    IMPRESSION:   1. No pneumothorax visualized.  2. Decreased now moderate right pleural effusion.  3. Unchanged in left pleural effusion.  4. No significant change in diffuse mixed interstitial and airspace  opacities.    I have personally reviewed the examination and initial interpretation  and I agree with the findings.    TAYLER QUINTERO MD   XR Chest Port 1 View    Narrative    XR CHEST PORT 1 VW  8/24/2019 8:53 PM      HISTORY: Increasing O2 requirement, thoracentesis today    COMPARISON: 8/24/2019 at 1446    FINDINGS:   Single view of the chest demonstrates increase in bilateral, left  greater than right pleural effusions and perihilar mixed interstitial  and airspace opacities. Progression of the right hilar alveolar  opacity with air bronchograms. Stable position of the  right arm PICC  line.  No pneumothorax.      Impression    IMPRESSION:   1. Increase in size of right greater than left pleural effusions.  2. Overall increase in perihilar mixed interstitial airspace opacities  favoring severe pulmonary edema, although severe infection, ARDS, or  diffuse alveolar hemorrhage can be considered in the appropriate  clinical setting.    I have personally reviewed the examination and initial interpretation  and I agree with the findings.    TAYLER QUINTERO MD   CT Chest/Abdomen/Pelvis w Contrast    Narrative    EXAMINATION: CT CHEST/ABDOMEN/PELVIS W CONTRAST, 8/25/2019 2:00 PM    TECHNIQUE:  Helical CT images from the thoracic inlet through the  symphysis pubis were obtained  with contrast. Contrast dose: 135ml  isovue 370    COMPARISON: CT 8/19/2019    HISTORY: Increasing abdominal pain in patient with ESBL bacteremia and  recent paracentesis with bile aspirated.    FINDINGS:    Chest: Moderate right pleural effusion decreased in size from prior.  Small left pleural effusion increased in size from prior. There are  diffuse patchy areas of groundglass attenuation superimposed on  interlobular septal thickening and scattered patchy consolidative  opacities throughout most the lung fields with slight sparing of the  left lower lung. These findings have substantially increased since the  prior Dense consolidation adjacent to the right pleural effusion.  Scattered calcified granulomas. No pneumothorax. The central tracheal  bronchial tree is clear.    The heart is not enlarged. The ascending aorta and main pulmonary  artery are normal caliber. Right PICC distal tip is at the cavoatrial  junction. No large pericardial effusion. Thyroid is unremarkable.  Calcified mediastinal nodes. No new or enlarging mediastinal or hilar  lymphadenopathy. Bilateral breast implants.    Abdomen and pelvis: Denny-en-Y gastric bypass. No abnormally dilated  loops of bowel. No focal hepatic lesions. The portal  system appears  grossly patent. The gallbladder appears to be folded on itself,  somewhat hydropic, measuring 5 cm in diameter. There are no dense  gallstones identified. No dilation of the common bile duct. No  intrahepatic biliary ductal dilation. The pancreas is diminutive and  atrophic. Splenomegaly similar to prior with multiple wedge-shaped  hypodensities in the periphery also similar to prior. Extensive  portosystemic collaterals. The adrenal glands are unremarkable. There  is no hydronephrosis or nephrolithiasis. The bladder is decompressed  around a Alexander catheter with a small amount of air in the balloon.  Hysterectomy. No adnexal masses are visualized. The small and large  bowel are normal caliber. No intra-abdominal free air. The appendix is  not visualized. There is moderate volume free fluid in the abdomen in  the upper quadrants and a small amount of the pelvis. This fluid  measures simple which argues against hemorrhage. Extensive  subcutaneous edema about the abdomen and pelvis and chest. Nonspecific  mesenteric haziness suggestive of fluid overload.     No new or enlarging lymphadenopathy in the abdomen or pelvis. No  abdominal aortic aneurysm. Recanalized periumbilical vein. Left upper  quadrant varices.    Bones and soft tissues: Mild degenerative changes of the thoracolumbar  spine. No suspicious osseous lesions. 4.8 x 2.8 x 6.8 cm fluid  collection in the left subcutaneous pelvic region adjacent to the  lateral aspect of the superior iliac crest which previously measured  4.9 x 2.7 x 7.1 cm. Lumbar spine laminectomy changes.      Impression    IMPRESSION:   1. Increased areas of groundglass attenuation superimposed on  interlobular septal thickening with additional patchy consolidative  opacities throughout most of the lung fields with slight sparing of  the left lower lung which are increased from 8/19/2019. Findings are  compatible provided history of infection. Pulmonary edema should  be  considered among others.  2. Moderate right pleural effusion is decreased in size from prior,  and small left pleural effusion is increased in size from prior. There  is a dense consolidative opacity adjacent to the right effusion which  may represent atelectasis or infection.  3a. Findings of portal hypertension in a somewhat noncirrhotic  appearing liver including splenomegaly, portosystemic collaterals, and  moderate free fluid in the abdomen and pelvis which is slightly  increased from prior.  3b. Somewhat focal perihepatic fluid. If bilaterally consistent with,  given history, nuclear medicine hepatobiliary scan could be  considered. MRI with Eovist would also provide additional information.  4. Distended gallbladder with mild wall thickening similar to prior  CT, which is nonspecific in the setting of liver disease and ascites.  Right upper quadrant ultrasound could be considered if indicated.  5. Redemonstration of wedge-shaped hypodensities in the spleen  suggestive of embolic infarctions.  6. Relatively unchanged, nonspecific, possibly encapsulated fluid  collection in the subcutaneous tissue of the left superior pelvis.     I have personally reviewed the examination and initial interpretation  and I agree with the findings.    TAYLER QUINTERO MD   XR Chest Port 1 View    Narrative    Exam: XR CHEST PORT 1 VW, 8/26/2019 10:24 AM    Indication: 43yo F admitted to MICU for acute respiratory failure    Comparison: 8/25/2019, 8/24/2019.    Findings:   Single AP view of the chest. Right approximately PICC tip projects  over the superior cavoatrial junction. The central tracheobronchial  tree is patent. No pneumothorax. Right greater than left pleural  effusions with overlying bibasilar opacities. Diffuse patchy airspace  opacities throughout the lungs bilaterally. The visualized upper  abdomen is unremarkable. No acute osseous abnormality.      Impression    Impression:   1. Diffuse patchy airspace opacities  bilaterally, concerning for  ongoing infection and/or pulmonary edema, not significantly changed  from prior examinations.  2. Small right pleural effusion, improved from prior examinations.  Stable small left pleural effusion. Unchanged associated bibasilar  atelectasis and/or consolidation.    I have personally reviewed the examination and initial interpretation  and I agree with the findings.    BELEN WATERS MD   XR Chest Port 1 View    Narrative    Exam: XR CHEST PORT 1 VW, 8/26/2019 4:48 PM    Indication: ETT placement    Comparison: Same day 1011    Findings:   AP radiograph the chest. Endotracheal tube distal tip projects 3.9 cm  above the nishant. Right PICC distal tip projects over the low superior  vena cava. Cardiac silhouette is unchanged from prior. There are mixed  interstitial and hazy airspace opacities bilaterally which are similar  to prior. More dense hazy opacification of the lower lung fields.  Small left and small-to-moderate right pleural effusions. No  pneumothorax.      Impression    Impression:   1. Endotracheal tube distal tip projects 3.9 cm above the nishant.  2. Mixed interstitial and hazy airspace opacities are relatively  unchanged from prior and likely represent pulmonary edema possibly  with superimposed atelectasis or infection.  3. Small left and small-to-moderate right pleural effusion.    I have personally reviewed the examination and initial interpretation  and I agree with the findings.    TIMOTEO VALADEZ MD   XR Feeding Tube Placement    Narrative    Feeding tube placement.    Comparison: CT chest abdomen and pelvis, 8/25/2019.    History: Feeding tube needed for nutrition.Gastric bypass in 2002.    Fluoroscopy time:  1.1 minutes    Technique: After injection of Xylocaine gel into the right nostril, a  feeding tube was advanced under fluoroscopic guidance.    Findings: The feeding tube is advanced with the tip in the jejunum.  Small amount of barium was injected to define  anatomy.      Impression    Impression: Uncomplicated feeding tube placement with tip in the  jejunum.     I, TIMOTEO VALADEZ MD, attest that I was present for all critical  portions of the procedure and was immediately available to provide  guidance and assistance during the remainder of the procedure.       I have personally reviewed the examination and initial interpretation  and I agree with the findings.    TIMOTEO VALADEZ MD   XR Chest Port 1 View    Narrative    XR CHEST PORT 1 VW  8/27/2019 2:05 PM      HISTORY: hypoxia    COMPARISON: 8/26/2019    FINDINGS:   Single AP radiograph of the chest. Endotracheal tube tip approximately  4.1 cm above the nishant. New enteric tube tip courses beyond the field  of view with small volume of contrast in the left upper quadrant.  Right upper extremity PICC tip projects over the low SVC. Cardiac  silhouette is similar in size. Lung volumes are similar with unchanged  diffuse mixed interstitial and airspace opacities favoring the lower  lung fields. Stable small-to-moderate right pleural effusion and  increased left small-to-moderate pleural effusion.      Impression    IMPRESSION:   1. Unchanged diffuse mixed interstitial and airspace opacities, likely  pulmonary edema, possibly with superimposed infection/atelectasis.  2. Small-to-moderate bilateral pleural effusions.    I have personally reviewed the examination and initial interpretation  and I agree with the findings.    BELEN WATERS MD   XR Chest Port 1 View    Narrative    Exam: XR CHEST PORT 1 VW, 8/27/2019 5:11 PM    Indication: post-bronch increased hypoxia    Comparison: Same day 1348    Findings:   AP radiograph of the chest. Endotracheal tube distal tip projects 4.3  cm above the nishant. Enteric tube courses below the diaphragm and out  of the field-of-view. Small amount contrast in the stomach. Right PICC  distal tip projects over the low superior vena cava.    Cardiac silhouette is unchanged. There are mixed  interstitial and hazy  airspace opacities which are similar to prior. No pneumothorax.  Small-to-moderate right pleural effusion with adjacent basilar  opacities, slightly increased.      Impression    Impression:   1. Mixed interstitial and hazy airspace opacities bilaterally similar  to prior likely represents pulmonary edema possibly with superimposed  atelectasis or infection.  2. Slightly increased right pleural effusion and adjacent atelectasis  versus consolidation.     I have personally reviewed the examination and initial interpretation  and I agree with the findings.    DAVID RENTERIA, DO   XR Chest Port 1 View    Narrative    Exam: XR CHEST PORT 1 VW, 8/28/2019 8:20 AM    Indication: hypoxia, suspected pneumonia, now on flolan    Comparison: 8/27/2019    Findings:   Endotracheal tube tip at the mid thoracic trachea. Right upper  extremity PICC tip at the mid SVC. Enteric tube courses below the  diaphragm and off image margin. The cardiomediastinal silhouette is  within normal limits. The pulmonary vasculature is indistinct. Diffuse  bilateral mixed interstitial and patchy airspace opacities. Decreased  right basilar and increased left basilar opacities with associated  probable bilateral pleural effusions. No pneumothorax.      Impression    Impression: Diffuse bilateral mixed interstitial and patchy airspace  opacities, slightly decreased in the right lung base and slightly  increased in the left lung base. Probable bilateral pleural effusions.    DAVID RENTERIA, DO   XR Chest Port 1 View    Narrative    XR CHEST PORT 1 VW  8/29/2019 6:26 AM      HISTORY: pulmonary edema v hepatopulmonary syndrome    COMPARISON: 8/28/2019    FINDINGS:   Single AP radiograph of the chest. Endotracheal tube tip projects 6.8  cm above the nishant. Right upper chimney PICC tip projects over the  SVC/right atrial junction. Left IJ central line tip projects over the  SVC/right atrial junction. Enteric tube tip courses beyond  the  field-of-view. The cardiac silhouette is similar in size. Increased  lung volumes with mildly increased mixed interstitial and airspace  opacities in the right lung base. Small right pleural effusion.      Impression    IMPRESSION:   1. Increased lung volumes with mildly increased mixed interstitial and  airspace opacities in the right lung base, pulmonary edema versus  infection.  2. Small right pleural effusion.    I have personally reviewed the examination and initial interpretation  and I agree with the findings.    BELEN WATERS MD   XR Chest Port 1 View    Narrative    Exam: XR CHEST PORT 1 VW, 8/28/2019 5:43 PM    Indication: line placement    Comparison: Same day 0757    Findings:   AP radiograph the chest. Endotracheal tube distal tip projects over  the midthoracic trachea. Right PICC distal tip projects over the high  superior vena cava. Left IJ central venous catheter distal tip  projects over the cavoatrial junction. Enteric tube courses below the  diaphragm and not field-of-view. Small amount contrast in the stomach.  Right IJ central venous catheter distal tip projects over the high  right atrium.    Cardiac silhouette is unchanged. Mixed interstitial and airspace  opacities bilaterally not significantly changed from prior. Bibasilar  opacities increased on the right and decrease in the left. Likely  small bilateral pleural effusions. No pneumothorax.      Impression    Impression:   1. Left IJ central venous catheter distal tip projects over the low  superior vena cava. Right IJ central venous catheter distal tip  projects over the high right atrium.  2. Relatively unchanged mixed interstitial and airspace opacities,  likely pulmonary edema.  3. Slightly increased right and decrease left bibasilar opacities  which may represent overlying atelectasis or infection.     I have personally reviewed the examination and initial interpretation  and I agree with the findings.    BELEN WATERS MD   XR  Chest Port 1 View    Narrative    Exam: XR CHEST PORT 1 VW, 8/30/2019 6:02 AM    Indication: pulmonary edema v hepatopulmonary syndrome    Comparison: 8/29/2019    Findings:   AP view of the chest. Endotracheal tube with the tip in the mid  thoracic trachea. Right approach PICC line with the tip at the  superior cavoatrial junction. Left and right IJ approach venous line  with the tips projecting at the level of the right atrium. Enteric  tube with the distal end extending beyond the field-of-view.    Cardiac silhouette is partially obscured. Mixed interstitial and  patchy airspace opacities with increased atelectasis versus  consolidation in both lower lobes. Bilateral layering pleural  effusions. No pneumothorax. No acute findings in the upper abdomen.      Impression    Impression:   1. Mixed interstitial and patchy airspace opacities with increased  atelectasis versus consolidation in both lower lobes, suggestive of  worsening pulmonary edema.  2. Bilateral layering pleural effusions.    I have personally reviewed the examination and initial interpretation  and I agree with the findings.    DAVID RENTERIA, DO   XR Chest Port 1 View    Narrative    Exam: XR CHEST PORT 1 VW, 8/31/2019 6:32 AM    Indication: pulmonary edema v hepatopulmonary syndrome    Comparison: 8/30/2019    Findings:   AP view of the chest. Endotracheal tube with the tip in the mid  thoracic trachea. Enteric tube with the distal end extending beyond  the field-of-view. Right approach PICC line with the tip in the lower  thoracic SVC. Left IJ approach venous line with the tip directed over  the right atrium. Right IJ approach venous line with the tip  projecting over the right atrium.    Cardiac silhouette is unchanged. Mixed interstitial and patchy basilar  airspace opacity similar to prior exam. There is slightly increased  atelectasis versus consolidation in both lower lobes. Bilateral  layering pleural effusions similar to prior. No  pneumothorax.  Visualized portion of the upper abdomen is unremarkable.      Impression    Impression:   1. Mixed interstitial and patchy airspace opacities with increased  atelectasis versus consolidation in both lower lungs. Findings  suggesting of worsening pulmonary edema versus infection.  2. Bilateral layering pleural effusions similar to prior.  3. Support devices as detailed above.    I have personally reviewed the examination and initial interpretation  and I agree with the findings.    MD Ben WATKINS MD

## 2019-08-31 NOTE — PROGRESS NOTES
CRRT STATUS NOTE    DATA:  Time:  6:42 AM  Pressures WNL:  YES  Filter Status:  WDL    Problems Reported/Alarms Noted:  None.    Supplies Present:  YES    ASSESSMENT:  Patient Net Fluid Balance:  Net -578 ml @ 0600.  Vital Signs:  HR 92, /51, MAP 68   Labs:  K 4.2, Phos 3.4, iCa 4.2, Hgb 7.8, Plt 17, NH3 213   Goals of Therapy:  50cc/hr net negative as BP's allow    INTERVENTIONS:   None.    PLAN:  Continue to monitor circuit daily and change set q72 hours or PRN for clotting/clogging. Please call CRRT RN with any questions/problems.

## 2019-08-31 NOTE — PROGRESS NOTES
Nephrology Progress Note  08/31/2019         Neema Bailey is a 44 yof w/ESLD c/b HE, ascites and EV, polysubstance abuse, obesity s/p gastric bypass 2002 with recent admission for E. Coli bacteremia (source unclear), re-admitted 8/19/19 with SOB, leg swelling and suspected infection.  Cr at baseline is 0.6, on rise since admission with likely culprits of contrast given for CT (for abd pain) and vanco given for suspected infection.  Nephrology consulted for management of DEE DEE and possible HD.       Interval History  Mrs Bailey continues on CRRT, and is now off all pressor support. Haptoglobin is low, fibrinogin is low. She is currently getting cryos. CVP is 9-11 currently although she appears total body volume up.     ASSESSMENT AND RECOMMENDATIONS:   DEE DEE-Baseline Cr of 0.6, up to 1.2 with etiology likely KELLY with 135cc of contrast and also high dose Vanco on admit (although level was not high when checked).  HRS is in DDx and likely has some HRS physiology but Dr Nur and I did look at urine microscopy and saw granular casts suggestive of at least some degree of ATN.  Started CRRT 8/28 due to rapidly worsening resp status to try to pull some fluid and optimize pulm status, continuing with tenuous hemodynamics.                   -DEE DEE, likely ATN from contrast/vanco, sepsis.                    - On CRRT but with these pressures could likely switch to intermittent/daily modality. I am going to give her one more day on CRRt and assess tomorrow but assuming she remains this stable we could switch Monday.     [facility]       Volume-Total body volume overload but much of it 3rd spaced.  This is correlated with the CVP of 11. Will keep her I=O based upon CVP. Pull if >13 , stop pulling if <6. Orders updated.     Electrolytes/pH-All stable on CRRT      BMD- Mag was 2.3 yesterday. Will check daily. Phos 3.7 and has been stable. Also check daily.      Nutrition-Nutren 1.5 TF at goal.     Hyperammonemia: This is  "typically well cleared by CRRt. Somewhat concerning that it is now rising despite being on CRRT. Cont to monitor. I will adjust the CRRT somewhat to attempt better clearance.     Infection: Currently on micafungin and meropenem. Both dosed adequatly  (meropenem is dosed normally on CRRT and micafungin doesn't require adjustment). As we transition this will need to be reassessed.     Discussed with Dr. Stern and the team in person.      Faye Nur MD MS FNKF      Review of Systems:   I reviewed the following systems:  ROS not done due to vent/sedation.     Physical Exam:   I/O last 3 completed shifts:  In: 4975.11 [I.V.:2319.11; Other:121; NG/GT:930]  Out: 6669 [Other:4169; Stool:2500]   /51 (BP Location: Left arm)   Pulse 105   Temp 98.1  F (36.7  C) (Core)   Resp 25   Ht 1.727 m (5' 8\")   Wt 123.6 kg (272 lb 7.8 oz)   LMP 10/04/2018   SpO2 94%   Breastfeeding? No   BMI 41.43 kg/m       GENERAL APPEARANCE: Intubated and sedated.   EYES:  + scleral icterus, pupils equal  HENT: some oral bleeding buccal mucosa, around ET tube  PULM: course bs bilaterally  CV: regular rhythm, normal rate, no rub     -JVP not elevated     -sig LE edema pitting consistent with anasarca  GI: soft, non-tender, nondistended, bowel sounds are +  MS: no evidence of inflammation in joints, no muscle tenderness  NEURO: Intubated and sedated.     Labs:   All labs reviewed by me  Electrolytes/Renal -   Recent Labs   Lab Test 08/31/19  1211 08/31/19  0435 08/30/19  2153  08/30/19  0349  08/29/19  0419  08/28/19  1617    140 140   < > 141   < > 146*   < > 148*   POTASSIUM 3.7 4.2 4.2   < > 3.6   < > 4.0   < > 4.0   CHLORIDE 111* 110* 110*   < > 111*   < > 115*   < > 118*   CO2 23 23 24   < > 22   < > 20   < > 18*   BUN 13 14 14   < > 18   < > 28   < > 34*   CR 0.77 0.74 0.79   < > 0.91   < > 1.23*   < > 1.59*   * 166* 149*   < > 150*   < > 108*   < > 128*   NARENDRA 7.5* 7.1* 7.2*   < > 7.2*   < > 7.9*   < > 8.4* "   MAG  --   --   --   --  2.3  --  2.4*  --  2.4*   PHOS 3.7 3.4 3.2   < > 3.5   < > 5.0*   < > 5.3*    < > = values in this interval not displayed.       CBC -   Recent Labs   Lab Test 08/31/19  1211 08/31/19  0435 08/30/19  2153   WBC 29.0* 22.3* 21.4*   HGB 7.7* 7.8* 7.9*   PLT 25* 17* 20*       LFTs -   Recent Labs   Lab Test 08/31/19  1211 08/31/19  0435 08/30/19  2153   ALKPHOS 192* 186* 188*   BILITOTAL 11.3* 11.5* 11.6*   ALT 66* 65* 72*   * 271* 292*   PROTTOTAL 5.3* 5.3* 5.4*   ALBUMIN 2.9* 2.9* 3.0*       Iron Panel -   Recent Labs   Lab Test 08/21/19  0348 07/17/19  1542   IRON 37 91   IRONSAT 29 86*   CHELSY 166 372*           Current Medications:    acetylcysteine  2 mL Nebulization Q4H     artificial tears   Both Eyes Q6H     ascorbic acid  500 mg Oral or Feeding Tube Daily     folic acid  500 mcg Oral or Feeding Tube Daily     heparin lock flush  5-10 mL Intracatheter Q24H     hydrocortisone sodium succinate PF  50 mg Intravenous Q6H     lactulose  20 g Oral or Feeding Tube Q6H     meropenem  1 g Intravenous Q8H     micafungin  100 mg Intravenous Q24H     miconazole   Topical BID     multivitamins w/minerals  15 mL Per Feeding Tube Daily     pantoprazole  40 mg Oral or Feeding Tube QAM AC     protein modular  1 packet Per Feeding Tube TID     rifaximin  550 mg Oral or Feeding Tube BID     sertraline  75 mg Oral or Feeding Tube Daily     cyanocobalamin  100 mcg Oral or Feeding Tube Daily     vitamin B1  100 mg Oral or Feeding Tube Daily       - MEDICATION INSTRUCTIONS -       IV fluid REPLACEMENT ONLY       CRRT replacement solution 12.5 mL/kg/hr (08/31/19 1352)     epoprostenol (VELETRI) 20 mcg/mL in sterile water inhalation solution 20 ng/kg/min (08/31/19 1033)     fentaNYL 100 mcg/hr (08/31/19 1333)     midazolam 2 mg/hr (08/31/19 1200)     - MEDICATION INSTRUCTIONS -       - MEDICATION INSTRUCTIONS -       norepinephrine 0.28 mcg/kg/min (08/31/19 1300)     - MEDICATION INSTRUCTIONS -        phenylephrine Stopped (08/31/19 0100)     CRRT replacement solution 1.66 mL/kg/hr (08/31/19 1030)     CRRT replacement solution 15 mL/kg/hr (08/31/19 1352)     vasopressin (PITRESSIN) infusion ADULT (40 mL) Stopped (08/31/19 0230)

## 2019-08-31 NOTE — PLAN OF CARE
D/I/A: Patient un-arousable, Versed off (Versed gtt left in-line; Nayeli on-coming RN aware) and Fentanyl 50. LS coarse/diminshed, Flolan off, PEEP now 10 and SpO2 96%.  HR SR  w/ SBP  and MAP 60's; Levophed titrated per hemodynamic goals. BS hyperactive and rectal tube flushed to maintain patency. anuric. 1 unit if platelet and 2 packs of Cryo given. Perianal area erythremic and excoriated Md notified Miconazole powder given. +4 dependent edema present.     P: Negative CAPA. Q 2hour turns. Fall risk precautions.  SBP > 95. MAP > 60. See CRRT order set for goals of care. Goal 1 L stool output q24hrs, see lactulose/eMAR. Record Flotrac/volumeview Q 12hrs per MICU resident

## 2019-08-31 NOTE — PROGRESS NOTES
CRRT STATUS NOTE    DATA:  Time:  9:02 AM  Pressures WNL:  WNL  Filter Status:  WDL but rising 160-170s  Problems Reported/Alarms Noted:  None    Supplies Present:  Yes    ASSESSMENT:  Patient Net Fluid Balance: +530.29  Vital Signs: stable with levophed  Drips: veletri 20mcg/mL  Fentanyl: 50mcg/hr  Versed: 2mg/hr  Levophed: 0.3 mcg/kg/min  Phenylephrine: stopped  Vasopressin:stopped    Labs:  TBili 11.5/11.3  Alk phos: 186/192  ALT: 65/66  AST: 271/261  CK: 378  PH 7.26/53/75/23  WBC: 22,300/29,000  Hgb: 7.8/7.7  Plt: 17 (platelets transfused)/ 25  INR: 3.85 (5 units cryo transfused)  PTT: 71  Fibrinogen: 99/114  7.31/44/96/22  Goals of Therapy:  CVP goal of 6-12. I=O if 6-12. If >13 may UF up to 100 cc's per hour as tolerated. If <6 set fluid removal rate to 0 until CVP again 6 or greater  08/31 0645  123.6 kg (272 lb 7.8 oz)   08/30 0125  126.1 kg (278 lb)   08/29 0400  123 kg (271 lb 2.7 oz)       INTERVENTIONS:   Changed circuit at 11:02.  Bedside RN support    PLAN:  MD to order Mg daily

## 2019-08-31 NOTE — PROGRESS NOTES
Nephrology Progress Note  08/31/2019         Neema Bailey is a 44 yof w/ESLD c/b HE, ascites and EV, polysubstance abuse, obesity s/p gastric bypass 2002 with recent admission for E. Coli bacteremia (source unclear), re-admitted 8/19/19 with SOB, leg swelling and suspected infection.  Cr at baseline is 0.6, on rise since admission with likely culprits of contrast given for CT (for abd pain) and vanco given for suspected infection.  Nephrology consulted for management of DEE DEE and possible HD.       Interval History  Mrs Bailey continues on CRRT, was net positive yesterday but much of this was free H2O which will disperse intracellularly and will not contribute to intravascular volume.  No major change in plan from Nephrology standpoint, can stop free water and will try to pull 50cc/hr as BP's allow today.       ASSESSMENT AND RECOMMENDATIONS:   DEE DEE-Baseline Cr of 0.6, up to 1.2 with etiology likely KELLY with 135cc of contrast and also high dose Vanco on admit (although level was not high when checked).  HRS is in DDx and likely has some HRS physiology but Dr Nur and I did look at urine microscopy and saw granular casts suggestive of at least some degree of ATN.  Started CRRT 8/28 due to rapidly worsening resp status to try to pull some fluid and optimize pulm status, continuing with tenuous hemodynamics.                   -DEE DEE, likely ATN from contrast/vanco, sepsis.                   -CRRT, 0-100cc/hr, 4k baths and standard Rx, line is temp [facility]       Volume-Total body volume overload but much of it 3rd spaced.  Up ~10kg since admit, trying to pull 50cc/hr to help pulm status although pulm edema is not largest factor with pulm status.       Electrolytes/pH-No acute issues.  K 3.7, stable on CRRT, bicarb 22.       BMD- Ca 7.3, Mg and Phos reasonable.       Nutrition-Nutren 1.5 TF at goal.      Seen and discussed with Dr Nur     Recommendations were communicated to primary team via verbal  "communication.        Ernst Rowan, APRN CNS  Clinical Nurse Specialist  [phone]      Attestation:  This patient has been seen, examined and evaluated by me, Faye Nur MD as a shared visit with the NP/PA above.    In brief:  Neema Bailey is a 44 year old female with acute kidney injury likely due to contrast and some component of drug. Granular casts on Urine sedmiment (personally reviewed). Sodium is improved with the free water.     I personally reviewed major complaints, physical findings, investigations, medications, lab values, vital signs, I/O's and the overall management plan.      My key findings:  Remains volume overloaded  Sodium is improved and now normalized  Acidosis is improved    Key management decisions made by me:    Reduce free water  Cont to UF as tolerated but remember that fluid pulls are slow and done over 12-25 hour shifts. As such, intermittent volume (ABx, blood etc) accumulate rapidly but are removed slowly. Watch 24 hour totals rather than hourly or even 4 hour totals.     Faye Nur MD   Date of service (date I saw patient) aug 30 2019    Review of Systems:   I reviewed the following systems:  ROS not done due to vent/sedation.     Physical Exam:   I/O last 3 completed shifts:  In: 4975.11 [I.V.:2319.11; Other:121; NG/GT:930]  Out: 6669 [Other:4169; Stool:2500]   /51 (BP Location: Left arm)   Pulse 105   Temp 98.1  F (36.7  C) (Bladder)   Resp 25   Ht 1.727 m (5' 8\")   Wt 123.6 kg (272 lb 7.8 oz)   LMP 10/04/2018   SpO2 98%   Breastfeeding? No   BMI 41.43 kg/m       GENERAL APPEARANCE: Intubated and sedated.   EYES:  + scleral icterus, pupils equal  HENT: mouth without ulcers or lesions  PULM: lungs clear to auscultation, equal air movement, no cyanosis or clubbing  CV: regular rhythm, normal rate, no rub     -JVP not elevated     -edema +2  GI: soft, non-tender, nondistended, bowel sounds are +  MS: no evidence of inflammation in joints, " no muscle tenderness  NEURO: Intubated and sedated. normal    Labs:   All labs reviewed by me  Electrolytes/Renal -   Recent Labs   Lab Test 08/31/19 0435 08/30/19 2153 08/30/19  1544  08/30/19  0349  08/29/19  0419  08/28/19  1617    140 141   < > 141   < > 146*   < > 148*   POTASSIUM 4.2 4.2 3.9   < > 3.6   < > 4.0   < > 4.0   CHLORIDE 110* 110* 112*   < > 111*   < > 115*   < > 118*   CO2 23 24 23   < > 22   < > 20   < > 18*   BUN 14 14 15   < > 18   < > 28   < > 34*   CR 0.74 0.79 0.85   < > 0.91   < > 1.23*   < > 1.59*   * 149* 121*   < > 150*   < > 108*   < > 128*   NARENDRA 7.1* 7.2* 7.2*   < > 7.2*   < > 7.9*   < > 8.4*   MAG  --   --   --   --  2.3  --  2.4*  --  2.4*   PHOS 3.4 3.2 3.0   < > 3.5   < > 5.0*   < > 5.3*    < > = values in this interval not displayed.       CBC -   Recent Labs   Lab Test 08/31/19 0435 08/30/19 2153 08/30/19  1544   WBC 22.3* 21.4* 22.7*   HGB 7.8* 7.9* 7.9*   PLT 17* 20* 25*       LFTs -   Recent Labs   Lab Test 08/31/19 0435 08/30/19 2153 08/30/19  1544   ALKPHOS 186* 188* 188*   BILITOTAL 11.5* 11.6* 11.0*   ALT 65* 72* 68*   * 292* 294*   PROTTOTAL 5.3* 5.4* 5.4*   ALBUMIN 2.9* 3.0* 2.9*       Iron Panel -   Recent Labs   Lab Test 08/21/19 0348 07/17/19  1542   IRON 37 91   IRONSAT 29 86*   CHELSY 166 372*           Current Medications:    acetylcysteine  2 mL Nebulization Q4H     artificial tears   Both Eyes Q6H     ascorbic acid  500 mg Oral or Feeding Tube Daily     folic acid  500 mcg Oral or Feeding Tube Daily     heparin lock flush  5-10 mL Intracatheter Q24H     hydrocortisone sodium succinate PF  50 mg Intravenous Q6H     lactulose  20 g Oral or Feeding Tube Q6H     meropenem  1 g Intravenous Q8H     micafungin  100 mg Intravenous Q24H     miconazole   Topical BID     multivitamins w/minerals  15 mL Per Feeding Tube Daily     pantoprazole  40 mg Oral or Feeding Tube QAM AC     protein modular  1 packet Per Feeding Tube TID     rifaximin  550 mg  Oral or Feeding Tube BID     sertraline  75 mg Oral or Feeding Tube Daily     cyanocobalamin  100 mcg Oral or Feeding Tube Daily     vitamin B1  100 mg Oral or Feeding Tube Daily       - MEDICATION INSTRUCTIONS -       IV fluid REPLACEMENT ONLY       CRRT replacement solution 12.5 mL/kg/hr (08/31/19 1029)     epoprostenol (VELETRI) 20 mcg/mL in sterile water inhalation solution 20 ng/kg/min (08/31/19 1033)     fentaNYL 100 mcg/hr (08/31/19 1107)     midazolam 2 mg/hr (08/31/19 1045)     - MEDICATION INSTRUCTIONS -       - MEDICATION INSTRUCTIONS -       norepinephrine 0.3 mcg/kg/min (08/31/19 1102)     - MEDICATION INSTRUCTIONS -       phenylephrine Stopped (08/31/19 0100)     CRRT replacement solution 1.66 mL/kg/hr (08/31/19 1030)     CRRT replacement solution 15 mL/kg/hr (08/31/19 1029)     vasopressin (PITRESSIN) infusion ADULT (40 mL) Stopped (08/31/19 0230)

## 2019-08-31 NOTE — PLAN OF CARE
ICU End of Shift Summary. See flowsheets for vital signs and detailed assessment.    Changes this shift: To maintain sBP> 95 pressors have been titrated accordingly. Vaso and phenylephrine are currently off and levo is @ 0.24 mcg/kg/min will continue to titrate as needed.  Platelets replaced for count 20. Will need need another dose for a count of 17. Order released awaiting component.  Continuing to pull at net -(0-50) ml/hr. At 0700 patient is at approx - 600ml.    Plan:  Continue to titrate pressors to maintain MAP.

## 2019-08-31 NOTE — PROGRESS NOTES
[facility], [place]    Progress Note  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    Worsening thrombocytopenia today but otherwise overall improved clinically. Started hydrocort yesterday and was able to come off of vaso/phenylephrine, just on levo now. Respiratory stable/improved,    Changes today:  - continue hydrocort  - wean PEEP/FIO2 for goal PaO2 55-80 per ARDS net protocol  - continue CRRT for volume removal based on CVP  - transfuse cryo for fibrinogen < 100  - Hematology consult  - off flolan    Neurology:  Hepatic/Toxic-metabolic encephalopathy: Elevated ammonia to 147 on 8/25 with lethargy. Normal BUN, no other sedating meds given. Ammonia downtrending with PO lactulose and CRRT. Pupils reactive, intermittently sluggish and dilated without focal findings while sedated.  - PO lactulose scheduled and per [facility] protocol; goal 1L of stool a day  - trend ammonia daily    Sedation:  - fentanyl/versed gtt for RASS 0 to -1 in the setting of critical respiratory illness  - cis 8/28-8/30    Depression  - PTA sertraline    Cardiovascular:  Shock, multifactorial: in the setting of cirrhosis with third spacing and possible infection underlying. Rising lactate during hospital stay from 1.9 after transfer out of ICU to 4.3 on the day of transfer back. Bedside TTE with hyperdynamic LV, 1.78cm IVC with minimal respiratory variation while on positive pressure ventilation. Cardiac function on formal TTE 8/20 hyperdynamic with normal  PASP. Repeat TTE with ongoing hyperdynamic changes, no evidence of shunting on bubble study. Able to wean off vaso and phenylephrine after starting hydrocortisone on on 8/30.  - levophed, titrate for SBP > 95  - hydrocort stress dosing    Pulmonary:        Acute hypoxic respiratory failure  Developing ARDS, suspected  Transudative R-sided pleural effusion, moderate  CT chest 8/25 notable for GGO, interlobular septal thickening, patchy consolidative opacities. Differential includes pulmonary edema, developing ARDS, hepatopulmonary syndrome, pneumonia, atypical infection, severe aspiration pneumonitis.. P/F ratio 149. Intubated for increased work of breathing and hypoxemia. Initially improving on MICU readmission day 2 and then worsened after bronchial lavage on MICU day 3 with worsening hypoxemia. BAL notable for significant bilious secretions that were suctioned. Subsequently, started on Flolan with some improvement. Improvement after being prone for ~12h on 8/29, and remained stable after flipping to supine.   - on CRRT, pulling off volume based on CVP per Renal  - daily CXR to re-evaluate  - routine vent cares  - lung-protective ventilation  - PEEP to 10, off flolan, goal PaO2 55-85  - daily ABG    Ventilation Mode: CMV/AC  (Continuous Mandatory Ventilation/ Assist Control)  FiO2 (%): 50 %  Rate Set (breaths/minute): 25 breaths/min  Tidal Volume Set (mL): 350 mL  PEEP (cm H2O): 12 cmH2O  Oxygen Concentration (%): 50 %  Resp: 25      Renal  Oliguric DEE DEE 2/2 KELLY, possible hepatorenal syndrome, ATN: Baseline creatinine 0.6, increased to 1.0 in the setting of hypotension. Recent nephrotoxins also include IV contrast 8/25 during CT scan, and now administration of vancomycin. Urine output decreasing 8/23. UA 8/22 notable for mild proteinuria, small LE, hyaline casts. FENA and FEBUN consistent with prerenal azotemia. Repeat UA 8/27 demonstrating some ketones, did not improve with 2 days of albumin challenge.  - strict  I/Os with Alexander anchored  - avoid nephrotoxins  - Renal consult, appreciate assistance  - daily BMP  - holding PTA midodrine given on stronger pressors  - CRRT per Renal    Hypernatremia: Resolved with free water flushing and on CRRT.    ID:         Recent ESBL E coli bacteremia   Possible sepsis due to unknown source : Blood culture 1/2 positive 8/19 for ESBL E coli. No followup blood cultures obtained until 8/22, which remain NGTD. Previous urine culture, transudative pleural fluid culture, and ascites fluid culture currently NGTD. S pneumo and legionella urine antigen negative. Procalcitonin uptrending but not significantly. Leukocytosis increasing. Cryptococcal antigen negative.  - BCx 8/26 x 2 NGTD  - trend CBC  - consider tapping pelvic fluid collection noted on CT if not improving  - consider discontinuation of micafungin in the next couple days pending fungal studies    Cultures:  UCx 8/27 No growth   Bronchial lavage 8/27 NGTD  BCx 8/26 x2 NGTD  Pleural fluid culture 8/25 NGTD  Ascites fluid culture 8/22 NGTD    Antimicrobials:  Micafungin (8/26 -   Meropenem (8/25 -    Vancomycin (8/20, 8/25-8/27)  Azithromycin (8/25 - 8/27)  Ertapenem (8/19 - 8/24)      GI  ESLD, multifactorial c/b portal hypertension with ascites, esophageal varices: Last EGD 5/2019 with no varices. Last US 8/20 with no mass, no portal vein thrombosis.   - Hepatology consulted, appreciate recs  - currently not undergoing transplant evaluation  - rifaxamin, lactulose as above for HE protocol  - holding PTA diuretics furosemid 20mg, spironolactone 50mg  - daily MELD labs    MELD-Na score: 31 at 8/31/2019 12:11 PM  MELD score: 31 at 8/31/2019 12:11 PM  Calculated from:  Serum Creatinine: 0.77 mg/dL (Rounded to 1 mg/dL) at 8/31/2019 12:11 PM  Serum Sodium: 141 mmol/L (Rounded to 137 mmol/L) at 8/31/2019 12:11 PM  Total Bilirubin: 11.3 mg/dL at 8/31/2019 12:11 PM  INR(ratio): 3.85 at 8/31/2019  4:35 AM  Age: 44 years    ? Alcoholic  hepatitis  Hyperbilirubinemia, Mild transaminitis: Mild gallbladder wall thickening noted on CT abd/pelvis 8/25. Negative HIDA scan. Tenderness to palpation on exam. CK initially elevated to 1300, downtrending on recheck - may have some critical illness myopathy. MDF ? 154. On steroids for shock.  - continue to monitor  - trend CK, daily MELD labs    Mild reflux esophagitis  - PPI daily    Nutrition  At risk for malnutrition:  - feeding per nutrition    Endocrine:  At risk for hypoglycemia  - hypoglycemia protocol, q4h glucose checks while on tube feeds    Heme/Onc:  Macrocytic anemia: Baseline hemoglobin 8-9 in the last 6 months. 1 point hemoglobin drop today without any clear signs or symptoms of bleeding. Hemolysis labs suggestive; however, hemoglobin stable currently. Associated with worse thrombocytopenia today. Hgb drop to 6.9 on 8/29, 1U pRBCs given. Smear consistent with some degree of hemolysis.  - daily CBC, transfuse Hgb > 7  - continue to monitor  - heme consult if worsening labs concerning for hemolysis    Thrombocytopenia, chronic due to liver disease: Baseline ~120s in the last 6 months. Worsening in the setting of ARDS. No heparin exposure. Labs consistent with hemolysis as above. Down to < 20 today, more than the degree expected for CRRT.  - trend fibrinogen with q6h CBC    DVT Prophylaxis: Pneumatic Compression Devices  GI Prophylaxis: PPI    Restraints: Restraints for medical healing needed: YES    Family update by me today: Yes     Amy Bray    I have seen and discussed this patient with Dr. Jarred Bray MD  Internal Medicine/Pediatrics, PGY3  [facility]  (p) [phone]      Code Status   Full Code    Subjective  RN notes reviewed. Off vasopressin and phenylephrine after hydrocortisone started. Still heavily sedated despite reduction in sedation. No response to stimuli. Stool output at goal. Overall improving.    Review of Systems   Review of systems not obtained  due to patient factors - mental status    Physical Exam   Temp: 98.1  F (36.7  C) Temp src: Core Temp  Min: 97.3  F (36.3  C)  Max: 98.7  F (37.1  C)     Heart Rate: 104 Resp: 25 SpO2: 94 % O2 Device: Mechanical Ventilator    Vital Signs with Ranges  Temp:  [97.3  F (36.3  C)-98.7  F (37.1  C)] 98.1  F (36.7  C)  Heart Rate:  [] 104  Resp:  [16-27] 25  MAP:  [52 mmHg-87 mmHg] 66 mmHg  Arterial Line BP: ()/(35-63) 102/45  FiO2 (%):  [50 %-70 %] 50 %  SpO2:  [88 %-100 %] 94 %  272 lbs 7.82 oz    Constitutional: intubated and sedated  Eyes: Pupils equal and reactive, scleral icterus, subconjunctival hemorrhage on the R   ENT: Normocephalic, without obvious abnormality, atraumatic, orally intubated  Respiratory: CTAB, improved aeration bilaterally, synchronous with vent, no wheezes/rhonchi/rales appreciated  Cardiovascular: Normal apical impulse, tachycardic, regular rhythm, normal S1 and S2, no S3 or S4, and no murmur noted.  GI: normal bowel sounds, soft, non-distended, nontender on palpation, no masses palpated, no hepatosplenomegaly, brown stool on rectal tube  Genitourinary:  Alexander in place draining dark yellow urine  Skin: No bruising or bleeding, normal skin color, texture, turgor, no redness, warmth, or swelling, no rashes, no lesions, no abnormal moles, nails normal without discoloration or clubbing and no jaundice  Musculoskeletal: pitting edema bilaterally in the LE to the knees 2+, Tone is normal.  Neurologic: sedated, unresponsive to pain    Data   Results for orders placed or performed during the hospital encounter of 08/19/19 (from the past 24 hour(s))   Blood gas arterial   Result Value Ref Range    pH Arterial 7.27 (L) 7.35 - 7.45 pH    pCO2 Arterial 51 (H) 35 - 45 mm Hg    pO2 Arterial 79 (L) 80 - 105 mm Hg    Bicarbonate Arterial 23 21 - 28 mmol/L    Base Deficit Art 4.0 mmol/L    FIO2 50.0    Comprehensive metabolic panel   Result Value Ref Range    Sodium 141 133 - 144 mmol/L     Potassium 3.9 3.4 - 5.3 mmol/L    Chloride 112 (H) 94 - 109 mmol/L    Carbon Dioxide 23 20 - 32 mmol/L    Anion Gap 6 3 - 14 mmol/L    Glucose 121 (H) 70 - 99 mg/dL    Urea Nitrogen 15 7 - 30 mg/dL    Creatinine 0.85 0.52 - 1.04 mg/dL    GFR Estimate 82 >60 mL/min/[1.73_m2]    GFR Estimate If Black >90 >60 mL/min/[1.73_m2]    Calcium 7.2 (L) 8.5 - 10.1 mg/dL    Bilirubin Total 11.0 (H) 0.2 - 1.3 mg/dL    Albumin 2.9 (L) 3.4 - 5.0 g/dL    Protein Total 5.4 (L) 6.8 - 8.8 g/dL    Alkaline Phosphatase 188 (H) 40 - 150 U/L    ALT 68 (H) 0 - 50 U/L     (H) 0 - 45 U/L   Phosphorus   Result Value Ref Range    Phosphorus 3.0 2.5 - 4.5 mg/dL   CBC with platelets differential   Result Value Ref Range    WBC 22.7 (H) 4.0 - 11.0 10e9/L    RBC Count 2.54 (L) 3.8 - 5.2 10e12/L    Hemoglobin 7.9 (L) 11.7 - 15.7 g/dL    Hematocrit 25.3 (L) 35.0 - 47.0 %     78 - 100 fl    MCH 31.1 26.5 - 33.0 pg    MCHC 31.2 (L) 31.5 - 36.5 g/dL    RDW 21.1 (H) 10.0 - 15.0 %    Platelet Count 25 (LL) 150 - 450 10e9/L    Diff Method Automated Method     % Neutrophils 72.8 %    % Lymphocytes 9.1 %    % Monocytes 7.7 %    % Eosinophils 5.8 %    % Basophils 0.5 %    % Immature Granulocytes 4.1 %    Nucleated RBCs 0 0 /100    Absolute Neutrophil 16.5 (H) 1.6 - 8.3 10e9/L    Absolute Lymphocytes 2.1 0.8 - 5.3 10e9/L    Absolute Monocytes 1.8 (H) 0.0 - 1.3 10e9/L    Absolute Eosinophils 1.3 (H) 0.0 - 0.7 10e9/L    Absolute Basophils 0.1 0.0 - 0.2 10e9/L    Abs Immature Granulocytes 0.9 (H) 0 - 0.4 10e9/L    Absolute Nucleated RBC 0.1     Platelet Estimate Confirming automated cell count    Lactate Dehydrogenase   Result Value Ref Range    Lactate Dehydrogenase 552 (H) 81 - 234 U/L   Fibrinogen activity (AM Draw)   Result Value Ref Range    Fibrinogen 105 (L) 200 - 420 mg/dL   Comprehensive metabolic panel   Result Value Ref Range    Sodium 140 133 - 144 mmol/L    Potassium 4.2 3.4 - 5.3 mmol/L    Chloride 110 (H) 94 - 109 mmol/L    Carbon  Dioxide 24 20 - 32 mmol/L    Anion Gap 6 3 - 14 mmol/L    Glucose 149 (H) 70 - 99 mg/dL    Urea Nitrogen 14 7 - 30 mg/dL    Creatinine 0.79 0.52 - 1.04 mg/dL    GFR Estimate 89 >60 mL/min/[1.73_m2]    GFR Estimate If Black >90 >60 mL/min/[1.73_m2]    Calcium 7.2 (L) 8.5 - 10.1 mg/dL    Bilirubin Total 11.6 (H) 0.2 - 1.3 mg/dL    Albumin 3.0 (L) 3.4 - 5.0 g/dL    Protein Total 5.4 (L) 6.8 - 8.8 g/dL    Alkaline Phosphatase 188 (H) 40 - 150 U/L    ALT 72 (H) 0 - 50 U/L     (H) 0 - 45 U/L   Phosphorus   Result Value Ref Range    Phosphorus 3.2 2.5 - 4.5 mg/dL   CBC with platelets differential   Result Value Ref Range    WBC 21.4 (H) 4.0 - 11.0 10e9/L    RBC Count 2.59 (L) 3.8 - 5.2 10e12/L    Hemoglobin 7.9 (L) 11.7 - 15.7 g/dL    Hematocrit 25.7 (L) 35.0 - 47.0 %    MCV 99 78 - 100 fl    MCH 30.5 26.5 - 33.0 pg    MCHC 30.7 (L) 31.5 - 36.5 g/dL    RDW 21.0 (H) 10.0 - 15.0 %    Platelet Count 20 (LL) 150 - 450 10e9/L    Diff Method Automated Method     % Neutrophils 82.6 %    % Lymphocytes 4.9 %    % Monocytes 6.6 %    % Eosinophils 3.1 %    % Basophils 0.5 %    % Immature Granulocytes 2.3 %    Nucleated RBCs 0 0 /100    Absolute Neutrophil 17.7 (H) 1.6 - 8.3 10e9/L    Absolute Lymphocytes 1.1 0.8 - 5.3 10e9/L    Absolute Monocytes 1.4 (H) 0.0 - 1.3 10e9/L    Absolute Eosinophils 0.7 0.0 - 0.7 10e9/L    Absolute Basophils 0.1 0.0 - 0.2 10e9/L    Abs Immature Granulocytes 0.5 (H) 0 - 0.4 10e9/L    Absolute Nucleated RBC 0.1    Fibrinogen activity (AM Draw)   Result Value Ref Range    Fibrinogen 109 (L) 200 - 420 mg/dL   Blood gas arterial   Result Value Ref Range    pH Arterial 7.26 (L) 7.35 - 7.45 pH    pCO2 Arterial 51 (H) 35 - 45 mm Hg    pO2 Arterial 79 (L) 80 - 105 mm Hg    Bicarbonate Arterial 23 21 - 28 mmol/L    Base Deficit Art 4.3 mmol/L    FIO2 60.0    Platelets prepare order unit   Result Value Ref Range    Blood Component Type PLT Pheresis     Units Ordered 1    Blood component   Result Value Ref  Range    Unit Number [id number]     Blood Component Type PlateletPheresis,LeukoRed Irrad (Part 3)     Division Number 00     Status of Unit Released to care unit 08/31/2019 0033     Blood Product Code G8371P68     Unit Status ISS    CK total   Result Value Ref Range    CK Total 378 (H) 30 - 225 U/L   Blood gas arterial   Result Value Ref Range    pH Arterial 7.26 (L) 7.35 - 7.45 pH    pCO2 Arterial 53 (H) 35 - 45 mm Hg    pO2 Arterial 75 (L) 80 - 105 mm Hg    Bicarbonate Arterial 23 21 - 28 mmol/L    Base Deficit Art 3.7 mmol/L    FIO2 65.0    Ammonia (AM Draw)   Result Value Ref Range    Ammonia 213 (HH) 10 - 50 umol/L   Calcium, ionized whole blood (AM Draw)   Result Value Ref Range    Calcium Ionized Whole Blood 4.2 (L) 4.4 - 5.2 mg/dL   Comprehensive metabolic panel   Result Value Ref Range    Sodium 140 133 - 144 mmol/L    Potassium 4.2 3.4 - 5.3 mmol/L    Chloride 110 (H) 94 - 109 mmol/L    Carbon Dioxide 23 20 - 32 mmol/L    Anion Gap 7 3 - 14 mmol/L    Glucose 166 (H) 70 - 99 mg/dL    Urea Nitrogen 14 7 - 30 mg/dL    Creatinine 0.74 0.52 - 1.04 mg/dL    GFR Estimate >90 >60 mL/min/[1.73_m2]    GFR Estimate If Black >90 >60 mL/min/[1.73_m2]    Calcium 7.1 (L) 8.5 - 10.1 mg/dL    Bilirubin Total 11.5 (H) 0.2 - 1.3 mg/dL    Albumin 2.9 (L) 3.4 - 5.0 g/dL    Protein Total 5.3 (L) 6.8 - 8.8 g/dL    Alkaline Phosphatase 186 (H) 40 - 150 U/L    ALT 65 (H) 0 - 50 U/L     (H) 0 - 45 U/L   Phosphorus   Result Value Ref Range    Phosphorus 3.4 2.5 - 4.5 mg/dL   CBC with platelets differential   Result Value Ref Range    WBC 22.3 (H) 4.0 - 11.0 10e9/L    RBC Count 2.55 (L) 3.8 - 5.2 10e12/L    Hemoglobin 7.8 (L) 11.7 - 15.7 g/dL    Hematocrit 25.4 (L) 35.0 - 47.0 %     78 - 100 fl    MCH 30.6 26.5 - 33.0 pg    MCHC 30.7 (L) 31.5 - 36.5 g/dL    RDW 20.9 (H) 10.0 - 15.0 %    Platelet Count 17 (LL) 150 - 450 10e9/L    Diff Method Automated Method     % Neutrophils 88.0 %    % Lymphocytes 4.1 %    %  Monocytes 3.9 %    % Eosinophils 0.2 %    % Basophils 0.3 %    % Immature Granulocytes 3.5 %    Nucleated RBCs 0 0 /100    Absolute Neutrophil 19.7 (H) 1.6 - 8.3 10e9/L    Absolute Lymphocytes 0.9 0.8 - 5.3 10e9/L    Absolute Monocytes 0.9 0.0 - 1.3 10e9/L    Absolute Eosinophils 0.0 0.0 - 0.7 10e9/L    Absolute Basophils 0.1 0.0 - 0.2 10e9/L    Abs Immature Granulocytes 0.8 (H) 0 - 0.4 10e9/L    Absolute Nucleated RBC 0.1    INR   Result Value Ref Range    INR 3.85 (H) 0.86 - 1.14   PTT (AM Draw)   Result Value Ref Range    PTT 71 (H) 22 - 37 sec   Fibrinogen activity (AM Draw)   Result Value Ref Range    Fibrinogen 99 (LL) 200 - 420 mg/dL   Procalcitonin   Result Value Ref Range    Procalcitonin 0.61 ng/ml   Platelets prepare order unit   Result Value Ref Range    Blood Component Type PLT Pheresis     Units Ordered 1    Blood component   Result Value Ref Range    Unit Number [id number]     Blood Component Type PlateletPheresis LeukoReduced Irradiated     Division Number 00     Status of Unit Released to care unit 08/31/2019 0949     Blood Product Code [id number]     Unit Status ISS    XR Chest Port 1 View    Narrative    Exam: XR CHEST PORT 1 VW, 8/31/2019 6:32 AM    Indication: pulmonary edema v hepatopulmonary syndrome    Comparison: 8/30/2019    Findings:   AP view of the chest. Endotracheal tube with the tip in the mid  thoracic trachea. Enteric tube with the distal end extending beyond  the field-of-view. Right approach PICC line with the tip in the lower  thoracic SVC. Left IJ approach venous line with the tip directed over  the right atrium. Right IJ approach venous line with the tip  projecting over the right atrium.    Cardiac silhouette is unchanged. Mixed interstitial and patchy basilar  airspace opacity similar to prior exam. There is slightly increased  atelectasis versus consolidation in both lower lobes. Bilateral  layering pleural effusions similar to prior. No pneumothorax.  Visualized portion of  the upper abdomen is unremarkable.      Impression    Impression:   1. Mixed interstitial and patchy airspace opacities with increased  atelectasis versus consolidation in both lower lungs. Findings  suggesting of worsening pulmonary edema versus infection.  2. Bilateral layering pleural effusions similar to prior.  3. Support devices as detailed above.    I have personally reviewed the examination and initial interpretation  and I agree with the findings.    BELEN WATERS MD   Cryoprecipitate prepare order unit   Result Value Ref Range    Blood Component Type Cryoprecipitate     Units Ordered 10    Blood component   Result Value Ref Range    Unit Number [id number]     Blood Component Type 5 cryoprecipitate units pooled     Division Number 00     Status of Unit Released to care unit 08/31/2019 1358     Blood Product Code [id number]     Unit Status ISS    Comprehensive metabolic panel   Result Value Ref Range    Sodium 141 133 - 144 mmol/L    Potassium 3.7 3.4 - 5.3 mmol/L    Chloride 111 (H) 94 - 109 mmol/L    Carbon Dioxide 23 20 - 32 mmol/L    Anion Gap 7 3 - 14 mmol/L    Glucose 151 (H) 70 - 99 mg/dL    Urea Nitrogen 13 7 - 30 mg/dL    Creatinine 0.77 0.52 - 1.04 mg/dL    GFR Estimate >90 >60 mL/min/[1.73_m2]    GFR Estimate If Black >90 >60 mL/min/[1.73_m2]    Calcium 7.5 (L) 8.5 - 10.1 mg/dL    Bilirubin Total 11.3 (H) 0.2 - 1.3 mg/dL    Albumin 2.9 (L) 3.4 - 5.0 g/dL    Protein Total 5.3 (L) 6.8 - 8.8 g/dL    Alkaline Phosphatase 192 (H) 40 - 150 U/L    ALT 66 (H) 0 - 50 U/L     (H) 0 - 45 U/L   Phosphorus   Result Value Ref Range    Phosphorus 3.7 2.5 - 4.5 mg/dL   CBC with platelets differential   Result Value Ref Range    WBC 29.0 (H) 4.0 - 11.0 10e9/L    RBC Count 2.56 (L) 3.8 - 5.2 10e12/L    Hemoglobin 7.7 (L) 11.7 - 15.7 g/dL    Hematocrit 25.8 (L) 35.0 - 47.0 %     (H) 78 - 100 fl    MCH 30.1 26.5 - 33.0 pg    MCHC 29.8 (L) 31.5 - 36.5 g/dL    RDW 20.3 (H) 10.0 - 15.0 %    Platelet  Count 25 (LL) 150 - 450 10e9/L    Diff Method Automated Method     % Neutrophils 86.4 %    % Lymphocytes 3.9 %    % Monocytes 6.0 %    % Eosinophils 0.1 %    % Basophils 0.1 %    % Immature Granulocytes 3.5 %    Nucleated RBCs 0 0 /100    Absolute Neutrophil 25.0 (H) 1.6 - 8.3 10e9/L    Absolute Lymphocytes 1.1 0.8 - 5.3 10e9/L    Absolute Monocytes 1.7 (H) 0.0 - 1.3 10e9/L    Absolute Eosinophils 0.0 0.0 - 0.7 10e9/L    Absolute Basophils 0.0 0.0 - 0.2 10e9/L    Abs Immature Granulocytes 1.0 (H) 0 - 0.4 10e9/L    Absolute Nucleated RBC 0.1    Fibrinogen activity (AM Draw)   Result Value Ref Range    Fibrinogen 114 (L) 200 - 420 mg/dL   Sputum Culture Aerobic Bacterial   Result Value Ref Range    Specimen Description Sputum Endotracheal     Special Requests       Specimen leaked in transit.  Due to possible contamination, results should be interpreted   with caution.      Culture Micro PENDING    Gram stain   Result Value Ref Range    Specimen Description Sputum Endotracheal     Special Requests       Specimen leaked in transit.  Due to possible contamination, results should be interpreted   with caution.      Gram Stain PENDING

## 2019-09-01 NOTE — PROGRESS NOTES
CRRT STATUS NOTE    DATA:  Time:  6:27 AM  Pressures WNL:  YES  Filter Status:  WDL    Problems Reported/Alarms Noted:  None    Supplies Present:  YES    ASSESSMENT:  Patient Net Fluid Balance:  Net +38 ml @ 0600.  Vital Signs:  , /55, MAP 76, RR 29   Labs:  K 4, Mg 2.6, Phos 3.4, iCa 4.2, Hgb 7.5, Plt 26  Goals of Therapy:  CVP goal of 6-12. I=O if 6-12. If >13 may UF up to 100 cc's per hour as tolerated. If <6 set fluid removal rate to 0 until CVP again 6 or greater    INTERVENTIONS:   None.    PLAN:  Continue to monitor circuit daily and change set q72 hours or PRN for clotting/clogging. Please call CRRT RN with any questions/problems.

## 2019-09-01 NOTE — PLAN OF CARE
ICU End of Shift Summary. See flowsheets for vital signs and detailed assessment.    Changes this shift: All sedation off, no change in neuro status. Titrating levo to maintain SBP > 95. 500cc LR bolus administered due to low CVPs. Tolerating CRRT, see orders re: CVP. ~ net even for the day at 1900.     Plan: Continue to monitor for improvement in neuro status. Wean vent settings as able.

## 2019-09-01 NOTE — PROGRESS NOTES
CRRT STATUS NOTE    DATA:  Time:  5:49 PM  Pressures WNL:  YES  Filter Status:  WDL    Problems Reported/Alarms Noted:  None reported    Supplies Present:  YES    ASSESSMENT:  Patient Net Fluid Balance:  Net even since midnight  Vital Signs:  HR , /44(64), Sats 96% on 60% FiO2 PEEP 10  Labs:  Tbili 12.1, WBC 43.6, Hgb 7.7, Plt 31  Goals of Therapy:  CVP goal of 6-12. I=O if 6-12. If >13 may UF up to 100 cc's per hour as tolerated. If <6 set fluid removal rate to 0 until CVP again 6 or greater    INTERVENTIONS:   Checked in with bedside RN    PLAN:  Continue with treatment goals. Call CRRT RN at [phone] with questions and concerns.

## 2019-09-01 NOTE — PROGRESS NOTES
Nephrology Progress Note  09/01/2019         Neema Bailey is a 44 yof w/ESLD c/b HE, ascites and EV, polysubstance abuse, obesity s/p gastric bypass 2002 with recent admission for E. Coli bacteremia (source unclear), re-admitted 8/19/19 with SOB, leg swelling and suspected infection.  Cr at baseline is 0.6, on rise since admission with likely culprits of contrast given for CT (for abd pain) and vanco given for suspected infection.  Nephrology consulted for management of DEE DEE and possible HD.       Interval History  Mrs Bailey continues on CRRT, and is now off all pressor support. We are net even over the past 24 hours. CVP's are in the 5-10 range. Sedation is off but she is slow to wake up. She was on a versed gtt for 4 days. BP's are stable.     ASSESSMENT AND RECOMMENDATIONS:   DEE DEE-Baseline Cr of 0.6, up to 1.2 with etiology likely KELLY with 135cc of contrast and also high dose Vanco on admit (although level was not high when checked).  HRS is in DDx and likely has some HRS physiology but Dr Nur and I did look at urine microscopy and saw granular casts suggestive of at least some degree of ATN.  Started CRRT 8/28 due to rapidly worsening resp status to try to pull some fluid and optimize pulm status, continuing with tenuous hemodynamics.                   -DEE DEE, likely ATN from contrast/vanco, sepsis.                   -On CRRT but with these pressures could likely switch to intermittent/daily modality. The machine was just restarted early this am so will keep it running. However, if she clots, or she is extubated, then stop CRRT. Transition to IHT     [facility]  is functioning well.      Volume-Total body volume overload but much of it 3rd spaced.  CVP's today are in the 5-10 range. Will keep her I=O based upon CVP. Pull if >13 , stop pulling if <6.      Electrolytes/pH-All stable on CRRT      BMD- Phos 3.4. Mag 2.6     Nutrition-Nutren 1.5 TF at goal.     Hyperammonemia: slight change to CRRT  "prescription yesterday with increased dialysate flow rate. Now down t 170.    Infection: Currently on micafungin and meropenem. Both dosed adequatly  (meropenem is dosed normally on CRRT and micafungin doesn't require adjustment). As we transition this will need to be reassessed.     Discussed with the residents in person     Faye Nur MD MS [facility]      Review of Systems:   I reviewed the following systems:  ROS not done due to vent/sedation.     Physical Exam:   I/O last 3 completed shifts:  In: 4373.04 [I.V.:1610.04; NG/GT:685]  Out: 3749 [Other:3599; Stool:150]   BP (!) 94/36   Pulse 105   Temp 98.5  F (36.9  C) (Axillary)   Resp 28   Ht 1.727 m (5' 8\")   Wt 122.9 kg (270 lb 15.1 oz)   LMP 10/04/2018   SpO2 91%   Breastfeeding? No   BMI 41.20 kg/m       GENERAL APPEARANCE: Intubated sedation is off but not yet responsive  EYES:  + scleral icterus, pupils equal  HENT: Neck is supple. L internal jugular catheter is in place  PULM: course bs bilaterally  CV: regular rhythm, normal rate, no rub     -JVP not elevated     -sig LE edema pitting consistent with anasarca  GI: soft, non-tender, nondistended, bowel sounds are +  MS: no evidence of inflammation in joints, no muscle tenderness  NEURO: Intubated and sedated.     Labs:   All labs reviewed by me  Electrolytes/Renal -   Recent Labs   Lab Test 09/01/19  0334 08/31/19  1713 08/31/19  1211  08/30/19  0349  08/29/19  0419    140 141   < > 141   < > 146*   POTASSIUM 4.0 3.8 3.7   < > 3.6   < > 4.0   CHLORIDE 109 110* 111*   < > 111*   < > 115*   CO2 22 22 23   < > 22   < > 20   BUN 17 14 13   < > 18   < > 28   CR 0.76 0.76 0.77   < > 0.91   < > 1.23*   * 156* 151*   < > 150*   < > 108*   NARENDRA 7.4* 7.3* 7.5*   < > 7.2*   < > 7.9*   MAG 2.6*  --   --   --  2.3  --  2.4*   PHOS 3.4 3.4 3.7   < > 3.5   < > 5.0*    < > = values in this interval not displayed.       CBC -   Recent Labs   Lab Test 09/01/19  0334 08/31/19  1211 08/31/19  0435   WBC " 37.8* 29.0* 22.3*   HGB 7.5* 7.7* 7.8*   PLT 26* 25* 17*       LFTs -   Recent Labs   Lab Test 09/01/19  0334 08/31/19  1713 08/31/19  1211   ALKPHOS 228* 207* 192*   BILITOTAL 12.0* 11.4* 11.3*   ALT 77* 70* 66*   * 262* 261*   PROTTOTAL 5.5* 5.5* 5.3*   ALBUMIN 2.9* 2.9* 2.9*       Iron Panel -   Recent Labs   Lab Test 08/21/19  0348 07/17/19  1542   IRON 37 91   IRONSAT 29 86*   CHELSY 166 372*           Current Medications:    acetylcysteine  2 mL Nebulization Q4H     artificial tears   Both Eyes Q6H     ascorbic acid  500 mg Oral or Feeding Tube Daily     folic acid  500 mcg Oral or Feeding Tube Daily     heparin lock flush  5-10 mL Intracatheter Q24H     hydrocortisone sodium succinate PF  50 mg Intravenous Q6H     lactulose  20 g Oral or Feeding Tube Q6H     meropenem  1 g Intravenous Q8H     micafungin  100 mg Intravenous Q24H     miconazole   Topical BID     multivitamins w/minerals  15 mL Per Feeding Tube Daily     pantoprazole  40 mg Oral or Feeding Tube QAM AC     protein modular  1 packet Per Feeding Tube TID     rifaximin  550 mg Oral or Feeding Tube BID     sertraline  75 mg Oral or Feeding Tube Daily     cyanocobalamin  100 mcg Oral or Feeding Tube Daily     vitamin B1  100 mg Oral or Feeding Tube Daily       - MEDICATION INSTRUCTIONS -       IV fluid REPLACEMENT ONLY       CRRT replacement solution 16 mL/kg/hr (09/01/19 1136)     fentaNYL 50 mcg/hr (09/01/19 1200)     midazolam Stopped (08/31/19 1730)     - MEDICATION INSTRUCTIONS -       - MEDICATION INSTRUCTIONS -       norepinephrine 0.22 mcg/kg/min (09/01/19 1200)     - MEDICATION INSTRUCTIONS -       CRRT replacement solution 1.66 mL/kg/hr (09/01/19 0936)     CRRT replacement solution 10 mL/kg/hr (09/01/19 0937)     vasopressin (PITRESSIN) infusion ADULT (40 mL) Stopped (08/31/19 0230)

## 2019-09-01 NOTE — PROGRESS NOTES
[facility], [place]    Progress Note  [facility] Intensive Care     Date of Admission:  8/19/2019    Assessment & Plan   Neema Bailey is a 44 year old female with history of multifactorial ESLD c/b HE, EV, ascites, polysubstance abuse, morbid obesity s/p gastric bypass in 2002 who presents as a transfer from the floor for acute hypoxic respiratory failure and altered mental status, notably recently discharged from ICU 8/21 after a 2-day stay for severe sepsis with hypotension 2/2 ESBL E coli bacteremia of unknown source. Course was complicated by prolonged BiPAP in the setting of altered mental status likely resulting in aspiration events. Intubated for work of breathing, bronched, and subsequently worsened from a respiratory perspective, with development of severe ARDS. Paralyzed and proned for a period of time, now recovering.    Changes today:  Stable coagulopathy, improving pressor requirement and respiratory status, awaiting lung recovery.  - maintain vent settings  - continue to wean pressors as able  - discuss with radiology again pelvic fluid collection or other sources of infection  - follow up cultures  - add-on hepatitis A IgM    Neurology:  Hepatic/Toxic-metabolic encephalopathy: Elevated ammonia to 147 on 8/25 with lethargy. Normal BUN, no other sedating meds given. Ammonia downtrending with PO lactulose and CRRT. Pupils reactive, intermittently sluggish and dilated without focal findings while sedated.  - PO lactulose scheduled and per [facility] protocol; goal 1L of stool a day  - trend ammonia daily    Sedation:  - off versed  - fentanyl gtt  - cis 8/28-8/30    Depression  - PTA sertraline    Cardiovascular:  Shock, multifactorial: in the setting of cirrhosis with third spacing and possible infection underlying. Rising lactate during hospital stay from 1.9 after transfer out of ICU to 4.3 on the day of transfer back. Bedside TTE with hyperdynamic LV,  1.78cm IVC with minimal respiratory variation while on positive pressure ventilation. Cardiac function on formal TTE 8/20 hyperdynamic with normal PASP. Repeat TTE with ongoing hyperdynamic changes, no evidence of shunting on bubble study. Able to wean off vaso and phenylephrine after starting hydrocortisone on on 8/30.  - levophed, titrate for SBP > 95  - hydrocort stress dosing    Pulmonary:        Acute hypoxic respiratory failure  Developing ARDS, suspected  Transudative R-sided pleural effusion, moderate  CT chest 8/25 notable for GGO, interlobular septal thickening, patchy consolidative opacities. Differential includes pulmonary edema, developing ARDS, hepatopulmonary syndrome, pneumonia, atypical infection, severe aspiration pneumonitis.. P/F ratio 149. Intubated for increased work of breathing and hypoxemia. Initially improving on MICU readmission day 2 and then worsened after bronchial lavage on MICU day 3 with worsening hypoxemia. BAL notable for significant bilious secretions that were suctioned. Subsequently, started on Flolan with some improvement. Improvement after being prone for ~12h on 8/29, and remained stable after flipping to supine.   - on CRRT, pulling off volume based on CVP per Renal  - daily CXR to re-evaluate  - routine vent cares  - lung-protective ventilation  - goal PaO2 55-85, plateau pressures < 30   - daily ABG    Ventilation Mode: CMV/AC  (Continuous Mandatory Ventilation/ Assist Control)  FiO2 (%): 60 %  Rate Set (breaths/minute): 25 breaths/min  Tidal Volume Set (mL): 350 mL  PEEP (cm H2O): 10 cmH2O  Oxygen Concentration (%): 60 %  Resp: 27      Renal  Oliguric DEE DEE 2/2 KELLY, possible hepatorenal syndrome, ATN: Baseline creatinine 0.6, increased to 1.0 in the setting of hypotension. Recent nephrotoxins also include IV contrast 8/25 during CT scan, and now administration of vancomycin. Urine output decreasing 8/23. UA 8/22 notable for mild proteinuria, small LE, hyaline casts. FENA  and FEBUN consistent with prerenal azotemia. Repeat UA 8/27 demonstrating some ketones, did not improve with 2 days of albumin challenge.  - strict I/Os with Alexander anchored  - avoid nephrotoxins  - Renal consult, appreciate assistance  - daily BMP  - holding PTA midodrine given on stronger pressors  - CRRT per Renal    Hypernatremia: Resolved with free water flushing and on CRRT.    ID:         Recent ESBL E coli bacteremia   Possible sepsis due to unknown source : Blood culture 1/2 positive 8/19 for ESBL E coli. No followup blood cultures obtained until 8/22, which remain NGTD. Previous urine culture, transudative pleural fluid culture, and ascites fluid culture currently NGTD. S pneumo and legionella urine antigen negative. Procalcitonin uptrending but not significantly. Leukocytosis increasing. Cryptococcal antigen negative.  - BCx 8/26 x 2 NGTD  - trend CBC  - consider tapping pelvic fluid collection noted on CT if not improving  - consider discontinuation of micafungin in the next couple days pending fungal studies    Cultures:  UCx 8/27 No growth   Bronchial lavage 8/27 NGTD  BCx 8/26 x2 NGTD  Pleural fluid culture 8/25 NGTD  Ascites fluid culture 8/22 NGTD    Antimicrobials:  Micafungin (8/26 -   Meropenem (8/25 -    Vancomycin (8/20, 8/25-8/27)  Azithromycin (8/25 - 8/27)  Ertapenem (8/19 - 8/24)      GI  ESLD, multifactorial c/b portal hypertension with ascites, esophageal varices: Last EGD 5/2019 with no varices. Last US 8/20 with no mass, no portal vein thrombosis.   - Hepatology consulted, appreciate recs  - currently not undergoing transplant evaluation  - rifaxamin, lactulose as above for HE protocol  - holding PTA diuretics furosemid 20mg, spironolactone 50mg  - daily MELD labs    MELD-Na score: 27 at 9/1/2019  3:34 AM  MELD score: 27 at 9/1/2019  3:34 AM  Calculated from:  Serum Creatinine: 0.76 mg/dL (Rounded to 1 mg/dL) at 9/1/2019  3:34 AM  Serum Sodium: 141 mmol/L (Rounded to 137 mmol/L) at  9/1/2019  3:34 AM  Total Bilirubin: 12.0 mg/dL at 9/1/2019  3:34 AM  INR(ratio): 2.82 at 9/1/2019  3:34 AM  Age: 44 years    ? Alcoholic hepatitis  Hyperbilirubinemia, Mild transaminitis: Mild gallbladder wall thickening noted on CT abd/pelvis 8/25. Negative HIDA scan. Tenderness to palpation on exam. CK initially elevated to 1300, downtrending on recheck - may have some critical illness myopathy. MDF ? 154. On steroids for shock.  - continue to monitor  - trend CK, daily MELD labs    Mild reflux esophagitis  - PPI daily    Nutrition  At risk for malnutrition:  - feeding per nutrition    Endocrine:  At risk for hypoglycemia  - hypoglycemia protocol, q4h glucose checks while on tube feeds    Heme/Onc:  Macrocytic anemia: Baseline hemoglobin 8-9 in the last 6 months. 1 point hemoglobin drop today without any clear signs or symptoms of bleeding. Hemolysis labs suggestive; however, hemoglobin stable currently. Associated with worse thrombocytopenia today. Hgb drop to 6.9 on 8/29, 1U pRBCs given. Smear consistent with some degree of hemolysis.  - daily CBC, transfuse Hgb > 7  - continue to monitor  - heme consult if worsening labs concerning for hemolysis    Thrombocytopenia, chronic due to liver disease: Baseline ~120s in the last 6 months. Worsening in the setting of ARDS. No heparin exposure. Labs consistent with hemolysis as above. Down to < 20 today, more than the degree expected for CRRT.  - trend fibrinogen with q6h CBC    DVT Prophylaxis: Pneumatic Compression Devices  GI Prophylaxis: PPI    Restraints: Restraints for medical healing needed: YES    Family update by me today: Yes     Amy Bray    I have seen and discussed this patient with Dr. Jarred Bray MD  Internal Medicine/Pediatrics, PGY3  [facility]  (p) [phone]      Code Status   Full Code    Subjective  RN notes reviewed. Off vasopressin and phenylephrine after hydrocortisone started. Still heavily sedated despite  reduction in sedation. No response to stimuli. Stool output at goal. Overall improving.    Review of Systems   Review of systems not obtained due to patient factors - mental status    Physical Exam   Temp: 98.6  F (37  C) Temp src: Axillary Temp  Min: 97.5  F (36.4  C)  Max: 99.2  F (37.3  C)     Heart Rate: 96 Resp: 27 SpO2: (!) 89 % O2 Device: Mechanical Ventilator    Vital Signs with Ranges  Temp:  [97.5  F (36.4  C)-99.2  F (37.3  C)] 98.6  F (37  C)  Heart Rate:  [] 96  Resp:  [25-29] 27  MAP:  [49 mmHg-78 mmHg] 55 mmHg  Arterial Line BP: ()/(32-60) 94/38  FiO2 (%):  [50 %-60 %] 60 %  SpO2:  [89 %-100 %] 89 %  270 lbs 15.13 oz    Constitutional: intubated and sedated  Eyes: Pupils equal and reactive, scleral icterus, subconjunctival hemorrhage on the R   ENT: Normocephalic, without obvious abnormality, atraumatic, orally intubated  Respiratory: CTAB, improved aeration bilaterally, synchronous with vent, no wheezes/rhonchi/rales appreciated  Cardiovascular: Normal apical impulse, tachycardic, regular rhythm, normal S1 and S2, no S3 or S4, and no murmur noted.  GI: normal bowel sounds, soft, non-distended, nontender on palpation, no masses palpated, no hepatosplenomegaly, brown stool on rectal tube  Genitourinary:  Alexander in place draining dark yellow urine  Skin: No bruising or bleeding, normal skin color, texture, turgor, no redness, warmth, or swelling, no rashes, no lesions, no abnormal moles, nails normal without discoloration or clubbing and no jaundice  Musculoskeletal: pitting edema bilaterally in the LE to the knees 2+, Tone is normal.  Neurologic: sedated, unresponsive to pain    Data   Results for orders placed or performed during the hospital encounter of 08/19/19 (from the past 24 hour(s))   Cryoprecipitate prepare order unit   Result Value Ref Range    Blood Component Type Cryoprecipitate     Units Ordered 10    Blood component   Result Value Ref Range    Unit Number [id number]      Blood Component Type 5 cryoprecipitate units pooled     Division Number 00     Status of Unit Released to care unit     Blood Product Code [id number]     Unit Status ISS    Blood component   Result Value Ref Range    Unit Number [id number]     Blood Component Type 5 cryoprecipitate units pooled     Division Number 00     Status of Unit Released to care unit     Blood Product Code [id number]     Unit Status ISS    Hematology Adult IP Consult: Patient to be seen: Routine within 24 hrs; Call back #: [phone] MICU 1 Amy Bray; worsening thrombocytopenia with evidence of hemolysis; 43yo F with h/o multifactorial cirrhosis, initially admitted with ESBL bacteremia...    Narrative    Ben Hernandez MD     8/31/2019  3:31 PM     [facility] Hematology-Oncology New Consult          Neema Bailey MRN# [id number]   Age: 44 year old YOB: 1975   Date of Admission: 8/19/2019     Reason for consult:Thrombocytopenia, hemolysis in the setting of   ARDS and ESLD           Assessment and Recommendations:     Assessment:  Neema Bailey is a 44 year old female with complex PMHX   now with severe septic shock from ESBL E.coli bacteremia w MODS   ARDS requiring intubation and mechanical ventilation, hepatic and   toxic metabolic encephalopathy, DEE DEE on CRRT.Baseline Plt around     K. platelet count now in the 20s to 30s , with evidence of   bruising around the insertion of IV lines. Sometimes it is very   hard to distinguish between DIC and liver dysfunction from   cirrhosis.  Thrombocytopenia is multifactorial cirrhosis, sepsis,   and antibiotic exposure. Less likely to be TMA, TTP, HIT.   Baseline hemoglobin 8-9 in the last 6 months.  Hgb drop to 6.9 on   8/29, 1U pRBCs given. Last smear on 8/28 with small amount o   schistocytes. Anemia overall stable. Anemia is probably related   to intravascular hemolysis from DIC and bone marrow suppression   from  sepsis.      Recommendations:  -Continue supportive measures for sepsis including antibiotics  -Transfuse for goal hemoglobin more than 7,  platelets more than   20K , and fibrinogen of 100  -Monitor daily CBC and fibrinogen  -Avoid prophylactic AC until Plts are consistently above 30K  -Trend LDH every 48-72 hrs    Pt discussed with Dr. Sexton who agrees with the plan.      Ben Hernandez MD  Hematology Oncology fellow  [phone]          HPI:   Pt was intubated and sedated, most of the Hx I from chart review.   Ms. Bailey is a 44 year-old female with PMHx of end-stage   liver disease 2/2 alcohol, BURT complicated by hepatic   encephalopathy and varices, HTN, chronic pain/sciatica,   polysubstance abuse, and morbid obesity with multiple recent   hospitalizations for decompensated cirrhosis who was admitted   with fevers/chills, weakness, shortness of breath, and worsening   peripheral edema.  She was admitted on 8/20.    Of note she was discharged to TCU on 8/13 following a   hospitalization from 7/17-21 for decompensated cirrhosis s/p   therapeutic paracentesis and thoracentesis. Since TCU discharge,   the had increased shortness of breath, a/w non-productive cough   and fever. Pt was hemodynamically unstable with signs of severe   sepsis and the patient was started on etrapenem and vancomycin   given history of ESBL E coli and VRE, and transferred to the MICU   service given severe sepsis and concern for potential need for   pressors.     During the course of hospitalization she has developed several   complications including hypoxemic respiratory failure, that   progressed to ARDS requiring intubation and mechanical   ventilation proning for 1 or 2 days.  She also developed hepatic   and toxic metabolic encephalopathy, septic shock from E.coli   baceteremia requiring vasopressor support, multiorgan   dysfunction, including DEE DEE on CRRT. She is recovering from ARDS   perspective as O2 requirment is improving  and she is on broad   spectrum antibiotics.     Hematology was consulted for thrombocytopenia and anemia.         Review of system: Complete ROS otherwise negative         Past Medical History:     Past Medical History:   Diagnosis Date     Anxiety      Bilateral leg edema      Chronic kidney disease      Dizziness and giddiness      Hypertension      Insomnia      Iron deficiency anemia     due to chronic blood loss, vitamin B12 deficiency, Macrocytic     Migraines      BURT (nonalcoholic steatohepatitis)      Numbness and tingling     PERIPHERAL NEUROPATHY     Obese      Other chronic pain     Chronic Bilateral Low Back Pain with Bilateral Sciatica,   Bilateral Knee Pain     Peripheral neuropathy      Pruritus      Restless legs      Sciatica      Seborrheic dermatitis      Severe episode of recurrent major depressive disorder, without   psychotic features (H)      Sinus tachycardia      Substance abuse in remission (H)     h/o alcoholism had treatment at 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)     BREAST SURGERY      Breast Augmentation     COLPORRHAPHY ANTERIOR N/A 9/21/2015    Procedure: COLPORRHAPHY ANTERIOR;  Surgeon: Jairon Adams MD;  Location: [facility]     COSMETIC SURGERY      Abdominoplasty     CYSTOCELE REPAIR       CYSTOSCOPY N/A 11/26/2018    Procedure: CYSTOSCOPY;  Surgeon: Rony Melgar MD;    Location: [facility]     ENT SURGERY      tonsillectomy & adenoidectomy     GI SURGERY      Small Bowel Enterotomy Repair for SBO, EGD     HYSTERECTOMY VAGINAL, COLPORRHAPHY ANTERIOR, POSTERIOR,   COMBINED N/A 11/26/2018    Procedure: VAGINAL HYSTERECTOMY, ANTERIOR AND POSTERIOR REPAIR;    Surgeon: Rony Melgar MD;   Location: [facility]     IR PARACENTESIS  8/7/2019     IR THORACENTESIS  8/7/2019     PERINEORRHAPHY N/A 11/26/2018    Procedure: PERINEOPLASTY;  Surgeon: Rony Melgar MD;    Location: [facility]     TUBAL LIGATION               Social History:     Social History     Socioeconomic History     Marital status: Single     Spouse name: Not on file     Number of children: Not on file     Years of education: Not on file     Highest education level: Not on file   Occupational History     Not on file   Social Needs     Financial resource strain: Not on file     Food insecurity:     Worry: Not on file     Inability: Not on file     Transportation needs:     Medical: Not on file     Non-medical: Not on file   Tobacco Use     Smoking status: Never Smoker     Smokeless tobacco: Never Used   Substance and Sexual Activity     Alcohol use: No     Alcohol/week: 0.0 oz     Frequency: Never     Comment: Pt reports being a previou heavy drinker, reports   quitting earlier this year     Drug use: No     Sexual activity: Not Currently     Partners: Male     Birth control/protection: Female Surgical     Comment: Tubal Ligation 2003   Lifestyle     Physical activity:     Days per week: Not on file     Minutes per session: Not on file     Stress: Not on file   Relationships     Social connections:     Talks on phone: Not on file     Gets together: Not on file     Attends Islam service: Not on file     Active member of club or organization: Not on file     Attends meetings of clubs or organizations: Not on file     Relationship status: Not on file     Intimate partner violence:     Fear of current or ex partner: Not on file     Emotionally abused: Not on file     Physically abused: Not on file     Forced sexual activity: Not on file   Other Topics Concern     Parent/sibling w/ CABG, MI or angioplasty before 65F 55M? Not   Asked   Social History Narrative    ** Merged History Encounter **                  Family History:   History  reviewed. No pertinent family history.             Allergies:   .   Allergies   Allergen Reactions     Bactrim [Sulfamethoxazole W/Trimethoprim]      Gabapentin      Other reaction(s): Edema     Nitrofurantoin Other (See Comments)     drug-induced liver injury             Medications:     Current Facility-Administered Medications   Medication     acetaminophen (TYLENOL) tablet 325 mg     acetylcysteine (MUCOMYST) 20 % nebulizer solution 2 mL     alum & mag hydroxide-simethicone (MYLANTA ES/MAALOX  ES)   suspension 20 mL     artificial tears ophthalmic ointment     ascorbic acid (vitamin C) chewable tablet 500 mg     calcium chloride 1 g in sodium chloride 0.9 % 100 mL   intermittent infusion     Daily 2 GRAM acetaminophen limit, unless fulminent liver   failure or transaminases greater than or equal to 300 - 400, then   none     dextrose 10 % 1,000 mL infusion     glucose gel 15-30 g    Or     dextrose 50 % injection 25-50 mL    Or     glucagon injection 1 mg     dialysate for CVVHD & CVVHDF (Phoxillum BK4/2.5)     epoprostenol (VELETRI) 20 mcg/mL in sterile water inhalation   solution     fentaNYL (SUBLIMAZE) infusion     folic acid (FOLATE) oral solution 500 mcg     heparin lock flush 10 UNIT/ML injection 2-5 mL     heparin lock flush 10 UNIT/ML injection 5-10 mL     heparin lock flush 10 UNIT/ML injection 5-10 mL     hydrocortisone sodium succinate PF (solu-CORTEF) injection 50   mg     HYDROmorphone (DILAUDID) injection 0.1-0.2 mg     hydrOXYzine (ATARAX) tablet 25 mg     hypromellose-dextran (ARTIFICAL TEARS) 0.1-0.3 % ophthalmic   solution 1 drop     lactulose (CHRONULAC) solution 20 g     lactulose (CHRONULAC) solution 20 g    Or     lactulose (CHRONULAC) solution for enema prep 100 g     lidocaine (LMX4) cream     lidocaine 1 % 0.1-1 mL     magnesium sulfate 2 g in NS intermittent infusion (PharMEDium   or FV Cmpd)     meropenem (MERREM) 1 g vial to attach to  mL bag     micafungin (MYCAMINE) 100 mg in  "sodium chloride 0.9 % 100 mL   intermittent infusion     miconazole (MICATIN/MICRO GUARD) 2 % powder     midazolam (VERSED) drip - ADULT 100 mg/100 mL in NS PRE-MIX     midazolam (VERSED) injection 1-4 mg     multivitamins w/minerals (CERTAVITE) liquid 15 mL     naloxone (NARCAN) injection 0.1-0.4 mg     No heparin required     No lozenges or gum should be given while patient on   BIPAP/AVAPS/AVAPS AE     norepinephrine (LEVOPHED) 16 mg in  mL infusion     ondansetron (ZOFRAN-ODT) ODT tab 4 mg    Or     ondansetron (ZOFRAN) injection 4 mg     pantoprazole (PROTONIX) 2 mg/mL suspension 40 mg     Patient may continue current oral medications     phenylephrine (WHIT-SYNEPHRINE) 50 mg in sodium chloride 0.9 %   250 mL infusion     POST-filter replacement solution for CVVHD & CVVHDF (Phoxillum   BK4/2.5)     potassium chloride 20 mEq in 50 mL intermittent infusion     PRE-filter replacement solution for CVVHD & CVVHDF (Phoxillum   BK4/2.5)     prochlorperazine (COMPAZINE) injection 10 mg    Or     prochlorperazine (COMPAZINE) tablet 10 mg    Or     prochlorperazine (COMPAZINE) Suppository 25 mg     protein modular (PROSOURCE TF) 1 packet     rifaximin (XIFAXAN) tablet 550 mg     sertraline (ZOLOFT) tablet 75 mg     simethicone (MYLICON) chewable tablet 80 mg     sodium chloride (PF) 0.9% PF flush 10-20 mL     sodium phosphate 20 mmol in D5W 100 mL intermittent infusion     vasopressin (VASOSTRICT) 40 Units in D5W 40 mL infusion     vitamin B-12 (CYANOCOBALAMIN) tablet 100 mcg     vitamin B1 (THIAMINE) tablet 100 mg           Vital signs:  Temp: 97.9  F (36.6  C) Temp src: Core     Heart Rate: 102 Resp:   25 SpO2: 97 % O2 Device: Mechanical Ventilator Oxygen Delivery:   Other (Comments)(45 L) Height: 172.7 cm (5' 8\") Weight: 123.6 kg   (272 lb 7.8 oz)  Estimated body mass index is 41.43 kg/m  as calculated from the   following:    Height as of this encounter: 1.727 m (5' 8\").    Weight as of this encounter: 123.6 kg " (272 lb 7.8 oz).    Physical exam:    Gen: Well built and nourished, not in any acute distress  HEENT: Mucous Membrane Moist, no oral lesions, no pallor, icterus  Neck: supple,   Lymph Nodes: no cervical, axillary LAD   CVS: Regular Rate Rhythm, no murmurs  Resp: CTA, no added sounds  GI: Soft, non-tender, no Hepatospleenomegaly  Extremities: no edema   Skin: no bruises  Neuro: AAOx4. Cranial nerves grossly intact. Strength 5/5   throughout. Sensations grossly intact.          Data:   The labs and imaging were reviewed.      .  Recent Results (from the past 24 hour(s))   Blood gas arterial    Collection Time: 08/30/19  3:44 PM   Result Value Ref Range    pH Arterial 7.27 (L) 7.35 - 7.45 pH    pCO2 Arterial 51 (H) 35 - 45 mm Hg    pO2 Arterial 79 (L) 80 - 105 mm Hg    Bicarbonate Arterial 23 21 - 28 mmol/L    Base Deficit Art 4.0 mmol/L    FIO2 50.0    Comprehensive metabolic panel    Collection Time: 08/30/19  3:44 PM   Result Value Ref Range    Sodium 141 133 - 144 mmol/L    Potassium 3.9 3.4 - 5.3 mmol/L    Chloride 112 (H) 94 - 109 mmol/L    Carbon Dioxide 23 20 - 32 mmol/L    Anion Gap 6 3 - 14 mmol/L    Glucose 121 (H) 70 - 99 mg/dL    Urea Nitrogen 15 7 - 30 mg/dL    Creatinine 0.85 0.52 - 1.04 mg/dL    GFR Estimate 82 >60 mL/min/[1.73_m2]    GFR Estimate If Black >90 >60 mL/min/[1.73_m2]    Calcium 7.2 (L) 8.5 - 10.1 mg/dL    Bilirubin Total 11.0 (H) 0.2 - 1.3 mg/dL    Albumin 2.9 (L) 3.4 - 5.0 g/dL    Protein Total 5.4 (L) 6.8 - 8.8 g/dL    Alkaline Phosphatase 188 (H) 40 - 150 U/L    ALT 68 (H) 0 - 50 U/L     (H) 0 - 45 U/L   Phosphorus    Collection Time: 08/30/19  3:44 PM   Result Value Ref Range    Phosphorus 3.0 2.5 - 4.5 mg/dL   CBC with platelets differential    Collection Time: 08/30/19  3:44 PM   Result Value Ref Range    WBC 22.7 (H) 4.0 - 11.0 10e9/L    RBC Count 2.54 (L) 3.8 - 5.2 10e12/L    Hemoglobin 7.9 (L) 11.7 - 15.7 g/dL    Hematocrit 25.3 (L) 35.0 - 47.0 %     78 - 100 fl     MCH 31.1 26.5 - 33.0 pg    MCHC 31.2 (L) 31.5 - 36.5 g/dL    RDW 21.1 (H) 10.0 - 15.0 %    Platelet Count 25 (LL) 150 - 450 10e9/L    Diff Method Automated Method     % Neutrophils 72.8 %    % Lymphocytes 9.1 %    % Monocytes 7.7 %    % Eosinophils 5.8 %    % Basophils 0.5 %    % Immature Granulocytes 4.1 %    Nucleated RBCs 0 0 /100    Absolute Neutrophil 16.5 (H) 1.6 - 8.3 10e9/L    Absolute Lymphocytes 2.1 0.8 - 5.3 10e9/L    Absolute Monocytes 1.8 (H) 0.0 - 1.3 10e9/L    Absolute Eosinophils 1.3 (H) 0.0 - 0.7 10e9/L    Absolute Basophils 0.1 0.0 - 0.2 10e9/L    Abs Immature Granulocytes 0.9 (H) 0 - 0.4 10e9/L    Absolute Nucleated RBC 0.1     Platelet Estimate Confirming automated cell count    Lactate Dehydrogenase    Collection Time: 08/30/19  3:44 PM   Result Value Ref Range    Lactate Dehydrogenase 552 (H) 81 - 234 U/L   Fibrinogen activity (AM Draw)    Collection Time: 08/30/19  5:56 PM   Result Value Ref Range    Fibrinogen 105 (L) 200 - 420 mg/dL   Comprehensive metabolic panel    Collection Time: 08/30/19  9:53 PM   Result Value Ref Range    Sodium 140 133 - 144 mmol/L    Potassium 4.2 3.4 - 5.3 mmol/L    Chloride 110 (H) 94 - 109 mmol/L    Carbon Dioxide 24 20 - 32 mmol/L    Anion Gap 6 3 - 14 mmol/L    Glucose 149 (H) 70 - 99 mg/dL    Urea Nitrogen 14 7 - 30 mg/dL    Creatinine 0.79 0.52 - 1.04 mg/dL    GFR Estimate 89 >60 mL/min/[1.73_m2]    GFR Estimate If Black >90 >60 mL/min/[1.73_m2]    Calcium 7.2 (L) 8.5 - 10.1 mg/dL    Bilirubin Total 11.6 (H) 0.2 - 1.3 mg/dL    Albumin 3.0 (L) 3.4 - 5.0 g/dL    Protein Total 5.4 (L) 6.8 - 8.8 g/dL    Alkaline Phosphatase 188 (H) 40 - 150 U/L    ALT 72 (H) 0 - 50 U/L     (H) 0 - 45 U/L   Phosphorus    Collection Time: 08/30/19  9:53 PM   Result Value Ref Range    Phosphorus 3.2 2.5 - 4.5 mg/dL   CBC with platelets differential    Collection Time: 08/30/19  9:53 PM   Result Value Ref Range    WBC 21.4 (H) 4.0 - 11.0 10e9/L    RBC Count 2.59 (L) 3.8 - 5.2  10e12/L    Hemoglobin 7.9 (L) 11.7 - 15.7 g/dL    Hematocrit 25.7 (L) 35.0 - 47.0 %    MCV 99 78 - 100 fl    MCH 30.5 26.5 - 33.0 pg    MCHC 30.7 (L) 31.5 - 36.5 g/dL    RDW 21.0 (H) 10.0 - 15.0 %    Platelet Count 20 (LL) 150 - 450 10e9/L    Diff Method Automated Method     % Neutrophils 82.6 %    % Lymphocytes 4.9 %    % Monocytes 6.6 %    % Eosinophils 3.1 %    % Basophils 0.5 %    % Immature Granulocytes 2.3 %    Nucleated RBCs 0 0 /100    Absolute Neutrophil 17.7 (H) 1.6 - 8.3 10e9/L    Absolute Lymphocytes 1.1 0.8 - 5.3 10e9/L    Absolute Monocytes 1.4 (H) 0.0 - 1.3 10e9/L    Absolute Eosinophils 0.7 0.0 - 0.7 10e9/L    Absolute Basophils 0.1 0.0 - 0.2 10e9/L    Abs Immature Granulocytes 0.5 (H) 0 - 0.4 10e9/L    Absolute Nucleated RBC 0.1    Fibrinogen activity (AM Draw)    Collection Time: 08/30/19  9:53 PM   Result Value Ref Range    Fibrinogen 109 (L) 200 - 420 mg/dL   Blood gas arterial    Collection Time: 08/30/19  9:53 PM   Result Value Ref Range    pH Arterial 7.26 (L) 7.35 - 7.45 pH    pCO2 Arterial 51 (H) 35 - 45 mm Hg    pO2 Arterial 79 (L) 80 - 105 mm Hg    Bicarbonate Arterial 23 21 - 28 mmol/L    Base Deficit Art 4.3 mmol/L    FIO2 60.0    Platelets prepare order unit    Collection Time: 08/30/19 11:44 PM   Result Value Ref Range    Blood Component Type PLT Pheresis     Units Ordered 1    Blood component    Collection Time: 08/30/19 11:44 PM   Result Value Ref Range    Unit Number [id number]     Blood Component Type PlateletPheresis,LeukoRed Irrad (Part 3)     Division Number 00     Status of Unit Released to care unit 08/31/2019 0033     Blood Product Code A0955I62     Unit Status ISS    CK total    Collection Time: 08/31/19  4:35 AM   Result Value Ref Range    CK Total 378 (H) 30 - 225 U/L   Blood gas arterial    Collection Time: 08/31/19  4:35 AM   Result Value Ref Range    pH Arterial 7.26 (L) 7.35 - 7.45 pH    pCO2 Arterial 53 (H) 35 - 45 mm Hg    pO2 Arterial 75 (L) 80 - 105 mm Hg     Bicarbonate Arterial 23 21 - 28 mmol/L    Base Deficit Art 3.7 mmol/L    FIO2 65.0    Ammonia (AM Draw)    Collection Time: 08/31/19  4:35 AM   Result Value Ref Range    Ammonia 213 (HH) 10 - 50 umol/L   Calcium, ionized whole blood (AM Draw)    Collection Time: 08/31/19  4:35 AM   Result Value Ref Range    Calcium Ionized Whole Blood 4.2 (L) 4.4 - 5.2 mg/dL   Comprehensive metabolic panel    Collection Time: 08/31/19  4:35 AM   Result Value Ref Range    Sodium 140 133 - 144 mmol/L    Potassium 4.2 3.4 - 5.3 mmol/L    Chloride 110 (H) 94 - 109 mmol/L    Carbon Dioxide 23 20 - 32 mmol/L    Anion Gap 7 3 - 14 mmol/L    Glucose 166 (H) 70 - 99 mg/dL    Urea Nitrogen 14 7 - 30 mg/dL    Creatinine 0.74 0.52 - 1.04 mg/dL    GFR Estimate >90 >60 mL/min/[1.73_m2]    GFR Estimate If Black >90 >60 mL/min/[1.73_m2]    Calcium 7.1 (L) 8.5 - 10.1 mg/dL    Bilirubin Total 11.5 (H) 0.2 - 1.3 mg/dL    Albumin 2.9 (L) 3.4 - 5.0 g/dL    Protein Total 5.3 (L) 6.8 - 8.8 g/dL    Alkaline Phosphatase 186 (H) 40 - 150 U/L    ALT 65 (H) 0 - 50 U/L     (H) 0 - 45 U/L   Phosphorus    Collection Time: 08/31/19  4:35 AM   Result Value Ref Range    Phosphorus 3.4 2.5 - 4.5 mg/dL   CBC with platelets differential    Collection Time: 08/31/19  4:35 AM   Result Value Ref Range    WBC 22.3 (H) 4.0 - 11.0 10e9/L    RBC Count 2.55 (L) 3.8 - 5.2 10e12/L    Hemoglobin 7.8 (L) 11.7 - 15.7 g/dL    Hematocrit 25.4 (L) 35.0 - 47.0 %     78 - 100 fl    MCH 30.6 26.5 - 33.0 pg    MCHC 30.7 (L) 31.5 - 36.5 g/dL    RDW 20.9 (H) 10.0 - 15.0 %    Platelet Count 17 (LL) 150 - 450 10e9/L    Diff Method Automated Method     % Neutrophils 88.0 %    % Lymphocytes 4.1 %    % Monocytes 3.9 %    % Eosinophils 0.2 %    % Basophils 0.3 %    % Immature Granulocytes 3.5 %    Nucleated RBCs 0 0 /100    Absolute Neutrophil 19.7 (H) 1.6 - 8.3 10e9/L    Absolute Lymphocytes 0.9 0.8 - 5.3 10e9/L    Absolute Monocytes 0.9 0.0 - 1.3 10e9/L    Absolute Eosinophils 0.0  0.0 - 0.7 10e9/L    Absolute Basophils 0.1 0.0 - 0.2 10e9/L    Abs Immature Granulocytes 0.8 (H) 0 - 0.4 10e9/L    Absolute Nucleated RBC 0.1    INR    Collection Time: 08/31/19  4:35 AM   Result Value Ref Range    INR 3.85 (H) 0.86 - 1.14   PTT (AM Draw)    Collection Time: 08/31/19  4:35 AM   Result Value Ref Range    PTT 71 (H) 22 - 37 sec   Fibrinogen activity (AM Draw)    Collection Time: 08/31/19  4:35 AM   Result Value Ref Range    Fibrinogen 99 (LL) 200 - 420 mg/dL   Procalcitonin    Collection Time: 08/31/19  4:35 AM   Result Value Ref Range    Procalcitonin 0.61 ng/ml   Platelets prepare order unit    Collection Time: 08/31/19  5:08 AM   Result Value Ref Range    Blood Component Type PLT Pheresis     Units Ordered 1    Blood component    Collection Time: 08/31/19  5:08 AM   Result Value Ref Range    Unit Number [id number]     Blood Component Type PlateletPheresis LeukoReduced Irradiated     Division Number 00     Status of Unit Released to care unit 08/31/2019 0949     Blood Product Code [id number]     Unit Status ISS    Cryoprecipitate prepare order unit    Collection Time: 08/31/19 10:24 AM   Result Value Ref Range    Blood Component Type Cryoprecipitate     Units Ordered 10    Blood component    Collection Time: 08/31/19 10:24 AM   Result Value Ref Range    Unit Number [id number]     Blood Component Type 5 cryoprecipitate units pooled     Division Number 00     Status of Unit Released to care unit 08/31/2019 1358     Blood Product Code H6419Q29     Unit Status ISS    Comprehensive metabolic panel    Collection Time: 08/31/19 12:11 PM   Result Value Ref Range    Sodium 141 133 - 144 mmol/L    Potassium 3.7 3.4 - 5.3 mmol/L    Chloride 111 (H) 94 - 109 mmol/L    Carbon Dioxide 23 20 - 32 mmol/L    Anion Gap 7 3 - 14 mmol/L    Glucose 151 (H) 70 - 99 mg/dL    Urea Nitrogen 13 7 - 30 mg/dL    Creatinine 0.77 0.52 - 1.04 mg/dL    GFR Estimate >90 >60 mL/min/[1.73_m2]    GFR Estimate If Black >90 >60  mL/min/[1.73_m2]    Calcium 7.5 (L) 8.5 - 10.1 mg/dL    Bilirubin Total 11.3 (H) 0.2 - 1.3 mg/dL    Albumin 2.9 (L) 3.4 - 5.0 g/dL    Protein Total 5.3 (L) 6.8 - 8.8 g/dL    Alkaline Phosphatase 192 (H) 40 - 150 U/L    ALT 66 (H) 0 - 50 U/L     (H) 0 - 45 U/L   Phosphorus    Collection Time: 08/31/19 12:11 PM   Result Value Ref Range    Phosphorus 3.7 2.5 - 4.5 mg/dL   CBC with platelets differential    Collection Time: 08/31/19 12:11 PM   Result Value Ref Range    WBC 29.0 (H) 4.0 - 11.0 10e9/L    RBC Count 2.56 (L) 3.8 - 5.2 10e12/L    Hemoglobin 7.7 (L) 11.7 - 15.7 g/dL    Hematocrit 25.8 (L) 35.0 - 47.0 %     (H) 78 - 100 fl    MCH 30.1 26.5 - 33.0 pg    MCHC 29.8 (L) 31.5 - 36.5 g/dL    RDW 20.3 (H) 10.0 - 15.0 %    Platelet Count 25 (LL) 150 - 450 10e9/L    Diff Method Automated Method     % Neutrophils 86.4 %    % Lymphocytes 3.9 %    % Monocytes 6.0 %    % Eosinophils 0.1 %    % Basophils 0.1 %    % Immature Granulocytes 3.5 %    Nucleated RBCs 0 0 /100    Absolute Neutrophil 25.0 (H) 1.6 - 8.3 10e9/L    Absolute Lymphocytes 1.1 0.8 - 5.3 10e9/L    Absolute Monocytes 1.7 (H) 0.0 - 1.3 10e9/L    Absolute Eosinophils 0.0 0.0 - 0.7 10e9/L    Absolute Basophils 0.0 0.0 - 0.2 10e9/L    Abs Immature Granulocytes 1.0 (H) 0 - 0.4 10e9/L    Absolute Nucleated RBC 0.1    Fibrinogen activity (AM Draw)    Collection Time: 08/31/19 12:11 PM   Result Value Ref Range    Fibrinogen 114 (L) 200 - 420 mg/dL   Sputum Culture Aerobic Bacterial    Collection Time: 08/31/19 12:12 PM   Result Value Ref Range    Specimen Description Sputum Endotracheal     Special Requests       Specimen leaked in transit.  Due to possible contamination,   results should be interpreted   with caution.      Culture Micro PENDING    Gram stain    Collection Time: 08/31/19 12:12 PM   Result Value Ref Range    Specimen Description Sputum Endotracheal     Special Requests       Specimen leaked in transit.  Due to possible contamination,    results should be interpreted   with caution.      Gram Stain PENDING            Results for orders placed or performed during the hospital   encounter of 08/19/19   XR Chest 2 Views    Narrative    Exam: XR CHEST 2 VW, 8/19/2019 8:20 PM    Indication: jose    Comparison: 8/11/2019    Findings:   AP and lateral views of the chest. The cardiac silhouette is   unchanged  from prior. There is a large right pleural effusion which is   increased  in size from 11/20/2019. There are adjacent streaky opacities and  streaky left basilar opacities. No pneumothorax. Visualized upper  abdomen is unremarkable. No acute osseous abnormalities. There   are  degenerative changes of the right acromioclavicular joint.      Impression    Impression:   1. Large right pleural effusion which is increased in size from  8/11/2019.  2. Streaky bibasilar opacities favored to represent pulmonary   edema  and atelectasis over infectious.      I have personally reviewed the examination and initial   interpretation  and I agree with the findings.    GREGORIO MIR MD   CT Chest/Abdomen/Pelvis w Contrast    Narrative    CT CHEST/ABDOMEN/PELVIS W CONTRAST 8/19/2019 11:26 PM    History: Abd pain, unspecified; ; abdominal pain, low back pain,  vomiting    Comparison: Same day chest x-ray, ultrasound 7/18/2019, CT   abdomen  pelvis 8/11/2017    [Narrative was truncated due to length]   Comprehensive metabolic panel   Result Value Ref Range    Sodium 141 133 - 144 mmol/L    Potassium 3.7 3.4 - 5.3 mmol/L    Chloride 111 (H) 94 - 109 mmol/L    Carbon Dioxide 23 20 - 32 mmol/L    Anion Gap 7 3 - 14 mmol/L    Glucose 151 (H) 70 - 99 mg/dL    Urea Nitrogen 13 7 - 30 mg/dL    Creatinine 0.77 0.52 - 1.04 mg/dL    GFR Estimate >90 >60 mL/min/[1.73_m2]    GFR Estimate If Black >90 >60 mL/min/[1.73_m2]    Calcium 7.5 (L) 8.5 - 10.1 mg/dL    Bilirubin Total 11.3 (H) 0.2 - 1.3 mg/dL    Albumin 2.9 (L) 3.4 - 5.0 g/dL    Protein Total 5.3 (L) 6.8 - 8.8 g/dL     Alkaline Phosphatase 192 (H) 40 - 150 U/L    ALT 66 (H) 0 - 50 U/L     (H) 0 - 45 U/L   Phosphorus   Result Value Ref Range    Phosphorus 3.7 2.5 - 4.5 mg/dL   CBC with platelets differential   Result Value Ref Range    WBC 29.0 (H) 4.0 - 11.0 10e9/L    RBC Count 2.56 (L) 3.8 - 5.2 10e12/L    Hemoglobin 7.7 (L) 11.7 - 15.7 g/dL    Hematocrit 25.8 (L) 35.0 - 47.0 %     (H) 78 - 100 fl    MCH 30.1 26.5 - 33.0 pg    MCHC 29.8 (L) 31.5 - 36.5 g/dL    RDW 20.3 (H) 10.0 - 15.0 %    Platelet Count 25 (LL) 150 - 450 10e9/L    Diff Method Automated Method     % Neutrophils 86.4 %    % Lymphocytes 3.9 %    % Monocytes 6.0 %    % Eosinophils 0.1 %    % Basophils 0.1 %    % Immature Granulocytes 3.5 %    Nucleated RBCs 0 0 /100    Absolute Neutrophil 25.0 (H) 1.6 - 8.3 10e9/L    Absolute Lymphocytes 1.1 0.8 - 5.3 10e9/L    Absolute Monocytes 1.7 (H) 0.0 - 1.3 10e9/L    Absolute Eosinophils 0.0 0.0 - 0.7 10e9/L    Absolute Basophils 0.0 0.0 - 0.2 10e9/L    Abs Immature Granulocytes 1.0 (H) 0 - 0.4 10e9/L    Absolute Nucleated RBC 0.1    Fibrinogen activity (AM Draw)   Result Value Ref Range    Fibrinogen 114 (L) 200 - 420 mg/dL   Sputum Culture Aerobic Bacterial   Result Value Ref Range    Specimen Description Sputum Endotracheal     Special Requests       Specimen leaked in transit.  Due to possible contamination, results should be interpreted   with caution.      Culture Micro PENDING    Gram stain   Result Value Ref Range    Specimen Description Sputum Endotracheal     Special Requests       Specimen leaked in transit.  Due to possible contamination, results should be interpreted   with caution.      Gram Stain >25 PMNs/low power field     Gram Stain No organisms seen    Fibrinogen activity (AM Draw)   Result Value Ref Range    Fibrinogen 185 (L) 200 - 420 mg/dL   Comprehensive metabolic panel   Result Value Ref Range    Sodium 140 133 - 144 mmol/L    Potassium 3.8 3.4 - 5.3 mmol/L    Chloride 110 (H) 94 - 109  mmol/L    Carbon Dioxide 22 20 - 32 mmol/L    Anion Gap 8 3 - 14 mmol/L    Glucose 156 (H) 70 - 99 mg/dL    Urea Nitrogen 14 7 - 30 mg/dL    Creatinine 0.76 0.52 - 1.04 mg/dL    GFR Estimate >90 >60 mL/min/[1.73_m2]    GFR Estimate If Black >90 >60 mL/min/[1.73_m2]    Calcium 7.3 (L) 8.5 - 10.1 mg/dL    Bilirubin Total 11.4 (H) 0.2 - 1.3 mg/dL    Albumin 2.9 (L) 3.4 - 5.0 g/dL    Protein Total 5.5 (L) 6.8 - 8.8 g/dL    Alkaline Phosphatase 207 (H) 40 - 150 U/L    ALT 70 (H) 0 - 50 U/L     (H) 0 - 45 U/L   Phosphorus   Result Value Ref Range    Phosphorus 3.4 2.5 - 4.5 mg/dL   Blood gas arterial   Result Value Ref Range    pH Arterial 7.31 (L) 7.35 - 7.45 pH    pCO2 Arterial 44 35 - 45 mm Hg    pO2 Arterial 96 80 - 105 mm Hg    Bicarbonate Arterial 22 21 - 28 mmol/L    Base Deficit Art 3.9 mmol/L    FIO2 60.0    CK total   Result Value Ref Range    CK Total 352 (H) 30 - 225 U/L   Ammonia (AM Draw)   Result Value Ref Range    Ammonia 170 (HH) 10 - 50 umol/L   INR   Result Value Ref Range    INR 2.82 (H) 0.86 - 1.14   PTT (AM Draw)   Result Value Ref Range    PTT 53 (H) 22 - 37 sec   Blood gas arterial   Result Value Ref Range    pH Arterial 7.32 (L) 7.35 - 7.45 pH    pCO2 Arterial 45 35 - 45 mm Hg    pO2 Arterial 67 (L) 80 - 105 mm Hg    Bicarbonate Arterial 23 21 - 28 mmol/L    Base Deficit Art 2.8 mmol/L    FIO2 60.0    Comprehensive metabolic panel   Result Value Ref Range    Sodium 141 133 - 144 mmol/L    Potassium 4.0 3.4 - 5.3 mmol/L    Chloride 109 94 - 109 mmol/L    Carbon Dioxide 22 20 - 32 mmol/L    Anion Gap 10 3 - 14 mmol/L    Glucose 169 (H) 70 - 99 mg/dL    Urea Nitrogen 17 7 - 30 mg/dL    Creatinine 0.76 0.52 - 1.04 mg/dL    GFR Estimate >90 >60 mL/min/[1.73_m2]    GFR Estimate If Black >90 >60 mL/min/[1.73_m2]    Calcium 7.4 (L) 8.5 - 10.1 mg/dL    Bilirubin Total 12.0 (H) 0.2 - 1.3 mg/dL    Albumin 2.9 (L) 3.4 - 5.0 g/dL    Protein Total 5.5 (L) 6.8 - 8.8 g/dL    Alkaline Phosphatase 228 (H) 40  - 150 U/L    ALT 77 (H) 0 - 50 U/L     (H) 0 - 45 U/L   Phosphorus   Result Value Ref Range    Phosphorus 3.4 2.5 - 4.5 mg/dL   Magnesium (AM Draw)   Result Value Ref Range    Magnesium 2.6 (H) 1.6 - 2.3 mg/dL   Calcium, ionized whole blood (AM Draw)   Result Value Ref Range    Calcium Ionized Whole Blood 4.2 (L) 4.4 - 5.2 mg/dL   CBC with platelets differential   Result Value Ref Range    WBC 37.8 (H) 4.0 - 11.0 10e9/L    RBC Count 2.49 (L) 3.8 - 5.2 10e12/L    Hemoglobin 7.5 (L) 11.7 - 15.7 g/dL    Hematocrit 24.7 (L) 35.0 - 47.0 %    MCV 99 78 - 100 fl    MCH 30.1 26.5 - 33.0 pg    MCHC 30.4 (L) 31.5 - 36.5 g/dL    RDW 19.8 (H) 10.0 - 15.0 %    Platelet Count 26 (LL) 150 - 450 10e9/L    Diff Method Automated Method     % Neutrophils 88.4 %    % Lymphocytes 3.7 %    % Monocytes 5.4 %    % Eosinophils 0.0 %    % Basophils 0.2 %    % Immature Granulocytes 2.3 %    Nucleated RBCs 0 0 /100    Absolute Neutrophil 33.4 (H) 1.6 - 8.3 10e9/L    Absolute Lymphocytes 1.4 0.8 - 5.3 10e9/L    Absolute Monocytes 2.0 (H) 0.0 - 1.3 10e9/L    Absolute Eosinophils 0.0 0.0 - 0.7 10e9/L    Absolute Basophils 0.1 0.0 - 0.2 10e9/L    Abs Immature Granulocytes 0.9 (H) 0 - 0.4 10e9/L    Absolute Nucleated RBC 0.1    Fibrinogen activity (AM Draw)   Result Value Ref Range    Fibrinogen 173 (L) 200 - 420 mg/dL   Lactate Dehydrogenase   Result Value Ref Range    Lactate Dehydrogenase 559 (H) 81 - 234 U/L   XR Chest Port 1 View    Narrative    Exam: XR CHEST PORT 1 VW, 9/1/2019 6:03 AM    Indication: pulmonary edema v hepatopulmonary syndrome    Comparison: 8/31/2019    Findings:   Endotracheal tube tip projects over the mid trachea. Right PICC tip  projects over the low SVC. Enteric tube courses below the field of  view. Stable cardiomegaly with bilateral mixed interstitial/airspace  opacities and layering pleural effusions. Improved left basilar dense  opacities. No pneumothorax.      Impression    Impression: Stable support  devices. Improved dense left basilar  opacities. Unchanged mixed interstitial/airspace opacities with  layering pleural effusions.    CAN ROBLERO MD

## 2019-09-01 NOTE — PLAN OF CARE
ICU End of Shift Summary. See flowsheets for vital signs and detailed assessment.    Changes this shift: No new neuro changes. Levo titrated as needed, current rate 0.2 mcg/kg/min. CVP remained with the 6-12 parameter, CRRT pulled at I=O in the last 12hr. No stool output overnight.    Plan: Monitor patient hemodynamics closely, titrate pressors to maintain sBP>95. Follow POC.

## 2019-09-02 NOTE — PLAN OF CARE
VSS with titration of norepinephrine; cierra Terrell MD aware, no new orders at this time. Unresponsive, no cough, no gag; pupils equal and sluggish. No changes to vent. Rectal tube in place, meeting stool output goal with PRN and scheduled lactulose. Straight cathed for 210mls. TF remain at goal with standard flushes. Femoral arterial line removed, radial arterial line placed. CRRT remains running without circuit issues, see flowsheets for changes and orders. Will continue to monitor patient and update team with any changes or concerns.

## 2019-09-02 NOTE — PROGRESS NOTES
[facility], [place]    Progress Note  [facility] Intensive Care     Date of Admission:  8/19/2019    Assessment & Plan   Neema Bailey is a 44 year old female with history of multifactorial ESLD c/b HE, EV, ascites, polysubstance abuse, morbid obesity s/p gastric bypass in 2002 who presents as a transfer from the floor for acute hypoxic respiratory failure and altered mental status, notably recently discharged from ICU 8/21 after a 2-day stay for severe sepsis with hypotension 2/2 ESBL E coli bacteremia of unknown source. Course was complicated by prolonged BiPAP in the setting of altered mental status likely resulting in aspiration events. Intubated for work of breathing, bronched, and subsequently worsened from a respiratory perspective, with development of severe ARDS. Paralyzed and proned for a period of time, now recovering.    Changes today:  Stable, monitoring for improvement. Ongoing leukocytosis that is significant, concerning for ongoing inflammatory process driving poor clinical status.  - maintain vent settings  - continue to wean pressors as able  - discuss with radiology again pelvic fluid collection or other sources of infection  - re-culture blood, sputum, urine  - remove femoral arterial line, replace in radial area    Neurology:  Hepatic/Toxic-metabolic encephalopathy: Elevated ammonia to 147 on 8/25 with lethargy. Normal BUN, no other sedating meds given. Ammonia downtrending with PO lactulose and CRRT. Pupils reactive, intermittently sluggish and dilated without focal findings while sedated.  - PO lactulose scheduled and per [facility] protocol; goal 1L of stool a day  - trend ammonia daily    Sedation: RASS 0 to -1  - off versed  - fentanyl gtt, off  - cis 8/28-8/30    Depression  - PTA sertraline    Cardiovascular:  Shock, multifactorial: in the setting of cirrhosis with third spacing and possible infection underlying. Rising lactate during hospital  stay from 1.9 after transfer out of ICU to 4.3 on the day of transfer back. Bedside TTE with hyperdynamic LV, 1.78cm IVC with minimal respiratory variation while on positive pressure ventilation. Cardiac function on formal TTE 8/20 hyperdynamic with normal PASP. Repeat TTE with ongoing hyperdynamic changes, no evidence of shunting on bubble study. Able to wean off vaso and phenylephrine after starting hydrocortisone on on 8/30.  - levophed, titrate for SBP > 95  - hydrocort stress dosing    Pulmonary:        Acute hypoxic respiratory failure  Developing ARDS, suspected  Transudative R-sided pleural effusion, moderate  CT chest 8/25 notable for GGO, interlobular septal thickening, patchy consolidative opacities. Differential includes pulmonary edema, developing ARDS, hepatopulmonary syndrome, pneumonia, atypical infection, severe aspiration pneumonitis.. P/F ratio 149. Intubated for increased work of breathing and hypoxemia. Initially improving on MICU readmission day 2 and then worsened after bronchial lavage on MICU day 3 with worsening hypoxemia. BAL notable for significant bilious secretions that were suctioned. Subsequently, started on Flolan with some improvement. Improvement after being prone for ~12h on 8/29, and remained stable after flipping to supine.   - on CRRT, pulling off volume based on CVP per Renal  - daily CXR to re-evaluate  - routine vent cares  - lung-protective ventilation  - goal PaO2 55-85, plateau pressures < 30   - daily ABG    Ventilation Mode: CMV/AC  (Continuous Mandatory Ventilation/ Assist Control)  FiO2 (%): 60 %  Rate Set (breaths/minute): 25 breaths/min  Tidal Volume Set (mL): 350 mL  PEEP (cm H2O): 10 cmH2O  Oxygen Concentration (%): 60 %  Resp: 28      Renal  Anasarca  Oliguric DEE DEE 2/2 KELLY, possible hepatorenal syndrome, ATN  Baseline creatinine 0.6, increased to 1.0 in the setting of hypotension. Recent nephrotoxins also include IV contrast 8/25 during CT scan, and now  administration of vancomycin. Urine output decreasing 8/23. UA 8/22 notable for mild proteinuria, small LE, hyaline casts. FENA and FEBUN consistent with prerenal azotemia. Repeat UA 8/27 demonstrating some ketones, did not improve with 2 days of albumin challenge.  - strict I/Os with Alexander anchored  - avoid nephrotoxins  - Renal consult, appreciate assistance  - daily BMP  - holding PTA midodrine given on stronger pressors  - CRRT per Renal  - compression stockings    Hypernatremia: Resolved with free water flushing and on CRRT.    ID:         Recent ESBL E coli bacteremia   Possible sepsis due to unknown source : Blood culture 1/2 positive 8/19 for ESBL E coli. No followup blood cultures obtained until 8/22, which remain NGTD. Previous urine culture, transudative pleural fluid culture, and ascites fluid culture currently NGTD. S pneumo and legionella urine antigen negative. Procalcitonin uptrending but not significantly. Leukocytosis increasing. Cryptococcal antigen negative.  - re-culture blood, sputum, urine  - trend CBC  - consider tapping pelvic fluid collection noted on CT if not improving  - off micafungin today (b,d-glucan and galactomannan negative)  - trending procal    Cultures:  UCx 8/27 No growth   Bronchial lavage 8/27 NGTD  BCx 8/26 x2 NGTD  Pleural fluid culture 8/25 NGTD  Ascites fluid culture 8/22 NGTD    Antimicrobials:  Meropenem (8/25 -    Micafungin (8/26 - 9/2)  Vancomycin (8/20, 8/25-8/27)  Azithromycin (8/25 - 8/27)  Ertapenem (8/19 - 8/24)      GI  ESLD, multifactorial c/b portal hypertension with ascites, esophageal varices: Last EGD 5/2019 with no varices. Last US 8/20 with no mass, no portal vein thrombosis.   - Hepatology consulted, appreciate recs  - currently not undergoing transplant evaluation  - rifaxamin, lactulose as above for HE protocol  - holding PTA diuretics furosemid 20mg, spironolactone 50mg  - daily MELD labs    MELD-Na score: 27 at 9/2/2019  3:41 AM  MELD score: 27 at  9/2/2019  3:41 AM  Calculated from:  Serum Creatinine: 0.77 mg/dL (Rounded to 1 mg/dL) at 9/2/2019  3:41 AM  Serum Sodium: 140 mmol/L (Rounded to 137 mmol/L) at 9/2/2019  3:41 AM  Total Bilirubin: 11.9 mg/dL at 9/2/2019  3:41 AM  INR(ratio): 2.79 at 9/2/2019  3:41 AM  Age: 44 years    ? Alcoholic hepatitis  Hyperbilirubinemia, Mild transaminitis: Mild gallbladder wall thickening noted on CT abd/pelvis 8/25. Negative HIDA scan. Tenderness to palpation on exam. CK initially elevated to 1300, downtrending on recheck - may have some critical illness myopathy. MDF ? 154. On steroids for shock.  - continue to monitor  - trend CK, daily MELD labs    Mild reflux esophagitis  - PPI daily    Nutrition  At risk for malnutrition:  - feeding per nutrition    Endocrine:  At risk for hypoglycemia  - hypoglycemia protocol, q4h glucose checks while on tube feeds  - sliding scale insulin    Heme/Onc:  Mild DIC/LIC  Thrombocytopenia, multifactorial (baseline 120s in the last 6 months)  Macrocytic anemia   Baseline hemoglobin 8-9 in the last 6 months. Requiring intermittent transfusions while on CRRT. Hemolysis labs suggestive of mild DIC with thrombocytopenia, anemia, RBC fragments on smear, haptoglobin undetectable, LDH elevated, hemoglobin plasma elevated and risk factors including ARDS, liver disease, underlying infectious process.  - daily CBC, transfuse Hgb > 7  - continue to monitor  - heme consulted, appreciate recs  - LDH q48h, fibrinogen daily    DVT Prophylaxis: Pneumatic Compression Devices  GI Prophylaxis: PPI    Restraints: Restraints for medical healing needed: YES    Family update by me today: Yes     Amy Bray    I have seen and discussed this patient with Dr. Jarred Bray MD  Internal Medicine/Pediatrics, PGY3  [facility]  (p) [phone]      Code Status   Full Code    Subjective  RN notes reviewed. Stable, still no improvement in mental status despite being off sedation. Minimal urine  output. Net even on CRRT since midnight. Stable on pressors.     Review of Systems   Review of systems not obtained due to patient factors - mental status    Physical Exam   Temp: 98.4  F (36.9  C) Temp src: Axillary Temp  Min: 97.9  F (36.6  C)  Max: 98.6  F (37  C)    Heart Rate: 102 Resp: 28 SpO2: 95 % O2 Device: Mechanical Ventilator    Vital Signs with Ranges  Temp:  [97.9  F (36.6  C)-98.6  F (37  C)] 98.4  F (36.9  C)  Heart Rate:  [] 102  Resp:  [27-29] 28  MAP:  [54 mmHg-69 mmHg] 57 mmHg  Arterial Line BP: ()/(32-48) 93/36  FiO2 (%):  [60 %] 60 %  SpO2:  [89 %-100 %] 95 %  275 lbs 5.67 oz    Constitutional: intubated and sedated  Eyes: Pupils equal and reactive, scleral icterus, subconjunctival hemorrhage on the R   ENT: Normocephalic, without obvious abnormality, atraumatic, orally intubated  Respiratory: CTAB, synchronous with vent, no wheezes/rhonchi/rales appreciated  Cardiovascular: Normal apical impulse, tachycardic, regular rhythm, normal S1 and S2, no S3 or S4, and no murmur noted.  GI: normal bowel sounds, soft, non-distended, nontender on palpation, no masses palpated, no hepatosplenomegaly, brown stool on rectal tube  Genitourinary:  Alexander in place draining dark yellow urine  Skin: No bruising or bleeding, normal skin color, texture, turgor, no redness, warmth, or swelling, no rashes, no lesions, no abnormal moles, nails normal without discoloration or clubbing and no jaundice  Musculoskeletal: pitting edema bilaterally in the LE to the knees 2+, compression stockings on, Tone is normal.  Neurologic: sedated, unresponsive to pain    Data   Results for orders placed or performed during the hospital encounter of 08/19/19 (from the past 24 hour(s))   Comprehensive metabolic panel   Result Value Ref Range    Sodium 139 133 - 144 mmol/L    Potassium 4.1 3.4 - 5.3 mmol/L    Chloride 108 94 - 109 mmol/L    Carbon Dioxide 24 20 - 32 mmol/L    Anion Gap 8 3 - 14 mmol/L    Glucose 164 (H) 70 -  99 mg/dL    Urea Nitrogen 18 7 - 30 mg/dL    Creatinine 0.70 0.52 - 1.04 mg/dL    GFR Estimate >90 >60 mL/min/[1.73_m2]    GFR Estimate If Black >90 >60 mL/min/[1.73_m2]    Calcium 7.5 (L) 8.5 - 10.1 mg/dL    Bilirubin Total 12.1 (H) 0.2 - 1.3 mg/dL    Albumin 2.8 (L) 3.4 - 5.0 g/dL    Protein Total 5.6 (L) 6.8 - 8.8 g/dL    Alkaline Phosphatase 228 (H) 40 - 150 U/L    ALT 94 (H) 0 - 50 U/L     (H) 0 - 45 U/L   Phosphorus   Result Value Ref Range    Phosphorus 3.3 2.5 - 4.5 mg/dL   Calcium, ionized whole blood (AM Draw)   Result Value Ref Range    Calcium Ionized Whole Blood 4.4 4.4 - 5.2 mg/dL   CBC with platelets differential   Result Value Ref Range    WBC 43.6 (H) 4.0 - 11.0 10e9/L    RBC Count 2.49 (L) 3.8 - 5.2 10e12/L    Hemoglobin 7.7 (L) 11.7 - 15.7 g/dL    Hematocrit 24.8 (L) 35.0 - 47.0 %     78 - 100 fl    MCH 30.9 26.5 - 33.0 pg    MCHC 31.0 (L) 31.5 - 36.5 g/dL    RDW 19.8 (H) 10.0 - 15.0 %    Platelet Count 31 (LL) 150 - 450 10e9/L    Diff Method Automated Method     % Neutrophils 86.4 %    % Lymphocytes 3.4 %    % Monocytes 6.7 %    % Eosinophils 0.0 %    % Basophils 0.2 %    % Immature Granulocytes 3.3 %    Nucleated RBCs 0 0 /100    Absolute Neutrophil 37.7 (H) 1.6 - 8.3 10e9/L    Absolute Lymphocytes 1.5 0.8 - 5.3 10e9/L    Absolute Monocytes 2.9 (H) 0.0 - 1.3 10e9/L    Absolute Eosinophils 0.0 0.0 - 0.7 10e9/L    Absolute Basophils 0.1 0.0 - 0.2 10e9/L    Abs Immature Granulocytes 1.5 (H) 0 - 0.4 10e9/L    Absolute Nucleated RBC 0.2    Fibrinogen activity (AM Draw)   Result Value Ref Range    Fibrinogen 162 (L) 200 - 420 mg/dL   Blood gas arterial   Result Value Ref Range    pH Arterial 7.30 (L) 7.35 - 7.45 pH    pCO2 Arterial 46 (H) 35 - 45 mm Hg    pO2 Arterial 79 (L) 80 - 105 mm Hg    Bicarbonate Arterial 23 21 - 28 mmol/L    Base Deficit Art 3.3 mmol/L    FIO2 60    CK total   Result Value Ref Range    CK Total 288 (H) 30 - 225 U/L   Ammonia (AM Draw)   Result Value Ref Range     Ammonia 109 (HH) 10 - 50 umol/L   INR   Result Value Ref Range    INR 2.79 (H) 0.86 - 1.14   PTT (AM Draw)   Result Value Ref Range    PTT 53 (H) 22 - 37 sec   Blood gas arterial   Result Value Ref Range    pH Arterial 7.30 (L) 7.35 - 7.45 pH    pCO2 Arterial 48 (H) 35 - 45 mm Hg    pO2 Arterial 74 (L) 80 - 105 mm Hg    Bicarbonate Arterial 24 21 - 28 mmol/L    Base Deficit Art 2.9 mmol/L    FIO2 60    Comprehensive metabolic panel   Result Value Ref Range    Sodium 140 133 - 144 mmol/L    Potassium 4.0 3.4 - 5.3 mmol/L    Chloride 108 94 - 109 mmol/L    Carbon Dioxide 25 20 - 32 mmol/L    Anion Gap 8 3 - 14 mmol/L    Glucose 185 (H) 70 - 99 mg/dL    Urea Nitrogen 22 7 - 30 mg/dL    Creatinine 0.77 0.52 - 1.04 mg/dL    GFR Estimate >90 >60 mL/min/[1.73_m2]    GFR Estimate If Black >90 >60 mL/min/[1.73_m2]    Calcium 7.2 (L) 8.5 - 10.1 mg/dL    Bilirubin Total 11.9 (H) 0.2 - 1.3 mg/dL    Albumin 2.7 (L) 3.4 - 5.0 g/dL    Protein Total 5.2 (L) 6.8 - 8.8 g/dL    Alkaline Phosphatase 222 (H) 40 - 150 U/L    ALT 98 (H) 0 - 50 U/L     (H) 0 - 45 U/L   Phosphorus   Result Value Ref Range    Phosphorus 3.4 2.5 - 4.5 mg/dL   Magnesium (AM Draw)   Result Value Ref Range    Magnesium 2.6 (H) 1.6 - 2.3 mg/dL   Calcium, ionized whole blood (AM Draw)   Result Value Ref Range    Calcium Ionized Whole Blood 4.3 (L) 4.4 - 5.2 mg/dL   CBC with platelets differential   Result Value Ref Range    WBC 46.5 (H) 4.0 - 11.0 10e9/L    RBC Count 2.43 (L) 3.8 - 5.2 10e12/L    Hemoglobin 7.4 (L) 11.7 - 15.7 g/dL    Hematocrit 24.0 (L) 35.0 - 47.0 %    MCV 99 78 - 100 fl    MCH 30.5 26.5 - 33.0 pg    MCHC 30.8 (L) 31.5 - 36.5 g/dL    RDW 19.5 (H) 10.0 - 15.0 %    Platelet Count 34 (LL) 150 - 450 10e9/L    Diff Method Automated Method     % Neutrophils 85.0 %    % Lymphocytes 3.4 %    % Monocytes 8.6 %    % Eosinophils 0.1 %    % Basophils 0.2 %    % Immature Granulocytes 2.7 %    Nucleated RBCs 0 0 /100    Absolute Neutrophil 39.6 (H) 1.6  - 8.3 10e9/L    Absolute Lymphocytes 1.6 0.8 - 5.3 10e9/L    Absolute Monocytes 4.0 (H) 0.0 - 1.3 10e9/L    Absolute Eosinophils 0.1 0.0 - 0.7 10e9/L    Absolute Basophils 0.1 0.0 - 0.2 10e9/L    Abs Immature Granulocytes 1.2 (H) 0 - 0.4 10e9/L    Absolute Nucleated RBC 0.1    Fibrinogen activity (AM Draw)   Result Value Ref Range    Fibrinogen 142 (L) 200 - 420 mg/dL   Procalcitonin   Result Value Ref Range    Procalcitonin 0.49 ng/ml   XR Chest Port 1 View    Narrative    XR CHEST PORT 1 VW  9/2/2019 5:50 AM      HISTORY: pulmonary edema v hepatopulmonary syndrome    COMPARISON: 9/1/2019, 8/31/2019.    FINDINGS: Single AP view of the chest. Endotracheal tube tip projects  approximately 3 cm above the nishant. Enteric tube courses beyond the  field-of-view. Left central line in right upper approach PICC line tip  projects over the right atrium. Trachea is midline, stable  cardiomegaly. Essentially unchanged lateral mixed interstitial and  airspace opacities. Stable bilateral pleural effusions, right greater  than left. No acute osseous or abdominal abnormality.      Impression    IMPRESSION:  1. Stable bilateral pleural effusions with overlying basilar  opacities, right greater than left. Atelectasis vs developing  consolidation.  2. Cardiomegaly with bilateral mixed interstitial and airspace  opacities, pulmonary edema versus developing infection.    I have personally reviewed the examination and initial interpretation  and I agree with the findings.    LEW RAE MD

## 2019-09-02 NOTE — PROGRESS NOTES
Diagnosis: DEE DEE requiring dialysis  This patient was seen and examined while on CRRT. Laboratory results and nurses' notes were reviewed.  Remains on intermittent low dose norepi.  Ins more or less matching with outs.  Remains unresponsive.  Ammonia less but remains high.    -will continue CRRT for now though suspect we could transition to conventional HD soon even if on low dose pressor  -having said that, it may be worth restarting midodrine with the goal of weaning off norepi.  Also, goal SBP may need to be reduced to a level that is more realistic given her ESLD and chronic hypotension  -no pressing need for volume removal so will attempt to match I=O's for now   Marc Garrett MD   [phone]

## 2019-09-02 NOTE — PROCEDURES
[facility], [place]    Arterial line placement  Date/Time: 9/2/2019 2:41 PM  Performed by: Amy Bray MD  Authorized by: Amy Bray MD     UNIVERSAL PROTOCOL   Site Marked: Yes  Prior Images Obtained and Reviewed:  Yes  Required items: Required blood products, implants, devices and special equipment available    Patient identity confirmed:  Arm band  NA - No sedation, light sedation, or local anesthesia (Patient already intubated and sedated)  Confirmation Checklist:  Patient's identity using two indicators, relevant allergies, procedure was appropriate and matched the consent or emergent situation and correct equipment/implants were available  Time out: Immediately prior to the procedure a time out was called    Preparation: Patient was prepped and draped in usual sterile fashion    ESBL (mL):  2  Indication: multiple ABGs respiratory failure hemodynamic monitoring  Location:  Right radial      SEDATION    Patient Sedated: Yes    Sedation Type:  Anxiolysis  Sedation:  Fentanyl  Vital signs: Vital signs monitored during sedation      PROCEDURE DETAILS      Seldinger technique: Seldinger technique used    Number of Attempts:  1  Post-procedure:  Line sutured and dressing applied  CMS: unchanged  PROCEDURE   Patient Tolerance:  Patient tolerated the procedure well with no immediate complications    : none.  Time of Sedation in Minutes by Physician:  0

## 2019-09-02 NOTE — PROGRESS NOTES
CRRT STATUS NOTE    DATA:  Time:  1800 PM  Pressures WNL:  YES  Filter Status:  WDL    Problems Reported/Alarms Noted:  none    Supplies Present:  YES    ASSESSMENT:  Patient Net Fluid Balance:  9/1: Net +490.33             9/2: Net -640.20 since midnight  Vital Signs:  Temp: 97.1  F (36.2  C) Temp src: Axillary    Heart Rate: 84 Resp: 28 SpO2: 95 % O2 Device: Mechanical Ventilator      Labs:    Lab Results   Component Value Date     09/02/2019      Lab Results   Component Value Date    POTASSIUM 4.1 09/02/2019     Lab Results   Component Value Date    CHLORIDE 108 09/02/2019     Lab Results   Component Value Date    ICAW 4.3 09/02/2019     Lab Results   Component Value Date    CO2 24 09/02/2019     Lab Results   Component Value Date    BUN 21 09/02/2019     Lab Results   Component Value Date    CR 0.70 09/02/2019     Lab Results   Component Value Date     09/02/2019     Lab Results   Component Value Date    MAG 2.6 09/02/2019     Lab Results   Component Value Date    PHOS 3.7 09/02/2019     Goals of Therapy:  CVP goal of 6-12. I=O if 6-12. If >13 may UF up to 100 cc's per hour as tolerated. If <6 set fluid removal rate to 0 until CVP again 6 or greater    INTERVENTIONS:   Continue CRRT per plan of care    PLAN:  Call/contact CRRT RN with questions/concerns

## 2019-09-02 NOTE — PLAN OF CARE
ICU End of Shift Summary. See flowsheets for vital signs and detailed assessment.    Changes this shift: Patient's neuro status unchanged. CRRT  Net +190mL pulling I=O for CVP 6-12.     Plan:  Follow POC.

## 2019-09-03 NOTE — PLAN OF CARE
ICU End of Shift Summary. See flowsheets for vital signs and detailed assessment.    Changes this shift: CVP 9-11 so goal I=O with CRRT, just about meeting goal this morning, set currently in the middle of being changed. Levo ranged from 0.16 mcg/kg/min to 0.22 mcg/kg/min. Remains in sinus arrhythmia/frequent PACs, bp labile with this rhythm. BP also does not tolerate large turns to the left as compared to the right. WBC and LFTs continue to trend upwards, ammonia down to 91 this am. Met stool goal 9/2 with scheduled lactulose. FiO2 weaned to 50%.     Plan:  Continue to support and wean CRRT/pressors/vent as able. Possible IR procedure in near future to treat fluid collection in pelvis as possible infection source.

## 2019-09-03 NOTE — PROGRESS NOTES
Nephrology Progress Note  09/03/2019         Neema Bailey is a 44 yof w/ESLD c/b HE, ascites and EV, polysubstance abuse, obesity s/p gastric bypass 2002 with recent admission for E. Coli bacteremia (source unclear), re-admitted 8/19/19 with SOB, leg swelling and suspected infection.  Cr at baseline is 0.6, on rise since admission with likely culprits of contrast given for CT (for abd pain) and vanco given for suspected infection.  Nephrology consulted for management of DEE DEE and possible HD.       Interval History  Mrs Bailey continues on CRRT, was net negative a bit yesterday, needed 2.2L of UF to achieve on one pressor at fairly high dose.  Planning 0-50cc/hr today, main goal I=O although can pull some fluid if pressor needs are improving through the day.        ASSESSMENT AND RECOMMENDATIONS:   DEE DEE-Baseline Cr of 0.6, up to 1.2 with etiology likely KELLY with 135cc of contrast and also high dose Vanco on admit (although level was not high when checked).  HRS is in DDx and likely has some HRS physiology but Dr Nur and I did look at urine microscopy and saw granular casts suggestive of at least some degree of ATN.  Started CRRT 8/28 due to rapidly worsening resp status to try to pull some fluid and optimize pulm status, continuing with tenuous hemodynamics.                   -DEE DEE, likely ATN from contrast/vanco, sepsis.       -Line is temp [facility] from 8/28                 -CRRT, 0-50cc/hr, main goal I=O but can pull fluid if pressor needs are improving through the day.     Volume-Total body volume overload but much of it 3rd spaced.  Up ~8-10kg since admit, trying to pull 0-50cc/hr to help pulm status although pulm edema is not largest factor with pulm status.       Electrolytes/pH-No acute issues.  K 3.8, stable on CRRT, bicarb 23.       BMD- Ca 7.5, Mg and Phos reasonable.       Nutrition-Nutren 1.5 TF at goal.      Seen and discussed with Dr Gibson     Recommendations were communicated to primary  "team via verbal communication.        KANDACE Lovelace CNS  Clinical Nurse Specialist  [phone]    Review of Systems:   I reviewed the following systems:  ROS not done due to vent/sedation.     Physical Exam:   I/O last 3 completed shifts:  In: 3102.99 [I.V.:1226.99; Other:26; NG/GT:530]  Out: 3763 [Urine:210; Other:2003; Stool:1550]   BP 93/45 (BP Location: Left arm)   Pulse 105   Temp 97  F (36.1  C) (Axillary)   Resp 30   Ht 1.727 m (5' 8\")   Wt 123.4 kg (272 lb 0.8 oz)   LMP 10/04/2018   SpO2 94%   Breastfeeding? No   BMI 41.36 kg/m       GENERAL APPEARANCE: Intubated and sedated.   EYES:  + scleral icterus, pupils equal  HENT: mouth without ulcers or lesions  PULM: lungs clear to auscultation, equal air movement, no cyanosis or clubbing  CV: regular rhythm, normal rate, no rub     -JVP not elevated     -edema +2  GI: soft, non-tender, nondistended, bowel sounds are +  MS: no evidence of inflammation in joints, no muscle tenderness  NEURO: Intubated and sedated. normal    Labs:   All labs reviewed by me  Electrolytes/Renal -   Recent Labs   Lab Test 09/03/19 0301 09/02/19 1607 09/02/19 0341 09/01/19  0334    140 140   < > 141   POTASSIUM 3.8 4.1 4.0   < > 4.0   CHLORIDE 108 108 108   < > 109   CO2 23 24 25   < > 22   BUN 22 21 22   < > 17   CR 0.70 0.70 0.77   < > 0.76   * 152* 185*   < > 169*   NARENDRA 7.5* 7.5* 7.2*   < > 7.4*   MAG 2.5*  --  2.6*  --  2.6*   PHOS 3.9 3.7 3.4   < > 3.4    < > = values in this interval not displayed.       CBC -   Recent Labs   Lab Test 09/03/19 0301 09/02/19 1607 09/02/19  0341   WBC 56.6* 51.9* 46.5*   HGB 7.4* 7.2* 7.4*   PLT 48* 42* 34*       LFTs -   Recent Labs   Lab Test 09/03/19  0301 09/02/19  1607 09/02/19  0341   ALKPHOS 227* 214* 222*   BILITOTAL 11.0* 11.2* 11.9*   * 110* 98*   * 316* 305*   PROTTOTAL 5.0* 5.0* 5.2*   ALBUMIN 2.6* 2.5* 2.7*       Iron Panel -   Recent Labs   Lab Test 08/21/19  0348 07/17/19  1542   IRON " 37 91   IRONSAT 29 86*   CEHLSY 166 372*           Current Medications:    artificial tears   Both Eyes Q6H     ascorbic acid  500 mg Oral or Feeding Tube Daily     cyanocobalamin  100 mcg Oral or Feeding Tube Daily     folic acid  500 mcg Oral or Feeding Tube Daily     heparin lock flush  5-10 mL Intracatheter Q24H     hydrocortisone sodium succinate PF  50 mg Intravenous Q6H     insulin aspart  1-4 Units Subcutaneous Q4H     lactulose  20 g Oral or Feeding Tube Q6H     miconazole   Topical BID     multivitamins w/minerals  15 mL Per Feeding Tube Daily     pantoprazole  40 mg Oral or Feeding Tube QAM AC     protein modular  1 packet Per Feeding Tube TID     rifaximin  550 mg Oral or Feeding Tube BID     sertraline  75 mg Oral or Feeding Tube Daily     vitamin B1  100 mg Oral or Feeding Tube Daily       - MEDICATION INSTRUCTIONS -       IV fluid REPLACEMENT ONLY       CRRT replacement solution 16 mL/kg/hr (09/03/19 1220)     fentaNYL Stopped (09/01/19 1400)     - MEDICATION INSTRUCTIONS -       - MEDICATION INSTRUCTIONS -       norepinephrine 0.24 mcg/kg/min (09/03/19 1300)     - MEDICATION INSTRUCTIONS -       CRRT replacement solution 1.66 mL/kg/hr (09/03/19 0644)     CRRT replacement solution 10 mL/kg/hr (09/03/19 1116)

## 2019-09-03 NOTE — PLAN OF CARE
ICU End of Shift Summary. See flowsheets for vital signs and detailed assessment.    Changes this shift: Pt is still unresponsive with no cough reflex. Pupils equal. Sinus rhythm with frequent PACs. Levo titrated to 0.30 for low SBP. Lung sounds became more coarse bilaterally throughout the day. Frequent diarrhea with leakage around the rectal tube. Scheduled lactulose. Stool goal met for the day. Oliguric. Removed PICC.    Plan: Give 1 unit PRBCs for Hgb 7.0. Hold lactulose.         Problem: Gastrointestinal Condition Comorbidity  Goal: Gastrointestinal Condition  Description  Patient comorbidity will be monitored for signs and symptoms of Gastrointestinal condition.  Problems will be absent, minimized or managed by discharge/transition of care.  9/3/2019 1837 by Karine Vásquez, RN  Outcome: No Change     Problem: Mood Alteration Comorbidity  Goal: Mood Alteration Comorbidity  Description  Patient comorbidity will be monitored for signs and symptoms of Mood Alteration condition.  Problems will be absent, minimized or managed by discharge/transition of care.  9/3/2019 1837 by Karine Vásquez, RN  Outcome: No Change     Problem: ARDS (Acute Respiratory Distress Syndrome)  Goal: Effective Oxygenation  9/3/2019 1837 by Karine Vásquez, RN  Outcome: No Change

## 2019-09-03 NOTE — PROGRESS NOTES
CRRT STATUS NOTE    DATA:  Time:  1830 PM  Pressures WNL:  YES  Filter Status:  WDL    Problems Reported/Alarms Noted:  none    Supplies Present:  YES    ASSESSMENT:  Patient Net Fluid Balance:  9/2 -336.20, 9/3 +380.03  Vital Signs:  Temp: 97.4  F (36.3  C) Temp src: Axillary BP: 93/45   Heart Rate: 92 Resp: 29 SpO2: 94 % O2 Device: Mechanical Ventilator      Labs:    Lab Results   Component Value Date     09/03/2019      Lab Results   Component Value Date    POTASSIUM 4.2 09/03/2019     Lab Results   Component Value Date    CHLORIDE 108 09/03/2019     Lab Results   Component Value Date    ICAW 4.2 09/03/2019     Lab Results   Component Value Date    CO2 25 09/03/2019     Lab Results   Component Value Date    BUN 21 09/03/2019     Lab Results   Component Value Date    CR 0.66 09/03/2019     Lab Results   Component Value Date     09/03/2019     Lab Results   Component Value Date    MAG 2.5 09/03/2019     Lab Results   Component Value Date    PHOS 4.0 09/03/2019     Goals of Therapy: 0-50cc/hr net net negative, main goal I=O, can pull 50cc/hr if pressor needs are decreasing.     INTERVENTIONS:   Continue CRRT per POC    PLAN:  Contact CRRT resource RN with questions/concerns

## 2019-09-03 NOTE — PROGRESS NOTES
[facility], [place]    Progress Note  [place] [facility]     Date of Admission:  8/19/2019    Assessment & Plan   Neema Bailey is a 44 year old female with history of multifactorial ESLD c/b HE, EV, ascites, polysubstance abuse, morbid obesity s/p gastric bypass in 2002 who presents as a transfer from the floor for acute hypoxic respiratory failure and altered mental status, notably recently discharged from ICU 8/21 after a 2-day stay for severe sepsis with hypotension 2/2 ESBL E coli bacteremia of unknown source. Course was complicated by prolonged BiPAP in the setting of altered mental status likely resulting in aspiration events. Intubated for work of breathing, bronched, and subsequently worsened from a respiratory perspective, with development of severe ARDS. Paralyzed and proned for a period of time, now recovering.    Changes today:  Stable, monitoring for improvement. Ongoing leukocytosis that is significant, concerning for ongoing inflammatory process driving poor clinical status.  - decrease PEEP to 8  - continue to wean pressors as able  - pull PICC line that is not needed for access  - VBG with oxyhemoglobin to evaluate perfusion   - MSK US of L gluteal fluid collection   - discontinue meropenem today after 2 week course    Neurology:  Hepatic/Toxic-metabolic encephalopathy: Elevated ammonia to 147 on 8/25 with lethargy. Normal BUN, no other sedating meds given. Ammonia downtrending with PO lactulose and CRRT. Pupils reactive, intermittently sluggish and dilated without focal findings while sedated. Despite no sedatives, pt has consistent RASS of -5.  - PO lactulose scheduled and per [facility] protocol; goal 1L of stool a day  - trend ammonia daily    Sedation: RASS 0 to -1  - off versed  - fentanyl gtt, off  - cis 8/28-8/30    Depression  - PTA sertraline    Cardiovascular:  Shock, multifactorial: in the setting of cirrhosis with third spacing and  possible infection underlying. Rising lactate during hospital stay from 1.9 after transfer out of ICU to 4.3 on the day of transfer back. Bedside TTE with hyperdynamic LV, 1.78cm IVC with minimal respiratory variation while on positive pressure ventilation. Cardiac function on formal TTE 8/20 hyperdynamic with normal PASP. Repeat TTE with ongoing hyperdynamic changes, no evidence of shunting on bubble study. Able to wean off vaso and phenylephrine after starting hydrocortisone on on 8/30.  - levophed, titrate for SBP > 95   - will consider decreasing SBP goal once central venous oxygenation is determined  - VBG wit oxyhemoglobin today   - hydrocort stress dosing until able to wean off pressors    Pulmonary:        Acute hypoxic respiratory failure  Developing ARDS, suspected  Transudative R-sided pleural effusion, moderate  CT chest 8/25 notable for GGO, interlobular septal thickening, patchy consolidative opacities. Differential includes pulmonary edema, developing ARDS, hepatopulmonary syndrome, pneumonia, atypical infection, severe aspiration pneumonitis.. P/F ratio 149. Intubated for increased work of breathing and hypoxemia. Initially improving on MICU readmission day 2 and then worsened after bronchial lavage on MICU day 3 with worsening hypoxemia. BAL notable for significant bilious secretions that were suctioned. Subsequently, started on Flolan with some improvement. Improvement after being prone for ~12h on 8/29, and remained stable after flipping to supine.   - on CRRT, I=O in the setting of soft pressures  - daily CXR to re-evaluate  - routine vent cares  - lung-protective ventilation  - goal PaO2 55-85, plateau pressures < 30   - daily ABG    Ventilation Mode: CMV/AC  (Continuous Mandatory Ventilation/ Assist Control)  FiO2 (%): 50 %  Rate Set (breaths/minute): 25 breaths/min  Tidal Volume Set (mL): 350 mL  PEEP (cm H2O): 10 cmH2O  Oxygen Concentration (%): 60 %  Resp: 29      Renal  Anasarca  Oliguric  DEE DEE 2/2 KELLY, possible hepatorenal syndrome, ATN  Baseline creatinine 0.6, increased to 1.0 in the setting of hypotension. Recent nephrotoxins also include IV contrast 8/25 during CT scan, and now administration of vancomycin. Urine output decreasing 8/23. UA 8/22 notable for mild proteinuria, small LE, hyaline casts. FENA and FEBUN consistent with prerenal azotemia. Repeat UA 8/27 demonstrating some ketones, did not improve with 2 days of albumin challenge.  - strict I/Os with Alexander anchored  - avoid nephrotoxins  - Renal consult, appreciate assistance  - daily BMP  - holding PTA midodrine given on stronger pressors  - CRRT per Renal  - compression stockings    Hypernatremia: Resolved with free water flushing and on CRRT.    ID:         Recent ESBL E coli bacteremia   Possible sepsis due to unknown source : Blood culture 1/2 positive 8/19 for ESBL E coli. No followup blood cultures obtained until 8/22, which remain NGTD. Previous urine culture, transudative pleural fluid culture, and ascites fluid culture currently NGTD. S pneumo and legionella urine antigen negative. Procalcitonin initially uptrending but now stable. Leukocytosis uptrending. Cryptococcal antigen negative. Re-culture of blood, sputum, urine on 9/2 with no growth to date.  - trend CBC  - consider tapping pelvic fluid collection noted on CT if not improving  - off micafungin on 9/1 (b,d-glucan and galactomannan negative)  - discontinue meropenem today  - MSK US of left gluteus to evaluate fluid collection     Cultures:  UCx 8/27 No growth   Bronchial lavage 8/27 NGTD  BCx 8/26 x2 NGTD  Pleural fluid culture 8/25 NGTD  Ascites fluid culture 8/22 NGTD    Antimicrobials:  Meropenem (8/25 - 9/3)  Micafungin (8/26 - 9/2)  Vancomycin (8/20, 8/25-8/27)  Azithromycin (8/25 - 8/27)  Ertapenem (8/19 - 8/24)      GI  ESLD, multifactorial c/b portal hypertension with ascites, esophageal varices: Last EGD 5/2019 with no varices. Last US 8/20 with no mass, no  portal vein thrombosis.   - Hepatology consulted, appreciate recs  - currently not undergoing transplant evaluation  - rifaxamin, lactulose as above for HE protocol  - holding PTA diuretics furosemid 20mg, spironolactone 50mg  - daily MELD labs    MELD-Na score: 27 at 9/3/2019  3:01 AM  MELD score: 27 at 9/3/2019  3:01 AM  Calculated from:  Serum Creatinine: 0.70 mg/dL (Rounded to 1 mg/dL) at 9/3/2019  3:01 AM  Serum Sodium: 141 mmol/L (Rounded to 137 mmol/L) at 9/3/2019  3:01 AM  Total Bilirubin: 11.0 mg/dL at 9/3/2019  3:01 AM  INR(ratio): 2.94 at 9/3/2019  3:01 AM  Age: 44 years    ? Alcoholic hepatitis  Hyperbilirubinemia, Mild transaminitis: Mild gallbladder wall thickening noted on CT abd/pelvis 8/25. Negative HIDA scan. Tenderness to palpation on exam. CK initially elevated to 1300, downtrending on recheck - may have some critical illness myopathy. MDF ? 154. On steroids for shock.  - continue to monitor  - trend CK, daily MELD labs    Mild reflux esophagitis  - PPI daily    Nutrition  At risk for malnutrition:  - feeding per nutrition    Endocrine:  At risk for hypoglycemia  - hypoglycemia protocol, q4h glucose checks while on tube feeds  - sliding scale insulin    Heme/Onc:  Mild DIC/LIC  Thrombocytopenia, multifactorial (baseline 120s in the last 6 months)  Macrocytic anemia   Baseline hemoglobin 8-9 in the last 6 months. Requiring intermittent transfusions while on CRRT. Hemolysis labs suggestive of mild DIC with thrombocytopenia, anemia, RBC fragments on smear, haptoglobin undetectable, LDH elevated, hemoglobin plasma elevated and risk factors including ARDS, liver disease, underlying infectious process.  - daily CBC, transfuse Hgb > 7  - continue to monitor  - heme consulted, appreciate recs  - LDH q48h, fibrinogen daily    DVT Prophylaxis: Pneumatic Compression Devices  GI Prophylaxis: PPI    Restraints: Restraints for medical healing needed: YES    Family update by me today: Yes     Lilliana Oakley   Reynaldo    I have seen and discussed this patient with Dr. Jones.     Lilliana Jacobs MD  Medicine-Pediatrics, PGY-1  Pager [phone]      Code Status   Full Code    Subjective  RN notes reviewed. No acute events overnight. White count continues to climb. Still on levophed and stress dose steroids, no sedation.     Review of Systems   Review of systems not obtained due to patient factors - mental status    Physical Exam   Temp: 97.5  F (36.4  C) Temp src: Oral Temp  Min: 96.2  F (35.7  C)  Max: 98.1  F (36.7  C) BP: 93/45   Heart Rate: 86 Resp: 29 SpO2: 95 % O2 Device: Mechanical Ventilator    Vital Signs with Ranges  Temp:  [96.2  F (35.7  C)-98.1  F (36.7  C)] 97.5  F (36.4  C)  Heart Rate:  [] 86  Resp:  [25-29] 29  BP: (93)/(45) 93/45  MAP:  [49 mmHg-69 mmHg] 58 mmHg  Arterial Line BP: ()/(27-51) 96/39  FiO2 (%):  [50 %-60 %] 50 %  SpO2:  [90 %-100 %] 95 %  272 lbs .76 oz    Constitutional: intubated and sedated  Eyes: Pupils equal, small, and sluggish, scleral icterus, subconjunctival hemorrhage on the R   ENT: Normocephalic, without obvious abnormality, atraumatic, orally intubated  Respiratory: CTAB, synchronous with vent, no wheezes/rhonchi/rales appreciated  Cardiovascular: Normal apical impulse, tachycardic, irregular rhythm, and no murmur noted.  GI: normal bowel sounds, soft, non-distended, nontender on palpation, no masses palpated, no hepatosplenomegaly, brown stool in rectal tube  Genitourinary:  Alexander in place draining dark yellow urine  Skin: No bruising or bleeding, mild jaundice, texture, turgor, no redness, warmth, or swelling, no rashes, no lesions, no abnormal moles, nails normal without discoloration or clubbing Musculoskeletal: pitting edema bilaterally in the LE above the knees 2+, compression stockings on, Tone is normal.  Neurologic: sedated, unresponsive to pain    Data   Results for orders placed or performed during the hospital encounter of 08/19/19 (from  the past 24 hour(s))   Blood culture   Result Value Ref Range    Specimen Description Blood Right Hand     Special Requests Aerobic and anaerobic bottles received     Culture Micro No growth after 16 hours    Blood culture   Result Value Ref Range    Specimen Description Blood Right Port     Special Requests Aerobic and anaerobic bottles received     Culture Micro No growth after 18 hours    Urine Culture Aerobic Bacterial   Result Value Ref Range    Specimen Description Catheterized Urine     Special Requests Specimen received in preservative     Culture Micro PENDING    Glucose by meter   Result Value Ref Range    Glucose 146 (H) 70 - 99 mg/dL   Arterial line placement    Narrative    Amy Bray MD     9/2/2019  2:55 PM  [facility]    Arterial line placement  Date/Time: 9/2/2019 2:41 PM  Performed by: Amy Bray MD  Authorized by: Amy Bray MD     UNIVERSAL PROTOCOL   Site Marked: Yes  Prior Images Obtained and Reviewed:  Yes  Required items: Required blood products, implants, devices and special   equipment available    Patient identity confirmed:  Arm band  NA - No sedation, light sedation, or local anesthesia (Patient already   intubated and sedated)  Confirmation Checklist:  Patient's identity using two indicators, relevant   allergies, procedure was appropriate and matched the consent or emergent   situation and correct equipment/implants were available  Time out: Immediately prior to the procedure a time out was called    Preparation: Patient was prepped and draped in usual sterile fashion    ESBL (mL):  2  Indication: multiple ABGs respiratory failure hemodynamic monitoring  Location:  Right radial      SEDATION    Patient Sedated: Yes    Sedation Type:  Anxiolysis  Sedation:  Fentanyl  Vital signs: Vital signs monitored during sedation      PROCEDURE DETAILS      Seldinger technique: Seldinger technique used    Number of Attempts:  1  Post-procedure:  Line  sutured and dressing applied  CMS: unchanged  PROCEDURE   Patient Tolerance:  Patient tolerated the procedure well with no immediate   complications    : none.  Time of Sedation in Minutes by Physician:  0     Sputum Culture Aerobic Bacterial   Result Value Ref Range    Specimen Description Sputum Endotracheal     Culture Micro PENDING    Gram stain   Result Value Ref Range    Specimen Description Sputum Endotracheal     Gram Stain <25 PMNs/low power field     Gram Stain Few  Mixed gram negative and positive venkat      Glucose by meter   Result Value Ref Range    Glucose 139 (H) 70 - 99 mg/dL   Comprehensive metabolic panel   Result Value Ref Range    Sodium 140 133 - 144 mmol/L    Potassium 4.1 3.4 - 5.3 mmol/L    Chloride 108 94 - 109 mmol/L    Carbon Dioxide 24 20 - 32 mmol/L    Anion Gap 8 3 - 14 mmol/L    Glucose 152 (H) 70 - 99 mg/dL    Urea Nitrogen 21 7 - 30 mg/dL    Creatinine 0.70 0.52 - 1.04 mg/dL    GFR Estimate >90 >60 mL/min/[1.73_m2]    GFR Estimate If Black >90 >60 mL/min/[1.73_m2]    Calcium 7.5 (L) 8.5 - 10.1 mg/dL    Bilirubin Total 11.2 (H) 0.2 - 1.3 mg/dL    Albumin 2.5 (L) 3.4 - 5.0 g/dL    Protein Total 5.0 (L) 6.8 - 8.8 g/dL    Alkaline Phosphatase 214 (H) 40 - 150 U/L     (H) 0 - 50 U/L     (H) 0 - 45 U/L   Phosphorus   Result Value Ref Range    Phosphorus 3.7 2.5 - 4.5 mg/dL   Calcium, ionized whole blood (AM Draw)   Result Value Ref Range    Calcium Ionized Whole Blood 4.3 (L) 4.4 - 5.2 mg/dL   CBC with platelets differential   Result Value Ref Range    WBC 51.9 (HH) 4.0 - 11.0 10e9/L    RBC Count 2.27 (L) 3.8 - 5.2 10e12/L    Hemoglobin 7.2 (L) 11.7 - 15.7 g/dL    Hematocrit 22.5 (L) 35.0 - 47.0 %    MCV 99 78 - 100 fl    MCH 31.7 26.5 - 33.0 pg    MCHC 32.0 31.5 - 36.5 g/dL    RDW 20.0 (H) 10.0 - 15.0 %    Platelet Count 42 (LL) 150 - 450 10e9/L    Diff Method Automated Method     % Neutrophils 84.1 %    % Lymphocytes 3.4 %    % Monocytes 8.5 %    % Eosinophils 0.0 %    %  Basophils 0.2 %    % Immature Granulocytes 3.8 %    Nucleated RBCs 0 0 /100    Absolute Neutrophil 43.7 (H) 1.6 - 8.3 10e9/L    Absolute Lymphocytes 1.7 0.8 - 5.3 10e9/L    Absolute Monocytes 4.4 (H) 0.0 - 1.3 10e9/L    Absolute Eosinophils 0.0 0.0 - 0.7 10e9/L    Absolute Basophils 0.1 0.0 - 0.2 10e9/L    Abs Immature Granulocytes 2.0 (H) 0 - 0.4 10e9/L    Absolute Nucleated RBC 0.1    Fibrinogen activity (AM Draw)   Result Value Ref Range    Fibrinogen 141 (L) 200 - 420 mg/dL   Blood gas arterial   Result Value Ref Range    pH Arterial 7.29 (L) 7.35 - 7.45 pH    pCO2 Arterial 50 (H) 35 - 45 mm Hg    pO2 Arterial 73 (L) 80 - 105 mm Hg    Bicarbonate Arterial 24 21 - 28 mmol/L    Base Deficit Art 2.6 mmol/L    FIO2 60.0    EKG 12-lead, tracing only   Result Value Ref Range    Interpretation ECG Click View Image link to view waveform and result    Glucose by meter   Result Value Ref Range    Glucose 172 (H) 70 - 99 mg/dL   Glucose by meter   Result Value Ref Range    Glucose 141 (H) 70 - 99 mg/dL   CK total   Result Value Ref Range    CK Total 542 (H) 30 - 225 U/L   Ammonia (AM Draw)   Result Value Ref Range    Ammonia 91 (H) 10 - 50 umol/L   INR   Result Value Ref Range    INR 2.94 (H) 0.86 - 1.14   PTT (AM Draw)   Result Value Ref Range    PTT 57 (H) 22 - 37 sec   Blood gas arterial   Result Value Ref Range    pH Arterial 7.27 (L) 7.35 - 7.45 pH    pCO2 Arterial 52 (H) 35 - 45 mm Hg    pO2 Arterial 91 80 - 105 mm Hg    Bicarbonate Arterial 24 21 - 28 mmol/L    Base Deficit Art 2.9 mmol/L    FIO2 60.0    Comprehensive metabolic panel   Result Value Ref Range    Sodium 141 133 - 144 mmol/L    Potassium 3.8 3.4 - 5.3 mmol/L    Chloride 108 94 - 109 mmol/L    Carbon Dioxide 23 20 - 32 mmol/L    Anion Gap 10 3 - 14 mmol/L    Glucose 190 (H) 70 - 99 mg/dL    Urea Nitrogen 22 7 - 30 mg/dL    Creatinine 0.70 0.52 - 1.04 mg/dL    GFR Estimate >90 >60 mL/min/[1.73_m2]    GFR Estimate If Black >90 >60 mL/min/[1.73_m2]     Calcium 7.5 (L) 8.5 - 10.1 mg/dL    Bilirubin Total 11.0 (H) 0.2 - 1.3 mg/dL    Albumin 2.6 (L) 3.4 - 5.0 g/dL    Protein Total 5.0 (L) 6.8 - 8.8 g/dL    Alkaline Phosphatase 227 (H) 40 - 150 U/L     (H) 0 - 50 U/L     (H) 0 - 45 U/L   Phosphorus   Result Value Ref Range    Phosphorus 3.9 2.5 - 4.5 mg/dL   Magnesium (AM Draw)   Result Value Ref Range    Magnesium 2.5 (H) 1.6 - 2.3 mg/dL   Calcium, ionized whole blood (AM Draw)   Result Value Ref Range    Calcium Ionized Whole Blood 4.6 4.4 - 5.2 mg/dL   CBC with platelets differential   Result Value Ref Range    WBC 56.6 (HH) 4.0 - 11.0 10e9/L    RBC Count 2.43 (L) 3.8 - 5.2 10e12/L    Hemoglobin 7.4 (L) 11.7 - 15.7 g/dL    Hematocrit 24.1 (L) 35.0 - 47.0 %    MCV 99 78 - 100 fl    MCH 30.5 26.5 - 33.0 pg    MCHC 30.7 (L) 31.5 - 36.5 g/dL    RDW 20.1 (H) 10.0 - 15.0 %    Platelet Count 48 (LL) 150 - 450 10e9/L    Diff Method Automated Method     % Neutrophils 83.4 %    % Lymphocytes 3.4 %    % Monocytes 9.6 %    % Eosinophils 0.0 %    % Basophils 0.2 %    % Immature Granulocytes 3.4 %    Nucleated RBCs 0 0 /100    Absolute Neutrophil 47.2 (H) 1.6 - 8.3 10e9/L    Absolute Lymphocytes 1.9 0.8 - 5.3 10e9/L    Absolute Monocytes 5.5 (H) 0.0 - 1.3 10e9/L    Absolute Eosinophils 0.0 0.0 - 0.7 10e9/L    Absolute Basophils 0.1 0.0 - 0.2 10e9/L    Abs Immature Granulocytes 2.0 (H) 0 - 0.4 10e9/L    Absolute Nucleated RBC 0.1     Anisocytosis Moderate     Poikilocytosis Slight     RBC Fragments Slight     Platelet Estimate Confirming automated cell count    Fibrinogen activity (AM Draw)   Result Value Ref Range    Fibrinogen 127 (L) 200 - 420 mg/dL   Glucose by meter   Result Value Ref Range    Glucose 167 (H) 70 - 99 mg/dL   Glucose by meter   Result Value Ref Range    Glucose 162 (H) 70 - 99 mg/dL

## 2019-09-03 NOTE — PROGRESS NOTES
CRRT STATUS NOTE    DATA:  Time:  7:39 AM  Pressures WNL:  YES  Filter Status:  WDL    Problems Reported/Alarms Noted:  none    Supplies Present:  YES    ASSESSMENT:  Patient Net Fluid Balance:  9/2: net -390 mL; 9/3: net +160 mL since MN  Vital Signs:  T 97.5-97.6 oral, HR 78-86, MAP 54-59, RR 26-29, SPO2 92-98%  Labs:  Crt 0.7, Mg 2.5, NH3 91, pH 7.27, WBC 56.6, Plt 48  Goals of Therapy:  CVP goal of 6-12. I=O if 6-12. If >13 may UF up to 100 cc's per hour as tolerated. If <6 set fluid removal rate to 0 until CVP again 6 or greater, meeting goals    INTERVENTIONS:   Restarted x1    PLAN:  Continue fluid removal as tolerated per goals of therapy. Check circuit daily and change circuit q72h and prn. Please contact CRRT resource RN at [phone] with any questions/concerns./

## 2019-09-03 NOTE — PROGRESS NOTES
CLINICAL NUTRITION SERVICES - REASSESSMENT NOTE     Nutrition Prescription    RECOMMENDATIONS FOR MDs/PROVIDERS TO ORDER:  Maintain FT when pt is extubated, as able. Pt likely will be deconditioned and will not meet needs via PO intake alone when able to start eating.     Malnutrition Status:    Severe malnutrition in the context of chronic illness.     Recommendations already ordered by Registered Dietitian (RD):  Discussed nutrition POC on MICU rounds.   Changed Certavite to NephroNex with pt now on CRRT.    Future/Additional Recommendations:  Metabolic cart study once off of CRRT (on a non-HD day, if pt transitions to iHD).       EVALUATION OF THE PROGRESS TOWARD GOALS   Diet: NPO  Nutrition Support: Nutren 1.5 @ 55 mL/hr which provides 1980 kcals (26 kcal/kg/day), 90 g PRO (1.2 g/kg/day), 1003 mL H2O, 232 g CHO and no fiber daily.  Pt also receives protein modular, Prosource, 1 pkt TID to provide an additional 120 kcal and 33 g PRO to fully meet PRO needs (total 123 g/day or 1.6 g PRO/kg). TF infusing via small bore FT.  Intake: goal TF volumes received daily for the past 4 days. Pt received goal Prosource daily for the past 5 days.        NEW FINDINGS   Pitting edema improved in LE, though still present. Pt appears more cachectic. Pt remains jaundiced. Remains on ascorbic acid supplement for + signs of scurvy.  Daily stool output with lactulose.  Lytes are ok, other than hypocalcemia.    MALNUTRITION  % Intake: Decreased intake does not meet criteria  % Weight Loss: None noted  Subcutaneous Fat Loss: None observed  Muscle Loss: Scapular bone: moderate, Thoracic region (clavicle, acromium bone, deltoid, trapezius, pectoral): moderate, Upper arm (bicep, tricep): moderate, Lower arm (forearm): moderate, Dorsal hand: mild, Upper leg (quadricep, hamstring): moderate, and Posterior calf: moderate (difficult to assess given morbid obesity). Sarcopenic obesity present.   Fluid Accumulation/Edema: Moderate (still has LE  edema, but improving)  Malnutrition Diagnosis: Severe malnutrition in the context of chronic illness.     Previous Goals   Total avg nutritional intake to meet a minimum of 20 kcal/kg and 1.5 g PRO/kg daily (per dosing wt 77 kg).  Evaluation: Met    Previous Nutrition Diagnosis  Inadequate protein-energy intake  Evaluation: Improving    CURRENT NUTRITION DIAGNOSIS  Predicted inadequate nutrient intake (calories, protein) related to prolonged hospital LOS with risk for interruptions to TF infusion.      INTERVENTIONS  Implementation  Collaboration with other providers- discussed nutrition POC with MICU team.   Multivit/min changed.    Goals  Total avg nutritional intake to meet a minimum of 20 kcal/kg and 1.5 g PRO/kg daily (per dosing wt 77 kg).    Monitoring/Evaluation  Progress toward goals will be monitored and evaluated per protocol.    Sayda Frank, DENILSON, LD  (MICU dietitian, sly- [phone])

## 2019-09-04 NOTE — PROGRESS NOTES
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 Course was complicated by prolonged BiPAP in the setting of altered mental status likely resulting in aspiration events. Intubated for work of breathing, bronched, and subsequently worsened from a respiratory perspective, with development of severe ARDS. Paralyzed and proned for a period of time, now recovering.    Changes today:  Stable, monitoring for improvement. Continued leukocytosis with climb in liver function tests concerning for additional inflammatory process.  - EEG today   - SBP goal of 85, will check lactate this afternoon to check for adequate perfusion  - try to pull ~50cc/hr on CRRT as tolerated  - wean down hydrocortisone from q6h to q8h  - discontinue sertraline in the setting of poor platelet function   - increase tidal volume to 400 and decrease inspiratory time to 0.65    Neurology:  Hepatic/Toxic-metabolic encephalopathy: Elevated ammonia to 147 on 8/25 with lethargy. Normal BUN, no other sedating meds given. Ammonia downtrending with PO lactulose and CRRT. Pupils reactive, intermittently sluggish without focal findings while sedated. Despite no sedatives, pt has consistent RASS of -5.  - PO lactulose scheduled and per [facility] protocol; goal 1L of stool a day  - trend ammonia daily  - EEG today to assess underlying seizure activity    Sedation: RASS 0 to -1  - off versed  - fentanyl gtt, off  - cis 8/28-8/30    Depression  -  discontinue sertraline in the setting of coagulopathy     Cardiovascular:  Shock, multifactorial: in the setting of cirrhosis with third spacing and possible infection underlying. Rising lactate during hospital stay from 1.9 after transfer out of ICU to 4.3 on the day of transfer back. Bedside TTE with hyperdynamic LV, 1.78cm IVC with minimal respiratory variation while on positive pressure ventilation. Cardiac function on formal TTE 8/20 hyperdynamic with normal PASP. Repeat TTE with ongoing hyperdynamic changes, no evidence of shunting on bubble study. Able to wean off vaso and phenylephrine after starting hydrocortisone on on 8/30.  - levophed, titrate for SBP > 85   - will recheck lactate this afternoon for adequate perfusion  - slow wean of hydrocortisone stress dosing from q6h to q8h    Pulmonary:        Acute hypoxic respiratory failure  Developing ARDS, suspected  Transudative R-sided pleural effusion, moderate  CT chest 8/25 notable for GGO, interlobular septal thickening, patchy consolidative opacities. Differential includes pulmonary edema, developing ARDS, hepatopulmonary syndrome, pneumonia, atypical infection, severe aspiration pneumonitis.. P/F ratio 149. Intubated for increased work of breathing and hypoxemia. Initially improving on MICU readmission day 2 and then worsened after bronchial lavage on MICU day 3 with worsening hypoxemia. BAL notable for significant bilious secretions that were suctioned. Subsequently, started on Flolan with some improvement. Improvement after being prone for ~12h on 8/29, and remained stable after flipping to supine. CXR on 9/4 with worsened pleural effusion.   - on CRRT, try to pull 50cc/hr today  - daily CXR to re-evaluate  - routine vent cares  - lung-protective ventilation  - goal PaO2 55-85, plateau pressures < 30    - increase tidal volume to 400 and decrease inspiratory time to 0.65 since plateau pressure was 20  - daily ABG    Ventilation Mode: CMV/AC   (Continuous Mandatory Ventilation/ Assist Control)  FiO2 (%): 50 %  Rate Set (breaths/minute): 25 breaths/min  Tidal Volume Set (mL): 350 mL  PEEP (cm H2O): 8 cmH2O  Oxygen Concentration (%): 50 %  Resp: 28      Renal  Anasarca  Oliguric DEE DEE 2/2 KELLY, possible hepatorenal syndrome, ATN  Baseline creatinine 0.6, increased to 1.0 in the setting of hypotension. Recent nephrotoxins also include IV contrast 8/25 during CT scan, and now administration of vancomycin. Urine output decreasing 8/23. UA 8/22 notable for mild proteinuria, small LE, hyaline casts. FENA and FEBUN consistent with prerenal azotemia. Repeat UA 8/27 demonstrating some ketones, did not improve with 2 days of albumin challenge.  - strict I/Os with Alexander anchored  - avoid nephrotoxins  - Renal consult, appreciate assistance  - daily BMP  - holding PTA midodrine given on stronger pressors  - CRRT per Renal, pull 50cc/hr today  - compression stockings    Hypernatremia: Resolved with free water flushing and on CRRT.    ID:         Recent ESBL E coli bacteremia   Possible sepsis due to unknown source : Blood culture 1/2 positive 8/19 for ESBL E coli. No followup blood cultures obtained until 8/22, which remain NGTD. Previous urine culture, transudative pleural fluid culture, and ascites fluid culture currently NGTD. S pneumo and legionella urine antigen negative. Procalcitonin initially uptrending but now stable. Leukocytosis and LFTs uptrending. Cryptococcal antigen negative. Re-culture of blood, sputum, urine on 9/2 with no growth to date.   - trend CBC  - consider tapping pelvic fluid collection noted on CT if not improving  - off all antimicrobials  - MSK US of left gluteus to evaluate fluid collection, not concerning for abscess     Cultures:  UCx 8/27 No growth   Bronchial lavage 8/27 NGTD  BCx 8/26 x2 NGTD  Pleural fluid culture 8/25 NGTD  Ascites fluid culture 8/22 NGTD    Antimicrobials:  Meropenem (8/25 - 9/3)  Micafungin (8/26 - 9/2)  Vancomycin  (8/20, 8/25-8/27)  Azithromycin (8/25 - 8/27)  Ertapenem (8/19 - 8/24)      GI  ESLD, multifactorial c/b portal hypertension with ascites, esophageal varices: Last EGD 5/2019 with no varices. Last US 8/20 with no mass, no portal vein thrombosis. LFTs uptrending, will continue to monitor.   - Hepatology consulted, appreciate recs  - currently not undergoing transplant evaluation  - rifaxamin, lactulose as above for HE protocol  - holding PTA diuretics furosemid 20mg, spironolactone 50mg  - daily MELD labs    MELD-Na score: 29 at 9/4/2019  4:10 AM  MELD score: 29 at 9/4/2019  4:10 AM  Calculated from:  Serum Creatinine: 0.72 mg/dL (Rounded to 1 mg/dL) at 9/4/2019  4:10 AM  Serum Sodium: 142 mmol/L (Rounded to 137 mmol/L) at 9/4/2019  4:10 AM  Total Bilirubin: 11.9 mg/dL at 9/4/2019  4:10 AM  INR(ratio): 3.26 at 9/4/2019  4:10 AM  Age: 44 years    ? Alcoholic hepatitis  Hyperbilirubinemia, Mild transaminitis: Mild gallbladder wall thickening noted on CT abd/pelvis 8/25. Negative HIDA scan. Tenderness to palpation on exam. CK initially elevated to 1300, then downtrending on recheck - may have some critical illness myopathy. However, CK uptrending on 9/4 to 892. MDF ? 154. On steroids for shock.  - continue to monitor  - trend CK, daily MELD labs    Mild reflux esophagitis  - PPI daily    Nutrition  At risk for malnutrition:  - feeding per nutrition    Endocrine:  At risk for hypoglycemia  - hypoglycemia protocol, q4h glucose checks while on tube feeds  - sliding scale insulin    Heme/Onc:  Mild DIC/LIC  Thrombocytopenia, multifactorial (baseline 120s in the last 6 months)  Macrocytic anemia   Baseline hemoglobin 8-9 in the last 6 months. Requiring intermittent transfusions while on CRRT. Hemolysis labs suggestive of mild DIC with thrombocytopenia, anemia, RBC fragments on smear, haptoglobin undetectable, LDH elevated, hemoglobin plasma elevated and risk factors including ARDS, liver disease, underlying infectious  process. On 9/4, fibrinogen down to 89, transfused 2U of cryo.   - daily CBC, transfuse Hgb > 7  - continue to monitor  - heme consulted, appreciate recs  - LDH q48h, fibrinogen daily  - fibrinogen goal >100    DVT Prophylaxis: Pneumatic Compression Devices  GI Prophylaxis: PPI    Restraints: Restraints for medical healing needed: YES    Family update by me today: Yes     Lilliana Jacobs    I have seen and discussed this patient with Dr. Jones.     Lilliana Jacobs MD  Medicine-Pediatrics, PGY-1  Pager [phone]      Code Status   Full Code    Subjective  RN notes reviewed. Transfused 2U cryoprecipitate overnight for low fibrinogen. Continues to require 0.3 of Norepi. No improvement of mental status.     Review of Systems   Review of systems not obtained due to patient factors - mental status    Physical Exam   Temp: 98.4  F (36.9  C) Temp src: Axillary Temp  Min: 97  F (36.1  C)  Max: 99.3  F (37.4  C) BP: 96/46   Heart Rate: 103 Resp: 28 SpO2: 91 % O2 Device: Mechanical Ventilator    Vital Signs with Ranges  Temp:  [97  F (36.1  C)-99.3  F (37.4  C)] 98.4  F (36.9  C)  Heart Rate:  [] 103  Resp:  [25-31] 28  BP: (96)/(46) 96/46  MAP:  [50 mmHg-70 mmHg] 66 mmHg  Arterial Line BP: ()/(35-51) 99/48  FiO2 (%):  [50 %] 50 %  SpO2:  [91 %-98 %] 91 %  271 lbs 13.23 oz    Constitutional: intubated and sedated  Eyes: Pupils equal, small, and reactive, scleral icterus, subconjunctival hemorrhage on the R; roving eye movements    ENT: Normocephalic, without obvious abnormality, atraumatic, orally intubated  Respiratory: CTAB, not synchronous with vent, no wheezes/rhonchi/rales appreciated  Cardiovascular: tachycardic, irregular rhythm, and no murmur noted.  GI: normal bowel sounds, soft, non-distended, nontender on palpation, no masses palpated, no hepatosplenomegaly, dark stool in rectal tube  Genitourinary:  Alexander in place draining dark yellow urine  Skin: No bruising or bleeding, mild  jaundice, texture, turgor, no redness, warmth, or swelling, no rashes, no lesions, no abnormal moles, nails normal without discoloration or clubbing; dependent edema  Musculoskeletal: pitting edema bilaterally in the LE above the knees 2+, compression stockings on, tone is normal.  Neurologic: sedated, unresponsive to painful stimuli on upper and lower extremities     Data   Results for orders placed or performed during the hospital encounter of 08/19/19 (from the past 24 hour(s))   Blood gas venous with oxyhemoglobin (PCU Collect TBD)   Result Value Ref Range    Ph Venous 7.27 (L) 7.32 - 7.43 pH    PCO2 Venous 52 (H) 40 - 50 mm Hg    PO2 Venous 51 (H) 25 - 47 mm Hg    Bicarbonate Venous 24 21 - 28 mmol/L    FIO2 50     Oxyhemoglobin Venous 78 %    Base Deficit Venous 3.0 mmol/L   US MSK Limited    Narrative    EXAM: Limited left hip ultrasound, 9/3/2019.    INDICATION: Pelvic fluid collection on the left side superficial to  the gluteal muscle.    COMPARISON: CT, 8/25/2019.    TECHNIQUE: Gray scale imaging without color Doppler of the left hip.    FINDINGS/    Impression    IMPRESSION: 12.0 x 2.9 x 4.6 cm anechoic collection adjacent to the  left hip with extensive overlying edema, does not appear to be  significantly changed since CT exam dated 8/25/2019.     I have personally reviewed the examination and initial interpretation  and I agree with the findings.    JAY ENGLE MD   Blood gas arterial   Result Value Ref Range    pH Arterial 7.31 (L) 7.35 - 7.45 pH    pCO2 Arterial 48 (H) 35 - 45 mm Hg    pO2 Arterial 64 (L) 80 - 105 mm Hg    Bicarbonate Arterial 25 21 - 28 mmol/L    Base Deficit Art 1.8 mmol/L    FIO2 50    Glucose by meter   Result Value Ref Range    Glucose 158 (H) 70 - 99 mg/dL   Comprehensive metabolic panel   Result Value Ref Range    Sodium 140 133 - 144 mmol/L    Potassium 4.2 3.4 - 5.3 mmol/L    Chloride 108 94 - 109 mmol/L    Carbon Dioxide 25 20 - 32 mmol/L    Anion Gap 7 3 - 14 mmol/L     Glucose 181 (H) 70 - 99 mg/dL    Urea Nitrogen 21 7 - 30 mg/dL    Creatinine 0.66 0.52 - 1.04 mg/dL    GFR Estimate >90 >60 mL/min/[1.73_m2]    GFR Estimate If Black >90 >60 mL/min/[1.73_m2]    Calcium 7.4 (L) 8.5 - 10.1 mg/dL    Bilirubin Total 11.0 (H) 0.2 - 1.3 mg/dL    Albumin 2.4 (L) 3.4 - 5.0 g/dL    Protein Total 4.9 (L) 6.8 - 8.8 g/dL    Alkaline Phosphatase 230 (H) 40 - 150 U/L     (H) 0 - 50 U/L     (H) 0 - 45 U/L   Phosphorus   Result Value Ref Range    Phosphorus 4.0 2.5 - 4.5 mg/dL   Calcium, ionized whole blood (AM Draw)   Result Value Ref Range    Calcium Ionized Whole Blood 4.2 (L) 4.4 - 5.2 mg/dL   CBC with platelets differential   Result Value Ref Range    WBC 57.5 (HH) 4.0 - 11.0 10e9/L    RBC Count 2.17 (L) 3.8 - 5.2 10e12/L    Hemoglobin 7.0 (L) 11.7 - 15.7 g/dL    Hematocrit 21.6 (L) 35.0 - 47.0 %     78 - 100 fl    MCH 32.3 26.5 - 33.0 pg    MCHC 32.4 31.5 - 36.5 g/dL    RDW 20.3 (H) 10.0 - 15.0 %    Platelet Count 62 (L) 150 - 450 10e9/L    Diff Method Manual Differential     % Neutrophils 86.0 %    % Lymphocytes 6.1 %    % Monocytes 7.0 %    % Eosinophils 0.0 %    % Basophils 0.9 %    Nucleated RBCs 1 (H) 0 /100    Absolute Neutrophil 49.5 (H) 1.6 - 8.3 10e9/L    Absolute Lymphocytes 3.5 0.8 - 5.3 10e9/L    Absolute Monocytes 4.0 (H) 0.0 - 1.3 10e9/L    Absolute Eosinophils 0.0 0.0 - 0.7 10e9/L    Absolute Basophils 0.5 (H) 0.0 - 0.2 10e9/L    Absolute Nucleated RBC 0.5     Anisocytosis Marked     Poikilocytosis Slight     Polychromasia Slight     [place] Cells Moderate     Macrocytes Present     Platelet Estimate Confirming automated cell count    Fibrinogen activity (AM Draw)   Result Value Ref Range    Fibrinogen 111 (L) 200 - 420 mg/dL   Glucose by meter   Result Value Ref Range    Glucose 168 (H) 70 - 99 mg/dL   Glucose by meter   Result Value Ref Range    Glucose 158 (H) 70 - 99 mg/dL   Blood gas arterial   Result Value Ref Range    pH Arterial 7.30 (L) 7.35 - 7.45 pH     pCO2 Arterial 49 (H) 35 - 45 mm Hg    pO2 Arterial 74 (L) 80 - 105 mm Hg    Bicarbonate Arterial 24 21 - 28 mmol/L    Base Deficit Art 2.3 mmol/L    FIO2 50    Glucose by meter   Result Value Ref Range    Glucose 162 (H) 70 - 99 mg/dL   CK total   Result Value Ref Range    CK Total 892 (H) 30 - 225 U/L   Ammonia (AM Draw)   Result Value Ref Range    Ammonia 79 (H) 10 - 50 umol/L   INR   Result Value Ref Range    INR 3.26 (H) 0.86 - 1.14   PTT (AM Draw)   Result Value Ref Range    PTT 61 (H) 22 - 37 sec   Blood gas arterial   Result Value Ref Range    pH Arterial 7.35 7.35 - 7.45 pH    pCO2 Arterial 45 35 - 45 mm Hg    pO2 Arterial 66 (L) 80 - 105 mm Hg    Bicarbonate Arterial 25 21 - 28 mmol/L    Base Deficit Art 0.8 mmol/L    FIO2 50    Comprehensive metabolic panel   Result Value Ref Range    Sodium 142 133 - 144 mmol/L    Potassium 4.8 3.4 - 5.3 mmol/L    Chloride 110 (H) 94 - 109 mmol/L    Carbon Dioxide 22 20 - 32 mmol/L    Anion Gap 10 3 - 14 mmol/L    Glucose 174 (H) 70 - 99 mg/dL    Urea Nitrogen 23 7 - 30 mg/dL    Creatinine 0.72 0.52 - 1.04 mg/dL    GFR Estimate >90 >60 mL/min/[1.73_m2]    GFR Estimate If Black >90 >60 mL/min/[1.73_m2]    Calcium 7.9 (L) 8.5 - 10.1 mg/dL    Bilirubin Total 11.9 (H) 0.2 - 1.3 mg/dL    Albumin 2.4 (L) 3.4 - 5.0 g/dL    Protein Total 4.8 (L) 6.8 - 8.8 g/dL    Alkaline Phosphatase 256 (H) 40 - 150 U/L     (H) 0 - 50 U/L     (H) 0 - 45 U/L   Phosphorus   Result Value Ref Range    Phosphorus 4.0 2.5 - 4.5 mg/dL   Magnesium (AM Draw)   Result Value Ref Range    Magnesium 2.5 (H) 1.6 - 2.3 mg/dL   Calcium, ionized whole blood (AM Draw)   Result Value Ref Range    Calcium Ionized Whole Blood 4.5 4.4 - 5.2 mg/dL   CBC with platelets differential   Result Value Ref Range    WBC 57.9 (HH) 4.0 - 11.0 10e9/L    RBC Count 2.65 (L) 3.8 - 5.2 10e12/L    Hemoglobin 8.0 (L) 11.7 - 15.7 g/dL    Hematocrit 25.3 (L) 35.0 - 47.0 %    MCV 96 78 - 100 fl    MCH 30.2 26.5 - 33.0 pg     MCHC 31.6 31.5 - 36.5 g/dL    RDW 21.1 (H) 10.0 - 15.0 %    Platelet Count 65 (L) 150 - 450 10e9/L    Diff Method Manual Differential     % Neutrophils 82.2 %    % Lymphocytes 5.9 %    % Monocytes 10.2 %    % Eosinophils 0.0 %    % Basophils 0.0 %    % Myelocytes 1.7 %    Nucleated RBCs 1 (H) 0 /100    Absolute Neutrophil 47.6 (H) 1.6 - 8.3 10e9/L    Absolute Lymphocytes 3.4 0.8 - 5.3 10e9/L    Absolute Monocytes 5.9 (H) 0.0 - 1.3 10e9/L    Absolute Eosinophils 0.0 0.0 - 0.7 10e9/L    Absolute Basophils 0.0 0.0 - 0.2 10e9/L    Absolute Myelocytes 1.0 (H) 0 10e9/L    Absolute Nucleated RBC 0.5     Poikilocytosis Moderate     Polychromasia Slight     Ovalocytes Slight     [place] Cells Slight     Platelet Estimate Confirming automated cell count    Fibrinogen activity (AM Draw)   Result Value Ref Range    Fibrinogen 89 (LL) 200 - 420 mg/dL   Glucose by meter   Result Value Ref Range    Glucose 167 (H) 70 - 99 mg/dL   Cryoprecipitate prepare order unit   Result Value Ref Range    Blood Component Type Cryoprecipitate     Units Ordered 10    Blood component   Result Value Ref Range    Unit Number [id number]     Blood Component Type 5 cryoprecipitate units pooled     Division Number 00     Status of Unit Released to care unit 09/04/2019 0653     Blood Product Code [id number]     Unit Status ISS    Blood component   Result Value Ref Range    Unit Number [id number]     Blood Component Type 5 cryoprecipitate units pooled     Division Number 00     Status of Unit Released to care unit 09/04/2019 0833     Blood Product Code [id number]     Unit Status ISS    XR Chest Port 1 View    Narrative    Exam: TEMPORARY, 9/4/2019 6:14 AM    Indication: Pulmonary edema versus bilateral pulmonary syndrome    Comparison: 9/3/2019    Findings:   Single AP view of the chest. Endotracheal tube tip projects over the  midthoracic trachea. Enteric tube and right central line are unchanged  in position. Trachea is midline, stable cardiomegaly.  Increased right  pleural effusion with right lung atelectasis. Stable bilateral  interstitial perihilar opacities. No acute osseous abnormality.      Impression    IMPRESSION:  1. Increased right pleural effusion with right lung atelectasis.  2. Cardiomegaly with pulmonary edema.    I have personally reviewed the examination and initial interpretation  and I agree with the findings.    ZHANG REECE MD

## 2019-09-04 NOTE — PROGRESS NOTES
Nutrition Services - Brief Note    Pt's FT is clogged and RN unable to clear with water flushes and clog zapper; needs FT replacement by Radiologist using fluoro d/t her gastric bypass. RN thought clog was potentially d/t vitamin C tablet (chewable), auto-subbed for syrup.    Interventions already implemented by the RD:  Discussed with RN, PharmD and discontinued vitamin C supplementation 1 day early d/t clogged FT. Pt was receiving supplement for presumed scurvy given clinical and physical assessment.    Recommendations:  None at this time.    RD will continue to follow.  Sayda Frank RD, LD  ([facility], pgr- [phone])

## 2019-09-04 NOTE — PLAN OF CARE
ICU End of Shift Summary. See flowsheets for vital signs and detailed assessment.    Changes this shift: Neuro status unchanged, unresponsive to stimuli. HR increasing after 0300 up to 110s. BPs labile, levo titrated up to meet CRRT goal of I=O. +200 yesterday, will try for I=O today per MD order. CRRT set changed 0600 d/t increasing TMP. Vent settings unchanged, FiO2 50%. Intermittently desynchronous with the vent and using accessory muscles, ABGs drawn but unremarkable. Changes noted on chest xray, MD notified. Fibrinogen low this AM, to be replaced.     Plan: Reassess CRRT and pressor goals this AM. Continue to monitor for changes in neuro status. Continue plan of care.    Caridad John RN on 9/4/2019 at 6:38 AM

## 2019-09-04 NOTE — PLAN OF CARE
ICU End of Shift Summary. See flowsheets for vital signs and detailed assessment.    Changes this shift: Pt received a total of 2 cryo this morning. New SBP goal >85. Levo titrated to maintain MAPs, at highest 0.38, now at 0.33 mcg/kg/min. New NG tube placed at bedside by Radiology due to previous tube clogging this morning. 2 unmeasured stools due to leaking around rectal tube, ~300mL estimated total. 1 dose of PRN lactulose given once new feeding tube was placed. Continuous EEG started this afternoon. Calcium replaced this evening. Mom updated at bedside throughout the day today.     Plan:  Continue to pull net -50 from CRRT, may need to add another pressor to support, wean vent, wean pressors, trend labs.               Problem: Adult Inpatient Plan of Care  Goal: Plan of Care Review  9/4/2019 1856 by Jes Betts, RN  Outcome: No Change    Goal: Patient-Specific Goal (Individualization)  9/4/2019 1856 by Jes Betts, RN  Outcome: No Change    Goal: Absence of Hospital-Acquired Illness or Injury  9/4/2019 1856 by Jes Betts, RN  Outcome: No Change    Goal: Optimal Comfort and Wellbeing  9/4/2019 1856 by Jes Betts, RN  Outcome: No Change    Goal: Readiness for Transition of Care  9/4/2019 1856 by Jes Betts, RN  Outcome: No Change    Goal: Rounds/Family Conference  9/4/2019 1856 by Jes Betts, RN  Outcome: No Change       Problem: Adjustment to Illness (Liver Failure)  Goal: Optimal Coping with Liver Failure  9/4/2019 1856 by Jes Betts, RN  Outcome: No Change       Problem: Bleeding (Liver Failure)  Goal: Absence of Bleeding  9/4/2019 1856 by Jes Betts, RN  Outcome: No Change       Problem: Neurologic Function Impaired (Liver Failure)  Goal: Optimal Neurologic Function  9/4/2019 1856 by Jes Betts, RN  Outcome: No Change       Problem: Fluid Imbalance (Liver Failure)  Goal: Optimal Fluid Balance  9/4/2019 1856 by Jes Betts,  RN  Outcome: No Change       Problem: Oral Intake Inadequate (Liver Failure)  Goal: Optimal Nutrition Intake  9/4/2019 1856 by Jes Betts RN  Outcome: No Change       Problem: Infection (Liver Failure)  Goal: Absence of Infection Signs/Symptoms  9/4/2019 1856 by Jes Betts, RN  Outcome: No Change       Problem: Pain (Liver Failure)  Goal: Acceptable Pain Control  9/4/2019 1856 by Jes Betts, RN  Outcome: No Change       Problem: Respiratory Compromise (Liver Failure)  Goal: Effective Oxygenation and Ventilation  9/4/2019 1856 by Jes Betts, RN  Outcome: No Change       Problem: Gastrointestinal Condition Comorbidity  Goal: Gastrointestinal Condition  Description  Patient comorbidity will be monitored for signs and symptoms of Gastrointestinal condition.  Problems will be absent, minimized or managed by discharge/transition of care.  9/4/2019 1856 by Jes Betts RN  Outcome: No Change       Problem: Mood Alteration Comorbidity  Goal: Mood Alteration Comorbidity  Description  Patient comorbidity will be monitored for signs and symptoms of Mood Alteration condition.  Problems will be absent, minimized or managed by discharge/transition of care.  9/4/2019 1856 by Jes Betts, RN  Outcome: No Change       Problem: ARDS (Acute Respiratory Distress Syndrome)  Goal: Effective Oxygenation  9/4/2019 1856 by Jes Betts RN  Outcome: No Change

## 2019-09-04 NOTE — PROGRESS NOTES
WO Nurse Inpatient Wound Assessment   Reason for consultation: Evaluate and treat coccyx wound     Assessment  Intergluteal cleft wound due to Moisture Associated Skin Damage (MASD)- fissure  Status: follow up assessment, stable    Heels: boggy with blanchable erythema.  Prevalon boots ordered.      Coccyx and sacrum with intact epidermis without any erythema.    Treatment Plan  Intergluteal cleft wound: Every 3 days     Cleanse the area with NS and pat dry.    Apply No sting film barrier to periwound skin.    Cover wound with Sacral Mepilex (#[id number])    Change dressing Q 3 days.    Turn and reposition Q 2hrs.    Ensure pt has Chris-cushion while sitting up in the chair.  FYI- If pt has constant incontinent loose stools needing dressing changes Q shift please discontinue the Mepilex dressing and apply criticaid barrier paste BID and PRN.     Orders Written  Recommended provider order: None  WOC Nurse follow-up plan:weekly  Nursing to notify the Provider(s) and re-consult the WOC Nurse if wound(s) deteriorates or new skin concern.    Patient History  According to provider note(s):  Ms. Bailey is a 44 year-old female with PMHx of end-stage liver disease complicated by hepatic encephalopathy and varices, HTN, chronic pain/sciatica, polysubstance abuse, and morbid obesity with multiple recent hospitalizations for decompensated cirrhosis admitted to the MICU for severe sepsis of multiple possible sources, currently hemodynamically stable.     Objective Data  Containment of urine/stool: Internal fecal management due to watery stool- receiving lactulose Q 4hrs    Active Diet Order  Orders Placed This Encounter      NPO for Medical/Clinical Reasons Except for: No Exceptions      Output:   I/O last 3 completed shifts:  In: 3459.76 [I.V.:1129.76; NG/GT:690]  Out: 3330 [Other:2630; Stool:700]    Risk Assessment:   Sensory Perception: 1-->completely limited  Moisture: 3-->occasionally moist  Activity:  1-->bedfast  Mobility: 1-->completely immobile  Nutrition: 3-->adequate  Friction and Shear: 2-->potential problem  Sebas Score: 11                          Labs:   Recent Labs   Lab 09/04/19  0410   ALBUMIN 2.4*   HGB 8.0*   INR 3.26*   WBC 57.9*       Physical Exam  Skin inspection: focused coccyx, sacrum, BL buttocks and intergluteal cleft    Wound Location:  Intergluteal cleft  Date of last photo Not taken, needed two staff to assess the area  Wound History: Pt has a hx of diarrhea. Now has rectal tube in place  Measurements (length x width x depth, in cm) 2 cm x 0.5 cm  x  0.1 cm -fissure (stable but no significant changes)  Wound Base: 100 % dermis  Tunneling N/A  Undermining N/A  Palpation of the wound bed: normal   Periwound skin:immediate skin is  intact, dry peeling epithelium   Color: normal and consistent with surrounding tissue  Temperature: normal   Drainage:, scant  Description of drainage: bloody  Odor: none  Pain: denies  and unable to assess due to  sedation ,     Interventions  Current support surface: Standard  Low air loss mattress  Current off-loading measures: Foam padding and Pillows under calves  Visual inspection of wound(s) completed  Wound Care: Area assessed and placed same Mepilex  Supplies: floor stock  Education provided: importance of repositioning, plan of care and wound progress  Discussed plan of care with Patient and Nurse    Nicole Navarrete RN

## 2019-09-04 NOTE — PROGRESS NOTES
CRRT STATUS NOTE    DATA:  Time:  6:58 AM  Pressures WNL:  YES  Filter Status:  WDL    Problems Reported/Alarms Noted:  Filter clogged    Supplies Present:  YES    ASSESSMENT:  Patient Net Fluid Balance: +180mL yesterday/ +70mL since midnight  Vital Signs:  T: 98; HR: ; MAP: 55-64; SPO2: 92-96%  Labs:   Last Renal Panel:  Sodium   Date Value Ref Range Status   09/04/2019 142 133 - 144 mmol/L Final     Potassium   Date Value Ref Range Status   09/04/2019 4.8 3.4 - 5.3 mmol/L Final     Chloride   Date Value Ref Range Status   09/04/2019 110 (H) 94 - 109 mmol/L Final     Carbon Dioxide   Date Value Ref Range Status   09/04/2019 22 20 - 32 mmol/L Final     Anion Gap   Date Value Ref Range Status   09/04/2019 10 3 - 14 mmol/L Final     Glucose   Date Value Ref Range Status   09/04/2019 174 (H) 70 - 99 mg/dL Final     Urea Nitrogen   Date Value Ref Range Status   09/04/2019 23 7 - 30 mg/dL Final     Creatinine   Date Value Ref Range Status   09/04/2019 0.72 0.52 - 1.04 mg/dL Final     GFR Estimate   Date Value Ref Range Status   09/04/2019 >90 >60 mL/min/[1.73_m2] Final     Comment:     Non  GFR Calc  Starting 12/18/2018, serum creatinine based estimated GFR (eGFR) will be   calculated using the Chronic Kidney Disease Epidemiology Collaboration   (CKD-EPI) equation.       Calcium   Date Value Ref Range Status   09/04/2019 7.9 (L) 8.5 - 10.1 mg/dL Final     Phosphorus   Date Value Ref Range Status   09/04/2019 4.0 2.5 - 4.5 mg/dL Final     Albumin   Date Value Ref Range Status   09/04/2019 2.4 (L) 3.4 - 5.0 g/dL Final       Goals of Therapy:  0-50cc/hr net net negative, main goal I=O, can pull 50cc/hr if pressor needs are decreasing.    INTERVENTIONS:   Filter clogged. Blood was returned. CRRT restarted.    PLAN:  Continue fluid removal as tolerated per goals of therapy.  Check filter daily.  Change filter q 72 hrs and PRN.  Please contact the CRRT resource RN at [phone] with any questions/concerns.

## 2019-09-04 NOTE — PROGRESS NOTES
Nephrology Progress Note  09/04/2019         Neema Bailey is a 44 yof w/ESLD c/b HE, ascites and EV, polysubstance abuse, obesity s/p gastric bypass 2002 with recent admission for E. Coli bacteremia (source unclear), re-admitted 8/19/19 with SOB, leg swelling and suspected infection.  Cr at baseline is 0.6, on rise since admission with likely culprits of contrast given for CT (for abd pain) and vanco given for suspected infection.  Nephrology consulted for management of DEE DEE and possible HD.       Interval History  Mrs Bailey continues on CRRT, was net even yesterday, wt unchanged.  Trying to pull a bit of fluid today as CXR suggests pulm edema (as well as large effusion), plan for 50cc/hr, may need more pressor to achieve, MAP goal down to 85mmHg.       ASSESSMENT AND RECOMMENDATIONS:   DEE DEE-Baseline Cr of 0.6, up to 1.2 with etiology likely KELLY with 135cc of contrast and also high dose Vanco on admit (although level was not high when checked).  HRS is in DDx and likely has some HRS physiology but Dr Nur and I did look at urine microscopy and saw granular casts suggestive of at least some degree of ATN.  Started CRRT 8/28 due to rapidly worsening resp status to try to pull some fluid and optimize pulm status, continuing with tenuous hemodynamics.                   -DEE DEE, likely ATN from contrast/vanco, sepsis.                   -Line is temp [facility]J from 8/28                 -CRRT, goal 50cc/hr net negative with pulm edema on exam.       Volume-Total body volume overload but much of it 3rd spaced.  Up ~8-10kg since admit, trying to pull 0-50cc/hr to help pulm status although pulm edema is not largest factor with pulm status, may need more pressor to achieve 50cc/hr, MAP goal changed to 85 mmHg.       Electrolytes/pH-No acute issues.  K 4.4, stable on CRRT, bicarb 24.       BMD- Ca 8.0, Mg and Phos reasonable.       Nutrition-Nutren 1.5 TF at goal.      Seen and discussed with   "Arthur     Recommendations were communicated to primary team via verbal communication.        KANDACE Lovelace CNS  Clinical Nurse Specialist  [phone]    Review of Systems:   I reviewed the following systems:  ROS not done due to vent/sedation.     Physical Exam:   I/O last 3 completed shifts:  In: 3459.76 [I.V.:1129.76; NG/GT:690]  Out: 3330 [Other:2630; Stool:700]   BP 96/46 (BP Location: Left arm)   Pulse 105   Temp 97.4  F (36.3  C) (Axillary)   Resp 28   Ht 1.727 m (5' 8\")   Wt 123.3 kg (271 lb 13.2 oz)   LMP 10/04/2018   SpO2 96%   Breastfeeding? No   BMI 41.33 kg/m       GENERAL APPEARANCE: Intubated and sedated.   EYES:  + scleral icterus, pupils equal  HENT: mouth without ulcers or lesions  PULM: lungs clear to auscultation, equal air movement, no cyanosis or clubbing  CV: regular rhythm, normal rate, no rub     -JVP not elevated     -edema +2  GI: soft, non-tender, nondistended, bowel sounds are +  MS: no evidence of inflammation in joints, no muscle tenderness  NEURO: Intubated and sedated. normal    Labs:   All labs reviewed by me  Electrolytes/Renal -   Recent Labs   Lab Test 09/04/19  1009 09/04/19  0410 09/03/19  1631 09/03/19  0301    142 140 141   POTASSIUM 4.4 4.8 4.2 3.8   CHLORIDE 108 110* 108 108   CO2 24 22 25 23   BUN 24 23 21 22   CR 0.71 0.72 0.66 0.70   * 174* 181* 190*   NARENDRA 8.0* 7.9* 7.4* 7.5*   MAG 2.5* 2.5*  --  2.5*   PHOS 4.2 4.0 4.0 3.9       CBC -   Recent Labs   Lab Test 09/04/19  1009 09/04/19  0410 09/03/19  1631   WBC 56.0* 57.9* 57.5*   HGB 7.9* 8.0* 7.0*   PLT 77* 65* 62*       LFTs -   Recent Labs   Lab Test 09/04/19  1009 09/04/19  0410 09/03/19  1631   ALKPHOS 234* 256* 230*   BILITOTAL 12.4* 11.9* 11.0*   * 184* 155*   * 484* 421*   PROTTOTAL 5.1* 4.8* 4.9*   ALBUMIN 2.6* 2.4* 2.4*       Iron Panel -   Recent Labs   Lab Test 08/21/19  0348 07/17/19  1542   IRON 37 91   IRONSAT 29 86*   CHELSY 166 372*           Current " Medications:    artificial tears   Both Eyes Q6H     B and C vitamin Complex with folic acid  5 mL Oral or Feeding Tube Daily     cyanocobalamin  100 mcg Oral or Feeding Tube Daily     folic acid  500 mcg Oral or Feeding Tube Daily     heparin lock flush  5-10 mL Intracatheter Q24H     hydrocortisone sodium succinate PF  50 mg Intravenous Q8H     insulin aspart  1-4 Units Subcutaneous Q4H     lactulose  20 g Oral or Feeding Tube Q6H     miconazole   Topical BID     pantoprazole  40 mg Oral or Feeding Tube QAM AC     protein modular  1 packet Per Feeding Tube TID     rifaximin  550 mg Oral or Feeding Tube BID     vitamin B1  100 mg Oral or Feeding Tube Daily       - MEDICATION INSTRUCTIONS -       IV fluid REPLACEMENT ONLY       CRRT replacement solution 16 mL/kg/hr (09/04/19 1140)     - MEDICATION INSTRUCTIONS -       norepinephrine 0.38 mcg/kg/min (09/04/19 1200)     - MEDICATION INSTRUCTIONS -       CRRT replacement solution 1.66 mL/kg/hr (09/04/19 0605)     CRRT replacement solution 10 mL/kg/hr (09/04/19 1052)

## 2019-09-05 NOTE — PROGRESS NOTES
[facility]  Palliative Care Daily Progress Note       Recommendations & Counseling       Agree with care conference tomorrow with family to discuss goals of care    Family could benefit from palliative care  for support            Assessments          Neema Bailey is a 44 year old female with a history of ESLD c/b hepatic encephalopathy, portal hypertension, ascites, and polysubstance use disorder. Hospital course complicated by respiratory failure, patient is intubated and no longer sedated.      Today, the patient was seen for:  ESLD  Respiratory failure    Visited with Mother - Roxy and patient's sister. They are overwhelmed, but seem to be coping ok with the support of each other and other family. They are looking forward to the care conference tomorrow, as they have specific questions for the team.     Prognosis, Goals, or Advance Care Planning was addressed today with: Yes.    Mother and sister continue to endorse she would not want to live on machines for the rest of her life and would not want to live wheelchair dependent. Family feels that since she is not waking up that could be due to the medications she was on for a long time. Family also feel that her kidneys were not that bad before and seem under of why they are being told they are not working. Discussed with family the care conference idea and seems that family would appreciate that so that they have all the same information about her condition.    Mood, coping, and/or meaning in the context of serious illness were addressed today: Yes.  Summary/Comments:Patient unable to participate in conversation, family feels overwhelmed, patient's mother Roxy also has a father in hospice. Patients sister seems a good support for Roxy.             Interval History:     Chart review/discussion with unit or clinical team members:   Notes reviewed and discussed with team, patient weaned off sedation, remains  intubated with little improvement and declining liver function.     Per patient or family/caregivers today:  Patient is intubated and unable to participate in conversation  Mother and sister are at bedside     Key Palliative Symptoms:  # Pain severity the last 12 hours: none  # Dyspnea severity the last 12 hours: none  # Nausea severity the last 12 hours: none  # Anxiety severity the last 12 hours: none    Patient is on opioids: bowels not assessed today.           Review of Systems:     Besides above, ROS was reviewed and is unremarkable          Medications:     I have reviewed this patient's medication profile and medications during this hospitalization.           Physical Exam:   Vitals were reviewed  Temp: 97.5  F (36.4  C) Temp src: Oral BP: 100/42   Heart Rate: 89 Resp: 30 SpO2: 95 % O2 Device: Mechanical Ventilator      Intake/Output Summary (Last 24 hours) at 9/5/2019 1615  Last data filed at 9/5/2019 1600  Gross per 24 hour   Intake 2990.36 ml   Output 4209 ml   Net -1218.64 ml     Constitutional: intubated, not responsive,  no apparent distress  Lungs: No increased work of breathing on ventilator  Neurologic: nonresponsive  Neuropsychiatric: TESSIE given AMS  Skin: jaundiced.               Data Reviewed:     Reviewed recent pertinent imaging, comments:   No recent imaging.      Reviewed recent labs, comments:   Cr 0.56  GFR >90  Bilirubin 14.2      INR 3.10      WBC 61.2  Hgb 7.6  Platelets 80     KANDACE Ghotra CNS  Palliative Care Consult Team  Pager: [phone]     Total time spent was 25 minutes,  >50% of time was spent counseling and/or coordination of care regarding goals of care.

## 2019-09-05 NOTE — PROGRESS NOTES
CRRT STATUS NOTE    DATA:  Time:  6:05 AM  Pressures WNL:  YES  Filter Status:  WDL    Problems Reported/Alarms Noted:  none    Supplies Present:  YES    ASSESSMENT:  Patient Net Fluid Balance:  9/4 -371ml   9/5 0600 -273ml  Vital Signs:  Vented 50%.  Levophed .35mcg/kg/min, goal SBP > 85.  Labs: K 4.8-4.5, WBC 61.2, Hgb 7.6 (downtrending), CK 1553  Goals of Therapy:  0-50cc/hr net negative without starting 2nd pressor.  Meeting goals of therapy.      INTERVENTIONS:   Restart due to clotting 9/5 0110    PLAN:  Continue per renal orders.  Call CRRT resource RN [phone] with questions.

## 2019-09-05 NOTE — PLAN OF CARE
CRRT RESTART NOTE    Reason for Restart:  Filter clotting  Error Code:  NA    Patient s Vascular Access: LIJ Catheter                     Placement Confirmed:  YES  Manufacture:    Model:    Length/Montenegrin Size:    Flush Volume:      DATA:  Procedure:  CVVHDF  Start Time:  1839  Machine#:  5  Filter:  M150  Blood Flow:   200 mL/min  Pre-Replacement Solution:  Phoxillum BK 4/2.5  Post-Replacement Solution:  Phoxillum BK 4/2.5  Dialysate Solution:  Phoxillum BK 4/2.5  Pre-Replacement Solution Rate:  1200 mL/hr  Post-Replacement Solution Rate:  200mL/hr  Dialysate Flow Rate:  2000mL/hr  Patient Removal Rate:  80 mL/hr  Anticoagulation Type and Rate:  NA    ASSESSMENT:  How Patient Tolerated Restart:  Well  Vital Signs:  HR: 87 BP: 86/44 MAP: 61 RR: 30 SpO2:97%  Initial Pressures:  Access:  -63  Filter:  116  Return:  28  TMP:  49  Change in Filter Pressure:  17    INTERVENTIONS:  None    PLAN:  Continue fluid removal per goals of care as patient tolerates. Check filter daily. Change filter q72 hours and PRN. Call CRRT resource RN @ [phone] with any questions or concerns.

## 2019-09-05 NOTE — PLAN OF CARE
ICU End of Shift Summary. See flowsheets for vital signs and detailed assessment.    Changes this shift: Neuros unchanged , remains unresponsive. Levo titrated up following large volume stool dump 0300, maintaining -50cc/hour per MD order from stool output but have been unable to pull since 0300. Set changed 0100. 600cc stool out since midnight. AST and ALT continue to trend up.     Plan:  Assess need for adding another pressor. Consider goals of care. Continue plan of care.    Caridad John RN on 9/5/2019 at 6:27 AM

## 2019-09-05 NOTE — PROGRESS NOTES
Nephrology Progress Note  09/05/2019         Neema Bailey is a 44 yof w/ESLD c/b HE, ascites and EV, polysubstance abuse, obesity s/p gastric bypass 2002 with recent admission for E. Coli bacteremia (source unclear), re-admitted 8/19/19 with SOB, leg swelling and suspected infection.  Cr at baseline is 0.6, on rise since admission with likely culprits of contrast given for CT (for abd pain) and vanco given for suspected infection.  Nephrology consulted for management of DEE DEE and possible HD.       Interval History  Mrs Bailey continues on CRRT, net negative 0.4L yesterday without increasing pressor, on max dose of norepi.  Similar plan today, will pull 50cc/hr as able without adding 2nd pressor.       ASSESSMENT AND RECOMMENDATIONS:   DEE DEE-Baseline Cr of 0.6, up to 1.2 with etiology likely KELLY with 135cc of contrast and also high dose Vanco on admit (although level was not high when checked).  HRS is in DDx and likely has some HRS physiology but Dr Nur and I did look at urine microscopy and saw granular casts suggestive of at least some degree of ATN.  Started CRRT 8/28 due to rapidly worsening resp status to try to pull some fluid and optimize pulm status, continuing with tenuous hemodynamics.                   -DEE DEE, likely ATN from contrast/vanco, sepsis.                   -Line is temp [facility] from 8/28                 -CRRT, goal 50cc/hr net negative with pulm edema on exam.       Volume-Total body volume overload but much of it 3rd spaced.  Up ~8kg since admit, able to pull 0.4L net negative yesterday without adding 2nd pressor, similar strategy today, will pull 0-50cc/hr as able.       Electrolytes/pH-No acute issues.  K 4.7, stable on CRRT, bicarb 22.       BMD- Ca 8.0, Mg and Phos reasonable.       Nutrition-Nutren 1.5 TF at goal.      Seen and discussed with Dr Gibson     Recommendations were communicated to primary team via verbal communication.        KANDACE Lovelace CNS  Clinical Nurse  "Specialist  [phone]    Review of Systems:   I reviewed the following systems:  ROS not done due to vent/sedation.     Physical Exam:   I/O last 3 completed shifts:  In: 2927.88 [I.V.:1278.88; NG/GT:500]  Out: 3587 [Other:2587; Stool:1000]   /42 (BP Location: Left arm)   Pulse 105   Temp 97.5  F (36.4  C) (Oral)   Resp 26   Ht 1.727 m (5' 8\")   Wt 123.6 kg (272 lb 7.8 oz)   LMP 10/04/2018   SpO2 98%   Breastfeeding? No   BMI 41.43 kg/m       GENERAL APPEARANCE: Intubated and sedated.   EYES:  + scleral icterus, pupils equal  HENT: mouth without ulcers or lesions  PULM: lungs clear to auscultation, equal air movement, no cyanosis or clubbing  CV: regular rhythm, normal rate, no rub     -JVP not elevated     -edema +2  GI: soft, non-tender, nondistended, bowel sounds are +  MS: no evidence of inflammation in joints, no muscle tenderness  NEURO: Intubated and sedated. normal    Labs:   All labs reviewed by me  Electrolytes/Renal -   Recent Labs   Lab Test 09/05/19  1123 09/05/19  0409 09/04/19  2245 09/04/19  1655 09/04/19  1009 09/04/19  0410    142 139 140 142 142   POTASSIUM 4.7 4.5 4.5 4.8 4.4 4.8   CHLORIDE 108 110* 108 110* 108 110*   CO2 22 22 22 22 24 22   BUN 23 25 24 24 24 23   CR 0.56 0.67 0.64 0.73 0.71 0.72   * 161* 166* 122* 140* 174*   NARENDRA 8.0* 8.0* 8.2* 8.0* 8.0* 7.9*   MAG  --  2.7*  --   --  2.5* 2.5*   PHOS  --  5.0*  --  5.0* 4.2 4.0       CBC -   Recent Labs   Lab Test 09/05/19  0409 09/04/19  1655 09/04/19  1009   WBC 61.2* 60.8* 56.0*   HGB 7.6* 8.3* 7.9*   PLT 80* 85* 77*       LFTs -   Recent Labs   Lab Test 09/05/19  1123 09/05/19  0409 09/04/19  2245   ALKPHOS 248* 238* 229*   BILITOTAL 14.2* 13.1* 13.0*   * 218* 215*   * 548* 527*   PROTTOTAL 5.1* 4.8* 5.1*   ALBUMIN 2.5* 2.3* 2.5*       Iron Panel -   Recent Labs   Lab Test 08/21/19  0348 07/17/19  1542   IRON 37 91   IRONSAT 29 86*   CHELSY 166 372*           Current Medications:    artificial " tears   Both Eyes Q6H     B and C vitamin Complex with folic acid  5 mL Oral or Feeding Tube Daily     cyanocobalamin  100 mcg Oral or Feeding Tube Daily     folic acid  500 mcg Oral or Feeding Tube Daily     heparin lock flush  5-10 mL Intracatheter Q24H     hydrocortisone sodium succinate PF  50 mg Intravenous Q8H     insulin aspart  1-4 Units Subcutaneous Q4H     lactulose  20 g Oral or Feeding Tube Q6H     micafungin  100 mg Intravenous Q24H     miconazole   Topical BID     pantoprazole  40 mg Oral or Feeding Tube QAM AC     protein modular  1 packet Per Feeding Tube TID     rifaximin  550 mg Oral or Feeding Tube BID     vitamin B1  100 mg Oral or Feeding Tube Daily       - MEDICATION INSTRUCTIONS -       IV fluid REPLACEMENT ONLY       CRRT replacement solution 16 mL/kg/hr (09/05/19 1120)     - MEDICATION INSTRUCTIONS -       norepinephrine 0.35 mcg/kg/min (09/05/19 1230)     - MEDICATION INSTRUCTIONS -       CRRT replacement solution 1.66 mL/kg/hr (09/05/19 0031)     CRRT replacement solution 10 mL/kg/hr (09/05/19 1155)     sodium chloride 10 mL/hr at 09/05/19 1108

## 2019-09-05 NOTE — PROGRESS NOTES
[facility], [place]    Progress Note  [facility] Intensive Care     Date of Admission:  8/19/2019    Assessment & Plan   Neema Bailey is a 44 year old female with history of multifactorial ESLD c/b HE, EV, ascites, polysubstance abuse, morbid obesity s/p gastric bypass in 2002 who presents as a transfer from the floor for acute hypoxic respiratory failure and altered mental status, notably recently discharged from ICU 8/21 after a 2-day stay for severe sepsis with hypotension 2/2 ESBL E coli bacteremia of unknown source. Course was complicated by prolonged BiPAP in the setting of altered mental status likely resulting in aspiration events. Intubated for work of breathing, bronched, and subsequently worsened from a respiratory perspective, with development of severe ARDS. Paralyzed and proned for a period of time, now recovering.    Changes today:  Continued leukocytosis with continued climb in liver function tests concerning for additional inflammatory process.  - discontinue EEG after 24h; no seizure activity found  - restart micafungin with positive sputum and urine cultures in this critically ill patient  - repeat RUQ US in the setting of worsening LFTs    Neurology:  Hepatic/Toxic-metabolic encephalopathy: Elevated ammonia to 147 on 8/25 with lethargy. Normal BUN, no other sedating meds given. Ammonia downtrending with PO lactulose and CRRT. Pupils reactive, intermittently sluggish without focal findings while sedated. Despite no sedatives, pt has consistent RASS of -5. EEG not showing seizure activity.  - PO lactulose scheduled and per [facility] protocol; goal 1L of stool a day  - trend ammonia daily  - discontinue EEG after 24h    Sedation: RASS 0 to -1  - off versed  - fentanyl gtt, off  - cis 8/28-8/30    Depression  - holding sertraline in the setting of coagulopathy     Cardiovascular:  Shock, multifactorial: in the setting of cirrhosis with third spacing and  possible infection underlying. Rising lactate during hospital stay from 1.9 after transfer out of ICU to 4.3 on the day of transfer back. Bedside TTE with hyperdynamic LV, 1.78cm IVC with minimal respiratory variation while on positive pressure ventilation. Cardiac function on formal TTE 8/20 hyperdynamic with normal PASP. Repeat TTE with ongoing hyperdynamic changes, no evidence of shunting on bubble study. Able to wean off vaso and phenylephrine after starting hydrocortisone on on 8/30.  - levophed, titrate for SBP > 85  - hold dose of hydrocortisone, consider wean tomorrow 9/6  - try to avoid a second pressor, so balance pulling CRRT with pressor    Pulmonary:        Acute hypoxic respiratory failure  Developing ARDS, suspected  Transudative R-sided pleural effusion, moderate  CT chest 8/25 notable for GGO, interlobular septal thickening, patchy consolidative opacities. Differential includes pulmonary edema, developing ARDS, hepatopulmonary syndrome, pneumonia, atypical infection, severe aspiration pneumonitis.. P/F ratio 149. Intubated for increased work of breathing and hypoxemia. Initially improving on MICU readmission day 2 and then worsened after bronchial lavage on MICU day 3 with worsening hypoxemia. BAL notable for significant bilious secretions that were suctioned. Subsequently, started on Flolan with some improvement. Improvement after being prone for ~12h on 8/29, and remained stable after flipping to supine. CXR on 9/4 with worsened pleural effusion.   - on CRRT, try to pull 50cc/hr without starting second pressor  - daily CXR to re-evaluate  - routine vent cares  - lung-protective ventilation  - goal PaO2 55-85, plateau pressures < 30   - daily ABG    Ventilation Mode: CMV/AC  (Continuous Mandatory Ventilation/ Assist Control)  FiO2 (%): 50 %  Rate Set (breaths/minute): 25 breaths/min  Tidal Volume Set (mL): 400 mL  PEEP (cm H2O): 8 cmH2O  Oxygen Concentration (%): 50 %  Resp:  30      Renal  Anasarca  Oliguric DEE DEE 2/2 KELLY, possible hepatorenal syndrome, ATN  Baseline creatinine 0.6, increased to 1.0 in the setting of hypotension. Recent nephrotoxins also include IV contrast 8/25 during CT scan, and now administration of vancomycin. Urine output decreasing 8/23. UA 8/22 notable for mild proteinuria, small LE, hyaline casts. FENA and FEBUN consistent with prerenal azotemia. Repeat UA 8/27 demonstrating some ketones, did not improve with 2 days of albumin challenge.  - strict I/Os with Alexander anchored  - avoid nephrotoxins  - Renal consult, appreciate assistance  - daily BMP  - holding PTA midodrine given on stronger pressors  - CRRT per Renal, pull 50cc/hr today if possible  - compression stockings    Hypernatremia: Resolved with free water flushing and on CRRT.    ID:         Recent ESBL E coli bacteremia   Possible sepsis due to unknown source : Blood culture 1/2 positive 8/19 for ESBL E coli. No followup blood cultures obtained until 8/22, which remain NGTD. Previous urine culture, transudative pleural fluid culture, and ascites fluid culture currently NGTD. S pneumo and legionella urine antigen negative. Procalcitonin initially uptrending but now stable. Leukocytosis and LFTs uptrending. Cryptococcal antigen negative. Now with Candida kefyr in sputum and urine, this is in the setting of stopping micafungin on 9/2. With uptrending white count, concerning that the candida is causing disease in this critically ill pt.   - trend CBC  - on bedside US, no fluid to tap  - MSK US of left gluteus to evaluate fluid collection, not concerning for abscess   - restart micafungin today for candida    Cultures:  UCx 8/27 No growth   Bronchial lavage 8/27 NGTD  BCx 8/26 x2 NGTD  Pleural fluid culture 8/25 NGTD  Ascites fluid culture 8/22 NGTD    Antimicrobials:  Micafungin (8/26 - 9/2) (9/5-*)  Meropenem (8/25 - 9/3)  Vancomycin (8/20, 8/25-8/27)  Azithromycin (8/25 - 8/27)  Ertapenem (8/19 -  8/24)      GI  ESLD, multifactorial c/b portal hypertension with ascites, esophageal varices: Last EGD 5/2019 with no varices. Last US 8/20 with no mass, no portal vein thrombosis. LFTs uptrending, will continue to monitor.   - Hepatology consulted, appreciate recs  - currently not undergoing transplant evaluation  - rifaxamin, lactulose as above for HE protocol  - holding PTA diuretics furosemid 20mg, spironolactone 50mg  - daily MELD labs    MELD-Na score: 29 at 9/5/2019  4:09 AM  MELD score: 29 at 9/5/2019  4:09 AM  Calculated from:  Serum Creatinine: 0.67 mg/dL (Rounded to 1 mg/dL) at 9/5/2019  4:09 AM  Serum Sodium: 142 mmol/L (Rounded to 137 mmol/L) at 9/5/2019  4:09 AM  Total Bilirubin: 13.1 mg/dL at 9/5/2019  4:09 AM  INR(ratio): 3.10 at 9/5/2019  4:09 AM  Age: 44 years    ? Alcoholic hepatitis  Hyperbilirubinemia, Mild transaminitis: Mild gallbladder wall thickening noted on CT abd/pelvis 8/25. Negative HIDA scan. Tenderness to palpation on exam. CK initially elevated to 1300, then downtrending on recheck - may have some critical illness myopathy. However, CK uptrending since 9/1. MDF ? 154. On steroids for shock.  - continue to monitor  - trend CK, daily MELD labs    Mild reflux esophagitis  - PPI daily    Nutrition  At risk for malnutrition:  - feeding per nutrition    Endocrine:  At risk for hypoglycemia  - hypoglycemia protocol, q4h glucose checks while on tube feeds  - sliding scale insulin    Heme/Onc:  Mild DIC/LIC  Thrombocytopenia, multifactorial (baseline 120s in the last 6 months)  Macrocytic anemia   Baseline hemoglobin 8-9 in the last 6 months. Requiring intermittent transfusions while on CRRT. Hemolysis labs suggestive of mild DIC with thrombocytopenia, anemia, RBC fragments on smear, haptoglobin undetectable, LDH elevated, hemoglobin plasma elevated and risk factors including ARDS, liver disease, underlying infectious process. On 9/4, fibrinogen down to 89, transfused 2U of cryo.   - daily  CBC, transfuse Hgb > 7  - continue to monitor  - heme consulted, appreciate recs  - LDH q48h, fibrinogen daily  - fibrinogen goal >100    DVT Prophylaxis: Pneumatic Compression Devices  GI Prophylaxis: PPI    Restraints: Restraints for medical healing needed: YES    Family update by me today: Yes     Lilliana Jacobs    I have seen and discussed this patient with Dr. Jones.     Lilliana Jacobs MD  Medicine-Pediatrics, PGY-1  Pager [phone]    =====================================================================  Code Status   Full Code    Subjective  RN notes reviewed. No acute events overnight. Bedside nurse intermittently able to pull off CRRT. Almost on max levophed.     Review of Systems   Review of systems not obtained due to patient factors - mental status    Physical Exam   Temp: 97.9  F (36.6  C) Temp src: Axillary Temp  Min: 95.8  F (35.4  C)  Max: 98.4  F (36.9  C) BP: 100/42   Heart Rate: 92 Resp: 30 SpO2: 96 % O2 Device: Mechanical Ventilator    Vital Signs with Ranges  Temp:  [95.8  F (35.4  C)-98.4  F (36.9  C)] 97.9  F (36.6  C)  Heart Rate:  [] 92  Resp:  [25-31] 30  BP: (100)/(42) 100/42  MAP:  [53 mmHg-78 mmHg] 61 mmHg  Arterial Line BP: ()/(31-57) 90/42  FiO2 (%):  [50 %] 50 %  SpO2:  [88 %-100 %] 96 %  272 lbs 7.82 oz    Constitutional: intubated and sedated  Eyes: Pupils equal, small, and reactive, scleral icterus, subconjunctival hemorrhage on the R; roving eye movements    ENT: Normocephalic, without obvious abnormality, atraumatic, orally intubated  Respiratory: CTAB, synchronous with vent, ronchorous breath sounds bilaterally  Cardiovascular: tachycardic, irregular rhythm, and no murmur noted.  GI: normal bowel sounds, soft, non-distended, nontender on palpation, no masses palpated, no hepatosplenomegaly, dark stool in rectal tube  Skin: No bruising or bleeding, mild jaundice, texture, turgor, no redness, warmth, or swelling, no rashes, no lesions, no  abnormal moles, nails normal without discoloration or clubbing; dependent edema  Musculoskeletal: pitting edema bilaterally in the LE above the knees 2+, compression stockings on, tone is normal; diffuse anasarca   Neurologic: sedated, unresponsive to painful stimuli on upper and lower extremities     Data   Results for orders placed or performed during the hospital encounter of 08/19/19 (from the past 24 hour(s))   Glucose by meter   Result Value Ref Range    Glucose 153 (H) 70 - 99 mg/dL   CBC (AM Draw)   Result Value Ref Range    WBC 56.0 (HH) 4.0 - 11.0 10e9/L    RBC Count 2.55 (L) 3.8 - 5.2 10e12/L    Hemoglobin 7.9 (L) 11.7 - 15.7 g/dL    Hematocrit 24.5 (L) 35.0 - 47.0 %    MCV 96 78 - 100 fl    MCH 31.0 26.5 - 33.0 pg    MCHC 32.2 31.5 - 36.5 g/dL    RDW 21.9 (H) 10.0 - 15.0 %    Platelet Count 77 (L) 150 - 450 10e9/L   Phosphorus (AM Draw)   Result Value Ref Range    Phosphorus 4.2 2.5 - 4.5 mg/dL   Comprehensive metabolic panel   Result Value Ref Range    Sodium 142 133 - 144 mmol/L    Potassium 4.4 3.4 - 5.3 mmol/L    Chloride 108 94 - 109 mmol/L    Carbon Dioxide 24 20 - 32 mmol/L    Anion Gap 9 3 - 14 mmol/L    Glucose 140 (H) 70 - 99 mg/dL    Urea Nitrogen 24 7 - 30 mg/dL    Creatinine 0.71 0.52 - 1.04 mg/dL    GFR Estimate >90 >60 mL/min/[1.73_m2]    GFR Estimate If Black >90 >60 mL/min/[1.73_m2]    Calcium 8.0 (L) 8.5 - 10.1 mg/dL    Bilirubin Total 12.4 (H) 0.2 - 1.3 mg/dL    Albumin 2.6 (L) 3.4 - 5.0 g/dL    Protein Total 5.1 (L) 6.8 - 8.8 g/dL    Alkaline Phosphatase 234 (H) 40 - 150 U/L     (H) 0 - 50 U/L     (H) 0 - 45 U/L   Magnesium (AM Draw)   Result Value Ref Range    Magnesium 2.5 (H) 1.6 - 2.3 mg/dL   Blood gas arterial   Result Value Ref Range    pH Arterial 7.36 7.35 - 7.45 pH    pCO2 Arterial 44 35 - 45 mm Hg    pO2 Arterial 67 (L) 80 - 105 mm Hg    Bicarbonate Arterial 25 21 - 28 mmol/L    Base Deficit Art 0.8 mmol/L    FIO2 50    Calcium ionized whole blood   Result  Value Ref Range    Calcium Ionized Whole Blood 4.5 4.4 - 5.2 mg/dL   Lactic acid whole blood   Result Value Ref Range    Lactic Acid 2.4 (H) 0.7 - 2.0 mmol/L   XR Feeding Tube Placement    Narrative    Feeding tube placement    Comparison: 8/25/2019 CT.    History: NG. Patient has HISTORY of gastric bypass surgery.     Fluoroscopy time:  10 minutes    Technique: An existing, clogged feeding tube was removed. After  injection of Xylocaine gel into the right nostril, a feeding tube was  advanced under fluoroscopic guidance.    Findings: The feeding tube is advanced with the tip near the  gastrojejunostomy, however, the tip could not be passed beyond the  gastrojejunostomy into the proximal jejunum. The tube was left in this  position with redundancy of the portion of the tube in the more  proximal stomach.       Impression    Impression: Feeding tube placement with tip near the  gastrojejunostomy. The tip could not be advanced beyond the  gastrojejunostomy. Consider obtaining a follow-up radiograph to assess  for spontaneous advancement of the tip into the small bowel.     I, DAVID RENTERIA DO, attest that I was present for all critical  portions of the procedure and was immediately available to provide  guidance and assistance during the remainder of the procedure.    I have personally reviewed the examination and initial interpretation  and I agree with the findings.    DAVID RENTERIA DO   Phosphorus   Result Value Ref Range    Phosphorus 5.0 (H) 2.5 - 4.5 mg/dL   Calcium, ionized whole blood (AM Draw)   Result Value Ref Range    Calcium Ionized Whole Blood 4.3 (L) 4.4 - 5.2 mg/dL   CBC with platelets differential   Result Value Ref Range    WBC 60.8 (HH) 4.0 - 11.0 10e9/L    RBC Count 2.63 (L) 3.8 - 5.2 10e12/L    Hemoglobin 8.3 (L) 11.7 - 15.7 g/dL    Hematocrit 25.1 (L) 35.0 - 47.0 %    MCV 95 78 - 100 fl    MCH 31.6 26.5 - 33.0 pg    MCHC 33.1 31.5 - 36.5 g/dL    RDW 22.2 (H) 10.0 - 15.0 %    Platelet Count 85  (L) 150 - 450 10e9/L    Diff Method Manual Differential     % Neutrophils 87.9 %    % Lymphocytes 4.3 %    % Monocytes 6.1 %    % Eosinophils 0.0 %    % Basophils 0.0 %    % Myelocytes 1.7 %    Nucleated RBCs 2 (H) 0 /100    Absolute Neutrophil 53.4 (H) 1.6 - 8.3 10e9/L    Absolute Lymphocytes 2.6 0.8 - 5.3 10e9/L    Absolute Monocytes 3.7 (H) 0.0 - 1.3 10e9/L    Absolute Eosinophils 0.0 0.0 - 0.7 10e9/L    Absolute Basophils 0.0 0.0 - 0.2 10e9/L    Absolute Myelocytes 1.0 (H) 0 10e9/L    Absolute Nucleated RBC 1.0     Anisocytosis Moderate     Poikilocytosis Moderate     Polychromasia Moderate     [place]arm[place] Cells Moderate     Macrocytes Present     Hypochromasia Present     Platelet Estimate Confirming automated cell count    Fibrinogen activity (AM Draw)   Result Value Ref Range    Fibrinogen 191 (L) 200 - 420 mg/dL   Comprehensive metabolic panel   Result Value Ref Range    Sodium 140 133 - 144 mmol/L    Potassium 4.8 3.4 - 5.3 mmol/L    Chloride 110 (H) 94 - 109 mmol/L    Carbon Dioxide 22 20 - 32 mmol/L    Anion Gap 8 3 - 14 mmol/L    Glucose 122 (H) 70 - 99 mg/dL    Urea Nitrogen 24 7 - 30 mg/dL    Creatinine 0.73 0.52 - 1.04 mg/dL    GFR Estimate >90 >60 mL/min/[1.73_m2]    GFR Estimate If Black >90 >60 mL/min/[1.73_m2]    Calcium 8.0 (L) 8.5 - 10.1 mg/dL    Bilirubin Total 13.4 (H) 0.2 - 1.3 mg/dL    Albumin 2.6 (L) 3.4 - 5.0 g/dL    Protein Total 5.4 (L) 6.8 - 8.8 g/dL    Alkaline Phosphatase 244 (H) 40 - 150 U/L     (H) 0 - 50 U/L     (HH) 0 - 45 U/L   Comprehensive metabolic panel   Result Value Ref Range    Sodium 139 133 - 144 mmol/L    Potassium 4.5 3.4 - 5.3 mmol/L    Chloride 108 94 - 109 mmol/L    Carbon Dioxide 22 20 - 32 mmol/L    Anion Gap 8 3 - 14 mmol/L    Glucose 166 (H) 70 - 99 mg/dL    Urea Nitrogen 24 7 - 30 mg/dL    Creatinine 0.64 0.52 - 1.04 mg/dL    GFR Estimate >90 >60 mL/min/[1.73_m2]    GFR Estimate If Black >90 >60 mL/min/[1.73_m2]    Calcium 8.2 (L) 8.5 - 10.1 mg/dL     Bilirubin Total 13.0 (H) 0.2 - 1.3 mg/dL    Albumin 2.5 (L) 3.4 - 5.0 g/dL    Protein Total 5.1 (L) 6.8 - 8.8 g/dL    Alkaline Phosphatase 229 (H) 40 - 150 U/L     (H) 0 - 50 U/L     (HH) 0 - 45 U/L   CK total   Result Value Ref Range    CK Total 1,553 (HH) 30 - 225 U/L   Ammonia (AM Draw)   Result Value Ref Range    Ammonia 57 (H) 10 - 50 umol/L   INR   Result Value Ref Range    INR 3.10 (H) 0.86 - 1.14   PTT (AM Draw)   Result Value Ref Range    PTT 60 (H) 22 - 37 sec   Blood gas arterial   Result Value Ref Range    pH Arterial 7.36 7.35 - 7.45 pH    pCO2 Arterial 40 35 - 45 mm Hg    pO2 Arterial 77 (L) 80 - 105 mm Hg    Bicarbonate Arterial 23 21 - 28 mmol/L    Base Deficit Art 2.7 mmol/L    FIO2 50.0    Phosphorus   Result Value Ref Range    Phosphorus 5.0 (H) 2.5 - 4.5 mg/dL   Magnesium (AM Draw)   Result Value Ref Range    Magnesium 2.7 (H) 1.6 - 2.3 mg/dL   Calcium, ionized whole blood (AM Draw)   Result Value Ref Range    Calcium Ionized Whole Blood 4.4 4.4 - 5.2 mg/dL   CBC with platelets differential   Result Value Ref Range    WBC 61.2 (HH) 4.0 - 11.0 10e9/L    RBC Count 2.41 (L) 3.8 - 5.2 10e12/L    Hemoglobin 7.6 (L) 11.7 - 15.7 g/dL    Hematocrit 23.0 (L) 35.0 - 47.0 %    MCV 95 78 - 100 fl    MCH 31.5 26.5 - 33.0 pg    MCHC 33.0 31.5 - 36.5 g/dL    RDW 22.1 (H) 10.0 - 15.0 %    Platelet Count 80 (L) 150 - 450 10e9/L    Diff Method Manual Differential     % Neutrophils 70.4 %    % Lymphocytes 17.8 %    % Monocytes 9.3 %    % Eosinophils 0.0 %    % Basophils 0.0 %    % Metamyelocytes 0.8 %    % Myelocytes 1.7 %    Nucleated RBCs 6 (H) 0 /100    Absolute Neutrophil 43.1 (H) 1.6 - 8.3 10e9/L    Absolute Lymphocytes 10.9 (H) 0.8 - 5.3 10e9/L    Absolute Monocytes 5.7 (H) 0.0 - 1.3 10e9/L    Absolute Eosinophils 0.0 0.0 - 0.7 10e9/L    Absolute Basophils 0.0 0.0 - 0.2 10e9/L    Absolute Metamyelocytes 0.5 (H) 0 10e9/L    Absolute Myelocytes 1.0 (H) 0 10e9/L    Absolute Nucleated RBC 3.6      Anisocytosis Moderate     Poikilocytosis Moderate     Polychromasia Slight     [place] Cells Slight     Target Cells Slight     Platelet Estimate Confirming automated cell count    Fibrinogen activity (AM Draw)   Result Value Ref Range    Fibrinogen 154 (L) 200 - 420 mg/dL   Comprehensive metabolic panel   Result Value Ref Range    Sodium 142 133 - 144 mmol/L    Potassium 4.5 3.4 - 5.3 mmol/L    Chloride 110 (H) 94 - 109 mmol/L    Carbon Dioxide 22 20 - 32 mmol/L    Anion Gap 10 3 - 14 mmol/L    Glucose 161 (H) 70 - 99 mg/dL    Urea Nitrogen 25 7 - 30 mg/dL    Creatinine 0.67 0.52 - 1.04 mg/dL    GFR Estimate >90 >60 mL/min/[1.73_m2]    GFR Estimate If Black >90 >60 mL/min/[1.73_m2]    Calcium 8.0 (L) 8.5 - 10.1 mg/dL    Bilirubin Total 13.1 (H) 0.2 - 1.3 mg/dL    Albumin 2.3 (L) 3.4 - 5.0 g/dL    Protein Total 4.8 (L) 6.8 - 8.8 g/dL    Alkaline Phosphatase 238 (H) 40 - 150 U/L     (H) 0 - 50 U/L     (HH) 0 - 45 U/L   Oxyhemoglobin   Result Value Ref Range    Oxyhemoglobin Arterial 92 92 - 100 %

## 2019-09-06 PROBLEM — K72.10 END-STAGE LIVER DISEASE (H): Status: ACTIVE | Noted: 2019-01-01

## 2019-09-06 PROBLEM — R65.21 SEPTIC SHOCK (H): Status: ACTIVE | Noted: 2019-01-01

## 2019-09-06 PROBLEM — A41.9 SEPTIC SHOCK (H): Status: ACTIVE | Noted: 2019-01-01

## 2019-09-06 PROBLEM — R65.20: Status: ACTIVE | Noted: 2019-01-01

## 2019-09-06 PROBLEM — D68.9 COAGULOPATHY (H): Status: ACTIVE | Noted: 2019-01-01

## 2019-09-06 PROBLEM — G93.40 ENCEPHALOPATHY: Status: ACTIVE | Noted: 2019-01-01

## 2019-09-06 PROBLEM — A41.9: Status: ACTIVE | Noted: 2019-01-01

## 2019-09-06 PROBLEM — J80 ARDS (ADULT RESPIRATORY DISTRESS SYNDROME) (H): Status: ACTIVE | Noted: 2019-01-01

## 2019-09-06 NOTE — PROGRESS NOTES
"[facility]  Palliative Care Daily Progress Note       Recommendations & Counseling         Family is gathering to visit with Neema and would anticipate transition to comfort measures only, would recommend ongoing conversation with family regarding timing    Appreciate  and/or  support of family at this time      Once family decides on transitioning to comfort measures only would utilize the \"ICU Care of Patient and End of Life\" orderset    Below is the recommended steps for terminal extubation based on her current opioid needs    Would administer medications prior to decreasing any blood pressure support.     Please page our on call MD if there are further questions over the weekend. After regular work hours and on weekends/holidays, you can call our answering service at [phone]. Also, who's on call for us is available in [employer].      1. Terminal extubation:   Recommend initiating comfort care orders through the order set \"ICU Care for Patient at End of Live\". Through the order set, initiate the following (please note differences in medication doses/frecuency recommended below that stray from the pre-set orders in order set):    - Patient care- initiate all patient care orders, nursing preparation orders and RT orders    - prior to extubation orders:    -- glycopyrrolate 0.2mg 30-60 min before extubation   -- give fentanyl 50mcg bolus dose 15 min prior to extubation   -- versed 2-4 mg 15 minutes prior to extubation              -- after pre-medications are given wean vent to pressure support setting and monitor for ongoing comfort on this setting. If remains comfortable (10min) then proceed with extubation. If not comfortable use prn versed and fentanyl orders to obtain comfort prior to extubation. Encourage use of bolus medications over a continuous infusion as the infusions take some time to reach peak effect.       - post-extubation comfort " medications:     --for dyspnea/pain: start with fentanyl  mcg Q10 minutes prn. Nurse should have 3 syringes full and ready for immediate infusion if necessary.   -- for anxiety/ agitation: versed 1-2mg IV q 10 min prn. Nurse should have 3 syringes full and ready for immediate infusion if necessary.   -- for secretions: prn glycopyrrolate 0.1-0.2mg IV q 4 hours prn and 1-2 atropine drops sublingual q15min prn    - Medication adjustment/titration recs:  - If needing 3 or more prn fentanyl doses per hour for 2-3 hours, would increase continuous infusion to a rate that equals total hourly use over past 1-2 hours.  -If needing 3 or more prn lorazepam doses per hour for 2-3 hours, would start continuous infusion at a rate that equals total hourly use over past 1-2 hours.   - if only moderate benefit from max dose fentanyl or lorazepam would do 50% dose increase  - if minimal to no benefit from max dose fentanyl or lorazepam would do 100% dose increase    2. Patient/Family support: offer family  and social work involvment              Assessments          Neema Bailey is a 44 year old female with a history of ESLD c/b hepatic encephalopathy, portal hypertension, ascites, and polysubstance use disorder. Hospital course complicated by respiratory failure, intubated for prolonged period now, on CRRT, not waking up and is off sedation.     Today, the patient was seen for:  ESLD  Renal failure  Respiratory Failure  Encepahlopathy    Prognosis, Goals, or Advance Care Planning was addressed today with: Yes.    Attended care conference today with patient's mother- Roxy, daughter- Fallon, sister- Neema, primary team, nephrology, SW , bedside nurse, SW, RNCC, and myself. Discussed her medical condition, that she is not improving despite maximal support, and is going to die. Family was appropriately tearful. Answered family's questions. Recommended family call anyone that wants to see her for them to  come to the hospital in the next 24ish hours, discussed transition to comfort measures only once family has had time to come and see her. Discussed also code status and recommended DNR at this time. Family was discussing options after the care conference and daughter feels that everyone should come by tomorrow and then would likely transition to comfort measures only there after. Family felt today would not be a good day given it being her sister's birthday.     Family was given the prognosis of minutes to hours with stopping life support, and if she continued on life support likely having days.    Mood, coping, and/or meaning in the context of serious illness were addressed today: Yes.  Summary/Comments: family is appropriately tearful, supporting each other, have a /friend of Neema who is a good support. Although it seems that the /friend may also benefit from support.             Interval History:     Chart review/discussion with unit or clinical team members:   Notes reviewed, grimaces with painful stimulus, increasing pressor requirement    Per patient or family/caregivers today:  Mother states she is feeling less hopeful now, she is not getting better and is getting sicker.     Key Palliative Symptoms:  # Pain severity the last 12 hours: none  # Dyspnea severity the last 12 hours: none  # Nausea severity the last 12 hours: none  # Anxiety severity the last 12 hours: none    Patient is on opioids: assessed and bowels ok/no needed changes to plan of care today.           Review of Systems:     Besides above, ROS was reviewed and is unremarkable          Medications:     I have reviewed this patient's medication profile and medications during this hospitalization.           Physical Exam:   Vitals were reviewed  Temp: 97.8  F (36.6  C) Temp src: Axillary     Heart Rate: 98 Resp: 28 SpO2: 96 % O2 Device: Mechanical Ventilator      Intake/Output Summary (Last 24 hours) at 9/6/2019 1108  Last data  filed at 9/6/2019 1000  Gross per 24 hour   Intake 3357.67 ml   Output 3057 ml   Net 300.67 ml     Constitutional: intubated, not responsive,  no apparent distress  Lungs: No increased work of breathing on ventilator  Neurologic: nonresponsive  Neuropsychiatric: TESSIE given AMS  Skin: jaundiced.               Data Reviewed:       Reviewed recent labs, comments:   Creatinine 0.66  GFR 90  Albumin 2.1  Bilirubin 13.9  WBC 56.6  Hemoglobin 7.3  Platelets 79    MELD-Na score: 33 at 9/6/2019  3:55 AM  MELD score: 33 at 9/6/2019  3:55 AM  Calculated from:  Serum Creatinine: 0.66 mg/dL (Rounded to 1 mg/dL) at 9/6/2019  3:55 AM  Serum Sodium: 140 mmol/L (Rounded to 137 mmol/L) at 9/6/2019  3:55 AM  Total Bilirubin: 13.9 mg/dL at 9/6/2019  3:55 AM  INR(ratio): 4.53 at 9/6/2019  3:55 AM  Age: 44 years    KANDACE Ghotra CNS  Palliative Care Consult Team  Pager: [phone]     Total time spent was 85 minutes,  >50% of time was spent counseling and/or coordination of care regarding goals of care and symptom management.     60 min of that were spent in non face-to-face, in 1145, out 1245.

## 2019-09-06 NOTE — DEATH PRONOUNCEMENT
MD DEATH PRONOUNCEMENT    Called to pronounce Neema Bailey dead.    Physical Exam: Unresponsive to noxious stimuli, Spontaneous respirations absent, Breath sounds absent, Carotid pulse absent, Heart sounds absent and Pupillary light reflex absent    Patient was pronounced dead at 06:10 PM, 2019.    Preliminary Cause of Death: SIRS, multisystem organ failure     Active Problems:    SIRS (systemic inflammatory response syndrome) (H)    Acute kidney failure, unspecified (H)       Infectious disease present?: YES     Communicable disease present? 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   NO Death of mother at or up to seven days after delivery.     NO All  and pediatric deaths.     NO Death where the cause is sufficiently obscure to delay completion of the death certificate.   NO Deaths in which autopsy would confirm a suspected illness/condition that would affect surviving family members or recipients of transplanted organs.     The following deaths must be reported to the 's Office:  NO A death that may be due entirely or in part to any factors other than natural disease (recent surgery, recent trauma, suspected abuse/neglect).   NO A death that may be an accident, suicide, or homicide.     NO Any sudden, unexpected death in which there is no prior history of significant heart disease or any other condition associated with sudden death.   NO A death under suspicious, unusual, or unexpected circumstances.    NO Any death which is apparently due to natural causes but in which the  does not have a personal physician familiar with the patient s medical history, social, or environmental situation or the  circumstances of the terminal event.   NO Any death apparently due to Sudden Infant Death Syndrome.     NO Deaths that occur during, in association with, or as consequences of a diagnostic, therapeutic, or anesthetic procedure.   NO Any death in which a fracture of a major bone has occurred within the past (6) six months.   NO A death of persons note seen by their physician within 120 days of demise.     NO Any death in which the  was an inmate of a public institution or was in the custody of Law Enforcement personnel.   NO  All unexpected deaths of children   NO Solid organ donors   NO Unidentified bodies   NO Deaths of persons whose bodies are to be cremated or otherwise disposed of so that the bodies will later be unavailable for examination;   NO Deaths unattended by a physician outside of a licensed healthcare facility or licensed residential hospice program   NO Deaths occurring within 24 hours of arrival to a health care facility if death is unexpected.    NO Deaths associated with the decedent s employment.   NO Deaths attributed to acts of terrorism.   NO Any death in which there is uncertainty as to whether it is a medical examiner s care should be discussed with the medical investigator.        Body disposition: Autopsy was discussed with family member:  Mother and Daughter in person.  Permission for autopsy was declined.

## 2019-09-06 NOTE — PROGRESS NOTES
CRRT STATUS NOTE    DATA:  Time:  7:53 AM  Pressures WNL:  YES  Filter Status:  WDL    Problems Reported/Alarms Noted:  Gain limit reached    Supplies Present:  YES    ASSESSMENT:  Patient Net Fluid Balance:  +200 ml since midnight  Vital Signs: T 97.5 HR 92 BERTRAM 92/37 (59) RR 26 spO2 93%  Labs: WBC 57 K 5.1 lactate 5.3   Goals of Therapy:  0-50 net negative per hr    INTERVENTIONS:   Circuit changed at 0452.     PLAN:  Care conference planned for today.   Pressor needs increasing throughout night. Consider 2K bath if K continues to increase.

## 2019-09-06 NOTE — PLAN OF CARE
CRRT STATUS NOTE    DATA:  Time:  7:44 PM  Pressures WNL:  YES  Filter Status:  WDL    Problems Reported/Alarms Noted:  None    Supplies Present:  YES    ASSESSMENT:  Patient Net Fluid Balance:  -1230mls since midnight, -3L since admission  Vital Signs:  T: 97.5, HR: 87, BP: 90/46, MAP: 64, RR: 32, SpO2: 97%  Labs:    Lab Results   Component Value Date    PH 7.36 09/05/2019    PO2 77 (L) 09/05/2019    PCO2 40 09/05/2019    HCO3 23 09/05/2019          Lab Results   Component Value Date     09/05/2019    Lab Results   Component Value Date    CHLORIDE 109 09/05/2019    Lab Results   Component Value Date    BUN 23 09/05/2019      Lab Results   Component Value Date    POTASSIUM 4.9 09/05/2019    Lab Results   Component Value Date    CO2 20 09/05/2019    Lab Results   Component Value Date    CR 0.67 09/05/2019        Lab Results   Component Value Date    HGB 7.8 (L) 09/05/2019     Ionized Calcium: 4.3  WBC: 57.7  Lactate: 2.4    Goals of Therapy:  0-50ml/hr net negative, goal 50ml/hr but trying to avoid starting 2nd pressor.    INTERVENTIONS:   Filter changed @ 1839, see restart note.    PLAN:  Continue fluid removal per goals of therapy. Check filter daily. Change filter q72 hours and PRN. Pleas call CRRT resource RN @ [phone] with any questions or concerns.

## 2019-09-06 NOTE — CARE CONFERENCE
Care Conference    Care conference was held on September 6, 2019.  Conference was coordinated by Care Coordinator in the conference room    Attending the conference were:     Family: Pt's mother Roxy, Daughter Fallon were present at conference. Pt's sister Faye was on speaker phone.     MICU: Dr Bray, Dr Jacobs, Dr Jones  Palliative Care: Radha MATUTE and Chaplain Evelyne  Nephrology: Ernst Rowan   [facility]   ICU RNCC: Joce Dover  Bedside RN: Eleanor Bhagat    Purpose of the conference was medical update and goals of care.     Discussion/Outcomes/Follow-Up:     Introductions took place. Dr Bray started conference with medical update and current grim status of multi-system organ failure. Pt's family had an opportunity to ask questions and verbalized understanding of answers provided. Family was told that pt is actively dying. Family was grieving appropriately. Dr Jones discussed pt's long time illness starting 4 months ago with hospitalization at [facility]. Family discussed process of having other family members come to see the patient prior to withdrawal of support. Dr Jones discussed process of withdrawal of support and pt's change to comfort cares only. This writer brought up current full code status in light of her grim status and DNR was then recommended by medical team. Family wished for time alone to discuss information provided. Palliative Care team sat with family to answer further questions. RNCC/SW to follow for any further care coordination and family support needs.     Joce Dover RN, BSN  ICU Care Coordinator  Pager: [phone]  Phone:  [phone]

## 2019-09-06 NOTE — PROGRESS NOTES
CLINICAL NUTRITION SERVICES - BRIEF NOTE   (See RD note on 9/3 for full assessment)     Reason for RD note: Following up on K+/phosphorus trends    New Findings:  Nutrition support: Nutren 1.5 @ 55 mL/hr + 1 pkt ProSource TID = 2100 kcals (27 kcal/kg), 90 g PRO (1.6 g pro/kg), 1003 mL H2O, 232 g CHO and no fiber.     Renal: Remains on CRRT.    GI: 1900 mL stool output yesterday.    Labs: Na 140 (WNL), K 5.1 (WNL, uptrended), Phos 5.9 (H), Mg 2.6 (H), BUN 22 (WNL), Cr 0.66 (WNL)     Meds: Nephronex, vitamin B12 100 mcg daily, folate, low insulin resistance dosing sliding scale insulin, lactulose, protonix, ProSource protein pkt TID, thiamine, norepi, NS @ 10 mL/hr    Interventions:  Given elevated Phos and K uptrended, switch to Nepro @ goal 40 ml/hr (960 ml/day) + 4 pkts ProSource Protein daily to provide 1888 kcals (25 kcal/kg/day), 122 g PRO (1.6 g/kg/day), 701 ml free H2O, 155 g CHO and 12 g Fiber daily.    Future/Additional Recommendations:  Monitor K+/Phos trends and CRRT vs iHD    Nutrition will continue to follow per protocol.    Valerie Potter RD, LD  Pager: [phone]

## 2019-09-06 NOTE — PROCEDURES
EEG #-1 (Day 1 of Video-EEG Monitoring)    DATE OF RECORDIN2019    DURATION OF RECORDIN hours, 43 minutes.      CLINICAL SUMMARY:  This diagnostic video-EEG monitoring procedure was performed in evaluation of encephalopathy in Melly Bailey.  She was intubated and mechanically ventilated throughout the period of monitoring.  She was noted to be receiving sertraline on this day of monitoring.     TECHNICAL SUMMARY:  This continuous EEG monitoring procedure was performed with 23 scalp electrodes in 10-20 system placements, and additional scalp, precordial and other surface electrodes used for electrical referencing and artifact detection.  Video monitoring was utilized and periodically reviewed by EEG technologists and the physician for electroclinical correlation.     EEG ACTIVITIES DURING COMA:  During non-sedated coma, there was no evidence of wake-sleep cycling.  There was continuous moderate amplitude irregular 0.5-4 Hz delta slowing symmetrically, with a marked paucity of faster frequencies.      No interictal epileptiform abnormalities and no electrographic seizures were recorded.      SUMMARY OF DAY 1 OF VIDEO-EEG MONITORING:    The interictal EEG recording during non-sedated coma was abnormal due to continuous generalized delta slowing, with marked paucity of faster frequencies.    No interictal epileptiform abnormalities and no electrographic seizures were recorded.    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  Clinical correlation is recommended.   Mario Stephens M.D., Professor of Neurology       D: 2019   T: 2019   MT: LILIAN      Name:     MELLY BAILEY   MRN:      [id number]        Account:        [id number]   :      1975           Procedure Date: 2019      Document: [id number]

## 2019-09-06 NOTE — PROGRESS NOTES
Nephrology Progress Note  09/06/2019       Neema Bailey is a 44 yof w/ESLD c/b HE, ascites and EV, polysubstance abuse, obesity s/p gastric bypass 2002 with recent admission for E. Coli bacteremia (source unclear), re-admitted 8/19/19 with SOB, leg swelling and suspected infection.  Cr at baseline is 0.6, on rise since admission with likely culprits of contrast given for CT (for abd pain) and vanco given for suspected infection.  Nephrology consulted for management of DEE DEE and possible HD.       Interval History  Mrs Bailey continues on CRRT, net negative again yesterday but lactate is up, on 2nd pressor and WBC is nearly 60k.  We had care conference today, with grim prognosis we are moving toward comfort care extubation, likely tomorrow.  Will continue CRRT for now but would not restart should the circuit clot although could have issue with hyperkalemia with rising lactate.       ASSESSMENT AND RECOMMENDATIONS:   DEE DEE-Baseline Cr of 0.6, up to 1.2 with etiology likely KELLY with 135cc of contrast and also high dose Vanco on admit (although level was not high when checked).  HRS is in DDx and likely has some HRS physiology but Dr Nur and I did look at urine microscopy and saw granular casts suggestive of at least some degree of ATN.  Started CRRT 8/28 due to rapidly worsening resp status, able to pull fluid the past 2 days but now worsened on 2 pressors, no UF.                  -DEE DEE, likely ATN from contrast/vanco, sepsis.                   -Line is temp [facility] from 8/28                 -CRRT, no UF given 2nd pressor is needed, lactate up, likely comfort care tomorrow.      Volume-Total body volume overload but much of it 3rd spaced, on 2nd pressor, UF set at 0.       Electrolytes/pH-No acute issues.  K 5.1, stable on CRRT, bicarb 19 due to lactate.       BMD- Ca 8.0, Mg and Phos reasonable.       Nutrition-Nutren 1.5 TF at goal.      Seen and discussed with Dr Gibson     Recommendations were  "communicated to primary team via verbal communication.      Ernst Rowan, APRN CNS  Clinical Nurse Specialist  [phone]      Review of Systems:   I reviewed the following systems:  ROS not done due to vent/sedation.     Physical Exam:   I/O last 3 completed shifts:  In: 4146.16 [I.V.:1885.24; Other:178; NG/GT:545]  Out: 2228 [Other:1628; Stool:600]   /42 (BP Location: Left arm)   Pulse 105   Temp 97.9  F (36.6  C) (Axillary)   Resp (!) 33   Ht 1.727 m (5' 8\")   Wt 121.5 kg (267 lb 13.7 oz)   LMP 10/04/2018   SpO2 97%   Breastfeeding? No   BMI 40.73 kg/m       GENERAL APPEARANCE: Intubated, non-responsive.   EYES:  + scleral icterus, pupils equal  HENT: mouth without ulcers or lesions  PULM: lungs clear to auscultation, equal air movement, no cyanosis or clubbing  CV: regular rhythm, normal rate, no rub     -JVP not elevated     -edema +2  GI: soft, non-tender, nondistended, bowel sounds are +  MS: no evidence of inflammation in joints, no muscle tenderness  NEURO: Intubated, non-responsive.     Labs:   All labs reviewed by me  Electrolytes/Renal -   Recent Labs   Lab Test 09/06/19  0355 09/05/19  2154 09/05/19  1557  09/05/19  0409  09/04/19  1009    140 138   < > 142   < > 142   POTASSIUM 5.1  4.9 4.9 4.9   < > 4.5   < > 4.4   CHLORIDE 110* 109 109   < > 110*   < > 108   CO2 19* 23 20   < > 22   < > 24   BUN 22 23 23   < > 25   < > 24   CR 0.66 0.59 0.67   < > 0.67   < > 0.71   * 152* 155*   < > 161*   < > 140*   NARENDRA 8.0* 7.9* 7.9*   < > 8.0*   < > 8.0*   MAG 2.6*  --   --   --  2.7*  --  2.5*   PHOS 5.9*  --  5.5*  --  5.0*   < > 4.2    < > = values in this interval not displayed.       CBC -   Recent Labs   Lab Test 09/06/19  0355 09/05/19  1557 09/05/19  0409   WBC 56.6* 57.7* 61.2*   HGB 7.3* 7.8* 7.6*   PLT 79* 83* 80*       LFTs -   Recent Labs   Lab Test 09/06/19  0355 09/05/19  2154 09/05/19  1557   ALKPHOS 262* 237* 244*   BILITOTAL 13.9* 13.7* 13.7*   * 253* 246* "   AST Canceled, Test credited 585* 603*   PROTTOTAL 4.4* 4.4* 4.8*   ALBUMIN 2.1* 2.2* 2.3*       Iron Panel -   Recent Labs   Lab Test 08/21/19  0348 07/17/19  1542   IRON 37 91   IRONSAT 29 86*   CHELSY 166 372*           Current Medications:    artificial tears   Both Eyes Q6H     B and C vitamin Complex with folic acid  5 mL Oral or Feeding Tube Daily     cyanocobalamin  100 mcg Oral or Feeding Tube Daily     ertapenem (INVanz) IV  1 g Intravenous Q24H     folic acid  500 mcg Oral or Feeding Tube Daily     heparin lock flush  5-10 mL Intracatheter Q24H     hydrocortisone sodium succinate PF  50 mg Intravenous Q8H     insulin aspart  1-4 Units Subcutaneous Q4H     lactulose  20 g Oral or Feeding Tube BID     linezolid  600 mg Intravenous Q12H     micafungin  100 mg Intravenous Q24H     miconazole   Topical BID     pantoprazole  40 mg Oral or Feeding Tube QAM AC     protein modular  2 packet Per Feeding Tube BID     rifaximin  550 mg Oral or Feeding Tube BID     vitamin B1  100 mg Oral or Feeding Tube Daily       - MEDICATION INSTRUCTIONS -       IV fluid REPLACEMENT ONLY       CRRT replacement solution 16 mL/kg/hr (09/06/19 1014)     - MEDICATION INSTRUCTIONS -       norepinephrine 0.6 mcg/kg/min (09/06/19 1500)     - MEDICATION INSTRUCTIONS -       CRRT replacement solution 1.66 mL/kg/hr (09/06/19 0423)     CRRT replacement solution 10 mL/kg/hr (09/06/19 1318)     sodium chloride 10 mL/hr at 09/05/19 1108     vasopressin (PITRESSIN) infusion ADULT (40 mL) 4 Units/hr (09/06/19 1521)

## 2019-09-06 NOTE — PROGRESS NOTES
"[facility]    Hepatology Follow-up    CC:      Weakness, shortness of breath     Dx:      ESBL Bacteremia, unclear source              ARDS              Multi-system organ failure              Decompensated EtOH/BURT Cirrhosis              H/o RNYGB    24 hour events:   NAEON, supine in bed, oxygenation improving    Subjective:  Intubated, sedated  Afebrile    Medications  Current Facility-Administered Medications   Medication Dose Route Frequency     artificial tears   Both Eyes Q6H     B and C vitamin Complex with folic acid  5 mL Oral or Feeding Tube Daily     cyanocobalamin  100 mcg Oral or Feeding Tube Daily     folic acid  500 mcg Oral or Feeding Tube Daily     heparin lock flush  5-10 mL Intracatheter Q24H     hydrocortisone sodium succinate PF  50 mg Intravenous Q8H     insulin aspart  1-4 Units Subcutaneous Q4H     lactulose  20 g Oral or Feeding Tube Q6H     micafungin  100 mg Intravenous Q24H     miconazole   Topical BID     pantoprazole  40 mg Oral or Feeding Tube QAM AC     protein modular  1 packet Per Feeding Tube TID     rifaximin  550 mg Oral or Feeding Tube BID     vitamin B1  100 mg Oral or Feeding Tube Daily       Review of systems  A 10-point review of systems was negative, unless stated above    Examination  /42 (BP Location: Left arm)   Pulse 105   Temp 97.5  F (36.4  C) (Oral)   Resp (!) 31   Ht 1.727 m (5' 8\")   Wt 123.6 kg (272 lb 7.8 oz)   LMP 10/04/2018   SpO2 97%   Breastfeeding? No   BMI 41.43 kg/m      Intake/Output Summary (Last 24 hours) at 9/5/2019 1928  Last data filed at 9/5/2019 1900  Gross per 24 hour   Intake 2846.81 ml   Output 4534 ml   Net -1687.19 ml       General: Critically ill looking  CVS: tachycardic  Resp:intubated and ventilated  Abdomen: soft  Extremities: edema  Neuro: Sedated      Laboratory  Lab Results   Component Value Date     09/05/2019    POTASSIUM 4.9 09/05/2019    CHLORIDE 109 09/05/2019    CO2 20 09/05/2019 "    BUN 23 09/05/2019    CR 0.67 09/05/2019       Lab Results   Component Value Date    BILITOTAL 13.7 09/05/2019     09/05/2019     09/05/2019    ALKPHOS 244 09/05/2019       Lab Results   Component Value Date    WBC 57.7 09/05/2019    HGB 7.8 09/05/2019    MCV 95 09/05/2019    PLT 83 09/05/2019       Lab Results   Component Value Date    INR 3.10 09/05/2019       MELD-Na score: 29 at 9/5/2019  3:57 PM  MELD score: 29 at 9/5/2019  3:57 PM  Calculated from:  Serum Creatinine: 0.67 mg/dL (Rounded to 1 mg/dL) at 9/5/2019  3:57 PM  Serum Sodium: 138 mmol/L (Rounded to 137 mmol/L) at 9/5/2019  3:57 PM  Total Bilirubin: 13.7 mg/dL at 9/5/2019  3:57 PM  INR(ratio): 3.10 at 9/5/2019  4:09 AM  Age: 44 years     Assessment  44 year old female with h/o decompensated cirrhosis 2/2 EtOH and BURT in setting of RNYGB admitted with sepsis 2/2 ESBL bacteremia (unclear source) with initial improvement on antibiotics but subsequent deterioration clinically and transfer back to ICU with septic shock, ARDS and multi-system organ failure.     Transaminases and TB continue to rise, as well as her CK. Concerned about a possible cardiac event I.e demand ischemia.    Overall, course is suggestive of liver disease of critical illness and sepsis.    She remains critically ill.    Recommendations  -- Continue antibiotics per primary team  -- Ongoing supportive care per primary team   -- We will continue to follow peripherally      Patient seen and discussed with attending physician, Dr. Tavares Goddard MD  PGY 5  Hepatology

## 2019-09-06 NOTE — PROGRESS NOTES
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 Course was complicated by prolonged BiPAP in the setting of altered mental status likely resulting in aspiration events. Intubated for work of breathing, bronched, and subsequently worsened from a respiratory perspective, with development of severe ARDS. Paralyzed and proned for a period of time, now recovering from a pulmonary perspective but with ongoing multi-organ failure with worsening trajectory.    Changes today:  Ongoing decreased mental status with reported grimacing to pain without improvement, off sedation. Increasing pressor requirement without overall clinical improvement.  - Care conference today, will discuss prognosis and goals of care; will have renal present  - C diff testing, enteric precautions given minimal lactulose but ongoing significant stool output  - vasopressin today, 1 unit of blood for colloid replacement and downtrending anemia  - net even on CRRT  - linezolid and ertapenem for intraabdominal coverage with sepsis, worsening hypotension    Neurology:  Hepatic/Toxic-metabolic encephalopathy: Elevated ammonia to 147 on 8/25 with lethargy. Normal BUN, no other sedating meds given. Ammonia downtrending with PO lactulose and CRRT. Pupils reactive, intermittently sluggish without focal findings while sedated. Despite no sedatives, pt has consistent RASS of -5. EEG not showing seizure activity.  - PO lactulose scheduled and per  [facility] protocol; goal 1L of stool a day  - trend ammonia daily  - off EEG, no evidence of seizures; severe diffuse encephalopathy  - care conference today; given minimal mental status improvement in the setting of severe multi-organ dysfunction, will discuss goals of care    Sedation: RASS 0 to -1  - off versed  - fentanyl gtt, off  - cis 8/28-8/30    Depression  - holding sertraline in the setting of ongoing TME    Cardiovascular:  Shock, multifactorial: in the setting of cirrhosis with third spacing and possible infection underlying. Rising lactate during hospital stay from 1.9 after transfer out of ICU to 4.3 on the day of transfer back. Bedside TTE with hyperdynamic LV, 1.78cm IVC with minimal respiratory variation while on positive pressure ventilation. Cardiac function on formal TTE 8/20 hyperdynamic with normal PASP. Repeat TTE with ongoing hyperdynamic changes, no evidence of shunting on bubble study. Able to wean off vaso and phenylephrine after starting hydrocortisone on on 8/30.  - levophed, titrate for SBP > 90  - add vasopressin today  - continue hydrocortisone q8h    Pulmonary:        Acute hypoxic respiratory failure  Developing ARDS, suspected  Transudative R-sided pleural effusion, moderate  CT chest 8/25 notable for GGO, interlobular septal thickening, patchy consolidative opacities. Differential includes pulmonary edema, developing ARDS, hepatopulmonary syndrome, pneumonia, atypical infection, severe aspiration pneumonitis.. P/F ratio 149. Intubated for increased work of breathing and hypoxemia. Initially improving on MICU readmission day 2 and then worsened after bronchial lavage on MICU day 3 with worsening hypoxemia. BAL notable for significant bilious secretions that were suctioned. Subsequently, started on Flolan with some improvement. Improvement after being prone for ~12h on 8/29, and remained stable after flipping to supine. CXR on 9/4 with worsened pleural effusion.   - on CRRT, net  even  - daily CXR to re-evaluate  - routine vent cares  - lung-protective ventilation  - goal PaO2 55-85, plateau pressures < 30   - daily ABG    Ventilation Mode: CMV/AC  (Continuous Mandatory Ventilation/ Assist Control)  FiO2 (%): 40 %  Rate Set (breaths/minute): 25 breaths/min  Tidal Volume Set (mL): (S) 450 mL  PEEP (cm H2O): 8 cmH2O  Oxygen Concentration (%): 40 %  Resp: 26      Large R pleural effusion, increasing  Previously tapped and consistent with transudative fluid, likely to be secondary to hepatic hydrothorax. Layering, unlikely to be loculated.    Renal  Anasarca  Oliguric DEE DEE 2/2 KELLY, possible hepatorenal syndrome, ATN  Baseline creatinine 0.6, increased to 1.0 in the setting of hypotension. Recent nephrotoxins also include IV contrast 8/25 during CT scan, and now administration of vancomycin. Urine output decreasing 8/23. UA 8/22 notable for mild proteinuria, small LE, hyaline casts. FENA and FEBUN consistent with prerenal azotemia. Repeat UA 8/27 demonstrating some ketones, did not improve with 2 days of albumin challenge.  - strict I/Os with Alexander anchored  - avoid nephrotoxins  - Renal consult, appreciate assistance  - daily BMP  - holding PTA midodrine given on stronger pressors  - CRRT per Renal, run net even  - compression stockings    Hypernatremia: Resolved with free water flushing and on CRRT.    ID:         Recent ESBL E coli bacteremia   Possible sepsis due to unknown source  Concern for disseminated candidiasis : Blood culture 1/2 positive 8/19 for ESBL E coli. No followup blood cultures obtained until 8/22, which remain NGTD. Previous urine culture, transudative pleural fluid culture, and ascites fluid culture currently NGTD. S pneumo and legionella urine antigen negative. Procalcitonin initially uptrending but now stable. Leukocytosis and LFTs uptrending. Cryptococcal antigen negative. Now with Candida kefyr in sputum and urine, this is in the setting of stopping micafungin on 9/2.  With uptrending white count, concerning that the candida is causing disease in this critically ill pt. Bedside US for paracentesis fluid 9/5 with no pocket to tap; exploration of the L gluteal/pelvic area with no significant fluid collection, appears consistent with anasarca.  - trend CBC  - micafungin for concern for disseminated candidiasis, susceptibilities pending  - ertapenem and linezolid today, concern that VRE may be in other spaces    Cultures:  Sputum culture 9/2 VRE + Candida keyfr, susceptibility testing pending  UCx 9/2 Candida kefyr  SCx 8/31 C albicans, susceptibility testing pending  UCx 8/27 NGTD   Bronchial lavage 8/27 NGTD  BCx 8/26 x2 NGTD  Pleural fluid culture 8/25 NGTD  Ascites fluid culture 8/22 NGTD    Antimicrobials:  Micafungin (8/26 - 9/2) (9/5-*)  Ertapenem (8/19 - 8/24) (9/6-*)  Linezolid (9/6-*)    Meropenem (8/25 - 9/3)  Vancomycin (8/20, 8/25-8/27)  Azithromycin (8/25 - 8/27)      GI  ESLD, multifactorial c/b portal hypertension with ascites, esophageal varices: Last EGD 5/2019 with no varices. Last US 8/20 with no mass, no portal vein thrombosis. LFTs uptrending, will continue to monitor.   - Hepatology consulted, appreciate recs  - currently not undergoing transplant evaluation  - rifaxamin, lactulose as above for HE protocol  - holding PTA diuretics furosemide 20mg, spironolactone 50mg  - daily MELD labs    MELD-Na score: 33 at 9/6/2019  3:55 AM  MELD score: 33 at 9/6/2019  3:55 AM  Calculated from:  Serum Creatinine: 0.66 mg/dL (Rounded to 1 mg/dL) at 9/6/2019  3:55 AM  Serum Sodium: 140 mmol/L (Rounded to 137 mmol/L) at 9/6/2019  3:55 AM  Total Bilirubin: 13.9 mg/dL at 9/6/2019  3:55 AM  INR(ratio): 4.53 at 9/6/2019  3:55 AM  Age: 44 years    ? Alcoholic hepatitis  Hyperbilirubinemia, Mild transaminitis: Mild gallbladder wall thickening noted on CT abd/pelvis 8/25. Negative HIDA scan. Tenderness to palpation on exam. CK initially elevated to 1300, then downtrending on recheck -  may have some critical illness myopathy. However, CK uptrending since 9/1. MDF ? 154. On steroids for shock. Slowly worsening liver labs since the beginning of admission.  - continue to monitor  - trend CK, daily MELD labs    Mild reflux esophagitis  - PPI daily    Nutrition  At risk for malnutrition:  - feeding per nutrition    Endocrine:  At risk for hypoglycemia  - hypoglycemia protocol, q4h glucose checks while on tube feeds  - sliding scale insulin    Heme/Onc:  Mild DIC/LIC  Thrombocytopenia, multifactorial (baseline 120s in the last 6 months)  Macrocytic anemia   Baseline hemoglobin 8-9 in the last 6 months. Requiring intermittent transfusions while on CRRT. Hemolysis labs suggestive of mild DIC with thrombocytopenia, anemia, RBC fragments on smear, haptoglobin undetectable, LDH elevated, hemoglobin plasma elevated and risk factors including ARDS, liver disease, underlying infectious process. On 9/4, fibrinogen down to 89, transfused 2U of cryo.   - daily CBC, transfuse Hgb > 7  - continue to monitor  - heme consulted, appreciate recs  - LDH q48h, fibrinogen daily  - fibrinogen goal >100    DVT Prophylaxis: Pneumatic Compression Devices  GI Prophylaxis: PPI    Restraints: Restraints for medical healing needed: YES    Family update by me today: Yes     Amy Bray    I have seen and discussed this patient with Dr. Jones.     Amy Bray MD  Internal Medicine/Pediatrics, PGY3  [facility]  (p) [phone]      =====================================================================  Code Status   Full Code    Subjective  RN notes reviewed. Increasing pressor support this morning. Still no significant change i     Review of Systems   Review of systems not obtained due to patient factors - mental status    Physical Exam   Temp: 97.5  F (36.4  C) Temp src: Oral Temp  Min: 95.8  F (35.4  C)  Max: 97.5  F (36.4  C)     Heart Rate: 95 Resp: 26 SpO2: 95 % O2 Device: Mechanical Ventilator    Vital Signs  with Ranges  Temp:  [95.8  F (35.4  C)-97.5  F (36.4  C)] 97.5  F (36.4  C)  Heart Rate:  [] 95  Resp:  [19-32] 26  MAP:  [53 mmHg-75 mmHg] 58 mmHg  Arterial Line BP: ()/(32-52) 82/44  FiO2 (%):  [40 %-50 %] 40 %  SpO2:  [93 %-100 %] 95 %  267 lbs 13.74 oz    Constitutional: intubated and jaundiced  Eyes: Pupils equal, small, and reactive, scleral icterus, subconjunctival hemorrhage on the R; roving eye movements    ENT: Normocephalic, without obvious abnormality, atraumatic, orally intubated  Respiratory: CTAB, synchronous with vent, ronchorous breath sounds bilaterally  Cardiovascular: tachycardic, irregular rhythm, and no murmur noted.  GI: normal bowel sounds, soft, non-distended, nontender on palpation, no masses palpated, no hepatosplenomegaly, dark stool in rectal tube  Skin: No bruising or bleeding, mild jaundice, texture, turgor, no redness, warmth, or swelling, no rashes, no lesions, no abnormal moles, nails normal without discoloration or clubbing; dependent edema  Musculoskeletal: pitting edema bilaterally in the LE trace and dependent, UE with significant edema  Neurologic: comatose, unresponsive    Data   Results for orders placed or performed during the hospital encounter of 08/19/19 (from the past 24 hour(s))   US Abdomen Limited w Abd/Pelv Duplex Complete Port    Narrative    EXAMINATION: US ABDOMEN COMPLETE WITH DOPPLER, 9/5/2019 11:08 AM     COMPARISON: Ultrasound 8/20/2019    HISTORY: concern for portal vein thrombosis and/or splenic artery  thrombosis/infarction in patient with cirrhosis    TECHNIQUE: The abdomen was scanned in standard fashion with  specialized ultrasound transducer(s) using both gray-scale, color  Doppler, and spectral flow techniques.      Findings:    Liver: Liver measures 13.1 cm in length. No evidence of a focal  hepatic mass.     Extrahepatic portal vein flow is retrograde, measuring 35 cm/sec.  Right portal vein flow is retrograde, measuring 17 cm/sec.  Left  portal vein flow is retrograde, measuring 16 cm/sec.      Flow in the hepatic artery is towards the liver with abnormal  monophasic low resistance waveforms:  135 cm/sec peak systolic  0.52 resistive index.     The splenic vein is patent with retrograde flow. The left, middle, and  right hepatic veins are patent with flow towards the IVC. The IVC is  patent with flow towards the heart.   The visualized aorta is not  dilated.    Gallbladder: The gallbladder is relatively contracted with echogenic  debris within its lumen, likely biliary sludge. Mild gallbladder wall  is mildly thickened, nonspecific in the setting of ascites.     Bile Ducts: Both the intra- and extrahepatic biliary system are of  normal caliber.  The common bile duct measures 2.4 mm in diameter.    Pancreas: Obscured.    Kidneys: The right kidney measures 9.8 cm, and demonstrates normal  echotexture, without mass or hydronephrosis.      Fluid: Large right pleural effusion. Small volume ascites.        Impression    Impression:   1.  Patent right upper quadrant Doppler evaluation, with retrograde  flow in the portal and splenic veins. No portal vein thrombosis.  2.  Large right-sided pleural effusion and small volume  intra-abdominal ascites.    I have personally reviewed the examination and initial interpretation  and I agree with the findings.    JAY ENGLE MD   Comprehensive metabolic panel   Result Value Ref Range    Sodium 139 133 - 144 mmol/L    Potassium 4.7 3.4 - 5.3 mmol/L    Chloride 108 94 - 109 mmol/L    Carbon Dioxide 22 20 - 32 mmol/L    Anion Gap 9 3 - 14 mmol/L    Glucose 125 (H) 70 - 99 mg/dL    Urea Nitrogen 23 7 - 30 mg/dL    Creatinine 0.56 0.52 - 1.04 mg/dL    GFR Estimate >90 >60 mL/min/[1.73_m2]    GFR Estimate If Black >90 >60 mL/min/[1.73_m2]    Calcium 8.0 (L) 8.5 - 10.1 mg/dL    Bilirubin Total 14.2 (H) 0.2 - 1.3 mg/dL    Albumin 2.5 (L) 3.4 - 5.0 g/dL    Protein Total 5.1 (L) 6.8 - 8.8 g/dL    Alkaline Phosphatase 248  (H) 40 - 150 U/L     (H) 0 - 50 U/L     (HH) 0 - 45 U/L   Troponin I   Result Value Ref Range    Troponin I ES 0.204 (HH) 0.000 - 0.045 ug/L   EKG 12-lead, complete   Result Value Ref Range    Interpretation ECG Click View Image link to view waveform and result    Phosphorus   Result Value Ref Range    Phosphorus 5.5 (H) 2.5 - 4.5 mg/dL   Calcium, ionized whole blood (AM Draw)   Result Value Ref Range    Calcium Ionized Whole Blood 4.3 (L) 4.4 - 5.2 mg/dL   CBC with platelets differential   Result Value Ref Range    WBC 57.7 (HH) 4.0 - 11.0 10e9/L    RBC Count 2.55 (L) 3.8 - 5.2 10e12/L    Hemoglobin 7.8 (L) 11.7 - 15.7 g/dL    Hematocrit 24.3 (L) 35.0 - 47.0 %    MCV 95 78 - 100 fl    MCH 30.6 26.5 - 33.0 pg    MCHC 32.1 31.5 - 36.5 g/dL    RDW 22.3 (H) 10.0 - 15.0 %    Platelet Count 83 (L) 150 - 450 10e9/L    Diff Method Manual Differential     % Neutrophils 87.6 %    % Lymphocytes 2.5 %    % Monocytes 9.1 %    % Eosinophils 0.0 %    % Basophils 0.0 %    % Myelocytes 0.8 %    Nucleated RBCs 7 (H) 0 /100    Absolute Neutrophil 50.5 (H) 1.6 - 8.3 10e9/L    Absolute Lymphocytes 1.4 0.8 - 5.3 10e9/L    Absolute Monocytes 5.3 (H) 0.0 - 1.3 10e9/L    Absolute Eosinophils 0.0 0.0 - 0.7 10e9/L    Absolute Basophils 0.0 0.0 - 0.2 10e9/L    Absolute Myelocytes 0.5 (H) 0 10e9/L    Absolute Nucleated RBC 4.3     Anisocytosis Marked     Poikilocytosis Moderate     Polychromasia Marked     [place] Cells Moderate     Macrocytes Present     Platelet Estimate Confirming automated cell count    Fibrinogen activity (AM Draw)   Result Value Ref Range    Fibrinogen 143 (L) 200 - 420 mg/dL   Comprehensive metabolic panel   Result Value Ref Range    Sodium 138 133 - 144 mmol/L    Potassium 4.9 3.4 - 5.3 mmol/L    Chloride 109 94 - 109 mmol/L    Carbon Dioxide 20 20 - 32 mmol/L    Anion Gap 9 3 - 14 mmol/L    Glucose 155 (H) 70 - 99 mg/dL    Urea Nitrogen 23 7 - 30 mg/dL    Creatinine 0.67 0.52 - 1.04 mg/dL    GFR Estimate  >90 >60 mL/min/[1.73_m2]    GFR Estimate If Black >90 >60 mL/min/[1.73_m2]    Calcium 7.9 (L) 8.5 - 10.1 mg/dL    Bilirubin Total 13.7 (H) 0.2 - 1.3 mg/dL    Albumin 2.3 (L) 3.4 - 5.0 g/dL    Protein Total 4.8 (L) 6.8 - 8.8 g/dL    Alkaline Phosphatase 244 (H) 40 - 150 U/L     (H) 0 - 50 U/L     (HH) 0 - 45 U/L   Troponin I   Result Value Ref Range    Troponin I ES 0.213 (HH) 0.000 - 0.045 ug/L   Comprehensive metabolic panel   Result Value Ref Range    Sodium 140 133 - 144 mmol/L    Potassium 4.9 3.4 - 5.3 mmol/L    Chloride 109 94 - 109 mmol/L    Carbon Dioxide 23 20 - 32 mmol/L    Anion Gap 8 3 - 14 mmol/L    Glucose 152 (H) 70 - 99 mg/dL    Urea Nitrogen 23 7 - 30 mg/dL    Creatinine 0.59 0.52 - 1.04 mg/dL    GFR Estimate >90 >60 mL/min/[1.73_m2]    GFR Estimate If Black >90 >60 mL/min/[1.73_m2]    Calcium 7.9 (L) 8.5 - 10.1 mg/dL    Bilirubin Total 13.7 (H) 0.2 - 1.3 mg/dL    Albumin 2.2 (L) 3.4 - 5.0 g/dL    Protein Total 4.4 (L) 6.8 - 8.8 g/dL    Alkaline Phosphatase 237 (H) 40 - 150 U/L     (H) 0 - 50 U/L     (HH) 0 - 45 U/L   CK total   Result Value Ref Range    CK Total 1,996 (HH) 30 - 225 U/L   INR   Result Value Ref Range    INR 4.53 (H) 0.86 - 1.14   PTT (AM Draw)   Result Value Ref Range    PTT 79 (H) 22 - 37 sec   Blood gas arterial   Result Value Ref Range    pH Arterial 7.36 7.35 - 7.45 pH    pCO2 Arterial 36 35 - 45 mm Hg    pO2 Arterial 71 (L) 80 - 105 mm Hg    Bicarbonate Arterial 21 21 - 28 mmol/L    Base Deficit Art 4.3 mmol/L    FIO2 40    Phosphorus   Result Value Ref Range    Phosphorus 5.9 (H) 2.5 - 4.5 mg/dL   Magnesium (AM Draw)   Result Value Ref Range    Magnesium 2.6 (H) 1.6 - 2.3 mg/dL   Calcium, ionized whole blood (AM Draw)   Result Value Ref Range    Calcium Ionized Whole Blood 4.3 (L) 4.4 - 5.2 mg/dL   CBC with platelets differential   Result Value Ref Range    WBC 56.6 (HH) 4.0 - 11.0 10e9/L    RBC Count 2.38 (L) 3.8 - 5.2 10e12/L    Hemoglobin 7.3 (L)  11.7 - 15.7 g/dL    Hematocrit 22.7 (L) 35.0 - 47.0 %    MCV 95 78 - 100 fl    MCH 30.7 26.5 - 33.0 pg    MCHC 32.2 31.5 - 36.5 g/dL    RDW 22.3 (H) 10.0 - 15.0 %    Platelet Count 79 (L) 150 - 450 10e9/L    Diff Method Manual Differential     % Neutrophils 84.7 %    % Lymphocytes 6.8 %    % Monocytes 6.8 %    % Eosinophils 0.0 %    % Basophils 0.0 %    % Metamyelocytes 1.7 %    Nucleated RBCs 4 (H) 0 /100    Absolute Neutrophil 47.9 (H) 1.6 - 8.3 10e9/L    Absolute Lymphocytes 3.8 0.8 - 5.3 10e9/L    Absolute Monocytes 3.8 (H) 0.0 - 1.3 10e9/L    Absolute Eosinophils 0.0 0.0 - 0.7 10e9/L    Absolute Basophils 0.0 0.0 - 0.2 10e9/L    Absolute Metamyelocytes 1.0 (H) 0 10e9/L    Absolute Nucleated RBC 2.4     Anisocytosis Moderate     Poikilocytosis Moderate     Polychromasia Slight     Ovalocytes Slight     Carmen Cells Slight     Target Cells Slight    Fibrinogen activity (AM Draw)   Result Value Ref Range    Fibrinogen 116 (L) 200 - 420 mg/dL   Comprehensive metabolic panel   Result Value Ref Range    Sodium 140 133 - 144 mmol/L    Potassium 5.1 3.4 - 5.3 mmol/L    Chloride 110 (H) 94 - 109 mmol/L    Carbon Dioxide 19 (L) 20 - 32 mmol/L    Anion Gap 12 3 - 14 mmol/L    Glucose 147 (H) 70 - 99 mg/dL    Urea Nitrogen 22 7 - 30 mg/dL    Creatinine 0.66 0.52 - 1.04 mg/dL    GFR Estimate >90 >60 mL/min/[1.73_m2]    GFR Estimate If Black >90 >60 mL/min/[1.73_m2]    Calcium 8.0 (L) 8.5 - 10.1 mg/dL    Bilirubin Total 13.9 (H) 0.2 - 1.3 mg/dL    Albumin 2.1 (L) 3.4 - 5.0 g/dL    Protein Total 4.4 (L) 6.8 - 8.8 g/dL    Alkaline Phosphatase 262 (H) 40 - 150 U/L     (H) 0 - 50 U/L    AST Canceled, Test credited 0 - 45 U/L   Troponin I   Result Value Ref Range    Troponin I ES 0.182 (HH) 0.000 - 0.045 ug/L   Potassium whole blood (PCU Collect TBD)   Result Value Ref Range    Potassium 4.9 3.4 - 5.3 mmol/L   Ammonia (AM Draw)   Result Value Ref Range    Ammonia 68 (H) 10 - 50 umol/L   Blood gas venous with oxyhemoglobin  (1200)   Result Value Ref Range    Ph Venous 7.31 (L) 7.32 - 7.43 pH    PCO2 Venous 42 40 - 50 mm Hg    PO2 Venous 38 25 - 47 mm Hg    Bicarbonate Venous 21 21 - 28 mmol/L    FIO2 45     Oxyhemoglobin Venous 56 %    Base Deficit Venous 4.8 mmol/L   Lactic acid whole blood   Result Value Ref Range    Lactic Acid 5.3 (HH) 0.7 - 2.0 mmol/L

## 2019-09-06 NOTE — PROCEDURES
EEG #-2 (Day 2 of Video-EEG Monitoring)    DATE OF RECORDIN2019    DURATION OF RECORDING:  10 hours, 19 minutes.    CLINICAL SUMMARY:  This diagnostic video-EEG monitoring procedure was performed in evaluation of encephalopathy in Neema Bailey.  She was intubated and mechanically ventilated throughout the period of monitoring.  She was noted to be receiving sertraline on this day of monitoring.     TECHNICAL SUMMARY:  This continuous EEG monitoring procedure was performed with 23 scalp electrodes in 10-20 system placements, and additional scalp, precordial and other surface electrodes used for electrical referencing and artifact detection.  Video monitoring was utilized and periodically reviewed by EEG technologists and the physician for electroclinical correlation.     EEG ACTIVITIES DURING COMA:  During non-sedated coma, there was no evidence of wake-sleep cycling.  There was continuous moderate amplitude irregular 0.5-4 Hz delta slowing symmetrically, with a marked paucity of faster frequencies.      No interictal epileptiform abnormalities and no electrographic seizures were recorded.      SUMMARY OF DAY 2 OF VIDEO-EEG MONITORING:    The interictal EEG recording during non-sedated coma was abnormal due to continuous generalized delta slowing, with marked paucity of faster frequencies.    No interictal epileptiform abnormalities and no electrographic seizures were recorded.      SUMMARY OF 2 DAYS OF VIDEO EEG MONITORING:    Over the 2 days of monitoring, the patient was continuously in nonsedated coma, with absence of wake-sleep cycling and continuous irregular generalized delta slowing, with paucity of faster frequencies.    No interictal epileptiform abnormalities and no electrographic seizures were recorded over the 2 days of monitoring.    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  Clinical correlation is recommended.   Mario BRAY  WANDA Stephens, Professor of Neurology       D: 2019   T: 2019   MT:       Name:     MELLY JONES   MRN:      [id number]        Account:        [id number]   :      1975           Procedure Date: 2019      Document: [id number]

## 2019-09-06 NOTE — PLAN OF CARE
ICU End of Shift Summary. See flowsheets for vital signs and detailed assessment.    Changes this shift: Still not withdrawing, opening eyes, or following commands.  Will grimace with oral care/sternal rub.  Increasingly hypotensive this AM, levo now at 0.48.  K+ this AM 5.1, lactic 5.3, WBC 56.6. Sinus rhythm, peaked T waves, frequent PACs.  Lungs coarse, no cough, volume increased to 450 from 400 as pt was overbreathing vent.      Pt met fluid output goal for CRRT yesterday, today is net positive pulling 0 d/t hypotension; team aware.     Plan: Care conference today.

## 2019-09-06 NOTE — PROGRESS NOTES
SPIRITUAL HEALTH SERVICES  SPIRITUAL ASSESSMENT Progress Note (Palliative Focus)  [facility] ([place]) 4C    REFERRAL SOURCE: Family request.    Care conference for patient Neema Bailey with primary team, palliative care team, mother Roxy, daughter Fallon, and sister Faye (sister by phone). Visit following care conference with Fallon Ramsey, and Neema's close friend and  Kindra.    Family are shocked and grieving as they anticipate Neema's death. They received medical update and, given condition and prognosis, plan to transition to comfort measures only tomorrow, Saturday, once all family and friends have had an opportunity to visit.     Daughter Fallon is most concerned about how Neema's younger children, 18 yo and 17 yo, as well as grandchildren (ages 5, 5, and 3) will cope with Neema's death.  She is also concerned that 17 yo daughter, who has lived with Fallon for several years, be allowed to continue to live with her.     I offered grief and spiritual support through reflective listening, affirming of emotions, experience, and meaning, and referral to social work.    Plan: I will follow for spiritual support while Palliative Care is consulted.    Alyssa Mota  Palliative   Pager [phone]  [facility] Inpatient Team Consult pager [phone] (M-F 8-4:30)  After-hours Answering Service [phone]

## 2019-09-06 NOTE — PROGRESS NOTES
ICU End of Shift Summary. See flowsheets for vital signs and detailed assessment.    Changes this shift: Patient requiring more pressors this morning, along with increase in lactic and white count. Family Care conference at 1300, was decided to go to comfort cares after all family arrived at bedside. At 1630 patient started declining and requiring a 3rd pressor family requested that RN do not draw labs, give meds, or turn patient. It was discussed with family that she may pass while being fully supportive. After family discussion, they decided to withdraw care at 1740. Pressors turned off, CRRT turned off and patient extubated at 1755. Patient passed at 1810 with family at bedside. Hand prints done with the lazaro mold.      Plan: family ay bedside at this time, patient will be transferred to [facility] when ready

## 2019-09-06 NOTE — PROGRESS NOTES
After care conference it was decided by family to transition to comfort cares for patient after the rest of the family was able to get here. Patient made DNR. Max out on pressors at this time. At 1740 family asked me turn off pressors and CRRT, called team and asked for comfort care order set. All of family at bedside at this time.

## 2019-09-07 NOTE — DISCHARGE SUMMARY
[facility], [place]    Death Summary  [facility] Intensive Care    Date of Admission:  8/19/2019  Date of Death:         9/6/2019  Provider Completing Death Summary: Amy Bray    Discharge Diagnoses   Principal Problem:    Sepsis with multiple organ dysfunction (MOD) (H)  Active Problems:    Acute kidney failure, unspecified (H)    End-stage liver disease (H)    Septic shock (H)    ARDS (adult respiratory distress syndrome) (H)    Coagulopathy (H)    Encephalopathy      History of Present Illness   Neema Bailey is an 44 year old female PMHx of end-stage liver disease complicated by hepatic encephalopathy and varices, HTN, chronic pain/sciatica, polysubstance abuse, and morbid obesity with multiple recent hospitalizations for decompensated cirrhosis who presented with fevers/chills, weakness, shortness of breath, and worsening peripheral edema.     The patient was discharged form a TCU on 8/13 following a hospitalization from 7/17-21 for decompensated cirrhosis s/p therapeutic paracentesis and thoracentesis. Since TCU discharge, the patient reports increasing shortness of breath, particularly over the last 2-3 days. Associated symptoms include non-productive cough. Today, the patient developed acute worsening of her breathing symptoms and new fevers/chills (febrile to 102 at home) and generalized weakness. She is also concerned about worsening peripheral edema that is making it harder for her to walk. Today, the patient's symptoms acutely worsened. She reports fevers/chills (fever at home to 102) and generalized weakness. No known sick contacts. She has been taking all of her home medications as prescribed.      In the ED, the patient was tachycardic, tachypneic, and hypotensive and was found to have a WBC count of 17, lactate of 4.9, and elevated CRP. Procal was normal. Blood pressures improved and lactate downtrended with 2L IVF bolus. O2 sats maintained on 2L NC.  The patient was started on etrapenem and vancomycin given history of ESBL E coli and VRE, and transferred to the MICU service given severe sepsis and concern for potential need for pressors.      Hospital Course   Neema Bailey was admitted on 8/19/2019.  The following problems were addressed during her hospitalization:    Patient was initially admitted to the medical ICU for septic shock, found to have bacteremia growing in 1 out of 2 blood culture bottles.  She was transitioned from broad-spectrum antibiotics to ertapenem, targeted for ESBL E. coli.  Potential sources of infection were investigated, but no source was ever found.  She was transferred to the medical floors on hospital day 3 and subsequently developed worsening encephalopathy and hypoxia, temporarily treated with BiPAP before being transferred back to the medical ICU for increased work of breathing, worsening hypoxia, and altered mental status.  She was intubated for the same issues on transfer and started back on broad-spectrum antibiotics.  Her course was complicated by shock requiring pressors and anuric renal failure ultimately requiring CRRT, on a background of worsening liver disease.  Additional investigations were done to identify a focus of infection, significant for right upper lobe opacity concerning for pneumonia from possible aspiration.  Bronchoscopy was done, and culture results did not demonstrate compelling evidence of infection despite copious bilious secretions present in the respiratory tract.  However, on the following days, the patient developed worsening hypoxia consistent with ARDS.  She required initiation of Flolan and was prone and paralyzed for approximately 24 hours.  Although she improved from a respiratory perspective over the following days, she continued to require significant pressor support and CRRT and continue to demonstrate worsening liver failure despite broad-spectrum antimicrobial coverage, including  antifungals.  On her final day in the hospital, she showed signs of worsening hypotension unresponsive to pressors and increasing lactic acidosis consistent with fulminant multiorgan failure.  A care conference was called with her family, and the decision was made to transition to comfort cares.  She  of multiorgan failure on .    Cause of death: Multiple organ failure with suspected sepsis and pressor-refractory shock    I have seen and discussed this patient with Dr. Karen Bray MD  Internal Medicine/Pediatrics, PGY3  [facility]  (p) [phone]      Amy Bray MD    Significant Results and Procedures   EEG   R radial artery line placement   Central line placement R internal jugular   Central line placement L internal jugular for HD   R femoral artery line placement   Bronchoscopy   Intubation   Thoracentesis, diagnostic and therapeutic   Paracentesis, diagnostic     All cultures:  Recent Labs   Lab 19  1522 19  1157 19  1112 19  1106 19  1212   CULT Moderate growth  Enterococcus faecium (VRE)  *  Moderate growth  Candida kefyr  Susceptibility testing not routinely done  *  Critical Value/Significant Value called to and read back by  Rohit Dillard RN at 1204 19 SRQ    Susceptibility testing requested by  Lilliana Sexton on candida kefyr. 19 at 1028.TV.   50,000 to 100,000 colonies/mL  Candida kefyr  * No growth after 4 days No growth after 4 days Light growth  Candida albicans / dubliniensis  Candida albicans and Candida dubliniensis are not routinely speciated  Susceptibility testing not routinely done  *  Susceptibility testing requested by  Lilliana Sexton. 19 at [email or web address].           Pending Results   Unresulted Labs Ordered in the Past 30 Days of this Admission     Date and Time Order Name Status Description    2019 1110 Blood culture Preliminary     2019 1003 Blood culture  Preliminary     8/27/2019 1553 Nocardia culture - BAL Site 1 Preliminary     8/27/2019 1553 Legionella culture - BAL Site 1 Preliminary     8/27/2019 1553 Fungus Culture, non-blood - BAL Site 1 Preliminary     8/27/2019 1553 AFB Culture Non Blood - BAL site 1 Preliminary     8/27/2019 0905 Myoglobin quantitative urine In process     8/6/2019 2200 Plasma prepare order unit In process     7/18/2019 1600 Lactic acid whole blood In process           Primary Care Physician   Suresh Shabazz    Consultations This Hospital Stay   PHARMACY TO DOSE VANCO  PHYSICAL THERAPY ADULT IP CONSULT  VASCULAR ACCESS ADULT IP CONSULT  WOUND OSTOMY CONTINENCE NURSE  IP CONSULT  VASCULAR ACCESS ADULT IP CONSULT  PALLIATIVE CARE ADULT IP CONSULT  CHEMICAL DEPENDENCY IP CONSULT  LYMPHEDEMA THERAPY IP CONSULT  INTERNAL MEDICINE PROCEDURE TEAM ADULT IP CONSULT [facility]  GI HEPATOLOGY ADULT IP CONSULT  INTERNAL MEDICINE PROCEDURE TEAM ADULT IP CONSULT [facility] - [facility]  INTERVENTIONAL RADIOLOGY ADULT/PEDS IP CONSULT  INTERNAL MEDICINE PROCEDURE TEAM ADULT IP CONSULT [facility] - THORAC[facility]ESIS  PULMONARY GENERAL ADULT IP CONSULT  PHARMACY TO DOSE VANCO  PULMONARY GENERAL ADULT IP CONSULT  OCCUPATIONAL THERAPY ADULT IP CONSULT  NEPHROLOGY ICU IP CONSULT  NUTRITION SERVICES ADULT IP CONSULT  PHARMACY IP CONSULT  VASCULAR ACCESS CARE ADULT IP CONSULT  PHARMACY CRRT IP CONSULT  MEDICATION HISTORY IP PHARMACY CONSULT  HEMATOLOGY ADULT IP CONSULT  WOUND OSTOMY CONTINENCE NURSE  IP CONSULT  SPIRITUAL HEALTH SERVICES IP CONSULT    Time Spent on this Encounter   I, Amy Bray MD, personally saw the patient today and spent less than or equal to 30 minutes discharging this patient.    Data   Most Recent 3 CBC's:  Recent Labs   Lab Test 09/06/19  0355 09/05/19  1557 09/05/19  0409   WBC 56.6* 57.7* 61.2*   HGB 7.3* 7.8* 7.6*   MCV 95 95 95   PLT 79* 83* 80*      Most Recent 3 BMP's:  Recent Labs   Lab Test 09/06/19  0355  09/05/19  2154 09/05/19  1557    140 138   POTASSIUM 5.1  4.9 4.9 4.9   CHLORIDE 110* 109 109   CO2 19* 23 20   BUN 22 23 23   CR 0.66 0.59 0.67   ANIONGAP 12 8 9   NARENDRA 8.0* 7.9* 7.9*   * 152* 155*     Most Recent 2 LFT's:  Recent Labs   Lab Test 09/06/19  0355 09/05/19  2154   AST Canceled, Test credited 585*   * 253*   ALKPHOS 262* 237*   BILITOTAL 13.9* 13.7*     Most Recent INR's and Anticoagulation Dosing History:  Anticoagulation Dose History     Recent Dosing and Labs Latest Ref Rng & Units 8/31/2019 9/1/2019 9/2/2019 9/3/2019 9/4/2019 9/5/2019 9/6/2019    INR 0.86 - 1.14 3.85(H) 2.82(H) 2.79(H) 2.94(H) 3.26(H) 3.10(H) 4.53(H)        Most Recent 3 Troponin's:  Recent Labs   Lab Test 09/06/19  0355 09/05/19  1557 09/05/19  1123   TROPI 0.182* 0.213* 0.204*     Most Recent Cholesterol Panel:No lab results found.  Most Recent 6 Bacteria Isolates From Any Culture (See EPIC Reports for Culture Details):  Recent Labs   Lab Test 09/02/19  1522 09/02/19  1157 09/02/19  1112 09/02/19  1106 08/31/19  1212 08/27/19  1543   CULT Moderate growth  Enterococcus faecium (VRE)  *  Moderate growth  Candida kefyr  Susceptibility testing not routinely done  *  Critical Value/Significant Value called to and read back by  Rohit Dillard RN at 1204 9/4/19 SRQ    Susceptibility testing requested by  Lilliana Sexton on candida kefyr. 9/5/19 at [email or web address].   50,000 to 100,000 colonies/mL  Candida kefyr  * No growth after 4 days No growth after 4 days Light growth  Candida albicans / dubliniensis  Candida albicans and Candida dubliniensis are not routinely speciated  Susceptibility testing not routinely done  *  Susceptibility testing requested by  Lilliana Sexton. 9/5/19 at 1028.TV.   No growth after 9 days  Culture negative after 1 week  Culture received and in progress.  Positive AFB results are called as soon as detected.    Final report to follow in 7 to 8 weeks.    Assayed at [facility]  [address] [place] [address] [phone]  No growth  Culture negative monitoring continues     Most Recent TSH, T4 and A1c Labs:  Recent Labs   Lab Test 08/20/19  0431 08/19/19 1952   TSH  --  6.46*   T4 0.92  --      Results for orders placed or performed during the hospital encounter of 08/19/19   XR Chest 2 Views    Narrative    Exam: XR CHEST 2 VW, 8/19/2019 8:20 PM    Indication: jose    Comparison: 8/11/2019    Findings:   AP and lateral views of the chest. The cardiac silhouette is unchanged  from prior. There is a large right pleural effusion which is increased  in size from 11/20/2019. There are adjacent streaky opacities and  streaky left basilar opacities. No pneumothorax. Visualized upper  abdomen is unremarkable. No acute osseous abnormalities. There are  degenerative changes of the right acromioclavicular joint.      Impression    Impression:   1. Large right pleural effusion which is increased in size from  8/11/2019.  2. Streaky bibasilar opacities favored to represent pulmonary edema  and atelectasis over infectious.      I have personally reviewed the examination and initial interpretation  and I agree with the findings.    GREGORIO MIR MD   CT Chest/Abdomen/Pelvis w Contrast    Narrative    CT CHEST/ABDOMEN/PELVIS W CONTRAST 8/19/2019 11:26 PM    History: Abd pain, unspecified; ; abdominal pain, low back pain,  vomiting    Comparison: Same day chest x-ray, ultrasound 7/18/2019, CT abdomen  pelvis 8/11/2017    Technique: Helical CT acquisition from the lung apices to the pubic  symphysis was obtained withintravenous contrast. Axial, coronal, and  sagittal reconstructions were obtained and reviewed.    Contrast: iopamidol (ISOVUE-370) solution 135 mL    Findings:     CHEST:     Lungs: There is a large right-sided pleural effusion with complete  collapse of the right lower and middle lobes and subsegmental  atelectasis of the right upper lobe and left lower lobe. Scattered  calcified  pulmonary granuloma. No pneumothorax.  Airways: Central tracheobronchial tree is clear.  Vessels: Main pulmonary artery and aorta are normal in caliber. No  central pulmonary embolism. Normal three-vessel arch.  Heart: Heart size is normal without pericardial effusion.  Lymph nodes: No suspicious mediastinal or hilar lymphadenopathy.  Calcified mediastinal and hilar lymph nodes.  Thyroid: Within normal limits.  Esophagus: Within normal limits    ABDOMEN PELVIS:    Liver: Normal parenchymal attenuation without focal mass.  Biliary system: Gallbladder is distended with mucosal hyperenhancement  and mild amount of wall thickening. No radiopaque gallstones.. No  intrahepatic or extrahepatic biliary ductal dilatation.  Pancreas: No focal mass or dilation of the main pancreatic duct.  Stomach: Postoperative changes of Denny-en-Y gastric bypass.  Spleen: Spleen is enlarged measuring 13.3 cm in length. There are  multiple wedge-shaped hypodensities throughout superior aspect of the  spleen.  Adrenal glands: Within normal limits.  Kidneys: No focal mass, hydronephrosis, or stone.  Bladder: Within normal limits.  Reproductive organs: Uterus and ovaries are not visualized.  Colon: Colon is relatively decompressed with scattered noninflamed  diverticuli.  Appendix: Not confidently identified.  Small Bowel: No dilated loops of bowel. Gastrojejunal and  jejunojejunal anastomoses are within normal limits.  Lymph nodes: No intra-abdominal or pelvic lymphadenopathy.  Vasculature: Major intra-abdominal vasculature is patent. Multiple  predominantly splenorenal upper abdominal varices.    Mild intra-abdominal ascites.    Bones and soft tissues: Bilateral breast augmentation. Moderate  anasarca predominantly around the mid abdomen. There is a 5.1 x 2.6 cm  fluid collection in the subcutaneous fat of the left pelvic region.  There is a thin mildly hyperattenuating rim surrounding the  collection. No suspicious osseous lesion.  Degenerative changes L5-S1  with disc space narrowing, endplate sclerosis, and marginal  osteophytes.      Impression    IMPRESSION:   1. Large right-sided pleural effusion with complete collapse of the  right lower and middle lobes with subsegmental consolidation of the  right upper lobe. Cannot exclude underlying infection.   2. Although the liver does not appear overtly cirrhotic, there are  findings of portal hypertension in this patient with history of BURT  with splenomegaly, upper abdominal varices, and mild intra-abdominal  ascites.  3. Gallbladder is distended with mild wall thickening. This is  nonspecific in the setting of likely cirrhotic liver with ascites.  This appears unchanged compared to ultrasound dated 7/18/2019 given  differences in technique  4. Findings suggestive of acute to subacute splenic infarctions.  5. There is a nonspecific, possibly encapsulated, 5.1 x 2.6 cm fluid  collection in the subcutaneous fat of the left pelvic region.    I have personally reviewed the examination and initial interpretation  and I agree with the findings.    ZHANG REECE MD   US Abdomen Limited (RUQ)    Narrative    EXAMINATION: Limited Abdominal Ultrasound, 8/20/2019 1:49 AM     COMPARISON: Abdominal ultrasound 7/18/2019    HISTORY: Abdominal pain, vomiting, sepsis. Evaluate for cholecystitis.    FINDINGS:   Fluid: Large pleural effusion and small to moderate volume ascites.    Liver: Measures 14.5 cm. Demonstrates increased proximal echogenicity  with mildly nodular contour. No focal mass..     Gallbladder: Gallbladder sludge with mild gallbladder wall thickening  measuring up to 3 mm. There is a mild amount of pericholecystic fluid.  Equivocal sonographic Mensah sign due to pain medication. No wall  hyperemia.    Bile Ducts: Both the intra- and extrahepatic biliary system are of  normal caliber.  The common bile duct measures 4 mm in diameter.    Pancreas: Visualized portions of the head and body of  the pancreas are  unremarkable.     Kidney: The right kidney measures 9.4 cm long. There is no  hydronephrosis or hydroureter, no shadowing renal calculi, or solid  mass.     There is retrograde flow within the splenic and main portal vein.      Impression    IMPRESSION:   Gallbladder sludge with mild gallbladder wall thickening or  pericholecystic fluid.  These findings other than interval development  of gallbladder sludge are unchanged since 7/18/2019 and are  nonspecific in the setting of likely cirrhosis. This makes acute  cholecystitis unlikely.  Cirrhotic appearing liver with new retrograde flow in the splenic and  main portal vein. There is mild to moderate volume ascites with large  right-sided pleural effusion consistent with portal hypertension. No  focal mass.    I have personally reviewed the examination and initial interpretation  and I agree with the findings.    ZHANG REECE MD   US Abd/Pelvis Duplex Complete Portable    Narrative    EXAMINATION: TEMPORARY 8/20/2019 5:14 AM     COMPARISON: Abdominal ultrasound 7/18/2019    HISTORY: concern for portal vein thrombosis and/or splenic artery  thrombosis/infarction in patient with cirrhosis    TECHNIQUE: The abdomen was scanned in standard fashion with  specialized ultrasound transducer(s) using both gray-scale, color  Doppler, and spectral flow techniques.    Findings:  Large right-sided pleural effusion. Small intra-abdominal ascites    Extrahepatic portal vein flow is retrograde at 30 cm/s.  Right portal vein flow is retrograde, measuring 22 cm/s.  Left portal vein flow is retrograde, measuring 41 cm/s.    Flow in the hepatic artery is towards the liver with normal monophasic  low-resistance waveform:  153 cm/s peak systolic  0.62 resistive index.     The splenic vein is patent and flow is away from the liver.  The left,  middle, and right hepatic veins are patent with flow towards the IVC.  The IVC is patent with flow towards the heart.       Splenic artery: 69 cm/s towards the spleen with resistive index of  0.68. Normal monophasic low-resistance waveform.    Spleen: The spleen measures 15.4 cm in length.      Impression    Impression:   1.  Patent right upper quadrant Doppler evaluation with retrograde  flow in the portal and splenic veins. No portal vein thrombosis.  2.  Patent splenic artery.  3.  Large right-sided pleural effusion with small intraabdominal  ascites.  Previously visualized splenic infarctions are not well delineated on  this examination.    I have personally reviewed the examination and initial interpretation  and I agree with the findings.    ZHANG REECE MD   XR Chest Port 1 View    Narrative    XR CHEST PORT 1 VW  8/20/2019 6:11 AM      HISTORY: follow up pleural effusion; concern for worsening pulmonary  edema    COMPARISON: Chest x-ray and CT chest abdomen pelvis 8/19/2019    TECHNIQUE: Semiupright frontal view of the chest    FINDINGS: Stable moderate to large right-sided pleural effusion with  associated consolidation/atelectasis of the right middle and lower  lobes. Mild interstitial opacities. No appreciable pneumothorax.  Cardiac mediastinal silhouette is within normal limits. Trachea is  midline. Upper abdomen is unremarkable. No suspicious osseous lesion.      Impression    IMPRESSION:   1. Stable moderate to large right-sided pleural effusion with  associated consolidation/atelectasis of the right middle and lower  lobes.   2. Bilateral interstitial opacities, likely pulmonary edema.    I have personally reviewed the examination and initial interpretation  and I agree with the findings.    ZHANG REECE MD   XR Chest Port 1 View    Narrative    XR CHEST PORT 1 VW  8/20/2019 12:12 PM      HISTORY: PICC position    COMPARISON: Chest x-ray same day, chest abdomen and pelvis CT  8/19/2019.    FINDINGS:   AP radiograph of the chest. Right upper extremity PICC tip projects  over the right atrium.    Stable  cardiac mediastinal silhouette, partially obscured on the  right. Pulmonary vasculature is discrete on the left. Normal lung  volumes on the left. Trachea is midline. Slightly increased large  right pleural effusion with overlying streaky opacities. Perihilar and  retrocardiac hazy opacities. No pleural effusion on the left. No  pneumothorax appreciated.      Impression    IMPRESSION:   1. Right upper extremity PICC tip projects over the right atrium.  2. Large right pleural effusion, slightly increased from prior exam  with overlying streaky opacities favoring atelectasis vs infection.  3. Perihilar and retrocardiac hazy opacities favoring atelectasis  superimposed upon mild pulmonary vascular congestion.    I have personally reviewed the examination and initial interpretation  and I agree with the findings.    BELEN WATERS MD   NM HepatOBiliary Scan    Narrative    Examination:  NM HEPATOBILIARY SCAN      Date: 8/23/2019 2:57 PM.    Indication:   Diagnostic paracentesis yesterday with bilious drainage  - evaluate for bile leak     Additional Information: none    Technique:    The patient received 6.6 mCi of Tc-99m Choletec intravenously. Images  were obtained out through 60 minutes.   At 60 minutes the patient  received [Amt of CCK] mcg of CCK over [Infusion Time] minutes. An  additional 45 minutes of images were obtained after the gall bladder  contraction intervention.    Findings:    There is slow clearance of the radionuclide from the blood pool into  the liver suggesting underlying hepatocellular dysfunction. By 15  minutes there is clear visualization of the intrahepatic ducts as well  as the upper common bile duct.  At 25 minutes there is emptying from  the common bile duct into the small bowel. No evidence of bile leak.  Enterogastric reflux was not present. At 60 minutes there is still  residual radionuclide activity within the blood pool indicating  hepatocellular dysfunction or hepatitis. There is  secondary excretion  into the kidneys.      Impression    Impression:    1. No changes to indicate a bile leak.  2. Abnormal clearance of the ring nuclide from the blood pool  indicating underlying hepatocellular dysfunction or hepatitis.    I have personally reviewed the examination and initial interpretation  and I agree with the findings.    GREGORIO MIR MD   XR Chest Port 1 View    Narrative    XR CHEST PORT 1 VW  8/23/2019 11:19 PM      HISTORY: SOB, known hydrothorax    COMPARISON: Chest x-ray 8/20/2019    TECHNIQUE: Semiupright frontal view of the chest    FINDINGS: Relatively stable moderate to large right sided pleural  effusion given differences in positioning with associated  consolidation/atelectasis of the right lower lobe. Small sided pleural  effusion. No appreciable pneumothorax. Bilateral mixed interstitial  patchy airspace opacities. Cardiac mediastinal silhouette is within  normal limits. The trachea is midline. The upper abdomen is  unremarkable. Right-sided PICC line with tip projecting over the  superior cavoatrial junction. No suspicious osseous lesion.      Impression    IMPRESSION:   1. Stable large right-sided pleural effusion given differences in  positioning with associated consolidation/atelectasis of the right  lower lobe. Small left-sided pleural effusion.  2. Bilateral mixed interstitial patchy airspace opacities, edema  versus infection versus ARDS.    I have personally reviewed the examination and initial interpretation  and I agree with the findings.    BELEN WATERS MD   XR Chest Port 1 View    Narrative    XR CHEST PORT 1 VW  8/24/2019 2:50 PM      HISTORY: s/p thoracentesis    COMPARISON: Chest radiograph 8/22/2019    FINDINGS:   Single AP view of the chest. Right upper extremity PICC tip projects  at the cavoatrial junction. Trachea is midline. Cardiac and  mediastinal silhouette are within normal limits. Pulmonary vasculature  is indistinct. Diffuse hazy airspace and  interstitial opacities not  significantly changed. Unchanged small left pleural effusion.  Decreased moderate right pleural effusion. No pneumothorax.    Visualized portions of the abdomen, soft tissues and osseous thorax  are unremarkable.      Impression    IMPRESSION:   1. No pneumothorax visualized.  2. Decreased now moderate right pleural effusion.  3. Unchanged in left pleural effusion.  4. No significant change in diffuse mixed interstitial and airspace  opacities.    I have personally reviewed the examination and initial interpretation  and I agree with the findings.    TAYLER QUINTERO MD   XR Chest Port 1 View    Narrative    XR CHEST PORT 1 VW  8/24/2019 8:53 PM      HISTORY: Increasing O2 requirement, thoracentesis today    COMPARISON: 8/24/2019 at 1446    FINDINGS:   Single view of the chest demonstrates increase in bilateral, left  greater than right pleural effusions and perihilar mixed interstitial  and airspace opacities. Progression of the right hilar alveolar  opacity with air bronchograms. Stable position of the right arm PICC  line.  No pneumothorax.      Impression    IMPRESSION:   1. Increase in size of right greater than left pleural effusions.  2. Overall increase in perihilar mixed interstitial airspace opacities  favoring severe pulmonary edema, although severe infection, ARDS, or  diffuse alveolar hemorrhage can be considered in the appropriate  clinical setting.    I have personally reviewed the examination and initial interpretation  and I agree with the findings.    TAYLER QUINTERO MD   CT Chest/Abdomen/Pelvis w Contrast    Narrative    EXAMINATION: CT CHEST/ABDOMEN/PELVIS W CONTRAST, 8/25/2019 2:00 PM    TECHNIQUE:  Helical CT images from the thoracic inlet through the  symphysis pubis were obtained  with contrast. Contrast dose: 135ml  isovue 370    COMPARISON: CT 8/19/2019    HISTORY: Increasing abdominal pain in patient with ESBL bacteremia and  recent paracentesis with bile  aspirated.    FINDINGS:    Chest: Moderate right pleural effusion decreased in size from prior.  Small left pleural effusion increased in size from prior. There are  diffuse patchy areas of groundglass attenuation superimposed on  interlobular septal thickening and scattered patchy consolidative  opacities throughout most the lung fields with slight sparing of the  left lower lung. These findings have substantially increased since the  prior Dense consolidation adjacent to the right pleural effusion.  Scattered calcified granulomas. No pneumothorax. The central tracheal  bronchial tree is clear.    The heart is not enlarged. The ascending aorta and main pulmonary  artery are normal caliber. Right PICC distal tip is at the cavoatrial  junction. No large pericardial effusion. Thyroid is unremarkable.  Calcified mediastinal nodes. No new or enlarging mediastinal or hilar  lymphadenopathy. Bilateral breast implants.    Abdomen and pelvis: Denny-en-Y gastric bypass. No abnormally dilated  loops of bowel. No focal hepatic lesions. The portal system appears  grossly patent. The gallbladder appears to be folded on itself,  somewhat hydropic, measuring 5 cm in diameter. There are no dense  gallstones identified. No dilation of the common bile duct. No  intrahepatic biliary ductal dilation. The pancreas is diminutive and  atrophic. Splenomegaly similar to prior with multiple wedge-shaped  hypodensities in the periphery also similar to prior. Extensive  portosystemic collaterals. The adrenal glands are unremarkable. There  is no hydronephrosis or nephrolithiasis. The bladder is decompressed  around a Alexander catheter with a small amount of air in the balloon.  Hysterectomy. No adnexal masses are visualized. The small and large  bowel are normal caliber. No intra-abdominal free air. The appendix is  not visualized. There is moderate volume free fluid in the abdomen in  the upper quadrants and a small amount of the pelvis. This  fluid  measures simple which argues against hemorrhage. Extensive  subcutaneous edema about the abdomen and pelvis and chest. Nonspecific  mesenteric haziness suggestive of fluid overload.     No new or enlarging lymphadenopathy in the abdomen or pelvis. No  abdominal aortic aneurysm. Recanalized periumbilical vein. Left upper  quadrant varices.    Bones and soft tissues: Mild degenerative changes of the thoracolumbar  spine. No suspicious osseous lesions. 4.8 x 2.8 x 6.8 cm fluid  collection in the left subcutaneous pelvic region adjacent to the  lateral aspect of the superior iliac crest which previously measured  4.9 x 2.7 x 7.1 cm. Lumbar spine laminectomy changes.      Impression    IMPRESSION:   1. Increased areas of groundglass attenuation superimposed on  interlobular septal thickening with additional patchy consolidative  opacities throughout most of the lung fields with slight sparing of  the left lower lung which are increased from 8/19/2019. Findings are  compatible provided history of infection. Pulmonary edema should be  considered among others.  2. Moderate right pleural effusion is decreased in size from prior,  and small left pleural effusion is increased in size from prior. There  is a dense consolidative opacity adjacent to the right effusion which  may represent atelectasis or infection.  3a. Findings of portal hypertension in a somewhat noncirrhotic  appearing liver including splenomegaly, portosystemic collaterals, and  moderate free fluid in the abdomen and pelvis which is slightly  increased from prior.  3b. Somewhat focal perihepatic fluid. If bilaterally consistent with,  given history, nuclear medicine hepatobiliary scan could be  considered. MRI with Eovist would also provide additional information.  4. Distended gallbladder with mild wall thickening similar to prior  CT, which is nonspecific in the setting of liver disease and ascites.  Right upper quadrant ultrasound could be considered  if indicated.  5. Redemonstration of wedge-shaped hypodensities in the spleen  suggestive of embolic infarctions.  6. Relatively unchanged, nonspecific, possibly encapsulated fluid  collection in the subcutaneous tissue of the left superior pelvis.     I have personally reviewed the examination and initial interpretation  and I agree with the findings.    TAYLER QUINTERO MD   XR Chest Port 1 View    Narrative    Exam: XR CHEST PORT 1 VW, 8/26/2019 10:24 AM    Indication: 43yo F admitted to MICU for acute respiratory failure    Comparison: 8/25/2019, 8/24/2019.    Findings:   Single AP view of the chest. Right approximately PICC tip projects  over the superior cavoatrial junction. The central tracheobronchial  tree is patent. No pneumothorax. Right greater than left pleural  effusions with overlying bibasilar opacities. Diffuse patchy airspace  opacities throughout the lungs bilaterally. The visualized upper  abdomen is unremarkable. No acute osseous abnormality.      Impression    Impression:   1. Diffuse patchy airspace opacities bilaterally, concerning for  ongoing infection and/or pulmonary edema, not significantly changed  from prior examinations.  2. Small right pleural effusion, improved from prior examinations.  Stable small left pleural effusion. Unchanged associated bibasilar  atelectasis and/or consolidation.    I have personally reviewed the examination and initial interpretation  and I agree with the findings.    BELEN WATERS MD   XR Chest Port 1 View    Narrative    Exam: XR CHEST PORT 1 VW, 8/26/2019 4:48 PM    Indication: ETT placement    Comparison: Same day 1011    Findings:   AP radiograph the chest. Endotracheal tube distal tip projects 3.9 cm  above the nishant. Right PICC distal tip projects over the low superior  vena cava. Cardiac silhouette is unchanged from prior. There are mixed  interstitial and hazy airspace opacities bilaterally which are similar  to prior. More dense hazy opacification  of the lower lung fields.  Small left and small-to-moderate right pleural effusions. No  pneumothorax.      Impression    Impression:   1. Endotracheal tube distal tip projects 3.9 cm above the nishant.  2. Mixed interstitial and hazy airspace opacities are relatively  unchanged from prior and likely represent pulmonary edema possibly  with superimposed atelectasis or infection.  3. Small left and small-to-moderate right pleural effusion.    I have personally reviewed the examination and initial interpretation  and I agree with the findings.    TIMOTEO VALADEZ MD   XR Feeding Tube Placement    Narrative    Feeding tube placement.    Comparison: CT chest abdomen and pelvis, 8/25/2019.    History: Feeding tube needed for nutrition.Gastric bypass in 2002.    Fluoroscopy time:  1.1 minutes    Technique: After injection of Xylocaine gel into the right nostril, a  feeding tube was advanced under fluoroscopic guidance.    Findings: The feeding tube is advanced with the tip in the jejunum.  Small amount of barium was injected to define anatomy.      Impression    Impression: Uncomplicated feeding tube placement with tip in the  jejunum.     I, TIMOTEO VALADEZ MD, attest that I was present for all critical  portions of the procedure and was immediately available to provide  guidance and assistance during the remainder of the procedure.       I have personally reviewed the examination and initial interpretation  and I agree with the findings.    TIMOTEO VALADEZ MD   XR Chest Port 1 View    Narrative    XR CHEST PORT 1 VW  8/27/2019 2:05 PM      HISTORY: hypoxia    COMPARISON: 8/26/2019    FINDINGS:   Single AP radiograph of the chest. Endotracheal tube tip approximately  4.1 cm above the nishant. New enteric tube tip courses beyond the field  of view with small volume of contrast in the left upper quadrant.  Right upper extremity PICC tip projects over the low SVC. Cardiac  silhouette is similar in size. Lung volumes are similar  with unchanged  diffuse mixed interstitial and airspace opacities favoring the lower  lung fields. Stable small-to-moderate right pleural effusion and  increased left small-to-moderate pleural effusion.      Impression    IMPRESSION:   1. Unchanged diffuse mixed interstitial and airspace opacities, likely  pulmonary edema, possibly with superimposed infection/atelectasis.  2. Small-to-moderate bilateral pleural effusions.    I have personally reviewed the examination and initial interpretation  and I agree with the findings.    BELEN WATERS MD   XR Chest Port 1 View    Narrative    Exam: XR CHEST PORT 1 VW, 8/27/2019 5:11 PM    Indication: post-bronch increased hypoxia    Comparison: Same day 1348    Findings:   AP radiograph of the chest. Endotracheal tube distal tip projects 4.3  cm above the nishant. Enteric tube courses below the diaphragm and out  of the field-of-view. Small amount contrast in the stomach. Right PICC  distal tip projects over the low superior vena cava.    Cardiac silhouette is unchanged. There are mixed interstitial and hazy  airspace opacities which are similar to prior. No pneumothorax.  Small-to-moderate right pleural effusion with adjacent basilar  opacities, slightly increased.      Impression    Impression:   1. Mixed interstitial and hazy airspace opacities bilaterally similar  to prior likely represents pulmonary edema possibly with superimposed  atelectasis or infection.  2. Slightly increased right pleural effusion and adjacent atelectasis  versus consolidation.     I have personally reviewed the examination and initial interpretation  and I agree with the findings.    DAVID RENTERIA,    XR Chest Port 1 View    Narrative    Exam: XR CHEST PORT 1 VW, 8/28/2019 8:20 AM    Indication: hypoxia, suspected pneumonia, now on flolan    Comparison: 8/27/2019    Findings:   Endotracheal tube tip at the mid thoracic trachea. Right upper  extremity PICC tip at the mid SVC. Enteric tube  courses below the  diaphragm and off image margin. The cardiomediastinal silhouette is  within normal limits. The pulmonary vasculature is indistinct. Diffuse  bilateral mixed interstitial and patchy airspace opacities. Decreased  right basilar and increased left basilar opacities with associated  probable bilateral pleural effusions. No pneumothorax.      Impression    Impression: Diffuse bilateral mixed interstitial and patchy airspace  opacities, slightly decreased in the right lung base and slightly  increased in the left lung base. Probable bilateral pleural effusions.    DAVID RENTERIA, DO   XR Chest Port 1 View    Narrative    XR CHEST PORT 1 VW  8/29/2019 6:26 AM      HISTORY: pulmonary edema v hepatopulmonary syndrome    COMPARISON: 8/28/2019    FINDINGS:   Single AP radiograph of the chest. Endotracheal tube tip projects 6.8  cm above the nishant. Right upper chimney PICC tip projects over the  SVC/right atrial junction. Left IJ central line tip projects over the  SVC/right atrial junction. Enteric tube tip courses beyond the  field-of-view. The cardiac silhouette is similar in size. Increased  lung volumes with mildly increased mixed interstitial and airspace  opacities in the right lung base. Small right pleural effusion.      Impression    IMPRESSION:   1. Increased lung volumes with mildly increased mixed interstitial and  airspace opacities in the right lung base, pulmonary edema versus  infection.  2. Small right pleural effusion.    I have personally reviewed the examination and initial interpretation  and I agree with the findings.    BELEN WATERS MD   XR Chest Port 1 View    Narrative    Exam: XR CHEST PORT 1 VW, 8/28/2019 5:43 PM    Indication: line placement    Comparison: Same day 0757    Findings:   AP radiograph the chest. Endotracheal tube distal tip projects over  the midthoracic trachea. Right PICC distal tip projects over the high  superior vena cava. Left IJ central venous catheter  distal tip  projects over the cavoatrial junction. Enteric tube courses below the  diaphragm and not field-of-view. Small amount contrast in the stomach.  Right IJ central venous catheter distal tip projects over the high  right atrium.    Cardiac silhouette is unchanged. Mixed interstitial and airspace  opacities bilaterally not significantly changed from prior. Bibasilar  opacities increased on the right and decrease in the left. Likely  small bilateral pleural effusions. No pneumothorax.      Impression    Impression:   1. Left IJ central venous catheter distal tip projects over the low  superior vena cava. Right IJ central venous catheter distal tip  projects over the high right atrium.  2. Relatively unchanged mixed interstitial and airspace opacities,  likely pulmonary edema.  3. Slightly increased right and decrease left bibasilar opacities  which may represent overlying atelectasis or infection.     I have personally reviewed the examination and initial interpretation  and I agree with the findings.    BELEN WATERS MD   XR Chest Port 1 View    Narrative    Exam: XR CHEST PORT 1 VW, 8/30/2019 6:02 AM    Indication: pulmonary edema v hepatopulmonary syndrome    Comparison: 8/29/2019    Findings:   AP view of the chest. Endotracheal tube with the tip in the mid  thoracic trachea. Right approach PICC line with the tip at the  superior cavoatrial junction. Left and right IJ approach venous line  with the tips projecting at the level of the right atrium. Enteric  tube with the distal end extending beyond the field-of-view.    Cardiac silhouette is partially obscured. Mixed interstitial and  patchy airspace opacities with increased atelectasis versus  consolidation in both lower lobes. Bilateral layering pleural  effusions. No pneumothorax. No acute findings in the upper abdomen.      Impression    Impression:   1. Mixed interstitial and patchy airspace opacities with increased  atelectasis versus consolidation  in both lower lobes, suggestive of  worsening pulmonary edema.  2. Bilateral layering pleural effusions.    I have personally reviewed the examination and initial interpretation  and I agree with the findings.    DAVID RENTERIA,    XR Chest Port 1 View    Narrative    Exam: XR CHEST PORT 1 VW, 8/31/2019 6:32 AM    Indication: pulmonary edema v hepatopulmonary syndrome    Comparison: 8/30/2019    Findings:   AP view of the chest. Endotracheal tube with the tip in the mid  thoracic trachea. Enteric tube with the distal end extending beyond  the field-of-view. Right approach PICC line with the tip in the lower  thoracic SVC. Left IJ approach venous line with the tip directed over  the right atrium. Right IJ approach venous line with the tip  projecting over the right atrium.    Cardiac silhouette is unchanged. Mixed interstitial and patchy basilar  airspace opacity similar to prior exam. There is slightly increased  atelectasis versus consolidation in both lower lobes. Bilateral  layering pleural effusions similar to prior. No pneumothorax.  Visualized portion of the upper abdomen is unremarkable.      Impression    Impression:   1. Mixed interstitial and patchy airspace opacities with increased  atelectasis versus consolidation in both lower lungs. Findings  suggesting of worsening pulmonary edema versus infection.  2. Bilateral layering pleural effusions similar to prior.  3. Support devices as detailed above.    I have personally reviewed the examination and initial interpretation  and I agree with the findings.    BELEN WATERS MD   XR Chest Port 1 View    Narrative    Exam: XR CHEST PORT 1 VW, 9/1/2019 6:03 AM    Indication: pulmonary edema v hepatopulmonary syndrome    Comparison: 8/31/2019    Findings:   Endotracheal tube tip projects over the mid trachea. Right PICC tip  projects over the low SVC. Enteric tube courses below the field of  view. Stable cardiomegaly with bilateral mixed  interstitial/airspace  opacities and layering pleural effusions. Improved left basilar dense  opacities. No pneumothorax.      Impression    Impression: Stable support devices. Improved dense left basilar  opacities. Unchanged mixed interstitial/airspace opacities with  layering pleural effusions.    CAN ROBLERO MD   XR Chest Port 1 View    Narrative    XR CHEST PORT 1 VW  9/2/2019 5:50 AM      HISTORY: pulmonary edema v hepatopulmonary syndrome    COMPARISON: 9/1/2019, 8/31/2019.    FINDINGS: Single AP view of the chest. Endotracheal tube tip projects  approximately 3 cm above the nishant. Enteric tube courses beyond the  field-of-view. Left central line in right upper approach PICC line tip  projects over the right atrium. Trachea is midline, stable  cardiomegaly. Essentially unchanged lateral mixed interstitial and  airspace opacities. Stable bilateral pleural effusions, right greater  than left. No acute osseous or abdominal abnormality.      Impression    IMPRESSION:  1. Stable bilateral pleural effusions with overlying basilar  opacities, right greater than left. Atelectasis vs developing  consolidation.  2. Cardiomegaly with bilateral mixed interstitial and airspace  opacities, pulmonary edema versus developing infection.    I have personally reviewed the examination and initial interpretation  and I agree with the findings.    LEW RAE MD   XR Chest Port 1 View    Narrative    XR CHEST PORT 1 VW  9/3/2019 6:04 AM      HISTORY: pulmonary edema v hepatopulmonary syndrome    COMPARISON: 9/2/2019    FINDINGS: Single view of the chest. Endotracheal tube projects  approximately 4 cm above the nishant. Right central line, right upper  approach PICC, left central line are unchanged in position. Trachea is  midline, currently silhouette is unchanged.     Essentially unchanged bilateral mixed interstitial and airspace  opacities. Stable bilateral pleural effusions, right greater than  left. Unusual  lucency at the right lung base. Likely suggests slightly  more aerated lung compared to the remainder. Attention on follow-up  recommended. No acute osseous or abdominal abnormality.      Impression    IMPRESSION:  1. Stable bilateral pleural effusions with overlying basilar  opacities, right greater than left. Atelectasis versus developing  consolidation.  2. Cardiomegaly with stable bilateral mixed interstitial and airspace  opacities, pulmonary edema versus developing infection.  3. Unusual lucency at the right lung base. Likely just better aerated  portion of the lung compared to prior studies. Attention on follow-up  recommended.    I have personally reviewed the examination and initial interpretation  and I agree with the findings.    TIMOTEO VALADEZ MD   US MSK Limited    Narrative    EXAM: Limited left hip ultrasound, 9/3/2019.    INDICATION: Pelvic fluid collection on the left side superficial to  the gluteal muscle.    COMPARISON: CT, 8/25/2019.    TECHNIQUE: Gray scale imaging without color Doppler of the left hip.    FINDINGS/    Impression    IMPRESSION: 12.0 x 2.9 x 4.6 cm anechoic collection adjacent to the  left hip with extensive overlying edema, does not appear to be  significantly changed since CT exam dated 8/25/2019.     I have personally reviewed the examination and initial interpretation  and I agree with the findings.    JAY ENGLE MD   XR Chest Port 1 View    Narrative    Exam: TEMPORARY, 9/4/2019 6:14 AM    Indication: Pulmonary edema versus bilateral pulmonary syndrome    Comparison: 9/3/2019    Findings:   Single AP view of the chest. Endotracheal tube tip projects over the  midthoracic trachea. Enteric tube and right central line are unchanged  in position. Trachea is midline, stable cardiomegaly. Increased right  pleural effusion with right lung atelectasis. Stable bilateral  interstitial perihilar opacities. No acute osseous abnormality.      Impression    IMPRESSION:  1. Increased  right pleural effusion with right lung atelectasis.  2. Cardiomegaly with pulmonary edema.    I have personally reviewed the examination and initial interpretation  and I agree with the findings.    ZHANG REECE MD   XR Feeding Tube Placement    Narrative    Feeding tube placement    Comparison: 8/25/2019 CT.    History: NG. Patient has HISTORY of gastric bypass surgery.     Fluoroscopy time:  10 minutes    Technique: An existing, clogged feeding tube was removed. After  injection of Xylocaine gel into the right nostril, a feeding tube was  advanced under fluoroscopic guidance.    Findings: The feeding tube is advanced with the tip near the  gastrojejunostomy, however, the tip could not be passed beyond the  gastrojejunostomy into the proximal jejunum. The tube was left in this  position with redundancy of the portion of the tube in the more  proximal stomach.       Impression    Impression: Feeding tube placement with tip near the  gastrojejunostomy. The tip could not be advanced beyond the  gastrojejunostomy. Consider obtaining a follow-up radiograph to assess  for spontaneous advancement of the tip into the small bowel.     I, DAVID RENTERIA DO, attest that I was present for all critical  portions of the procedure and was immediately available to provide  guidance and assistance during the remainder of the procedure.    I have personally reviewed the examination and initial interpretation  and I agree with the findings.    DAVID RENTERIA DO   XR Chest Port 1 View    Narrative    Exam: XR CHEST PORT 1 VW, 9/5/2019 7:55 AM    Indication: H/o ARDS, R pleural effusion    Comparison: 4/9/2019    Findings: Single AP chest radiograph. Endotracheal tube in the mid  thoracic trachea. Bilateral central venous catheter tips terminating  at the cavoatrial junction. Enteric tube projecting over the  esophagus, tip outside field-of-view. The trachea is midline. Mild  cardiomegaly. Large right pleural effusion. Left  basilar/retrocardiac  opacity. Diffuse interstitial airspace markings, right greater than  left. No acute osseous abnormalities. Small amount of residual  contrast in the GE junction.      Impression    Impression:   1. Support devices in stable position. Endotracheal tube in the mid  thoracic trachea.  2. Large right pleural effusion, not significantly changed from prior.  3. Bibasilar opacities, atelectasis versus consolidation.  4. Right greater than left interstitial pulmonary opacities.     I have personally reviewed the examination and initial interpretation  and I agree with the findings.    BELEN WATERS MD   US Abdomen Limited w Abd/Pelv Duplex Complete Port    Narrative    EXAMINATION: US ABDOMEN COMPLETE WITH DOPPLER, 9/5/2019 11:08 AM     COMPARISON: Ultrasound 8/20/2019    HISTORY: concern for portal vein thrombosis and/or splenic artery  thrombosis/infarction in patient with cirrhosis    TECHNIQUE: The abdomen was scanned in standard fashion with  specialized ultrasound transducer(s) using both gray-scale, color  Doppler, and spectral flow techniques.      Findings:    Liver: Liver measures 13.1 cm in length. No evidence of a focal  hepatic mass.     Extrahepatic portal vein flow is retrograde, measuring 35 cm/sec.  Right portal vein flow is retrograde, measuring 17 cm/sec.  Left portal vein flow is retrograde, measuring 16 cm/sec.      Flow in the hepatic artery is towards the liver with abnormal  monophasic low resistance waveforms:  135 cm/sec peak systolic  0.52 resistive index.     The splenic vein is patent with retrograde flow. The left, middle, and  right hepatic veins are patent with flow towards the IVC. The IVC is  patent with flow towards the heart.   The visualized aorta is not  dilated.    Gallbladder: The gallbladder is relatively contracted with echogenic  debris within its lumen, likely biliary sludge. Mild gallbladder wall  is mildly thickened, nonspecific in the setting of ascites.      Bile Ducts: Both the intra- and extrahepatic biliary system are of  normal caliber.  The common bile duct measures 2.4 mm in diameter.    Pancreas: Obscured.    Kidneys: The right kidney measures 9.8 cm, and demonstrates normal  echotexture, without mass or hydronephrosis.      Fluid: Large right pleural effusion. Small volume ascites.        Impression    Impression:   1.  Patent right upper quadrant Doppler evaluation, with retrograde  flow in the portal and splenic veins. No portal vein thrombosis.  2.  Large right-sided pleural effusion and small volume  intra-abdominal ascites.    I have personally reviewed the examination and initial interpretation  and I agree with the findings.    JAY ENGLE MD   XR Chest Port 1 View    Narrative    Exam: TEMPORARY, 9/6/2019 5:50 AM    Indication: History of ARDS, right pleural effusion    Comparison: 9/5/2019    Findings:   Single view of the chest. Endotracheal tube projects over the  midthoracic trachea. Left central line, right central line, and  feeding tube are unchanged in position. Trachea midline, stable cardio  likely. Stable large right pleural effusion. Diffuse interstitial and  patchy airspace opacities, right greater than left. No acute osseous  abnormality.      Impression    IMPRESSION:  1. Stable support devices.  2. Stable large right pleural effusion.  3. Unchanged mixed interstitial and patchy airspace opacities,  atelectasis versus consolidation.    I have personally reviewed the examination and initial interpretation  and I agree with the findings.    DAVID RENTERIA, DO

## 2019-09-08 LAB
BACTERIA SPEC CULT: ABNORMAL
BACTERIA SPEC CULT: NO GROWTH
BACTERIA SPEC CULT: NO GROWTH
Lab: ABNORMAL
Lab: NORMAL
Lab: NORMAL
SPECIMEN SOURCE: ABNORMAL
SPECIMEN SOURCE: ABNORMAL
SPECIMEN SOURCE: NORMAL
SPECIMEN SOURCE: NORMAL

## 2019-09-09 LAB
BACTERIA SPEC CULT: ABNORMAL
BACTERIA SPEC CULT: ABNORMAL
Lab: ABNORMAL
SPECIMEN SOURCE: ABNORMAL

## 2019-09-10 LAB
BACTERIA SPEC CULT: NORMAL
SPECIMEN SOURCE: NORMAL

## 2019-09-19 ENCOUNTER — PRE VISIT (OUTPATIENT)
Dept: GASTROENTEROLOGY | Facility: CLINIC | Age: 44
End: 2019-09-19

## 2019-09-24 LAB
BACTERIA SPEC CULT: NORMAL
FUNGUS SPEC CULT: NORMAL
SPECIMEN SOURCE: NORMAL
SPECIMEN SOURCE: NORMAL

## 2019-10-23 LAB
MYCOBACTERIUM SPEC CULT: NORMAL
SPECIMEN SOURCE: NORMAL

## 2020-04-20 NOTE — PLAN OF CARE
P likes new 4ww. Pt near IND with mobility 165 ft with 4ww. Pt on track for discharge to [facility] 8/4.  Discharge Planner PT   Patient plan for discharge: [facility]  Current status: above  Barriers to return to prior living situation:health status, liver failure  Recommendations for discharge: home PT  Rationale for recommendations:pt presentation; home safety       Entered by: Darlin Singleton 08/02/2019 1:10 PM        DIZZINESS/SEIZURE

## 2021-06-03 ENCOUNTER — RECORDS - HEALTHEAST (OUTPATIENT)
Dept: ADMINISTRATIVE | Facility: CLINIC | Age: 46
End: 2021-06-03

## 2021-11-15 NOTE — PHARMACY-ADMISSION MEDICATION HISTORY
Admission medication history interview status for the 7/17/2019 admission is complete. See Epic admission navigator for allergy information, pharmacy, prior to admission medications and immunization status.     Medication history interview sources:  Nursing home MAR, Chart Review    Changes made to PTA medication list (reason)  Added: All medications on current PTA list - these were medications listed on [facility] MAR.  Deleted: Estradiol 0.5 mg tablet, fluticasone 0.05% cream, ibuprofen 600 mg tablet, metoprolol succinate 50 mg tablet, senna-docusate 8.6-50 mg tablet, sumatriptan succinate 50 mg tablet - these were medications not listed on [facility] MAR. Of note, patient has been following with [facility] for the past six months and these may have been old medications that have not been updated during the time patient was following with [facility].   Changed: None    Additional medication history information (including reliability of information, actions taken by pharmacist):  Per MAR, metoprolol tartrate (take 12.5 mg by mouth twice daily) was discontinued on 7/11, but did not include any notes about why it was discontinued.       Prior to Admission medications    Medication Sig Last Dose Taking? Auth Provider   furosemide (LASIX) 20 MG tablet Take 20 mg by mouth daily 7/16/2019 at 0800 Yes Unknown, Entered By History   hydrOXYzine (ATARAX) 25 MG tablet Take 25 mg by mouth every 6 hours as needed for itching 7/16/2019 at 2150 Yes Unknown, Entered By History   lactulose (CHRONULAC) 10 GM/15ML solution Take 20 g by mouth 3 times daily 7/16/2019 at 2000 Yes Unknown, Entered By History   Lidocaine (LIDOCARE) 4 % Patch Apply 1 patch daily at 0800 and remove at 2000. To prevent lidocaine toxicity, patient should be patch free for 12 hrs daily. 7/16/2019 at 0800 Yes Unknown, Entered By History   melatonin 3 MG tablet Take 3 mg by mouth At Bedtime 7/16/2019 at 2000 Yes Unknown, Entered By History  midodrine (PROAMATINE) 10 MG tablet Take 12.5 mg by mouth 3 times daily (with meals) 7/16/2019 at 1730 Yes Unknown, Entered By History   multivitamin, therapeutic (THERA-VIT) TABS tablet Take 1 tablet by mouth daily 7/15/2019 at 0800 Yes Unknown, Entered By History   pantoprazole sodium (PROTONIX) 40 MG packet Take 1 packet by mouth daily 7/16/2019 at 0700 Yes Unknown, Entered By History   polyethylene glycol (MIRALAX/GLYCOLAX) packet Take 17 g by mouth daily 7/16/2019 at 0800 Yes Unknown, Entered By History   rifaximin (XIFAXAN) 550 MG TABS tablet Take 550 mg by mouth 2 times daily 7/16/2019 at 2000 Yes Unknown, Entered By History   spironolactone (ALDACTONE) 25 MG tablet Take 12.5 mg by mouth daily 7/16/2019 at 0800 Yes Unknown, Entered By History   Vitamin D, Cholecalciferol, 1000 units TABS Take 1 tablet by mouth daily 7/16/2019 at 0800 Yes Unknown, Entered By History         Medication history completed by:   Eleuterio Ash  PharmD IV Student

## 2023-11-09 NOTE — DISCHARGE INSTRUCTIONS
Dental Abscess  An abscess is a sac of pus. A dental abscess forms when a tooth or the tissue around it becomes infected with bacteria. The bacteria can enter through a cavity or a crack in a tooth. It can also infect the gum tissue or bone around a tooth. An untreated abscess can cause the loss of the tooth. It can even spread to other parts of the body and become life-threatening.    Symptoms of a dental abscess   Signs of a dental abscess include:    Toothache, often severe    Tooth pain with hot, cold, or pressure    Pain in the gums, cheek, or jaw    Bad breath or bitter taste in the mouth    Trouble swallowing or opening the mouth    Fever    Swollen or enlarged glands in the neck  Diagnosing a dental abscess  An abscess is diagnosed by looking at your teeth and gums. You will be told if any tests are needed, such as dental X-rays.  Treating a dental abscess  Treatments for a dental abscess may include the following:    Antibiotic medicines. These treat the underlying infection.    Pain relievers. These help you feel more comfortable. Your healthcare provider may prescribe a medicine for you. Or you may use over-the-counter pain relievers, such as acetaminophen or ibuprofen.    Warm saltwater rinses. These can soothe discomfort and help clear away pus.    Root canal surgery.  This may be done if needed to save the tooth. With a root canal, the infected part of the tooth is removed. A special substance is then used to fill the empty space in the tooth.    Draining the abscess. This may be doneif needed. Incisions are made to allow the infected material to drain from the tooth.    Removing the tooth. This is done in cases of severe infection that can t be treated another way.  You may need to be admitted to a hospital if the infection is severe, has spread, or doesn t respond to treatment.     When to call the dentist  Call your dentist right away if you have any of the following:    Fever of 100.4 F (38 C) or  SUBJECTIVE  This is a 68 year old patient status post YAG Capsulotomy left eye.  Patient is doing well, Seeing better.    OBJECTIVE:  Vision                         LEFT: Without correction  CF    ph= NI      Tonometry    Method  Goldmann with Flurox   Time: 10:45 AM      LEFT:  18 mm Hg  Electronically Signed by:    Charlotte Harman 11/9/2023 10:50 AM      Exam  PCIOL, capsulotomy, no cells, left side                                       ASSESSMENT:  Impression - S/P YAG capsulotomy in left eye times1 week.  Looks good.    PLAN:  Follow up in 6 months.    Take eye pressure and dilate next time.  OCT of the macula next time.    Diego Magallanes MD    higher    Increased pain, redness, drainage, or swelling in the treated area    Swelling of the face or jawbone    Pain that can't be controlled with medicines   Preventing dental abscess  To prevent another abscess in the future, keep your teeth clean and healthy. Brush twice a day and floss at least once daily. See your dentist for regular tooth cleanings. And stay away from sugary foods and drinks that can lead to tooth decay.  Date Last Reviewed: 6/1/2017 2000-2017 [employer]. [address] [place] [address], [place] [address]. All rights reserved. This information is not intended as a substitute for professional medical care. Always follow your healthcare professional's instructions.

## (undated) DEVICE — SOL NACL 0.9% INJ 250ML BAG 2B1322Q

## (undated) DEVICE — DECANTER BAG 2002S

## (undated) DEVICE — ESU GROUND PAD UNIVERSAL W/O CORD

## (undated) DEVICE — SOL WATER IRRIG 1000ML BOTTLE 2F7114

## (undated) DEVICE — RETR ELASTIC STAYS LONE STAR SHARP 5MM 8/PACK 3311-8G

## (undated) DEVICE — GOWN IMPERVIOUS SPECIALTY XLG/XLONG 32474

## (undated) DEVICE — GLOVE PROTEXIS W/NEU-THERA 8.0  2D73TE80

## (undated) DEVICE — SPONGE PACK VAGINAL 2X36"

## (undated) DEVICE — SYR 10ML SLIP TIP W/O NDL

## (undated) DEVICE — SYR 10ML FINGER CONTROL W/O NDL 309695

## (undated) DEVICE — PACK MAJOR LITHOTOMY SBA15MLFSD

## (undated) DEVICE — SU VICRYL 3-0 CT-2 27" J332H

## (undated) DEVICE — SPONGE PACK VAGINAL 2"X9

## (undated) DEVICE — SU VICRYL 0 CT-1 27" J340H

## (undated) DEVICE — RETR RING LONE STAR 16.6X16.2CM 3715

## (undated) DEVICE — NDL 22GA 1.5"

## (undated) DEVICE — TUBING IRRIG CYSTO/BLADDER SET 81" LF 2C4040

## (undated) DEVICE — BLADE CLIPPER 4406

## (undated) DEVICE — BLADE KNIFE SURG 15 371115

## (undated) DEVICE — SU VICRYL 3-0 CT-1 36" J338H

## (undated) DEVICE — NDL 25GA 1.5" 305127

## (undated) DEVICE — DRAPE VAGI BAG 18X9" 1072

## (undated) DEVICE — SU VICRYL 2-0 CT-2 27" J333H

## (undated) DEVICE — LINEN TOWEL PACK X5 5464

## (undated) DEVICE — DECANTER VIAL 2006S

## (undated) DEVICE — ESU PENCIL W/HOLSTER E2350H

## (undated) RX ORDER — PROPOFOL 10 MG/ML
INJECTION, EMULSION INTRAVENOUS
Status: DISPENSED
Start: 2018-01-01

## (undated) RX ORDER — LIDOCAINE HYDROCHLORIDE 20 MG/ML
SOLUTION OROPHARYNGEAL
Status: DISPENSED
Start: 2019-01-01

## (undated) RX ORDER — FENTANYL CITRATE 50 UG/ML
INJECTION, SOLUTION INTRAMUSCULAR; INTRAVENOUS
Status: DISPENSED
Start: 2018-01-01

## (undated) RX ORDER — ONDANSETRON 2 MG/ML
INJECTION INTRAMUSCULAR; INTRAVENOUS
Status: DISPENSED
Start: 2018-01-01

## (undated) RX ORDER — ACETAMINOPHEN 325 MG/1
TABLET ORAL
Status: DISPENSED
Start: 2018-01-01

## (undated) RX ORDER — HYDROMORPHONE HYDROCHLORIDE 1 MG/ML
INJECTION, SOLUTION INTRAMUSCULAR; INTRAVENOUS; SUBCUTANEOUS
Status: DISPENSED
Start: 2018-01-01

## (undated) RX ORDER — KETOROLAC TROMETHAMINE 30 MG/ML
INJECTION, SOLUTION INTRAMUSCULAR; INTRAVENOUS
Status: DISPENSED
Start: 2018-01-01

## (undated) RX ORDER — PHENAZOPYRIDINE HYDROCHLORIDE 200 MG/1
TABLET, FILM COATED ORAL
Status: DISPENSED
Start: 2018-01-01

## (undated) RX ORDER — LIDOCAINE HYDROCHLORIDE 20 MG/ML
INJECTION, SOLUTION EPIDURAL; INFILTRATION; INTRACAUDAL; PERINEURAL
Status: DISPENSED
Start: 2018-01-01

## (undated) RX ORDER — ALBUMIN, HUMAN INJ 5% 5 %
SOLUTION INTRAVENOUS
Status: DISPENSED
Start: 2018-01-01

## (undated) RX ORDER — FUROSEMIDE 10 MG/ML
INJECTION INTRAMUSCULAR; INTRAVENOUS
Status: DISPENSED
Start: 2018-01-01

## (undated) RX ORDER — LIDOCAINE HYDROCHLORIDE 10 MG/ML
INJECTION, SOLUTION EPIDURAL; INFILTRATION; INTRACAUDAL; PERINEURAL
Status: DISPENSED
Start: 2019-01-01

## (undated) RX ORDER — DEXAMETHASONE SODIUM PHOSPHATE 4 MG/ML
INJECTION, SOLUTION INTRA-ARTICULAR; INTRALESIONAL; INTRAMUSCULAR; INTRAVENOUS; SOFT TISSUE
Status: DISPENSED
Start: 2018-01-01

## (undated) RX ORDER — CEFAZOLIN SODIUM IN 0.9 % NACL 3 G/100 ML
INTRAVENOUS SOLUTION, PIGGYBACK (ML) INTRAVENOUS
Status: DISPENSED
Start: 2018-01-01

## (undated) RX ORDER — HYDROXYZINE HYDROCHLORIDE 50 MG/ML
INJECTION, SOLUTION INTRAMUSCULAR
Status: DISPENSED
Start: 2018-01-01

## (undated) RX ORDER — CEFAZOLIN SODIUM 2 G/100ML
INJECTION, SOLUTION INTRAVENOUS
Status: DISPENSED
Start: 2018-01-01

## (undated) RX ORDER — GLYCOPYRROLATE 0.2 MG/ML
INJECTION, SOLUTION INTRAMUSCULAR; INTRAVENOUS
Status: DISPENSED
Start: 2018-01-01